# Patient Record
Sex: MALE | Race: BLACK OR AFRICAN AMERICAN | NOT HISPANIC OR LATINO | URBAN - METROPOLITAN AREA
[De-identification: names, ages, dates, MRNs, and addresses within clinical notes are randomized per-mention and may not be internally consistent; named-entity substitution may affect disease eponyms.]

---

## 2020-11-06 ENCOUNTER — EMERGENCY (EMERGENCY)
Facility: HOSPITAL | Age: 55
LOS: 1 days | Discharge: HOME | End: 2020-11-06
Admitting: EMERGENCY MEDICINE
Payer: MEDICAID

## 2020-11-06 VITALS
SYSTOLIC BLOOD PRESSURE: 123 MMHG | OXYGEN SATURATION: 96 % | HEIGHT: 74 IN | RESPIRATION RATE: 16 BRPM | TEMPERATURE: 98 F | DIASTOLIC BLOOD PRESSURE: 83 MMHG | HEART RATE: 118 BPM | WEIGHT: 205.03 LBS

## 2020-11-06 VITALS
RESPIRATION RATE: 16 BRPM | HEART RATE: 85 BPM | DIASTOLIC BLOOD PRESSURE: 73 MMHG | SYSTOLIC BLOOD PRESSURE: 116 MMHG | OXYGEN SATURATION: 96 %

## 2020-11-06 DIAGNOSIS — R07.89 OTHER CHEST PAIN: ICD-10-CM

## 2020-11-06 LAB
ALBUMIN SERPL ELPH-MCNC: 3.8 G/DL — SIGNIFICANT CHANGE UP (ref 3.4–5)
ALP SERPL-CCNC: 53 U/L — SIGNIFICANT CHANGE UP (ref 40–120)
ALT FLD-CCNC: 34 U/L — SIGNIFICANT CHANGE UP (ref 12–42)
ANION GAP SERPL CALC-SCNC: 10 MMOL/L — SIGNIFICANT CHANGE UP (ref 9–16)
AST SERPL-CCNC: 48 U/L — HIGH (ref 15–37)
BILIRUB SERPL-MCNC: 0.8 MG/DL — SIGNIFICANT CHANGE UP (ref 0.2–1.2)
BUN SERPL-MCNC: 17 MG/DL — SIGNIFICANT CHANGE UP (ref 7–23)
CALCIUM SERPL-MCNC: 9 MG/DL — SIGNIFICANT CHANGE UP (ref 8.5–10.5)
CHLORIDE SERPL-SCNC: 105 MMOL/L — SIGNIFICANT CHANGE UP (ref 96–108)
CO2 SERPL-SCNC: 26 MMOL/L — SIGNIFICANT CHANGE UP (ref 22–31)
CREAT SERPL-MCNC: 1.27 MG/DL — SIGNIFICANT CHANGE UP (ref 0.5–1.3)
D DIMER BLD IA.RAPID-MCNC: 430 NG/ML DDU — HIGH
GLUCOSE SERPL-MCNC: 91 MG/DL — SIGNIFICANT CHANGE UP (ref 70–99)
HCT VFR BLD CALC: 37.4 % — LOW (ref 39–50)
HGB BLD-MCNC: 12.5 G/DL — LOW (ref 13–17)
MCHC RBC-ENTMCNC: 31.6 PG — SIGNIFICANT CHANGE UP (ref 27–34)
MCHC RBC-ENTMCNC: 33.4 GM/DL — SIGNIFICANT CHANGE UP (ref 32–36)
MCV RBC AUTO: 94.7 FL — SIGNIFICANT CHANGE UP (ref 80–100)
NRBC # BLD: 0 /100 WBCS — SIGNIFICANT CHANGE UP (ref 0–0)
PLATELET # BLD AUTO: 189 K/UL — SIGNIFICANT CHANGE UP (ref 150–400)
POTASSIUM SERPL-MCNC: 4.1 MMOL/L — SIGNIFICANT CHANGE UP (ref 3.5–5.3)
POTASSIUM SERPL-SCNC: 4.1 MMOL/L — SIGNIFICANT CHANGE UP (ref 3.5–5.3)
PROT SERPL-MCNC: 7.9 G/DL — SIGNIFICANT CHANGE UP (ref 6.4–8.2)
RBC # BLD: 3.95 M/UL — LOW (ref 4.2–5.8)
RBC # FLD: 13.6 % — SIGNIFICANT CHANGE UP (ref 10.3–14.5)
SODIUM SERPL-SCNC: 141 MMOL/L — SIGNIFICANT CHANGE UP (ref 132–145)
TROPONIN I SERPL-MCNC: 0.03 NG/ML — SIGNIFICANT CHANGE UP (ref 0.02–0.06)
TROPONIN I SERPL-MCNC: 0.04 NG/ML — SIGNIFICANT CHANGE UP (ref 0.02–0.06)
TSH SERPL-MCNC: 2.26 UIU/ML — SIGNIFICANT CHANGE UP (ref 0.36–3.74)
WBC # BLD: 8.53 K/UL — SIGNIFICANT CHANGE UP (ref 3.8–10.5)
WBC # FLD AUTO: 8.53 K/UL — SIGNIFICANT CHANGE UP (ref 3.8–10.5)

## 2020-11-06 PROCEDURE — 93010 ELECTROCARDIOGRAM REPORT: CPT

## 2020-11-06 PROCEDURE — 71045 X-RAY EXAM CHEST 1 VIEW: CPT | Mod: 26

## 2020-11-06 PROCEDURE — 99285 EMERGENCY DEPT VISIT HI MDM: CPT | Mod: 25

## 2020-11-06 RX ORDER — ASPIRIN/CALCIUM CARB/MAGNESIUM 324 MG
325 TABLET ORAL ONCE
Refills: 0 | Status: COMPLETED | OUTPATIENT
Start: 2020-11-06 | End: 2020-11-06

## 2020-11-06 RX ADMIN — Medication 325 MILLIGRAM(S): at 04:19

## 2020-11-06 NOTE — ED ADULT NURSE NOTE - NSIMPLEMENTINTERV_GEN_ALL_ED
Implemented All Universal Safety Interventions:  [place] to call system. Call bell, personal items and telephone within reach. Instruct patient to call for assistance. Room bathroom lighting operational. Non-slip footwear when patient is off stretcher. Physically safe environment: no spills, clutter or unnecessary equipment. Stretcher in lowest position, wheels locked, appropriate side rails in place.

## 2020-11-06 NOTE — ED PROVIDER NOTE - OBJECTIVE STATEMENT
56 yo male with pmhx HTN presents c/o left sided chest pain x 1 hour, states he walked here from [address] currently falling asleep, poorly cooperative with history, patient unable to provide details of chest discomfort. no vomiting/diaphoresis. denies smoking or drug use history including no cocaine use. patient states he was told he had pericarditis 5 years ago.

## 2020-11-06 NOTE — ED PROVIDER NOTE - DIAGNOSTIC INTERPRETATION
Interpreted by ED JESUS Huggins  chest x-ray 1 views  Lungs clear, heart shadow normal, bony structures normal, no free air under diaphragm, no PTX

## 2020-11-06 NOTE — ED PROVIDER NOTE - PHYSICAL EXAMINATION
CONSTITUTIONAL: Well-appearing; well-nourished; in no apparent distress.   	HEAD: Normocephalic; atraumatic.   	EYES:  conjunctiva and sclera clear  	ENT: normal nose; no rhinorrhea; normal pharynx with no erythema or lesions.   	NECK: Supple; non-tender;   	CARDIOVASCULAR: Normal S1, S2; no murmurs, rubs, or gallops. Regular rate and rhythm.   	RESPIRATORY: Breathing easily; breath sounds clear and equal bilaterally; no wheezes, rhonchi, or rales.  	GI: Soft; non-distended; non-tender  	EXT: No cyanosis or edema; N/V intact  	SKIN: Normal for age and race; warm; dry; good turgor; no apparent lesions or rash.   	NEURO: A & O x 3; face symmetric; grossly unremarkable.   PSYCHOLOGICAL: The patient’s mood and manner are appropriate.

## 2020-11-06 NOTE — ED PROVIDER NOTE - CARE PROVIDER_API CALL
Getachew Guardado  CARDIOVASCULAR DISEASE  [address] [facility] [facility], 3rd [place] [address]  Phone: [phone]  Fax: [phone]  Follow Up Time:

## 2020-11-06 NOTE — ED ADULT NURSE REASSESSMENT NOTE - NS ED NURSE REASSESS COMMENT FT1
Patient sleeping comfortably in stretcher, blood collected and sent to lab and heplock placed to right ac 20G patent and intact free or redness swelling or abnormalities. Attempted to perform Covid 19 swab multiple times. Patient was unable to tolerate it and became slightly irate. Purpose and importance of covid testing explained to patient. Provider informed and aware.

## 2020-11-06 NOTE — ED PROVIDER NOTE - PATIENT PORTAL LINK FT
You can access the FollowMyHealth Patient Portal offered by [facility] by registering at the following website: [email or web address]. By joining [employer]’s FollowMyHealth portal, you will also be able to view your health information using other applications (apps) compatible with our system.

## 2020-11-06 NOTE — ED PROVIDER NOTE - CLINICAL SUMMARY MEDICAL DECISION MAKING FREE TEXT BOX
chest pain, poor historian, patient sleeping throughout ED stay, well appearing, NAD, EKG NSR, trop x 2 neg, chest pain free now, advised close follow up with cards, return precautions discussed.

## 2021-03-31 ENCOUNTER — EMERGENCY (EMERGENCY)
Facility: HOSPITAL | Age: 56
LOS: 1 days | Discharge: HOME | End: 2021-03-31
Attending: EMERGENCY MEDICINE | Admitting: EMERGENCY MEDICINE
Payer: MEDICAID

## 2021-03-31 VITALS
OXYGEN SATURATION: 96 % | DIASTOLIC BLOOD PRESSURE: 75 MMHG | HEART RATE: 82 BPM | SYSTOLIC BLOOD PRESSURE: 104 MMHG | RESPIRATION RATE: 18 BRPM | TEMPERATURE: 98 F | HEIGHT: 74 IN | WEIGHT: 250 LBS

## 2021-03-31 DIAGNOSIS — R07.89 OTHER CHEST PAIN: ICD-10-CM

## 2021-03-31 DIAGNOSIS — Z86.79 PERSONAL HISTORY OF OTHER DISEASES OF THE CIRCULATORY SYSTEM: ICD-10-CM

## 2021-03-31 DIAGNOSIS — Z91.14 PATIENT'S OTHER NONCOMPLIANCE WITH MEDICATION REGIMEN: ICD-10-CM

## 2021-03-31 DIAGNOSIS — I10 ESSENTIAL (PRIMARY) HYPERTENSION: ICD-10-CM

## 2021-03-31 LAB
ALBUMIN SERPL ELPH-MCNC: 3.8 G/DL — SIGNIFICANT CHANGE UP (ref 3.4–5)
ALP SERPL-CCNC: 79 U/L — SIGNIFICANT CHANGE UP (ref 40–120)
ALT FLD-CCNC: 37 U/L — SIGNIFICANT CHANGE UP (ref 12–42)
ANION GAP SERPL CALC-SCNC: 9 MMOL/L — SIGNIFICANT CHANGE UP (ref 9–16)
AST SERPL-CCNC: 28 U/L — SIGNIFICANT CHANGE UP (ref 15–37)
BASOPHILS # BLD AUTO: 0.03 K/UL — SIGNIFICANT CHANGE UP (ref 0–0.2)
BASOPHILS NFR BLD AUTO: 0.6 % — SIGNIFICANT CHANGE UP (ref 0–2)
BILIRUB SERPL-MCNC: 0.4 MG/DL — SIGNIFICANT CHANGE UP (ref 0.2–1.2)
BUN SERPL-MCNC: 13 MG/DL — SIGNIFICANT CHANGE UP (ref 7–23)
CALCIUM SERPL-MCNC: 9.4 MG/DL — SIGNIFICANT CHANGE UP (ref 8.5–10.5)
CHLORIDE SERPL-SCNC: 106 MMOL/L — SIGNIFICANT CHANGE UP (ref 96–108)
CO2 SERPL-SCNC: 28 MMOL/L — SIGNIFICANT CHANGE UP (ref 22–31)
CREAT SERPL-MCNC: 1.23 MG/DL — SIGNIFICANT CHANGE UP (ref 0.5–1.3)
EOSINOPHIL # BLD AUTO: 0.2 K/UL — SIGNIFICANT CHANGE UP (ref 0–0.5)
EOSINOPHIL NFR BLD AUTO: 4.1 % — SIGNIFICANT CHANGE UP (ref 0–6)
GLUCOSE SERPL-MCNC: 76 MG/DL — SIGNIFICANT CHANGE UP (ref 70–99)
HCT VFR BLD CALC: 37.2 % — LOW (ref 39–50)
HGB BLD-MCNC: 12.7 G/DL — LOW (ref 13–17)
IMM GRANULOCYTES NFR BLD AUTO: 0.6 % — SIGNIFICANT CHANGE UP (ref 0–1.5)
LYMPHOCYTES # BLD AUTO: 2.2 K/UL — SIGNIFICANT CHANGE UP (ref 1–3.3)
LYMPHOCYTES # BLD AUTO: 44.8 % — HIGH (ref 13–44)
MCHC RBC-ENTMCNC: 32.2 PG — SIGNIFICANT CHANGE UP (ref 27–34)
MCHC RBC-ENTMCNC: 34.1 GM/DL — SIGNIFICANT CHANGE UP (ref 32–36)
MCV RBC AUTO: 94.4 FL — SIGNIFICANT CHANGE UP (ref 80–100)
MONOCYTES # BLD AUTO: 0.51 K/UL — SIGNIFICANT CHANGE UP (ref 0–0.9)
MONOCYTES NFR BLD AUTO: 10.4 % — SIGNIFICANT CHANGE UP (ref 2–14)
NEUTROPHILS # BLD AUTO: 1.94 K/UL — SIGNIFICANT CHANGE UP (ref 1.8–7.4)
NEUTROPHILS NFR BLD AUTO: 39.5 % — LOW (ref 43–77)
NRBC # BLD: 0 /100 WBCS — SIGNIFICANT CHANGE UP (ref 0–0)
PLATELET # BLD AUTO: 239 K/UL — SIGNIFICANT CHANGE UP (ref 150–400)
POTASSIUM SERPL-MCNC: 3.9 MMOL/L — SIGNIFICANT CHANGE UP (ref 3.5–5.3)
POTASSIUM SERPL-SCNC: 3.9 MMOL/L — SIGNIFICANT CHANGE UP (ref 3.5–5.3)
PROT SERPL-MCNC: 7.6 G/DL — SIGNIFICANT CHANGE UP (ref 6.4–8.2)
RBC # BLD: 3.94 M/UL — LOW (ref 4.2–5.8)
RBC # FLD: 14.1 % — SIGNIFICANT CHANGE UP (ref 10.3–14.5)
SODIUM SERPL-SCNC: 143 MMOL/L — SIGNIFICANT CHANGE UP (ref 132–145)
TROPONIN I SERPL-MCNC: 0.02 NG/ML — SIGNIFICANT CHANGE UP (ref 0.02–0.06)
WBC # BLD: 4.91 K/UL — SIGNIFICANT CHANGE UP (ref 3.8–10.5)
WBC # FLD AUTO: 4.91 K/UL — SIGNIFICANT CHANGE UP (ref 3.8–10.5)

## 2021-03-31 PROCEDURE — 93010 ELECTROCARDIOGRAM REPORT: CPT

## 2021-03-31 PROCEDURE — 99284 EMERGENCY DEPT VISIT MOD MDM: CPT | Mod: 25

## 2021-03-31 PROCEDURE — 71046 X-RAY EXAM CHEST 2 VIEWS: CPT | Mod: 26

## 2021-03-31 RX ORDER — KETOROLAC TROMETHAMINE 30 MG/ML
15 SYRINGE (ML) INJECTION ONCE
Refills: 0 | Status: DISCONTINUED | OUTPATIENT
Start: 2021-03-31 | End: 2021-03-31

## 2021-03-31 RX ADMIN — Medication 15 MILLIGRAM(S): at 06:48

## 2021-03-31 NOTE — ED PROVIDER NOTE - OBJECTIVE STATEMENT
56 yo M, hx HTN, pericarditis, noncompliant with hctz, or medical care, presents with intermittent L sided chest pain, sharp, nonradiating. denies associated sob, syncope, palpitations, or abd pain, N/V/D. denies recent trauma. denies etoh, drugs or smoking. notes did not take anything for the pain. denies recent uri fever, chills or cough.

## 2021-03-31 NOTE — ED PROVIDER NOTE - CLINICAL SUMMARY MEDICAL DECISION MAKING FREE TEXT BOX
patient with hx of htn, pericarditis. ekg in ed not c/w pericarditis, will do 1 trop, and basic labs. do not suspect pe. or dissection. well appearing with stable vs.

## 2021-03-31 NOTE — ED ADULT TRIAGE NOTE - CHIEF COMPLAINT QUOTE
walk in pt with complaints of sharp chest pain radiating up left neck and left arm. Hx pericarditis and htn. Has not taken htn meds 'in a while.'

## 2021-03-31 NOTE — ED ADULT NURSE NOTE - OBJECTIVE STATEMENT
Pt states "I started having this pain yesterday and its not going away". Pt denies associated complaints at this time.

## 2021-03-31 NOTE — ED PROVIDER NOTE - CARE PROVIDERS DIRECT ADDRESSES
[email or web address]

## 2021-03-31 NOTE — ED PROVIDER NOTE - PATIENT PORTAL LINK FT
You can access the FollowMyHealth Patient Portal offered by [facility] by registering at the following website: [email or web address]. By joining [employer]’s FollowMyHealth portal, you will also be able to view your health information using other applications (apps) compatible with our system.

## 2021-05-27 ENCOUNTER — EMERGENCY (EMERGENCY)
Facility: HOSPITAL | Age: 56
LOS: 1 days | Discharge: HOME | End: 2021-05-27
Attending: EMERGENCY MEDICINE | Admitting: EMERGENCY MEDICINE
Payer: MEDICAID

## 2021-05-27 VITALS
HEART RATE: 95 BPM | OXYGEN SATURATION: 96 % | RESPIRATION RATE: 16 BRPM | SYSTOLIC BLOOD PRESSURE: 128 MMHG | DIASTOLIC BLOOD PRESSURE: 76 MMHG | TEMPERATURE: 98 F

## 2021-05-27 VITALS
DIASTOLIC BLOOD PRESSURE: 69 MMHG | RESPIRATION RATE: 16 BRPM | SYSTOLIC BLOOD PRESSURE: 111 MMHG | TEMPERATURE: 98 F | OXYGEN SATURATION: 94 % | WEIGHT: 250 LBS | HEART RATE: 109 BPM | HEIGHT: 74 IN

## 2021-05-27 DIAGNOSIS — F10.129 ALCOHOL ABUSE WITH INTOXICATION, UNSPECIFIED: ICD-10-CM

## 2021-05-27 DIAGNOSIS — I10 ESSENTIAL (PRIMARY) HYPERTENSION: ICD-10-CM

## 2021-05-27 DIAGNOSIS — M54.2 CERVICALGIA: ICD-10-CM

## 2021-05-27 DIAGNOSIS — R07.9 CHEST PAIN, UNSPECIFIED: ICD-10-CM

## 2021-05-27 DIAGNOSIS — R06.02 SHORTNESS OF BREATH: ICD-10-CM

## 2021-05-27 LAB
ALBUMIN SERPL ELPH-MCNC: 4 G/DL — SIGNIFICANT CHANGE UP (ref 3.4–5)
ALP SERPL-CCNC: 49 U/L — SIGNIFICANT CHANGE UP (ref 40–120)
ALT FLD-CCNC: 35 U/L — SIGNIFICANT CHANGE UP (ref 12–42)
ANION GAP SERPL CALC-SCNC: 14 MMOL/L — SIGNIFICANT CHANGE UP (ref 9–16)
AST SERPL-CCNC: 36 U/L — SIGNIFICANT CHANGE UP (ref 15–37)
BASOPHILS # BLD AUTO: 0.04 K/UL — SIGNIFICANT CHANGE UP (ref 0–0.2)
BASOPHILS NFR BLD AUTO: 0.6 % — SIGNIFICANT CHANGE UP (ref 0–2)
BILIRUB SERPL-MCNC: 0.8 MG/DL — SIGNIFICANT CHANGE UP (ref 0.2–1.2)
BUN SERPL-MCNC: 15 MG/DL — SIGNIFICANT CHANGE UP (ref 7–23)
CALCIUM SERPL-MCNC: 9 MG/DL — SIGNIFICANT CHANGE UP (ref 8.5–10.5)
CHLORIDE SERPL-SCNC: 104 MMOL/L — SIGNIFICANT CHANGE UP (ref 96–108)
CO2 SERPL-SCNC: 22 MMOL/L — SIGNIFICANT CHANGE UP (ref 22–31)
CREAT SERPL-MCNC: 1.27 MG/DL — SIGNIFICANT CHANGE UP (ref 0.5–1.3)
D DIMER BLD IA.RAPID-MCNC: 446 NG/ML DDU — HIGH
EOSINOPHIL # BLD AUTO: 0.04 K/UL — SIGNIFICANT CHANGE UP (ref 0–0.5)
EOSINOPHIL NFR BLD AUTO: 0.6 % — SIGNIFICANT CHANGE UP (ref 0–6)
ETHANOL SERPL-MCNC: 110 MG/DL — HIGH
GLUCOSE SERPL-MCNC: 121 MG/DL — HIGH (ref 70–99)
HCT VFR BLD CALC: 36.8 % — LOW (ref 39–50)
HGB BLD-MCNC: 12.6 G/DL — LOW (ref 13–17)
IMM GRANULOCYTES NFR BLD AUTO: 0.3 % — SIGNIFICANT CHANGE UP (ref 0–1.5)
LIDOCAIN IGE QN: 32 U/L — LOW (ref 73–393)
LYMPHOCYTES # BLD AUTO: 2.21 K/UL — SIGNIFICANT CHANGE UP (ref 1–3.3)
LYMPHOCYTES # BLD AUTO: 33.3 % — SIGNIFICANT CHANGE UP (ref 13–44)
MAGNESIUM SERPL-MCNC: 2 MG/DL — SIGNIFICANT CHANGE UP (ref 1.6–2.6)
MCHC RBC-ENTMCNC: 32 PG — SIGNIFICANT CHANGE UP (ref 27–34)
MCHC RBC-ENTMCNC: 34.2 GM/DL — SIGNIFICANT CHANGE UP (ref 32–36)
MCV RBC AUTO: 93.4 FL — SIGNIFICANT CHANGE UP (ref 80–100)
MONOCYTES # BLD AUTO: 0.51 K/UL — SIGNIFICANT CHANGE UP (ref 0–0.9)
MONOCYTES NFR BLD AUTO: 7.7 % — SIGNIFICANT CHANGE UP (ref 2–14)
NEUTROPHILS # BLD AUTO: 3.82 K/UL — SIGNIFICANT CHANGE UP (ref 1.8–7.4)
NEUTROPHILS NFR BLD AUTO: 57.5 % — SIGNIFICANT CHANGE UP (ref 43–77)
NRBC # BLD: 0 /100 WBCS — SIGNIFICANT CHANGE UP (ref 0–0)
PLATELET # BLD AUTO: 246 K/UL — SIGNIFICANT CHANGE UP (ref 150–400)
POTASSIUM SERPL-MCNC: 3.6 MMOL/L — SIGNIFICANT CHANGE UP (ref 3.5–5.3)
POTASSIUM SERPL-SCNC: 3.6 MMOL/L — SIGNIFICANT CHANGE UP (ref 3.5–5.3)
PROT SERPL-MCNC: 7.8 G/DL — SIGNIFICANT CHANGE UP (ref 6.4–8.2)
RBC # BLD: 3.94 M/UL — LOW (ref 4.2–5.8)
RBC # FLD: 14.1 % — SIGNIFICANT CHANGE UP (ref 10.3–14.5)
SODIUM SERPL-SCNC: 140 MMOL/L — SIGNIFICANT CHANGE UP (ref 132–145)
TROPONIN I SERPL-MCNC: 0.03 NG/ML — SIGNIFICANT CHANGE UP (ref 0.02–0.06)
TROPONIN I SERPL-MCNC: <0.017 NG/ML — LOW (ref 0.02–0.06)
WBC # BLD: 6.64 K/UL — SIGNIFICANT CHANGE UP (ref 3.8–10.5)
WBC # FLD AUTO: 6.64 K/UL — SIGNIFICANT CHANGE UP (ref 3.8–10.5)

## 2021-05-27 PROCEDURE — 71275 CT ANGIOGRAPHY CHEST: CPT | Mod: 26

## 2021-05-27 PROCEDURE — 99285 EMERGENCY DEPT VISIT HI MDM: CPT

## 2021-05-27 PROCEDURE — 93010 ELECTROCARDIOGRAM REPORT: CPT

## 2021-05-27 RX ORDER — ASPIRIN/CALCIUM CARB/MAGNESIUM 324 MG
325 TABLET ORAL ONCE
Refills: 0 | Status: COMPLETED | OUTPATIENT
Start: 2021-05-27 | End: 2021-05-27

## 2021-05-27 RX ORDER — DIPHENOXYLATE HCL/ATROPINE 2.5-.025MG
1 TABLET ORAL ONCE
Refills: 0 | Status: DISCONTINUED | OUTPATIENT
Start: 2021-05-27 | End: 2021-05-27

## 2021-05-27 RX ORDER — METOCLOPRAMIDE HCL 10 MG
10 TABLET ORAL ONCE
Refills: 0 | Status: DISCONTINUED | OUTPATIENT
Start: 2021-05-27 | End: 2021-05-27

## 2021-05-27 RX ORDER — MORPHINE SULFATE 50 MG/1
6 CAPSULE, EXTENDED RELEASE ORAL ONCE
Refills: 0 | Status: DISCONTINUED | OUTPATIENT
Start: 2021-05-27 | End: 2021-05-27

## 2021-05-27 RX ORDER — FAMOTIDINE 10 MG/ML
20 INJECTION INTRAVENOUS ONCE
Refills: 0 | Status: COMPLETED | OUTPATIENT
Start: 2021-05-27 | End: 2021-05-27

## 2021-05-27 RX ADMIN — Medication 30 MILLILITER(S): at 10:40

## 2021-05-27 RX ADMIN — Medication 325 MILLIGRAM(S): at 10:40

## 2021-05-27 RX ADMIN — FAMOTIDINE 20 MILLIGRAM(S): 10 INJECTION INTRAVENOUS at 10:40

## 2021-05-27 NOTE — ED PROVIDER NOTE - PATIENT PORTAL LINK FT
You can access the FollowMyHealth Patient Portal offered by [facility] by registering at the following website: [email or web address]. By joining [employer]’s FollowMyHealth portal, you will also be able to view your health information using other applications (apps) compatible with our system.

## 2021-05-27 NOTE — SBIRT NOTE ADULT - NSSBIRTALCACTIVEREFTXDET_GEN_A_CORE
Patient requested to go to detox treatment and is requesting placement at [facility] in [place]. This worker called the facility in which the have placement available for him for detox and he is well known to the facility. Patient in agreement with placement today and will be picked up and taken there

## 2021-05-27 NOTE — ED ADULT NURSE NOTE - NSIMPLEMENTINTERV_GEN_ALL_ED
Implemented All Universal Safety Interventions:  [place] to call system. Call bell, personal items and telephone within reach. Instruct patient to call for assistance. Room bathroom lighting operational. Non-slip footwear when patient is off stretcher. Physically safe environment: no spills, clutter or unnecessary equipment. Stretcher in lowest position, wheels locked, appropriate side rails in place.

## 2021-05-27 NOTE — ED PROVIDER NOTE - OBJECTIVE STATEMENT
56 y/o M with PMHx of HTN presents to the ED presents to the ED for L sided CP with radiation to the neck that started about 20 minutes PTA. Pt also reports having some SOB. He admits to drinking alcohol last night. When asked how many drinks, Pt states he had "a whole lot of shit." He denies fevers, chills, Abd pain, and N/V.

## 2021-05-27 NOTE — ED PROVIDER NOTE - CLINICAL SUMMARY MEDICAL DECISION MAKING FREE TEXT BOX
54 y/o M with Hx of HTN presenting with L sided CP that started about 20 minutes prior to arrival. Exam is remarkable for mild epigastric discomfort. Pt otherwise appears well and in no apparent distress. Plan for routine labs, CXR, EKG, and cardiac monitoring. Will reassess clinically after results have been obtained.

## 2021-05-27 NOTE — ED PROVIDER NOTE - CARE PROVIDER_API CALL
Getachew Guardado)  Cardiovascular Disease  [address] [facility] [place] [address]  Phone: [phone]  Fax: [phone]  Follow Up Time:

## 2021-05-27 NOTE — ED PROVIDER NOTE - PROGRESS NOTE DETAILS
Lab results show elevated D-Dimer. Will order CT angio chest to r/o PE. Pt w normal trop x 2, CTA neg for PE, intoxicated, now clinically sober. Asking for detox placement help. SM called [facility] and they know pt, he can be picked up in 45 min. Will DC. Pt alert, tolerated po in ED.

## 2021-09-17 ENCOUNTER — EMERGENCY (EMERGENCY)
Facility: HOSPITAL | Age: 56
LOS: 1 days | Discharge: HOME | End: 2021-09-17
Attending: EMERGENCY MEDICINE | Admitting: EMERGENCY MEDICINE
Payer: MEDICAID

## 2021-09-17 VITALS
DIASTOLIC BLOOD PRESSURE: 71 MMHG | RESPIRATION RATE: 16 BRPM | SYSTOLIC BLOOD PRESSURE: 110 MMHG | HEART RATE: 65 BPM | OXYGEN SATURATION: 97 % | TEMPERATURE: 98 F

## 2021-09-17 VITALS
TEMPERATURE: 98 F | DIASTOLIC BLOOD PRESSURE: 74 MMHG | OXYGEN SATURATION: 98 % | HEIGHT: 74 IN | HEART RATE: 84 BPM | SYSTOLIC BLOOD PRESSURE: 131 MMHG | RESPIRATION RATE: 18 BRPM

## 2021-09-17 DIAGNOSIS — I10 ESSENTIAL (PRIMARY) HYPERTENSION: ICD-10-CM

## 2021-09-17 DIAGNOSIS — M79.18 MYALGIA, OTHER SITE: ICD-10-CM

## 2021-09-17 DIAGNOSIS — U07.1 COVID-19: ICD-10-CM

## 2021-09-17 PROCEDURE — 71045 X-RAY EXAM CHEST 1 VIEW: CPT | Mod: 26

## 2021-09-17 PROCEDURE — 99284 EMERGENCY DEPT VISIT MOD MDM: CPT | Mod: 25

## 2021-09-17 RX ORDER — KETOROLAC TROMETHAMINE 30 MG/ML
30 SYRINGE (ML) INJECTION ONCE
Refills: 0 | Status: DISCONTINUED | OUTPATIENT
Start: 2021-09-17 | End: 2021-09-17

## 2021-09-17 RX ORDER — ACETAMINOPHEN 500 MG
1000 TABLET ORAL ONCE
Refills: 0 | Status: COMPLETED | OUTPATIENT
Start: 2021-09-17 | End: 2021-09-17

## 2021-09-17 RX ADMIN — Medication 30 MILLIGRAM(S): at 10:46

## 2021-09-17 RX ADMIN — Medication 1000 MILLIGRAM(S): at 10:45

## 2021-09-17 NOTE — ED PROVIDER NOTE - PATIENT PORTAL LINK FT
You can access the FollowMyHealth Patient Portal offered by [facility] by registering at the following website: [email or web address]. By joining [employer]’s FollowMyHealth portal, you will also be able to view your health information using other applications (apps) compatible with our system.

## 2021-09-17 NOTE — ED PROVIDER NOTE - CLINICAL SUMMARY MEDICAL DECISION MAKING FREE TEXT BOX
COVID positive pt presents with generalized body ache and chest tightness. Physical exam was unremarkable. Will obtain chest X-ray and provide Tylenol and Toradol injection.

## 2021-09-17 NOTE — ED PROVIDER NOTE - OBJECTIVE STATEMENT
57 y/o M with PMHx of HTN presents to ED for generalized body ache and chest tightness with deep breathe, onset 6 days ago. Pt notes symptoms is worsening and would like a note to remain in the isolation hotel. Pt was initially negative with rapid COVID test, but tested positive at the shelter program. Pt is given ibuprofen with minimal relief. Pt is not in respiratory distress. Pt has a hx of drinking, but is trying to stay clean for his new job. Pt was vaccinated with his first dose of Moderna in March but did not receive his second dose.

## 2021-09-17 NOTE — ED ADULT TRIAGE NOTE - CHIEF COMPLAINT QUOTE
pt states he is covid positive dx 9/9/21 and has "whole body pain" requesting "something stronger than motrin" o2 sat 98 room air, afebrile  staying in [facility] shelter hotel but has been outside in stores, carrying grocery bags

## 2021-09-17 NOTE — ED PROVIDER NOTE - NSFOLLOWUPINSTRUCTIONS_ED_ALL_ED_FT
Pt is still recovering from Covid 19 and needs stable isolation housing    Give patient 600 mg of Ibuprofen every 6 to 8 hours for pain/fever    See Covid instructions below

## 2021-09-17 NOTE — ED ADULT NURSE NOTE - OBJECTIVE STATEMENT
Bed: 22  Expected date:   Expected time:   Means of arrival:   Comments:  ems   recent covid dx, c/o bodyaches, no s/s of distress, awaiting provider eval

## 2021-09-17 NOTE — ED PROVIDER NOTE - CONSTITUTIONAL, MLM
Well appearing, awake, alert, oriented to person, place, time/situation and in no respiratory distress. normal...

## 2021-10-31 ENCOUNTER — EMERGENCY (EMERGENCY)
Facility: HOSPITAL | Age: 56
LOS: 1 days | Discharge: HOME | End: 2021-10-31
Admitting: EMERGENCY MEDICINE
Payer: MEDICAID

## 2021-10-31 VITALS
WEIGHT: 255.07 LBS | DIASTOLIC BLOOD PRESSURE: 54 MMHG | OXYGEN SATURATION: 97 % | RESPIRATION RATE: 16 BRPM | HEART RATE: 122 BPM | SYSTOLIC BLOOD PRESSURE: 155 MMHG | TEMPERATURE: 98 F | HEIGHT: 74 IN

## 2021-10-31 VITALS
DIASTOLIC BLOOD PRESSURE: 56 MMHG | HEART RATE: 87 BPM | SYSTOLIC BLOOD PRESSURE: 107 MMHG | RESPIRATION RATE: 18 BRPM | OXYGEN SATURATION: 96 % | TEMPERATURE: 98 F

## 2021-10-31 DIAGNOSIS — M54.2 CERVICALGIA: ICD-10-CM

## 2021-10-31 DIAGNOSIS — R00.0 TACHYCARDIA, UNSPECIFIED: ICD-10-CM

## 2021-10-31 DIAGNOSIS — R07.89 OTHER CHEST PAIN: ICD-10-CM

## 2021-10-31 DIAGNOSIS — R11.0 NAUSEA: ICD-10-CM

## 2021-10-31 DIAGNOSIS — I10 ESSENTIAL (PRIMARY) HYPERTENSION: ICD-10-CM

## 2021-10-31 LAB
ALBUMIN SERPL ELPH-MCNC: 3.9 G/DL — SIGNIFICANT CHANGE UP (ref 3.4–5)
ALP SERPL-CCNC: 55 U/L — SIGNIFICANT CHANGE UP (ref 40–120)
ALT FLD-CCNC: 65 U/L — HIGH (ref 12–42)
AMPHET UR-MCNC: NEGATIVE — SIGNIFICANT CHANGE UP
ANION GAP SERPL CALC-SCNC: 13 MMOL/L — SIGNIFICANT CHANGE UP (ref 9–16)
APTT BLD: 28.4 SEC — SIGNIFICANT CHANGE UP (ref 27.5–35.5)
AST SERPL-CCNC: 56 U/L — HIGH (ref 15–37)
BARBITURATES UR SCN-MCNC: NEGATIVE — SIGNIFICANT CHANGE UP
BASOPHILS # BLD AUTO: 0.04 K/UL — SIGNIFICANT CHANGE UP (ref 0–0.2)
BASOPHILS NFR BLD AUTO: 0.6 % — SIGNIFICANT CHANGE UP (ref 0–2)
BENZODIAZ UR-MCNC: NEGATIVE — SIGNIFICANT CHANGE UP
BILIRUB SERPL-MCNC: 1 MG/DL — SIGNIFICANT CHANGE UP (ref 0.2–1.2)
BUN SERPL-MCNC: 17 MG/DL — SIGNIFICANT CHANGE UP (ref 7–23)
CALCIUM SERPL-MCNC: 8.8 MG/DL — SIGNIFICANT CHANGE UP (ref 8.5–10.5)
CHLORIDE SERPL-SCNC: 102 MMOL/L — SIGNIFICANT CHANGE UP (ref 96–108)
CO2 SERPL-SCNC: 25 MMOL/L — SIGNIFICANT CHANGE UP (ref 22–31)
COCAINE METAB.OTHER UR-MCNC: POSITIVE
CREAT SERPL-MCNC: 1.18 MG/DL — SIGNIFICANT CHANGE UP (ref 0.5–1.3)
EOSINOPHIL # BLD AUTO: 0.02 K/UL — SIGNIFICANT CHANGE UP (ref 0–0.5)
EOSINOPHIL NFR BLD AUTO: 0.3 % — SIGNIFICANT CHANGE UP (ref 0–6)
GLUCOSE SERPL-MCNC: 73 MG/DL — SIGNIFICANT CHANGE UP (ref 70–99)
HCT VFR BLD CALC: 36.6 % — LOW (ref 39–50)
HGB BLD-MCNC: 12.5 G/DL — LOW (ref 13–17)
IMM GRANULOCYTES NFR BLD AUTO: 0.4 % — SIGNIFICANT CHANGE UP (ref 0–1.5)
INR BLD: 1.21 — HIGH (ref 0.88–1.16)
LIDOCAIN IGE QN: 33 U/L — LOW (ref 73–393)
LYMPHOCYTES # BLD AUTO: 2.01 K/UL — SIGNIFICANT CHANGE UP (ref 1–3.3)
LYMPHOCYTES # BLD AUTO: 29.3 % — SIGNIFICANT CHANGE UP (ref 13–44)
MAGNESIUM SERPL-MCNC: 1.9 MG/DL — SIGNIFICANT CHANGE UP (ref 1.6–2.6)
MCHC RBC-ENTMCNC: 31.6 PG — SIGNIFICANT CHANGE UP (ref 27–34)
MCHC RBC-ENTMCNC: 34.2 GM/DL — SIGNIFICANT CHANGE UP (ref 32–36)
MCV RBC AUTO: 92.7 FL — SIGNIFICANT CHANGE UP (ref 80–100)
METHADONE UR-MCNC: NEGATIVE — SIGNIFICANT CHANGE UP
MONOCYTES # BLD AUTO: 0.72 K/UL — SIGNIFICANT CHANGE UP (ref 0–0.9)
MONOCYTES NFR BLD AUTO: 10.5 % — SIGNIFICANT CHANGE UP (ref 2–14)
NEUTROPHILS # BLD AUTO: 4.04 K/UL — SIGNIFICANT CHANGE UP (ref 1.8–7.4)
NEUTROPHILS NFR BLD AUTO: 58.9 % — SIGNIFICANT CHANGE UP (ref 43–77)
NRBC # BLD: 0 /100 WBCS — SIGNIFICANT CHANGE UP (ref 0–0)
NT-PROBNP SERPL-SCNC: 81 PG/ML — SIGNIFICANT CHANGE UP
OPIATES UR-MCNC: NEGATIVE — SIGNIFICANT CHANGE UP
PCP SPEC-MCNC: SIGNIFICANT CHANGE UP
PCP UR-MCNC: NEGATIVE — SIGNIFICANT CHANGE UP
PLATELET # BLD AUTO: 260 K/UL — SIGNIFICANT CHANGE UP (ref 150–400)
POTASSIUM SERPL-MCNC: 4.3 MMOL/L — SIGNIFICANT CHANGE UP (ref 3.5–5.3)
POTASSIUM SERPL-SCNC: 4.3 MMOL/L — SIGNIFICANT CHANGE UP (ref 3.5–5.3)
PROT SERPL-MCNC: 7.9 G/DL — SIGNIFICANT CHANGE UP (ref 6.4–8.2)
PROTHROM AB SERPL-ACNC: 14.1 SEC — HIGH (ref 10.6–13.6)
RBC # BLD: 3.95 M/UL — LOW (ref 4.2–5.8)
RBC # FLD: 14.2 % — SIGNIFICANT CHANGE UP (ref 10.3–14.5)
SODIUM SERPL-SCNC: 140 MMOL/L — SIGNIFICANT CHANGE UP (ref 132–145)
THC UR QL: NEGATIVE — SIGNIFICANT CHANGE UP
TROPONIN I, HIGH SENSITIVITY RESULT: 13.2 NG/L — SIGNIFICANT CHANGE UP
TROPONIN I, HIGH SENSITIVITY RESULT: 13.4 NG/L — SIGNIFICANT CHANGE UP
WBC # BLD: 6.86 K/UL — SIGNIFICANT CHANGE UP (ref 3.8–10.5)
WBC # FLD AUTO: 6.86 K/UL — SIGNIFICANT CHANGE UP (ref 3.8–10.5)

## 2021-10-31 PROCEDURE — 71046 X-RAY EXAM CHEST 2 VIEWS: CPT | Mod: 26

## 2021-10-31 PROCEDURE — 71275 CT ANGIOGRAPHY CHEST: CPT | Mod: 26

## 2021-10-31 PROCEDURE — 99285 EMERGENCY DEPT VISIT HI MDM: CPT

## 2021-10-31 PROCEDURE — 93010 ELECTROCARDIOGRAM REPORT: CPT

## 2021-10-31 RX ORDER — SODIUM CHLORIDE 9 MG/ML
1000 INJECTION INTRAMUSCULAR; INTRAVENOUS; SUBCUTANEOUS ONCE
Refills: 0 | Status: COMPLETED | OUTPATIENT
Start: 2021-10-31 | End: 2021-10-31

## 2021-10-31 RX ORDER — LIDOCAINE 4 G/100G
5 CREAM TOPICAL ONCE
Refills: 0 | Status: COMPLETED | OUTPATIENT
Start: 2021-10-31 | End: 2021-10-31

## 2021-10-31 RX ORDER — ASPIRIN/CALCIUM CARB/MAGNESIUM 324 MG
324 TABLET ORAL ONCE
Refills: 0 | Status: COMPLETED | OUTPATIENT
Start: 2021-10-31 | End: 2021-10-31

## 2021-10-31 RX ORDER — NITROGLYCERIN 6.5 MG
0.4 CAPSULE, EXTENDED RELEASE ORAL ONCE
Refills: 0 | Status: COMPLETED | OUTPATIENT
Start: 2021-10-31 | End: 2021-10-31

## 2021-10-31 RX ORDER — FAMOTIDINE 10 MG/ML
20 INJECTION INTRAVENOUS ONCE
Refills: 0 | Status: COMPLETED | OUTPATIENT
Start: 2021-10-31 | End: 2021-10-31

## 2021-10-31 RX ADMIN — Medication 30 MILLILITER(S): at 12:37

## 2021-10-31 RX ADMIN — SODIUM CHLORIDE 1000 MILLILITER(S): 9 INJECTION INTRAMUSCULAR; INTRAVENOUS; SUBCUTANEOUS at 12:36

## 2021-10-31 RX ADMIN — LIDOCAINE 5 MILLILITER(S): 4 CREAM TOPICAL at 12:38

## 2021-10-31 RX ADMIN — Medication 324 MILLIGRAM(S): at 12:37

## 2021-10-31 RX ADMIN — FAMOTIDINE 20 MILLIGRAM(S): 10 INJECTION INTRAVENOUS at 12:36

## 2021-10-31 RX ADMIN — Medication 0.4 MILLIGRAM(S): at 15:28

## 2021-10-31 NOTE — ED PROVIDER NOTE - CHIEF COMPLAINT
Endorse anxiety relating to health issues  Service to Behavioral Health  TSH   
PLAN  - Doppler U/S of right leg  - Referral to Ortho  
Patient has a history of prediabetes  PLAN  -HbA1c  -CBC  -CMP    
The patient is a 56y Male complaining of chest pain.

## 2021-10-31 NOTE — ED PROVIDER NOTE - OBJECTIVE STATEMENT
57 yo M w/ PMHx of pericarditis in 2015, HTN noncompliant with medications, recent COVID infection last month, s/p 1 dose of Moderna COVID vaccine, presents to the ED c/o L sided chest pain radiating to L neck with associated nausea, upset stomach, described as burning x 30 mins prior to arrival. Pt states chest pain is constant. Pt denies tobacco, alcohol or drug use. Pt denies fever, chills, cough, SOB, vomiting, back pain, dysuria, hematuria, lower leg swelling or recent travel.

## 2021-10-31 NOTE — ED PROVIDER NOTE - PATIENT PORTAL LINK FT
You can access the FollowMyHealth Patient Portal offered by [facility] by registering at the following website: [email or web address]. By joining [employer]’s FollowMyHealth portal, you will also be able to view your health information using other applications (apps) compatible with our system.

## 2021-10-31 NOTE — ED PROVIDER NOTE - CONSTITUTIONAL, MLM
poor hygiene, awake, alert, oriented to person, place, time/situation and in no apparent distress. normal...

## 2021-10-31 NOTE — ED PROVIDER NOTE - CHPI ED SYMPTOMS NEG
dysuria, hematuria, lower leg swelling/no back pain/no cough/no fever/no shortness of breath/no vomiting/no chills

## 2021-10-31 NOTE — ED ADULT NURSE NOTE - OBJECTIVE STATEMENT
Pt c/o sharp left-sided chest pain radiating up to neck and into left arm starting about 45 minutes ago. Does not volunteer additional information but says yes to nausea, abdominal pain, SOB. States was not feeling well this morning on waking but unclear in what way. When asked about puncture deonna and bruise to AC area states was admitted to [facility] last week, also for chest pain, unsure what results of admission were. Denies cough/fevers. Supposed to be taking HCTZ for HTN but has been out for a month.

## 2021-10-31 NOTE — ED ADULT NURSE NOTE - NSIMPLEMENTINTERV_GEN_ALL_ED
Implemented All Universal Safety Interventions:  [place] to call system. Call bell, personal items and telephone within reach. Instruct patient to call for assistance. Room bathroom lighting operational. Non-slip footwear when patient is off stretcher. Physically safe environment: no spills, clutter or unnecessary equipment. Stretcher in lowest position, wheels locked, appropriate side rails in place.

## 2021-10-31 NOTE — ED PROVIDER NOTE - NSFOLLOWUPINSTRUCTIONS_ED_ALL_ED_FT
Chest Pain    Chest pain can be caused by many different conditions which may or may not be dangerous. Causes include heartburn, lung infections, heart attack, blood clot in lungs, skin infections, strain or damage to muscle, cartilage, or bones, etc. In addition to a history and physical examination, an electrocardiogram (ECG) or other lab tests may have been performed to determine the cause of your chest pain. Follow up with your primary care provider or with a cardiologist as instructed.     SEEK IMMEDIATE MEDICAL CARE IF YOU HAVE ANY OF THE FOLLOWING SYMPTOMS: worsening chest pain, coughing up blood, unexplained back/neck/jaw pain, severe abdominal pain, dizziness or lightheadedness, fainting, shortness of breath, sweaty or clammy skin, vomiting, or racing heart beat. These symptoms may represent a serious problem that is an emergency. Do not wait to see if the symptoms will go away. Get medical help right away. Call 911 and do not drive yourself to the hospital.      HYDRATE WELL  FOLLOW UP WITH CARDIOLOGY.  CALL FOR A FOLLOW UP APPOINTMENT.

## 2021-12-03 ENCOUNTER — EMERGENCY (EMERGENCY)
Facility: HOSPITAL | Age: 56
LOS: 1 days | Discharge: HOME | End: 2021-12-03
Attending: EMERGENCY MEDICINE | Admitting: EMERGENCY MEDICINE
Payer: MEDICAID

## 2021-12-03 VITALS
SYSTOLIC BLOOD PRESSURE: 103 MMHG | HEART RATE: 62 BPM | TEMPERATURE: 98 F | OXYGEN SATURATION: 98 % | DIASTOLIC BLOOD PRESSURE: 65 MMHG | RESPIRATION RATE: 18 BRPM

## 2021-12-03 VITALS
HEIGHT: 74 IN | SYSTOLIC BLOOD PRESSURE: 108 MMHG | TEMPERATURE: 98 F | OXYGEN SATURATION: 97 % | HEART RATE: 75 BPM | WEIGHT: 255.96 LBS | DIASTOLIC BLOOD PRESSURE: 70 MMHG | RESPIRATION RATE: 16 BRPM

## 2021-12-03 DIAGNOSIS — R07.89 OTHER CHEST PAIN: ICD-10-CM

## 2021-12-03 DIAGNOSIS — M17.11 UNILATERAL PRIMARY OSTEOARTHRITIS, RIGHT KNEE: ICD-10-CM

## 2021-12-03 DIAGNOSIS — I10 ESSENTIAL (PRIMARY) HYPERTENSION: ICD-10-CM

## 2021-12-03 PROBLEM — I31.9 DISEASE OF PERICARDIUM, UNSPECIFIED: Chronic | Status: ACTIVE | Noted: 2021-10-31

## 2021-12-03 LAB
ALBUMIN SERPL ELPH-MCNC: 3.9 G/DL — SIGNIFICANT CHANGE UP (ref 3.4–5)
ALP SERPL-CCNC: 72 U/L — SIGNIFICANT CHANGE UP (ref 40–120)
ALT FLD-CCNC: 25 U/L — SIGNIFICANT CHANGE UP (ref 12–42)
ANION GAP SERPL CALC-SCNC: 6 MMOL/L — LOW (ref 9–16)
AST SERPL-CCNC: 25 U/L — SIGNIFICANT CHANGE UP (ref 15–37)
BASOPHILS # BLD AUTO: 0.02 K/UL — SIGNIFICANT CHANGE UP (ref 0–0.2)
BASOPHILS NFR BLD AUTO: 0.4 % — SIGNIFICANT CHANGE UP (ref 0–2)
BILIRUB SERPL-MCNC: 0.5 MG/DL — SIGNIFICANT CHANGE UP (ref 0.2–1.2)
BUN SERPL-MCNC: 18 MG/DL — SIGNIFICANT CHANGE UP (ref 7–23)
CALCIUM SERPL-MCNC: 9.1 MG/DL — SIGNIFICANT CHANGE UP (ref 8.5–10.5)
CHLORIDE SERPL-SCNC: 103 MMOL/L — SIGNIFICANT CHANGE UP (ref 96–108)
CO2 SERPL-SCNC: 29 MMOL/L — SIGNIFICANT CHANGE UP (ref 22–31)
CREAT SERPL-MCNC: 1.18 MG/DL — SIGNIFICANT CHANGE UP (ref 0.5–1.3)
EOSINOPHIL # BLD AUTO: 0.19 K/UL — SIGNIFICANT CHANGE UP (ref 0–0.5)
EOSINOPHIL NFR BLD AUTO: 3.7 % — SIGNIFICANT CHANGE UP (ref 0–6)
GLUCOSE SERPL-MCNC: 102 MG/DL — HIGH (ref 70–99)
HCT VFR BLD CALC: 38.3 % — LOW (ref 39–50)
HGB BLD-MCNC: 13 G/DL — SIGNIFICANT CHANGE UP (ref 13–17)
IMM GRANULOCYTES NFR BLD AUTO: 0.2 % — SIGNIFICANT CHANGE UP (ref 0–1.5)
LYMPHOCYTES # BLD AUTO: 2.21 K/UL — SIGNIFICANT CHANGE UP (ref 1–3.3)
LYMPHOCYTES # BLD AUTO: 42.7 % — SIGNIFICANT CHANGE UP (ref 13–44)
MCHC RBC-ENTMCNC: 31.3 PG — SIGNIFICANT CHANGE UP (ref 27–34)
MCHC RBC-ENTMCNC: 33.9 GM/DL — SIGNIFICANT CHANGE UP (ref 32–36)
MCV RBC AUTO: 92.3 FL — SIGNIFICANT CHANGE UP (ref 80–100)
MONOCYTES # BLD AUTO: 0.53 K/UL — SIGNIFICANT CHANGE UP (ref 0–0.9)
MONOCYTES NFR BLD AUTO: 10.2 % — SIGNIFICANT CHANGE UP (ref 2–14)
NEUTROPHILS # BLD AUTO: 2.22 K/UL — SIGNIFICANT CHANGE UP (ref 1.8–7.4)
NEUTROPHILS NFR BLD AUTO: 42.8 % — LOW (ref 43–77)
NRBC # BLD: 0 /100 WBCS — SIGNIFICANT CHANGE UP (ref 0–0)
PLATELET # BLD AUTO: 237 K/UL — SIGNIFICANT CHANGE UP (ref 150–400)
POTASSIUM SERPL-MCNC: 3.8 MMOL/L — SIGNIFICANT CHANGE UP (ref 3.5–5.3)
POTASSIUM SERPL-SCNC: 3.8 MMOL/L — SIGNIFICANT CHANGE UP (ref 3.5–5.3)
PROT SERPL-MCNC: 8 G/DL — SIGNIFICANT CHANGE UP (ref 6.4–8.2)
RBC # BLD: 4.15 M/UL — LOW (ref 4.2–5.8)
RBC # FLD: 13.3 % — SIGNIFICANT CHANGE UP (ref 10.3–14.5)
SODIUM SERPL-SCNC: 138 MMOL/L — SIGNIFICANT CHANGE UP (ref 132–145)
TROPONIN I, HIGH SENSITIVITY RESULT: 6.8 NG/L — SIGNIFICANT CHANGE UP
WBC # BLD: 5.18 K/UL — SIGNIFICANT CHANGE UP (ref 3.8–10.5)
WBC # FLD AUTO: 5.18 K/UL — SIGNIFICANT CHANGE UP (ref 3.8–10.5)

## 2021-12-03 PROCEDURE — 99285 EMERGENCY DEPT VISIT HI MDM: CPT | Mod: 25

## 2021-12-03 PROCEDURE — 71045 X-RAY EXAM CHEST 1 VIEW: CPT | Mod: 26

## 2021-12-03 PROCEDURE — 73562 X-RAY EXAM OF KNEE 3: CPT | Mod: 26,RT

## 2021-12-03 PROCEDURE — 93010 ELECTROCARDIOGRAM REPORT: CPT

## 2021-12-03 RX ORDER — KETOROLAC TROMETHAMINE 30 MG/ML
30 SYRINGE (ML) INJECTION ONCE
Refills: 0 | Status: DISCONTINUED | OUTPATIENT
Start: 2021-12-03 | End: 2021-12-03

## 2021-12-03 RX ORDER — FAMOTIDINE 10 MG/ML
1 INJECTION INTRAVENOUS
Qty: 28 | Refills: 0
Start: 2021-12-03 | End: 2021-12-16

## 2021-12-03 RX ORDER — FAMOTIDINE 10 MG/ML
20 INJECTION INTRAVENOUS ONCE
Refills: 0 | Status: COMPLETED | OUTPATIENT
Start: 2021-12-03 | End: 2021-12-03

## 2021-12-03 RX ADMIN — Medication 30 MILLIGRAM(S): at 10:45

## 2021-12-03 RX ADMIN — FAMOTIDINE 20 MILLIGRAM(S): 10 INJECTION INTRAVENOUS at 10:29

## 2021-12-03 RX ADMIN — Medication 30 MILLILITER(S): at 10:30

## 2021-12-03 RX ADMIN — Medication 30 MILLIGRAM(S): at 10:29

## 2021-12-03 NOTE — ED ADULT NURSE NOTE - OBJECTIVE STATEMENT
Pt c/o right knee pain x1 month and chest pain stating "I always have chest pain because my job is stressful". Denies SOB or any other complaints at this time. Full ROM to all extremities.

## 2021-12-03 NOTE — ED PROVIDER NOTE - CARE PROVIDER_API CALL
Getachew Guardado)  Cardiovascular Disease  [address] [facility], [address]rd [place] [address]  Phone: [phone]  Fax: [phone]  Follow Up Time:     Franky Merritt)  Orthopaedic Surgery; Sports Medicine  [address], [address]nd [place] [address]  Phone: [phone]  Fax: [phone]  Follow Up Time:

## 2021-12-03 NOTE — ED PROVIDER NOTE - CLINICAL SUMMARY MEDICAL DECISION MAKING FREE TEXT BOX
Negative workup for ACS, pain likely more related to GERD/gastritis, will d/c home on famotidine, f/u with both PMD and cardio. Given ACE wrap for knee and PO meds for pain control, f/u with ortho.

## 2021-12-03 NOTE — ED PROVIDER NOTE - PATIENT PORTAL LINK FT
You can access the FollowMyHealth Patient Portal offered by [facility] by registering at the following website: [email or web address]. By joining [employer]’s FollowMyHealth portal, you will also be able to view your health information using other applications (apps) compatible with our system.

## 2021-12-03 NOTE — ED ADULT NURSE NOTE - ALCOHOL PRE SCREEN (AUDIT - C)
Patient not in room  Continue current meds  Will f/u peripherally  Call us as needed Statement Selected

## 2021-12-03 NOTE — ED PROVIDER NOTE - NSFOLLOWUPINSTRUCTIONS_ED_ALL_ED_FT
Chest Pain    Chest pain can be caused by many different conditions which may or may not be dangerous. Causes include heartburn, lung infections, heart attack, blood clot in lungs, skin infections, strain or damage to muscle, cartilage, or bones, etc. In addition to a history and physical examination, an electrocardiogram (ECG) or other lab tests may have been performed to determine the cause of your chest pain. Follow up with your primary care provider or with a cardiologist as instructed.     SEEK IMMEDIATE MEDICAL CARE IF YOU HAVE ANY OF THE FOLLOWING SYMPTOMS: worsening chest pain, coughing up blood, unexplained back/neck/jaw pain, severe abdominal pain, dizziness or lightheadedness, fainting, shortness of breath, sweaty or clammy skin, vomiting, or racing heart beat. These symptoms may represent a serious problem that is an emergency. Do not wait to see if the symptoms will go away. Get medical help right away. Call 911 and do not drive yourself to the hospital.        Knee Pain    WHAT YOU NEED TO KNOW:    Knee pain may start suddenly, or it may be a long-term problem. You may have pain on the side, front, or back of your knee. You may have knee stiffness and swelling. You may hear popping sounds or feel like your knee is giving way or locking up as you walk. You may feel pain when you sit, stand, walk, or climb up and down stairs. Knee pain can be caused by conditions such as obesity, inflammation, or strains or tears in ligaments or tendons.     DISCHARGE INSTRUCTIONS:    Return to the emergency department if:   •Your pain is worse, even after treatment.       •You cannot bend or straighten your leg completely.       •The swelling around your knee does not go down even with treatment.      •Your knee is painful and hot to the touch.       Contact your healthcare provider if:   •You have questions or concerns about your condition or care.           Medicines: You may need any of the following:   •NSAIDs help decrease swelling and pain or fever. This medicine is available with or without a doctor's order. NSAIDs can cause stomach bleeding or kidney problems in certain people. If you take blood thinner medicine, always ask your healthcare provider if NSAIDs are safe for you. Always read the medicine label and follow directions.      •Acetaminophen decreases pain and fever. It is available without a doctor's order. Ask how much to take and how often to take it. Follow directions. Read the labels of all other medicines you are using to see if they also contain acetaminophen, or ask your doctor or pharmacist. Acetaminophen can cause liver damage if not taken correctly. Do not use more than 4 grams (4,000 milligrams) total of acetaminophen in one day.       •Prescription pain medicine may be given. Ask your healthcare provider how to take this medicine safely. Some prescription pain medicines contain acetaminophen. Do not take other medicines that contain acetaminophen without talking to your healthcare provider. Too much acetaminophen may cause liver damage. Prescription pain medicine may cause constipation. Ask your healthcare provider how to prevent or treat constipation.       •Take your medicine as directed. Contact your healthcare provider if you think your medicine is not helping or if you have side effects. Tell him or her if you are allergic to any medicine. Keep a list of the medicines, vitamins, and herbs you take. Include the amounts, and when and why you take them. Bring the list or the pill bottles to follow-up visits. Carry your medicine list with you in case of an emergency.      What you can do to manage your symptoms:   •Rest your knee so it can heal. Limit activities that increase your pain. Do low-impact exercises, such as walking or swimming.       •Apply ice to help reduce swelling and pain. Use an ice pack, or put crushed ice in a plastic bag. Cover it with a towel before you apply it to your knee. Apply ice for 15 to 20 minutes every hour, or as directed.      •Apply compression to help reduce swelling. Use a brace or bandage only as directed.      •Elevate your knee to help decrease pain and swelling. Elevate your knee while you are sitting or lying down. Prop your leg on pillows to keep your knee above the level of your heart.      •Prevent your knee from moving as directed. Your healthcare provider may put on a cast or splint. You may need to wear a leg brace to stabilize your knee. A leg brace can be adjusted to increase your range of motion as your knee heals.  Hinged Knee Braces            What you can do to prevent knee pain:   •Maintain a healthy weight. Extra weight increases your risk for knee pain. Ask your healthcare provider how much you should weigh. He or she can help you create a safe weight loss plan if you need to lose weight.      •Exercise or train properly. Use the correct equipment for sports. Wear shoes that provide good support. Check your posture often as you exercise, play sports, or train for an event. This can help prevent stress and strain on your knees. Rest between sessions so you do not overwork your knees.      Follow up with your healthcare provider within 24 hours or as directed: You may need follow-up treatments, such as steroid injections to decrease pain. Write down your questions so you remember to ask them during your visits.

## 2021-12-03 NOTE — ED ADULT NURSE REASSESSMENT NOTE - NS ED NURSE REASSESS COMMENT FT1
Pt placed on continuous cardiac monitoring. Pt requesting pain medication for knee pain, Dr. Valdes made aware. No acute signs of distress at this time.

## 2021-12-03 NOTE — ED PROVIDER NOTE - OBJECTIVE STATEMENT
57 yo M w/ PMHx of pericarditis in 2015, HTN noncompliant with medications, p/w non-exertional burning CP on/off for years, noted to be worse when stressed out. No SOB/palpitations. Sx worse at night. States "maybe it's heartburn." Took 200mg IBU yesterday w/out improvement. Also endorsing R knee pain/swelling that waxes/wanes with exertion and has been present for years. Pt is starting a new job in 3 days and wants to make sure that he's healthy enough for work. No falls/trauma. No fever/chills/rash.
0

## 2022-01-01 NOTE — ED ADULT TRIAGE NOTE - PAIN: [place]ENCE, MLM
complains of pain/discomfort I have reviewed and confirmed nurses' notes for patient's medications, allergies, medical history, and surgical history.

## 2022-01-07 ENCOUNTER — EMERGENCY (EMERGENCY)
Facility: HOSPITAL | Age: 57
LOS: 1 days | Discharge: HOME | End: 2022-01-07
Attending: EMERGENCY MEDICINE | Admitting: EMERGENCY MEDICINE
Payer: MEDICAID

## 2022-01-07 VITALS
HEIGHT: 74 IN | HEART RATE: 100 BPM | DIASTOLIC BLOOD PRESSURE: 70 MMHG | WEIGHT: 246.04 LBS | RESPIRATION RATE: 16 BRPM | OXYGEN SATURATION: 96 % | SYSTOLIC BLOOD PRESSURE: 106 MMHG | TEMPERATURE: 99 F

## 2022-01-07 DIAGNOSIS — I10 ESSENTIAL (PRIMARY) HYPERTENSION: ICD-10-CM

## 2022-01-07 DIAGNOSIS — R07.89 OTHER CHEST PAIN: ICD-10-CM

## 2022-01-07 DIAGNOSIS — F14.99 COCAINE USE, UNSPECIFIED WITH UNSPECIFIED COCAINE-INDUCED DISORDER: ICD-10-CM

## 2022-01-07 DIAGNOSIS — R07.9 CHEST PAIN, UNSPECIFIED: ICD-10-CM

## 2022-01-07 DIAGNOSIS — Z20.822 CONTACT WITH AND (SUSPECTED) EXPOSURE TO COVID-19: ICD-10-CM

## 2022-01-07 LAB
ALBUMIN SERPL ELPH-MCNC: 3.7 G/DL — SIGNIFICANT CHANGE UP (ref 3.4–5)
ALP SERPL-CCNC: 66 U/L — SIGNIFICANT CHANGE UP (ref 40–120)
ALT FLD-CCNC: 51 U/L — HIGH (ref 12–42)
ANION GAP SERPL CALC-SCNC: 8 MMOL/L — LOW (ref 9–16)
AST SERPL-CCNC: 37 U/L — SIGNIFICANT CHANGE UP (ref 15–37)
BASOPHILS # BLD AUTO: 0.03 K/UL — SIGNIFICANT CHANGE UP (ref 0–0.2)
BASOPHILS NFR BLD AUTO: 0.4 % — SIGNIFICANT CHANGE UP (ref 0–2)
BILIRUB SERPL-MCNC: 0.6 MG/DL — SIGNIFICANT CHANGE UP (ref 0.2–1.2)
BUN SERPL-MCNC: 17 MG/DL — SIGNIFICANT CHANGE UP (ref 7–23)
CALCIUM SERPL-MCNC: 9.1 MG/DL — SIGNIFICANT CHANGE UP (ref 8.5–10.5)
CHLORIDE SERPL-SCNC: 104 MMOL/L — SIGNIFICANT CHANGE UP (ref 96–108)
CO2 SERPL-SCNC: 24 MMOL/L — SIGNIFICANT CHANGE UP (ref 22–31)
CREAT SERPL-MCNC: 1.25 MG/DL — SIGNIFICANT CHANGE UP (ref 0.5–1.3)
EOSINOPHIL # BLD AUTO: 0.09 K/UL — SIGNIFICANT CHANGE UP (ref 0–0.5)
EOSINOPHIL NFR BLD AUTO: 1.2 % — SIGNIFICANT CHANGE UP (ref 0–6)
GLUCOSE SERPL-MCNC: 112 MG/DL — HIGH (ref 70–99)
HCT VFR BLD CALC: 37.2 % — LOW (ref 39–50)
HGB BLD-MCNC: 12.7 G/DL — LOW (ref 13–17)
IMM GRANULOCYTES NFR BLD AUTO: 0.4 % — SIGNIFICANT CHANGE UP (ref 0–1.5)
LYMPHOCYTES # BLD AUTO: 1.57 K/UL — SIGNIFICANT CHANGE UP (ref 1–3.3)
LYMPHOCYTES # BLD AUTO: 20.1 % — SIGNIFICANT CHANGE UP (ref 13–44)
MCHC RBC-ENTMCNC: 31.7 PG — SIGNIFICANT CHANGE UP (ref 27–34)
MCHC RBC-ENTMCNC: 34.1 GM/DL — SIGNIFICANT CHANGE UP (ref 32–36)
MCV RBC AUTO: 92.8 FL — SIGNIFICANT CHANGE UP (ref 80–100)
MONOCYTES # BLD AUTO: 0.7 K/UL — SIGNIFICANT CHANGE UP (ref 0–0.9)
MONOCYTES NFR BLD AUTO: 9 % — SIGNIFICANT CHANGE UP (ref 2–14)
NEUTROPHILS # BLD AUTO: 5.38 K/UL — SIGNIFICANT CHANGE UP (ref 1.8–7.4)
NEUTROPHILS NFR BLD AUTO: 68.9 % — SIGNIFICANT CHANGE UP (ref 43–77)
NRBC # BLD: 0 /100 WBCS — SIGNIFICANT CHANGE UP (ref 0–0)
PLATELET # BLD AUTO: 360 K/UL — SIGNIFICANT CHANGE UP (ref 150–400)
POTASSIUM SERPL-MCNC: 4.3 MMOL/L — SIGNIFICANT CHANGE UP (ref 3.5–5.3)
POTASSIUM SERPL-SCNC: 4.3 MMOL/L — SIGNIFICANT CHANGE UP (ref 3.5–5.3)
PROT SERPL-MCNC: 7.9 G/DL — SIGNIFICANT CHANGE UP (ref 6.4–8.2)
RBC # BLD: 4.01 M/UL — LOW (ref 4.2–5.8)
RBC # FLD: 13.6 % — SIGNIFICANT CHANGE UP (ref 10.3–14.5)
SARS-COV-2 RNA SPEC QL NAA+PROBE: SIGNIFICANT CHANGE UP
SODIUM SERPL-SCNC: 136 MMOL/L — SIGNIFICANT CHANGE UP (ref 132–145)
TROPONIN I, HIGH SENSITIVITY RESULT: 7.1 NG/L — SIGNIFICANT CHANGE UP
WBC # BLD: 7.8 K/UL — SIGNIFICANT CHANGE UP (ref 3.8–10.5)
WBC # FLD AUTO: 7.8 K/UL — SIGNIFICANT CHANGE UP (ref 3.8–10.5)

## 2022-01-07 PROCEDURE — 71045 X-RAY EXAM CHEST 1 VIEW: CPT | Mod: 26

## 2022-01-07 PROCEDURE — 99285 EMERGENCY DEPT VISIT HI MDM: CPT | Mod: 25

## 2022-01-07 PROCEDURE — 93010 ELECTROCARDIOGRAM REPORT: CPT

## 2022-01-07 RX ORDER — ASPIRIN/CALCIUM CARB/MAGNESIUM 324 MG
162 TABLET ORAL ONCE
Refills: 0 | Status: COMPLETED | OUTPATIENT
Start: 2022-01-07 | End: 2022-01-07

## 2022-01-07 RX ADMIN — Medication 162 MILLIGRAM(S): at 10:51

## 2022-01-07 NOTE — ED PROVIDER NOTE - NORMAL, MLM
Detail Level: Zone If patient would like to start Isotretinoin he is in town until August.  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

## 2022-01-07 NOTE — ED PROVIDER NOTE - OBJECTIVE STATEMENT
57 yo M w/ PMHx of pericarditis in 2015, HTN noncompliant with medications, p/w non-exertional CP     previous [facility] records reviewed 57 yo M w/ PMHx of pericarditis in 2015, HTN noncompliant with medications, p/w non-exertional CP   states he used cocaine last night     The patient denies the following symptoms:  headache, dizziness/lightheadedness, neck pain/neck stiffness, numbness/tingling, photophobia, change in vision/hearing/gait/mental status/speech,difficulty swallowing, focal weakness, rash, fever, chills, neck/jaw/arm or back pain, palpitations, shortness of breath, diaphoresis, swelling to arm and/or legs, N/V/D, abdominal pain, abdominal distention, back pain,  previous [facility] records reviewed

## 2022-01-07 NOTE — ED PROVIDER NOTE - PATIENT PORTAL LINK FT
You can access the FollowMyHealth Patient Portal offered by [facility] by registering at the following website: [email or web address]. By joining [employer]’s FollowMyHealth portal, you will also be able to view your health information using other applications (apps) compatible with our system.

## 2022-01-07 NOTE — ED PROVIDER NOTE - NSFOLLOWUPINSTRUCTIONS_ED_ALL_ED_FT
don't use cocaine    limit alcohol intake    take all prior medications as directed     follow up with your doctor next week    return to ER for worsening pain shortness of breath or any other concern

## 2022-01-07 NOTE — ED ADULT NURSE NOTE - OBJECTIVE STATEMENT
57 y/o male who is here with chest pain and cough- pt states he was dx with the flu " weeks ago " admits to alcohol and marijuana use-

## 2022-01-07 NOTE — ED PROVIDER NOTE - CLINICAL SUMMARY MEDICAL DECISION MAKING FREE TEXT BOX
The patient's symptoms progressively improved throughout the ED stay.  The patient tolerated PO fluids.  ED evaluation and management discussed with the patient and family (if available) in detail.  Close PMD and/or specialist follow up encouraged.  Strict ED return instructions discussed in detail for any worsening or new symptoms. The patient was given the opportunity to ask any questions about their discharge diagnosis and discharge instructions. The patient verbalized understanding of these instructions and need to return to the ED for any worsening of illness or for any concern. The patient received a printed version of the discharge instructions. The patient understands the Emergency Department diagnosis is a preliminary diagnosis often based on limited information and that the patient must adhere to the follow-up plan as discussed.  At the time of discharge from the Emergency Department, the patient is alert with fluent appropriate speech and ambulatory without difficulty.  A medical screening examination was performed and no emergency medical condition was identified.    @1230 pm pt is alert walking around ER and asking to home

## 2022-01-07 NOTE — ED ADULT NURSE NOTE - SUICIDE SCREENING QUESTION 2
No

## 2022-01-07 NOTE — ED ADULT NURSE NOTE - CHIEF COMPLAINT QUOTE
productive cough, lightheaded, sneezing, chest discomfort x 3 days, admits to "heavy" etoh use, crack/cocaine, marijuana use pm

## 2022-02-16 ENCOUNTER — EMERGENCY (EMERGENCY)
Facility: HOSPITAL | Age: 57
LOS: 1 days | Discharge: HOME | End: 2022-02-16
Admitting: EMERGENCY MEDICINE
Payer: MEDICAID

## 2022-02-16 VITALS
SYSTOLIC BLOOD PRESSURE: 115 MMHG | WEIGHT: 220.02 LBS | HEART RATE: 124 BPM | HEIGHT: 74 IN | DIASTOLIC BLOOD PRESSURE: 81 MMHG | TEMPERATURE: 98 F | OXYGEN SATURATION: 99 % | RESPIRATION RATE: 20 BRPM

## 2022-02-16 DIAGNOSIS — R07.2 PRECORDIAL PAIN: ICD-10-CM

## 2022-02-16 DIAGNOSIS — Z86.79 PERSONAL HISTORY OF OTHER DISEASES OF THE CIRCULATORY SYSTEM: ICD-10-CM

## 2022-02-16 DIAGNOSIS — R00.0 TACHYCARDIA, UNSPECIFIED: ICD-10-CM

## 2022-02-16 DIAGNOSIS — Z20.822 CONTACT WITH AND (SUSPECTED) EXPOSURE TO COVID-19: ICD-10-CM

## 2022-02-16 DIAGNOSIS — I10 ESSENTIAL (PRIMARY) HYPERTENSION: ICD-10-CM

## 2022-02-16 LAB
ALBUMIN SERPL ELPH-MCNC: 4.2 G/DL — SIGNIFICANT CHANGE UP (ref 3.4–5)
ALP SERPL-CCNC: 73 U/L — SIGNIFICANT CHANGE UP (ref 40–120)
ALT FLD-CCNC: 41 U/L — SIGNIFICANT CHANGE UP (ref 12–42)
ANION GAP SERPL CALC-SCNC: 13 MMOL/L — SIGNIFICANT CHANGE UP (ref 9–16)
APTT BLD: 29.7 SEC — SIGNIFICANT CHANGE UP (ref 27.5–35.5)
AST SERPL-CCNC: 38 U/L — HIGH (ref 15–37)
BASOPHILS # BLD AUTO: 0.03 K/UL — SIGNIFICANT CHANGE UP (ref 0–0.2)
BASOPHILS NFR BLD AUTO: 0.3 % — SIGNIFICANT CHANGE UP (ref 0–2)
BILIRUB SERPL-MCNC: 0.6 MG/DL — SIGNIFICANT CHANGE UP (ref 0.2–1.2)
BUN SERPL-MCNC: 14 MG/DL — SIGNIFICANT CHANGE UP (ref 7–23)
CALCIUM SERPL-MCNC: 9.5 MG/DL — SIGNIFICANT CHANGE UP (ref 8.5–10.5)
CHLORIDE SERPL-SCNC: 100 MMOL/L — SIGNIFICANT CHANGE UP (ref 96–108)
CO2 SERPL-SCNC: 26 MMOL/L — SIGNIFICANT CHANGE UP (ref 22–31)
CREAT SERPL-MCNC: 1.14 MG/DL — SIGNIFICANT CHANGE UP (ref 0.5–1.3)
D DIMER BLD IA.RAPID-MCNC: 588 NG/ML DDU — HIGH
EOSINOPHIL # BLD AUTO: 0.06 K/UL — SIGNIFICANT CHANGE UP (ref 0–0.5)
EOSINOPHIL NFR BLD AUTO: 0.6 % — SIGNIFICANT CHANGE UP (ref 0–6)
GLUCOSE SERPL-MCNC: 69 MG/DL — LOW (ref 70–99)
HCT VFR BLD CALC: 37.2 % — LOW (ref 39–50)
HGB BLD-MCNC: 12.8 G/DL — LOW (ref 13–17)
IMM GRANULOCYTES NFR BLD AUTO: 0.4 % — SIGNIFICANT CHANGE UP (ref 0–1.5)
INR BLD: 1.25 — HIGH (ref 0.88–1.16)
LYMPHOCYTES # BLD AUTO: 3.37 K/UL — HIGH (ref 1–3.3)
LYMPHOCYTES # BLD AUTO: 33.3 % — SIGNIFICANT CHANGE UP (ref 13–44)
MCHC RBC-ENTMCNC: 31.4 PG — SIGNIFICANT CHANGE UP (ref 27–34)
MCHC RBC-ENTMCNC: 34.4 GM/DL — SIGNIFICANT CHANGE UP (ref 32–36)
MCV RBC AUTO: 91.2 FL — SIGNIFICANT CHANGE UP (ref 80–100)
MONOCYTES # BLD AUTO: 0.94 K/UL — HIGH (ref 0–0.9)
MONOCYTES NFR BLD AUTO: 9.3 % — SIGNIFICANT CHANGE UP (ref 2–14)
NEUTROPHILS # BLD AUTO: 5.68 K/UL — SIGNIFICANT CHANGE UP (ref 1.8–7.4)
NEUTROPHILS NFR BLD AUTO: 56.1 % — SIGNIFICANT CHANGE UP (ref 43–77)
NRBC # BLD: 0 /100 WBCS — SIGNIFICANT CHANGE UP (ref 0–0)
PLATELET # BLD AUTO: 343 K/UL — SIGNIFICANT CHANGE UP (ref 150–400)
POTASSIUM SERPL-MCNC: 3.9 MMOL/L — SIGNIFICANT CHANGE UP (ref 3.5–5.3)
POTASSIUM SERPL-SCNC: 3.9 MMOL/L — SIGNIFICANT CHANGE UP (ref 3.5–5.3)
PROT SERPL-MCNC: 8.6 G/DL — HIGH (ref 6.4–8.2)
PROTHROM AB SERPL-ACNC: 14.8 SEC — HIGH (ref 10.6–13.6)
RBC # BLD: 4.08 M/UL — LOW (ref 4.2–5.8)
RBC # FLD: 14.2 % — SIGNIFICANT CHANGE UP (ref 10.3–14.5)
SARS-COV-2 RNA SPEC QL NAA+PROBE: SIGNIFICANT CHANGE UP
SODIUM SERPL-SCNC: 139 MMOL/L — SIGNIFICANT CHANGE UP (ref 132–145)
TROPONIN I, HIGH SENSITIVITY RESULT: 16.5 NG/L — SIGNIFICANT CHANGE UP
WBC # BLD: 10.12 K/UL — SIGNIFICANT CHANGE UP (ref 3.8–10.5)
WBC # FLD AUTO: 10.12 K/UL — SIGNIFICANT CHANGE UP (ref 3.8–10.5)

## 2022-02-16 PROCEDURE — 71275 CT ANGIOGRAPHY CHEST: CPT | Mod: 26

## 2022-02-16 PROCEDURE — 71046 X-RAY EXAM CHEST 2 VIEWS: CPT | Mod: 26

## 2022-02-16 PROCEDURE — 93010 ELECTROCARDIOGRAM REPORT: CPT

## 2022-02-16 PROCEDURE — 99285 EMERGENCY DEPT VISIT HI MDM: CPT

## 2022-02-16 NOTE — ED ADULT TRIAGE NOTE - PATIENT ON (OXYGEN DELIVERY METHOD)
P1 term baby has not breast fed yet. She is 4 hrs old. Put baby STS and tried latching but she is too sleepy. Discussed STS, cueing, if sleepy, attempt to latch baby every 3 hrs, baby's output and encouraged to call for assist. She has a breast pump.   room air

## 2022-02-16 NOTE — ED ADULT TRIAGE NOTE - CHIEF COMPLAINT QUOTE
Pt c/o chest pain radiating to LUE x15 minutes and diarhrea x1 day, endorses hx of pericarditis in 2015.

## 2022-02-16 NOTE — ED ADULT NURSE NOTE - OBJECTIVE STATEMENT
Patient states he developed sudden onset of chest pain that radiated up to his neck and left upper arm today. Patient states has never felt pain like this. States only cardiac history is pericarditis prior.

## 2022-02-17 VITALS
SYSTOLIC BLOOD PRESSURE: 120 MMHG | RESPIRATION RATE: 18 BRPM | OXYGEN SATURATION: 99 % | HEART RATE: 91 BPM | DIASTOLIC BLOOD PRESSURE: 74 MMHG | TEMPERATURE: 98 F

## 2022-02-17 LAB — GLUCOSE BLDC GLUCOMTR-MCNC: 124 MG/DL — HIGH (ref 70–99)

## 2022-02-17 RX ORDER — IBUPROFEN 200 MG
600 TABLET ORAL ONCE
Refills: 0 | Status: COMPLETED | OUTPATIENT
Start: 2022-02-17 | End: 2022-02-17

## 2022-02-17 RX ADMIN — Medication 600 MILLIGRAM(S): at 04:04

## 2022-02-17 NOTE — ED PROVIDER NOTE - PATIENT PORTAL LINK FT
You can access the FollowMyHealth Patient Portal offered by [facility] by registering at the following website: [email or web address]. By joining [employer]’s FollowMyHealth portal, you will also be able to view your health information using other applications (apps) compatible with our system.

## 2022-02-17 NOTE — ED PROVIDER NOTE - CLINICAL SUMMARY MEDICAL DECISION MAKING FREE TEXT BOX
57 yo m pmhx sig for pericarditis in 2015, HTN noncompliant with medications pw gradual onset mid sternal chest pain pleuritic non radiating with no provoking or modifying factors. The CP is non exertional non positional.

## 2022-02-17 NOTE — ED PROVIDER NOTE - PHYSICAL EXAMINATION
Physical Exam    Vital Signs: I have reviewed the initial vital signs.  Constitutional: well-nourished, appears stated age, no acute distress  Eyes: PERRLA, and symmetrical lids.  ENT: Neck supple with no adenopathy, moist MM.  Cardiovascular: +tachycardia, regular rhythm, well-perfused extremities, radial pulse +2 and equal b/l  Respiratory: unlabored respiratory effort, clear to auscultation bilaterally  Gastrointestinal: soft, non-tender abdomen, no guarding  Musculoskeletal: supple neck, no lower extremity edema  Integumentary: warm, dry, no rash  Neurologic: extremities’ motor and sensory functions grossly intact  Psychiatric: A&Ox3, appropriate mood, appropriate affect

## 2022-02-17 NOTE — ED PROVIDER NOTE - OBJECTIVE STATEMENT
55 yo m pmhx sig for pericarditis in 2015, HTN noncompliant with medications pw gradual onset mid sternal chest pain pleuritic non radiating with no provoking or modifying factors. The CP is non exertional non positional.    I have reviewed available current nursing and previous documentation of past medical, surgical, family, and/or social history.

## 2022-02-17 NOTE — ED PROVIDER NOTE - CARE PROVIDER_API CALL
Dianne Candelario)  Cardiovascular Disease; Internal Medicine  [address], [address]  [place], [place] [address]  Phone: [phone]  Fax: [phone]  Follow Up Time: 1-3 Days

## 2022-05-07 ENCOUNTER — EMERGENCY (EMERGENCY)
Facility: HOSPITAL | Age: 57
LOS: 1 days | Discharge: HOME | End: 2022-05-07
Admitting: EMERGENCY MEDICINE
Payer: MEDICAID

## 2022-05-07 VITALS
HEIGHT: 74 IN | SYSTOLIC BLOOD PRESSURE: 124 MMHG | RESPIRATION RATE: 18 BRPM | TEMPERATURE: 98 F | OXYGEN SATURATION: 96 % | DIASTOLIC BLOOD PRESSURE: 83 MMHG | HEART RATE: 102 BPM | WEIGHT: 255.07 LBS

## 2022-05-07 VITALS
DIASTOLIC BLOOD PRESSURE: 75 MMHG | OXYGEN SATURATION: 96 % | HEART RATE: 91 BPM | SYSTOLIC BLOOD PRESSURE: 122 MMHG | TEMPERATURE: 98 F | RESPIRATION RATE: 18 BRPM

## 2022-05-07 DIAGNOSIS — F43.20 ADJUSTMENT DISORDER, UNSPECIFIED: ICD-10-CM

## 2022-05-07 DIAGNOSIS — F14.20 COCAINE DEPENDENCE, UNCOMPLICATED: ICD-10-CM

## 2022-05-07 DIAGNOSIS — F32.9 MAJOR DEPRESSIVE DISORDER, SINGLE EPISODE, UNSPECIFIED: ICD-10-CM

## 2022-05-07 LAB
ALBUMIN SERPL ELPH-MCNC: 3.9 G/DL — SIGNIFICANT CHANGE UP (ref 3.4–5)
ALP SERPL-CCNC: 64 U/L — SIGNIFICANT CHANGE UP (ref 40–120)
ALT FLD-CCNC: 76 U/L — HIGH (ref 12–42)
ANION GAP SERPL CALC-SCNC: 10 MMOL/L — SIGNIFICANT CHANGE UP (ref 9–16)
APTT BLD: 28.6 SEC — SIGNIFICANT CHANGE UP (ref 27.5–35.5)
AST SERPL-CCNC: 61 U/L — HIGH (ref 15–37)
BASOPHILS # BLD AUTO: 0.04 K/UL — SIGNIFICANT CHANGE UP (ref 0–0.2)
BASOPHILS NFR BLD AUTO: 0.6 % — SIGNIFICANT CHANGE UP (ref 0–2)
BILIRUB SERPL-MCNC: 0.8 MG/DL — SIGNIFICANT CHANGE UP (ref 0.2–1.2)
BUN SERPL-MCNC: 14 MG/DL — SIGNIFICANT CHANGE UP (ref 7–23)
CALCIUM SERPL-MCNC: 8.7 MG/DL — SIGNIFICANT CHANGE UP (ref 8.5–10.5)
CHLORIDE SERPL-SCNC: 106 MMOL/L — SIGNIFICANT CHANGE UP (ref 96–108)
CO2 SERPL-SCNC: 25 MMOL/L — SIGNIFICANT CHANGE UP (ref 22–31)
CREAT SERPL-MCNC: 1.38 MG/DL — HIGH (ref 0.5–1.3)
EGFR: 60 ML/MIN/1.73M2 — SIGNIFICANT CHANGE UP
EOSINOPHIL # BLD AUTO: 0.05 K/UL — SIGNIFICANT CHANGE UP (ref 0–0.5)
EOSINOPHIL NFR BLD AUTO: 0.7 % — SIGNIFICANT CHANGE UP (ref 0–6)
ETHANOL SERPL-MCNC: 45 MG/DL — HIGH
GLUCOSE SERPL-MCNC: 76 MG/DL — SIGNIFICANT CHANGE UP (ref 70–99)
HCT VFR BLD CALC: 37 % — LOW (ref 39–50)
HGB BLD-MCNC: 12.2 G/DL — LOW (ref 13–17)
IMM GRANULOCYTES NFR BLD AUTO: 0.1 % — SIGNIFICANT CHANGE UP (ref 0–1.5)
INR BLD: 1.28 — HIGH (ref 0.88–1.16)
LACTATE SERPL-SCNC: 1.9 MMOL/L — SIGNIFICANT CHANGE UP (ref 0.4–2)
LIDOCAIN IGE QN: 31 U/L — LOW (ref 73–393)
LYMPHOCYTES # BLD AUTO: 2.27 K/UL — SIGNIFICANT CHANGE UP (ref 1–3.3)
LYMPHOCYTES # BLD AUTO: 32.2 % — SIGNIFICANT CHANGE UP (ref 13–44)
MAGNESIUM SERPL-MCNC: 1.8 MG/DL — SIGNIFICANT CHANGE UP (ref 1.6–2.6)
MCHC RBC-ENTMCNC: 30.7 PG — SIGNIFICANT CHANGE UP (ref 27–34)
MCHC RBC-ENTMCNC: 33 GM/DL — SIGNIFICANT CHANGE UP (ref 32–36)
MCV RBC AUTO: 93 FL — SIGNIFICANT CHANGE UP (ref 80–100)
MONOCYTES # BLD AUTO: 0.8 K/UL — SIGNIFICANT CHANGE UP (ref 0–0.9)
MONOCYTES NFR BLD AUTO: 11.4 % — SIGNIFICANT CHANGE UP (ref 2–14)
NEUTROPHILS # BLD AUTO: 3.87 K/UL — SIGNIFICANT CHANGE UP (ref 1.8–7.4)
NEUTROPHILS NFR BLD AUTO: 55 % — SIGNIFICANT CHANGE UP (ref 43–77)
NRBC # BLD: 0 /100 WBCS — SIGNIFICANT CHANGE UP (ref 0–0)
PLATELET # BLD AUTO: 245 K/UL — SIGNIFICANT CHANGE UP (ref 150–400)
POTASSIUM SERPL-MCNC: 4 MMOL/L — SIGNIFICANT CHANGE UP (ref 3.5–5.3)
POTASSIUM SERPL-SCNC: 4 MMOL/L — SIGNIFICANT CHANGE UP (ref 3.5–5.3)
PROT SERPL-MCNC: 7.5 G/DL — SIGNIFICANT CHANGE UP (ref 6.4–8.2)
PROTHROM AB SERPL-ACNC: 14.9 SEC — HIGH (ref 10.5–13.4)
RBC # BLD: 3.98 M/UL — LOW (ref 4.2–5.8)
RBC # FLD: 14.5 % — SIGNIFICANT CHANGE UP (ref 10.3–14.5)
SARS-COV-2 RNA SPEC QL NAA+PROBE: SIGNIFICANT CHANGE UP
SODIUM SERPL-SCNC: 141 MMOL/L — SIGNIFICANT CHANGE UP (ref 132–145)
TROPONIN I, HIGH SENSITIVITY RESULT: 10.7 NG/L — SIGNIFICANT CHANGE UP
TROPONIN I, HIGH SENSITIVITY RESULT: 12.4 NG/L — SIGNIFICANT CHANGE UP
WBC # BLD: 7.04 K/UL — SIGNIFICANT CHANGE UP (ref 3.8–10.5)
WBC # FLD AUTO: 7.04 K/UL — SIGNIFICANT CHANGE UP (ref 3.8–10.5)

## 2022-05-07 PROCEDURE — 71045 X-RAY EXAM CHEST 1 VIEW: CPT | Mod: 26

## 2022-05-07 PROCEDURE — 93010 ELECTROCARDIOGRAM REPORT: CPT

## 2022-05-07 PROCEDURE — 99285 EMERGENCY DEPT VISIT HI MDM: CPT

## 2022-05-07 PROCEDURE — 90792 PSYCH DIAG EVAL W/MED SRVCS: CPT | Mod: 95

## 2022-05-07 RX ORDER — ACETAMINOPHEN 500 MG
650 TABLET ORAL ONCE
Refills: 0 | Status: COMPLETED | OUTPATIENT
Start: 2022-05-07 | End: 2022-05-07

## 2022-05-07 RX ORDER — SODIUM CHLORIDE 9 MG/ML
1000 INJECTION INTRAMUSCULAR; INTRAVENOUS; SUBCUTANEOUS ONCE
Refills: 0 | Status: COMPLETED | OUTPATIENT
Start: 2022-05-07 | End: 2022-05-07

## 2022-05-07 RX ADMIN — Medication 650 MILLIGRAM(S): at 14:27

## 2022-05-07 RX ADMIN — SODIUM CHLORIDE 1000 MILLILITER(S): 9 INJECTION INTRAMUSCULAR; INTRAVENOUS; SUBCUTANEOUS at 13:26

## 2022-05-07 NOTE — ED PROVIDER NOTE - PATIENT PORTAL LINK FT
You can access the FollowMyHealth Patient Portal offered by [facility] by registering at the following website: [email or web address]. By joining [employer]’s FollowMyHealth portal, you will also be able to view your health information using other applications (apps) compatible with our system.

## 2022-05-07 NOTE — ED PROVIDER NOTE - CLINICAL SUMMARY MEDICAL DECISION MAKING FREE TEXT BOX
55 y/o M here for chest pain, undomiciled, asking for food. Moody through evaluation pt stating he is suicidal. Pt evaluated by psych and cleared. Pt discharged with return precautions

## 2022-05-07 NOTE — ED PROVIDER NOTE - NSICDXPASTMEDICALHX_GEN_ALL_CORE_FT
PAST MEDICAL HISTORY:  Hypertension     Pericarditis      PAST MEDICAL HISTORY:  Hypertension     Pericarditis

## 2022-05-07 NOTE — ED BEHAVIORAL HEALTH ASSESSMENT NOTE - SUMMARY
57y/o male with longstanding history of cocaine use disorder, alcohol use disorder, other psychoactive substance use disorder, MDD, multiple ED visitations for chest pain, cocaine dependence and intoxication, alcohol dependence and intoxication, BIBSELF to the ED initially with chest pain then prior to disposition reported suicidal ideation, thus psychiatry consult requested.    Patient most likely under the influence of substances, did not provide urine for toxicology, is minimally cooperative and provides very minimal concrete information, evasive, with conditional suicidal ideation though he has no history of suicide attempts, is help-seeking, visits multiple hospitals for similar issues repeatedly, and is chronically non-adherent to care.    Does not warrant psychiatric hospitalization.

## 2022-05-07 NOTE — ED BEHAVIORAL HEALTH ASSESSMENT NOTE - RISK ASSESSMENT
Low Acute Suicide Risk Acute RF - active substance abuse, non-adherence, underlying addiction and mental health disorder    Chronic RF - residential/financial/relationship insecurity, little to no insight into mental health and addiction    Protective - help-seeking, resourceful, personality structure

## 2022-05-07 NOTE — ED PROVIDER NOTE - PROGRESS NOTE DETAILS
Consulted telepsych. Telepshych will see patient. Pt endorsed suicidality later on in visit, placed on 1:1 will consult tele psych. Patient cleared by telepsych. Patient Appears well NAD stable.

## 2022-05-07 NOTE — ED BEHAVIORAL HEALTH ASSESSMENT NOTE - DIFFERENTIAL
Cocaine Use Disorder, by history  Alcohol Use Disorder, by history  MDD, by history  Other substance use disorders as per Psykes    High likelihood of malingering for secondary gain of shelter

## 2022-05-07 NOTE — ED BEHAVIORAL HEALTH ASSESSMENT NOTE - SAFETY PLAN ADDT'L DETAILS
Education provided regarding environmental safety / lethal means restriction/Provision of National Suicide Prevention Lifeline 1-687-204-TALK (0945)

## 2022-05-07 NOTE — ED BEHAVIORAL HEALTH ASSESSMENT NOTE - NSACTIVEVENT_PSY_ALL_CORE
Chronic pain or other acute medical condition/Substance intoxication or withdrawal/Pending incarceration or homelessness

## 2022-05-07 NOTE — ED BEHAVIORAL HEALTH ASSESSMENT NOTE - DESCRIPTION
ED triage    Chest Pain work-up    Placed on 1:1    Psychiatry consult undomiciled, unemployed, not enrolled in school, non-caregiver Pericarditis?

## 2022-05-07 NOTE — ED PROVIDER NOTE - OBJECTIVE STATEMENT
55 y/o M, undomiciled, Hx of pericarditis and HTN in the past coming in today complaining of chest pain. Pt is a poor historian. No fever, no chills.

## 2022-05-07 NOTE — ED BEHAVIORAL HEALTH ASSESSMENT NOTE - OTHER PAST PSYCHIATRIC HISTORY (INCLUDE DETAILS REGARDING ONSET, COURSE OF ILLNESS, INPATIENT/OUTPATIENT TREATMENT)
Alcohol related disorders | Cocaine related disorders | Major Depressive Disorder | Other psychoactive substance related disorders |  Substance-Induced Depressive Disorder | Adjustment Disorder | Cannabis related disorders | Unspecifed/Other Anxiety Disorder |  Unspecifed/Other Bipolar | Unspecifed/Other Depressive Disorder | Substance-Induced Sleep Disorder | Unspecifed/Other Psychotic  Disorders

## 2022-05-07 NOTE — ED ADULT NURSE REASSESSMENT NOTE - NS ED NURSE REASSESS COMMENT FT1
pt now c/o "feeling suicidal" states that he has been thinking of "jumping in front of a train" for months, provider at bedside, no s/s of distress, awaiting provider eval

## 2022-05-07 NOTE — ED BEHAVIORAL HEALTH ASSESSMENT NOTE - HPI (INCLUDE ILLNESS QUALITY, SEVERITY, DURATION, TIMING, CONTEXT, MODIFYING FACTORS, ASSOCIATED SIGNS AND SYMPTOMS)
57y/o male with longstanding history of cocaine use disorder, alcohol use disorder, other psychoactive substance use disorder, MDD, multiple ED visitations for chest pain, cocaine dependence and intoxication, alcohol dependence and intoxication, BIBSELF to the ED initially with chest pain then prior to disposition reported suicidal ideation, thus psychiatry consult requested.    On evaluation, patient sleeping, under blanket, appears comfortable, but complains of having chest pain and requests pain medication. He states that he is thinking of jumping in front of the train and "need to put me in a psych mejia" to "get back on medication", but states he does not know his diagnosis, that he is NEVER told his diagnosis, and he is unsure of medications (states zoloft), though most recent picked up medication was trazodone and hydroxyzine, and does not elaborate further on his mental health.    He cannot elaborate on his suicidal ideation with the exception of it "being for months". Of note patient has been in this emergency room february and there was no mention of any suicidal ideation. Patient does not have any history of suicide attempts.     Patient does not appear psychotic. Patient does not appear manic. patient appears to be most likely under the influence of substances.

## 2022-05-10 DIAGNOSIS — R07.89 OTHER CHEST PAIN: ICD-10-CM

## 2022-05-10 DIAGNOSIS — Z59.00 HOMELESSNESS UNSPECIFIED: ICD-10-CM

## 2022-05-10 DIAGNOSIS — I10 ESSENTIAL (PRIMARY) HYPERTENSION: ICD-10-CM

## 2022-05-10 DIAGNOSIS — Z20.822 CONTACT WITH AND (SUSPECTED) EXPOSURE TO COVID-19: ICD-10-CM

## 2022-05-10 SDOH — ECONOMIC STABILITY - HOUSING INSECURITY: HOMELESSNESS UNSPECIFIED: Z59.00

## 2022-06-29 ENCOUNTER — HOSPITAL ENCOUNTER (INPATIENT)
Dept: HOSPITAL 74 - YASAS | Age: 57
LOS: 1 days | Discharge: LEFT BEFORE DISCHARGE | DRG: 770 | End: 2022-06-30
Attending: SURGERY | Admitting: ALLERGY & IMMUNOLOGY
Payer: COMMERCIAL

## 2022-06-29 VITALS — BODY MASS INDEX: 33.1 KG/M2

## 2022-06-29 DIAGNOSIS — F10.230: Primary | ICD-10-CM

## 2022-06-29 DIAGNOSIS — I10: ICD-10-CM

## 2022-06-29 DIAGNOSIS — F19.24: ICD-10-CM

## 2022-06-29 DIAGNOSIS — F19.282: ICD-10-CM

## 2022-06-29 DIAGNOSIS — M54.59: ICD-10-CM

## 2022-06-29 DIAGNOSIS — G89.29: ICD-10-CM

## 2022-06-29 DIAGNOSIS — Z56.0: ICD-10-CM

## 2022-06-29 DIAGNOSIS — K21.9: ICD-10-CM

## 2022-06-29 DIAGNOSIS — Z87.891: ICD-10-CM

## 2022-06-29 DIAGNOSIS — F12.20: ICD-10-CM

## 2022-06-29 DIAGNOSIS — Z59.00: ICD-10-CM

## 2022-06-29 DIAGNOSIS — F31.9: ICD-10-CM

## 2022-06-29 PROCEDURE — U0005 INFEC AGEN DETEC AMPLI PROBE: HCPCS

## 2022-06-29 PROCEDURE — HZ2ZZZZ DETOXIFICATION SERVICES FOR SUBSTANCE ABUSE TREATMENT: ICD-10-PCS | Performed by: SURGERY

## 2022-06-29 PROCEDURE — U0003 INFECTIOUS AGENT DETECTION BY NUCLEIC ACID (DNA OR RNA); SEVERE ACUTE RESPIRATORY SYNDROME CORONAVIRUS 2 (SARS-COV-2) (CORONAVIRUS DISEASE [COVID-19]), AMPLIFIED PROBE TECHNIQUE, MAKING USE OF HIGH THROUGHPUT TECHNOLOGIES AS DESCRIBED BY CMS-2020-01-R: HCPCS

## 2022-06-29 RX ADMIN — Medication SCH: at 23:55

## 2022-06-29 RX ADMIN — HYDROXYZINE PAMOATE SCH: 25 CAPSULE ORAL at 18:40

## 2022-06-29 RX ADMIN — HYDROXYZINE PAMOATE SCH: 25 CAPSULE ORAL at 23:55

## 2022-06-29 SDOH — ECONOMIC STABILITY - HOUSING INSECURITY: HOMELESSNESS UNSPECIFIED: Z59.00

## 2022-06-29 SDOH — ECONOMIC STABILITY - INCOME SECURITY: UNEMPLOYMENT, UNSPECIFIED: Z56.0

## 2022-06-30 VITALS — HEART RATE: 71 BPM | DIASTOLIC BLOOD PRESSURE: 79 MMHG | SYSTOLIC BLOOD PRESSURE: 131 MMHG

## 2022-06-30 VITALS — TEMPERATURE: 97.8 F

## 2022-06-30 RX ADMIN — HYDROXYZINE PAMOATE SCH: 25 CAPSULE ORAL at 18:19

## 2022-06-30 RX ADMIN — HYDROXYZINE PAMOATE SCH: 25 CAPSULE ORAL at 21:29

## 2022-06-30 RX ADMIN — HYDROXYZINE PAMOATE SCH: 25 CAPSULE ORAL at 15:19

## 2022-06-30 RX ADMIN — Medication SCH MG: at 21:29

## 2022-06-30 RX ADMIN — HYDROXYZINE PAMOATE SCH: 25 CAPSULE ORAL at 10:40

## 2022-06-30 RX ADMIN — Medication SCH MG: at 21:28

## 2022-06-30 RX ADMIN — HYDROXYZINE PAMOATE SCH MG: 25 CAPSULE ORAL at 05:16

## 2022-06-30 NOTE — ED PROVIDER NOTE - CARE PROVIDER_API CALL
done
Kimmy Macdonald)  Internal Medicine  [address], [place] [address]  Phone: [phone]  Fax: [phone]  Follow Up Time:     Getachew Guardado)  Cardiovascular Disease  [address] [facility] [place] [address]  Phone: [phone]  Fax: [phone]  Follow Up Time:

## 2022-07-17 ENCOUNTER — EMERGENCY (EMERGENCY)
Facility: HOSPITAL | Age: 57
LOS: 1 days | Discharge: HOME | End: 2022-07-17
Attending: EMERGENCY MEDICINE | Admitting: EMERGENCY MEDICINE

## 2022-07-17 VITALS
RESPIRATION RATE: 20 BRPM | HEART RATE: 118 BPM | OXYGEN SATURATION: 96 % | TEMPERATURE: 98 F | DIASTOLIC BLOOD PRESSURE: 83 MMHG | SYSTOLIC BLOOD PRESSURE: 132 MMHG | HEIGHT: 74 IN | WEIGHT: 259.93 LBS

## 2022-07-17 VITALS — HEART RATE: 112 BPM

## 2022-07-17 LAB
ALBUMIN SERPL ELPH-MCNC: 4 G/DL — SIGNIFICANT CHANGE UP (ref 3.4–5)
ALP SERPL-CCNC: 59 U/L — SIGNIFICANT CHANGE UP (ref 40–120)
ALT FLD-CCNC: 36 U/L — SIGNIFICANT CHANGE UP (ref 12–42)
AMPHET UR-MCNC: NEGATIVE — SIGNIFICANT CHANGE UP
ANION GAP SERPL CALC-SCNC: 10 MMOL/L — SIGNIFICANT CHANGE UP (ref 9–16)
AST SERPL-CCNC: 28 U/L — SIGNIFICANT CHANGE UP (ref 15–37)
BARBITURATES UR SCN-MCNC: NEGATIVE — SIGNIFICANT CHANGE UP
BASOPHILS # BLD AUTO: 0.05 K/UL — SIGNIFICANT CHANGE UP (ref 0–0.2)
BASOPHILS NFR BLD AUTO: 0.7 % — SIGNIFICANT CHANGE UP (ref 0–2)
BENZODIAZ UR-MCNC: NEGATIVE — SIGNIFICANT CHANGE UP
BILIRUB SERPL-MCNC: 0.9 MG/DL — SIGNIFICANT CHANGE UP (ref 0.2–1.2)
BUN SERPL-MCNC: 15 MG/DL — SIGNIFICANT CHANGE UP (ref 7–23)
CALCIUM SERPL-MCNC: 9 MG/DL — SIGNIFICANT CHANGE UP (ref 8.5–10.5)
CHLORIDE SERPL-SCNC: 102 MMOL/L — SIGNIFICANT CHANGE UP (ref 96–108)
CO2 SERPL-SCNC: 24 MMOL/L — SIGNIFICANT CHANGE UP (ref 22–31)
COCAINE METAB.OTHER UR-MCNC: POSITIVE
CREAT SERPL-MCNC: 1.33 MG/DL — HIGH (ref 0.5–1.3)
EGFR: 63 ML/MIN/1.73M2 — SIGNIFICANT CHANGE UP
EOSINOPHIL # BLD AUTO: 0.05 K/UL — SIGNIFICANT CHANGE UP (ref 0–0.5)
EOSINOPHIL NFR BLD AUTO: 0.7 % — SIGNIFICANT CHANGE UP (ref 0–6)
ETHANOL SERPL-MCNC: 59 MG/DL — HIGH
GLUCOSE SERPL-MCNC: 78 MG/DL — SIGNIFICANT CHANGE UP (ref 70–99)
HCT VFR BLD CALC: 36.8 % — LOW (ref 39–50)
HGB BLD-MCNC: 12.5 G/DL — LOW (ref 13–17)
IMM GRANULOCYTES NFR BLD AUTO: 0.3 % — SIGNIFICANT CHANGE UP (ref 0–1.5)
LIDOCAIN IGE QN: 51 U/L — LOW (ref 73–393)
LYMPHOCYTES # BLD AUTO: 2.76 K/UL — SIGNIFICANT CHANGE UP (ref 1–3.3)
LYMPHOCYTES # BLD AUTO: 38.8 % — SIGNIFICANT CHANGE UP (ref 13–44)
MAGNESIUM SERPL-MCNC: 1.8 MG/DL — SIGNIFICANT CHANGE UP (ref 1.6–2.6)
MCHC RBC-ENTMCNC: 31.4 PG — SIGNIFICANT CHANGE UP (ref 27–34)
MCHC RBC-ENTMCNC: 34 GM/DL — SIGNIFICANT CHANGE UP (ref 32–36)
MCV RBC AUTO: 92.5 FL — SIGNIFICANT CHANGE UP (ref 80–100)
METHADONE UR-MCNC: NEGATIVE — SIGNIFICANT CHANGE UP
MONOCYTES # BLD AUTO: 0.88 K/UL — SIGNIFICANT CHANGE UP (ref 0–0.9)
MONOCYTES NFR BLD AUTO: 12.4 % — SIGNIFICANT CHANGE UP (ref 2–14)
NEUTROPHILS # BLD AUTO: 3.36 K/UL — SIGNIFICANT CHANGE UP (ref 1.8–7.4)
NEUTROPHILS NFR BLD AUTO: 47.1 % — SIGNIFICANT CHANGE UP (ref 43–77)
NRBC # BLD: 0 /100 WBCS — SIGNIFICANT CHANGE UP (ref 0–0)
NT-PROBNP SERPL-SCNC: 262 PG/ML — SIGNIFICANT CHANGE UP
OPIATES UR-MCNC: NEGATIVE — SIGNIFICANT CHANGE UP
PCP SPEC-MCNC: SIGNIFICANT CHANGE UP
PCP UR-MCNC: NEGATIVE — SIGNIFICANT CHANGE UP
PLATELET # BLD AUTO: 320 K/UL — SIGNIFICANT CHANGE UP (ref 150–400)
POTASSIUM SERPL-MCNC: 3.6 MMOL/L — SIGNIFICANT CHANGE UP (ref 3.5–5.3)
POTASSIUM SERPL-SCNC: 3.6 MMOL/L — SIGNIFICANT CHANGE UP (ref 3.5–5.3)
PROT SERPL-MCNC: 8.3 G/DL — HIGH (ref 6.4–8.2)
RBC # BLD: 3.98 M/UL — LOW (ref 4.2–5.8)
RBC # FLD: 14.6 % — HIGH (ref 10.3–14.5)
SODIUM SERPL-SCNC: 136 MMOL/L — SIGNIFICANT CHANGE UP (ref 132–145)
THC UR QL: NEGATIVE — SIGNIFICANT CHANGE UP
TROPONIN I, HIGH SENSITIVITY RESULT: 22.2 NG/L — SIGNIFICANT CHANGE UP
WBC # BLD: 7.12 K/UL — SIGNIFICANT CHANGE UP (ref 3.8–10.5)
WBC # FLD AUTO: 7.12 K/UL — SIGNIFICANT CHANGE UP (ref 3.8–10.5)

## 2022-07-17 PROCEDURE — 71046 X-RAY EXAM CHEST 2 VIEWS: CPT | Mod: 26

## 2022-07-17 PROCEDURE — 93010 ELECTROCARDIOGRAM REPORT: CPT

## 2022-07-17 PROCEDURE — 99285 EMERGENCY DEPT VISIT HI MDM: CPT | Mod: 25

## 2022-07-17 RX ORDER — ASPIRIN/CALCIUM CARB/MAGNESIUM 324 MG
325 TABLET ORAL ONCE
Refills: 0 | Status: COMPLETED | OUTPATIENT
Start: 2022-07-17 | End: 2022-07-17

## 2022-07-17 RX ORDER — SODIUM CHLORIDE 9 MG/ML
1000 INJECTION INTRAMUSCULAR; INTRAVENOUS; SUBCUTANEOUS ONCE
Refills: 0 | Status: COMPLETED | OUTPATIENT
Start: 2022-07-17 | End: 2022-07-17

## 2022-07-17 RX ADMIN — Medication 325 MILLIGRAM(S): at 14:26

## 2022-07-17 RX ADMIN — SODIUM CHLORIDE 1000 MILLILITER(S): 9 INJECTION INTRAMUSCULAR; INTRAVENOUS; SUBCUTANEOUS at 14:27

## 2022-07-17 RX ADMIN — Medication 30 MILLILITER(S): at 14:26

## 2022-07-17 NOTE — ED PROVIDER NOTE - CLINICAL SUMMARY MEDICAL DECISION MAKING FREE TEXT BOX
Pt presents for L upper chest wall pain, pt is well appearing on evaluation, has hx of similar presentations to ED and also requesting detox info for alcohol abuse. Will get basic labs and CXR.

## 2022-07-17 NOTE — ED PROVIDER NOTE - PATIENT PORTAL LINK FT
You can access the FollowMyHealth Patient Portal offered by [facility] by registering at the following website: [email or web address]. By joining [employer]’s FollowMyHealth portal, you will also be able to view your health information using other applications (apps) compatible with our system.

## 2022-07-17 NOTE — ED PROVIDER NOTE - OBJECTIVE STATEMENT
58 yo male pt, hx of HTN (uncompliant w meds), prior episode of pericarditis, hx of alcohol abuse. presents for L sided chest pain, that started this am. Pt states he has been binging on alcohol for 2 days. Denies any drug use. Old chart review shows prior visits for similar an between 2021-22 has had several visits w normal ACS work ups and 3 PE studies that show no PE. pt denies cough, n/v/d, HA, neck pain, fever, chills, etc.

## 2022-07-17 NOTE — ED PROVIDER NOTE - PROGRESS NOTE DETAILS
normal trop, unchanged CXR. similar presentation before. spoke about alcohol and cocaine cessation. Pt has been resting while in ED, no distress.

## 2022-07-18 DIAGNOSIS — R07.89 OTHER CHEST PAIN: ICD-10-CM

## 2022-07-18 DIAGNOSIS — F10.10 ALCOHOL ABUSE, UNCOMPLICATED: ICD-10-CM

## 2022-07-18 DIAGNOSIS — I10 ESSENTIAL (PRIMARY) HYPERTENSION: ICD-10-CM

## 2022-07-18 DIAGNOSIS — R00.0 TACHYCARDIA, UNSPECIFIED: ICD-10-CM

## 2022-07-30 NOTE — ED ADULT NURSE NOTE - CAS EDN DISCHARGE INTERVENTIONS
IV discontinued, cath removed intact
Vaccinations/[facility]  Screening Program/  Immunization Record/Breastfeeding Log/Bottle Feeding Log/Breastfeeding Mother’s Support Group Information/Guide to Postpartum Care/[facility] Hearing Screen Program/Back To Sleep Handout/Shaken Baby Prevention Handout/Breastfeeding Guide and Packet/Birth Certificate Instructions/Discharge Medication Information for Patients and Families Pocket Guide

## 2022-08-13 ENCOUNTER — EMERGENCY (EMERGENCY)
Facility: HOSPITAL | Age: 57
LOS: 1 days | Discharge: HOME | End: 2022-08-13
Attending: EMERGENCY MEDICINE | Admitting: EMERGENCY MEDICINE

## 2022-08-13 VITALS
TEMPERATURE: 98 F | HEIGHT: 74 IN | RESPIRATION RATE: 19 BRPM | DIASTOLIC BLOOD PRESSURE: 78 MMHG | OXYGEN SATURATION: 97 % | SYSTOLIC BLOOD PRESSURE: 126 MMHG | WEIGHT: 231.49 LBS | HEART RATE: 114 BPM

## 2022-08-13 VITALS — HEART RATE: 95 BPM | SYSTOLIC BLOOD PRESSURE: 129 MMHG | DIASTOLIC BLOOD PRESSURE: 65 MMHG

## 2022-08-13 DIAGNOSIS — R00.0 TACHYCARDIA, UNSPECIFIED: ICD-10-CM

## 2022-08-13 DIAGNOSIS — T46.5X6A UNDERDOSING OF OTHER ANTIHYPERTENSIVE DRUGS, INITIAL ENCOUNTER: ICD-10-CM

## 2022-08-13 DIAGNOSIS — R07.89 OTHER CHEST PAIN: ICD-10-CM

## 2022-08-13 DIAGNOSIS — Z20.822 CONTACT WITH AND (SUSPECTED) EXPOSURE TO COVID-19: ICD-10-CM

## 2022-08-13 DIAGNOSIS — F10.10 ALCOHOL ABUSE, UNCOMPLICATED: ICD-10-CM

## 2022-08-13 DIAGNOSIS — I10 ESSENTIAL (PRIMARY) HYPERTENSION: ICD-10-CM
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
## 2022-08-13 DIAGNOSIS — Z91.14 PATIENT'S OTHER NONCOMPLIANCE WITH MEDICATION REGIMEN: ICD-10-CM

## 2022-08-13 LAB
ALBUMIN SERPL ELPH-MCNC: 4 G/DL — SIGNIFICANT CHANGE UP (ref 3.4–5)
ALP SERPL-CCNC: 58 U/L — SIGNIFICANT CHANGE UP (ref 40–120)
ALT FLD-CCNC: 56 U/L — HIGH (ref 12–42)
ANION GAP SERPL CALC-SCNC: 12 MMOL/L — SIGNIFICANT CHANGE UP (ref 9–16)
APPEARANCE UR: CLEAR — SIGNIFICANT CHANGE UP
APTT BLD: 30.4 SEC — SIGNIFICANT CHANGE UP (ref 27.5–35.5)
AST SERPL-CCNC: 109 U/L — HIGH (ref 15–37)
BASOPHILS # BLD AUTO: 0.03 K/UL — SIGNIFICANT CHANGE UP (ref 0–0.2)
BASOPHILS NFR BLD AUTO: 0.4 % — SIGNIFICANT CHANGE UP (ref 0–2)
BILIRUB SERPL-MCNC: 0.9 MG/DL — SIGNIFICANT CHANGE UP (ref 0.2–1.2)
BILIRUB UR-MCNC: NEGATIVE — SIGNIFICANT CHANGE UP
BUN SERPL-MCNC: 15 MG/DL — SIGNIFICANT CHANGE UP (ref 7–23)
CALCIUM SERPL-MCNC: 9.1 MG/DL — SIGNIFICANT CHANGE UP (ref 8.5–10.5)
CHLORIDE SERPL-SCNC: 105 MMOL/L — SIGNIFICANT CHANGE UP (ref 96–108)
CO2 SERPL-SCNC: 22 MMOL/L — SIGNIFICANT CHANGE UP (ref 22–31)
COLOR SPEC: YELLOW — SIGNIFICANT CHANGE UP
CREAT SERPL-MCNC: 1.32 MG/DL — HIGH (ref 0.5–1.3)
DIFF PNL FLD: NEGATIVE — SIGNIFICANT CHANGE UP
EGFR: 63 ML/MIN/1.73M2 — SIGNIFICANT CHANGE UP
EOSINOPHIL # BLD AUTO: 0.09 K/UL — SIGNIFICANT CHANGE UP (ref 0–0.5)
EOSINOPHIL NFR BLD AUTO: 1.2 % — SIGNIFICANT CHANGE UP (ref 0–6)
ETHANOL SERPL-MCNC: 73 MG/DL — HIGH
GLUCOSE SERPL-MCNC: 75 MG/DL — SIGNIFICANT CHANGE UP (ref 70–99)
GLUCOSE UR QL: NEGATIVE — SIGNIFICANT CHANGE UP
HCT VFR BLD CALC: 36.9 % — LOW (ref 39–50)
HGB BLD-MCNC: 12.4 G/DL — LOW (ref 13–17)
IMM GRANULOCYTES NFR BLD AUTO: 0.3 % — SIGNIFICANT CHANGE UP (ref 0–1.5)
INR BLD: 1.2 — HIGH (ref 0.88–1.16)
KETONES UR-MCNC: NEGATIVE — SIGNIFICANT CHANGE UP
LEUKOCYTE ESTERASE UR-ACNC: NEGATIVE — SIGNIFICANT CHANGE UP
LYMPHOCYTES # BLD AUTO: 2.76 K/UL — SIGNIFICANT CHANGE UP (ref 1–3.3)
LYMPHOCYTES # BLD AUTO: 36.1 % — SIGNIFICANT CHANGE UP (ref 13–44)
MAGNESIUM SERPL-MCNC: 2 MG/DL — SIGNIFICANT CHANGE UP (ref 1.6–2.6)
MCHC RBC-ENTMCNC: 31.2 PG — SIGNIFICANT CHANGE UP (ref 27–34)
MCHC RBC-ENTMCNC: 33.6 GM/DL — SIGNIFICANT CHANGE UP (ref 32–36)
MCV RBC AUTO: 92.9 FL — SIGNIFICANT CHANGE UP (ref 80–100)
MONOCYTES # BLD AUTO: 0.88 K/UL — SIGNIFICANT CHANGE UP (ref 0–0.9)
MONOCYTES NFR BLD AUTO: 11.5 % — SIGNIFICANT CHANGE UP (ref 2–14)
NEUTROPHILS # BLD AUTO: 3.86 K/UL — SIGNIFICANT CHANGE UP (ref 1.8–7.4)
NEUTROPHILS NFR BLD AUTO: 50.5 % — SIGNIFICANT CHANGE UP (ref 43–77)
NITRITE UR-MCNC: NEGATIVE — SIGNIFICANT CHANGE UP
NRBC # BLD: 0 /100 WBCS — SIGNIFICANT CHANGE UP (ref 0–0)
NT-PROBNP SERPL-SCNC: 297 PG/ML — SIGNIFICANT CHANGE UP
PCP SPEC-MCNC: SIGNIFICANT CHANGE UP
PH UR: 5.5 — SIGNIFICANT CHANGE UP (ref 5–8)
PLATELET # BLD AUTO: 251 K/UL — SIGNIFICANT CHANGE UP (ref 150–400)
POTASSIUM SERPL-MCNC: 3.8 MMOL/L — SIGNIFICANT CHANGE UP (ref 3.5–5.3)
POTASSIUM SERPL-SCNC: 3.8 MMOL/L — SIGNIFICANT CHANGE UP (ref 3.5–5.3)
PROT SERPL-MCNC: 7.9 G/DL — SIGNIFICANT CHANGE UP (ref 6.4–8.2)
PROT UR-MCNC: NEGATIVE MG/DL — SIGNIFICANT CHANGE UP
PROTHROM AB SERPL-ACNC: 14 SEC — HIGH (ref 10.5–13.4)
RBC # BLD: 3.97 M/UL — LOW (ref 4.2–5.8)
RBC # FLD: 14.3 % — SIGNIFICANT CHANGE UP (ref 10.3–14.5)
SARS-COV-2 RNA SPEC QL NAA+PROBE: SIGNIFICANT CHANGE UP
SODIUM SERPL-SCNC: 139 MMOL/L — SIGNIFICANT CHANGE UP (ref 132–145)
SP GR SPEC: 1.01 — SIGNIFICANT CHANGE UP (ref 1–1.03)
TROPONIN I, HIGH SENSITIVITY RESULT: 27.6 NG/L — SIGNIFICANT CHANGE UP
UROBILINOGEN FLD QL: 0.2 E.U./DL — SIGNIFICANT CHANGE UP
WBC # BLD: 7.64 K/UL — SIGNIFICANT CHANGE UP (ref 3.8–10.5)
WBC # FLD AUTO: 7.64 K/UL — SIGNIFICANT CHANGE UP (ref 3.8–10.5)

## 2022-08-13 PROCEDURE — 71045 X-RAY EXAM CHEST 1 VIEW: CPT | Mod: 26

## 2022-08-13 PROCEDURE — 99285 EMERGENCY DEPT VISIT HI MDM: CPT | Mod: 25

## 2022-08-13 PROCEDURE — 93010 ELECTROCARDIOGRAM REPORT: CPT

## 2022-08-13 RX ORDER — FAMOTIDINE 10 MG/ML
20 INJECTION INTRAVENOUS ONCE
Refills: 0 | Status: COMPLETED | OUTPATIENT
Start: 2022-08-13 | End: 2022-08-13

## 2022-08-13 RX ORDER — SODIUM CHLORIDE 9 MG/ML
1000 INJECTION INTRAMUSCULAR; INTRAVENOUS; SUBCUTANEOUS ONCE
Refills: 0 | Status: COMPLETED | OUTPATIENT
Start: 2022-08-13 | End: 2022-08-13

## 2022-08-13 RX ORDER — DIAZEPAM 5 MG
5 TABLET ORAL ONCE
Refills: 0 | Status: DISCONTINUED | OUTPATIENT
Start: 2022-08-13 | End: 2022-08-13

## 2022-08-13 RX ORDER — ASPIRIN/CALCIUM CARB/MAGNESIUM 324 MG
324 TABLET ORAL ONCE
Refills: 0 | Status: COMPLETED | OUTPATIENT
Start: 2022-08-13 | End: 2022-08-13

## 2022-08-13 RX ADMIN — Medication 324 MILLIGRAM(S): at 16:30

## 2022-08-13 RX ADMIN — SODIUM CHLORIDE 1000 MILLILITER(S): 9 INJECTION INTRAMUSCULAR; INTRAVENOUS; SUBCUTANEOUS at 16:34

## 2022-08-13 RX ADMIN — Medication 5 MILLIGRAM(S): at 16:33

## 2022-08-13 RX ADMIN — Medication 30 MILLILITER(S): at 18:12

## 2022-08-13 RX ADMIN — FAMOTIDINE 20 MILLIGRAM(S): 10 INJECTION INTRAVENOUS at 18:12

## 2022-08-13 NOTE — ED PROVIDER NOTE - ATTENDING APP SHARED VISIT CONTRIBUTION OF CARE
I have seen the pt, reviewed all pertinent clinical data, and I agree with the documentation/care/plan executed by Dr. Marquis. CP improved, NSR on EKG w/negative workup for ACS, PTX, PNA. Tachycardia likely related to early withdrawal sx, improved with valium. d/c home to f/u with cardiology as outpt. given return precautions.

## 2022-08-13 NOTE — ED PROVIDER NOTE - CLINICAL SUMMARY MEDICAL DECISION MAKING FREE TEXT BOX
57M PMH HTN (noncompliant w meds), prior episode of pericarditis, hx of alcohol abuse. presents for L sided sharp chest pain. VSS in ED. ECG nondiagnostic. Lungs ctab, neurovascularly intact, abd NT  Symptoms do not seem like typical angina. Will eval for ACS with cardiac labs, CXR. Also possible component of GERD given recent etoh binge and intermittent burning sensation

## 2022-08-13 NOTE — ED PROVIDER NOTE - PROGRESS NOTE DETAILS
[employer] BANGURA (PGY-3)  explained results of w/u to pt. CXR clear and labs nonactionable. informed pt to cut down on drinking and avoid cocaine. pt states he was in detox last month and requesting detox again. No S/W present currently during weekend; told pt to come back to ED M-F b/w 9-5 for SW assistance. will d/c to home with return precautions

## 2022-08-13 NOTE — ED PROVIDER NOTE - NS ED ATTENDING STATEMENT MOD
This was a shared visit with the EMMIE. I reviewed and verified the documentation and independently performed the documented:

## 2022-08-13 NOTE — ED PROVIDER NOTE - PHYSICAL EXAMINATION
Physical Exam:  Gen: awake alert   HEENT: normal conjunctiva, oral mucosa moist  Lung: CTAB, no respiratory distress, no wheezes/rhonchi/rales B/L, speaking in full sentences  CV: RRR  Abd: soft, NT, ND, no guarding, no rigidity, no rebound tenderness  MSK: no visible deformities, ROM normal in UE/LE  Neuro: No focal sensory or motor deficits, 2+ radial pulses b/l  Skin: Warm, well perfused, no rash, no leg swelling  ~Zackary Marquis MD (PGY-3)

## 2022-08-13 NOTE — ED PROVIDER NOTE - OBJECTIVE STATEMENT
57M PMH HTN (noncompliant w meds), prior episode of pericarditis, hx of alcohol abuse. presents for L sided sharp chest pain radiating to L temple and L arm when he was walking this afternoon. States he drank a lot yest but only drinks on weekends usually. States he has never had cardiac w/u in the past although chart review indicates he has. Snorts cocaine but last use was 1 month ago. Also endorsing occasional chest burning sensation

## 2022-08-13 NOTE — ED PROVIDER NOTE - PATIENT PORTAL LINK FT
You can access the FollowMyHealth Patient Portal offered by [facility] by registering at the following website: [email or web address]. By joining [employer]’s FollowMyHealth portal, you will also be able to view your health information using other applications (apps) compatible with our system.

## 2022-08-13 NOTE — ED PROVIDER NOTE - NS ED ROS FT
CONST: no fevers, no chills  ENT: no sore throat  CV: +chest pain, no leg swelling  RESP: no shortness of breath, no cough  ABD: no abdominal pain, no nausea, no vomiting, no diarrhea  : no dysuria, no flank pain, no hematuria  MSK: no back pain, no extremity pain  NEURO: no headache or additional neurologic complaints  SKIN:  no rash

## 2022-08-13 NOTE — ED PROVIDER NOTE - NSFOLLOWUPINSTRUCTIONS_ED_ALL_ED_FT
You were seen today in the emergency room for chest pain. Although the testing done today indicates that your pain is not from an acute emergency, your pain could still represent a problem with your heart. You need to follow up with your doctor and/or a cardiologist in the next 48-72 hours.     If you develop any new or worsening symptoms you need to return immediately to the emergency department. If you experience any of the following please come right back to the emergency room: chest pain that becomes much worse with walking up stairs or exercising, uncontrollable nausea and vomiting, severe chest pain that will not go away, passing out, new persistent numbness and/or weakness. I You were seen today in the emergency room for chest pain. Although the testing done today indicates that your pain is not from an acute emergency, your pain could still represent a problem with your heart. You need to follow up with your doctor and/or a cardiologist in the next 48-72 hours    If you develop any new or worsening symptoms you need to return immediately to the emergency department. If you experience any of the following please come right back to the emergency room: chest pain that becomes much worse with walking up stairs or exercising, uncontrollable nausea and vomiting, severe chest pain that will not go away, passing out, new persistent numbness and/or weakness

## 2022-10-17 ENCOUNTER — HOSPITAL ENCOUNTER (INPATIENT)
Dept: HOSPITAL 74 - YASAS | Age: 57
LOS: 8 days | Discharge: HOME | DRG: 772 | End: 2022-10-25
Attending: PSYCHIATRY & NEUROLOGY | Admitting: ALLERGY & IMMUNOLOGY
Payer: COMMERCIAL

## 2022-10-17 VITALS — BODY MASS INDEX: 34.4 KG/M2

## 2022-10-17 DIAGNOSIS — I10: ICD-10-CM

## 2022-10-17 DIAGNOSIS — F19.24: ICD-10-CM

## 2022-10-17 DIAGNOSIS — F14.20: Primary | ICD-10-CM

## 2022-10-17 DIAGNOSIS — F10.20: ICD-10-CM

## 2022-10-17 DIAGNOSIS — F19.282: ICD-10-CM

## 2022-10-17 DIAGNOSIS — G89.29: ICD-10-CM

## 2022-10-17 DIAGNOSIS — F31.9: ICD-10-CM

## 2022-10-17 DIAGNOSIS — K21.9: ICD-10-CM

## 2022-10-17 PROCEDURE — U0005 INFEC AGEN DETEC AMPLI PROBE: HCPCS

## 2022-10-17 PROCEDURE — U0003 INFECTIOUS AGENT DETECTION BY NUCLEIC ACID (DNA OR RNA); SEVERE ACUTE RESPIRATORY SYNDROME CORONAVIRUS 2 (SARS-COV-2) (CORONAVIRUS DISEASE [COVID-19]), AMPLIFIED PROBE TECHNIQUE, MAKING USE OF HIGH THROUGHPUT TECHNOLOGIES AS DESCRIBED BY CMS-2020-01-R: HCPCS

## 2022-10-18 LAB
ALBUMIN SERPL-MCNC: 3.6 G/DL (ref 3.4–5)
ALP SERPL-CCNC: 75 U/L (ref 45–117)
ALT SERPL-CCNC: 60 U/L (ref 13–61)
ANION GAP SERPL CALC-SCNC: 9 MMOL/L (ref 8–16)
AST SERPL-CCNC: 53 U/L (ref 15–37)
BILIRUB SERPL-MCNC: 0.7 MG/DL (ref 0.2–1)
BUN SERPL-MCNC: 21.1 MG/DL (ref 7–18)
CALCIUM SERPL-MCNC: 9 MG/DL (ref 8.5–10.1)
CHLORIDE SERPL-SCNC: 109 MMOL/L (ref 98–107)
CO2 SERPL-SCNC: 25 MMOL/L (ref 21–32)
CREAT SERPL-MCNC: 1.3 MG/DL (ref 0.55–1.3)
DEPRECATED RDW RBC AUTO: 14.3 % (ref 11.9–15.9)
GLUCOSE SERPL-MCNC: 82 MG/DL (ref 74–106)
HCT VFR BLD CALC: 35.5 % (ref 35.4–49)
HGB BLD-MCNC: 12.2 GM/DL (ref 11.7–16.9)
MCH RBC QN AUTO: 32.5 PG (ref 25.7–33.7)
MCHC RBC AUTO-ENTMCNC: 34.4 G/DL (ref 32–35.9)
MCV RBC: 94.4 FL (ref 80–96)
PLATELET # BLD AUTO: 240 10^3/UL (ref 134–434)
PMV BLD: 9 FL (ref 7.5–11.1)
PROT SERPL-MCNC: 7.1 G/DL (ref 6.4–8.2)
RBC # BLD AUTO: 3.76 M/MM3 (ref 4–5.6)
SODIUM SERPL-SCNC: 143 MMOL/L (ref 136–145)
WBC # BLD AUTO: 4.5 K/MM3 (ref 4–10)

## 2022-10-18 PROCEDURE — HZ42ZZZ GROUP COUNSELING FOR SUBSTANCE ABUSE TREATMENT, COGNITIVE-BEHAVIORAL: ICD-10-PCS | Performed by: PSYCHIATRY & NEUROLOGY

## 2022-10-18 RX ADMIN — DIPHENHYDRAMINE HCL PRN MG: 25 CAPSULE ORAL at 21:18

## 2022-10-18 RX ADMIN — Medication SCH MG: at 21:18

## 2022-10-18 RX ADMIN — Medication SCH TAB: at 10:14

## 2022-10-18 RX ADMIN — HYDROCHLOROTHIAZIDE SCH MG: 12.5 CAPSULE ORAL at 10:14

## 2022-10-19 RX ADMIN — IBUPROFEN PRN MG: 400 TABLET, FILM COATED ORAL at 20:20

## 2022-10-19 RX ADMIN — HYDROCHLOROTHIAZIDE SCH: 12.5 CAPSULE ORAL at 10:04

## 2022-10-19 RX ADMIN — Medication SCH TAB: at 10:04

## 2022-10-19 RX ADMIN — Medication SCH: at 22:15

## 2022-10-20 VITALS — RESPIRATION RATE: 18 BRPM

## 2022-10-20 RX ADMIN — HYDROCHLOROTHIAZIDE SCH: 12.5 CAPSULE ORAL at 10:33

## 2022-10-20 RX ADMIN — DIPHENHYDRAMINE HCL PRN MG: 25 CAPSULE ORAL at 21:09

## 2022-10-20 RX ADMIN — Medication SCH MG: at 21:10

## 2022-10-20 RX ADMIN — Medication SCH TAB: at 10:34

## 2022-10-21 RX ADMIN — HYDROCHLOROTHIAZIDE SCH: 12.5 CAPSULE ORAL at 10:20

## 2022-10-21 RX ADMIN — Medication SCH TAB: at 10:19

## 2022-10-21 RX ADMIN — Medication SCH MG: at 21:14

## 2022-10-21 RX ADMIN — IBUPROFEN PRN MG: 400 TABLET, FILM COATED ORAL at 21:13

## 2022-10-22 RX ADMIN — HYDROCHLOROTHIAZIDE SCH MG: 12.5 CAPSULE ORAL at 09:52

## 2022-10-22 RX ADMIN — DIPHENHYDRAMINE HCL PRN MG: 25 CAPSULE ORAL at 21:05

## 2022-10-22 RX ADMIN — Medication SCH TAB: at 09:51

## 2022-10-22 RX ADMIN — Medication SCH MG: at 21:06

## 2022-10-23 LAB
APPEARANCE UR: CLEAR
BILIRUB UR STRIP.AUTO-MCNC: NEGATIVE MG/DL
COLOR UR: YELLOW
KETONES UR QL STRIP: NEGATIVE
LEUKOCYTE ESTERASE UR QL STRIP.AUTO: NEGATIVE
NITRITE UR QL STRIP: NEGATIVE
PH UR: 5.5 [PH] (ref 5–8)
PROT UR QL STRIP: NEGATIVE
PROT UR QL STRIP: NEGATIVE
SP GR UR: 1.02 (ref 1.01–1.03)
UROBILINOGEN UR STRIP-MCNC: 0.2 MG/DL (ref 0.2–1)

## 2022-10-23 RX ADMIN — Medication SCH MG: at 21:12

## 2022-10-23 RX ADMIN — HYDROCHLOROTHIAZIDE SCH: 12.5 CAPSULE ORAL at 09:45

## 2022-10-23 RX ADMIN — IBUPROFEN PRN MG: 400 TABLET, FILM COATED ORAL at 21:10

## 2022-10-23 RX ADMIN — Medication SCH TAB: at 09:45

## 2022-10-24 RX ADMIN — Medication SCH MG: at 21:12

## 2022-10-24 RX ADMIN — DIPHENHYDRAMINE HCL PRN MG: 25 CAPSULE ORAL at 21:12

## 2022-10-24 RX ADMIN — IBUPROFEN PRN MG: 400 TABLET, FILM COATED ORAL at 19:31

## 2022-10-24 RX ADMIN — HYDROCHLOROTHIAZIDE SCH: 12.5 CAPSULE ORAL at 09:53

## 2022-10-24 RX ADMIN — Medication SCH TAB: at 09:53

## 2022-10-25 VITALS — DIASTOLIC BLOOD PRESSURE: 62 MMHG | SYSTOLIC BLOOD PRESSURE: 112 MMHG | HEART RATE: 82 BPM

## 2022-10-25 VITALS — TEMPERATURE: 98 F

## 2022-10-25 RX ADMIN — Medication SCH TAB: at 09:46

## 2022-10-25 RX ADMIN — HYDROCHLOROTHIAZIDE SCH: 12.5 CAPSULE ORAL at 09:46

## 2022-12-30 ENCOUNTER — EMERGENCY (EMERGENCY)
Facility: HOSPITAL | Age: 57
LOS: 1 days | Discharge: LEFT BEFORE DISCHARGE | End: 2022-12-30
Attending: EMERGENCY MEDICINE | Admitting: EMERGENCY MEDICINE
Payer: MEDICAID

## 2022-12-30 VITALS
HEART RATE: 70 BPM | RESPIRATION RATE: 18 BRPM | OXYGEN SATURATION: 96 % | DIASTOLIC BLOOD PRESSURE: 82 MMHG | SYSTOLIC BLOOD PRESSURE: 113 MMHG | TEMPERATURE: 98 F

## 2022-12-30 VITALS
DIASTOLIC BLOOD PRESSURE: 77 MMHG | WEIGHT: 268.08 LBS | TEMPERATURE: 98 F | HEIGHT: 74 IN | OXYGEN SATURATION: 98 % | HEART RATE: 80 BPM | SYSTOLIC BLOOD PRESSURE: 117 MMHG | RESPIRATION RATE: 18 BRPM

## 2022-12-30 LAB
ALBUMIN SERPL ELPH-MCNC: 3.7 G/DL — SIGNIFICANT CHANGE UP (ref 3.4–5)
ALP SERPL-CCNC: 66 U/L — SIGNIFICANT CHANGE UP (ref 40–120)
ALT FLD-CCNC: 18 U/L — SIGNIFICANT CHANGE UP (ref 12–42)
ANION GAP SERPL CALC-SCNC: 8 MMOL/L — LOW (ref 9–16)
AST SERPL-CCNC: 22 U/L — SIGNIFICANT CHANGE UP (ref 15–37)
BASOPHILS # BLD AUTO: 0.04 K/UL — SIGNIFICANT CHANGE UP (ref 0–0.2)
BASOPHILS NFR BLD AUTO: 0.9 % — SIGNIFICANT CHANGE UP (ref 0–2)
BILIRUB SERPL-MCNC: 0.4 MG/DL — SIGNIFICANT CHANGE UP (ref 0.2–1.2)
BUN SERPL-MCNC: 16 MG/DL — SIGNIFICANT CHANGE UP (ref 7–23)
CALCIUM SERPL-MCNC: 9.4 MG/DL — SIGNIFICANT CHANGE UP (ref 8.5–10.5)
CHLORIDE SERPL-SCNC: 104 MMOL/L — SIGNIFICANT CHANGE UP (ref 96–108)
CO2 SERPL-SCNC: 26 MMOL/L — SIGNIFICANT CHANGE UP (ref 22–31)
CREAT SERPL-MCNC: 1.16 MG/DL — SIGNIFICANT CHANGE UP (ref 0.5–1.3)
EGFR: 74 ML/MIN/1.73M2 — SIGNIFICANT CHANGE UP
EOSINOPHIL # BLD AUTO: 0.14 K/UL — SIGNIFICANT CHANGE UP (ref 0–0.5)
EOSINOPHIL NFR BLD AUTO: 3.1 % — SIGNIFICANT CHANGE UP (ref 0–6)
GLUCOSE SERPL-MCNC: 99 MG/DL — SIGNIFICANT CHANGE UP (ref 70–99)
HCT VFR BLD CALC: 38.3 % — LOW (ref 39–50)
HGB BLD-MCNC: 12.6 G/DL — LOW (ref 13–17)
IMM GRANULOCYTES NFR BLD AUTO: 0.2 % — SIGNIFICANT CHANGE UP (ref 0–0.9)
LYMPHOCYTES # BLD AUTO: 1.67 K/UL — SIGNIFICANT CHANGE UP (ref 1–3.3)
LYMPHOCYTES # BLD AUTO: 37 % — SIGNIFICANT CHANGE UP (ref 13–44)
MCHC RBC-ENTMCNC: 30.9 PG — SIGNIFICANT CHANGE UP (ref 27–34)
MCHC RBC-ENTMCNC: 32.9 GM/DL — SIGNIFICANT CHANGE UP (ref 32–36)
MCV RBC AUTO: 93.9 FL — SIGNIFICANT CHANGE UP (ref 80–100)
MONOCYTES # BLD AUTO: 0.56 K/UL — SIGNIFICANT CHANGE UP (ref 0–0.9)
MONOCYTES NFR BLD AUTO: 12.4 % — SIGNIFICANT CHANGE UP (ref 2–14)
NEUTROPHILS # BLD AUTO: 2.09 K/UL — SIGNIFICANT CHANGE UP (ref 1.8–7.4)
NEUTROPHILS NFR BLD AUTO: 46.4 % — SIGNIFICANT CHANGE UP (ref 43–77)
NRBC # BLD: 0 /100 WBCS — SIGNIFICANT CHANGE UP (ref 0–0)
PLATELET # BLD AUTO: 212 K/UL — SIGNIFICANT CHANGE UP (ref 150–400)
POTASSIUM SERPL-MCNC: 4.3 MMOL/L — SIGNIFICANT CHANGE UP (ref 3.5–5.3)
POTASSIUM SERPL-SCNC: 4.3 MMOL/L — SIGNIFICANT CHANGE UP (ref 3.5–5.3)
PROT SERPL-MCNC: 7.6 G/DL — SIGNIFICANT CHANGE UP (ref 6.4–8.2)
RBC # BLD: 4.08 M/UL — LOW (ref 4.2–5.8)
RBC # FLD: 14 % — SIGNIFICANT CHANGE UP (ref 10.3–14.5)
SARS-COV-2 RNA SPEC QL NAA+PROBE: SIGNIFICANT CHANGE UP
SODIUM SERPL-SCNC: 138 MMOL/L — SIGNIFICANT CHANGE UP (ref 132–145)
TROPONIN I, HIGH SENSITIVITY RESULT: 7.7 NG/L — SIGNIFICANT CHANGE UP
WBC # BLD: 4.51 K/UL — SIGNIFICANT CHANGE UP (ref 3.8–10.5)
WBC # FLD AUTO: 4.51 K/UL — SIGNIFICANT CHANGE UP (ref 3.8–10.5)

## 2022-12-30 PROCEDURE — 99285 EMERGENCY DEPT VISIT HI MDM: CPT

## 2022-12-30 PROCEDURE — 71045 X-RAY EXAM CHEST 1 VIEW: CPT | Mod: 26

## 2022-12-30 PROCEDURE — 93010 ELECTROCARDIOGRAM REPORT: CPT

## 2022-12-30 RX ORDER — ASPIRIN/CALCIUM CARB/MAGNESIUM 324 MG
162 TABLET ORAL ONCE
Refills: 0 | Status: COMPLETED | OUTPATIENT
Start: 2022-12-30 | End: 2022-12-30

## 2022-12-30 RX ADMIN — Medication 162 MILLIGRAM(S): at 09:38

## 2022-12-30 NOTE — ED ADULT NURSE NOTE - NSIMPLEMENTINTERV_GEN_ALL_ED
Implemented All Universal Safety Interventions:  [place] to call system. Call bell, personal items and telephone within reach. Instruct patient to call for assistance. Room bathroom lighting operational. Non-slip footwear when patient is off stretcher. Physically safe environment: no spills, clutter or unnecessary equipment. Stretcher in lowest position, wheels locked, appropriate side rails in place.

## 2022-12-30 NOTE — ED PROVIDER NOTE - CLINICAL SUMMARY MEDICAL DECISION MAKING FREE TEXT BOX
no chest pain throughout ed stay walking around ER and/or watching tv  advised second trop. to be drawn at 12. @11 pt advised nursing and myself that "I want to leave AMA".     The patient wishes to leave against medical advice.  I have discussed the risks, benefits and alternatives (including the possibility of worsening of disease, pain, permanent disability, and/or death) from a heart attack  with the patient   The patient voices understanding of these risks, benefits, and alternatives and still wishes to sign out against medical advice.  The patient is awake, alert, oriented  x 3 and has demonstrated capacity to refuse/direct care.  I have advised the patient that they can and should return immediately should they develop any worse/different/additional symptoms, or if they change their mind and want to continue their care.

## 2022-12-30 NOTE — ED PROVIDER NOTE - OBJECTIVE STATEMENT
Triage VS: HR: 80, BP: 117/77, Resp.: 18, Temp.: 97.9  F, O2: 98%  Triage Note: Pt walk in complaining of exertional CP for the last couple days. Pt states "it runs to the L side of my face and L arm." Endorses mild SOB Triage VS: HR: 80, BP: 117/77, Resp.: 18, Temp.: 97.9  F, O2: 98%    Triage Note: Pt walk in complaining of exertional CP for the last couple days. Pt states "it runs to the L side of my face and L arm." Endorses mild SOB  -  56 yo male with the complaint of "many months of chest pain" states the pain sometime in left side of neck and sometime is associated with sob. pain can occur with or without exertion. pt states long history (more than one year) of similar chest pain.  previous [facility] records reviewed - multiple prior evaluations for chest pain. pt states that he had a normal cardiac cath at [facility] in october 2022.  he was told that  the cath was "good" and that he "did not need a stent" the cath was performed as patient had similar chest pains as he does today in the ER.    history of pericarditis 2015    no motor sensory complaints  no headache no n/v/d no fatigue mo Triage VS: HR: 80, BP: 117/77, Resp.: 18, Temp.: 97.9  F, O2: 98%    Triage Note: Pt walk in complaining of exertional CP for the last couple days. Pt states "it runs to the L side of my face and L arm." Endorses mild SOB  -  pt arrived ambulatory from [address] [place] [address] - walked and took the train   58 yo male with the complaint of "many months of chest pain" states the pain sometime in left side of neck and sometime is associated with sob. pain can occur with or without exertion. pt states long history (more than one year) of similar chest pain.  previous [facility] records reviewed - multiple prior evaluations for chest pain. pt states that he had a normal cardiac cath at [facility] in october 2022.  he was told that  the cath was "good" and that he "did not need a stent" the cath was performed as patient had similar chest pains as he does today in the ER.    history of pericarditis 2015    no motor sensory complaints  no headache no n/v/d no fatigue mo

## 2022-12-30 NOTE — ED PROVIDER NOTE - CONSTITUTIONAL, MLM
normal... Well appearing, awake, alert, oriented to person, place, time/situation and in no apparent distress. watching TV

## 2022-12-30 NOTE — ED PROVIDER NOTE - PATIENT PORTAL LINK FT
You can access the FollowMyHealth Patient Portal offered by [facility] by registering at the following website: [email or web address]. By joining [employer]’s FollowMyHealth portal, you will also be able to view your health information using other applications (apps) compatible with our system.

## 2022-12-30 NOTE — ED ADULT TRIAGE NOTE - CHIEF COMPLAINT QUOTE
Pt walk in complaining of exertional CP for the last couple days. Pt states "it runs to the L side of my face and L arm." Endorses mild SOB.

## 2022-12-30 NOTE — ED ADULT TRIAGE NOTE - TEMPERATURE IN FAHRENHEIT (DEGREES F)
DENIAL SOLIFENACIN 5MG TAB 10/28/20. Pt must try and fail preferred alternatives : MYRBETRIQ ER TAB, and TOVIAZ ER TAB .      See attached for details 97.9

## 2023-01-01 NOTE — ED ADULT TRIAGE NOTE - RESPIRATORY RATE (BREATHS/MIN)
Nursing notes reviewed as summarized above:      Time seen:900      History  Parents had stomach flu type symptoms last week which resolved  This patient had symptoms of sore throat achiness fever loose stool  Went to urgent care had strep test which was negative but placed on amoxicillin.  Culture is now back and negative  Because he had had fever for around 6 days he was brought for further evaluation  There is no peeling skin no runny nose at this time no sore throat no cough no abdominal pain no back pain no rash no complaints at this time      Past medical history  Patient Active Problem List   Diagnosis   • Kawasaki disease (CMS/HCC)   • Heart murmur        Periodic fever syndrome (CMS/HCC)                               NO PAST SURGERIES                                             Family history:      Family History   Problem Relation Age of Onset   • Hypertension Maternal Grandmother    • Hyperlipidemia Maternal Grandmother    • Diabetes Maternal Grandmother    • Hypertension Maternal Grandfather    • Hyperlipidemia Maternal Grandfather    • Diabetes Maternal Grandfather    • Heart disease Neg Hx      Review of patient's family status indicates:    Maternal Grandmother                                     Maternal Grandfather                                     Neg Hx                                                         Social History  here with parent  Social History     Tobacco Use   • Smoking status: Never   • Smokeless tobacco: Never       Review of systems    See HPI    Comprehensive ROS otherwise negative      Physical exam    ED Triage Vitals [01/01/23 0858]   /72   Heart Rate 99   Resp 18   Temp 97.7 °F (36.5 °C)   SpO2 100 %        Vitals:    01/01/23 0858   BP: 116/72   Pulse: 99   Resp: 18   Temp: 97.7 °F (36.5 °C)   TempSrc: Oral   SpO2: 100%      Vitals:    01/01/23 0858   BP: 116/72   Pulse: 99   Resp: 18   Temp: 97.7 °F (36.5 °C)   TempSrc: Oral   SpO2: 100%        General: Nontoxic no  distress vital signs reviewed   Eyes: No icterus pupils equal  ENT: Moist mucous membranes TM clear bilaterally no tonsillitis  Neck: No neck stiffness moving it freely  no meningeal signs  Cardiovascular: Equal symmetric pulses no murmur  Respiratory: Good effort clear bilaterally  no abdominal tenderness no HSM no rebound or guarding no hernia  Skin: No new rash  Psych: Appropriate mood  Lymph: No adenopathy      No results found for this visit on 01/01/23.    Imaging Results    None       Course  Normal exam at this point in time  Mother concerned of leukemia.  I discussed in the context of her and  having \"stomach flu\" symptoms with him with same type symptoms and now better with no symptoms that I do not think this is likely.  I did validate her concerns and recommended follow-up with the pediatrician if he develops additional fever  This time there is no lymphadenopathy and again child does not have any signs or symptoms at this time  There is no signs of strep throat there is no signs of neck abscess lymphadenopathy no abdominal tenderness no rash no peeling skin no signs of Kawasaki's no signs of pneumonia         ED Diagnosis     Diagnosis Comment Associated Orders       Final diagnosis    Fever, unspecified fever cause -- --      History of fever    Follow-up Information     Follow up With Specialties Details Why Contact Info    Nikunj Hinson DO Pediatrics In 2 days  [facility] [address] [facility] [address] [address]  [phone]            Current Discharge Medication List          Disposition:  Discharge [1] 1/1/2023  9:14 AM  Home     Nikunj Burden MD  01/01/23 0914       Nikunj Burden MD  01/01/23 0915     16

## 2023-01-02 DIAGNOSIS — R07.89 OTHER CHEST PAIN: ICD-10-CM

## 2023-01-02 DIAGNOSIS — Z20.822 CONTACT WITH AND (SUSPECTED) EXPOSURE TO COVID-19: ICD-10-CM

## 2023-01-02 DIAGNOSIS — Z53.29 PROCEDURE AND TREATMENT NOT CARRIED OUT BECAUSE OF PATIENT'S DECISION FOR OTHER REASONS: ICD-10-CM

## 2023-01-02 DIAGNOSIS — M54.2 CERVICALGIA: ICD-10-CM

## 2023-01-04 ENCOUNTER — HOSPITAL ENCOUNTER (INPATIENT)
Dept: HOSPITAL 74 - YASAS | Age: 58
LOS: 3 days | Discharge: HOME | End: 2023-01-07
Attending: SURGERY | Admitting: ALLERGY & IMMUNOLOGY
Payer: COMMERCIAL

## 2023-01-04 VITALS — BODY MASS INDEX: 34.4 KG/M2

## 2023-01-04 DIAGNOSIS — F32.A: ICD-10-CM

## 2023-01-04 DIAGNOSIS — M54.50: ICD-10-CM

## 2023-01-04 DIAGNOSIS — I10: ICD-10-CM

## 2023-01-04 DIAGNOSIS — K21.9: ICD-10-CM

## 2023-01-04 DIAGNOSIS — F10.230: Primary | ICD-10-CM

## 2023-01-04 DIAGNOSIS — G89.29: ICD-10-CM

## 2023-01-04 DIAGNOSIS — F14.20: ICD-10-CM

## 2023-01-04 PROCEDURE — HZ2ZZZZ DETOXIFICATION SERVICES FOR SUBSTANCE ABUSE TREATMENT: ICD-10-PCS | Performed by: SURGERY

## 2023-01-04 PROCEDURE — U0005 INFEC AGEN DETEC AMPLI PROBE: HCPCS

## 2023-01-04 PROCEDURE — U0003 INFECTIOUS AGENT DETECTION BY NUCLEIC ACID (DNA OR RNA); SEVERE ACUTE RESPIRATORY SYNDROME CORONAVIRUS 2 (SARS-COV-2) (CORONAVIRUS DISEASE [COVID-19]), AMPLIFIED PROBE TECHNIQUE, MAKING USE OF HIGH THROUGHPUT TECHNOLOGIES AS DESCRIBED BY CMS-2020-01-R: HCPCS

## 2023-01-04 RX ADMIN — Medication SCH: at 22:42

## 2023-01-05 RX ADMIN — DICYCLOMINE HYDROCHLORIDE PRN MG: 10 CAPSULE ORAL at 17:25

## 2023-01-05 RX ADMIN — PANTOPRAZOLE SODIUM SCH MG: 20 TABLET, DELAYED RELEASE ORAL at 23:01

## 2023-01-05 RX ADMIN — DICYCLOMINE HYDROCHLORIDE PRN MG: 10 CAPSULE ORAL at 06:25

## 2023-01-05 RX ADMIN — PANTOPRAZOLE SODIUM SCH MG: 20 TABLET, DELAYED RELEASE ORAL at 17:25

## 2023-01-05 RX ADMIN — Medication SCH MG: at 23:01

## 2023-01-05 RX ADMIN — ALUMINUM HYDROXIDE, MAGNESIUM HYDROXIDE, AND SIMETHICONE PRN ML: 200; 200; 20 SUSPENSION ORAL at 06:40

## 2023-01-05 RX ADMIN — Medication SCH: at 10:50

## 2023-01-05 RX ADMIN — BISMUTH SUBSALICYLATE PRN MG: 525 LIQUID ORAL at 08:45

## 2023-01-06 VITALS — TEMPERATURE: 98.5 F | SYSTOLIC BLOOD PRESSURE: 122 MMHG | HEART RATE: 105 BPM | DIASTOLIC BLOOD PRESSURE: 87 MMHG

## 2023-01-06 VITALS — RESPIRATION RATE: 18 BRPM

## 2023-01-06 RX ADMIN — BISMUTH SUBSALICYLATE PRN MG: 525 LIQUID ORAL at 17:22

## 2023-01-06 RX ADMIN — PANTOPRAZOLE SODIUM SCH MG: 20 TABLET, DELAYED RELEASE ORAL at 10:49

## 2023-01-06 RX ADMIN — Medication SCH TAB: at 10:49

## 2023-01-06 RX ADMIN — Medication SCH MG: at 22:03

## 2023-01-06 RX ADMIN — ALUMINUM HYDROXIDE, MAGNESIUM HYDROXIDE, AND SIMETHICONE PRN ML: 200; 200; 20 SUSPENSION ORAL at 06:54

## 2023-01-06 RX ADMIN — PANTOPRAZOLE SODIUM SCH MG: 20 TABLET, DELAYED RELEASE ORAL at 22:03

## 2023-01-13 ENCOUNTER — EMERGENCY (EMERGENCY)
Facility: HOSPITAL | Age: 58
LOS: 1 days | Discharge: LEFT BEFORE DISCHARGE | End: 2023-01-13
Attending: EMERGENCY MEDICINE | Admitting: EMERGENCY MEDICINE
Payer: MEDICAID

## 2023-01-13 VITALS
TEMPERATURE: 98 F | DIASTOLIC BLOOD PRESSURE: 83 MMHG | SYSTOLIC BLOOD PRESSURE: 126 MMHG | RESPIRATION RATE: 18 BRPM | OXYGEN SATURATION: 97 % | HEIGHT: 74 IN | HEART RATE: 119 BPM

## 2023-01-13 PROCEDURE — L9991: CPT

## 2023-01-17 DIAGNOSIS — R07.9 CHEST PAIN, UNSPECIFIED: ICD-10-CM

## 2023-01-17 DIAGNOSIS — Z53.21 PROCEDURE AND TREATMENT NOT CARRIED OUT DUE TO PATIENT LEAVING PRIOR TO BEING SEEN BY HEALTH CARE PROVIDER: ICD-10-CM

## 2023-02-03 ENCOUNTER — HOSPITAL ENCOUNTER (INPATIENT)
Dept: HOSPITAL 74 - YASAS | Age: 58
LOS: 7 days | Discharge: LEFT BEFORE DISCHARGE | DRG: 770 | End: 2023-02-10
Attending: PSYCHIATRY & NEUROLOGY | Admitting: ALLERGY & IMMUNOLOGY
Payer: COMMERCIAL

## 2023-02-03 VITALS — BODY MASS INDEX: 34.1 KG/M2

## 2023-02-03 DIAGNOSIS — M54.50: ICD-10-CM

## 2023-02-03 DIAGNOSIS — F10.20: Primary | ICD-10-CM

## 2023-02-03 DIAGNOSIS — F14.20: ICD-10-CM

## 2023-02-03 DIAGNOSIS — R07.9: ICD-10-CM

## 2023-02-03 DIAGNOSIS — I10: ICD-10-CM

## 2023-02-03 DIAGNOSIS — G89.29: ICD-10-CM

## 2023-02-03 DIAGNOSIS — F31.9: ICD-10-CM

## 2023-02-03 DIAGNOSIS — K21.9: ICD-10-CM

## 2023-02-03 PROCEDURE — HZ42ZZZ GROUP COUNSELING FOR SUBSTANCE ABUSE TREATMENT, COGNITIVE-BEHAVIORAL: ICD-10-PCS | Performed by: PSYCHIATRY & NEUROLOGY

## 2023-02-03 PROCEDURE — U0003 INFECTIOUS AGENT DETECTION BY NUCLEIC ACID (DNA OR RNA); SEVERE ACUTE RESPIRATORY SYNDROME CORONAVIRUS 2 (SARS-COV-2) (CORONAVIRUS DISEASE [COVID-19]), AMPLIFIED PROBE TECHNIQUE, MAKING USE OF HIGH THROUGHPUT TECHNOLOGIES AS DESCRIBED BY CMS-2020-01-R: HCPCS

## 2023-02-03 PROCEDURE — U0005 INFEC AGEN DETEC AMPLI PROBE: HCPCS

## 2023-02-03 RX ADMIN — Medication SCH: at 22:04

## 2023-02-03 RX ADMIN — ASPIRIN SCH: 81 TABLET, COATED ORAL at 15:50

## 2023-02-03 RX ADMIN — HYDROCHLOROTHIAZIDE SCH MG: 12.5 CAPSULE ORAL at 15:50

## 2023-02-04 RX ADMIN — Medication SCH MG: at 21:24

## 2023-02-04 RX ADMIN — IBUPROFEN PRN MG: 400 TABLET, FILM COATED ORAL at 00:18

## 2023-02-04 RX ADMIN — ASPIRIN SCH MG: 81 TABLET, COATED ORAL at 10:29

## 2023-02-04 RX ADMIN — HYDROCHLOROTHIAZIDE SCH MG: 12.5 CAPSULE ORAL at 10:29

## 2023-02-04 RX ADMIN — Medication SCH TAB: at 10:29

## 2023-02-04 RX ADMIN — IBUPROFEN PRN MG: 400 TABLET, FILM COATED ORAL at 06:42

## 2023-02-04 RX ADMIN — IBUPROFEN PRN MG: 400 TABLET, FILM COATED ORAL at 19:45

## 2023-02-04 RX ADMIN — Medication SCH: at 21:24

## 2023-02-05 RX ADMIN — Medication SCH: at 21:06

## 2023-02-05 RX ADMIN — IBUPROFEN PRN MG: 400 TABLET, FILM COATED ORAL at 21:05

## 2023-02-05 RX ADMIN — ALUMINUM HYDROXIDE, MAGNESIUM HYDROXIDE, AND SIMETHICONE PRN ML: 200; 200; 20 SUSPENSION ORAL at 15:37

## 2023-02-05 RX ADMIN — IBUPROFEN PRN MG: 400 TABLET, FILM COATED ORAL at 06:41

## 2023-02-05 RX ADMIN — HYDROCHLOROTHIAZIDE SCH MG: 12.5 CAPSULE ORAL at 09:16

## 2023-02-05 RX ADMIN — Medication SCH TAB: at 09:16

## 2023-02-05 RX ADMIN — ASPIRIN SCH MG: 81 TABLET, COATED ORAL at 09:16

## 2023-02-05 RX ADMIN — Medication SCH MG: at 21:05

## 2023-02-06 LAB
APPEARANCE UR: CLEAR
BILIRUB UR STRIP.AUTO-MCNC: NEGATIVE MG/DL
COLOR UR: YELLOW
KETONES UR QL STRIP: NEGATIVE
LEUKOCYTE ESTERASE UR QL STRIP.AUTO: NEGATIVE
NITRITE UR QL STRIP: NEGATIVE
PH UR: 7 [PH] (ref 5–8)
PROT UR QL STRIP: NEGATIVE
PROT UR QL STRIP: NEGATIVE
SP GR UR: 1.02 (ref 1.01–1.03)
UROBILINOGEN UR STRIP-MCNC: 0.2 MG/DL (ref 0.2–1)

## 2023-02-06 RX ADMIN — Medication SCH MG: at 21:35

## 2023-02-06 RX ADMIN — ALUMINUM HYDROXIDE, MAGNESIUM HYDROXIDE, AND SIMETHICONE PRN ML: 200; 200; 20 SUSPENSION ORAL at 01:44

## 2023-02-06 RX ADMIN — Medication SCH TAB: at 09:27

## 2023-02-06 RX ADMIN — Medication SCH: at 21:36

## 2023-02-06 RX ADMIN — ASPIRIN SCH MG: 81 TABLET, COATED ORAL at 09:26

## 2023-02-06 RX ADMIN — HYDROCHLOROTHIAZIDE SCH MG: 12.5 CAPSULE ORAL at 09:26

## 2023-02-07 LAB
ALBUMIN SERPL-MCNC: 3.4 G/DL (ref 3.4–5)
ALP SERPL-CCNC: 65 U/L (ref 45–117)
ALT SERPL-CCNC: 66 U/L (ref 13–61)
ANION GAP SERPL CALC-SCNC: 6 MMOL/L (ref 8–16)
AST SERPL-CCNC: 40 U/L (ref 15–37)
BASOPHILS # BLD: 1 % (ref 0–2)
BILIRUB SERPL-MCNC: 0.5 MG/DL (ref 0.2–1)
BUN SERPL-MCNC: 15 MG/DL (ref 7–18)
CALCIUM SERPL-MCNC: 9 MG/DL (ref 8.5–10.1)
CHLORIDE SERPL-SCNC: 106 MMOL/L (ref 98–107)
CO2 SERPL-SCNC: 26 MMOL/L (ref 21–32)
CREAT SERPL-MCNC: 1.2 MG/DL (ref 0.55–1.3)
DEPRECATED RDW RBC AUTO: 15.2 % (ref 11.9–15.9)
EOSINOPHIL # BLD: 3.9 % (ref 0–4.5)
GLUCOSE SERPL-MCNC: 111 MG/DL (ref 74–106)
HCT VFR BLD CALC: 36.5 % (ref 35.4–49)
HGB BLD-MCNC: 12.3 GM/DL (ref 11.7–16.9)
LYMPHOCYTES # BLD: 37.4 % (ref 8–40)
MCH RBC QN AUTO: 31.9 PG (ref 25.7–33.7)
MCHC RBC AUTO-ENTMCNC: 33.8 G/DL (ref 32–35.9)
MCV RBC: 94.5 FL (ref 80–96)
MONOCYTES # BLD AUTO: 8.1 % (ref 3.8–10.2)
NEUTROPHILS # BLD: 49.6 % (ref 42.8–82.8)
PLATELET # BLD AUTO: 229 10^3/UL (ref 134–434)
PMV BLD: 8.6 FL (ref 7.5–11.1)
PROT SERPL-MCNC: 6.9 G/DL (ref 6.4–8.2)
RBC # BLD AUTO: 3.86 M/MM3 (ref 4–5.6)
SODIUM SERPL-SCNC: 139 MMOL/L (ref 136–145)
TREPONEMA PALLIDUM AB [UNITS/VOLUME] IN SERUM OR PLASMA BY IMMUNOASSAY: (no result)
WBC # BLD AUTO: 5.3 K/MM3 (ref 4–10)

## 2023-02-07 RX ADMIN — IBUPROFEN PRN MG: 400 TABLET, FILM COATED ORAL at 06:55

## 2023-02-07 RX ADMIN — Medication SCH: at 21:37

## 2023-02-07 RX ADMIN — HYDROCHLOROTHIAZIDE SCH MG: 12.5 CAPSULE ORAL at 10:00

## 2023-02-07 RX ADMIN — GUAIFENESIN PRN ML: 100 SOLUTION ORAL at 16:24

## 2023-02-07 RX ADMIN — ASPIRIN SCH MG: 81 TABLET, COATED ORAL at 10:00

## 2023-02-07 RX ADMIN — Medication SCH TAB: at 10:00

## 2023-02-07 RX ADMIN — Medication SCH MG: at 21:36

## 2023-02-08 RX ADMIN — IBUPROFEN PRN MG: 400 TABLET, FILM COATED ORAL at 21:18

## 2023-02-08 RX ADMIN — ASPIRIN SCH MG: 81 TABLET, COATED ORAL at 09:58

## 2023-02-08 RX ADMIN — Medication SCH: at 21:17

## 2023-02-08 RX ADMIN — GUAIFENESIN PRN ML: 100 SOLUTION ORAL at 06:58

## 2023-02-08 RX ADMIN — Medication SCH TAB: at 09:59

## 2023-02-08 RX ADMIN — HYDROCHLOROTHIAZIDE SCH: 12.5 CAPSULE ORAL at 09:59

## 2023-02-08 RX ADMIN — Medication SCH MG: at 21:17

## 2023-02-09 VITALS — RESPIRATION RATE: 18 BRPM | TEMPERATURE: 97.3 F

## 2023-02-09 RX ADMIN — Medication SCH: at 21:27

## 2023-02-09 RX ADMIN — Medication SCH TAB: at 09:32

## 2023-02-09 RX ADMIN — ASPIRIN SCH MG: 81 TABLET, COATED ORAL at 09:31

## 2023-02-09 RX ADMIN — Medication SCH MG: at 21:27

## 2023-02-09 RX ADMIN — HYDROCHLOROTHIAZIDE SCH MG: 12.5 CAPSULE ORAL at 09:32

## 2023-02-10 VITALS — HEART RATE: 80 BPM | SYSTOLIC BLOOD PRESSURE: 107 MMHG | DIASTOLIC BLOOD PRESSURE: 72 MMHG

## 2023-02-10 RX ADMIN — Medication SCH TAB: at 09:53

## 2023-02-10 RX ADMIN — ASPIRIN SCH MG: 81 TABLET, COATED ORAL at 09:53

## 2023-02-10 RX ADMIN — HYDROCHLOROTHIAZIDE SCH: 12.5 CAPSULE ORAL at 09:54

## 2023-02-23 NOTE — ED ADULT TRIAGE NOTE - TEMPERATURE IN FAHRENHEIT (DEGREES F)
[Normal] : Patient is awake and alert and in no acute distress [Oriented x3] : oriented to person, place, and time [Conjunctiva] : normal conjunctiva [Eyelids] : normal eyelids [Pupils] : pupils were equal and round [Ears] : normal ears [Nose] : normal nose [Rash] : no rash [FreeTextEntry1] : Pleasant and cooperative with exam, appropriate for age.\par Ambulates without evidence of antalgia and limp, good coordination and balance.\par \par Right Knee: \par Full active and passive range of motion of the knee, possible mild decrease extension compared to the left  good muscle strength 5 5. \par Neurologically intact. DTRs intact. \par There is + clicking of the knee with ROM\par There is no quadriceps atrophy noted. \par There is no edema, effusion, erythema or ecchymosis noted. \par There are no signs of Genu Varum or Valgum. \par There is no pain over the tibial tubercle, patellar tendon or distal pole of the patella.  \par + Moderate ligamentous laxity noted at the patellofemoral joint.  \par + Positive J sign noted.  \par There is no discomfort with palpation over the medial/lateral joint space. \par There is no discomfort elicited with palpation over the medial/lateral aspect of the patella. \par There is no discomfort with palpation over the MCL/LCL ligaments. \par Negative patella apprehension sign. Negative patella grind test. Negative Marc's test. \par There is a good endpoint on Lachman's exam. Negative anterior/posterior drawer sign. \par The knee joint is stable with varus/valgus stress.  \par There is no active hip pain. \par 2+ pulses palpated, with capillary refill pulse one in all toes.\par  98

## 2023-03-17 ENCOUNTER — EMERGENCY (EMERGENCY)
Facility: HOSPITAL | Age: 58
LOS: 1 days | Discharge: LEFT BEFORE DISCHARGE | End: 2023-03-17
Attending: EMERGENCY MEDICINE | Admitting: EMERGENCY MEDICINE
Payer: MEDICAID

## 2023-03-17 VITALS
HEART RATE: 106 BPM | TEMPERATURE: 97 F | RESPIRATION RATE: 18 BRPM | OXYGEN SATURATION: 95 % | DIASTOLIC BLOOD PRESSURE: 78 MMHG | SYSTOLIC BLOOD PRESSURE: 115 MMHG

## 2023-03-17 PROCEDURE — 99284 EMERGENCY DEPT VISIT MOD MDM: CPT

## 2023-03-17 RX ORDER — ASPIRIN/CALCIUM CARB/MAGNESIUM 324 MG
324 TABLET ORAL ONCE
Refills: 0 | Status: DISCONTINUED | OUTPATIENT
Start: 2023-03-17 | End: 2023-03-17

## 2023-03-17 NOTE — ED PROVIDER NOTE - OBJECTIVE STATEMENT
56 yo M w/ history of HTN coming in w/ left sided chest pain. States it started 15 minutes PTA and radiates to the left side of the neck. Noted intermittent SOB with symptoms. Currently states still has chest patient. Patient denies seeing cardiologist in the past. However, on evaluation of records, patient has had multiple visits to [facility] ED for chest pain and reportedly had a normal cath in October. Patiently denied EKG in triage. 58 yo M w/ history of HTN coming in w/ left sided - focal (indicates site with one finger) non progressive chest pain. States chest pain started 15 minutes PTA while patient was seated and radiates to the left side of the neck. Noted intermittent SOB with symptoms. no dizziness/lightgheadedness/sweating or n/v.  Currently states still has chest patient. Patient denies seeing cardiologist in the past. However, on evaluation of records, patient has had multiple visits to [facility] ED for chest pain and reportedly had a normal cath in October 2022. Patiently refused  EKG in triage. triage: pt biba for chest pain.  -  58 yo M w/ history of HTN coming in w/ left sided - focal (indicates site with one finger) non progressive chest pain. States chest pain started 15 minutes PTA while patient was seated and radiates to the left side of the neck. pt noted intermittent SOB with cp symptoms. no dizziness/lightgheadedness/sweating or n/v.  Currently states still has chest pain. Patient denies seeing cardiologist in the past. However, on evaluation of [facility] records, patient has had multiple [facility] ED [facility] and in the medical records is it documented that the patient had a normal cardiac cath in October 2022. Patiently refused  EKG in triage.

## 2023-03-17 NOTE — ED ADULT TRIAGE NOTE - ARRIVAL FROM
"              After Visit Summary   5/22/2018    Patrick Gutierrez    MRN: [id number]           Patient Information     Date Of Birth          1994        Visit Information        Provider Department      5/22/2018 1:00 PM Katt Meredith DO [facility]        Today's Diagnoses     Attention deficit hyperactivity disorder (ADHD), predominantly inattentive type    -  1    Screening examination for venereal disease        Exercise-induced asthma        Screening for thyroid disorder          Care Instructions    Follow up as planned in 6 months  Script done for 4 months    Katt Meredith D.O.            Follow-ups after your visit        Who to contact     If you have questions or need follow up information about today's clinic visit or your schedule please contact [facility] directly at [phone].  Normal or non-critical lab and imaging results will be communicated to you by MyChart, letter or phone within 4 business days after the clinic has received the results. If you do not hear from us within 7 days, please contact the clinic through MyChart or phone. If you have a critical or abnormal lab result, we will notify you by phone as soon as possible.  Submit refill requests through [employer] or call your pharmacy and they will forward the refill request to us. Please allow 3 business days for your refill to be completed.          Additional Information About Your Visit        MyChart Information     [employer] lets you send messages to your doctor, view your test results, renew your prescriptions, schedule appointments and more. To sign up, go to [email or web address] . Click on \"Log in\" on the left side of the screen, which will take you to the Welcome page. Then click on \"Sign up Now\" on the right side of the page.     You will be asked to enter the access code listed below, as well as some personal information. Please follow the directions to create your username " "and password.     Your access code is: [email or web address]  Expires: 2018  1:30 PM     Your access code will  in 90 days. If you need help or a new code, please call your [place] clinic or [phone].        Care EveryWhere ID     This is your Care EveryWhere ID. This could be used by other organizations to access your [facility] medical records  [id number]        Your Vitals Were     Pulse Temperature Height Pulse Oximetry BMI (Body Mass Index)       72 98.3  F (36.8  C) 5' 11\" (1.803 m) 98% 23.74 kg/m2        Blood Pressure from Last 3 Encounters:   18 118/70   18 122/72   10/02/17 128/82    Weight from Last 3 Encounters:   18 170 lb 3.2 oz (77.2 kg)   18 161 lb (73 kg)   10/02/17 167 lb 2 oz (75.8 kg)              We Performed the Following     CHLAMYDIA TRACHOMATIS PCR     Drug  Screen Comprehensive , Urine with Reported Meds (Med[facility]ox) (Pain Care Package)     HIV Antigen Antibody Combo     NEISSERIA GONORRHOEA PCR     Treponema Abs w Reflex to RPR and Titer     TSH with free T4 reflex          Today's Medication Changes          These changes are accurate as of 18  1:30 PM.  If you have any questions, ask your nurse or doctor.               These medicines have changed or have updated prescriptions.        Dose/Directions    albuterol 108 (90 Base) MCG/ACT Inhaler   Commonly known as:  VENTOLIN HFA   This may have changed:  See the new instructions.   Used for:  Exercise-induced asthma   Changed by:  Katt Meredith DO        INHALE 2 PUFFS INTO THE LUNGS EVERY 6 HOURS AS NEEDED FOR SHORTNESS OF BREATH/DYSPNEA OR WHEEZING   Quantity:  18 g   Refills:  1       * methylphenidate 20 MG tablet   Commonly known as:  RITALIN   This may have changed:  Another medication with the same name was added. Make sure you understand how and when to take each.   Used for:  Attention deficit hyperactivity disorder (ADHD), predominantly inattentive type   Changed by:  Katt Meredith " DO Pam        Use one tab daily   Quantity:  30 tablet   Refills:  0       * methylphenidate 20 MG tablet   Commonly known as:  RITALIN   This may have changed:  Another medication with the same name was added. Make sure you understand how and when to take each.   Used for:  Attention deficit hyperactivity disorder (ADHD), predominantly inattentive type   Changed by:  Katt Meredith DO        One tab daily   Quantity:  30 tablet   Refills:  0       * methylphenidate 20 MG tablet   Commonly known as:  RITALIN   This may have changed:  Another medication with the same name was added. Make sure you understand how and when to take each.   Used for:  Attention deficit hyperactivity disorder (ADHD), predominantly inattentive type   Changed by:  Katt Meredith DO        Use one tab daily   Quantity:  30 tablet   Refills:  0       * methylphenidate 50 MG CR capsule   Commonly known as:  METADATE CD   This may have changed:  Another medication with the same name was added. Make sure you understand how and when to take each.   Used for:  Attention deficit hyperactivity disorder (ADHD), predominantly inattentive type   Changed by:  Katt Meredith DO        Dose:  50 mg   Start taking on:  6/15/2018   Take 1 capsule (50 mg) by mouth every morning   Quantity:  30 capsule   Refills:  0       * methylphenidate 20 MG tablet   Commonly known as:  RITALIN   This may have changed:  Another medication with the same name was added. Make sure you understand how and when to take each.   Used for:  Attention deficit hyperactivity disorder (ADHD), predominantly inattentive type   Changed by:  Katt Meredith DO        Start taking on:  6/15/2018   One tab daily   Quantity:  30 tablet   Refills:  0       * methylphenidate 50 MG CR capsule   Commonly known as:  METADATE CD   This may have changed:  You were already taking a medication with the same name, and this prescription was added. Make sure  you understand how and when to take each.   Used for:  Attention deficit hyperactivity disorder (ADHD), predominantly inattentive type   Changed by:  Katt Meredith, DO        Dose:  50 mg   Start taking on:  7/13/2018   Take 1 capsule (50 mg) by mouth daily   Quantity:  30 capsule   Refills:  0       * methylphenidate 20 MG tablet   Commonly known as:  RITALIN   This may have changed:  You were already taking a medication with the same name, and this prescription was added. Make sure you understand how and when to take each.   Used for:  Attention deficit hyperactivity disorder (ADHD), predominantly inattentive type   Changed by:  Katt Meredith, DO        Dose:  20 mg   Start taking on:  7/13/2018   Take 1 tablet (20 mg) by mouth daily   Quantity:  30 tablet   Refills:  0       * methylphenidate 50 MG CR capsule   Commonly known as:  METADATE CD   This may have changed:  You were already taking a medication with the same name, and this prescription was added. Make sure you understand how and when to take each.   Used for:  Attention deficit hyperactivity disorder (ADHD), predominantly inattentive type   Changed by:  Katt Meredith, DO        Dose:  50 mg   Start taking on:  8/13/2018   Take 1 capsule (50 mg) by mouth daily   Quantity:  30 capsule   Refills:  0       * methylphenidate 20 MG tablet   Commonly known as:  RITALIN   This may have changed:  You were already taking a medication with the same name, and this prescription was added. Make sure you understand how and when to take each.   Used for:  Attention deficit hyperactivity disorder (ADHD), predominantly inattentive type   Changed by:  Katt Meredith, DO        Dose:  20 mg   Start taking on:  8/13/2018   Take 1 tablet (20 mg) by mouth daily   Quantity:  30 tablet   Refills:  0       * methylphenidate 50 MG CR capsule   Commonly known as:  METADATE CD   This may have changed:  You were already taking a medication with  the same name, and this prescription was added. Make sure you understand how and when to take each.   Used for:  Attention deficit hyperactivity disorder (ADHD), predominantly inattentive type   Changed by:  Katt Meredith DO        Dose:  50 mg   Start taking on:  9/14/2018   Take 1 capsule (50 mg) by mouth daily   Quantity:  30 capsule   Refills:  0       * methylphenidate 20 MG tablet   Commonly known as:  RITALIN   This may have changed:  You were already taking a medication with the same name, and this prescription was added. Make sure you understand how and when to take each.   Used for:  Attention deficit hyperactivity disorder (ADHD), predominantly inattentive type   Changed by:  Katt Meredith DO        Dose:  20 mg   Start taking on:  9/14/2018   Take 1 tablet (20 mg) by mouth daily   Quantity:  30 tablet   Refills:  0       * Notice:  This list has 11 medication(s) that are the same as other medications prescribed for you. Read the directions carefully, and ask your doctor or other care provider to review them with you.         Where to get your medicines      These medications were sent to [facility] [address].  [address] [place] [id number]     Phone:  [phone]     albuterol 108 (90 Base) MCG/ACT Inhaler         Some of these will need a paper prescription and others can be bought over the counter.  Ask your nurse if you have questions.     Bring a paper prescription for each of these medications     methylphenidate 20 MG tablet    methylphenidate 20 MG tablet    methylphenidate 20 MG tablet    methylphenidate 20 MG tablet    methylphenidate 50 MG CR capsule    methylphenidate 50 MG CR capsule    methylphenidate 50 MG CR capsule    methylphenidate 50 MG CR capsule                Primary Care Provider Office Phone # Fax #    Katt Meredith [phone]       [address] [place] [address]  [place] [address]        Equal Access to Services     [facility] FarmROSENDO : Hadii nilton dorman zackaryalexandra Garrettali, waaxda luqadaha, qaybta kaalmakb foster, rosa maria mcgill. So [facility] [phone].    ATENCIÓN: Si habla español, tiene a lopez disposición servicios gratuitos de asistencia lingüística. Maddyame al [phone].    We comply with applicable federal civil rights laws and [place] laws. We do not discriminate on the basis of race, color, national origin, age, disability, sex, sexual orientation, or gender identity.            Thank you!     Thank you for choosing [facility]  for your care. Our goal is always to provide you with excellent care. Hearing back from our patients is one way we can continue to improve our services. Please take a few minutes to complete the written survey that you may receive in the mail after your visit with us. Thank you!             Your Updated Medication List - Protect others around you: Learn how to safely use, store and throw away your medicines at www.disposemymeds.org.          This list is accurate as of 5/22/18  1:30 PM.  Always use your most recent med list.                   Brand Name Dispense Instructions for use Diagnosis    albuterol 108 (90 Base) MCG/ACT Inhaler    VENTOLIN HFA    18 g    INHALE 2 PUFFS INTO THE LUNGS EVERY 6 HOURS AS NEEDED FOR SHORTNESS OF BREATH/DYSPNEA OR WHEEZING    Exercise-induced asthma       * methylphenidate 20 MG tablet    RITALIN    30 tablet    Use one tab daily    Attention deficit hyperactivity disorder (ADHD), predominantly inattentive type       * methylphenidate 20 MG tablet    RITALIN    30 tablet    One tab daily    Attention deficit hyperactivity disorder (ADHD), predominantly inattentive type       * methylphenidate 20 MG tablet    RITALIN    30 tablet    Use one tab daily    Attention deficit hyperactivity disorder (ADHD), predominantly inattentive type       * methylphenidate 50 MG CR capsule   Start  taking on:  6/15/2018    METADATE CD    30 capsule    Take 1 capsule (50 mg) by mouth every morning    Attention deficit hyperactivity disorder (ADHD), predominantly inattentive type       * methylphenidate 20 MG tablet   Start taking on:  6/15/2018    RITALIN    30 tablet    One tab daily    Attention deficit hyperactivity disorder (ADHD), predominantly inattentive type       * methylphenidate 50 MG CR capsule   Start taking on:  7/13/2018    METADATE CD    30 capsule    Take 1 capsule (50 mg) by mouth daily    Attention deficit hyperactivity disorder (ADHD), predominantly inattentive type       * methylphenidate 20 MG tablet   Start taking on:  7/13/2018    RITALIN    30 tablet    Take 1 tablet (20 mg) by mouth daily    Attention deficit hyperactivity disorder (ADHD), predominantly inattentive type       * methylphenidate 50 MG CR capsule   Start taking on:  8/13/2018    METADATE CD    30 capsule    Take 1 capsule (50 mg) by mouth daily    Attention deficit hyperactivity disorder (ADHD), predominantly inattentive type       * methylphenidate 20 MG tablet   Start taking on:  8/13/2018    RITALIN    30 tablet    Take 1 tablet (20 mg) by mouth daily    Attention deficit hyperactivity disorder (ADHD), predominantly inattentive type       * methylphenidate 50 MG CR capsule   Start taking on:  9/14/2018    METADATE CD    30 capsule    Take 1 capsule (50 mg) by mouth daily    Attention deficit hyperactivity disorder (ADHD), predominantly inattentive type       * methylphenidate 20 MG tablet   Start taking on:  9/14/2018    RITALIN    30 tablet    Take 1 tablet (20 mg) by mouth daily    Attention deficit hyperactivity disorder (ADHD), predominantly inattentive type       * Notice:  This list has 11 medication(s) that are the same as other medications prescribed for you. Read the directions carefully, and ask your doctor or other care provider to review them with you.       Home

## 2023-03-17 NOTE — ED PROVIDER NOTE - NS ED ROS FT
General: denies fever, chills  HENT: denies nasal congestion, rhinorrhea  Eyes: denies visual changes, blurred vision  CV: +chest pain + SOB  Resp: denies difficulty breathing, cough  Abdominal: denies nausea, vomiting, diarrhea, abdominal pain  : denies urinary pain or discharge  MSK: denies muscle aches, leg swelling  Neuro: denies headaches, numbness, tingling  Skin: denies rashes, bruises

## 2023-03-17 NOTE — ED ADULT NURSE REASSESSMENT NOTE - NS ED NURSE REASSESS COMMENT FT1
pt refusing ekg. pt states that he cannot walk, witnessed walking to and from restroom with steady gait, independently.

## 2023-03-17 NOTE — ED PROVIDER NOTE - NSFOLLOWUPINSTRUCTIONS_ED_ALL_ED_FT
Discharge instructions:    - Please follow up with your Cardiologist within the next 24-72 hours.     - Take any prescribed medications as instructed:     SEEK IMMEDIATE MEDICAL CARE IF YOU HAVE ANY OF THE FOLLOWING SYMPTOMS: worsening chest pain, coughing up blood, unexplained back/neck/jaw pain, severe abdominal pain, dizziness or lightheadedness, fainting, shortness of breath, sweaty or clammy skin, vomiting, or racing heart beat. These symptoms may represent a serious problem that is an emergency. Do not wait to see if the symptoms will go away. Get medical help right away. Call 911 and do not drive yourself to the hospital.

## 2023-03-17 NOTE — ED PROVIDER NOTE - NS ED ATTENDING STATEMENT MOD
I have seen and examined this patient and fully participated in the care of this patient as the teaching attending.  The service was shared with the EMMIE.  I reviewed and verified the documentation and independently performed the documented:

## 2023-03-17 NOTE — ED PROVIDER NOTE - PHYSICAL EXAMINATION
GENERAL: well appearing in no acute distress, non-toxic appearing  HEAD: normocephalic, atraumatic  HENT: airway intact  EYES: normal conjunctiva  CARDIAC: regular rate and rhythm, normal S1S2, no appreciable murmurs, 2+ pulses in UE/LE b/l  PULM: normal breath sounds, clear to ascultation bilaterally, no rales, rhonchi, wheezing  GI: abdomen nondistended, soft, nontender, no guarding, rebound tenderness  NEURO: no focal motor or sensory deficits  MSK: no peripheral edema  SKIN: well-perfused, extremities warm, no visible rashes GENERAL: well appearing in no apparent distress, appeared comfortable lying in the bed   HEAD: normocephalic, atraumatic  HENT: airway intact  EYES: normal conjunctiva  CARDIAC: regular rate and rhythm, normal S1S2, no appreciable murmurs, 2+ pulses in UE/LE b/l  PULM: normal breath sounds, clear to ascultation bilaterally, no rales, rhonchi, wheezing  GI: abdomen nondistended, soft, nontender, no guarding, rebound tenderness  NEURO: no focal motor or sensory deficits speech and gait normal   MSK: no peripheral edema  SKIN: well-perfused, extremities warm, no visible rashes

## 2023-03-17 NOTE — ED PROVIDER NOTE - PATIENT PORTAL LINK FT
You can access the FollowMyHealth Patient Portal offered by [facility] by registering at the following website: [email or web address]. By joining [facility]’s FollowMyHealth portal, you will also be able to view your health information using other applications (apps) compatible with our system.

## 2023-03-17 NOTE — ED ADULT NURSE NOTE - OBJECTIVE STATEMENT
Pt aox3 with steady gait (seen walking back and forth to the bathroom). Pt not endorsing any pain to rn on arrival. breathing is even and unlabored. denies cough, sob or dizziness.

## 2023-03-17 NOTE — ED PROVIDER NOTE - ATTENDING CONTRIBUTION TO CARE
I have personally seen and examined this patient. I have fully participated in the care of this patient. I have reviewed all pertinent clinical information, including history, physical exam and plan.    The patient wishes to leave against medical advice.  I have discussed the risks, benefits and alternatives (including the possibility of worsening of disease, pain, permanent disability, and/or death) with the patient .  The patient voices understanding of these risks, benefits, and alternatives and still wishes to sign out against medical advice.  The patient is awake, alert, oriented  x 3 and has demonstrated capacity to refuse/direct care.  I have advised the patient that they can and should return immediately should they develop any worse/different/additional symptoms, or if they change their mind and want to continue their care. the patient verbally consents to leaving ER AMA and has no further questions and left ER prior to signing AMA papers.

## 2023-03-20 DIAGNOSIS — R07.89 OTHER CHEST PAIN: ICD-10-CM

## 2023-03-20 DIAGNOSIS — I10 ESSENTIAL (PRIMARY) HYPERTENSION: ICD-10-CM

## 2023-03-20 DIAGNOSIS — Z86.79 PERSONAL HISTORY OF OTHER DISEASES OF THE CIRCULATORY SYSTEM: ICD-10-CM

## 2023-03-20 DIAGNOSIS — Z53.29 PROCEDURE AND TREATMENT NOT CARRIED OUT BECAUSE OF PATIENT'S DECISION FOR OTHER REASONS: ICD-10-CM

## 2023-04-06 ENCOUNTER — EMERGENCY (EMERGENCY)
Facility: HOSPITAL | Age: 58
LOS: 1 days | Discharge: HOME | End: 2023-04-06
Attending: EMERGENCY MEDICINE | Admitting: EMERGENCY MEDICINE
Payer: COMMERCIAL

## 2023-04-06 ENCOUNTER — HOSPITAL ENCOUNTER (INPATIENT)
Dept: HOSPITAL 74 - YASAS | Age: 58
LOS: 3 days | Discharge: HOME | End: 2023-04-09
Attending: SURGERY | Admitting: ALLERGY & IMMUNOLOGY
Payer: COMMERCIAL

## 2023-04-06 VITALS
OXYGEN SATURATION: 97 % | HEART RATE: 78 BPM | TEMPERATURE: 98 F | RESPIRATION RATE: 16 BRPM | HEIGHT: 72 IN | WEIGHT: 261.91 LBS

## 2023-04-06 VITALS
SYSTOLIC BLOOD PRESSURE: 132 MMHG | HEART RATE: 72 BPM | OXYGEN SATURATION: 98 % | RESPIRATION RATE: 16 BRPM | DIASTOLIC BLOOD PRESSURE: 79 MMHG

## 2023-04-06 VITALS — BODY MASS INDEX: 33.9 KG/M2

## 2023-04-06 DIAGNOSIS — Z56.0 UNEMPLOYMENT, UNSPECIFIED: ICD-10-CM

## 2023-04-06 DIAGNOSIS — F39 UNSPECIFIED MOOD [AFFECTIVE] DISORDER: ICD-10-CM

## 2023-04-06 DIAGNOSIS — R07.89 OTHER CHEST PAIN: ICD-10-CM

## 2023-04-06 DIAGNOSIS — F10.20 ALCOHOL DEPENDENCE, UNCOMPLICATED: ICD-10-CM

## 2023-04-06 DIAGNOSIS — I31.9 DISEASE OF PERICARDIUM, UNSPECIFIED: ICD-10-CM

## 2023-04-06 DIAGNOSIS — E66.3 OVERWEIGHT: ICD-10-CM

## 2023-04-06 DIAGNOSIS — R44.0 AUDITORY HALLUCINATIONS: ICD-10-CM

## 2023-04-06 DIAGNOSIS — F10.230: Primary | ICD-10-CM
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
## 2023-04-06 DIAGNOSIS — I10 ESSENTIAL (PRIMARY) HYPERTENSION: ICD-10-CM

## 2023-04-06 DIAGNOSIS — M54.50: ICD-10-CM

## 2023-04-06 DIAGNOSIS — F14.20: ICD-10-CM

## 2023-04-06 DIAGNOSIS — F32.A: ICD-10-CM

## 2023-04-06 DIAGNOSIS — R45.851 SUICIDAL IDEATIONS: ICD-10-CM

## 2023-04-06 DIAGNOSIS — Z59.01 SHELTERED HOMELESSNESS: ICD-10-CM

## 2023-04-06 DIAGNOSIS — K21.9: ICD-10-CM

## 2023-04-06 DIAGNOSIS — M79.10 MYALGIA, UNSPECIFIED SITE: ICD-10-CM

## 2023-04-06 DIAGNOSIS — Z91.51 PERSONAL HISTORY OF SUICIDAL BEHAVIOR: ICD-10-CM

## 2023-04-06 DIAGNOSIS — F14.90 COCAINE USE, UNSPECIFIED, UNCOMPLICATED: ICD-10-CM

## 2023-04-06 DIAGNOSIS — G89.29: ICD-10-CM

## 2023-04-06 DIAGNOSIS — R45.84 ANHEDONIA: ICD-10-CM

## 2023-04-06 DIAGNOSIS — I10: ICD-10-CM

## 2023-04-06 DIAGNOSIS — F31.9 BIPOLAR DISORDER, UNSPECIFIED: ICD-10-CM

## 2023-04-06 LAB
ALBUMIN SERPL ELPH-MCNC: 3.7 G/DL — SIGNIFICANT CHANGE UP (ref 3.3–5)
ALP SERPL-CCNC: 77 U/L — SIGNIFICANT CHANGE UP (ref 40–120)
ALT FLD-CCNC: 42 U/L — SIGNIFICANT CHANGE UP (ref 10–45)
ANION GAP SERPL CALC-SCNC: 12 MMOL/L — SIGNIFICANT CHANGE UP (ref 5–17)
AST SERPL-CCNC: 44 U/L — HIGH (ref 10–40)
BASOPHILS # BLD AUTO: 0.04 K/UL — SIGNIFICANT CHANGE UP (ref 0–0.2)
BASOPHILS NFR BLD AUTO: 0.7 % — SIGNIFICANT CHANGE UP (ref 0–2)
BILIRUB SERPL-MCNC: 0.6 MG/DL — SIGNIFICANT CHANGE UP (ref 0.2–1.2)
BUN SERPL-MCNC: 17 MG/DL — SIGNIFICANT CHANGE UP (ref 7–23)
CALCIUM SERPL-MCNC: 8.7 MG/DL — SIGNIFICANT CHANGE UP (ref 8.4–10.5)
CHLORIDE SERPL-SCNC: 104 MMOL/L — SIGNIFICANT CHANGE UP (ref 96–108)
CO2 SERPL-SCNC: 24 MMOL/L — SIGNIFICANT CHANGE UP (ref 22–31)
CREAT SERPL-MCNC: 1.19 MG/DL — SIGNIFICANT CHANGE UP (ref 0.5–1.3)
EGFR: 72 ML/MIN/1.73M2 — SIGNIFICANT CHANGE UP
EOSINOPHIL # BLD AUTO: 0.12 K/UL — SIGNIFICANT CHANGE UP (ref 0–0.5)
EOSINOPHIL NFR BLD AUTO: 2.1 % — SIGNIFICANT CHANGE UP (ref 0–6)
ETHANOL SERPL-MCNC: <10 MG/DL — SIGNIFICANT CHANGE UP (ref 0–10)
GLUCOSE SERPL-MCNC: 108 MG/DL — HIGH (ref 70–99)
HCT VFR BLD CALC: 36.5 % — LOW (ref 39–50)
HGB BLD-MCNC: 12.1 G/DL — LOW (ref 13–17)
IMM GRANULOCYTES NFR BLD AUTO: 0.2 % — SIGNIFICANT CHANGE UP (ref 0–0.9)
LYMPHOCYTES # BLD AUTO: 2.59 K/UL — SIGNIFICANT CHANGE UP (ref 1–3.3)
LYMPHOCYTES # BLD AUTO: 45.4 % — HIGH (ref 13–44)
MCHC RBC-ENTMCNC: 31.4 PG — SIGNIFICANT CHANGE UP (ref 27–34)
MCHC RBC-ENTMCNC: 33.2 GM/DL — SIGNIFICANT CHANGE UP (ref 32–36)
MCV RBC AUTO: 94.8 FL — SIGNIFICANT CHANGE UP (ref 80–100)
MONOCYTES # BLD AUTO: 0.6 K/UL — SIGNIFICANT CHANGE UP (ref 0–0.9)
MONOCYTES NFR BLD AUTO: 10.5 % — SIGNIFICANT CHANGE UP (ref 2–14)
NEUTROPHILS # BLD AUTO: 2.35 K/UL — SIGNIFICANT CHANGE UP (ref 1.8–7.4)
NEUTROPHILS NFR BLD AUTO: 41.1 % — LOW (ref 43–77)
NRBC # BLD: 0 /100 WBCS — SIGNIFICANT CHANGE UP (ref 0–0)
PLATELET # BLD AUTO: 297 K/UL — SIGNIFICANT CHANGE UP (ref 150–400)
POTASSIUM SERPL-MCNC: 4.4 MMOL/L — SIGNIFICANT CHANGE UP (ref 3.5–5.3)
POTASSIUM SERPL-SCNC: 4.4 MMOL/L — SIGNIFICANT CHANGE UP (ref 3.5–5.3)
PROT SERPL-MCNC: 6.6 G/DL — SIGNIFICANT CHANGE UP (ref 6–8.3)
RBC # BLD: 3.85 M/UL — LOW (ref 4.2–5.8)
RBC # FLD: 14.1 % — SIGNIFICANT CHANGE UP (ref 10.3–14.5)
SALICYLATES SERPL-MCNC: 0.6 MG/DL — LOW (ref 2.8–20)
SODIUM SERPL-SCNC: 140 MMOL/L — SIGNIFICANT CHANGE UP (ref 135–145)
TROPONIN T SERPL-MCNC: 0.01 NG/ML — SIGNIFICANT CHANGE UP (ref 0–0.01)
WBC # BLD: 5.71 K/UL — SIGNIFICANT CHANGE UP (ref 3.8–10.5)
WBC # FLD AUTO: 5.71 K/UL — SIGNIFICANT CHANGE UP (ref 3.8–10.5)

## 2023-04-06 PROCEDURE — 99285 EMERGENCY DEPT VISIT HI MDM: CPT | Mod: 25

## 2023-04-06 PROCEDURE — U0003 INFECTIOUS AGENT DETECTION BY NUCLEIC ACID (DNA OR RNA); SEVERE ACUTE RESPIRATORY SYNDROME CORONAVIRUS 2 (SARS-COV-2) (CORONAVIRUS DISEASE [COVID-19]), AMPLIFIED PROBE TECHNIQUE, MAKING USE OF HIGH THROUGHPUT TECHNOLOGIES AS DESCRIBED BY CMS-2020-01-R: HCPCS

## 2023-04-06 PROCEDURE — 99285 EMERGENCY DEPT VISIT HI MDM: CPT

## 2023-04-06 PROCEDURE — U0005 INFEC AGEN DETEC AMPLI PROBE: HCPCS

## 2023-04-06 PROCEDURE — 80053 COMPREHEN METABOLIC PANEL: CPT

## 2023-04-06 PROCEDURE — 71045 X-RAY EXAM CHEST 1 VIEW: CPT | Mod: 26

## 2023-04-06 PROCEDURE — 84484 ASSAY OF TROPONIN QUANT: CPT

## 2023-04-06 PROCEDURE — 85025 COMPLETE CBC W/AUTO DIFF WBC: CPT

## 2023-04-06 PROCEDURE — 80299 QUANTITATIVE ASSAY DRUG: CPT

## 2023-04-06 PROCEDURE — 71045 X-RAY EXAM CHEST 1 VIEW: CPT

## 2023-04-06 PROCEDURE — HZ2ZZZZ DETOXIFICATION SERVICES FOR SUBSTANCE ABUSE TREATMENT: ICD-10-PCS | Performed by: SURGERY

## 2023-04-06 PROCEDURE — 90792 PSYCH DIAG EVAL W/MED SRVCS: CPT

## 2023-04-06 PROCEDURE — 93005 ELECTROCARDIOGRAM TRACING: CPT

## 2023-04-06 PROCEDURE — 80307 DRUG TEST PRSMV CHEM ANLYZR: CPT

## 2023-04-06 PROCEDURE — 36415 COLL VENOUS BLD VENIPUNCTURE: CPT

## 2023-04-06 RX ADMIN — Medication SCH: at 22:52

## 2023-04-06 SDOH — ECONOMIC STABILITY - INCOME SECURITY: UNEMPLOYMENT, UNSPECIFIED: Z56.0

## 2023-04-06 SDOH — ECONOMIC STABILITY - HOUSING INSECURITY: SHELTERED HOMELESSNESS: Z59.01

## 2023-04-06 NOTE — ED PROVIDER NOTE - OBJECTIVE STATEMENT
58 y/o M with a PMHx of HTN, pericarditis, and bipolar disorder. Pt presents to the ED requesting to speak with social work. Pt states he was seen at outside hospital for suicidality 2-3 months ago and was started on zoloft. Pt states Zoloft started causing him diarrhea and he stopped taking medication. Pt states he is a daily drinker and drinks 3pints of vodka daily, last drink was some time yesterday and is looking for detox. Pt admits to living in hotels and doesn't have a permanent residence, and states he is tired and wants a place to rest. While in ED pt is c/o chest pain. Pt reports to feeling suicidal and reports that he wants to shoot himself, states he has a prior attempt when trying to jump in front of a train. Pt reports to having hallucinations telling to kill himself but has no visual hallucination, denies nausea, vomiting and sweating. 56 y/o M with a PMHx of HTN, pericarditis, and bipolar disorder. Pt presents to the ED requesting to speak with social work for detox. Pt states he was seen at outside hospital for suicidality 2-3 months ago and was started on zoloft. Pt states Zoloft started causing him diarrhea and he stopped taking medication. Pt states he is a daily drinker and drinks 3pints of vodka daily, last drink was some time yesterday and is looking for detox from alcohol. Pt admits to living in hotels and doesn't have a permanent residence, and states he is tired and wants a place to rest. While in ED pt is c/o left sided body pain including to chest which he has when he goes through withdrawal, however denies any tremors, n/v, sweating.... Pt reports feeling suicidal and reports that he wants to shoot himself, states he has a prior attempt of trying to jump in front of a train. Pt reports to having auditory hallucinations telling to kill himself but has no visual hallucination.

## 2023-04-06 NOTE — ED PROVIDER NOTE - NSFOLLOWUPINSTRUCTIONS_ED_ALL_ED_FT
Please follow up at the detox program for further treatment.  Follow up with your primary care physician/ psychiatrist for re-evaluation and further work up as needed.  Return to er for any new or worsening symptoms.        English    Alcohol Abuse and Dependence Information, Adult  Alcohol is a widely available drug. People drink alcohol in different amounts. People who drink alcohol very often and in large amounts often have problems during and after drinking. They may develop what is called an alcohol use disorder. There are two main types of alcohol use disorders:  Alcohol abuse. This is when you use alcohol too much or too often. You may use alcohol to make yourself feel happy or to reduce stress. You may have a hard time setting a limit on the amount you drink.  Alcohol dependence. This is when you use alcohol consistently for a period of time, and your body changes as a result. This can make it hard to stop drinking because you may start to feel sick or feel different when you do not use alcohol. These symptoms are known as withdrawal.  How can alcohol abuse and dependence affect me?  Alcohol abuse and dependence can have a negative effect on your life. Drinking too much can lead to addiction. You may feel like you need alcohol to function normally. You may drink alcohol before work in the morning, during the day, or as soon as you get home from work in the evening. These actions can result in:  Poor work performance.  Job loss.  Financial problems.  Car crashes or criminal charges from driving after drinking alcohol.  Problems in your relationships with friends and family.  Losing the trust and respect of coworkers, friends, and family.  Drinking heavily over a long period of time can permanently damage your body and brain, and can cause lifelong health issues, such as:  Damage to your liver or pancreas.  Heart problems, high blood pressure, or stroke.  Certain cancers.  Decreased ability to fight infections.  Brain or nerve damage.  Depression.  Early (premature) death.  If you are careless or you crave alcohol, it is easy to drink more than your body can handle (overdose). Alcohol overdose is a serious situation that requires hospitalization. It may lead to permanent injuries or death.    What can increase my risk?  Having a family history of alcohol abuse.  Having depression or other mental health conditions.  Beginning to drink at an early age.  Binge drinking often.  Experiencing trauma, stress, and an unstable home life during childhood.  Spending time with people who drink often.  What actions can I take to prevent or manage alcohol abuse and dependence?  Do not drink alcohol if:  Your health care provider tells you not to drink.  You are pregnant, may be pregnant, or are planning to become pregnant.  If you drink alcohol:  Limit how much you use to:  0–1 drink a day for women.  0–2 drinks a day for men.  Be aware of how much alcohol is in your drink. In the [place] one drink equals one 12 oz bottle of beer (355 mL), one 5 oz glass of wine (148 mL), or one 1½ oz glass of hard liquor (44 mL).  Stop drinking if you have been drinking too much. This can be very hard to do if you are used to abusing alcohol. If you begin to have withdrawal symptoms, talk with your health care provider or a person that you trust. These symptoms may include anxiety, shaky hands, headache, nausea, sweating, or not being able to sleep.  Choose to drink nonalcoholic beverages in social gatherings and places where there may be alcohol.  Activity    Spend more time on activities that you enjoy that do not involve alcohol, like hobbies or exercise.  Find healthy ways to cope with stress, such as exercise, meditation, or spending time with people you care about.  General information    Talk to your family, coworkers, and friends about supporting you in your efforts to stop drinking. If they drink, ask them not to drink around you. Spend more time with people who do not drink alcohol.  If you think that you have an alcohol dependency problem:  Tell friends or family about your concerns.  Talk with your health care provider or another health professional about where to get help.  Work with a therapist and a chemical dependency counselor.  Consider joining a support group for people who struggle with alcohol abuse and dependence.  Where to find support    Your health care provider.  SMART Recovery: www.smartrecovery.org  Therapy and support groups    Local treatment centers or chemical dependency counselors.  Local AA groups in your community: www.aa.org  Where to find more information  Centers for Disease Control and Prevention: www.cdc.gov  National [place] on Alcohol Abuse and Alcoholism: www.niaaa.nih.gov  Alcoholics Anonymous (AA): www.aa.org  Contact a health care provider if:  You drank more or for longer than you intended on more than one occasion.  You tried to stop drinking or to cut back on how much you drink, but you were not able to.  You often drink to the point of vomiting or passing out.  You want to drink so badly that you cannot think about anything else.  You have problems in your life due to drinking, but you continue to drink.  You keep drinking even though you feel anxious, depressed, or have experienced memory loss.  You have stopped doing the things you used to enjoy in order to drink.  You have to drink more than you used to in order to get the effect you want.  You experience anxiety, sweating, nausea, shakiness, and trouble sleeping when you try to stop drinking.  Get help right away if:  You have thoughts about hurting yourself or others.  You have serious withdrawal symptoms, including:  Confusion.  Racing heart.  High blood pressure.  Fever.  If you ever feel like you may hurt yourself or others, or have thoughts about taking your own life, get help right away. You can go to your nearest emergency department or call:  Your local emergency services (911 in the [place]  A suicide crisis helpline, such as the National Suicide Prevention Lifeline at 1-888.125.4582 or 053 in the [place] This is open 24 hours a day.  Summary  Alcohol abuse and dependence can have a negative effect on your life. Drinking too much or too often can lead to addiction.  If you drink alcohol, limit how much you use.  If you are having trouble keeping your drinking under control, find ways to change your behavior. Hobbies, calming activities, exercise, or support groups can help.  If you feel you need help with changing your drinking habits, talk with your health care provider, a good friend, or a therapist, or go to an AA group.  This information is not intended to replace advice given to you by your health care provider. Make sure you discuss any questions you have with your health care provider.    Document Revised: 11/11/2022 Document Reviewed: 02/25/2020  Elsevier Patient Education © 2023 Elsevier Inc.

## 2023-04-06 NOTE — ED PROVIDER NOTE - PHYSICAL EXAMINATION
VITAL SIGNS: I have reviewed nursing notes and confirm.  CONSTITUTIONAL: Well appearing, in no acute distress.   SKIN:  warm and dry, no acute rash.   HEAD:  normocephalic, atraumatic.  EYES: EOM intact; conjunctiva and sclera clear.  ENT: No nasal discharge; airway clear.   NECK: Supple; non tender.  CARD: S1, S2 normal; no murmurs, gallops, or rubs. Regular rate and rhythm.   RESP:  Clear to auscultation b/l, no wheezes, rales or rhonchi.  ABD: Normal bowel sounds; soft; non-distended; non-tender; no guarding/ rebound.  EXT: Normal ROM. No clubbing, cyanosis or edema. 2+ pulses to b/l ue/le.  NEURO: Alert, oriented, grossly unremarkable  PSYCH: Cooperative, mood and affect appropriate. VITAL SIGNS: I have reviewed nursing notes and confirm.  CONSTITUTIONAL: Tired appearing, in no acute distress.   SKIN:  warm and dry, no acute rash.   HEAD:  normocephalic, atraumatic.  EYES: EOM intact; conjunctiva and sclera clear.  ENT: No nasal discharge; airway clear.   NECK: Supple; non tender.  CARD: S1, S2 normal; no murmurs, gallops, or rubs. Regular rate and rhythm.   RESP:  Clear to auscultation b/l, no wheezes, rales or rhonchi.  ABD: Normal bowel sounds; soft; non-distended; non-tender; no guarding/ rebound.  EXT: Normal ROM. No clubbing, cyanosis or edema. 2+ pulses to b/l ue/le.  NEURO: Alert, oriented, grossly unremarkable  PSYCH: Cooperative, mood and affect appropriate. VITAL SIGNS: I have reviewed nursing notes and confirm.  CONSTITUTIONAL: WISAM m in no acute distress, sleeping comfortably, no diaphoresis.   SKIN:  warm and dry, no acute rash.   HEAD:  normocephalic, atraumatic.  EYES: EOM intact; conjunctiva and sclera clear.  ENT: No nasal discharge; airway clear.   NECK: Supple; non tender.  CARD: S1, S2 normal; no murmurs, gallops, or rubs. Regular rate and rhythm.   RESP:  Clear to auscultation b/l, no wheezes, rales or rhonchi.  ABD: Normal bowel sounds; soft; non-distended; non-tender; no guarding/ rebound.  EXT: Normal ROM. No clubbing, cyanosis or edema. 2+ pulses to b/l ue/le.  NEURO: somnolent but easily arousable to verbal stimuli. No focal deficits.   PSYCH: Easily exasperated.

## 2023-04-06 NOTE — ED BEHAVIORAL HEALTH ASSESSMENT NOTE - OTHER
reports to stay in hotels, on the street Pt describes a voice telling him to seek help in rehab "or else something will happen", appears more consistent with own thoughts than an auditory hallucination. Does not appear to be responding to internal stimuli partial insight into alcohol use disorder, limited insight into likely role in mood symptoms impaired based on recurrent substance use and nonadherence with outpt mental health treatment, though currently seeking help lying back in stretcher in ED, gait not assessed

## 2023-04-06 NOTE — ED BEHAVIORAL HEALTH ASSESSMENT NOTE - DIFFERENTIAL
Alcohol Use Disorder, cocaine use disorder by history  Unspecified mood disorder, suspicion for substance-induced mood disorder, r/o mdd, r/o bipolar spectrum  r/o malingering

## 2023-04-06 NOTE — ED PROVIDER NOTE - PATIENT PORTAL LINK FT
You can access the FollowMyHealth Patient Portal offered by [facility] by registering at the following website: [email or web address]. By joining [employer]’s FollowMyHealth portal, you will also be able to view your health information using other applications (apps) compatible with our system.

## 2023-04-06 NOTE — ED BEHAVIORAL HEALTH ASSESSMENT NOTE - NSBHMSETHTCONTENT_PSY_A_CORE
focused on physical pain, desire for rehab, overall future oriented content. Current passive SI, no HI

## 2023-04-06 NOTE — ED BEHAVIORAL HEALTH ASSESSMENT NOTE - NSBHSACONSEQUENCE_PSY_A_CORE FT
Pt reports many past detox and rehab admissions, including [facility], [facility], others, last 1-2 months ago

## 2023-04-06 NOTE — ED BEHAVIORAL HEALTH ASSESSMENT NOTE - RISK ASSESSMENT
Assessed at low acute suicide risk. Assessed at low acute suicide risk, low violence risk. Currently with passive SI, no active SI/intent/plan, no HI or violent thoughts or behaviors. While distressed by physical pain and recurrent alcohol use, notably treatment-seeking, forward thinking, with pattern of seeking help when in distress. additional protective factors include sense of commitment to 14yo daughter  Static risk factors include age, male gender, h/o psychiatric dx/ h/o substance use  Modifiable risk factors include recent substance use, lack of engagement in psychiatric/substance use treatment, unstable housing

## 2023-04-06 NOTE — ED BEHAVIORAL HEALTH ASSESSMENT NOTE - HPI (INCLUDE ILLNESS QUALITY, SEVERITY, DURATION, TIMING, CONTEXT, MODIFYING FACTORS, ASSOCIATED SIGNS AND SYMPTOMS)
58yo man with a self-reported history of bipolar disorder, alcohol use disorder, prior documented cocaine use disorder, HTN, pericarditis, who presented to the ED this morning BIBS with c/o chest pain x30 minutes, seeking social work assistance and detox from alcohol. Pt subsequently endorsed SI and AH to ED provider, with reported thoughts of shooting himself and history of jumping in front of a train. Reported CAH to kill self. No acute cause of chest pain identified. No reported attempts to harm self. No agitation. Psychiatry consulted for evaluation.     On interview, pt found asleep but arousable, calm, fully oriented, immediately reporting distress related to chest pain for "days" that has been evaluated at many emergency rooms including [facility], without identified cause. Pt states that after leaving the last ED (did not state name), he drank alcohol and walked over to [facility] hoping to access detox services. States that he would like to be picked up by [facility] detox and transported directly to the program, as he has been in the past. States that if he is not able to access detox, he "doesn't know what he'll do", mentions traveling to [place] to seek psychiatric admission at [facility] where he has been admitted once before. Does not volunteer any specific mood symptoms other than low mood; does not describe anhedonia, hopelessness, helplessness, guilt, changes in sleep or appetite. When asked directly, states that he has experiencing intermittent thoughts of ending his life over the last several days due to his pain, with fleeting thoughts of jumping in front of a train, no intent, no formed plan or preparatory acts, no recent attempts. Cites his 14yo daughter as his primary deterrent for suicide; does not wish for MD to contact her/daughter's mother because he doesn't want them to know he is in the hospital again.    Pt reports daily alcohol use of 3+ pints of vodka for many years, with periods of sobriety while in detox/rehab, last ~one month ago. Pt reports other past substance use but declines to discuss (chart indicates past cocaine); denies any recent use. Pt reports past diagnosis of bipolar disorder 2-3 months ago leading to admission to [facility] for depressive symptoms and passive SI; reports treatment with Zoloft that he discontinued after discharge. Pt denies any other past psychiatric admissions and denies consistent outpatient psychiatric care. Pt reports history of auditory hallucinations that he describes as a voice reinforcing his own thoughts, currently telling him to seek help in detox/rehab "or else something will happen", denies more specific commands or other types of perceptual disturbances. No described history suggestive of miquel, no reported paranoia or delusions, no HI.    Prior chart reviewed - pt with psychiatric evaluation at [facility] ED in 5/2022 under similar circumstances, initially with c/o chest pain and later endorsing SI. Per MD documentation, pt with hx of diagnoses including "cocaine use disorder, alcohol use disorder, other psychoactive substance use disorder, MDD, multiple ED visitations for chest pain, cocaine dependence and intoxication, alcohol dependence and intoxication." Assessed with substance induced mood sx, concern for conditional SI and malingering for shelter, cleared for dc.

## 2023-04-06 NOTE — ED BEHAVIORAL HEALTH ASSESSMENT NOTE - REFERRAL / APPOINTMENT DETAILS
refer to alcohol detox - pt requesting [place]. Can also provide referral information for the [facility] for dual diagnosis outpatient treatment (see above)

## 2023-04-06 NOTE — ED PROVIDER NOTE - CLINICAL SUMMARY MEDICAL DECISION MAKING FREE TEXT BOX
Impression: 58 y/o M with a PMHx of HTN, pericarditis, and bipolar disorder who presents to the ED with multiple complaints. Pt is requesting to speak with social work for detox, requesting a place to stay to rest, and c/o of SI. Will consult psych and dispo pending workup. Impression: 58 y/o M with a PMHx of HTN, pericarditis, and bipolar disorder who presents to the ED with multiple complaints including wanting to speak with social work for detox, requesting a place to stay to rest, chest pain, and SI. AVSS. Pt is comfortable appearing. No signs of etoh w/drawal with neg CIWA score. Impression: 56 y/o M with a PMHx of HTN, pericarditis, and bipolar disorder who presents to the ED with multiple complaints including wanting to speak with social work for detox, requesting a place to stay to rest, chest pain, and SI/ auditory hallucinations. AVSS. Pt is comfortable appearing. No signs of etoh w/drawal with zero CIWA score. Labs unremarkable including trop. No signs of pericarditis or stemi on ekg. CXR neg for i/e. Pt seen by psych- no indications for psychiatric inpt tx at this time. Prior charts reviewed and pt noted to have multiple ED visits for chest pain. Do not suspect acs given non-ischemic ekg and neg trop. Pt seen by social work and pt accepted for detox program at [facility]. Transportation will arrive at  today to take pt to detox center. ED evaluation and management discussed with the patient in detail.  Close PMD/ psych follow up encouraged.  Strict ED return instructions discussed in detail and patient given the opportunity to ask any questions about their discharge diagnosis and instructions. Patient verbalized understanding.

## 2023-04-06 NOTE — ED BEHAVIORAL HEALTH ASSESSMENT NOTE - NSBHSAALC_PSY_A_CORE FT
Pt reports daily ~3 pints of vodka for "a long time", last drink reported shortly before ED presentation

## 2023-04-06 NOTE — ED BEHAVIORAL HEALTH ASSESSMENT NOTE - NS ED BHA REVIEW OF ED CHART AVAILABLE INVESTIGATIONS REVIEWED
Yes On discharge, pt will f/u with current outpt psych treatment @ [facility] in [place]: psychiatrist Barby Murray, therapist Juan Antonio Gill [phone]

## 2023-04-06 NOTE — ED BEHAVIORAL HEALTH ASSESSMENT NOTE - DESCRIPTION
HTN, pericarditis single, unemployed, stays at a hotel and in the street, has a 14yo daughter in custody of her mother, speaks on the phone regularly. Pt BIBS early this morning with triage CC "chest pain for 30 mins . pt requesting for Detox. last drink was last night". Calm and cooperative in ED, maintained on 1:1 observation for later endorsed SI. Observed with inconsistencies in reported history.  Labs with mild elevation in AST, BAL <10, utox pending

## 2023-04-06 NOTE — ED BEHAVIORAL HEALTH ASSESSMENT NOTE - DETAILS
in custody of mother reports diarrhea with Zoloft reports chest pain reports conditional, fleeting thoughts of suicide by jumping in front of a train if unable to find cause of chest pain and unable to access detox services. Denies ever any intent, denies any preparatory acts, denies any clear past suicide attempts. lack of privacy in ED environment reports foot pain from walking around to different hospitals self not assessed at elevated suicide risk. Return precautions and emergency options reviewed with pt

## 2023-04-06 NOTE — ED ADULT NURSE NOTE - OBJECTIVE STATEMENT
Patient w/ PMHx pericarditis (2015), depression, bipolar, HTN, c/o chest pain and sob, started 30min before ED arrival. Patient stated want to speak to  about detox program for alcohol-dependence. Patient stated he is suppose to be on zoloft but haven't been taking because it gives him diarrhea , and patient wants to speak with psych about it. Patient stated last drink a few hours ago, patient answered "I took a bunch of shit" when asked how much he drank this morning and how much he drinks on daily basis; patient unable to quantified. Patient reported sweating a lot but skin is dry to touch. Patient answered "not today" when asked if he uses any recreational drug use, patient denies any history of drug use when asked again. Patient continue to eat cheetos in bed despite educated not to eat or drink before seeing ED provider. Patient had frequent ED visits for chest pain in the past but never see a cardiologist for f/u despite being told to. ekg completed at triage. NAD. Denies fever/chill. Ambulate w/ steady gait. Patient w/ PMHx pericarditis (2015), depression, bipolar, HTN, c/o chest pain and sob, started 30min before ED arrival. Patient stated want to speak to  about detox program for alcohol-dependence. Patient stated he is suppose to be on zoloft but haven't been taking because it gives him diarrhea , and patient wants to speak with psych about it. Patient stated last drink a few hours ago, patient answered "I took a bunch of shit" when asked how much he drank this morning and how much he drinks on daily basis; patient unable to quantified. Patient reported sweating a lot but skin is dry to touch. Patient answered "not today" when asked if he uses any recreational drug use, patient denies any history of drug use when asked again. Patient continue to eat cheetos in bed despite educated not to eat or drink before seeing ED provider. Patient had frequent ED visits for chest pain in the past but never see a cardiologist for f/u despite being told to. ekg completed at triage. NAD. Denies n/v, fever/chill. No tremor, focal weakness noted. Ambulate w/ steady gait.

## 2023-04-06 NOTE — ED BEHAVIORAL HEALTH ASSESSMENT NOTE - SUMMARY
RECOMMENDATIONS  -pt is psychiatrically stable for discharge  -substance counseling provided  -recommend referral to alcohol detox - pt seeking to attend [facility]  -f/u pending utox  -encourage participation in dual diagnosis treatment as outpatient - can provide additional referral options (below)  -pt declined to provide any collateral contacts  -d/w ED attending Dr. Sherman 58yo man, unstably domiciled, unemployed, with a history of alcohol use disorder, cocaine use disorder, self-reported bipolar disorder v MDD v substance induced mood disorder, HTN, pericarditis, one past reported psychiatric admission and unclear if past suicide attempt, not engaged in outpt psychiatric care, many past detox/rehab visits, who presents with vague mood symptoms and fleeting and conditional suicidality in the setting of recent alcohol and ?other substance use and distress related to chest pain. No s/s suggestive of a major depressive episode, miquel, or psychosis; appears that primary dx are alcohol use disorder and likely other substance use disorders. Not currently acutely intoxicated or withdrawing from alcohol, though component of substance-induced mood sx is likely. Role of secondary gain for shelter also suspected given unhappiness with living situation, many recent ED visits reported to other institutions, and focus on obtaining detox/rehab admission. Pt is clearly forward-thinking, treatment seeking (motivated for detox), not assessed at high acute risk of harm to self or others. Recommend referral to detox; no current indication for inpatient psychiatric care.       RECOMMENDATIONS  -pt is psychiatrically stable for discharge  -substance counseling provided  -recommend referral to alcohol detox - pt seeking to attend [facility]  -f/u pending utox  -encourage participation in dual diagnosis treatment as outpatient - can provide additional referral options (below)  -CIWA monitoring if remains in ED  -pt declined to provide any collateral contacts  -d/w ED attending Dr. Sherman    outpatient dual diagnosis treatment:   •	[email or web address]  o	[email or web address]  o	Comprehensive addiction treatment services  o	Walk Ins Mon-Fri 9am-2pm.   o	Two locations  ?	[address], [address] [facility] [address] [phone]  ?	[address] [place] [address] [phone]

## 2023-04-06 NOTE — ED BEHAVIORAL HEALTH ASSESSMENT NOTE - OTHER PAST PSYCHIATRIC HISTORY (INCLUDE DETAILS REGARDING ONSET, COURSE OF ILLNESS, INPATIENT/OUTPATIENT TREATMENT)
Pt reports a past diagnosis of depression v. bipolar disorder, previously prescribed Zoloft by [facility] 2-3 months ago  (or longer, pt uncertain), self-discontinued because developed diarrhea. Pt reports a history of SI when under stress or physical pain, vague about whether any past suicide attempts - states that he jumped in front of a train in the past but unable to provide any details and at another point in interview, denied past suicide attempts.   Denies ever being in consistent outpatient psychiatric care. Does mention past treatment with an [facility] on [address] in [place], states "they could never find me" and denies consistent adherence with care.

## 2023-04-07 RX ADMIN — Medication SCH MG: at 21:24

## 2023-04-07 RX ADMIN — Medication SCH TAB: at 10:44

## 2023-04-07 RX ADMIN — HYDROCHLOROTHIAZIDE SCH MG: 12.5 CAPSULE ORAL at 10:44

## 2023-04-08 RX ADMIN — Medication SCH: at 23:07

## 2023-04-08 RX ADMIN — HYDROCHLOROTHIAZIDE SCH MG: 12.5 CAPSULE ORAL at 10:35

## 2023-04-08 RX ADMIN — Medication SCH TAB: at 10:35

## 2023-04-09 VITALS
SYSTOLIC BLOOD PRESSURE: 130 MMHG | HEART RATE: 91 BPM | DIASTOLIC BLOOD PRESSURE: 78 MMHG | RESPIRATION RATE: 18 BRPM | TEMPERATURE: 97.5 F

## 2023-04-09 RX ADMIN — Medication SCH TAB: at 09:21

## 2023-04-09 RX ADMIN — HYDROCHLOROTHIAZIDE SCH: 12.5 CAPSULE ORAL at 09:23

## 2023-04-18 LAB — ACETAZOLAMIDE LEVEL: <0.5 CD:431152031 — LOW

## 2023-04-27 ENCOUNTER — HOSPITAL ENCOUNTER (INPATIENT)
Dept: HOSPITAL 74 - YASAS | Age: 58
LOS: 4 days | Discharge: HOME | End: 2023-05-01
Attending: SURGERY | Admitting: ALLERGY & IMMUNOLOGY
Payer: COMMERCIAL

## 2023-04-27 VITALS — BODY MASS INDEX: 33.3 KG/M2

## 2023-04-27 DIAGNOSIS — F10.230: Primary | ICD-10-CM

## 2023-04-27 DIAGNOSIS — E78.5: ICD-10-CM

## 2023-04-27 DIAGNOSIS — G89.29: ICD-10-CM

## 2023-04-27 DIAGNOSIS — K76.0: ICD-10-CM

## 2023-04-27 DIAGNOSIS — F14.20: ICD-10-CM

## 2023-04-27 DIAGNOSIS — I10: ICD-10-CM

## 2023-04-27 DIAGNOSIS — M54.50: ICD-10-CM

## 2023-04-27 DIAGNOSIS — K21.9: ICD-10-CM

## 2023-04-27 LAB
ALBUMIN SERPL-MCNC: 3.5 G/DL (ref 3.4–5)
ALP SERPL-CCNC: 82 U/L (ref 45–117)
ALT SERPL-CCNC: 48 U/L (ref 13–61)
ANION GAP SERPL CALC-SCNC: 7 MMOL/L (ref 8–16)
AST SERPL-CCNC: 36 U/L (ref 15–37)
BILIRUB SERPL-MCNC: 0.8 MG/DL (ref 0.2–1)
BUN SERPL-MCNC: 17 MG/DL (ref 7–18)
CALCIUM SERPL-MCNC: 8.7 MG/DL (ref 8.5–10.1)
CHLORIDE SERPL-SCNC: 107 MMOL/L (ref 98–107)
CO2 SERPL-SCNC: 23 MMOL/L (ref 21–32)
CREAT SERPL-MCNC: 1.2 MG/DL (ref 0.55–1.3)
DEPRECATED RDW RBC AUTO: 14.4 % (ref 11.9–15.9)
GLUCOSE SERPL-MCNC: 118 MG/DL (ref 74–106)
HCT VFR BLD CALC: 34.9 % (ref 35.4–49)
HGB BLD-MCNC: 11.9 GM/DL (ref 11.7–16.9)
MCH RBC QN AUTO: 31.5 PG (ref 25.7–33.7)
MCHC RBC AUTO-ENTMCNC: 34.1 G/DL (ref 32–35.9)
MCV RBC: 92.4 FL (ref 80–96)
PLATELET # BLD AUTO: 264 10^3/UL (ref 134–434)
PMV BLD: 8.6 FL (ref 7.5–11.1)
PROT SERPL-MCNC: 7 G/DL (ref 6.4–8.2)
RBC # BLD AUTO: 3.78 M/MM3 (ref 4–5.6)
SODIUM SERPL-SCNC: 137 MMOL/L (ref 136–145)
WBC # BLD AUTO: 4.6 K/MM3 (ref 4–10)

## 2023-04-27 PROCEDURE — HZ2ZZZZ DETOXIFICATION SERVICES FOR SUBSTANCE ABUSE TREATMENT: ICD-10-PCS | Performed by: SURGERY

## 2023-04-27 PROCEDURE — U0003 INFECTIOUS AGENT DETECTION BY NUCLEIC ACID (DNA OR RNA); SEVERE ACUTE RESPIRATORY SYNDROME CORONAVIRUS 2 (SARS-COV-2) (CORONAVIRUS DISEASE [COVID-19]), AMPLIFIED PROBE TECHNIQUE, MAKING USE OF HIGH THROUGHPUT TECHNOLOGIES AS DESCRIBED BY CMS-2020-01-R: HCPCS

## 2023-04-27 PROCEDURE — U0005 INFEC AGEN DETEC AMPLI PROBE: HCPCS

## 2023-04-27 RX ADMIN — Medication SCH TAB: at 12:55

## 2023-04-27 RX ADMIN — Medication SCH: at 23:08

## 2023-04-27 RX ADMIN — HYDROCHLOROTHIAZIDE SCH MG: 12.5 CAPSULE ORAL at 12:54

## 2023-04-27 RX ADMIN — Medication SCH: at 23:20

## 2023-04-28 VITALS — RESPIRATION RATE: 18 BRPM

## 2023-04-28 RX ADMIN — Medication SCH: at 22:52

## 2023-04-28 RX ADMIN — Medication SCH TAB: at 10:39

## 2023-04-28 RX ADMIN — Medication SCH MG: at 22:49

## 2023-04-28 RX ADMIN — HYDROCHLOROTHIAZIDE SCH: 12.5 CAPSULE ORAL at 10:42

## 2023-04-28 RX ADMIN — IBUPROFEN PRN MG: 600 TABLET, FILM COATED ORAL at 10:40

## 2023-04-29 RX ADMIN — Medication SCH TAB: at 10:06

## 2023-04-29 RX ADMIN — IBUPROFEN PRN MG: 600 TABLET, FILM COATED ORAL at 11:54

## 2023-04-29 RX ADMIN — HYDROCHLOROTHIAZIDE SCH MG: 12.5 CAPSULE ORAL at 10:06

## 2023-04-29 RX ADMIN — Medication SCH MG: at 22:46

## 2023-04-30 RX ADMIN — HYDROCHLOROTHIAZIDE SCH MG: 12.5 CAPSULE ORAL at 10:38

## 2023-04-30 RX ADMIN — Medication SCH: at 23:27

## 2023-04-30 RX ADMIN — Medication SCH TAB: at 10:38

## 2023-05-01 VITALS — SYSTOLIC BLOOD PRESSURE: 103 MMHG | TEMPERATURE: 96.6 F | HEART RATE: 86 BPM | DIASTOLIC BLOOD PRESSURE: 56 MMHG

## 2023-05-16 ENCOUNTER — EMERGENCY (EMERGENCY)
Facility: HOSPITAL | Age: 58
LOS: 1 days | Discharge: HOME | End: 2023-05-16
Attending: STUDENT IN AN ORGANIZED HEALTH CARE EDUCATION/TRAINING PROGRAM | Admitting: EMERGENCY MEDICINE
Payer: COMMERCIAL

## 2023-05-16 VITALS
SYSTOLIC BLOOD PRESSURE: 123 MMHG | WEIGHT: 261.91 LBS | HEART RATE: 84 BPM | DIASTOLIC BLOOD PRESSURE: 75 MMHG | OXYGEN SATURATION: 98 % | HEIGHT: 72 IN | RESPIRATION RATE: 18 BRPM | TEMPERATURE: 98 F

## 2023-05-16 VITALS
TEMPERATURE: 98 F | SYSTOLIC BLOOD PRESSURE: 110 MMHG | RESPIRATION RATE: 18 BRPM | DIASTOLIC BLOOD PRESSURE: 80 MMHG | HEART RATE: 70 BPM | OXYGEN SATURATION: 98 %

## 2023-05-16 DIAGNOSIS — F31.9 BIPOLAR DISORDER, UNSPECIFIED: ICD-10-CM

## 2023-05-16 DIAGNOSIS — R79.89 OTHER SPECIFIED ABNORMAL FINDINGS OF BLOOD CHEMISTRY: ICD-10-CM

## 2023-05-16 DIAGNOSIS — I10 ESSENTIAL (PRIMARY) HYPERTENSION: ICD-10-CM

## 2023-05-16 DIAGNOSIS — R74.8 ABNORMAL LEVELS OF OTHER SERUM ENZYMES: ICD-10-CM

## 2023-05-16 DIAGNOSIS — Z86.79 PERSONAL HISTORY OF OTHER DISEASES OF THE CIRCULATORY SYSTEM: ICD-10-CM

## 2023-05-16 DIAGNOSIS — R07.89 OTHER CHEST PAIN: ICD-10-CM

## 2023-05-16 DIAGNOSIS — R00.1 BRADYCARDIA, UNSPECIFIED: ICD-10-CM

## 2023-05-16 LAB
ANION GAP SERPL CALC-SCNC: 9 MMOL/L — SIGNIFICANT CHANGE UP (ref 5–17)
BASOPHILS # BLD AUTO: 0.04 K/UL — SIGNIFICANT CHANGE UP (ref 0–0.2)
BASOPHILS NFR BLD AUTO: 0.8 % — SIGNIFICANT CHANGE UP (ref 0–2)
BUN SERPL-MCNC: 22 MG/DL — SIGNIFICANT CHANGE UP (ref 7–23)
CALCIUM SERPL-MCNC: 8.9 MG/DL — SIGNIFICANT CHANGE UP (ref 8.4–10.5)
CHLORIDE SERPL-SCNC: 107 MMOL/L — SIGNIFICANT CHANGE UP (ref 96–108)
CK MB CFR SERPL CALC: 8.3 NG/ML — HIGH (ref 0–6.7)
CK SERPL-CCNC: 465 U/L — HIGH (ref 30–200)
CO2 SERPL-SCNC: 25 MMOL/L — SIGNIFICANT CHANGE UP (ref 22–31)
CREAT SERPL-MCNC: 1.31 MG/DL — HIGH (ref 0.5–1.3)
EGFR: 64 ML/MIN/1.73M2 — SIGNIFICANT CHANGE UP
EOSINOPHIL # BLD AUTO: 0.13 K/UL — SIGNIFICANT CHANGE UP (ref 0–0.5)
EOSINOPHIL NFR BLD AUTO: 2.6 % — SIGNIFICANT CHANGE UP (ref 0–6)
GLUCOSE SERPL-MCNC: 102 MG/DL — HIGH (ref 70–99)
HCT VFR BLD CALC: 35.2 % — LOW (ref 39–50)
HGB BLD-MCNC: 11.6 G/DL — LOW (ref 13–17)
IMM GRANULOCYTES NFR BLD AUTO: 0.2 % — SIGNIFICANT CHANGE UP (ref 0–0.9)
LIDOCAIN IGE QN: 26 U/L — SIGNIFICANT CHANGE UP (ref 7–60)
LYMPHOCYTES # BLD AUTO: 1.99 K/UL — SIGNIFICANT CHANGE UP (ref 1–3.3)
LYMPHOCYTES # BLD AUTO: 39.6 % — SIGNIFICANT CHANGE UP (ref 13–44)
MAGNESIUM SERPL-MCNC: 1.9 MG/DL — SIGNIFICANT CHANGE UP (ref 1.6–2.6)
MCHC RBC-ENTMCNC: 30.8 PG — SIGNIFICANT CHANGE UP (ref 27–34)
MCHC RBC-ENTMCNC: 33 GM/DL — SIGNIFICANT CHANGE UP (ref 32–36)
MCV RBC AUTO: 93.4 FL — SIGNIFICANT CHANGE UP (ref 80–100)
MONOCYTES # BLD AUTO: 0.47 K/UL — SIGNIFICANT CHANGE UP (ref 0–0.9)
MONOCYTES NFR BLD AUTO: 9.3 % — SIGNIFICANT CHANGE UP (ref 2–14)
NEUTROPHILS # BLD AUTO: 2.39 K/UL — SIGNIFICANT CHANGE UP (ref 1.8–7.4)
NEUTROPHILS NFR BLD AUTO: 47.5 % — SIGNIFICANT CHANGE UP (ref 43–77)
NRBC # BLD: 0 /100 WBCS — SIGNIFICANT CHANGE UP (ref 0–0)
NT-PROBNP SERPL-SCNC: 41 PG/ML — SIGNIFICANT CHANGE UP (ref 0–300)
PLATELET # BLD AUTO: 269 K/UL — SIGNIFICANT CHANGE UP (ref 150–400)
POTASSIUM SERPL-MCNC: 4.2 MMOL/L — SIGNIFICANT CHANGE UP (ref 3.5–5.3)
POTASSIUM SERPL-SCNC: 4.2 MMOL/L — SIGNIFICANT CHANGE UP (ref 3.5–5.3)
RBC # BLD: 3.77 M/UL — LOW (ref 4.2–5.8)
RBC # FLD: 14.3 % — SIGNIFICANT CHANGE UP (ref 10.3–14.5)
SODIUM SERPL-SCNC: 141 MMOL/L — SIGNIFICANT CHANGE UP (ref 135–145)
TROPONIN T SERPL-MCNC: 0.01 NG/ML — SIGNIFICANT CHANGE UP (ref 0–0.01)
TROPONIN T SERPL-MCNC: <0.01 NG/ML — SIGNIFICANT CHANGE UP (ref 0–0.01)
TROPONIN T SERPL-MCNC: <0.01 NG/ML — SIGNIFICANT CHANGE UP (ref 0–0.01)
WBC # BLD: 5.03 K/UL — SIGNIFICANT CHANGE UP (ref 3.8–10.5)
WBC # FLD AUTO: 5.03 K/UL — SIGNIFICANT CHANGE UP (ref 3.8–10.5)

## 2023-05-16 PROCEDURE — 99285 EMERGENCY DEPT VISIT HI MDM: CPT

## 2023-05-16 PROCEDURE — 36415 COLL VENOUS BLD VENIPUNCTURE: CPT

## 2023-05-16 PROCEDURE — 82553 CREATINE MB FRACTION: CPT

## 2023-05-16 PROCEDURE — 83735 ASSAY OF MAGNESIUM: CPT

## 2023-05-16 PROCEDURE — 84484 ASSAY OF TROPONIN QUANT: CPT

## 2023-05-16 PROCEDURE — 71045 X-RAY EXAM CHEST 1 VIEW: CPT | Mod: 26

## 2023-05-16 PROCEDURE — 83690 ASSAY OF LIPASE: CPT

## 2023-05-16 PROCEDURE — 93005 ELECTROCARDIOGRAM TRACING: CPT

## 2023-05-16 PROCEDURE — 85025 COMPLETE CBC W/AUTO DIFF WBC: CPT

## 2023-05-16 PROCEDURE — 82550 ASSAY OF CK (CPK): CPT

## 2023-05-16 PROCEDURE — 71045 X-RAY EXAM CHEST 1 VIEW: CPT

## 2023-05-16 PROCEDURE — 80048 BASIC METABOLIC PNL TOTAL CA: CPT

## 2023-05-16 PROCEDURE — 99285 EMERGENCY DEPT VISIT HI MDM: CPT | Mod: 25

## 2023-05-16 PROCEDURE — 83880 ASSAY OF NATRIURETIC PEPTIDE: CPT

## 2023-05-16 RX ORDER — ACETAMINOPHEN 500 MG
975 TABLET ORAL ONCE
Refills: 0 | Status: COMPLETED | OUTPATIENT
Start: 2023-05-16 | End: 2023-05-16

## 2023-05-16 RX ADMIN — Medication 975 MILLIGRAM(S): at 05:37

## 2023-05-16 NOTE — ED PROVIDER NOTE - PROGRESS NOTE DETAILS
jessica -   ecg nonischemic. cxr clear.   labs w trop neg. creatinine 1.31 (previously 1.19), CKMB slightly elevated at 8.3.   remains asymptomatic while in ED.  signed out to day team pending rpt ecg / trop @ 830a if unchanged ok for dc w outpt cards f/u. maria l: pt signed out with rpt troponin and ecg pending. ecg unchanged, 2nd troponin neg. return precautions given.

## 2023-05-16 NOTE — ED PROVIDER NOTE - NSFOLLOWUPINSTRUCTIONS_ED_ALL_ED_FT
CHEST PAIN -  Your work-up was reviewed and your results are on the pages to follow. No other emergency testing is indicated in the ER today to rule out other life threatening causes of your symptoms, however you will require further evaluation after leaving the Emergency Department today.     Drink plenty of fluids, get plenty of rest.   Avoid strenuous activity until you are seen in follow up.   Take tylenol / motrin as needed for pain.     FOLLOW UP - You need to follow up with a Primary Care Doctor / cardiology within 1-2 weeks for continued evaluation.   Call your Primary Care Doctor this week to schedule a follow up appointment.   See information below regarding a Cardiologist - call to make an appointment.    RETURN PRECAUTIONS - Return to the Emergency Department for persistent, worsening or new symptoms including severe chest pain, shortness of breath, difficulty breathing, nausea/vomiting, excessive sweating, or any other serious concerns.    ___      PRIMARY CARE -  1) [facility]  Phone: [phone]  [address] [place] [address]    2) [facility]  Phone: [phone]  [address] [place] [address]

## 2023-05-16 NOTE — ED ADULT NURSE NOTE - OBJECTIVE STATEMENT
Pt is 57 y.o male pt walked in complaining of L sided chest pain radiating to the neck 30 minutes prior to arrival. Pt A&Ox4. Pt is conversive and has a steady gait. Pt has hx of pericarditis, states " kiley been stressed out lately and been walking a lot". Denies sob, abd pain, fever, chills, headache, dizziness, tingling, numbness,  or bowel incontinence. EKG done. IV inserted and labs drawn. Assessment ongoing. Will cont to monitor.

## 2023-05-16 NOTE — ED ADULT NURSE NOTE - CAS TRG GEN SKIN CONDITION
----- Message from Jen Olmstead, Jerry Gonzalez sent at 3/3/2021 11:40 AM EST -----  1 hour GTT:  171  3 hour GTT:  76, 178, 162, 162    Please place referrals for diabetic education and nutrition  Please send testing supplies Rx to [facility] in [place]  She needs to test fasting QAM, 2 hours PP Warm

## 2023-05-16 NOTE — ED PROVIDER NOTE - CLINICAL SUMMARY MEDICAL DECISION MAKING FREE TEXT BOX
history of bipolar disorder, alcohol use disorder, prior documented cocaine use disorder, HTN, pericarditis, undomiciled per last psych note. pt w chest pain x 30mins pta. now resolved.   pt well appearing, stable vitals, exam unremarkable   low suspicion acs however w onset of pain just pta will assess w two ecg / trop  do not suspect PE. low risk by wells.   no recent infectious symptoms to suggest pericarditis.   plan - labs, ecg, cxr  reassess    of note - on chart review similar presentation in 4/2023 w ecg ok / trop negative. at that time also complained of si - cleared by psych. today pt denies si / hi / hallucinations.

## 2023-05-16 NOTE — ED PROVIDER NOTE - CARE PROVIDER_API CALL
Dianne Candelario)  Cardiovascular Disease; Internal Medicine  [address], [address]  [place], [place] [address]  Phone: [phone]  Fax: [phone]  Follow Up Time:

## 2023-05-16 NOTE — ED ADULT NURSE NOTE - NSFALLUNIVINTERV_ED_ALL_ED
Bed/Stretcher in lowest position, wheels locked, appropriate side rails in place/Call bell, personal items and telephone in reach/Instruct patient to call for assistance before getting out of bed/chair/stretcher/Non-slip footwear applied when patient is off stretcher/[place] to call system/Physically safe environment - no spills, clutter or unnecessary equipment/Purposeful proactive rounding/Room/bathroom lighting operational, light cord in reach

## 2023-05-16 NOTE — ED ADULT TRIAGE NOTE - CHIEF COMPLAINT QUOTE
Patient /co of left sided chest pain, radiates to left neck, states started this evening, had been walking a lot today, SOB, nausea/vomitting, no diaphoresis..  PMHx  Pericarditis, Pneumonia, HTN.  EKG in progress.

## 2023-05-16 NOTE — ED PROVIDER NOTE - PATIENT PORTAL LINK FT
You can access the FollowMyHealth Patient Portal offered by [facility] by registering at the following website: [email or web address]. By joining [facility]’s FollowMyHealth portal, you will also be able to view your health information using other applications (apps) compatible with our system.

## 2023-06-12 ENCOUNTER — HOSPITAL ENCOUNTER (INPATIENT)
Dept: HOSPITAL 74 - YASAS | Age: 58
LOS: 4 days | Discharge: HOME | End: 2023-06-16
Attending: ALLERGY & IMMUNOLOGY | Admitting: SURGERY
Payer: COMMERCIAL

## 2023-06-12 VITALS — BODY MASS INDEX: 33.6 KG/M2

## 2023-06-12 DIAGNOSIS — F14.20: ICD-10-CM

## 2023-06-12 DIAGNOSIS — I10: ICD-10-CM

## 2023-06-12 DIAGNOSIS — F10.230: Primary | ICD-10-CM

## 2023-06-12 PROCEDURE — HZ2ZZZZ DETOXIFICATION SERVICES FOR SUBSTANCE ABUSE TREATMENT: ICD-10-PCS | Performed by: SURGERY

## 2023-06-12 RX ADMIN — Medication SCH: at 22:49

## 2023-06-12 RX ADMIN — Medication SCH MG: at 22:49

## 2023-06-13 LAB
ALBUMIN SERPL-MCNC: 3.7 G/DL (ref 3.4–5)
ALP SERPL-CCNC: 63 U/L (ref 45–117)
ALT SERPL-CCNC: 64 U/L (ref 13–61)
ANION GAP SERPL CALC-SCNC: 9 MMOL/L (ref 8–16)
AST SERPL-CCNC: 49 U/L (ref 15–37)
BILIRUB SERPL-MCNC: 0.4 MG/DL (ref 0.2–1)
BUN SERPL-MCNC: 18 MG/DL (ref 7–18)
CALCIUM SERPL-MCNC: 9.3 MG/DL (ref 8.5–10.1)
CHLORIDE SERPL-SCNC: 108 MMOL/L (ref 98–107)
CO2 SERPL-SCNC: 28 MMOL/L (ref 21–32)
CREAT SERPL-MCNC: 1.1 MG/DL (ref 0.55–1.3)
DEPRECATED RDW RBC AUTO: 14.4 % (ref 11.9–15.9)
GLUCOSE SERPL-MCNC: 107 MG/DL (ref 74–106)
HCT VFR BLD CALC: 36.8 % (ref 35.4–49)
HGB BLD-MCNC: 12.4 GM/DL (ref 11.7–16.9)
MCH RBC QN AUTO: 30.8 PG (ref 25.7–33.7)
MCHC RBC AUTO-ENTMCNC: 33.6 G/DL (ref 32–35.9)
MCV RBC: 91.6 FL (ref 80–96)
PLATELET # BLD AUTO: 219 10^3/UL (ref 134–434)
PMV BLD: 9 FL (ref 7.5–11.1)
POTASSIUM SERPLBLD-SCNC: 4.2 MMOL/L (ref 3.5–5.1)
PROT SERPL-MCNC: 7.4 G/DL (ref 6.4–8.2)
RBC # BLD AUTO: 4.02 M/MM3 (ref 4–5.6)
SODIUM SERPL-SCNC: 145 MMOL/L (ref 136–145)
WBC # BLD AUTO: 5 K/MM3 (ref 4–10)

## 2023-06-13 RX ADMIN — Medication SCH: at 23:04

## 2023-06-13 RX ADMIN — HYDROCHLOROTHIAZIDE SCH: 12.5 CAPSULE ORAL at 10:26

## 2023-06-13 RX ADMIN — Medication SCH TAB: at 10:26

## 2023-06-14 RX ADMIN — Medication SCH: at 23:26

## 2023-06-14 RX ADMIN — Medication SCH TAB: at 10:43

## 2023-06-14 RX ADMIN — HYDROCHLOROTHIAZIDE SCH: 12.5 CAPSULE ORAL at 10:41

## 2023-06-15 RX ADMIN — HYDROCHLOROTHIAZIDE SCH: 12.5 CAPSULE ORAL at 09:31

## 2023-06-15 RX ADMIN — Medication SCH: at 23:05

## 2023-06-15 RX ADMIN — Medication SCH TAB: at 09:31

## 2023-06-15 RX ADMIN — Medication SCH: at 23:04

## 2023-06-16 VITALS
RESPIRATION RATE: 16 BRPM | DIASTOLIC BLOOD PRESSURE: 81 MMHG | TEMPERATURE: 97.3 F | SYSTOLIC BLOOD PRESSURE: 114 MMHG | HEART RATE: 60 BPM

## 2023-06-30 ENCOUNTER — EMERGENCY (EMERGENCY)
Facility: HOSPITAL | Age: 58
LOS: 1 days | Discharge: HOME | End: 2023-06-30
Admitting: EMERGENCY MEDICINE
Payer: MEDICAID

## 2023-06-30 VITALS
DIASTOLIC BLOOD PRESSURE: 71 MMHG | SYSTOLIC BLOOD PRESSURE: 162 MMHG | OXYGEN SATURATION: 99 % | RESPIRATION RATE: 16 BRPM | HEART RATE: 79 BPM

## 2023-06-30 VITALS
RESPIRATION RATE: 18 BRPM | TEMPERATURE: 97 F | OXYGEN SATURATION: 94 % | WEIGHT: 261.91 LBS | SYSTOLIC BLOOD PRESSURE: 106 MMHG | HEART RATE: 97 BPM | DIASTOLIC BLOOD PRESSURE: 74 MMHG | HEIGHT: 74 IN

## 2023-06-30 LAB
ALBUMIN SERPL ELPH-MCNC: 3.9 G/DL — SIGNIFICANT CHANGE UP (ref 3.4–5)
ALP SERPL-CCNC: 61 U/L — SIGNIFICANT CHANGE UP (ref 40–120)
ALT FLD-CCNC: 41 U/L — SIGNIFICANT CHANGE UP (ref 12–42)
ANION GAP SERPL CALC-SCNC: 9 MMOL/L — SIGNIFICANT CHANGE UP (ref 9–16)
AST SERPL-CCNC: 24 U/L — SIGNIFICANT CHANGE UP (ref 15–37)
BASOPHILS # BLD AUTO: 0.03 K/UL — SIGNIFICANT CHANGE UP (ref 0–0.2)
BASOPHILS NFR BLD AUTO: 0.5 % — SIGNIFICANT CHANGE UP (ref 0–2)
BILIRUB SERPL-MCNC: 0.4 MG/DL — SIGNIFICANT CHANGE UP (ref 0.2–1.2)
BUN SERPL-MCNC: 18 MG/DL — SIGNIFICANT CHANGE UP (ref 7–23)
CALCIUM SERPL-MCNC: 9.6 MG/DL — SIGNIFICANT CHANGE UP (ref 8.5–10.5)
CHLORIDE SERPL-SCNC: 104 MMOL/L — SIGNIFICANT CHANGE UP (ref 96–108)
CO2 SERPL-SCNC: 26 MMOL/L — SIGNIFICANT CHANGE UP (ref 22–31)
CREAT SERPL-MCNC: 1.19 MG/DL — SIGNIFICANT CHANGE UP (ref 0.5–1.3)
EGFR: 71 ML/MIN/1.73M2 — SIGNIFICANT CHANGE UP
EOSINOPHIL # BLD AUTO: 0.09 K/UL — SIGNIFICANT CHANGE UP (ref 0–0.5)
EOSINOPHIL NFR BLD AUTO: 1.6 % — SIGNIFICANT CHANGE UP (ref 0–6)
GLUCOSE SERPL-MCNC: 94 MG/DL — SIGNIFICANT CHANGE UP (ref 70–99)
HCT VFR BLD CALC: 38.4 % — LOW (ref 39–50)
HGB BLD-MCNC: 12.7 G/DL — LOW (ref 13–17)
IMM GRANULOCYTES NFR BLD AUTO: 0.5 % — SIGNIFICANT CHANGE UP (ref 0–0.9)
LYMPHOCYTES # BLD AUTO: 2.02 K/UL — SIGNIFICANT CHANGE UP (ref 1–3.3)
LYMPHOCYTES # BLD AUTO: 36.8 % — SIGNIFICANT CHANGE UP (ref 13–44)
MAGNESIUM SERPL-MCNC: 1.7 MG/DL — SIGNIFICANT CHANGE UP (ref 1.6–2.6)
MCHC RBC-ENTMCNC: 30.6 PG — SIGNIFICANT CHANGE UP (ref 27–34)
MCHC RBC-ENTMCNC: 33.1 GM/DL — SIGNIFICANT CHANGE UP (ref 32–36)
MCV RBC AUTO: 92.5 FL — SIGNIFICANT CHANGE UP (ref 80–100)
MONOCYTES # BLD AUTO: 0.57 K/UL — SIGNIFICANT CHANGE UP (ref 0–0.9)
MONOCYTES NFR BLD AUTO: 10.4 % — SIGNIFICANT CHANGE UP (ref 2–14)
NEUTROPHILS # BLD AUTO: 2.75 K/UL — SIGNIFICANT CHANGE UP (ref 1.8–7.4)
NEUTROPHILS NFR BLD AUTO: 50.2 % — SIGNIFICANT CHANGE UP (ref 43–77)
NRBC # BLD: 0 /100 WBCS — SIGNIFICANT CHANGE UP (ref 0–0)
NT-PROBNP SERPL-SCNC: 48 PG/ML — SIGNIFICANT CHANGE UP
PLATELET # BLD AUTO: 266 K/UL — SIGNIFICANT CHANGE UP (ref 150–400)
POTASSIUM SERPL-MCNC: 4.1 MMOL/L — SIGNIFICANT CHANGE UP (ref 3.5–5.3)
POTASSIUM SERPL-SCNC: 4.1 MMOL/L — SIGNIFICANT CHANGE UP (ref 3.5–5.3)
PROT SERPL-MCNC: 7.9 G/DL — SIGNIFICANT CHANGE UP (ref 6.4–8.2)
RBC # BLD: 4.15 M/UL — LOW (ref 4.2–5.8)
RBC # FLD: 14 % — SIGNIFICANT CHANGE UP (ref 10.3–14.5)
SODIUM SERPL-SCNC: 139 MMOL/L — SIGNIFICANT CHANGE UP (ref 132–145)
TROPONIN I, HIGH SENSITIVITY RESULT: 7.8 NG/L — SIGNIFICANT CHANGE UP
WBC # BLD: 5.49 K/UL — SIGNIFICANT CHANGE UP (ref 3.8–10.5)
WBC # FLD AUTO: 5.49 K/UL — SIGNIFICANT CHANGE UP (ref 3.8–10.5)

## 2023-06-30 PROCEDURE — 71045 X-RAY EXAM CHEST 1 VIEW: CPT | Mod: 26

## 2023-06-30 PROCEDURE — 99285 EMERGENCY DEPT VISIT HI MDM: CPT

## 2023-06-30 NOTE — ED PROVIDER NOTE - NS ED SCRIBE STATEMENT
CHIEF COMPLAINT       Chief Complaint   Patient presents with    Eye Problem       PT reports at 1400 today that PT lost vision in his right eye and was transferred to see ophthalmologist.           HPI  52 y.o. male emergency department pt with loss of vision to right eye. seen at [facility] earlier [facility] and sent to this facility for further ophthalmology consultation. over the period of a couple hours he noticed increasing blurred vision and then ultimately realized that he could not see half of his visual field on the affected eye.  Denies headache or any other symptoms. No trauma. No left eye Sx's. constant    [place]'s work-up including CT of the head as well as hematologic work-up all was benign.      REVIEW OF SYSTEMS  See HPI for further details. All other systems are negative.      PAST MEDICAL HISTORY   HTN  SOCIAL HISTORY  Social History           Tobacco Use    Smoking status: Not on file    Smokeless tobacco: Not on file   Substance and Sexual Activity    Alcohol use: Not on file    Drug use: Not on file    Sexual activity: Not on file         SURGICAL HISTORY  patient denies any surgical history     CURRENT MEDICATIONS  Home Medications    **Home medications have not yet been reviewed for this encounter**            ALLERGIES  NKDA        POHx: as per HPI.     FAMILY HISTORY - no blindness      EXAM:  General: no apparent distress, oriented x 3     Visual acuities: near, with glasses    Exam: VA rt CF Temporally / lt 20/30  IOP 13 OU     Pupils: right: 3+  APD  left: 4 -> 2 mm, brisk, regular, no APD  Motilities: right: left: WNL     Alignment: ortho both  Confrontation Visual Fields: unable rt; WNL left  Color Vision: not tested     Intraocular pressures: by tonopen    Right:16  Left: 18     SLIT LAMP EXAMINATION:    Lids / lashes / adnexa: RIGHT: WNL; LEFT: WNL;  sl dermatochalasis  Conjunctiva / sclera:   RIGHT: ; LEFT: white / quiet  Cornea: RIGHT: WNL (-) ; LEFT: WNL   Anterior Chamber: RIGHT:  deep quiet; LEFT: deep and quiet  Iris: RIGHT: ;  LEFT: normal  Lens: RIGHT: :LEFT: 2-3+ milky NSC / 1+ CC      DILATED FUNDUS EXAMINATION: after 1% tropicamide and 2.5% phenylephrine   Vit: rt: ; lt: wnl  Rt CRAO  Maculae: both: central dot hemorrhages / edema / hard exudates     Periphery: no breaks or tears noted OD;  OS:    Vessels: rt: ; lt: WNL        ASSESSMENT AND PLAN:  1. Rt central retinal artery occlusion - needs immediate CRP, ESR, platelets, blood sugar, A1C, CBC, PT / PTT, lipid profile, DAMIAN, RF, FTA-ABS, serum protein electrophoresis, carotid u/s, cardiacTTE, MRI orbits / brain     2. Nuclear sclerotic cataracts both eyes - not yet visually significant. Monitor.      3. Dermatochalasis BUL - NVS. monitor   EMMIE

## 2023-06-30 NOTE — ED ADULT NURSE NOTE - OBJECTIVE STATEMENT
here with chest pain while [place] subway this morning; said chest pain was crushing and caused shortness of breath

## 2023-06-30 NOTE — ED PROVIDER NOTE - OBJECTIVE STATEMENT
59 y/o M who got out of rehab today and has been clean for 20 days and presents to the ED after he felt some sharp chest pain that is different than normal. Denies chest pain on arrival. Patient appears to be undomiciled. Pt denies fevers/chills, headache, SOB, N/V.

## 2023-06-30 NOTE — ED PROVIDER NOTE - PATIENT PORTAL LINK FT
You can access the FollowMyHealth Patient Portal offered by [facility] by registering at the following website: [email or web address]. By joining [facility]’s FollowMyHealth portal, you will also be able to view your health information using other applications (apps) compatible with our system.

## 2023-06-30 NOTE — ED PROVIDER NOTE - PHYSICAL EXAMINATION
VITAL SIGNS: I have reviewed nursing notes and confirm.   CONST: Well-developed; No apparent distress.  ENT: No nasal discharge; airway clear.  HEAD: Normocephalic; atraumatic.  EYES: Sclera clear. Pupils round and symmetrical bilaterally.  CARD: S1, S2 normal; no murmurs, gallops, or rubs. Regular rate and rhythm.  RESP: No wheezes, rales or rhonchi. Breathing comfortably; speaking in full sentences.   ABD: Soft; non-distended; non-tender;   MSK: No acute deformities noted to extremities. No tenderness to cervical/thoracic/lumbar spine to palpation.  NEURO: Alert, oriented. Speech is fluent and appropriate. Moving all extremities appropriately. No gross motor or sensory abnormalities.   SKIN: Skin is normal temperature; no diaphoresis; no pallor.   PSYCH: Cooperative. Appropriate mood, language, and behavior.

## 2023-06-30 NOTE — ED ADULT NURSE NOTE - NURSING NEURO ORIENTATION
Alert-The patient is alert, awake and responds to voice. The patient is oriented to time, place, and person. The triage nurse is able to obtain subjective information.
oriented to person, place and time

## 2023-06-30 NOTE — ED PROVIDER NOTE - CLINICAL SUMMARY MEDICAL DECISION MAKING FREE TEXT BOX
Patient presents for sharp chest pain that began today. Exam within normal limits. Will get 2 sets of cardiac enzymes, EKG, and reassess.

## 2023-06-30 NOTE — ED ADULT TRIAGE NOTE - CHIEF COMPLAINT QUOTE
Pt walks in c/o chest pain radiating to L arm since this morning. PMH of pericarditis. Pt completed 20 stay at rehab for alcohol this morning.

## 2023-07-01 ENCOUNTER — EMERGENCY (EMERGENCY)
Facility: HOSPITAL | Age: 58
LOS: 1 days | Discharge: HOME | End: 2023-07-01
Attending: STUDENT IN AN ORGANIZED HEALTH CARE EDUCATION/TRAINING PROGRAM | Admitting: STUDENT IN AN ORGANIZED HEALTH CARE EDUCATION/TRAINING PROGRAM
Payer: COMMERCIAL

## 2023-07-01 VITALS
RESPIRATION RATE: 16 BRPM | DIASTOLIC BLOOD PRESSURE: 77 MMHG | WEIGHT: 257.94 LBS | OXYGEN SATURATION: 96 % | HEIGHT: 74 IN | HEART RATE: 117 BPM | TEMPERATURE: 98 F | SYSTOLIC BLOOD PRESSURE: 121 MMHG

## 2023-07-01 DIAGNOSIS — R45.851 SUICIDAL IDEATIONS: ICD-10-CM

## 2023-07-01 DIAGNOSIS — R00.0 TACHYCARDIA, UNSPECIFIED: ICD-10-CM

## 2023-07-01 DIAGNOSIS — Z59.01 SHELTERED HOMELESSNESS: ICD-10-CM

## 2023-07-01 DIAGNOSIS — I10 ESSENTIAL (PRIMARY) HYPERTENSION: ICD-10-CM

## 2023-07-01 DIAGNOSIS — F31.9 BIPOLAR DISORDER, UNSPECIFIED: ICD-10-CM

## 2023-07-01 DIAGNOSIS — Z86.79 PERSONAL HISTORY OF OTHER DISEASES OF THE CIRCULATORY SYSTEM: ICD-10-CM

## 2023-07-01 DIAGNOSIS — R07.89 OTHER CHEST PAIN: ICD-10-CM

## 2023-07-01 DIAGNOSIS — Z20.822 CONTACT WITH AND (SUSPECTED) EXPOSURE TO COVID-19: ICD-10-CM

## 2023-07-01 DIAGNOSIS — Z91.148 PATIENT'S OTHER NONCOMPLIANCE WITH MEDICATION REGIMEN FOR OTHER REASON: ICD-10-CM

## 2023-07-01 DIAGNOSIS — Z76.5 MALINGERER [CONSCIOUS SIMULATION]: ICD-10-CM

## 2023-07-01 LAB
ALBUMIN SERPL ELPH-MCNC: 4.6 G/DL — SIGNIFICANT CHANGE UP (ref 3.3–5)
ALP SERPL-CCNC: 61 U/L — SIGNIFICANT CHANGE UP (ref 40–120)
ALT FLD-CCNC: 41 U/L — SIGNIFICANT CHANGE UP (ref 10–45)
ANION GAP SERPL CALC-SCNC: 18 MMOL/L — HIGH (ref 5–17)
APAP SERPL-MCNC: <5 UG/ML — LOW (ref 10–30)
APPEARANCE UR: CLEAR — SIGNIFICANT CHANGE UP
AST SERPL-CCNC: 67 U/L — HIGH (ref 10–40)
BASOPHILS # BLD AUTO: 0.05 K/UL — SIGNIFICANT CHANGE UP (ref 0–0.2)
BASOPHILS NFR BLD AUTO: 0.6 % — SIGNIFICANT CHANGE UP (ref 0–2)
BILIRUB SERPL-MCNC: 0.8 MG/DL — SIGNIFICANT CHANGE UP (ref 0.2–1.2)
BILIRUB UR-MCNC: NEGATIVE — SIGNIFICANT CHANGE UP
BUN SERPL-MCNC: 21 MG/DL — SIGNIFICANT CHANGE UP (ref 7–23)
CALCIUM SERPL-MCNC: 9.3 MG/DL — SIGNIFICANT CHANGE UP (ref 8.4–10.5)
CHLORIDE SERPL-SCNC: 104 MMOL/L — SIGNIFICANT CHANGE UP (ref 96–108)
CO2 SERPL-SCNC: 17 MMOL/L — LOW (ref 22–31)
COLOR SPEC: YELLOW — SIGNIFICANT CHANGE UP
CREAT SERPL-MCNC: 1.31 MG/DL — HIGH (ref 0.5–1.3)
DIFF PNL FLD: NEGATIVE — SIGNIFICANT CHANGE UP
EGFR: 63 ML/MIN/1.73M2 — SIGNIFICANT CHANGE UP
EOSINOPHIL # BLD AUTO: 0.03 K/UL — SIGNIFICANT CHANGE UP (ref 0–0.5)
EOSINOPHIL NFR BLD AUTO: 0.4 % — SIGNIFICANT CHANGE UP (ref 0–6)
ETHANOL SERPL-MCNC: 27 MG/DL — HIGH (ref 0–10)
GLUCOSE SERPL-MCNC: 69 MG/DL — LOW (ref 70–99)
GLUCOSE UR QL: NEGATIVE — SIGNIFICANT CHANGE UP
HCT VFR BLD CALC: 40 % — SIGNIFICANT CHANGE UP (ref 39–50)
HGB BLD-MCNC: 13 G/DL — SIGNIFICANT CHANGE UP (ref 13–17)
IMM GRANULOCYTES NFR BLD AUTO: 0.4 % — SIGNIFICANT CHANGE UP (ref 0–0.9)
KETONES UR-MCNC: ABNORMAL MG/DL
LEUKOCYTE ESTERASE UR-ACNC: NEGATIVE — SIGNIFICANT CHANGE UP
LYMPHOCYTES # BLD AUTO: 2.2 K/UL — SIGNIFICANT CHANGE UP (ref 1–3.3)
LYMPHOCYTES # BLD AUTO: 28 % — SIGNIFICANT CHANGE UP (ref 13–44)
MCHC RBC-ENTMCNC: 30.7 PG — SIGNIFICANT CHANGE UP (ref 27–34)
MCHC RBC-ENTMCNC: 32.5 GM/DL — SIGNIFICANT CHANGE UP (ref 32–36)
MCV RBC AUTO: 94.3 FL — SIGNIFICANT CHANGE UP (ref 80–100)
MONOCYTES # BLD AUTO: 0.7 K/UL — SIGNIFICANT CHANGE UP (ref 0–0.9)
MONOCYTES NFR BLD AUTO: 8.9 % — SIGNIFICANT CHANGE UP (ref 2–14)
NEUTROPHILS # BLD AUTO: 4.85 K/UL — SIGNIFICANT CHANGE UP (ref 1.8–7.4)
NEUTROPHILS NFR BLD AUTO: 61.7 % — SIGNIFICANT CHANGE UP (ref 43–77)
NITRITE UR-MCNC: NEGATIVE — SIGNIFICANT CHANGE UP
NRBC # BLD: 0 /100 WBCS — SIGNIFICANT CHANGE UP (ref 0–0)
PCP SPEC-MCNC: SIGNIFICANT CHANGE UP
PH UR: 5.5 — SIGNIFICANT CHANGE UP (ref 5–8)
PLATELET # BLD AUTO: 253 K/UL — SIGNIFICANT CHANGE UP (ref 150–400)
POTASSIUM SERPL-MCNC: 4.3 MMOL/L — SIGNIFICANT CHANGE UP (ref 3.5–5.3)
POTASSIUM SERPL-SCNC: 4.3 MMOL/L — SIGNIFICANT CHANGE UP (ref 3.5–5.3)
PROT SERPL-MCNC: 8 G/DL — SIGNIFICANT CHANGE UP (ref 6–8.3)
PROT UR-MCNC: NEGATIVE MG/DL — SIGNIFICANT CHANGE UP
RBC # BLD: 4.24 M/UL — SIGNIFICANT CHANGE UP (ref 4.2–5.8)
RBC # FLD: 14.4 % — SIGNIFICANT CHANGE UP (ref 10.3–14.5)
SALICYLATES SERPL-MCNC: <0.3 MG/DL — LOW (ref 2.8–20)
SARS-COV-2 RNA SPEC QL NAA+PROBE: SIGNIFICANT CHANGE UP
SODIUM SERPL-SCNC: 139 MMOL/L — SIGNIFICANT CHANGE UP (ref 135–145)
SP GR SPEC: >=1.03 — SIGNIFICANT CHANGE UP (ref 1–1.03)
TROPONIN T, HIGH SENSITIVITY RESULT: 12 NG/L — SIGNIFICANT CHANGE UP (ref 0–51)
TROPONIN T, HIGH SENSITIVITY RESULT: 12 NG/L — SIGNIFICANT CHANGE UP (ref 0–51)
TSH SERPL-MCNC: 1.25 UIU/ML — SIGNIFICANT CHANGE UP (ref 0.27–4.2)
UROBILINOGEN FLD QL: 0.2 E.U./DL — SIGNIFICANT CHANGE UP
WBC # BLD: 7.86 K/UL — SIGNIFICANT CHANGE UP (ref 3.8–10.5)
WBC # FLD AUTO: 7.86 K/UL — SIGNIFICANT CHANGE UP (ref 3.8–10.5)

## 2023-07-01 PROCEDURE — 99285 EMERGENCY DEPT VISIT HI MDM: CPT

## 2023-07-01 PROCEDURE — 93005 ELECTROCARDIOGRAM TRACING: CPT

## 2023-07-01 PROCEDURE — 84484 ASSAY OF TROPONIN QUANT: CPT

## 2023-07-01 PROCEDURE — 81003 URINALYSIS AUTO W/O SCOPE: CPT

## 2023-07-01 PROCEDURE — 99283 EMERGENCY DEPT VISIT LOW MDM: CPT | Mod: 25

## 2023-07-01 PROCEDURE — 84443 ASSAY THYROID STIM HORMONE: CPT

## 2023-07-01 PROCEDURE — 80307 DRUG TEST PRSMV CHEM ANLYZR: CPT

## 2023-07-01 PROCEDURE — 87635 SARS-COV-2 COVID-19 AMP PRB: CPT

## 2023-07-01 PROCEDURE — 85025 COMPLETE CBC W/AUTO DIFF WBC: CPT

## 2023-07-01 PROCEDURE — 36415 COLL VENOUS BLD VENIPUNCTURE: CPT

## 2023-07-01 PROCEDURE — 80053 COMPREHEN METABOLIC PANEL: CPT

## 2023-07-01 SDOH — ECONOMIC STABILITY - HOUSING INSECURITY: SHELTERED HOMELESSNESS: Z59.01

## 2023-07-01 NOTE — ED ADULT NURSE NOTE - OBJECTIVE STATEMENT
Patient is a 58yoM presenting to the ED for SI. Patient is awake and alert, spont breathing on RA. Patient presents to the ED c/o intermittent left sided chest pain that is non radiating. Also endorsing SI, no actual plan at this time, denies HI, denies AH/VH. Also reports that he has been off his meds for over 2 months. Patient is a 58yoM presenting to the ED for SI. Patient is awake and alert, spont breathing on RA. Patient presents to the ED c/o intermittent left sided chest pain that is non radiating. Also endorsing extremely vague SI, no actual plan at this time, denies HI, denies AH/VH. Patient not able to elaborate further when questioned on SI. Also reports that he has been off his meds for over 2 months.

## 2023-07-01 NOTE — ED ADULT TRIAGE NOTE - CHIEF COMPLAINT QUOTE
Pt, with hx of bipolar, HTN, pericarditis and alcohol/cocaine use, presents to ER c/o SI (without plan) and left side chest pain radiating to left temple with N/V, palpitations, and dizziness for ~30 minutes while walking. Pt denies any HI or AH/VH at this time. Pt reports past attempts at harming self.

## 2023-07-01 NOTE — ED ADULT NURSE NOTE - NSFALLUNIVINTERV_ED_ALL_ED
Bed/Stretcher in lowest position, wheels locked, appropriate side rails in place/Call bell, personal items and telephone in reach/Instruct patient to call for assistance before getting out of bed/chair/stretcher/Non-slip footwear applied when patient is off stretcher/[place] to call system/Physically safe environment - no spills, clutter or unnecessary equipment/Purposeful proactive rounding/Room/bathroom lighting operational, light cord in reach

## 2023-07-02 VITALS
HEART RATE: 98 BPM | RESPIRATION RATE: 17 BRPM | TEMPERATURE: 97 F | OXYGEN SATURATION: 95 % | SYSTOLIC BLOOD PRESSURE: 100 MMHG | DIASTOLIC BLOOD PRESSURE: 60 MMHG

## 2023-07-02 NOTE — ED PROVIDER NOTE - OBJECTIVE STATEMENT
58 yr old male, history of bipolar disorder, alcohol use disorder, prior documented cocaine use disorder, HTN (noncompliant w meds), pericarditis, undomiciled, presents to the Emergency Department w multiple complaints. pt primarily complaining of chest pain. central chest started approx 30mins pta. resolved since arrival to ed. nonexertional, nonpleuritic. nonradiating.   no sob, palpitations, lightheadedness, diaphoresis, n/v, leg swelling, near-syncope / syncope. no fever, abd pain. no headache, vision changes, extremity weakness / numbness / tingling. no recent infectious symptoms. no prolonged immobilization / recent travel, hx dvt/pe.     on chart review similar presentation in 4/2023 w ecg ok / trop negative. at that time also complained of si - cleared by psych. today pt denies si / hi / hallucinations. 58 yr old male, history of bipolar disorder, alcohol use disorder, prior documented cocaine use disorder, HTN (noncompliant w meds), pericarditis, undomiciled, presents to the Emergency Department w multiple complaints. pt primarily complaining of chest pain. central chest started approx 30mins pta. resolved since arrival to ed. nonexertional, nonpleuritic. nonradiating.   no sob, palpitations, lightheadedness, diaphoresis, n/v, leg swelling, near-syncope / syncope. no fever, abd pain. no headache, vision changes, extremity weakness / numbness / tingling. no recent infectious symptoms. no prolonged immobilization / recent travel, hx dvt/pe.   complained of SI in triage however on my evaluation -   on chart review similar presentation in 4/2023 w ecg ok / trop negative. at that time also complained of si - cleared by psych. today pt denies si / hi / hallucinations. 58 yr old male, history of bipolar disorder, alcohol use disorder, prior documented cocaine use disorder, HTN (noncompliant w meds), pericarditis, undomiciled, presents to the Emergency Department w multiple complaints. pt primarily complaining of chest pain. central chest started approx 30mins pta. resolved since arrival to ed. nonexertional, nonpleuritic. nonradiating.   no sob, palpitations, lightheadedness, diaphoresis, n/v, leg swelling, near-syncope / syncope. no fever, abd pain. no headache, vision changes, extremity weakness / numbness / tingling. no recent infectious symptoms. no prolonged immobilization / recent travel, hx dvt/pe.   complained of SI in triage however on my evaluation pt only complains of chest pain. denies si / hi / hallucinations.     on chart review similar presentation in 4/2023 and 6/2023 w ecg ok / trop negative. 4/2023 also complained of si - cleared by psych.

## 2023-07-02 NOTE — ED PROVIDER NOTE - PATIENT PORTAL LINK FT
You can access the FollowMyHealth Patient Portal offered by [facility] by registering at the following website: [email or web address]. By joining [facility]’s FollowMyHealth portal, you will also be able to view your health information using other applications (apps) compatible with our system.

## 2023-07-02 NOTE — ED PROVIDER NOTE - CLINICAL SUMMARY MEDICAL DECISION MAKING FREE TEXT BOX
history of bipolar disorder, alcohol use disorder, prior documented cocaine use disorder, HTN, pericarditis, undomiciled per last psych note. pt w chest pain x 30mins pta. now resolved.   pt well appearing, stable vitals, exam unremarkable   low suspicion acs however w onset of pain just pta will assess w two ecg / trop  do not suspect PE. low risk by wells.   no recent infectious symptoms to suggest pericarditis.   plan - labs, ecg, cxr  reassess history of bipolar disorder, alcohol use disorder, prior documented cocaine use disorder, HTN, pericarditis, undomiciled here w chest pain x 30mins pta. now resolved. mentioned SI to triage but on my evaluation only complains of chest pain. denies si / hi / hallucinations.   pt well appearing, stable vitals, exam unremarkable   low suspicion acs however w onset of pain just pta will assess w two ecg / trop  do not suspect PE. low risk by wells.   no recent infectious symptoms to suggest pericarditis.   plan - labs, ecg, cxr  reassess    re complaint of si in triage. pt denied to me, resting comfortably, normal mood and affect does not seem to be risk to self or others.   on review of previous psych notes "Assessed with substance induced mood sx, concern for conditional SI and malingering for shelter, cleared for dc." // "though component of substance-induced mood sx is likely. Role of secondary gain for shelter also suspected given unhappiness with living situation,"  pt clinically sober and denies SI now but can reevaluate after cards evaluation.

## 2023-07-02 NOTE — ED PROVIDER NOTE - PROGRESS NOTE DETAILS
ecg nonischemic  trop 12 -> 12 w delta 0  creatinine 1.31 (similar prior)  pt resting comfortably. pt clinically sober and denies si, would like to sleep. pt not acutely psychotic and does not seem at risk to self or others. pt stable for dc.     All results reviewed with the patient verbally. Discharge plan and return precautions d/w pt who verbalized understanding and agrees with plan. All questions answered. Vitals WNL. Ready for d/c. ecg nonischemic. trop 12 -> 12 w delta 0  creatinine 1.31 (similar prior)  pt resting comfortably. again had initially denied SI on my evaluation however after discussing negative cardiac workup and plan for dc pt now reports si, vague statements says "I just do" when asked further questions. pt clinically sober do not feel this is substance related si. as mentioned previously in psych notes feel there may be secondary gain related to housing situation.   pt not acutely psychotic and does not seem at risk to self or others. do not feel need for emergent psych evaluation. pt stable for dc.     All results reviewed with the patient verbally. Discharge plan and return precautions d/w pt who verbalized understanding and agrees with plan. All questions answered. Vitals WNL. Ready for d/c.

## 2023-07-02 NOTE — ED PROVIDER NOTE - ATTENDING APP SHARED VISIT CONTRIBUTION OF CARE
history of bipolar disorder, alcohol use disorder, prior documented cocaine use disorder, HTN, pericarditis, undomiciled here w chest pain x 30mins pta. now resolved. mentioned SI to triage but on my evaluation only complains of chest pain. denies si / hi / hallucinations.   pt well appearing, stable vitals, exam unremarkable   low suspicion acs however w onset of pain just pta will assess w two ecg / trop  do not suspect PE. low risk by wells.   no recent infectious symptoms to suggest pericarditis.   plan - labs, ecg, cxr  reassess    re complaint of si in triage. pt denied to me, resting comfortably, normal mood and affect does not seem to be risk to self or others.   on review of previous psych notes "Assessed with substance induced mood sx, concern for conditional SI and malingering for shelter, cleared for dc." // "though component of substance-induced mood sx is likely. Role of secondary gain for shelter also suspected given unhappiness with living situation,"  pt clinically sober and denies SI now but can reevaluate after cards evaluation.

## 2023-07-02 NOTE — ED PROVIDER NOTE - NSFOLLOWUPINSTRUCTIONS_ED_ALL_ED_FT
Drink plenty of fluids, get plenty of rest.   Avoid strenuous activity until you are seen in follow up.   Take tylenol / motrin as needed for pain.     FOLLOW UP - You need to follow up with a Primary Care Doctor within 1-2 weeks for continued evaluation.     RETURN PRECAUTIONS - Return to the Emergency Department for persistent, worsening or new symptoms including severe chest pain, shortness of breath, difficulty breathing, nausea/vomiting, excessive sweating, or any other serious concerns.     ____      Continue all medications as previously prescribed.     Follow up with your Psychiatry team within 1 week for continued evaluation and management.     If you do not have insurance or to see a psychiatrist on the same day, you could go to a [facility] walk-in clinic, for example, to the one at [facility]:  [facility] Walk-in[facility] [address], at [address] Walk straight down the main [address]inas and take your first left.  Enter at room 1A2. 8:00am for Walk-in-Clinic [phone]    If you start to feel worse, especially if you are afraid you might hurt yourself or someone else, please call 911, return here, or go to your nearest emergency room.     [facility] ([phone]) is a free, confidential support, crisis intervention, and referral service for [facility] residents. You can call 24 hours per day/7 days per week. The hotline's staff of trained mental health professionals help callers find mental health and substance abuse services.

## 2023-07-04 DIAGNOSIS — R07.89 OTHER CHEST PAIN: ICD-10-CM

## 2023-07-04 DIAGNOSIS — F31.9 BIPOLAR DISORDER, UNSPECIFIED: ICD-10-CM

## 2023-07-04 DIAGNOSIS — Z59.00 HOMELESSNESS UNSPECIFIED: ICD-10-CM

## 2023-07-04 DIAGNOSIS — I10 ESSENTIAL (PRIMARY) HYPERTENSION: ICD-10-CM

## 2023-07-04 SDOH — ECONOMIC STABILITY - HOUSING INSECURITY: HOMELESSNESS UNSPECIFIED: Z59.00

## 2023-09-01 ENCOUNTER — HOSPITAL ENCOUNTER (INPATIENT)
Dept: HOSPITAL 74 - YASAS | Age: 58
LOS: 1 days | Discharge: LEFT BEFORE DISCHARGE | DRG: 770 | End: 2023-09-02
Attending: SURGERY | Admitting: SURGERY
Payer: COMMERCIAL

## 2023-09-01 VITALS — BODY MASS INDEX: 34.7 KG/M2

## 2023-09-01 DIAGNOSIS — F14.10: ICD-10-CM

## 2023-09-01 DIAGNOSIS — F10.230: Primary | ICD-10-CM

## 2023-09-01 PROCEDURE — HZ2ZZZZ DETOXIFICATION SERVICES FOR SUBSTANCE ABUSE TREATMENT: ICD-10-PCS | Performed by: SURGERY

## 2023-09-01 RX ADMIN — METHOCARBAMOL PRN MG: 500 TABLET ORAL at 22:48

## 2023-09-01 RX ADMIN — IBUPROFEN PRN MG: 600 TABLET, FILM COATED ORAL at 22:49

## 2023-09-01 RX ADMIN — IBUPROFEN PRN MG: 600 TABLET, FILM COATED ORAL at 17:01

## 2023-09-02 VITALS
RESPIRATION RATE: 20 BRPM | SYSTOLIC BLOOD PRESSURE: 114 MMHG | TEMPERATURE: 98.2 F | DIASTOLIC BLOOD PRESSURE: 64 MMHG | HEART RATE: 65 BPM

## 2023-09-02 RX ADMIN — IBUPROFEN PRN MG: 600 TABLET, FILM COATED ORAL at 06:02

## 2023-09-02 RX ADMIN — IBUPROFEN PRN MG: 600 TABLET, FILM COATED ORAL at 13:28

## 2023-09-02 RX ADMIN — METHOCARBAMOL PRN MG: 500 TABLET ORAL at 13:28

## 2023-09-28 NOTE — ED PROVIDER NOTE - OBJECTIVE STATEMENT
57 yr old male, history of bipolar disorder, alcohol use disorder, prior documented cocaine use disorder, HTN, pericarditis, undomiciled per last psych note, presents to the Emergency Department w chest pain. pt reports chest pain that started 30 mins pta. started while walking for "a long time" overnight. lasted a few mins and resolved on its own. no current pain.   no sob, palpitations, lightheadedness, diaphoresis, n/v, leg swelling, near-syncope / syncope. no fever, abd pain. no headache, vision changes, extremity weakness / numbness / tingling.   no recent infectious symptoms. no prolonged immobilization / recent travel, hx dvt/pe.     supposed to be on medications for htn but does not take them  on chart review similar presentation in 4/2023 w ecg ok / trop negative. at that time also complained of si - cleared by psych. today pt denies si / hi / hallucinations. Detail Level: Detailed Application Tool (Optional): Liquid Nitrogen Sprayer Render Note In Bullet Format When Appropriate: No Show Applicator Variable?: Yes Post-Care Instructions: I reviewed with the patient in detail post-care instructions. Patient is to wear sunprotection, and avoid picking at any of the treated lesions. Pt may apply Vaseline to crusted or scabbing areas. Duration Of Freeze Thaw-Cycle (Seconds): 0 Number Of Freeze-Thaw Cycles: 1 freeze-thaw cycle Consent: The patient's consent was obtained including but not limited to risks of crusting, scabbing, blistering, scarring, darker or lighter pigmentary change, recurrence, incomplete removal and infection. No

## 2023-10-03 ENCOUNTER — HOSPITAL ENCOUNTER (INPATIENT)
Dept: HOSPITAL 74 - YASAS | Age: 58
LOS: 4 days | Discharge: HOME | End: 2023-10-07
Attending: SURGERY | Admitting: ALLERGY & IMMUNOLOGY
Payer: COMMERCIAL

## 2023-10-03 VITALS — BODY MASS INDEX: 35.9 KG/M2

## 2023-10-03 DIAGNOSIS — F14.20: ICD-10-CM

## 2023-10-03 DIAGNOSIS — F41.9: ICD-10-CM

## 2023-10-03 DIAGNOSIS — F13.20: ICD-10-CM

## 2023-10-03 DIAGNOSIS — E78.5: ICD-10-CM

## 2023-10-03 DIAGNOSIS — F25.0: ICD-10-CM

## 2023-10-03 DIAGNOSIS — I10: ICD-10-CM

## 2023-10-03 DIAGNOSIS — F32.A: ICD-10-CM

## 2023-10-03 DIAGNOSIS — K21.9: ICD-10-CM

## 2023-10-03 DIAGNOSIS — F10.230: Primary | ICD-10-CM

## 2023-10-03 PROCEDURE — HZ2ZZZZ DETOXIFICATION SERVICES FOR SUBSTANCE ABUSE TREATMENT: ICD-10-PCS | Performed by: SURGERY

## 2023-10-04 RX ADMIN — Medication SCH MG: at 23:03

## 2023-10-04 RX ADMIN — Medication SCH TAB: at 09:23

## 2023-10-04 RX ADMIN — IBUPROFEN PRN MG: 600 TABLET, FILM COATED ORAL at 00:43

## 2023-10-05 RX ADMIN — ALUMINUM HYDROXIDE, MAGNESIUM HYDROXIDE, AND SIMETHICONE PRN ML: 200; 200; 20 SUSPENSION ORAL at 06:01

## 2023-10-05 RX ADMIN — Medication SCH: at 22:48

## 2023-10-05 RX ADMIN — ALUMINUM HYDROXIDE, MAGNESIUM HYDROXIDE, AND SIMETHICONE PRN ML: 200; 200; 20 SUSPENSION ORAL at 13:30

## 2023-10-05 RX ADMIN — Medication SCH TAB: at 09:13

## 2023-10-06 RX ADMIN — Medication SCH: at 10:37

## 2023-10-06 RX ADMIN — IBUPROFEN PRN MG: 600 TABLET, FILM COATED ORAL at 03:28

## 2023-10-06 RX ADMIN — Medication SCH: at 23:37

## 2023-10-06 RX ADMIN — ALUMINUM HYDROXIDE, MAGNESIUM HYDROXIDE, AND SIMETHICONE PRN ML: 200; 200; 20 SUSPENSION ORAL at 19:10

## 2023-10-07 VITALS
TEMPERATURE: 97.8 F | SYSTOLIC BLOOD PRESSURE: 136 MMHG | HEART RATE: 82 BPM | DIASTOLIC BLOOD PRESSURE: 86 MMHG | RESPIRATION RATE: 16 BRPM

## 2023-10-07 RX ADMIN — Medication SCH TAB: at 09:32

## 2023-11-13 ENCOUNTER — HOSPITAL ENCOUNTER (INPATIENT)
Dept: HOSPITAL 74 - YASAS | Age: 58
LOS: 3 days | Discharge: HOME | End: 2023-11-16
Attending: SURGERY | Admitting: ALLERGY & IMMUNOLOGY
Payer: COMMERCIAL

## 2023-11-13 VITALS — BODY MASS INDEX: 36.4 KG/M2

## 2023-11-13 DIAGNOSIS — K21.9: ICD-10-CM

## 2023-11-13 DIAGNOSIS — M54.50: ICD-10-CM

## 2023-11-13 DIAGNOSIS — F12.10: ICD-10-CM

## 2023-11-13 DIAGNOSIS — Z87.891: ICD-10-CM

## 2023-11-13 DIAGNOSIS — I10: ICD-10-CM

## 2023-11-13 DIAGNOSIS — F10.230: Primary | ICD-10-CM

## 2023-11-13 DIAGNOSIS — F41.9: ICD-10-CM

## 2023-11-13 DIAGNOSIS — F32.A: ICD-10-CM

## 2023-11-13 DIAGNOSIS — G89.29: ICD-10-CM

## 2023-11-13 DIAGNOSIS — F14.20: ICD-10-CM

## 2023-11-13 DIAGNOSIS — R79.89: ICD-10-CM

## 2023-11-13 PROCEDURE — HZ2ZZZZ DETOXIFICATION SERVICES FOR SUBSTANCE ABUSE TREATMENT: ICD-10-PCS | Performed by: SURGERY

## 2023-11-13 RX ADMIN — Medication SCH: at 22:41

## 2023-11-13 RX ADMIN — Medication SCH MG: at 22:26

## 2023-11-13 RX ADMIN — ALUMINUM HYDROXIDE, MAGNESIUM HYDROXIDE, AND SIMETHICONE PRN ML: 200; 200; 20 SUSPENSION ORAL at 22:27

## 2023-11-13 RX ADMIN — Medication SCH TAB: at 13:00

## 2023-11-14 LAB
ALBUMIN SERPL-MCNC: 3.6 G/DL (ref 3.4–5)
ALP SERPL-CCNC: 68 U/L (ref 45–117)
ALT SERPL-CCNC: 44 U/L (ref 13–61)
ANION GAP SERPL CALC-SCNC: 6 MMOL/L (ref 4–13)
AST SERPL-CCNC: 23 U/L (ref 15–37)
BILIRUB SERPL-MCNC: 0.4 MG/DL (ref 0.2–1)
BUN SERPL-MCNC: 16.8 MG/DL (ref 7–18)
CALCIUM SERPL-MCNC: 8.9 MG/DL (ref 8.5–10.1)
CHLORIDE SERPL-SCNC: 106 MMOL/L (ref 98–107)
CO2 SERPL-SCNC: 28 MMOL/L (ref 21–32)
CREAT SERPL-MCNC: 1.2 MG/DL (ref 0.55–1.3)
DEPRECATED RDW RBC AUTO: 14.7 % (ref 11.9–15.9)
GLUCOSE SERPL-MCNC: 108 MG/DL (ref 74–106)
HCT VFR BLD CALC: 36.3 % (ref 35.4–49)
HGB BLD-MCNC: 12.3 GM/DL (ref 11.7–16.9)
MCH RBC QN AUTO: 30.6 PG (ref 25.7–33.7)
MCHC RBC AUTO-ENTMCNC: 33.9 G/DL (ref 32–35.9)
MCV RBC: 90.3 FL (ref 80–96)
PLATELET # BLD AUTO: 238 10^3/UL (ref 134–434)
PMV BLD: 8.8 FL (ref 7.5–11.1)
POTASSIUM SERPLBLD-SCNC: 4.1 MMOL/L (ref 3.5–5.1)
PROT SERPL-MCNC: 7.1 G/DL (ref 6.4–8.2)
RBC # BLD AUTO: 4.02 M/MM3 (ref 4–5.6)
SODIUM SERPL-SCNC: 139 MMOL/L (ref 136–145)
WBC # BLD AUTO: 4.6 K/MM3 (ref 4–10)

## 2023-11-14 RX ADMIN — Medication SCH TAB: at 10:26

## 2023-11-14 RX ADMIN — Medication SCH MG: at 22:16

## 2023-11-14 RX ADMIN — HYDROCHLOROTHIAZIDE SCH: 12.5 CAPSULE ORAL at 10:26

## 2023-11-14 RX ADMIN — Medication SCH: at 22:16

## 2023-11-15 VITALS — RESPIRATION RATE: 18 BRPM

## 2023-11-15 RX ADMIN — Medication SCH: at 23:38

## 2023-11-15 RX ADMIN — ALUMINUM HYDROXIDE, MAGNESIUM HYDROXIDE, AND SIMETHICONE PRN ML: 200; 200; 20 SUSPENSION ORAL at 20:38

## 2023-11-15 RX ADMIN — ALUMINUM HYDROXIDE, MAGNESIUM HYDROXIDE, AND SIMETHICONE PRN ML: 200; 200; 20 SUSPENSION ORAL at 12:44

## 2023-11-15 RX ADMIN — HYDROCHLOROTHIAZIDE SCH: 12.5 CAPSULE ORAL at 10:57

## 2023-11-15 RX ADMIN — Medication SCH TAB: at 10:57

## 2023-11-16 VITALS — TEMPERATURE: 97.6 F | DIASTOLIC BLOOD PRESSURE: 55 MMHG | HEART RATE: 60 BPM | SYSTOLIC BLOOD PRESSURE: 98 MMHG

## 2023-11-16 RX ADMIN — Medication SCH: at 10:29

## 2023-11-16 RX ADMIN — HYDROCHLOROTHIAZIDE SCH: 12.5 CAPSULE ORAL at 10:29

## 2023-11-26 ENCOUNTER — EMERGENCY (EMERGENCY)
Facility: HOSPITAL | Age: 58
LOS: 1 days | Discharge: ANOTHER HOSPITAL | End: 2023-11-26
Attending: STUDENT IN AN ORGANIZED HEALTH CARE EDUCATION/TRAINING PROGRAM | Admitting: EMERGENCY MEDICINE
Payer: MEDICAID

## 2023-11-26 ENCOUNTER — EMERGENCY (EMERGENCY)
Facility: HOSPITAL | Age: 58
LOS: 1 days | Discharge: HOME | End: 2023-11-26
Attending: EMERGENCY MEDICINE | Admitting: EMERGENCY MEDICINE
Payer: MEDICAID

## 2023-11-26 VITALS
SYSTOLIC BLOOD PRESSURE: 147 MMHG | RESPIRATION RATE: 18 BRPM | HEIGHT: 74 IN | OXYGEN SATURATION: 97 % | TEMPERATURE: 98 F | DIASTOLIC BLOOD PRESSURE: 82 MMHG | HEART RATE: 63 BPM

## 2023-11-26 VITALS
DIASTOLIC BLOOD PRESSURE: 86 MMHG | SYSTOLIC BLOOD PRESSURE: 115 MMHG | RESPIRATION RATE: 18 BRPM | WEIGHT: 272.05 LBS | TEMPERATURE: 98 F | HEIGHT: 74 IN | OXYGEN SATURATION: 95 % | HEART RATE: 95 BPM

## 2023-11-26 VITALS
OXYGEN SATURATION: 95 % | TEMPERATURE: 98 F | SYSTOLIC BLOOD PRESSURE: 118 MMHG | HEART RATE: 82 BPM | RESPIRATION RATE: 16 BRPM | DIASTOLIC BLOOD PRESSURE: 87 MMHG

## 2023-11-26 DIAGNOSIS — F19.10 OTHER PSYCHOACTIVE SUBSTANCE ABUSE, UNCOMPLICATED: ICD-10-CM

## 2023-11-26 DIAGNOSIS — F32.A DEPRESSION, UNSPECIFIED: ICD-10-CM

## 2023-11-26 DIAGNOSIS — F41.9 ANXIETY DISORDER, UNSPECIFIED: ICD-10-CM

## 2023-11-26 LAB
ALBUMIN SERPL ELPH-MCNC: 3.5 G/DL — SIGNIFICANT CHANGE UP (ref 3.4–5)
ALBUMIN SERPL ELPH-MCNC: 3.5 G/DL — SIGNIFICANT CHANGE UP (ref 3.4–5)
ALBUMIN SERPL ELPH-MCNC: 3.8 G/DL — SIGNIFICANT CHANGE UP (ref 3.3–5)
ALBUMIN SERPL ELPH-MCNC: 3.8 G/DL — SIGNIFICANT CHANGE UP (ref 3.3–5)
ALP SERPL-CCNC: 61 U/L — SIGNIFICANT CHANGE UP (ref 40–120)
ALP SERPL-CCNC: 61 U/L — SIGNIFICANT CHANGE UP (ref 40–120)
ALP SERPL-CCNC: 69 U/L — SIGNIFICANT CHANGE UP (ref 40–120)
ALP SERPL-CCNC: 69 U/L — SIGNIFICANT CHANGE UP (ref 40–120)
ALT FLD-CCNC: 48 U/L — HIGH (ref 12–42)
ALT FLD-CCNC: 48 U/L — HIGH (ref 12–42)
ALT FLD-CCNC: SIGNIFICANT CHANGE UP U/L (ref 4–41)
ALT FLD-CCNC: SIGNIFICANT CHANGE UP U/L (ref 4–41)
ANION GAP SERPL CALC-SCNC: 10 MMOL/L — SIGNIFICANT CHANGE UP (ref 9–16)
ANION GAP SERPL CALC-SCNC: 10 MMOL/L — SIGNIFICANT CHANGE UP (ref 9–16)
ANION GAP SERPL CALC-SCNC: 9 MMOL/L — SIGNIFICANT CHANGE UP (ref 7–14)
ANION GAP SERPL CALC-SCNC: 9 MMOL/L — SIGNIFICANT CHANGE UP (ref 7–14)
APAP SERPL-MCNC: <10 UG/ML — LOW (ref 15–25)
APAP SERPL-MCNC: <10 UG/ML — LOW (ref 15–25)
APAP SERPL-MCNC: <2 UG/ML — LOW (ref 10–30)
APAP SERPL-MCNC: <2 UG/ML — LOW (ref 10–30)
APTT BLD: 30.2 SEC — SIGNIFICANT CHANGE UP (ref 24.5–35.6)
APTT BLD: 30.2 SEC — SIGNIFICANT CHANGE UP (ref 24.5–35.6)
AST SERPL-CCNC: 57 U/L — HIGH (ref 15–37)
AST SERPL-CCNC: 57 U/L — HIGH (ref 15–37)
AST SERPL-CCNC: SIGNIFICANT CHANGE UP U/L (ref 4–40)
AST SERPL-CCNC: SIGNIFICANT CHANGE UP U/L (ref 4–40)
BASOPHILS # BLD AUTO: 0.03 K/UL — SIGNIFICANT CHANGE UP (ref 0–0.2)
BASOPHILS NFR BLD AUTO: 0.5 % — SIGNIFICANT CHANGE UP (ref 0–2)
BASOPHILS NFR BLD AUTO: 0.5 % — SIGNIFICANT CHANGE UP (ref 0–2)
BASOPHILS NFR BLD AUTO: 0.7 % — SIGNIFICANT CHANGE UP (ref 0–2)
BASOPHILS NFR BLD AUTO: 0.7 % — SIGNIFICANT CHANGE UP (ref 0–2)
BILIRUB SERPL-MCNC: 0.6 MG/DL — SIGNIFICANT CHANGE UP (ref 0.2–1.2)
BILIRUB SERPL-MCNC: 0.6 MG/DL — SIGNIFICANT CHANGE UP (ref 0.2–1.2)
BILIRUB SERPL-MCNC: 0.7 MG/DL — SIGNIFICANT CHANGE UP (ref 0.2–1.2)
BILIRUB SERPL-MCNC: 0.7 MG/DL — SIGNIFICANT CHANGE UP (ref 0.2–1.2)
BUN SERPL-MCNC: 24 MG/DL — HIGH (ref 7–23)
CALCIUM SERPL-MCNC: 8.8 MG/DL — SIGNIFICANT CHANGE UP (ref 8.4–10.5)
CALCIUM SERPL-MCNC: 8.8 MG/DL — SIGNIFICANT CHANGE UP (ref 8.4–10.5)
CALCIUM SERPL-MCNC: 8.8 MG/DL — SIGNIFICANT CHANGE UP (ref 8.5–10.5)
CALCIUM SERPL-MCNC: 8.8 MG/DL — SIGNIFICANT CHANGE UP (ref 8.5–10.5)
CHLORIDE SERPL-SCNC: 104 MMOL/L — SIGNIFICANT CHANGE UP (ref 96–108)
CHLORIDE SERPL-SCNC: 104 MMOL/L — SIGNIFICANT CHANGE UP (ref 96–108)
CHLORIDE SERPL-SCNC: 107 MMOL/L — SIGNIFICANT CHANGE UP (ref 98–107)
CHLORIDE SERPL-SCNC: 107 MMOL/L — SIGNIFICANT CHANGE UP (ref 98–107)
CO2 SERPL-SCNC: 22 MMOL/L — SIGNIFICANT CHANGE UP (ref 22–31)
CO2 SERPL-SCNC: 22 MMOL/L — SIGNIFICANT CHANGE UP (ref 22–31)
CO2 SERPL-SCNC: 27 MMOL/L — SIGNIFICANT CHANGE UP (ref 22–31)
CO2 SERPL-SCNC: 27 MMOL/L — SIGNIFICANT CHANGE UP (ref 22–31)
CREAT SERPL-MCNC: 1.22 MG/DL — SIGNIFICANT CHANGE UP (ref 0.5–1.3)
CREAT SERPL-MCNC: 1.22 MG/DL — SIGNIFICANT CHANGE UP (ref 0.5–1.3)
CREAT SERPL-MCNC: 1.33 MG/DL — HIGH (ref 0.5–1.3)
CREAT SERPL-MCNC: 1.33 MG/DL — HIGH (ref 0.5–1.3)
EGFR: 62 ML/MIN/1.73M2 — SIGNIFICANT CHANGE UP
EGFR: 62 ML/MIN/1.73M2 — SIGNIFICANT CHANGE UP
EGFR: 69 ML/MIN/1.73M2 — SIGNIFICANT CHANGE UP
EGFR: 69 ML/MIN/1.73M2 — SIGNIFICANT CHANGE UP
EOSINOPHIL # BLD AUTO: 0.07 K/UL — SIGNIFICANT CHANGE UP (ref 0–0.5)
EOSINOPHIL # BLD AUTO: 0.07 K/UL — SIGNIFICANT CHANGE UP (ref 0–0.5)
EOSINOPHIL # BLD AUTO: 0.13 K/UL — SIGNIFICANT CHANGE UP (ref 0–0.5)
EOSINOPHIL # BLD AUTO: 0.13 K/UL — SIGNIFICANT CHANGE UP (ref 0–0.5)
EOSINOPHIL NFR BLD AUTO: 1.6 % — SIGNIFICANT CHANGE UP (ref 0–6)
EOSINOPHIL NFR BLD AUTO: 1.6 % — SIGNIFICANT CHANGE UP (ref 0–6)
EOSINOPHIL NFR BLD AUTO: 2 % — SIGNIFICANT CHANGE UP (ref 0–6)
EOSINOPHIL NFR BLD AUTO: 2 % — SIGNIFICANT CHANGE UP (ref 0–6)
ETHANOL SERPL-MCNC: <10 MG/DL — SIGNIFICANT CHANGE UP
ETHANOL SERPL-MCNC: <10 MG/DL — SIGNIFICANT CHANGE UP
ETHANOL SERPL-MCNC: <3 MG/DL — SIGNIFICANT CHANGE UP
ETHANOL SERPL-MCNC: <3 MG/DL — SIGNIFICANT CHANGE UP
FLUAV AG NPH QL: SIGNIFICANT CHANGE UP
FLUBV AG NPH QL: SIGNIFICANT CHANGE UP
GLUCOSE SERPL-MCNC: 105 MG/DL — HIGH (ref 70–99)
GLUCOSE SERPL-MCNC: 105 MG/DL — HIGH (ref 70–99)
GLUCOSE SERPL-MCNC: 93 MG/DL — SIGNIFICANT CHANGE UP (ref 70–99)
GLUCOSE SERPL-MCNC: 93 MG/DL — SIGNIFICANT CHANGE UP (ref 70–99)
HCT VFR BLD CALC: 34 % — LOW (ref 39–50)
HCT VFR BLD CALC: 34 % — LOW (ref 39–50)
HCT VFR BLD CALC: 35.5 % — LOW (ref 39–50)
HCT VFR BLD CALC: 35.5 % — LOW (ref 39–50)
HGB BLD-MCNC: 11.5 G/DL — LOW (ref 13–17)
HGB BLD-MCNC: 11.5 G/DL — LOW (ref 13–17)
HGB BLD-MCNC: 11.8 G/DL — LOW (ref 13–17)
HGB BLD-MCNC: 11.8 G/DL — LOW (ref 13–17)
HIV 1 & 2 AB SERPL IA.RAPID: SIGNIFICANT CHANGE UP
HIV 1 & 2 AB SERPL IA.RAPID: SIGNIFICANT CHANGE UP
IANC: 2.22 K/UL — SIGNIFICANT CHANGE UP (ref 1.8–7.4)
IANC: 2.22 K/UL — SIGNIFICANT CHANGE UP (ref 1.8–7.4)
IMM GRANULOCYTES NFR BLD AUTO: 0.2 % — SIGNIFICANT CHANGE UP (ref 0–0.9)
INR BLD: 0.99 RATIO — SIGNIFICANT CHANGE UP (ref 0.85–1.18)
INR BLD: 0.99 RATIO — SIGNIFICANT CHANGE UP (ref 0.85–1.18)
LYMPHOCYTES # BLD AUTO: 1.53 K/UL — SIGNIFICANT CHANGE UP (ref 1–3.3)
LYMPHOCYTES # BLD AUTO: 1.53 K/UL — SIGNIFICANT CHANGE UP (ref 1–3.3)
LYMPHOCYTES # BLD AUTO: 2.57 K/UL — SIGNIFICANT CHANGE UP (ref 1–3.3)
LYMPHOCYTES # BLD AUTO: 2.57 K/UL — SIGNIFICANT CHANGE UP (ref 1–3.3)
LYMPHOCYTES # BLD AUTO: 35.7 % — SIGNIFICANT CHANGE UP (ref 13–44)
LYMPHOCYTES # BLD AUTO: 35.7 % — SIGNIFICANT CHANGE UP (ref 13–44)
LYMPHOCYTES # BLD AUTO: 39.7 % — SIGNIFICANT CHANGE UP (ref 13–44)
LYMPHOCYTES # BLD AUTO: 39.7 % — SIGNIFICANT CHANGE UP (ref 13–44)
MCHC RBC-ENTMCNC: 30.5 PG — SIGNIFICANT CHANGE UP (ref 27–34)
MCHC RBC-ENTMCNC: 30.5 PG — SIGNIFICANT CHANGE UP (ref 27–34)
MCHC RBC-ENTMCNC: 30.6 PG — SIGNIFICANT CHANGE UP (ref 27–34)
MCHC RBC-ENTMCNC: 30.6 PG — SIGNIFICANT CHANGE UP (ref 27–34)
MCHC RBC-ENTMCNC: 33.2 GM/DL — SIGNIFICANT CHANGE UP (ref 32–36)
MCHC RBC-ENTMCNC: 33.2 GM/DL — SIGNIFICANT CHANGE UP (ref 32–36)
MCHC RBC-ENTMCNC: 33.8 GM/DL — SIGNIFICANT CHANGE UP (ref 32–36)
MCHC RBC-ENTMCNC: 33.8 GM/DL — SIGNIFICANT CHANGE UP (ref 32–36)
MCV RBC AUTO: 90.4 FL — SIGNIFICANT CHANGE UP (ref 80–100)
MCV RBC AUTO: 90.4 FL — SIGNIFICANT CHANGE UP (ref 80–100)
MCV RBC AUTO: 91.7 FL — SIGNIFICANT CHANGE UP (ref 80–100)
MCV RBC AUTO: 91.7 FL — SIGNIFICANT CHANGE UP (ref 80–100)
MONOCYTES # BLD AUTO: 0.43 K/UL — SIGNIFICANT CHANGE UP (ref 0–0.9)
MONOCYTES # BLD AUTO: 0.43 K/UL — SIGNIFICANT CHANGE UP (ref 0–0.9)
MONOCYTES # BLD AUTO: 0.6 K/UL — SIGNIFICANT CHANGE UP (ref 0–0.9)
MONOCYTES # BLD AUTO: 0.6 K/UL — SIGNIFICANT CHANGE UP (ref 0–0.9)
MONOCYTES NFR BLD AUTO: 10 % — SIGNIFICANT CHANGE UP (ref 2–14)
MONOCYTES NFR BLD AUTO: 10 % — SIGNIFICANT CHANGE UP (ref 2–14)
MONOCYTES NFR BLD AUTO: 9.3 % — SIGNIFICANT CHANGE UP (ref 2–14)
MONOCYTES NFR BLD AUTO: 9.3 % — SIGNIFICANT CHANGE UP (ref 2–14)
NEUTROPHILS # BLD AUTO: 2.22 K/UL — SIGNIFICANT CHANGE UP (ref 1.8–7.4)
NEUTROPHILS # BLD AUTO: 2.22 K/UL — SIGNIFICANT CHANGE UP (ref 1.8–7.4)
NEUTROPHILS # BLD AUTO: 3.13 K/UL — SIGNIFICANT CHANGE UP (ref 1.8–7.4)
NEUTROPHILS # BLD AUTO: 3.13 K/UL — SIGNIFICANT CHANGE UP (ref 1.8–7.4)
NEUTROPHILS NFR BLD AUTO: 48.3 % — SIGNIFICANT CHANGE UP (ref 43–77)
NEUTROPHILS NFR BLD AUTO: 48.3 % — SIGNIFICANT CHANGE UP (ref 43–77)
NEUTROPHILS NFR BLD AUTO: 51.8 % — SIGNIFICANT CHANGE UP (ref 43–77)
NEUTROPHILS NFR BLD AUTO: 51.8 % — SIGNIFICANT CHANGE UP (ref 43–77)
NRBC # BLD: 0 /100 WBCS — SIGNIFICANT CHANGE UP (ref 0–0)
NRBC # FLD: 0 K/UL — SIGNIFICANT CHANGE UP (ref 0–0)
NRBC # FLD: 0 K/UL — SIGNIFICANT CHANGE UP (ref 0–0)
NT-PROBNP SERPL-SCNC: 42 PG/ML — SIGNIFICANT CHANGE UP
NT-PROBNP SERPL-SCNC: 42 PG/ML — SIGNIFICANT CHANGE UP
NT-PROBNP SERPL-SCNC: 53 PG/ML — SIGNIFICANT CHANGE UP
NT-PROBNP SERPL-SCNC: 53 PG/ML — SIGNIFICANT CHANGE UP
PLATELET # BLD AUTO: 272 K/UL — SIGNIFICANT CHANGE UP (ref 150–400)
PLATELET # BLD AUTO: 272 K/UL — SIGNIFICANT CHANGE UP (ref 150–400)
PLATELET # BLD AUTO: 280 K/UL — SIGNIFICANT CHANGE UP (ref 150–400)
PLATELET # BLD AUTO: 280 K/UL — SIGNIFICANT CHANGE UP (ref 150–400)
POTASSIUM SERPL-MCNC: 4.1 MMOL/L — SIGNIFICANT CHANGE UP (ref 3.5–5.3)
POTASSIUM SERPL-MCNC: 4.1 MMOL/L — SIGNIFICANT CHANGE UP (ref 3.5–5.3)
POTASSIUM SERPL-MCNC: SIGNIFICANT CHANGE UP MMOL/L (ref 3.5–5.3)
POTASSIUM SERPL-MCNC: SIGNIFICANT CHANGE UP MMOL/L (ref 3.5–5.3)
POTASSIUM SERPL-SCNC: 4.1 MMOL/L — SIGNIFICANT CHANGE UP (ref 3.5–5.3)
POTASSIUM SERPL-SCNC: 4.1 MMOL/L — SIGNIFICANT CHANGE UP (ref 3.5–5.3)
POTASSIUM SERPL-SCNC: SIGNIFICANT CHANGE UP MMOL/L (ref 3.5–5.3)
POTASSIUM SERPL-SCNC: SIGNIFICANT CHANGE UP MMOL/L (ref 3.5–5.3)
PROT SERPL-MCNC: 7.6 G/DL — SIGNIFICANT CHANGE UP (ref 6.4–8.2)
PROT SERPL-MCNC: 7.6 G/DL — SIGNIFICANT CHANGE UP (ref 6.4–8.2)
PROT SERPL-MCNC: SIGNIFICANT CHANGE UP G/DL (ref 6–8.3)
PROT SERPL-MCNC: SIGNIFICANT CHANGE UP G/DL (ref 6–8.3)
PROTHROM AB SERPL-ACNC: 11.2 SEC — SIGNIFICANT CHANGE UP (ref 9.5–13)
PROTHROM AB SERPL-ACNC: 11.2 SEC — SIGNIFICANT CHANGE UP (ref 9.5–13)
RBC # BLD: 3.76 M/UL — LOW (ref 4.2–5.8)
RBC # BLD: 3.76 M/UL — LOW (ref 4.2–5.8)
RBC # BLD: 3.87 M/UL — LOW (ref 4.2–5.8)
RBC # BLD: 3.87 M/UL — LOW (ref 4.2–5.8)
RBC # FLD: 14.6 % — HIGH (ref 10.3–14.5)
RBC # FLD: 14.6 % — HIGH (ref 10.3–14.5)
RBC # FLD: 14.8 % — HIGH (ref 10.3–14.5)
RBC # FLD: 14.8 % — HIGH (ref 10.3–14.5)
RSV RNA NPH QL NAA+NON-PROBE: SIGNIFICANT CHANGE UP
SALICYLATES SERPL-MCNC: 0.4 MG/DL — LOW (ref 2.8–20)
SALICYLATES SERPL-MCNC: 0.4 MG/DL — LOW (ref 2.8–20)
SALICYLATES SERPL-MCNC: <0.3 MG/DL — LOW (ref 15–30)
SALICYLATES SERPL-MCNC: <0.3 MG/DL — LOW (ref 15–30)
SARS-COV-2 RNA SPEC QL NAA+PROBE: SIGNIFICANT CHANGE UP
SODIUM SERPL-SCNC: 138 MMOL/L — SIGNIFICANT CHANGE UP (ref 135–145)
SODIUM SERPL-SCNC: 138 MMOL/L — SIGNIFICANT CHANGE UP (ref 135–145)
SODIUM SERPL-SCNC: 141 MMOL/L — SIGNIFICANT CHANGE UP (ref 132–145)
SODIUM SERPL-SCNC: 141 MMOL/L — SIGNIFICANT CHANGE UP (ref 132–145)
TOXICOLOGY SCREEN, DRUGS OF ABUSE, SERUM RESULT: SIGNIFICANT CHANGE UP
TOXICOLOGY SCREEN, DRUGS OF ABUSE, SERUM RESULT: SIGNIFICANT CHANGE UP
TROPONIN I, HIGH SENSITIVITY RESULT: 10.3 NG/L — SIGNIFICANT CHANGE UP
TROPONIN I, HIGH SENSITIVITY RESULT: 10.3 NG/L — SIGNIFICANT CHANGE UP
TROPONIN I, HIGH SENSITIVITY RESULT: 9.9 NG/L — SIGNIFICANT CHANGE UP
TROPONIN I, HIGH SENSITIVITY RESULT: 9.9 NG/L — SIGNIFICANT CHANGE UP
TROPONIN T, HIGH SENSITIVITY RESULT: 7 NG/L — SIGNIFICANT CHANGE UP
TROPONIN T, HIGH SENSITIVITY RESULT: 7 NG/L — SIGNIFICANT CHANGE UP
TSH SERPL-MCNC: 3.9 UIU/ML — HIGH (ref 0.36–3.74)
TSH SERPL-MCNC: 3.9 UIU/ML — HIGH (ref 0.36–3.74)
WBC # BLD: 4.29 K/UL — SIGNIFICANT CHANGE UP (ref 3.8–10.5)
WBC # BLD: 4.29 K/UL — SIGNIFICANT CHANGE UP (ref 3.8–10.5)
WBC # BLD: 6.47 K/UL — SIGNIFICANT CHANGE UP (ref 3.8–10.5)
WBC # BLD: 6.47 K/UL — SIGNIFICANT CHANGE UP (ref 3.8–10.5)
WBC # FLD AUTO: 4.29 K/UL — SIGNIFICANT CHANGE UP (ref 3.8–10.5)
WBC # FLD AUTO: 4.29 K/UL — SIGNIFICANT CHANGE UP (ref 3.8–10.5)
WBC # FLD AUTO: 6.47 K/UL — SIGNIFICANT CHANGE UP (ref 3.8–10.5)
WBC # FLD AUTO: 6.47 K/UL — SIGNIFICANT CHANGE UP (ref 3.8–10.5)

## 2023-11-26 PROCEDURE — 99285 EMERGENCY DEPT VISIT HI MDM: CPT

## 2023-11-26 PROCEDURE — 71046 X-RAY EXAM CHEST 2 VIEWS: CPT | Mod: 26

## 2023-11-26 PROCEDURE — 93010 ELECTROCARDIOGRAM REPORT: CPT

## 2023-11-26 RX ORDER — OLANZAPINE 15 MG/1
5 TABLET, FILM COATED ORAL AT BEDTIME
Refills: 0 | Status: DISCONTINUED | OUTPATIENT
Start: 2023-11-26 | End: 2023-11-29

## 2023-11-26 RX ORDER — KETOROLAC TROMETHAMINE 30 MG/ML
15 SYRINGE (ML) INJECTION ONCE
Refills: 0 | Status: DISCONTINUED | OUTPATIENT
Start: 2023-11-26 | End: 2023-11-26

## 2023-11-26 RX ORDER — SODIUM CHLORIDE 9 MG/ML
1000 INJECTION INTRAMUSCULAR; INTRAVENOUS; SUBCUTANEOUS ONCE
Refills: 0 | Status: COMPLETED | OUTPATIENT
Start: 2023-11-26 | End: 2023-11-26

## 2023-11-26 RX ORDER — ACETAMINOPHEN 500 MG
975 TABLET ORAL ONCE
Refills: 0 | Status: COMPLETED | OUTPATIENT
Start: 2023-11-26 | End: 2023-11-26

## 2023-11-26 RX ORDER — IBUPROFEN 200 MG
600 TABLET ORAL ONCE
Refills: 0 | Status: COMPLETED | OUTPATIENT
Start: 2023-11-26 | End: 2023-11-26

## 2023-11-26 RX ADMIN — Medication 600 MILLIGRAM(S): at 20:20

## 2023-11-26 RX ADMIN — Medication 15 MILLIGRAM(S): at 02:00

## 2023-11-26 RX ADMIN — Medication 975 MILLIGRAM(S): at 02:00

## 2023-11-26 RX ADMIN — SODIUM CHLORIDE 1000 MILLILITER(S): 9 INJECTION INTRAMUSCULAR; INTRAVENOUS; SUBCUTANEOUS at 02:01

## 2023-11-26 NOTE — ED PROVIDER NOTE - PROGRESS NOTE DETAILS
KATRIN Ramos - Medical evaluation performed. There is no clinical evidence of any acute medical problem requiring immediate intervention. Tolerating medication well . Awaiting inpatient psychiatric bed. The patient is a 58y Male who has a past medical and surgery history of HTN Bipolar Disorder (non compliant with meds) substance abuse (alcohol/cocaine last use 2 days ago) pericarditis boarding for a bed in psychiatry for SI after initially PTED with left-sided chest pain as described. SI has been present for the past year stating a plan of he will jumping in front of a train Patient recently evaluated at [facility] earlier this morning for similar chest pain.  Workup was unremarkable and patient was discharged EKGS here have been normal .     Vital Signs Last 24 Hrs  T(F): 98.7 HR: 55 BP: 121/79 BP(mean): 77  RR: 17 SpO2: 98% (27 Nov 2023 07:47)   PE: as described;     DATA:  EKG: pending at time of evaluation Diagnosis Line Sinus tachycardia 7/1/23    LAB:                         11.5   4.29  )-----------( 272      ( 26 Nov 2023 10:03 )             34.0     PT: 11.2 sec;   INR: 0.99 ratio   PTT:30.2 [id number]    138  |  107  |  24<H>  ----------------------------<  105<H>  TNP   |  22  |  1.22    Ca    8.8      26 Nov 2023 10:03    TPro  TNP  /  Alb  3.8  /  TBili  0.6  /  DBili  x   /  AST  TNP  /  ALT  TNP  /  AlkPhos  61  11-26     Troponin T, High Sensitivity Result: 7 ng/L (11-26-23 @ 10:03)  Urinalysis Basic - ( 26 Nov 2023 10:03 )  Color: x / Appearance: x / SG: x / pH: x  Gluc: 105 mg/dL / Ketone: x  / Bili: x / Urobili: x   Blood: x / Protein: x / Nitrite: x   Leuk Esterase: x / RBC: x / WBC x   Sq Epi: x / Non Sq Epi: x / Bacteria: x       IMPRESSION/RISK:  Dx=  Bipolar non compliant with meds with plan seen by psychiatry feels needs admission  Consideration include: [facility] chart reviewed no indication that chest pain = ACS or pericarditis on that visit or this one can treat symptomatically with tylenol/dermal lido patch etc; if relation to meals consider GERD/pepcid  Plan  as above  continue to monitor   await psych reccs  signed to JESUS Qureshi JESUS Whitney: As above progress note, received sign out. Resting comfortable, no need for emergent/ acute medical intervention at this time. Pending admission or transfer to inpatient psych facility. Will continue to monitor. JESUS Whitney: patient accepted for transfer to [place] Dr. ALEJANDRO Purvis

## 2023-11-26 NOTE — ED PROVIDER NOTE - CARE PLAN
Principal Discharge DX:	Chest pain  Secondary Diagnosis:	Suicidal ideation   1 Principal Discharge DX:	Suicidal ideation  Secondary Diagnosis:	Bipolar disorder  Secondary Diagnosis:	Atypical chest pain

## 2023-11-26 NOTE — ED BEHAVIORAL HEALTH ASSESSMENT NOTE - PSYCHIATRIC ISSUES AND PLAN (INCLUDE STANDING AND PRN MEDICATION)
start zyprexa 5mg HS. titrate. PRNs: seroquel 50mg PO q6Hrs for agitation; PRNs: zyprexa 5mg IM q6Hrs for severe agitation. defer if qtC > 500m/s

## 2023-11-26 NOTE — ED BEHAVIORAL HEALTH ASSESSMENT NOTE - ADDITIONAL DETAILS ALL
one self reported aborted SA: via jumping in front of a 7 train (last summer 2023). no other reported hx of SA

## 2023-11-26 NOTE — ED BEHAVIORAL HEALTH ASSESSMENT NOTE - LEGAL HISTORY
none reported. no pending legal issues as per [facility]/ [employer] site

## 2023-11-26 NOTE — ED BEHAVIORAL HEALTH ASSESSMENT NOTE - RISK ASSESSMENT
Assessed at moderate acute suicide risk + elevated chronic suicide risk.  Currently with passive SI + plan but no intentions; past reported hx of self aborted SA; currently no active SI/intent/plan, currently no HI/ intent/ plan.     has chronic hx of somatic pain + recurrent alcohol use, notably treatment-seeking, forward goal oriented  thinking, with pattern of seeking help when in distress. additional protective factors include sense of commitment to 15 yr old daughter, no reported access to guns; currently not floridly psychotic or manic    Static risk factors include age, male gender, hx of psychiatric illness, with multiple substance use    Modifiable risk factors include recent substance use, lack of engagement in psychiatric/substance use treatment, unstable housing, social support

## 2023-11-26 NOTE — ED PROVIDER NOTE - PHYSICAL EXAMINATION
Patient A&Ox3, well-appearing, and in no acute distress. Head NCAT. EOM intact and PERRL. Oropharynx with MMM. Heart rate regular, with no murmurs/gallops/clicks/rubs. Breath sounds clear to auscultation bilaterally. Abdomen NTND, soft, with normal bowel sounds. Skin warm and dry.

## 2023-11-26 NOTE — ED PROVIDER NOTE - NSFOLLOWUPINSTRUCTIONS_ED_ALL_ED_FT
Chest Pain    Chest pain can be caused by many different conditions which may or may not be dangerous. Causes include heartburn, lung infections, heart attack, blood clot in lungs, skin infections, strain or damage to muscle, cartilage, or bones, etc. Lab tests or other studies including an electrocardiogram (EKG) may have been performed to find the cause of your pain. Make sure to follow up with a cardiologist or as instructed by your health care professional.    SEEK IMMEDIATE MEDICAL CARE IF YOU HAVE THE FOLLOWING SYMPTOMS: worsening chest pain, coughing up blood, unexplained back/neck/jaw pain, severe abdominal pain, dizziness or lightheadedness, shortness of breath, sweaty or clammy skin, vomiting, or racing heart beat. These symptoms may represent a serious problem that is an emergency. Do not wait to see if the symptoms will go away. Get medical help right away. Call your local emergency services (911 in the [place] Do not drive yourself to the hospital.    24-Hour Drop-In Centers For Adults:  [facility] (18+) - [address] (Subway: [address] to [address])    [facility] (25+) - [address] [facility] [address] (Subway: #6 to [facility])    [facility] (18+) - [address] [facility] (Subway: A to [facility])    Drop-In Centers for Adults:  [facility] (18+) - [address] (Near [facility] - Subway: 1/2/3/A/C/E to [address] /[place])    Please follow up with your Primary Care Physician. If you do not have a doctor, you can call our referral line ([phone]) to find a doctor that matches your insurance.     You can also follow up with clinics listed below if you do not have a doctor:  [facility]  [address] [place] [address]  Appointment Center:  [phone]    [address] [address]  Appointment Center: [phone] ([phone])

## 2023-11-26 NOTE — ED ADULT NURSE REASSESSMENT NOTE - NS ED NURSE REASSESS COMMENT FT1
Received patient from Main ED. Patient alert and oriented times three. Patient calm and cooperative. Lunch offered and accepted. Patient waiting for bed assignment.   EVERT Crowell

## 2023-11-26 NOTE — ED BEHAVIORAL HEALTH ASSESSMENT NOTE - DIFFERENTIAL
Unspecified mood disorder, suspicion for substance-induced mood disorder  MDD vs bipolar depression  axis II pathology  malingering

## 2023-11-26 NOTE — ED BEHAVIORAL HEALTH NOTE - BEHAVIORAL HEALTH NOTE
[facility]  Reference #: [id number]    Practitioner Count: 1  Pharmacy Count: 1  Current Opioid Prescriptions: 0  Current Benzodiazepine Prescriptions: 0  Current Stimulant Prescriptions: 0      Patient Demographic Information (PDI)       PDI	First Name	Last Name	Birth Date	Gender	Street Address	[facility]	Zip Code  LINDSEY Dickerson	1965	Male	[address]	[facility]	[address]    Prescription Information      PDI Filter:    PDI	Current Rx	Drug Type	Rx Written	Rx Dispensed	Drug	Quantity	Days Supply	Prescriber Name	Prescriber JUNIOR #	Payment Method	Dispenser  A	N	B	10/18/2023	10/18/2023	lorazepam 2 mg tablet	12	3	Robyn Gaines MD	[id number]	Medicaid	[id number]  A	N	B	04/24/2023	04/24/2023	lorazepam 2 mg tablet	12	2	Ayo Jacome)	[id number]	Medicaid	[id number]      per [facility] [address]/ [employer] site: NO PENDING LEGAL CASES

## 2023-11-26 NOTE — ED ADULT TRIAGE NOTE - CHIEF COMPLAINT QUOTE
Pt c/o suicidal thoughts "for a while" with a plan. Pt not willing to share plan in triage. Pt also c/o left sided chest pain radiating to head. PHx HTN, Bipolar not compliant with any meds

## 2023-11-26 NOTE — ED BEHAVIORAL HEALTH ASSESSMENT NOTE - SUMMARY
58/M with reported hx of bipolar disorder/ depression/ anxiety, chronically nonadherent to meds/ psych appts;  hx of multiple psych admissions + psych ED/ CPEP attendances; hx of polysubstance use.  this morning, self presented to the ED complaining of left sided chest pain (with radiation towards the head) + SOB.. psych was consulted for evaluation of SI (with plan to jump in front of the E train)    at this time, endorses feeling sad + anxious in the context of multiple psychosocial stressors. has also been harboring intermittent passive SI + plan (to jump in front of a train).  unable to partake towards safety planning. it seems that current presentation is same as with past ED presentation - SI is conditional;  said depressive episode do not meet severe MDD criteria. neither does endorsed anxiety satisfying any specific anxiety disorder criteria.  differentials to entertain for this case include: Unspecified mood disorder, suspicion for substance-induced mood disorder; axis II pathology and malingering.      currently, does not appear to be acutely manic nor psychotic. not actively suicidal or homicidal. not delirious. no manifestation of any impending withdrawal.  he is help seeking and wishes to pursue 9.13 admission.  ED service will help facilitate such request. however, if he rescinds and is able to safety plan, will consider discharging him.      recommendations:  1. start zyprexa 5mg HS. titrate.   2. PRNs: seroquel 50mg PO q6Hrs for agitation; PRNs: zyprexa 5mg IM q6Hrs for severe agitation. defer if qtC > 500m/s  3. other medical issues to be recommended by ED treatment team. start folic acid PO, thiamine PO + MVI 1 tab daily  4. symptom triggered CIWA  5. temporarily boarding here at the  ED as no beds available  - discussed with  ED staff 58/M with reported hx of bipolar disorder/ depression/ anxiety, chronically nonadherent to meds/ psych appts;  hx of multiple psych admissions + psych ED/ CPEP attendances; hx of polysubstance use.  this morning, self presented to the ED complaining of left sided chest pain (with radiation towards the head) + SOB.. psych was consulted for evaluation of SI (with plan to jump in front of the E train)    at this time, endorses feeling sad + anxious in the context of multiple psychosocial stressors. has also been harboring intermittent passive SI + plan (to jump in front of a train).  unable to partake towards safety planning. it seems that current presentation is same as with past ED presentation - SI is conditional;  said depressive episode do not meet severe MDD criteria. neither does endorsed anxiety satisfying any specific anxiety disorder criteria.  differentials to entertain for this case include: Unspecified mood disorder, suspicion for substance-induced mood disorder; axis II pathology and malingering.      currently, does not appear to be acutely manic nor psychotic. not actively suicidal or homicidal. not delirious. no manifestation of any impending withdrawal.  he is help seeking and wishes to pursue 9.13 admission.  ED service will help facilitate such request. however, if he rescinds and is able to safety plan, will consider discharging him.      recommendations:  1. start zyprexa 5mg HS. titrate.   2. PRNs: seroquel 50mg PO q6Hrs for agitation; PRNs: zyprexa 5mg IM q6Hrs for severe agitation. defer if qtC > 500m/s  3. other medical issues to be recommended by ED treatment team. start folic acid PO, thiamine PO + MVI 1 tab daily  4. symptom triggered CIWA

## 2023-11-26 NOTE — ED BEHAVIORAL HEALTH ASSESSMENT NOTE - NSBHSAALC_PSY_A_CORE FT
past hx of reporting daily alcohol use ~3 pints of vodka for "a long time". adamantly denied any DTs; does have withdrawal; does have black outs

## 2023-11-26 NOTE — ED PROVIDER NOTE - ATTENDING APP SHARED VISIT CONTRIBUTION OF CARE
58-year-old male, past medical history of hypertension, bipolar disorder, and substance use disorder, presents to ED complaining of left-sided chest pain x 1 day.  Patient also complaining of suicidal ideation.  Patient states he has had these thoughts for about a year now.  Patient states feels like he will jump in front of the E train.  Patient noncompliant with medication.  Patient admits to using alcohol/cocaine approximately 2 days ago.  Patient recently evaluated at [facility] earlier this morning for similar chest pain.  Workup was unremarkable and patient was discharged.  Patient states since then pain is worsened.  Patient notes has had cardiac cath in the past.  However patient is slightly poor historian.  Denies any recent cardiac workup.  Denies fever/chills, abdominal pain, nausea/vomiting/diarrhea, urinary symptoms, lightheadedness/dizziness, weakness/numbness or any other symptoms at this time.    Vital signs stable.  EKG is normal sinus without ischemic changes.  Physical exam significant for disheveled male in no acute distress.  Head is normocephalic and atraumatic.  Extraocular movements intact.  Pupils equal round reactive to light. No conjunctival pallor.  Heart is regular rate and rhythm without murmur.  Lungs clear to auscultation bilaterally.  Abdomen is soft and nontender/nondistended.  Pulses are 2+ throughout.  No lower extremity edema.  Skin is warm and well-perfused without rashes.  Neuro exam is nonfocal.    Patient presenting with left-sided chest pain and suicidal ideation.  History concerning for ACS.  Plan to perform cardiac workup including basic labs, troponin, and chest x-ray.  Patient likely will also require psychiatric evaluation.  Will send will do psych clearance and consult psychiatry for further evaluation.  Dispo pending results and psychiatric recommendations.

## 2023-11-26 NOTE — ED ADULT TRIAGE NOTE - SOURCE OF INFORMATION
From: Naty Ross  To: Ana Bustillo  Sent: 8/3/2021 10:00 AM CDT  Subject: Non-Urgent Medical Question    I am having issues with my left hip, it is aching whether I am standing, sitting or laying down and it is tender to the touch. I am unable to lay on my left side. I have not fallen or had any recent injury to the area. I called [facility] in [place] and told them I was having issues and the soonest they can get me in is August 30th. Is this something I should wait that long on? Do I have an alternative?     Thank you   Patient

## 2023-11-26 NOTE — ED ADULT NURSE NOTE - OBJECTIVE STATEMENT
pt A&ox4, coming to ED for suicidal thoughts and left sided chest pain. pt states he's had suicidal thought and chest pain for over 1 year. pt states he is prescribed Depakote but doesn't take it. pt has plan to "jump in front of U train". pt denies ETOH use or illicit drug use today but states he used cocaine and ETOH Thursday. NSR on telemetry. breathing is spontaneous and unlabored. sating 99% on RA.  bilateral pedal and radial pulses palpable and strong. right ac 20g IV placed Labs drawn and sent as per ordered. Bed in lowest position, call bell within reach, all other safety and comfort measures provided. awaiting labs results and further orders.

## 2023-11-26 NOTE — ED BEHAVIORAL HEALTH ASSESSMENT NOTE - NS ED BHA HOMICIDALITY PRESENT AGGRESSION OTHERS LIFETIME
Physician notified/Patient and/or family announced that they are leaving. They were advised to stay, advised to return if worse. Yes

## 2023-11-26 NOTE — ED PROVIDER NOTE - OBJECTIVE STATEMENT
58M history of bipolar, substance use disorder  presents with 1hr of sharp, left sided moderate chest pain with radiation to the L shoulder/neck associated with body aches, chills, and dry cough; denies associated shortness of breath, nausea/vomiting, diaphoresis. Went on a jimenez of alcohol and cocaine last night, but denies any substances over the past 24 hours. Has not slept in about 48 hours.

## 2023-11-26 NOTE — ED PROVIDER NOTE - PATIENT PORTAL LINK FT
You can access the FollowMyHealth Patient Portal offered by [facility] by registering at the following website: [email or web address]. By joining [facility]’s FollowMyHealth portal, you will also be able to view your health information using other applications (apps) compatible with our system.

## 2023-11-26 NOTE — ED BEHAVIORAL HEALTH NOTE - BEHAVIORAL HEALTH NOTE
As per request of provider, writer met with pt to obtain collateral information. Pt alert and oriented and able to provide collateral. Pt was a poor historian at times during conversation.     Patient is a 57 y/o male currently undomiciled, hx of bipolar disorder and substance use disorder, who presented to ED as a walk-in due to chest pain and suicidal ideations. Pt explained they have been living on the streets or intermittently renting rooms when available. Pt currently unemployed.     Reason for ed visit per provider note: past medical history of hypertension, bipolar disorder, and substance use disorder, presents to ED complaining of left-sided chest pain x 1 day.  Patient also complaining of suicidal ideation.  Patient states he has had these thoughts for about a year now.  Patient states feels like he will jump in front of the E train.  Patient noncompliant with medication.  Patient admits to using alcohol/cocaine approximately 2 days ago.  Patient recently evaluated at [facility] earlier this morning for similar chest pain.  Workup was unremarkable and patient was discharged.  Patient states since then pain is worsened.  Patient notes has had cardiac cath in the past.     Symptoms/hx: Pt states they often have suicidal ideation when they are depressed and have active plans leading to psychiatric admissions.     Baseline: pt explained when they were in Respite housing in the [place] they were doing very well and able to send money to their 15 y/o daughter in [place].     Drugs alcohol: pt denied any drug use. Pt stated recent alcohol use 2 days ago and states they often relapse and drink.     Treatment: Pt was recently inpatient at [facility] over the summer but felt their best when they were in respite housing.     Medication: currently prescribed Depakote and abilify, however non-compliant stating the medication is not right for him.     Medical problem: pt states they have high blood pressure.     Dispo: in-patient psychiatric, pt requesting [facility]. As per request of provider, writer met with pt to obtain collateral information. Pt alert and oriented and able to provide collateral. Pt was a poor historian at times during conversation.     Patient is a 57 y/o male currently undomiciled, hx of bipolar disorder and substance use disorder, who presented to ED as a walk-in due to chest pain and suicidal ideations. Pt explained they have been living on the streets or intermittently renting rooms when available. Pt currently unemployed.     Reason for ed visit per provider note: past medical history of hypertension, bipolar disorder, and substance use disorder, presents to ED complaining of left-sided chest pain x 1 day.  Patient also complaining of suicidal ideation.  Patient states he has had these thoughts for about a year now.  Patient states feels like he will jump in front of the E train.  Patient noncompliant with medication.  Patient admits to using alcohol/cocaine approximately 2 days ago.  Patient recently evaluated at [facility] earlier this morning for similar chest pain.  Workup was unremarkable and patient was discharged.  Patient states since then pain is worsened.  Patient notes has had cardiac cath in the past.     Symptoms/hx: Pt states they often have suicidal ideation when they are depressed and have active plans leading to psychiatric admissions.     Baseline: pt explained when they were in Respite housing in the [place] they were doing very well and able to send money to their 15 y/o daughter in [place].     Drugs alcohol: pt denied any drug use. Pt stated recent alcohol use 2 days ago and states they often relapse and drink.     Treatment: Pt was recently inpatient at [facility] over the summer but felt their best when they were in respite housing.     Medication: currently prescribed Depakote and abilify, however non-compliant stating the medication is not right for him.     Medical problem: pt states they have high blood pressure.     Dispo: in-patient psychiatric, pt requesting [facility]. Per Kerry [phone] no beds available today.
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
## 2023-11-26 NOTE — ED BEHAVIORAL HEALTH ASSESSMENT NOTE - NSACTIVEVENT_PSY_ALL_CORE
Triggering events leading to humiliation, shame, and/or despair (e.g., Loss of relationship, financial or health status) (real or anticipated)/Current or pending social isolation/Chronic pain or other acute medical condition/Substance intoxication or withdrawal

## 2023-11-26 NOTE — ED BEHAVIORAL HEALTH NOTE - BEHAVIORAL HEALTH NOTE
DIMA contacted [facility], spoke with Anthony Davidson he stated that there are still no adult beds available.    DIMA informed provider  of this information.

## 2023-11-26 NOTE — ED ADULT NURSE NOTE - NSFALLUNIVINTERV_ED_ALL_ED
Bed/Stretcher in lowest position, wheels locked, appropriate side rails in place/Call bell, personal items and telephone in reach/Instruct patient to call for assistance before getting out of bed/chair/stretcher/Non-slip footwear applied when patient is off stretcher/[place] to call system/Physically safe environment - no spills, clutter or unnecessary equipment/Purposeful proactive rounding/Room/bathroom lighting operational, light cord in reach

## 2023-11-26 NOTE — ED BEHAVIORAL HEALTH ASSESSMENT NOTE - DESCRIPTION
currently, reports feeling tired and experiencing "flu like symptoms": cough, SOB, previously with chest pain - but currently none. no reported occurrence of agitation/ violent behavior. no active SI or HI. not manic nor psychotic.  is not manifesting any signs/ symptoms suggestive of impending withdrawal. is not delirious.      Vital Signs Last 24 Hrs  T(C): 36.5 (26 Nov 2023 12:01), Max: 36.9 (26 Nov 2023 08:52)  T(F): 97.7 (26 Nov 2023 12:01), Max: 98.4 (26 Nov 2023 08:52)  HR: 70 (26 Nov 2023 12:01) (63 - 95)  BP: 105/63 (26 Nov 2023 12:01) (105/63 - 147/82)  BP(mean): 77 (26 Nov 2023 12:01) (77 - 77)  RR: 16 (26 Nov 2023 12:01) (16 - 18)  SpO2: 97% (26 Nov 2023 12:01) (95% - 97%)    Parameters below as of 26 Nov 2023 12:01  Patient On (Oxygen Delivery Method): room air single, unemployed, stays at a hotel, [place] and in the streets. has a 15 yr old daughter (living in [place], [place] - currently in custody of bio mother, speaks on the phone regularly). no reported access to guns HTN, pericarditis, CHF, CKD, GERD. per psyckes, past meds to include: HCTZ 25mg, lipitor 40mg, ASA 81mg, entresto 24-26mg, folic acid 1mg, thiamine 100mg HTN, pericarditis, self reports hx of CHF (claims EF around ? 45%), CKD, GERD. per psyckes, past meds to include: HCTZ 25mg, lipitor 40mg, ASA 81mg, entresto 24-26mg, folic acid 1mg, thiamine 100mg

## 2023-11-26 NOTE — ED BEHAVIORAL HEALTH ASSESSMENT NOTE - HPI (INCLUDE ILLNESS QUALITY, SEVERITY, DURATION, TIMING, CONTEXT, MODIFYING FACTORS, ASSOCIATED SIGNS AND SYMPTOMS)
58 yr old male, single, undomiciled and unemployed.  self reports hx of bipolar disorder, depression and anxiety; as per Psyckes: with pan-diagnoses namely: Major Depressive Disorder, Substance-Induced Depressive Disorder, Unspecified/Other Bipolar, Depressive Disorder,  Antisocial Personality Disorder,  schizoaffective Disorder, Bipolar I, Bipolar II, Unspecified/Other Anxiety Disorder, Adjustment Disorder, Substance-Induced Sleep Disorder, Schizophrenia, Substance-Induced Psychotic Disorder, Unspecified/Other Psychotic Disorders, Attention Deficit Hyperactivity Disorder, Borderline Personality Disorder,   Unspecified/Other Psychotic Disorders, Attention Deficit Hyperactivity Disorder, Borderline Personality Disorder, Insomnia Disorder, Other Mental Disorders  and Substance-Induced Anxiety Disorder.  Pt is chronically nonadherent to meds/ psych appts.  has hx of multiple psych admissions. self reports hx of self aborted SA (jumping in front of a 7 train - last summer). with polysubstance use: Alcohol (denied any DTs - per psyckes, with multiple detoxes/ rehabs - last attendance at [facility], [place], 10/9 - 10/15/2023 for Alcohol dependence, uncomplicated)/ Cocaine/ Other psychoactive substance related disorders/ Cannabis/ Tobacco/ Opioid/  Other stimulant related disorders.  previous ED evaluation conducted last 2023 initially presenting as chest pain x 30 minutes, sought social work assistance and detox from alcohol. Psych was consulted to evaluate for SI (plan of shooting self) + cAH ("kill yourself"). that encounter, led to Pt being discharged with referral to alcohol detox - requested referral to [facility].  Pt self reports hx of self aborted SA (jumping in front of a 7 train last summer).  pertinent medical issues include: HTN, CKD, GERD, pericarditis; self reports hx of CHF (with ? EF 45%).  this morning, self presented to the ED complaining of left sided chest pain (with radiation towards the head) + SOB.. psych was consulted for evaluation of SI (with plan to jump in front of the E train)    seen bedside.  currently, calm and cooperative.  He does not remember how he got to [facility].  said he was trying to get to [facility].  reported feeling depressed because he is unable to talk to his daughter (now age 15 - last time saw her, when she was 3 weeks old - daughter living with bio mother in [place], [place]).  along with the depression, he also admits harboring chronic SI. he stated  "I want to kill myself."  he has a plan to jump in front of the E train.  currently, denied any intentions.  no other plans raised nor any researches conducted re: potential lethal means to commit suicide.  currently denied any HI but expresses anger toward his child's mother. fantasizes "hanging her (child's mother) from a tree so she can suffer." Pt denies any ongoing pending legal issues.    self reports prior episode of SA: described as about to jump in front of a 7 train but that he did not jump because "an emre pulled me back".. described depression as feeling sad + associated symptoms of poor sleep; low energy level; feelings of hopelessness.  there is intermittent AH (hearing  mother's voice telling me "what are you waiting for?") + ? VH (seeing bugs crawl - ? formication in the context of cocaine use + seeing face of his  mother); denied any other perceptual disturbances. no thought insertion/ broadcasting/ withdrawal.      along with the depression, also endorses feeling anxious but denied experiencing any specific severe anxiety disorder symptoms.  He endorses past periods of sustained elevated mood, impulsivity/ risk taking behavior, racing thoughts but denies other associated symptoms of miquel. describes endorses frequent fighting to "defend myself".. denied any legal repercussions resultant of these frequent self reported physical altercations. no recent manic episode reported.  predominantly, endorses symptoms of depression + intermittent/ passive SI.      per Prior chart reviewed - there was past psych evaluation conducted at [facility] ED back in 2022 under similar circumstances.  Pt initially presented with complaint of chest pain and later on, endorsing SI. Pt was assessed with substance induced mood symptoms, concern for conditional SI and malingering.  that encounter also yielded Pt being psych cleared for discharge

## 2023-11-26 NOTE — ED BEHAVIORAL HEALTH ASSESSMENT NOTE - OTHER
boarding partial insight into alcohol use disorder, limited insight into likely role in mood symptoms homelessness, financial constraints, unemployment, limited social support, not being able to see 15 yr old daughter previously described hearing voice of  mother + seeing her face + seeing bugs (see HPI for details).. currently, no perceptual disturbances.. Does not appear to be responding to internal stimuli distracted impaired based on recurrent substance use and nonadherence with out-pt mental health treatment, though currently seeking help CVM, I stop lying back in stretcher in ED, gait not assessed [facility]

## 2023-11-26 NOTE — ED PROVIDER NOTE - CLINICAL SUMMARY MEDICAL DECISION MAKING FREE TEXT BOX
58M history of bipolar, substance use disorder  presents with 1hr of sharp, left sided moderate chest pain with radiation to the L shoulder/neck associated with body aches, chills, and dry cough; denies associated shortness of breath, nausea/vomiting, diaphoresis. Went on a jimenez of alcohol and cocaine last night, but denies any substances over the past 24 hours. Has not slept in about 48 hours.    Patient A&Ox3, well-appearing, and in no acute distress. Head NCAT. EOM intact and PERRL. Oropharynx with MMM. Heart rate regular, with no murmurs/gallops/clicks/rubs. Breath sounds clear to auscultation bilaterally. Abdomen NTND, soft, with normal bowel sounds. Skin warm and dry.    MDM: Patient with substance use disorder presents with acute onset of 1hr sharp chest pains. Lifestyle places patient at risk for ACS, but other flu-like symptoms are more consistent with viral illness. Will assess etiology of pain with labs, ecg. Based on chart review, patient has presented other times to the ED for similar complaints with negative workup and concern for possible shelter seeking behavior.

## 2023-11-27 VITALS
SYSTOLIC BLOOD PRESSURE: 121 MMHG | DIASTOLIC BLOOD PRESSURE: 79 MMHG | TEMPERATURE: 99 F | OXYGEN SATURATION: 98 % | RESPIRATION RATE: 17 BRPM | HEART RATE: 55 BPM

## 2023-11-27 DIAGNOSIS — F31.9 BIPOLAR DISORDER, UNSPECIFIED: ICD-10-CM

## 2023-11-27 DIAGNOSIS — R07.89 OTHER CHEST PAIN: ICD-10-CM

## 2023-11-27 DIAGNOSIS — Z20.822 CONTACT WITH AND (SUSPECTED) EXPOSURE TO COVID-19: ICD-10-CM

## 2023-11-27 DIAGNOSIS — M79.10 MYALGIA, UNSPECIFIED SITE: ICD-10-CM

## 2023-11-27 DIAGNOSIS — F14.90 COCAINE USE, UNSPECIFIED, UNCOMPLICATED: ICD-10-CM

## 2023-11-27 DIAGNOSIS — R05.8 OTHER SPECIFIED COUGH: ICD-10-CM

## 2023-11-27 DIAGNOSIS — F10.90 ALCOHOL USE, UNSPECIFIED, UNCOMPLICATED: ICD-10-CM

## 2023-11-27 NOTE — ED ADULT NURSE REASSESSMENT NOTE - NS ED NURSE REASSESS COMMENT FT1
Received pt from previous shift. Currently resting in BH room, even unlabored breathing,  not in any physical distress. Pt remains boarding for psychiatric admission. Will continue to monitor.

## 2023-11-27 NOTE — ED BEHAVIORAL HEALTH PROGRESS NOTE - OTHER
distracted homelessness, financial constraints, unemployment, limited social support, not being able to see 15 yr old daughter boarding impaired based on recurrent substance use and nonadherence with out-pt mental health treatment, though currently seeking help previously described hearing voice of  mother + seeing her face + seeing bugs (see HPI for details).. currently, no perceptual disturbances.. Does not appear to be responding to internal stimuli partial insight into alcohol use disorder, limited insight into likely role in mood symptoms lying back in stretcher in ED, gait not assessed

## 2023-11-27 NOTE — ED BEHAVIORAL HEALTH PROGRESS NOTE - SUMMARY
58/M with reported hx of bipolar disorder/ depression/ anxiety, chronically nonadherent to meds/ psych appts;  hx of multiple psych admissions + psych ED/ CPEP attendances; hx of polysubstance use.  this morning, self presented to the ED complaining of left sided chest pain (with radiation towards the head) + SOB.. psych was consulted for evaluation of SI (with plan to jump in front of the E train)    at this time, endorses feeling sad + anxious in the context of multiple psychosocial stressors. has also been harboring intermittent passive SI + plan (to jump in front of a train).  unable to partake towards safety planning. it seems that current presentation is same as with past ED presentation - SI is conditional;  said depressive episode do not meet severe MDD criteria. neither does endorsed anxiety satisfying any specific anxiety disorder criteria.  differentials to entertain for this case include: Unspecified mood disorder, suspicion for substance-induced mood disorder; axis II pathology and malingering.      currently, does not appear to be acutely manic nor psychotic. not actively suicidal or homicidal. not delirious. no manifestation of any impending withdrawal.  he is help seeking and wishes to pursue 9.13 admission.  ED service will help facilitate such request. however, if he rescinds and is able to safety plan, will consider discharging him.      recommendations:  1. start zyprexa 5mg HS. titrate.   2. PRNs: seroquel 50mg PO q6Hrs for agitation; PRNs: zyprexa 5mg IM q6Hrs for severe agitation. defer if qtC > 500m/s  3. other medical issues to be recommended by ED treatment team. start folic acid PO, thiamine PO + MVI 1 tab daily  4. symptom triggered CIWA  5. temporarily boarding here at the  ED as no beds available  - discussed with  ED staff

## 2023-11-27 NOTE — ED BEHAVIORAL HEALTH PROGRESS NOTE - CASE SUMMARY/FORMULATION (CLEARLY DOCUMENT RATIONALE FOR DISPOSITION CHANGE)
58/M with reported hx of bipolar disorder/ depression/ anxiety, chronically nonadherent to meds/ psych appts;  hx of multiple psych admissions + psych ED/ CPEP attendances; hx of polysubstance use.  this morning, self presented to the ED complaining of left sided chest pain (with radiation towards the head) + SOB.. psych was consulted for evaluation of SI (with plan to jump in front of the E train)    at this time, endorses feeling sad + anxious in the context of multiple psychosocial stressors. has also been harboring intermittent passive SI + plan (to jump in front of a train).  unable to partake towards safety planning. it seems that current presentation is same as with past ED presentation - SI is conditional;  said depressive episode do not meet severe MDD criteria. neither does endorsed anxiety satisfying any specific anxiety disorder criteria.  differentials to entertain for this case include: Unspecified mood disorder, suspicion for substance-induced mood disorder; axis II pathology and malingering.      currently, does not appear to be acutely manic nor psychotic. not actively suicidal or homicidal. not delirious. no manifestation of any impending withdrawal.  he is help seeking and wishes to pursue 9.13 admission.

## 2023-11-27 NOTE — ED ADULT NURSE REASSESSMENT NOTE - NS ED NURSE REASSESS COMMENT FT1
Pt currently asleep. Shows no signs of acute distress. Respirations even and unlabored. Safety maintained. Will continue to monitor.

## 2023-11-27 NOTE — ED ADULT NURSE REASSESSMENT NOTE - NS ED NURSE REASSESS COMMENT FT1
Pt is resting in bed, NAD, even unlabored respirations observed. nutrition noted. accepted vitals assessment. VS as noted. Safety measures maintained. Care ongoing.

## 2023-12-18 ENCOUNTER — HOSPITAL ENCOUNTER (INPATIENT)
Dept: HOSPITAL 74 - YASAS | Age: 58
LOS: 8 days | Discharge: HOME | End: 2023-12-26
Attending: SURGERY | Admitting: ALLERGY & IMMUNOLOGY
Payer: COMMERCIAL

## 2023-12-18 VITALS — BODY MASS INDEX: 34.7 KG/M2

## 2023-12-18 DIAGNOSIS — Z86.59: ICD-10-CM

## 2023-12-18 DIAGNOSIS — U07.1: ICD-10-CM

## 2023-12-18 DIAGNOSIS — F10.230: Primary | ICD-10-CM

## 2023-12-18 DIAGNOSIS — I10: ICD-10-CM

## 2023-12-18 DIAGNOSIS — K21.9: ICD-10-CM

## 2023-12-18 DIAGNOSIS — M54.50: ICD-10-CM

## 2023-12-18 DIAGNOSIS — F14.20: ICD-10-CM

## 2023-12-18 DIAGNOSIS — E78.5: ICD-10-CM

## 2023-12-18 DIAGNOSIS — G89.29: ICD-10-CM

## 2023-12-18 RX ADMIN — METHOCARBAMOL PRN MG: 500 TABLET ORAL at 22:01

## 2023-12-18 RX ADMIN — IBUPROFEN PRN MG: 600 TABLET, FILM COATED ORAL at 22:01

## 2023-12-18 RX ADMIN — Medication SCH MG: at 22:01

## 2023-12-18 RX ADMIN — ALUMINUM HYDROXIDE, MAGNESIUM HYDROXIDE, AND SIMETHICONE PRN ML: 200; 200; 20 SUSPENSION ORAL at 20:24

## 2023-12-19 LAB
ALBUMIN SERPL-MCNC: 3.5 G/DL (ref 3.4–5)
ALP SERPL-CCNC: 77 U/L (ref 45–117)
ALT SERPL-CCNC: 51 U/L (ref 13–61)
ANION GAP SERPL CALC-SCNC: 5 MMOL/L (ref 4–13)
AST SERPL-CCNC: 51 U/L (ref 15–37)
BILIRUB SERPL-MCNC: 0.4 MG/DL (ref 0.2–1)
BUN SERPL-MCNC: 19.9 MG/DL (ref 7–18)
CALCIUM SERPL-MCNC: 9.3 MG/DL (ref 8.5–10.1)
CHLORIDE SERPL-SCNC: 107 MMOL/L (ref 98–107)
CO2 SERPL-SCNC: 26 MMOL/L (ref 21–32)
CREAT SERPL-MCNC: 1.1 MG/DL (ref 0.55–1.3)
DEPRECATED RDW RBC AUTO: 14.9 % (ref 11.9–15.9)
GLUCOSE SERPL-MCNC: 86 MG/DL (ref 74–106)
HCT VFR BLD CALC: 37.3 % (ref 35.4–49)
HGB BLD-MCNC: 12.3 GM/DL (ref 11.7–16.9)
MCH RBC QN AUTO: 29.9 PG (ref 25.7–33.7)
MCHC RBC AUTO-ENTMCNC: 33 G/DL (ref 32–35.9)
MCV RBC: 90.4 FL (ref 80–96)
PLATELET # BLD AUTO: 257 10^3/UL (ref 134–434)
PMV BLD: 8.6 FL (ref 7.5–11.1)
POTASSIUM SERPLBLD-SCNC: 4.2 MMOL/L (ref 3.5–5.1)
PROT SERPL-MCNC: 7.2 G/DL (ref 6.4–8.2)
RBC # BLD AUTO: 4.13 M/MM3 (ref 4–5.6)
SODIUM SERPL-SCNC: 138 MMOL/L (ref 136–145)
WBC # BLD AUTO: 4.2 K/MM3 (ref 4–10)

## 2023-12-19 RX ADMIN — GUAIFENESIN PRN MG: 600 TABLET, EXTENDED RELEASE ORAL at 12:46

## 2023-12-19 RX ADMIN — DICYCLOMINE HYDROCHLORIDE PRN MG: 10 CAPSULE ORAL at 09:21

## 2023-12-19 RX ADMIN — HYDROCHLOROTHIAZIDE SCH MG: 12.5 CAPSULE ORAL at 10:40

## 2023-12-19 RX ADMIN — Medication SCH TAB: at 09:22

## 2023-12-20 PROCEDURE — HZ2ZZZZ DETOXIFICATION SERVICES FOR SUBSTANCE ABUSE TREATMENT: ICD-10-PCS | Performed by: SURGERY

## 2023-12-23 VITALS — RESPIRATION RATE: 18 BRPM

## 2023-12-25 RX ADMIN — FAMOTIDINE SCH MG: 20 TABLET ORAL at 16:39

## 2023-12-25 RX ADMIN — DICYCLOMINE HYDROCHLORIDE ONE MG: 10 CAPSULE ORAL at 10:48

## 2023-12-26 VITALS — SYSTOLIC BLOOD PRESSURE: 108 MMHG | TEMPERATURE: 98 F | DIASTOLIC BLOOD PRESSURE: 72 MMHG | HEART RATE: 85 BPM

## 2023-12-26 RX ADMIN — AMLODIPINE BESYLATE SCH MG: 2.5 TABLET ORAL at 09:58

## 2023-12-28 ENCOUNTER — EMERGENCY (EMERGENCY)
Facility: HOSPITAL | Age: 58
LOS: 1 days | Discharge: PSYCHIATRIC OR SUBSTANCE USE FACILITY | End: 2023-12-28
Attending: STUDENT IN AN ORGANIZED HEALTH CARE EDUCATION/TRAINING PROGRAM | Admitting: STUDENT IN AN ORGANIZED HEALTH CARE EDUCATION/TRAINING PROGRAM
Payer: MEDICAID

## 2023-12-28 VITALS
SYSTOLIC BLOOD PRESSURE: 133 MMHG | RESPIRATION RATE: 18 BRPM | TEMPERATURE: 98 F | HEART RATE: 81 BPM | OXYGEN SATURATION: 100 % | DIASTOLIC BLOOD PRESSURE: 86 MMHG | HEIGHT: 74 IN

## 2023-12-28 LAB
ALBUMIN SERPL ELPH-MCNC: 4 G/DL — SIGNIFICANT CHANGE UP (ref 3.3–5)
ALBUMIN SERPL ELPH-MCNC: 4 G/DL — SIGNIFICANT CHANGE UP (ref 3.3–5)
ALBUMIN SERPL ELPH-MCNC: 4.1 G/DL — SIGNIFICANT CHANGE UP (ref 3.3–5)
ALBUMIN SERPL ELPH-MCNC: 4.1 G/DL — SIGNIFICANT CHANGE UP (ref 3.3–5)
ALP SERPL-CCNC: 60 U/L — SIGNIFICANT CHANGE UP (ref 40–120)
ALP SERPL-CCNC: 60 U/L — SIGNIFICANT CHANGE UP (ref 40–120)
ALP SERPL-CCNC: 61 U/L — SIGNIFICANT CHANGE UP (ref 40–120)
ALP SERPL-CCNC: 61 U/L — SIGNIFICANT CHANGE UP (ref 40–120)
ALT FLD-CCNC: 36 U/L — SIGNIFICANT CHANGE UP (ref 4–41)
ALT FLD-CCNC: 36 U/L — SIGNIFICANT CHANGE UP (ref 4–41)
ALT FLD-CCNC: 37 U/L — SIGNIFICANT CHANGE UP (ref 4–41)
ALT FLD-CCNC: 37 U/L — SIGNIFICANT CHANGE UP (ref 4–41)
AMPHET UR-MCNC: NEGATIVE — SIGNIFICANT CHANGE UP
AMPHET UR-MCNC: NEGATIVE — SIGNIFICANT CHANGE UP
ANION GAP SERPL CALC-SCNC: 10 MMOL/L — SIGNIFICANT CHANGE UP (ref 7–14)
APAP SERPL-MCNC: <10 UG/ML — LOW (ref 15–25)
APAP SERPL-MCNC: <10 UG/ML — LOW (ref 15–25)
APPEARANCE UR: ABNORMAL
APPEARANCE UR: ABNORMAL
AST SERPL-CCNC: 36 U/L — SIGNIFICANT CHANGE UP (ref 4–40)
AST SERPL-CCNC: 36 U/L — SIGNIFICANT CHANGE UP (ref 4–40)
AST SERPL-CCNC: 39 U/L — SIGNIFICANT CHANGE UP (ref 4–40)
AST SERPL-CCNC: 39 U/L — SIGNIFICANT CHANGE UP (ref 4–40)
BACTERIA # UR AUTO: NEGATIVE /HPF — SIGNIFICANT CHANGE UP
BACTERIA # UR AUTO: NEGATIVE /HPF — SIGNIFICANT CHANGE UP
BARBITURATES UR SCN-MCNC: NEGATIVE — SIGNIFICANT CHANGE UP
BARBITURATES UR SCN-MCNC: NEGATIVE — SIGNIFICANT CHANGE UP
BASE EXCESS BLDV CALC-SCNC: 3.1 MMOL/L — HIGH (ref -2–3)
BASE EXCESS BLDV CALC-SCNC: 3.1 MMOL/L — HIGH (ref -2–3)
BASOPHILS # BLD AUTO: 0.03 K/UL — SIGNIFICANT CHANGE UP (ref 0–0.2)
BASOPHILS # BLD AUTO: 0.03 K/UL — SIGNIFICANT CHANGE UP (ref 0–0.2)
BASOPHILS NFR BLD AUTO: 0.4 % — SIGNIFICANT CHANGE UP (ref 0–2)
BASOPHILS NFR BLD AUTO: 0.4 % — SIGNIFICANT CHANGE UP (ref 0–2)
BENZODIAZ UR-MCNC: NEGATIVE — SIGNIFICANT CHANGE UP
BENZODIAZ UR-MCNC: NEGATIVE — SIGNIFICANT CHANGE UP
BILIRUB DIRECT SERPL-MCNC: 0.2 MG/DL — SIGNIFICANT CHANGE UP (ref 0–0.3)
BILIRUB DIRECT SERPL-MCNC: 0.2 MG/DL — SIGNIFICANT CHANGE UP (ref 0–0.3)
BILIRUB INDIRECT FLD-MCNC: 0.8 MG/DL — SIGNIFICANT CHANGE UP (ref 0–1)
BILIRUB INDIRECT FLD-MCNC: 0.8 MG/DL — SIGNIFICANT CHANGE UP (ref 0–1)
BILIRUB SERPL-MCNC: 1 MG/DL — SIGNIFICANT CHANGE UP (ref 0.2–1.2)
BILIRUB UR-MCNC: NEGATIVE — SIGNIFICANT CHANGE UP
BILIRUB UR-MCNC: NEGATIVE — SIGNIFICANT CHANGE UP
BLOOD GAS VENOUS COMPREHENSIVE RESULT: SIGNIFICANT CHANGE UP
BLOOD GAS VENOUS COMPREHENSIVE RESULT: SIGNIFICANT CHANGE UP
BUN SERPL-MCNC: 16 MG/DL — SIGNIFICANT CHANGE UP (ref 7–23)
CALCIUM SERPL-MCNC: 8.6 MG/DL — SIGNIFICANT CHANGE UP (ref 8.4–10.5)
CALCIUM SERPL-MCNC: 8.6 MG/DL — SIGNIFICANT CHANGE UP (ref 8.4–10.5)
CALCIUM SERPL-MCNC: 8.9 MG/DL — SIGNIFICANT CHANGE UP (ref 8.4–10.5)
CALCIUM SERPL-MCNC: 8.9 MG/DL — SIGNIFICANT CHANGE UP (ref 8.4–10.5)
CAST: 0 /LPF — SIGNIFICANT CHANGE UP (ref 0–4)
CAST: 0 /LPF — SIGNIFICANT CHANGE UP (ref 0–4)
CHLORIDE BLDV-SCNC: 103 MMOL/L — SIGNIFICANT CHANGE UP (ref 96–108)
CHLORIDE BLDV-SCNC: 103 MMOL/L — SIGNIFICANT CHANGE UP (ref 96–108)
CHLORIDE SERPL-SCNC: 102 MMOL/L — SIGNIFICANT CHANGE UP (ref 98–107)
CO2 BLDV-SCNC: 31.5 MMOL/L — HIGH (ref 22–26)
CO2 BLDV-SCNC: 31.5 MMOL/L — HIGH (ref 22–26)
CO2 SERPL-SCNC: 26 MMOL/L — SIGNIFICANT CHANGE UP (ref 22–31)
COCAINE METAB.OTHER UR-MCNC: POSITIVE
COCAINE METAB.OTHER UR-MCNC: POSITIVE
COLOR SPEC: SIGNIFICANT CHANGE UP
COLOR SPEC: SIGNIFICANT CHANGE UP
CREAT SERPL-MCNC: 1.13 MG/DL — SIGNIFICANT CHANGE UP (ref 0.5–1.3)
CREAT SERPL-MCNC: 1.13 MG/DL — SIGNIFICANT CHANGE UP (ref 0.5–1.3)
CREAT SERPL-MCNC: 1.17 MG/DL — SIGNIFICANT CHANGE UP (ref 0.5–1.3)
CREAT SERPL-MCNC: 1.17 MG/DL — SIGNIFICANT CHANGE UP (ref 0.5–1.3)
CREATININE URINE RESULT, DAU: 387 MG/DL — SIGNIFICANT CHANGE UP
CREATININE URINE RESULT, DAU: 387 MG/DL — SIGNIFICANT CHANGE UP
DIFF PNL FLD: NEGATIVE — SIGNIFICANT CHANGE UP
DIFF PNL FLD: NEGATIVE — SIGNIFICANT CHANGE UP
EGFR: 73 ML/MIN/1.73M2 — SIGNIFICANT CHANGE UP
EGFR: 73 ML/MIN/1.73M2 — SIGNIFICANT CHANGE UP
EGFR: 76 ML/MIN/1.73M2 — SIGNIFICANT CHANGE UP
EGFR: 76 ML/MIN/1.73M2 — SIGNIFICANT CHANGE UP
EOSINOPHIL # BLD AUTO: 0.13 K/UL — SIGNIFICANT CHANGE UP (ref 0–0.5)
EOSINOPHIL # BLD AUTO: 0.13 K/UL — SIGNIFICANT CHANGE UP (ref 0–0.5)
EOSINOPHIL NFR BLD AUTO: 1.8 % — SIGNIFICANT CHANGE UP (ref 0–6)
EOSINOPHIL NFR BLD AUTO: 1.8 % — SIGNIFICANT CHANGE UP (ref 0–6)
ETHANOL SERPL-MCNC: <10 MG/DL — SIGNIFICANT CHANGE UP
ETHANOL SERPL-MCNC: <10 MG/DL — SIGNIFICANT CHANGE UP
GAS PNL BLDV: 134 MMOL/L — LOW (ref 136–145)
GAS PNL BLDV: 134 MMOL/L — LOW (ref 136–145)
GLUCOSE BLDV-MCNC: 82 MG/DL — SIGNIFICANT CHANGE UP (ref 70–99)
GLUCOSE BLDV-MCNC: 82 MG/DL — SIGNIFICANT CHANGE UP (ref 70–99)
GLUCOSE SERPL-MCNC: 81 MG/DL — SIGNIFICANT CHANGE UP (ref 70–99)
GLUCOSE SERPL-MCNC: 81 MG/DL — SIGNIFICANT CHANGE UP (ref 70–99)
GLUCOSE SERPL-MCNC: 90 MG/DL — SIGNIFICANT CHANGE UP (ref 70–99)
GLUCOSE SERPL-MCNC: 90 MG/DL — SIGNIFICANT CHANGE UP (ref 70–99)
GLUCOSE UR QL: NEGATIVE MG/DL — SIGNIFICANT CHANGE UP
GLUCOSE UR QL: NEGATIVE MG/DL — SIGNIFICANT CHANGE UP
HCO3 BLDV-SCNC: 30 MMOL/L — HIGH (ref 22–29)
HCO3 BLDV-SCNC: 30 MMOL/L — HIGH (ref 22–29)
HCT VFR BLD CALC: 35.2 % — LOW (ref 39–50)
HCT VFR BLD CALC: 35.2 % — LOW (ref 39–50)
HCT VFR BLDA CALC: 38 % — LOW (ref 39–51)
HCT VFR BLDA CALC: 38 % — LOW (ref 39–51)
HGB BLD CALC-MCNC: 12.5 G/DL — LOW (ref 12.6–17.4)
HGB BLD CALC-MCNC: 12.5 G/DL — LOW (ref 12.6–17.4)
HGB BLD-MCNC: 11.8 G/DL — LOW (ref 13–17)
HGB BLD-MCNC: 11.8 G/DL — LOW (ref 13–17)
IANC: 3.67 K/UL — SIGNIFICANT CHANGE UP (ref 1.8–7.4)
IANC: 3.67 K/UL — SIGNIFICANT CHANGE UP (ref 1.8–7.4)
IMM GRANULOCYTES NFR BLD AUTO: 0.3 % — SIGNIFICANT CHANGE UP (ref 0–0.9)
IMM GRANULOCYTES NFR BLD AUTO: 0.3 % — SIGNIFICANT CHANGE UP (ref 0–0.9)
KETONES UR-MCNC: ABNORMAL MG/DL
KETONES UR-MCNC: ABNORMAL MG/DL
LACTATE BLDV-MCNC: 1.5 MMOL/L — SIGNIFICANT CHANGE UP (ref 0.5–2)
LACTATE BLDV-MCNC: 1.5 MMOL/L — SIGNIFICANT CHANGE UP (ref 0.5–2)
LEUKOCYTE ESTERASE UR-ACNC: NEGATIVE — SIGNIFICANT CHANGE UP
LEUKOCYTE ESTERASE UR-ACNC: NEGATIVE — SIGNIFICANT CHANGE UP
LYMPHOCYTES # BLD AUTO: 2.15 K/UL — SIGNIFICANT CHANGE UP (ref 1–3.3)
LYMPHOCYTES # BLD AUTO: 2.15 K/UL — SIGNIFICANT CHANGE UP (ref 1–3.3)
LYMPHOCYTES # BLD AUTO: 29.7 % — SIGNIFICANT CHANGE UP (ref 13–44)
LYMPHOCYTES # BLD AUTO: 29.7 % — SIGNIFICANT CHANGE UP (ref 13–44)
MAGNESIUM SERPL-MCNC: 2.1 MG/DL — SIGNIFICANT CHANGE UP (ref 1.6–2.6)
MAGNESIUM SERPL-MCNC: 2.1 MG/DL — SIGNIFICANT CHANGE UP (ref 1.6–2.6)
MAGNESIUM SERPL-MCNC: 2.2 MG/DL — SIGNIFICANT CHANGE UP (ref 1.6–2.6)
MAGNESIUM SERPL-MCNC: 2.2 MG/DL — SIGNIFICANT CHANGE UP (ref 1.6–2.6)
MCHC RBC-ENTMCNC: 29.9 PG — SIGNIFICANT CHANGE UP (ref 27–34)
MCHC RBC-ENTMCNC: 29.9 PG — SIGNIFICANT CHANGE UP (ref 27–34)
MCHC RBC-ENTMCNC: 33.5 GM/DL — SIGNIFICANT CHANGE UP (ref 32–36)
MCHC RBC-ENTMCNC: 33.5 GM/DL — SIGNIFICANT CHANGE UP (ref 32–36)
MCV RBC AUTO: 89.3 FL — SIGNIFICANT CHANGE UP (ref 80–100)
MCV RBC AUTO: 89.3 FL — SIGNIFICANT CHANGE UP (ref 80–100)
METHADONE UR-MCNC: NEGATIVE — SIGNIFICANT CHANGE UP
METHADONE UR-MCNC: NEGATIVE — SIGNIFICANT CHANGE UP
MONOCYTES # BLD AUTO: 1.23 K/UL — HIGH (ref 0–0.9)
MONOCYTES # BLD AUTO: 1.23 K/UL — HIGH (ref 0–0.9)
MONOCYTES NFR BLD AUTO: 17 % — HIGH (ref 2–14)
MONOCYTES NFR BLD AUTO: 17 % — HIGH (ref 2–14)
NEUTROPHILS # BLD AUTO: 3.67 K/UL — SIGNIFICANT CHANGE UP (ref 1.8–7.4)
NEUTROPHILS # BLD AUTO: 3.67 K/UL — SIGNIFICANT CHANGE UP (ref 1.8–7.4)
NEUTROPHILS NFR BLD AUTO: 50.8 % — SIGNIFICANT CHANGE UP (ref 43–77)
NEUTROPHILS NFR BLD AUTO: 50.8 % — SIGNIFICANT CHANGE UP (ref 43–77)
NITRITE UR-MCNC: NEGATIVE — SIGNIFICANT CHANGE UP
NITRITE UR-MCNC: NEGATIVE — SIGNIFICANT CHANGE UP
NRBC # BLD: 0 /100 WBCS — SIGNIFICANT CHANGE UP (ref 0–0)
NRBC # BLD: 0 /100 WBCS — SIGNIFICANT CHANGE UP (ref 0–0)
NRBC # FLD: 0 K/UL — SIGNIFICANT CHANGE UP (ref 0–0)
NRBC # FLD: 0 K/UL — SIGNIFICANT CHANGE UP (ref 0–0)
NT-PROBNP SERPL-SCNC: 55 PG/ML — SIGNIFICANT CHANGE UP
NT-PROBNP SERPL-SCNC: 55 PG/ML — SIGNIFICANT CHANGE UP
OPIATES UR-MCNC: NEGATIVE — SIGNIFICANT CHANGE UP
OPIATES UR-MCNC: NEGATIVE — SIGNIFICANT CHANGE UP
OXYCODONE UR-MCNC: NEGATIVE — SIGNIFICANT CHANGE UP
OXYCODONE UR-MCNC: NEGATIVE — SIGNIFICANT CHANGE UP
PCO2 BLDV: 54 MMHG — SIGNIFICANT CHANGE UP (ref 42–55)
PCO2 BLDV: 54 MMHG — SIGNIFICANT CHANGE UP (ref 42–55)
PCP SPEC-MCNC: SIGNIFICANT CHANGE UP
PCP SPEC-MCNC: SIGNIFICANT CHANGE UP
PCP UR-MCNC: NEGATIVE — SIGNIFICANT CHANGE UP
PCP UR-MCNC: NEGATIVE — SIGNIFICANT CHANGE UP
PH BLDV: 7.35 — SIGNIFICANT CHANGE UP (ref 7.32–7.43)
PH BLDV: 7.35 — SIGNIFICANT CHANGE UP (ref 7.32–7.43)
PH UR: 6 — SIGNIFICANT CHANGE UP (ref 5–8)
PH UR: 6 — SIGNIFICANT CHANGE UP (ref 5–8)
PHOSPHATE SERPL-MCNC: 2.7 MG/DL — SIGNIFICANT CHANGE UP (ref 2.5–4.5)
PHOSPHATE SERPL-MCNC: 2.7 MG/DL — SIGNIFICANT CHANGE UP (ref 2.5–4.5)
PLATELET # BLD AUTO: 275 K/UL — SIGNIFICANT CHANGE UP (ref 150–400)
PLATELET # BLD AUTO: 275 K/UL — SIGNIFICANT CHANGE UP (ref 150–400)
PO2 BLDV: 24 MMHG — LOW (ref 25–45)
PO2 BLDV: 24 MMHG — LOW (ref 25–45)
POTASSIUM BLDV-SCNC: 5.7 MMOL/L — HIGH (ref 3.5–5.1)
POTASSIUM BLDV-SCNC: 5.7 MMOL/L — HIGH (ref 3.5–5.1)
POTASSIUM SERPL-MCNC: 4.1 MMOL/L — SIGNIFICANT CHANGE UP (ref 3.5–5.3)
POTASSIUM SERPL-MCNC: 4.1 MMOL/L — SIGNIFICANT CHANGE UP (ref 3.5–5.3)
POTASSIUM SERPL-MCNC: 5.1 MMOL/L — SIGNIFICANT CHANGE UP (ref 3.5–5.3)
POTASSIUM SERPL-MCNC: 5.1 MMOL/L — SIGNIFICANT CHANGE UP (ref 3.5–5.3)
POTASSIUM SERPL-SCNC: 4.1 MMOL/L — SIGNIFICANT CHANGE UP (ref 3.5–5.3)
POTASSIUM SERPL-SCNC: 4.1 MMOL/L — SIGNIFICANT CHANGE UP (ref 3.5–5.3)
POTASSIUM SERPL-SCNC: 5.1 MMOL/L — SIGNIFICANT CHANGE UP (ref 3.5–5.3)
POTASSIUM SERPL-SCNC: 5.1 MMOL/L — SIGNIFICANT CHANGE UP (ref 3.5–5.3)
PROT SERPL-MCNC: 7.4 G/DL — SIGNIFICANT CHANGE UP (ref 6–8.3)
PROT SERPL-MCNC: 7.4 G/DL — SIGNIFICANT CHANGE UP (ref 6–8.3)
PROT SERPL-MCNC: 7.6 G/DL — SIGNIFICANT CHANGE UP (ref 6–8.3)
PROT SERPL-MCNC: 7.6 G/DL — SIGNIFICANT CHANGE UP (ref 6–8.3)
PROT UR-MCNC: 30 MG/DL
PROT UR-MCNC: 30 MG/DL
RBC # BLD: 3.94 M/UL — LOW (ref 4.2–5.8)
RBC # BLD: 3.94 M/UL — LOW (ref 4.2–5.8)
RBC # FLD: 14.6 % — HIGH (ref 10.3–14.5)
RBC # FLD: 14.6 % — HIGH (ref 10.3–14.5)
RBC CASTS # UR COMP ASSIST: 1 /HPF — SIGNIFICANT CHANGE UP (ref 0–4)
RBC CASTS # UR COMP ASSIST: 1 /HPF — SIGNIFICANT CHANGE UP (ref 0–4)
REVIEW: SIGNIFICANT CHANGE UP
REVIEW: SIGNIFICANT CHANGE UP
SAO2 % BLDV: 26.4 % — LOW (ref 67–88)
SAO2 % BLDV: 26.4 % — LOW (ref 67–88)
SARS-COV-2 RNA SPEC QL NAA+PROBE: SIGNIFICANT CHANGE UP
SARS-COV-2 RNA SPEC QL NAA+PROBE: SIGNIFICANT CHANGE UP
SODIUM SERPL-SCNC: 138 MMOL/L — SIGNIFICANT CHANGE UP (ref 135–145)
SP GR SPEC: 1.03 — HIGH (ref 1–1.03)
SP GR SPEC: 1.03 — HIGH (ref 1–1.03)
SQUAMOUS # UR AUTO: 1 /HPF — SIGNIFICANT CHANGE UP (ref 0–5)
SQUAMOUS # UR AUTO: 1 /HPF — SIGNIFICANT CHANGE UP (ref 0–5)
THC UR QL: NEGATIVE — SIGNIFICANT CHANGE UP
THC UR QL: NEGATIVE — SIGNIFICANT CHANGE UP
TROPONIN T, HIGH SENSITIVITY RESULT: 9 NG/L — SIGNIFICANT CHANGE UP
TROPONIN T, HIGH SENSITIVITY RESULT: 9 NG/L — SIGNIFICANT CHANGE UP
UROBILINOGEN FLD QL: 1 MG/DL — SIGNIFICANT CHANGE UP (ref 0.2–1)
UROBILINOGEN FLD QL: 1 MG/DL — SIGNIFICANT CHANGE UP (ref 0.2–1)
WBC # BLD: 7.23 K/UL — SIGNIFICANT CHANGE UP (ref 3.8–10.5)
WBC # BLD: 7.23 K/UL — SIGNIFICANT CHANGE UP (ref 3.8–10.5)
WBC # FLD AUTO: 7.23 K/UL — SIGNIFICANT CHANGE UP (ref 3.8–10.5)
WBC # FLD AUTO: 7.23 K/UL — SIGNIFICANT CHANGE UP (ref 3.8–10.5)
WBC UR QL: 2 /HPF — SIGNIFICANT CHANGE UP (ref 0–5)
WBC UR QL: 2 /HPF — SIGNIFICANT CHANGE UP (ref 0–5)

## 2023-12-28 PROCEDURE — 93010 ELECTROCARDIOGRAM REPORT: CPT

## 2023-12-28 PROCEDURE — 99285 EMERGENCY DEPT VISIT HI MDM: CPT

## 2023-12-28 PROCEDURE — 71045 X-RAY EXAM CHEST 1 VIEW: CPT | Mod: 26

## 2023-12-28 RX ORDER — KETOROLAC TROMETHAMINE 30 MG/ML
30 SYRINGE (ML) INJECTION ONCE
Refills: 0 | Status: DISCONTINUED | OUTPATIENT
Start: 2023-12-28 | End: 2023-12-28

## 2023-12-28 RX ADMIN — Medication 30 MILLIGRAM(S): at 08:06

## 2023-12-28 RX ADMIN — Medication 2 MILLIGRAM(S): at 07:36

## 2023-12-28 NOTE — ED BEHAVIORAL HEALTH ASSESSMENT NOTE - NSBHROSSYSTEMS_PSY_ALL_CORE
,Remote Patient Monitoring Note      Date/Time:  10/21/2022 1:05 PM  LPN contacted patient by telephone regarding red alert and yellow alert received for blood pressure reading (179/65 and FALSE ALERT for wt). Verified patients name and  as identifiers. Background: RPM for HTN Clinical Interventions: Reviewed and followed up on alerts and treatments-LPN reviewed yellow alert for BP and FALSE red alert for wt. Pt stated that she is not having any symptoms and feels that she may be placing the cuff too tightly. Writer asked pt to recheck BP but pt stated that she was not near her BP monitor and reassured writer that she is not having any symptoms. Denied HA, dizziness, AMS. 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 Wt today is 142.6 and starting wt is 143. Writer will route to [facility] to advise of the status. Plan/Follow Up: Will continue to review, monitor and address alerts with follow up based on severity of symptoms and risk factors. Chanda Larson LPN, [facility] PH: [phone] ---- Current Patient Metrics ---- Activity: - mins Blood Pressure: 179/65, 64bpm Pulseox: 95%, 62bpm Weight: 142.6lbs Note Created at: 10/21/2022 01:08 PM ET ---- Time-Spent: 7 minutes 0 seconds  Chanda Larson LPNAltru Health Systems  PH: [phone]    UPDATE- 2:25 pm- Writer contacted pt to advise of PCP's instructions to take extra Lisinopril with elevated BP and pt verbalized understanding.   Chanda Larson LPN, [facility] 30: [phone]
Constitutional Symptoms.../Cardiovascular...

## 2023-12-28 NOTE — ED BEHAVIORAL HEALTH ASSESSMENT NOTE - HPI (INCLUDE ILLNESS QUALITY, SEVERITY, DURATION, TIMING, CONTEXT, MODIFYING FACTORS, ASSOCIATED SIGNS AND SYMPTOMS)
58 yr old male, single, undomiciled and unemployed.  self reports hx of bipolar disorder, depression and anxiety; as per Psyckes: with pan-diagnoses namely: Major Depressive Disorder, Substance-Induced Depressive Disorder, Unspecified/Other Bipolar, Depressive Disorder,  Antisocial Personality Disorder,  schizoaffective Disorder, Bipolar I, Bipolar II, Unspecified/Other Anxiety Disorder, Adjustment Disorder, Substance-Induced Sleep Disorder, Schizophrenia, Substance-Induced Psychotic Disorder, Unspecified/Other Psychotic Disorders, Attention Deficit Hyperactivity Disorder, Borderline Personality Disorder, Unspecified/Other Psychotic Disorders, Attention Deficit Hyperactivity Disorder, Borderline Personality Disorder, Insomnia Disorder, Other Mental Disorders  and Substance-Induced Anxiety Disorder.  Pt is chronically nonadherent to meds/ psych appts.  has hx of multiple psych admissions- Last to [facility] -. self reports hx of self aborted SA (jumping in front of a 7 train - last summer). with polysubstance use: Alcohol (denied any seizures, stated in past he has had VH during detox - per psyckes, with multiple detoxes/ rehabs - last attendance at [facility]- reports he was discharged a few days agofor Alcohol dependence, uncomplicated)/ Cocaine/ Other psychoactive substance related disorders/ Cannabis/ Tobacco/ Opioid/  Other stimulant related disorders.  Pt self reports hx of self aborted SA (jumping in front of a 7 train last summer). Ppertinent medical issues include: HTN, CKD, GERD, pericarditis; self reports hx of CHF (with ? EF 45%).  this morning, self presented to the ED complaining of chest pain and suicidal ideation.     Patient reports he came to the ED because he feels physically and mentally ill. He stated 7 days ago he left [facility] and could not attend rehab because he had covid. He reports since leaving the detox he relapsed on alcohol and has been drinking "a few pints of liquor daily." He stated he has been having physical and emotional issues. He stated he has been having chest pain and feeling numbness on his left side. He reports he also has moments of dizziness and "my whole body aches." He stated he feels depressed and has chronic suicidal thoughts. He has a plan to get his friends gun or jump in front of a train. He stated "I keep doing the same thing." He reports his longest period of sobriety was from 0621-2010 when he lives down [facility]. He has an [employer] worker Kim but stated they are located in [place] with his psychiatrist and he finds that area triggering so he is not compliant with treatment.  He was unable to safety plan outside the hospital environment.     Patient reports over the last week he has not been sleeping, not eating, feeling depressed, hopeless and has low energy. Patient described depression as feeling sad + associated symptoms of poor sleep; low energy level; feelings of hopelessness.  He endorsed  intermittent AH (hearing  mother's voice ) but only when he took Depakote and Abilify. He endorsed+ ? VH (seeing bugs crawl - ? formication in the context of past detox ? He denied current AH/VH. He  denied any other perceptual disturbances. no thought insertion/ broadcasting/ withdrawal.        Patient questioned regarding HI and he stated he has HI to harm his daughters mother but refused to discuss these thoughts further.     per Prior chart reviewed -23- "admits harboring chronic SI. he stated  "I want to kill myself."  he has a plan to jump in front of the E train.  currently, denied any intentions.  no other plans raised nor any researches conducted re: potential lethal means to commit suicide.  currently denied any HI but expresses anger toward his child's mother. fantasizes "hanging her (child's mother) from a tree so she can suffer." Pt denies any ongoing pending legal issues."      Per chart review there was past psych evaluation conducted at [facility] ED back in 2022 under similar circumstances.  Pt initially presented with complaint of chest pain and later on, endorsing SI. Pt was assessed with substance induced mood symptoms, concern for conditional SI and malingering.  that encounter also yielded Pt being psych cleared for discharge    Attempted to call [facility] ([facility]) [facility] (Admission Date: 22-AUG-22) • Main  Contact: Kim Juan Carlos, [phone]- D 58 yr old male, single, undomiciled and unemployed.  self reports hx of bipolar disorder, depression and anxiety; as per Psyckes: with pan-diagnoses namely: Major Depressive Disorder, Substance-Induced Depressive Disorder, Unspecified/Other Bipolar, Depressive Disorder,  Antisocial Personality Disorder,  schizoaffective Disorder, Bipolar I, Bipolar II, Unspecified/Other Anxiety Disorder, Adjustment Disorder, Substance-Induced Sleep Disorder, Schizophrenia, Substance-Induced Psychotic Disorder, Unspecified/Other Psychotic Disorders, Attention Deficit Hyperactivity Disorder, Borderline Personality Disorder, Unspecified/Other Psychotic Disorders, Attention Deficit Hyperactivity Disorder, Borderline Personality Disorder, Insomnia Disorder, Other Mental Disorders  and Substance-Induced Anxiety Disorder.  Pt is chronically nonadherent to meds/ psych appts.  has hx of multiple psych admissions- Last to [facility] -. self reports hx of self aborted SA (jumping in front of a 7 train - last summer). with polysubstance use: Alcohol (denied any seizures, stated in past he has had VH during detox - per psyckes, with multiple detoxes/ rehabs - last attendance at [facility]- reports he was discharged a few days agofor Alcohol dependence, uncomplicated)/ Cocaine/ Other psychoactive substance related disorders/ Cannabis/ Tobacco/ Opioid/  Other stimulant related disorders.  Pt self reports hx of self aborted SA (jumping in front of a 7 train last summer). Ppertinent medical issues include: HTN, CKD, GERD, pericarditis; self reports hx of CHF (with ? EF 45%).  this morning, self presented to the ED complaining of chest pain and suicidal ideation.     Patient reports he came to the ED because he feels physically and mentally ill. He stated 7 days ago he left [facility] and could not attend rehab because he had covid. He reports since leaving the detox he relapsed on alcohol and has been drinking "a few pints of liquor daily." He stated he has been having physical and emotional issues. He stated he has been having chest pain and feeling numbness on his left side. He reports he also has moments of dizziness and "my whole body aches." He stated he feels depressed and has chronic suicidal thoughts. He has a plan to get his friends gun or jump in front of a train. He stated "I keep doing the same thing." He reports his longest period of sobriety was from 1011-2996 when he lives down [facility]. He has an [employer] worker Kim but stated they are located in [place] with his psychiatrist and he finds that area triggering so he is not compliant with treatment.  He was unable to safety plan outside the hospital environment.     Patient reports over the last week he has not been sleeping, not eating, feeling depressed, hopeless and has low energy. Patient described depression as feeling sad + associated symptoms of poor sleep; low energy level; feelings of hopelessness.  He endorsed  intermittent AH (hearing  mother's voice ) but only when he took Depakote and Abilify. He endorsed+ ? VH (seeing bugs crawl - ? formication in the context of past detox ? He denied current AH/VH. He  denied any other perceptual disturbances. no thought insertion/ broadcasting/ withdrawal.        Patient questioned regarding HI and he stated he has HI to harm his daughters mother but refused to discuss these thoughts further.     per Prior chart reviewed -23- "admits harboring chronic SI. he stated  "I want to kill myself."  he has a plan to jump in front of the E train.  currently, denied any intentions.  no other plans raised nor any researches conducted re: potential lethal means to commit suicide.  currently denied any HI but expresses anger toward his child's mother. fantasizes "hanging her (child's mother) from a tree so she can suffer." Pt denies any ongoing pending legal issues."      Per chart review there was past psych evaluation conducted at [facility] ED back in 2022 under similar circumstances.  Pt initially presented with complaint of chest pain and later on, endorsing SI. Pt was assessed with substance induced mood symptoms, concern for conditional SI and malingering.  that encounter also yielded Pt being psych cleared for discharge    Attempted to call [facility] ([facility]) [facility] (Admission Date: 22-AUG-22) • Main  Contact: Kim Juan Carlos, [phone]- Q 58 yr old male, single, undomiciled and unemployed.  self reports hx of bipolar disorder, depression and anxiety; as per Psyckes: with pan-diagnoses namely: Major Depressive Disorder, Substance-Induced Depressive Disorder, Unspecified/Other Bipolar, Depressive Disorder,  Antisocial Personality Disorder,  schizoaffective Disorder, Bipolar I, Bipolar II, Unspecified/Other Anxiety Disorder, Adjustment Disorder, Substance-Induced Sleep Disorder, Schizophrenia, Substance-Induced Psychotic Disorder, Unspecified/Other Psychotic Disorders, Attention Deficit Hyperactivity Disorder, Borderline Personality Disorder, Unspecified/Other Psychotic Disorders, Attention Deficit Hyperactivity Disorder, Borderline Personality Disorder, Insomnia Disorder, Other Mental Disorders  and Substance-Induced Anxiety Disorder.  Pt is chronically nonadherent to meds/ psych appts.  has hx of multiple psych admissions- Last to [facility] -. self reports hx of self aborted SA (jumping in front of a 7 train - last summer). with polysubstance use: Alcohol (denied any seizures, stated in past he has had VH during detox - per psyckes, with multiple detoxes/ rehabs - last attendance at [facility]- reports he was discharged a few days ago for Alcohol dependence, uncomplicated)/ Cocaine/ Other psychoactive substance related disorders/ Cannabis/ Tobacco/ Opioid/  Other stimulant related disorders.  Pt self reports hx of self aborted SA (jumping in front of a 7 train last summer). Pertinent medical issues include: HTN, CKD, GERD, pericarditis; self reports hx of CHF (with ? EF 45%).  This morning, self presented to the ED complaining of chest pain and suicidal ideation.     Patient reports he came to the ED because he feels physically and mentally ill. He stated 7 days ago he left [facility] and could not attend rehab because he had covid. He reports since leaving the detox he relapsed on alcohol and has been drinking "a few pints of liquor daily." He stated he has been having physical and emotional issues. He stated he has been having chest pain and feeling numbness on his left side. He reports he also has moments of dizziness and "my whole body aches." He stated he feels depressed and has chronic suicidal thoughts. He has a plan to get his friends gun or jump in front of a train. He stated "I keep doing the same thing." He reports his longest period of sobriety was from 4114-4310 when he lives down [facility]. He has an [employer] worker Kim but stated they are located in [place] with his psychiatrist and he finds that area triggering so he is not compliant with treatment.  He was unable to safety plan outside the hospital environment.     Patient reports over the last week he has not been sleeping, not eating, feeling depressed, hopeless and has low energy. Patient described depression as feeling sad + associated symptoms of poor sleep; low energy level; feelings of hopelessness.  He endorsed  intermittent AH (hearing  mother's voice ) but only when he took Depakote and Abilify. He endorsed+ ? VH (seeing bugs crawl - ? formication in the context of past detox ? He denied current AH/VH. He  denied any other perceptual disturbances. no thought insertion/ broadcasting/ withdrawal.        Patient questioned regarding HI and he stated he has HI to harm his daughters mother but refused to discuss these thoughts further.     per Prior chart reviewed -23- "admits harboring chronic SI. he stated  "I want to kill myself."  he has a plan to jump in front of the E train.  currently, denied any intentions.  no other plans raised nor any researches conducted re: potential lethal means to commit suicide.  currently denied any HI but expresses anger toward his child's mother. fantasizes "hanging her (child's mother) from a tree so she can suffer." Pt denies any ongoing pending legal issues."      Per chart review there was past psych evaluation conducted at [facility] ED back in 2022 under similar circumstances.  Pt initially presented with complaint of chest pain and later on, endorsing SI. Pt was assessed with substance induced mood symptoms, concern for conditional SI and malingering.  that encounter also yielded Pt being psych cleared for discharge    Attempted to call [facility] ([facility]) [facility] (Admission Date: 22-AUG-22) • Main  Contact: Kim Rangel, [phone]- [facility] 58 yr old male, single, undomiciled and unemployed.  self reports hx of bipolar disorder, depression and anxiety; as per Psyckes: with pan-diagnoses namely: Major Depressive Disorder, Substance-Induced Depressive Disorder, Unspecified/Other Bipolar, Depressive Disorder,  Antisocial Personality Disorder,  schizoaffective Disorder, Bipolar I, Bipolar II, Unspecified/Other Anxiety Disorder, Adjustment Disorder, Substance-Induced Sleep Disorder, Schizophrenia, Substance-Induced Psychotic Disorder, Unspecified/Other Psychotic Disorders, Attention Deficit Hyperactivity Disorder, Borderline Personality Disorder, Unspecified/Other Psychotic Disorders, Attention Deficit Hyperactivity Disorder, Borderline Personality Disorder, Insomnia Disorder, Other Mental Disorders  and Substance-Induced Anxiety Disorder.  Pt is chronically nonadherent to meds/ psych appts.  has hx of multiple psych admissions- Last to [facility] -. self reports hx of self aborted SA (jumping in front of a 7 train - last summer). with polysubstance use: Alcohol (denied any seizures, stated in past he has had VH during detox - per psyckes, with multiple detoxes/ rehabs - last attendance at [facility]- reports he was discharged a few days ago for Alcohol dependence, uncomplicated)/ Cocaine/ Other psychoactive substance related disorders/ Cannabis/ Tobacco/ Opioid/  Other stimulant related disorders.  Pt self reports hx of self aborted SA (jumping in front of a 7 train last summer). Pertinent medical issues include: HTN, CKD, GERD, pericarditis; self reports hx of CHF (with ? EF 45%).  This morning, self presented to the ED complaining of chest pain and suicidal ideation.     Patient reports he came to the ED because he feels physically and mentally ill. He stated 7 days ago he left [facility] and could not attend rehab because he had covid. He reports since leaving the detox he relapsed on alcohol and has been drinking "a few pints of liquor daily." He stated he has been having physical and emotional issues. He stated he has been having chest pain and feeling numbness on his left side. He reports he also has moments of dizziness and "my whole body aches." He stated he feels depressed and has chronic suicidal thoughts. He has a plan to get his friends gun or jump in front of a train. He stated "I keep doing the same thing." He reports his longest period of sobriety was from 7611-2085 when he lives down [facility]. He has an [employer] worker Kim but stated they are located in [place] with his psychiatrist and he finds that area triggering so he is not compliant with treatment.  He was unable to safety plan outside the hospital environment.     Patient reports over the last week he has not been sleeping, not eating, feeling depressed, hopeless and has low energy. Patient described depression as feeling sad + associated symptoms of poor sleep; low energy level; feelings of hopelessness.  He endorsed  intermittent AH (hearing  mother's voice ) but only when he took Depakote and Abilify. He endorsed+ ? VH (seeing bugs crawl - ? formication in the context of past detox ? He denied current AH/VH. He  denied any other perceptual disturbances. no thought insertion/ broadcasting/ withdrawal.        Patient questioned regarding HI and he stated he has HI to harm his daughters mother but refused to discuss these thoughts further.     per Prior chart reviewed -23- "admits harboring chronic SI. he stated  "I want to kill myself."  he has a plan to jump in front of the E train.  currently, denied any intentions.  no other plans raised nor any researches conducted re: potential lethal means to commit suicide.  currently denied any HI but expresses anger toward his child's mother. fantasizes "hanging her (child's mother) from a tree so she can suffer." Pt denies any ongoing pending legal issues."      Per chart review there was past psych evaluation conducted at [facility] ED back in 2022 under similar circumstances.  Pt initially presented with complaint of chest pain and later on, endorsing SI. Pt was assessed with substance induced mood symptoms, concern for conditional SI and malingering.  that encounter also yielded Pt being psych cleared for discharge    Attempted to call [facility] ([facility]) [facility] (Admission Date: 22-AUG-22) • Main  Contact: Kim Rangel, [phone]- [place]

## 2023-12-28 NOTE — ED ADULT NURSE NOTE - OBJECTIVE STATEMENT
patient brought to room 3A. Patient ambulatory at baseline. patient alert and oriented times four. Patient endorses chest pain and SI. Patients EKG in the chart. Patient vitals unremarkable. normal sinus on the monitor. Patient being difficult at triage and back here in the area with his belongings being taken away put across old cat scan room. Belongings taken and put away. MD stephenson at the bedside completing eval. awaiting further orders.     pt c/o left sided cp that started 20 minutes ago [place] sob.  also states has felt suicidal for a year. has made attempts in the past. states he tried to shoot himself. denies having weapons or firearms at this time.  denies drugs or etoh. also wants to be tested for covid.  past medical history- bipolar, pericarditis, htn

## 2023-12-28 NOTE — ED BEHAVIORAL HEALTH ASSESSMENT NOTE - NS ED BHA HOMICIDALITY PRESENT AGGRESSION PROPERTY PAST MONTH
HPI     Diabetic Eye Exam      Additional comments: photos              Comments     Screening photos          Last edited by Lenka Bailey MA on 9/17/2019  3:40 PM. (History)            Assessment /Plan     For exam results, see Encounter Report.    Diabetes mellitus without complication  -     Diabetic Eye Screening Photo      62 y.o. y/o here for screening for Diabetic Renopathy with non-dilated fundus photos per Balbir Avery MD                      None known

## 2023-12-28 NOTE — ED ADULT NURSE REASSESSMENT NOTE - NS ED NURSE REASSESS COMMENT FT1
RN: Pt brought to  by staff to get changed into hospital clothing. Pt endorsing worsening chest pain and is becoming agitated. MD Munguia made aware and came to  to evaluate pt. Pt was able to be verbally deescalated by staff. Pt agreed to take PO medication. Pt then medicated as ordered. Pt safely changed and belongings secured. Pt returned to spot  for further evaluation

## 2023-12-28 NOTE — ED ADULT NURSE REASSESSMENT NOTE - NS ED NURSE REASSESS COMMENT FT1
Received report from EVERT Sims, pt A&OX3, ambulatory at baseline here for chest pain and suicidal ideations "for a while". Pt has a plan in place. Pt says drinks alcohol on weekends and denies use of drugs. No hallucinations at present. Normal sinus rhythm on the cardiac monitor. Respirations even and unlabored, chest rise equal b/l. On 1:1 for safety. Cooperative at this time. Safety being maintained through out.

## 2023-12-28 NOTE — ED BEHAVIORAL HEALTH ASSESSMENT NOTE - DETAILS
reports chest pain, dizziness- Informed Dr. Mccarty- patient medically cleared reports diarrhea with Zoloft self reports getting into fights - none recent; HI regarding child's mother but refuses to discuss further in custody of mother (currently based in [place], [place]) in custody of mother (currently based in [place], [place]) Denies any intent, denies any preparatory acts. self reports recent aborted SA via jumping in front of a 7 train. fatigued, numbness- Dr Carnes aware- patient medically cleared per transfer protocol discussed with  ED team Admitted to [place] discharged beginning of December 2023 Admitted to [facility] discharged beginning of December 2023 central intake reported they cannot provide information, since patient is discharged refused to discuss fatigued, numbness- Dr Mccarty aware- patient medically cleared

## 2023-12-28 NOTE — ED ADULT NURSE REASSESSMENT NOTE - NS ED NURSE REASSESS COMMENT FT1
Patient report taken from Main ER RN. Patient is calm and cooperative. Patient waiting for bed assignment.  EVERT Crowell

## 2023-12-28 NOTE — ED BEHAVIORAL HEALTH ASSESSMENT NOTE - NSBHATTESTTYPEVISIT_PSY_A_CORE
Attending evaluating patient with EMMIE (70137/93862 code) Attending evaluating patient with EMMIE (49264/19544 code)

## 2023-12-28 NOTE — ED BEHAVIORAL HEALTH ASSESSMENT NOTE - NSBHSAALC_PSY_A_CORE FT
reports daily alcohol use ~3-4 pints of liquor daily- last drank this AM per patient , denies seizures, ? history of DTs?

## 2023-12-28 NOTE — ED BEHAVIORAL HEALTH NOTE - BEHAVIORAL HEALTH NOTE
writer received a call from [facility].  patient requires CIWA and UTOX. writer received a call from [facility].  patient requires CIWA and UTOX.

## 2023-12-28 NOTE — ED BEHAVIORAL HEALTH ASSESSMENT NOTE - RISK ASSESSMENT
Assessed at moderate acute suicide risk + elevated chronic suicide risk.  Currently with active SI + plan ? HI; past reported hx of self aborted SA; c    has chronic hx of somatic pain + recurrent alcohol use, notably treatment-seeking, forward goal oriented  thinking, with pattern of seeking help when in distress. additional protective factors include sense of commitment to 15 yr old daughter, currently not floridly psychotic or manic    Static risk factors include age, male gender, hx of psychiatric illness, with multiple substance use    Modifiable risk factors include recent substance use, lack of engagement in psychiatric/substance use treatment, unstable housing, social support

## 2023-12-28 NOTE — ED PROVIDER NOTE - ATTENDING CONTRIBUTION TO CARE
I (Ezra) agree with above, I performed a history and physical. Counseled [facility] medical staff, physician assistant, and/or medical student on medical decision making as documented. Medical decisions and treatment interventions were made in real time during the patient encounter. Additionally and/or with the following exceptions: Patient is a 58-year-old male past medical history of bipolar disorder and depression with multiple psychiatric admissions presenting to the emergency department with chest pain.  Radiated to neck.  Also had shortness of breath as well.  Was nonexertional.  Patient was well-appearing, although uncomfortable appearing.  No respiratory distress.  Neurologically intact ambulatory without ataxia.  Vital signs reviewed and were within normal limits.  On psychiatric screening exam patient stated he was feeling depressed and he would kill himself with a gun.  Said he has access to a gun in his apartment.  Patient signed out to oncoming attending pending labs and was medically cleared psychiatric consultation

## 2023-12-28 NOTE — ED BEHAVIORAL HEALTH ASSESSMENT NOTE - NSBHATTESTAPPAMEND_PSY_A_CORE
I have personally seen and examined this patient. I fully participated in the care of this patient. I have made amendments to the documentation where appropriate and otherwise agree with the history, physical exam, and plan as documented by the EMMIE

## 2023-12-28 NOTE — ED PROVIDER NOTE - CLINICAL SUMMARY MEDICAL DECISION MAKING FREE TEXT BOX
58-year-old male patient past medical history of bipolar disorder, depression, anxiety who is nonadherent to his medications, pericarditis, polysubstance abuse who presents to the emergency department for 7 days of left-sided chest pain that radiates towards his neck and temple.  Of note, endorses shortness of breath and SI as well.  Patient was seen in the ED about 1 month ago with similar complaints and was later transferred and admitted to [facility].  Patient endorses he wants to shoot himself, however, denies having any guns.  At this time, does not appear manic nor psychotic.  Will draw labs, EKG and chest x-ray for medical clearance and will consult psychiatry for reevaluation and assessment after recent psych hospitalization. 58-year-old male patient past medical history of bipolar disorder, depression, anxiety who is nonadherent to his medications, pericarditis, polysubstance abuse who presents to the emergency department for 7 days of left-sided chest pain that radiates towards his neck and temple.  Of note, endorses shortness of breath and SI as well.  Patient was seen in the ED about 1 month ago with similar complaints and was later transferred and admitted to [facility].  Patient endorses he wants to shoot himself, however, denies having any guns.  At this time, does not appear manic nor psychotic.  Will draw labs, EKG and chest x-ray for medical clearance and will consult psychiatry for reevaluation and assessment after recent psych hospitalization.

## 2023-12-28 NOTE — ED BEHAVIORAL HEALTH NOTE - BEHAVIORAL HEALTH NOTE
Writer was informed there were no beds at [facility], no beds at [facility], patient being reviewed by [facility]. Writer was informed there were no beds at [facility], no beds at [facility], patient being reviewed by [facility].

## 2023-12-28 NOTE — ED ADULT NURSE NOTE - NS_ED_NURSE_RECEIVING_FACILITY _ED_ALL_ED
[facility]3] [facility]

## 2023-12-28 NOTE — ED BEHAVIORAL HEALTH NOTE - BEHAVIORAL HEALTH NOTE
COVID Exposure Screen- Patient    1. *Have you had a COVID-19 test in the last 90 days? ( x) Yes ( ) No ( ) Unknown- Reason: _____    IF YES PROCEED TO QUESTION #2. IF NO OR UNKNOWN, PLEASE SKIP TO QUESTION #3.    Date of test(s) and result(s): reports + result 7 days ago; currently covid PCR negative     2. *In the past 10 days, have you been around anyone with a positive COVID-19 test?* (x ) Yes ( ) No ( ) Unknown- Reason: ____    see above

## 2023-12-28 NOTE — ED BEHAVIORAL HEALTH ASSESSMENT NOTE - VIOLENCE RISK FACTORS:
Violent ideation/threat/speech/Substance abuse/Impulsivity/Lack of insight into violence risk/need for treatment/Noncompliance with treatment/Community stressors that increase the risk of destabilization

## 2023-12-28 NOTE — ED PROVIDER NOTE - PHYSICAL EXAMINATION
GENERAL: Awake, alert, NAD  HEENT: NC/AT, moist mucous membranes, EOMI  LUNGS: CTAB, no wheezes or crackles   CARDIAC: RRR, no m/r/g  ABDOMEN: Soft, non tender, non distended, no rebound, no guarding  BACK: No midline spinal tenderness  EXT: No edema, no calf tenderness, no deformities.  NEURO: A&Ox3. Moving all extremities.  SKIN: Warm and dry. No rash.  PSYCH: endorsing SI with plan to shoot himself. denies HI or hallucinations

## 2023-12-28 NOTE — ED BEHAVIORAL HEALTH NOTE - BEHAVIORAL HEALTH NOTE
[facility] PRECERT:   Auth# [id number]  [employer]: [id number]  5     [employer] will call  with amount of days authorized. [facility] PRECERT:   Auth# [id number]  [employer]: [email or web address]  9     [employer] will call  with amount of days authorized.

## 2023-12-28 NOTE — ED PROVIDER NOTE - ED STEMI HIDDEN
Action 2021   Action Taken CE FOR [facility] UPDATED.  IMAGING REQUESTED   TRACKIN  CK     Action 10/05/2021 JTV 7:43am   Action Taken \[facility]\"" received CD with images from [facility]. Records were scanned and CD with images was sent to  for processing.        
hide

## 2023-12-28 NOTE — ED BEHAVIORAL HEALTH ASSESSMENT NOTE - SUMMARY
58 yr old male, single, undomiciled and unemployed.  self reports hx of bipolar disorder, depression and anxiety; as per Psyckes: with pan-diagnoses namely: Major Depressive Disorder, Substance-Induced Depressive Disorder, Unspecified/Other Bipolar, Depressive Disorder,  Antisocial Personality Disorder,  schizoaffective Disorder, Bipolar I, Bipolar II, Unspecified/Other Anxiety Disorder, Adjustment Disorder, Substance-Induced Sleep Disorder, Schizophrenia, Substance-Induced Psychotic Disorder, Unspecified/Other Psychotic Disorders, Attention Deficit Hyperactivity Disorder, Borderline Personality Disorder, Unspecified/Other Psychotic Disorders, Attention Deficit Hyperactivity Disorder, Borderline Personality Disorder, Insomnia Disorder, Other Mental Disorders  and Substance-Induced Anxiety Disorder.  Pt is chronically nonadherent to meds/ psych appts.  has hx of multiple psych admissions- Last to [facility] 11/23-12/23. self reports hx of self aborted SA (jumping in front of a 7 train - last summer). with polysubstance use: Alcohol (denied any seizures, stated in past he has had VH during detox - per psyckes, with multiple detoxes/ rehabs - last attendance at [facility]- reports he was discharged a few days agofor Alcohol dependence, uncomplicated)/ Cocaine/ Other psychoactive substance related disorders/ Cannabis/ Tobacco/ Opioid/  Other stimulant related disorders.  Pt self reports hx of self aborted SA (jumping in front of a 7 train last summer). Ppertinent medical issues include: HTN, CKD, GERD, pericarditis; self reports hx of CHF (with ? EF 45%).  this morning, self presented to the ED complaining of chest pain and suicidal ideation.     At this time, endorses  depression with chronic suicidal ideation with thoughts to use his friends gun or jump in front of a train, he endorses recent relapse on alcohol. He was  unable to partake towards safety planning. it seems that current presentation is same as with past ED presentation - differentials to entertain for this case include: Unspecified mood disorder, suspicion for substance-induced mood disorder; axis II pathology and malingering.      Currently, does not appear to be acutely manic nor psychotic. He is help seeking and wishes to pursue 9.13 admission.  ED service will help facilitate such request. however, if he rescinds and is able to safety plan, will consider discharging him.  Per EM Dr. Mccarty patient stated medically cleared.     recommendations:  1.  start Zoloft 50mg titrate.   2. PRNs: seroquel 50mg PO q6Hrs for agitation; PRNs: zyprexa 5mg IM q6Hrs for severe agitation. defer if qtC > 500m/s  3. other medical issues to be recommended by ED treatment team. start folic acid PO, thiamine PO + MVI 1 tab daily  4. symptom triggered CIWA 58 yr old male, single, undomiciled and unemployed.  self reports hx of bipolar disorder, depression and anxiety; as per Psyckes: with pan-diagnoses namely: Major Depressive Disorder, Substance-Induced Depressive Disorder, Unspecified/Other Bipolar, Depressive Disorder,  Antisocial Personality Disorder,  schizoaffective Disorder, Bipolar I, Bipolar II, Unspecified/Other Anxiety Disorder, Adjustment Disorder, Substance-Induced Sleep Disorder, Schizophrenia, Substance-Induced Psychotic Disorder, Unspecified/Other Psychotic Disorders, Attention Deficit Hyperactivity Disorder, Borderline Personality Disorder, Unspecified/Other Psychotic Disorders, Attention Deficit Hyperactivity Disorder, Borderline Personality Disorder, Insomnia Disorder, Other Mental Disorders  and Substance-Induced Anxiety Disorder.  Pt is chronically nonadherent to meds/ psych appts.  has hx of multiple psych admissions- Last to [facility] 11/23-12/23. self reports hx of self aborted SA (jumping in front of a 7 train - last summer). with polysubstance use: Alcohol (denied any seizures, stated in past he has had VH during detox - per psyckes, with multiple detoxes/ rehabs - last attendance at [facility]- reports he was discharged a few days agofor Alcohol dependence, uncomplicated)/ Cocaine/ Other psychoactive substance related disorders/ Cannabis/ Tobacco/ Opioid/  Other stimulant related disorders.  Pt self reports hx of self aborted SA (jumping in front of a 7 train last summer). Ppertinent medical issues include: HTN, CKD, GERD, pericarditis; self reports hx of CHF (with ? EF 45%).  this morning, self presented to the ED complaining of chest pain and suicidal ideation.     At this time, endorses  depression with chronic suicidal ideation with thoughts to use his friends gun or jump in front of a train, he endorses recent relapse on alcohol. He was  unable to partake towards safety planning. it seems that current presentation is same as with past ED presentation - differentials to entertain for this case include: Unspecified mood disorder, suspicion for substance-induced mood disorder; axis II pathology and malingering.      Currently, does not appear to be acutely manic nor psychotic. He is help seeking and wishes to pursue 9.13 admission.  ED service will help facilitate such request. however, if he rescinds and is able to safety plan, will consider discharging him.  Per EM Dr. Mccarty patient stated medically cleared.     recommendations:  1.  start Zoloft 50mg titrate.   2. PRNs: seroquel 50mg PO q6Hrs for agitation; PRNs: zyprexa 5mg IM q6Hrs for severe agitation. defer if qtC > 500m/s  3. other medical issues to be recommended by ED treatment team. start folic acid PO, thiamine PO + MVI 1 tab daily  4. symptom triggered CIWA 58 yr old male, single, undomiciled and unemployed.  self reports hx of bipolar disorder, depression and anxiety; as per Psyckes: with pan-diagnoses namely: Major Depressive Disorder, Substance-Induced Depressive Disorder, Unspecified/Other Bipolar, Depressive Disorder,  Antisocial Personality Disorder,  schizoaffective Disorder, Bipolar I, Bipolar II, Unspecified/Other Anxiety Disorder, Adjustment Disorder, Substance-Induced Sleep Disorder, Schizophrenia, Substance-Induced Psychotic Disorder, Unspecified/Other Psychotic Disorders, Attention Deficit Hyperactivity Disorder, Borderline Personality Disorder, Unspecified/Other Psychotic Disorders, Attention Deficit Hyperactivity Disorder, Borderline Personality Disorder, Insomnia Disorder, Other Mental Disorders  and Substance-Induced Anxiety Disorder.  Pt is chronically nonadherent to meds/ psych appts.  has hx of multiple psych admissions- Last to [facility] 11/23-12/23. self reports hx of self aborted SA (jumping in front of a 7 train - last summer). with polysubstance use: Alcohol (denied any seizures, stated in past he has had VH during detox - per psyckes, with multiple detoxes/ rehabs - last attendance at [facility]- reports he was discharged a few days agofor Alcohol dependence, uncomplicated)/ Cocaine/ Other psychoactive substance related disorders/ Cannabis/ Tobacco/ Opioid/  Other stimulant related disorders.  Pt self reports hx of self aborted SA (jumping in front of a 7 train last summer). Ppertinent medical issues include: HTN, CKD, GERD, pericarditis; self reports hx of CHF (with ? EF 45%).  this morning, self presented to the ED complaining of chest pain and suicidal ideation.     At this time, endorses depression with chronic suicidal ideation with thoughts to use his friends gun or jump in front of a train, he endorses recent relapse on alcohol. He was  unable to partake towards safety planning. it seems that current presentation is same as with past ED presentation - differentials to entertain for this case include: Unspecified mood disorder, suspicion for substance-induced mood disorder; axis II pathology and malingering.      Currently, does not appear to be acutely manic nor psychotic. He is help seeking and wishes to pursue 9.13 admission.  ED service will help facilitate such request. however, if he rescinds and is able to safety plan, will consider discharging him.  Per EM Dr. Mccarty patient stated medically cleared.     recommendations:  1.  start Zoloft 50mg titrate.   2. PRNs: seroquel 50mg PO q6Hrs for agitation; PRNs: zyprexa 5mg IM q6Hrs for severe agitation. defer if qtC > 500m/s  3. other medical issues to be recommended by ED treatment team. start folic acid PO, thiamine PO + MVI 1 tab daily  4. symptom triggered CIWA 58 yr old male, single, undomiciled and unemployed.  self reports hx of bipolar disorder, depression and anxiety; as per Psyckes: with pan-diagnoses namely: Major Depressive Disorder, Substance-Induced Depressive Disorder, Unspecified/Other Bipolar, Depressive Disorder,  Antisocial Personality Disorder,  schizoaffective Disorder, Bipolar I, Bipolar II, Unspecified/Other Anxiety Disorder, Adjustment Disorder, Substance-Induced Sleep Disorder, Schizophrenia, Substance-Induced Psychotic Disorder, Unspecified/Other Psychotic Disorders, Attention Deficit Hyperactivity Disorder, Borderline Personality Disorder, Unspecified/Other Psychotic Disorders, Attention Deficit Hyperactivity Disorder, Borderline Personality Disorder, Insomnia Disorder, Other Mental Disorders  and Substance-Induced Anxiety Disorder.  Pt is chronically nonadherent to meds/ psych appts.  has hx of multiple psych admissions- Last to [facility] 11/23-12/23. self reports hx of self aborted SA (jumping in front of a 7 train - last summer). with polysubstance use: Alcohol (denied any seizures, stated in past he has had VH during detox - per psyckes, with multiple detoxes/ rehabs - last attendance at [facility]- reports he was discharged a few days agofor Alcohol dependence, uncomplicated)/ Cocaine/ Other psychoactive substance related disorders/ Cannabis/ Tobacco/ Opioid/  Other stimulant related disorders.  Pt self reports hx of self aborted SA (jumping in front of a 7 train last summer). Ppertinent medical issues include: HTN, CKD, GERD, pericarditis; self reports hx of CHF (with ? EF 45%).  this morning, self presented to the ED complaining of chest pain and suicidal ideation.     At this time, endorses depression with chronic suicidal ideation with thoughts to use his friends gun or jump in front of a train, he endorses recent relapse on alcohol. He was  unable to partake towards safety planning. it seems that current presentation is same as with past ED presentation - differentials to entertain for this case include: Unspecified mood disorder, suspicion for substance-induced mood disorder; axis II pathology and malingering.      Currently, does not appear to be acutely manic nor psychotic. He is help seeking and wishes to pursue 9.13 admission.  ED service will help facilitate such request. however, if he rescinds and is able to safety plan, will consider discharging him.  Per EM Dr. Mccarty patient stated medically cleared.     recommendations:  1.  start Zoloft 50mg titrate.   2. PRNs: seroquel 50mg PO q6Hrs for agitation; PRNs: zyprexa 5mg IM q6Hrs for severe agitation. defer if qtC > 500m/s  3. other medical issues to be recommended by ED treatment team. start folic acid PO, thiamine PO + MVI 1 tab daily  4. symptom triggered CIWA

## 2023-12-28 NOTE — ED BEHAVIORAL HEALTH ASSESSMENT NOTE - NSBHATTESTCOMMENTATTENDFT_PSY_A_CORE
Patient is a 58 yr old male, single, undomiciled and unemployed, self reports hx of bipolar disorder, depression and anxiety; as per Psyckes: with pan-diagnoses namely: Major Depressive Disorder, Substance-Induced Depressive Disorder, Unspecified/Other Bipolar, Depressive Disorder,  Antisocial Personality Disorder,  schizoaffective Disorder, Bipolar I, Bipolar II, Unspecified/Other Anxiety Disorder, Adjustment Disorder, Substance-Induced Sleep Disorder, Schizophrenia, Substance-Induced Psychotic Disorder, Unspecified/Other Psychotic Disorders, Attention Deficit Hyperactivity Disorder, Borderline Personality Disorder, Unspecified/Other Psychotic Disorders, Attention Deficit Hyperactivity Disorder, Borderline Personality Disorder, Insomnia Disorder, Other Mental Disorders  and Substance-Induced Anxiety Disorder.  Pt is chronically nonadherent to meds/ psych appts.  has hx of multiple psych admissions- Last to [facility] 11/23-12/23. self reports hx of self aborted SA (jumping in front of a 7 train - last summer). with polysubstance use: Alcohol (denied any seizures, stated in past he has had VH during detox - per psyckes, with multiple detoxes/ rehabs - last attendance at [facility]- reports he was discharged a few days agofor Alcohol dependence, uncomplicated)/ Cocaine/ Other psychoactive substance related disorders/ Cannabis/ Tobacco/ Opioid/  Other stimulant related disorders.  Pt self reports hx of self aborted SA (jumping in front of a 7 train last summer). Ppertinent medical issues include: HTN, CKD, GERD, pericarditis; self reports hx of CHF (with ? EF 45%).  this morning, self presented to the ED complaining of chest pain and suicidal ideation.     Patient continues to endorse depressed mood, and persistent suicidal ideations with plans, and access to means with history of past suicide attempts.  Patient is an acute danger to self and others at this time.  Patient lacks insight and judgment into illness and remains an acute safety risk and warrants inpatient psychiatric hospitalization for safety and stabilization. He is unable to contract for safety or engage in meaningful safety planning. Patient is a 58 yr old male, single, undomiciled and unemployed, self reports hx of bipolar disorder, depression and anxiety; as per Psyckes: with pan-diagnoses namely: Major Depressive Disorder, Substance-Induced Depressive Disorder, Unspecified/Other Bipolar, Depressive Disorder,  Antisocial Personality Disorder,  schizoaffective Disorder, Bipolar I, Bipolar II, Unspecified/Other Anxiety Disorder, Adjustment Disorder, Substance-Induced Sleep Disorder, Schizophrenia, Substance-Induced Psychotic Disorder, Unspecified/Other Psychotic Disorders, Attention Deficit Hyperactivity Disorder, Borderline Personality Disorder, Unspecified/Other Psychotic Disorders, Attention Deficit Hyperactivity Disorder, Borderline Personality Disorder, Insomnia Disorder, Other Mental Disorders  and Substance-Induced Anxiety Disorder.  Pt is chronically nonadherent to meds/ psych appts.  has hx of multiple psych admissions- Last to [facility] 11/23-12/23. self reports hx of self aborted SA (jumping in front of a 7 train - last summer). with polysubstance use: Alcohol (denied any seizures, stated in past he has had VH during detox - per psyckes, with multiple detoxes/ rehabs - last attendance at [facility]- reports he was discharged a few days agofor Alcohol dependence, uncomplicated)/ Cocaine/ Other psychoactive substance related disorders/ Cannabis/ Tobacco/ Opioid/  Other stimulant related disorders.  Pt self reports hx of self aborted SA (jumping in front of a 7 train last summer). Ppertinent medical issues include: HTN, CKD, GERD, pericarditis; self reports hx of CHF (with ? EF 45%).  this morning, self presented to the ED complaining of chest pain and suicidal ideation.     Patient continues to endorse depressed mood, and persistent suicidal ideations with plans, and access to means with history of past suicide attempts.  Patient is an acute danger to self and others at this time.  Patient lacks insight and judgment into illness and remains an acute safety risk and warrants inpatient psychiatric hospitalization for safety and stabilization. He is unable to contract for safety or engage in meaningful safety planning.

## 2023-12-28 NOTE — ED ADULT TRIAGE NOTE - CHIEF COMPLAINT QUOTE
chest pain and SI    pt c/o left sided cp that started 20 [place] ago [place] sob.  also states has felt suicidal for a year. has made attempts in the past. states he tried to shoot himself. denies having weapons or firearms at this time.  denies drugs or etoh. also wants to be tested for covid.  past medical history- bipolar, pericarditis, htn

## 2023-12-28 NOTE — ED BEHAVIORAL HEALTH ASSESSMENT NOTE - NSBHMSEINSIGHT_PSY_A_CORE
Please contact patient  I was thinking more about her, and I think it may be worth trying a course of prednisone for her chest pain  This could help reduce any inflammation that may be causing pain  Please let me know if she is willing to try this and I will send a prescription 
Spoke to pt  She appreciates the thought you put int this  She does want to wait to discuss with the covid clinic  She will call if she wants to start the prednisone 
Other

## 2023-12-28 NOTE — ED PROVIDER NOTE - PROGRESS NOTE DETAILS
Odilon, PGY3: Patient cleared medically. No signs of ACS or any other pathology at this time. Will await psych eval and disposition. Shefali: Patient assessed for complaints, medically cleared for behavioral health. Dr. Greg Powers DO (ED ATTENDING):  patient medically cleared at this time for admission to psych. Patient had +COVID-19 swab 1week ago and per policy required 2 negative swabs today. Both covid swabs now negative.  Patient accepted by [facility] for transfer Dr. Greg Powers DO (ED ATTENDING):  patient medically cleared at this time for admission to psych. Patient had +COVID-19 swab 1week ago and per policy required 2 negative swabs today. Both covid swabs now negative.  Patient accepted by [facility] for transfer

## 2023-12-28 NOTE — ED BEHAVIORAL HEALTH ASSESSMENT NOTE - NS ED BHA AXIS I PRIMARY CODE FT
Patient Instructions by Rosita Ashford CMA at 3/29/2021  3:20 PM     Author: Rosiat Ashford CMA Service: -- Author Type: Certified Medical Assistant    Filed: 3/29/2021  3:18 PM Encounter Date: 3/29/2021 Status: Signed    : Rosita Ashford CMA (Certified Medical Assistant)         3/29/2021  Wt Readings from Last 1 Encounters:   03/29/21 29 lb (13.2 kg) (55 %, Z= 0.12)*     * Growth percentiles are based on CDC (Girls, 2-20 Years) data.       Acetaminophen Dosing Instructions  (May take every 4-6 hours)      WEIGHT   AGE Infant/Children's  160mg/5ml Children's   Chewable Tabs  80 mg each Víctor Strength  Chewable Tabs  160 mg     Milliliter (ml) Soft Chew Tabs Chewable Tabs   6-11 lbs 0-3 months 1.25 ml     12-17 lbs 4-11 months 2.5 ml     18-23 lbs 12-23 months 3.75 ml     24-35 lbs 2-3 years 5 ml 2 tabs    36-47 lbs 4-5 years 7.5 ml 3 tabs    48-59 lbs 6-8 years 10 ml 4 tabs 2 tabs   60-71 lbs 9-10 years 12.5 ml 5 tabs 2.5 tabs   72-95 lbs 11 years 15 ml 6 tabs 3 tabs   96 lbs and over 12 years   4 tabs     Ibuprofen Dosing Instructions- Liquid  (May take every 6-8 hours)      WEIGHT   AGE Concentrated Drops   50 mg/1.25 ml Infant/Children's   100 mg/5ml     Dropperful Milliliter (ml)   12-17 lbs 6- 11 months 1 (1.25 ml)    18-23 lbs 12-23 months 1 1/2 (1.875 ml)    24-35 lbs 2-3 years  5 ml   36-47 lbs 4-5 years  7.5 ml   48-59 lbs 6-8 years  10 ml   60-71 lbs 9-10 years  12.5 ml   72-95 lbs 11 years  15 ml       Ibuprofen Dosing Instructions- Tablets/Caplets  (May take every 6-8 hours)    WEIGHT AGE Children's   Chewable Tabs   50 mg Víctor Strength   Chewable Tabs   100 mg Víctor Strength   Caplets    100 mg     Tablet Tablet Caplet   24-35 lbs 2-3 years 2 tabs     36-47 lbs 4-5 years 3 tabs     48-59 lbs 6-8 years 4 tabs 2 tabs 2 caps   60-71 lbs 9-10 years 5 tabs 2.5 tabs 2.5 caps   72-95 lbs 11 years 6 tabs 3 tabs 3 caps          Patient Education      BRIGHT FUTURES HANDOUT- PARENT  2 YEAR  VISIT  Here are some suggestions from [employer] experts that may be of value to your family.     HOW YOUR FAMILY IS DOING  Take time for yourself and your partner.  Stay in touch with friends.  Make time for family activities. Spend time with each child.  Teach your child not to hit, bite, or hurt other people. Be a role model.  If you feel unsafe in your home or have been hurt by someone, let us know. Hotlines and community resources can also provide confidential help.  Dont smoke or use e-cigarettes. Keep your home and car smoke-free. Tobacco-free spaces keep children healthy.  Dont use alcohol or drugs.  Accept help from family and friends.  If you are worried about your living or food situation, reach out for help. Community agencies and programs such as WIC and SNAP can provide information and assistance.    YOUR BLESSING BEHAVIOR  Praise your child when he does what you ask him to do.  Listen to and respect your child. Expect others to as well.  Help your child talk about his feelings.  Watch how he responds to new people or situations.  Read, talk, sing, and explore together. These activities are the best ways to help toddlers learn.  Limit TV, tablet, or smartphone use to no more than 1 hour of high-quality programs each day.  It is better for toddlers to play than to watch TV.  Encourage your child to play for up to 60 minutes a day.  Avoid TV during meals. Talk together instead.    TALKING AND YOUR CHILD  Use clear, simple language with your child. Dont use baby talk.  Talk slowly and remember that it may take a while for your child to respond. Your child should be able to follow simple instructions.  Read to your child every day. Your child may love hearing the same story over and over.  Talk about and describe pictures in books.  Talk about the things you see and hear when you are together.  Ask your child to point to things as you read.  Stop a story to let your child make an animal sound or finish a  part of the story.    TOILET TRAINING  Begin toilet training when your child is ready. Signs of being ready for toilet training include  Staying dry for 2 hours  Knowing if she is wet or dry  Can pull pants down and up  Wanting to learn  Can tell you if she is going to have a bowel movement  Plan for toilet breaks often. Children use the toilet as many as 10 times each day.  Teach your child to wash her hands after using the toilet.  Clean potty-chairs after every use.  Take the child to choose underwear when she feels ready to do so.    SAFETY  Make sure your blessing car safety seat is rear facing until he reaches the highest weight or height allowed by the car safety seats . Once your child reaches these limits, it is time to switch the seat to the forward- facing position.  Make sure the car safety seat is installed correctly in the back seat. The harness straps should be snug against your blessing chest.  Children watch what you do. Everyone should wear a lap and shoulder seat belt in the car.  Never leave your child alone in your home or yard, especially near cars or machinery, without a responsible adult in charge.  When backing out of the garage or driving in the driveway, have another adult hold your child a safe distance away so he is not in the path of your car.  Have your child wear a helmet that fits properly when riding bikes and trikes.  If it is necessary to keep a gun in your home, store it unloaded and locked with the ammunition locked separately.    WHAT TO EXPECT AT YOUR BLESSING 2  YEAR VISIT  We will talk about  Creating family routines  Supporting your talking child  Getting along with other children  Getting ready for   Keeping your child safe at home, outside, and in the car      Helpful Resources: National Domestic Violence Hotline: 565.291.8992  Poison Help Line:  651.616.6183  Information About Car Safety Seats: www.safercar.gov/parents  Toll-free Auto Safety Hotline:  562-930-9831  Consistent with Bright Futures: Guidelines for Health Supervision of Infants, Children, and Adolescents, 4th Edition  For more information, go to https://brightfutures.aap.org.                     F32.A

## 2023-12-28 NOTE — ED BEHAVIORAL HEALTH NOTE - BEHAVIORAL HEALTH NOTE
worker spoke to Nakia at [facility] who states that patient has to be re-tested after 12am For covid due to pt testing positive 7 days ago. worker spoke to Nakia at [facility] who states that patient has to be re-tested after 12am For covid due to pt testing positive 7 days ago.

## 2023-12-28 NOTE — ED PROVIDER NOTE - DISPOSITION TYPE
The patient is Stable - Low risk of patient condition declining or worsening    Shift Goals  Clinical Goals: pain control, VSS, lochia remain WNL, bedrest    Progress made toward(s) clinical / shift goals:  Patient VSS and WDL. Lochia remain WNL, fundus firm and palpable. Patient currently on bed rest s/p  surgery. No s/sx of distress observed.    Patient is not progressing towards the following goals:       TRANSFER

## 2023-12-28 NOTE — ED BEHAVIORAL HEALTH ASSESSMENT NOTE - DESCRIPTION
HTN, pericarditis, self reports hx of CHF (claims EF around ? 45%), CKD, GERD. per psyckes, past meds to include: HCTZ 25mg, lipitor 40mg, ASA 81mg, entresto 24-26mg, folic acid 1mg, thiamine 100mg currently, reports feeling tired and experiencing chest pain and numbness. EM Dr. Carnes reports patient is medically cleared.    ICU Vital Signs Last 24 Hrs  T(C): 36.2 (28 Dec 2023 08:37), Max: 36.6 (28 Dec 2023 05:37)  T(F): 97.2 (28 Dec 2023 08:37), Max: 97.8 (28 Dec 2023 05:37)  HR: 61 (28 Dec 2023 08:37) (61 - 81)  BP: 143/65 (28 Dec 2023 08:37) (133/86 - 143/65)  BP(mean): --  ABP: --  ABP(mean): --  RR: 17 (28 Dec 2023 08:37) (17 - 18)  SpO2: 100% (28 Dec 2023 08:37) (100% - 100%)    O2 Parameters below as of 28 Dec 2023 08:37  Patient On (Oxygen Delivery Method): room air single, unemployed, stays at a hotel, [employer] park and in the streets. has a 15 yr old daughter (living in [place], [place] - currently in custody of bio mother, speaks on the phone regularly). no reported access to guns single, unemployed, stays at a hotel, [employer] park and in the streets. has a 15 yr old daughter (living in [place], [place] - currently in custody of bio mother, speaks on the phone regularly). no reported access to guns currently, reports feeling tired and experiencing chest pain and numbness. EM Dr. Mccarty reports patient is medically cleared.    ICU Vital Signs Last 24 Hrs  T(C): 36.2 (28 Dec 2023 08:37), Max: 36.6 (28 Dec 2023 05:37)  T(F): 97.2 (28 Dec 2023 08:37), Max: 97.8 (28 Dec 2023 05:37)  HR: 61 (28 Dec 2023 08:37) (61 - 81)  BP: 143/65 (28 Dec 2023 08:37) (133/86 - 143/65)  BP(mean): --  ABP: --  ABP(mean): --  RR: 17 (28 Dec 2023 08:37) (17 - 18)  SpO2: 100% (28 Dec 2023 08:37) (100% - 100%)    O2 Parameters below as of 28 Dec 2023 08:37  Patient On (Oxygen Delivery Method): room air

## 2023-12-28 NOTE — ED BEHAVIORAL HEALTH ASSESSMENT NOTE - OTHER
CVM, I stop partial insight into alcohol use disorder, limited insight into likely role in mood symptoms homelessness, financial constraints, unemployment, limited social support, not being able to see 15 yr old daughter boarding awaiting bed impaired based on recurrent substance use and nonadherence with out-pt mental health treatment, though currently seeking help

## 2023-12-28 NOTE — ED BEHAVIORAL HEALTH ASSESSMENT NOTE - OTHER PAST PSYCHIATRIC HISTORY (INCLUDE DETAILS REGARDING ONSET, COURSE OF ILLNESS, INPATIENT/OUTPATIENT TREATMENT)
self reports hx of depression + anxiety  multiple past diagnoses as per Psyckes  high mental health utilizer including numerous Psych ED/ CPEP visits for SI, alcohol intoxication  prior psych admissions  chronically nonadherent with meds   claimed previously prescribed Zoloft at [facility]   - self-discontinued because developed diarrhea  stated past hx of allegedly self aborting jumping in front of a 7 train over the summer (2023)  denied past suicide attempts.   Denies ever being in consistent outpatient psychiatric care.   currently with a IMT ([facility] ([facility]) [place] [facility], 8/22/2022 -  Current) self reports hx of depression + anxiety  multiple past diagnoses as per Psyckes  high mental health utilizer including numerous Psych ED/ CPEP visits for SI, alcohol intoxication  prior psych admissions  chronically nonadherent with meds   claimed previously prescribed Zoloft at [facility]   - self-discontinued because developed diarrhea  stated past hx of allegedly self aborting jumping in front of a 7 train over the summer (2023)  denied past suicide attempts.   Denies ever being in consistent outpatient psychiatric care.   currently with a IMT ([facility] ([facility]) [place] [facility], 8/22/2022 -  Current)

## 2023-12-28 NOTE — ED BEHAVIORAL HEALTH NOTE - BEHAVIORAL HEALTH NOTE
worker called [facility] and spoke to Ernestina who states they have no adult beds. worker called [facility] and spoke to Ernestina who states they have no adult beds.

## 2023-12-29 VITALS
SYSTOLIC BLOOD PRESSURE: 115 MMHG | HEART RATE: 75 BPM | OXYGEN SATURATION: 100 % | DIASTOLIC BLOOD PRESSURE: 82 MMHG | TEMPERATURE: 98 F | RESPIRATION RATE: 18 BRPM

## 2023-12-29 LAB
SARS-COV-2 RNA SPEC QL NAA+PROBE: SIGNIFICANT CHANGE UP
SARS-COV-2 RNA SPEC QL NAA+PROBE: SIGNIFICANT CHANGE UP

## 2023-12-29 NOTE — ED ADULT NURSE REASSESSMENT NOTE - NS ED NURSE REASSESS COMMENT FT1
Received report from night RN, pt calm denies si/hi/avh presently, pt made aware of admission to [facility] pt transported via EMS. Received report from night RN, pt calm denies si/hi/avh presently, pt made aware of admission to [facility] pt transported via EMS.

## 2023-12-29 NOTE — ED BEHAVIORAL HEALTH NOTE - BEHAVIORAL HEALTH NOTE
Pt second COVID test resulted and is negative. Writer contacted [facility] admissions at [phone]. Writer spoke with Dakota who reported doctor to complete final review. No nurse however available with request for d/c at 10AM once accepted. Pt second COVID test resulted and is negative. Writer contacted [facility] admissions at [phone]. Writer spoke with Dakota who reported doctor to complete final review. No nurse however available with request for d/c at 10AM once accepted. Pt second COVID test resulted and is negative. Writer contacted [facility] admissions at [phone]. Writer spoke with Dakota who reported doctor to complete final review. No nurse however available with request for d/c at 10AM once accepted.    4 AM: Pt accepted for transfer by Dr. Xi Marsh. Precert completed by earlier  staff. Pt second COVID test resulted and is negative. Writer contacted [facility] admissions at [phone]. Writer spoke with Dakota who reported doctor to complete final review. No nurse however available with request for d/c at 10AM once accepted.    4 AM: Pt accepted for transfer by Dr. Xi Marsh. Precert completed by earlier  staff. Pt second COVID test resulted and is negative. Writer contacted [facility] admissions at [phone]. Writer spoke with Dakota who reported doctor to complete final review. No nurse however available with request for d/c at 10AM once accepted.    4 AM: Pt accepted for transfer by Dr. Xi Marsh. Precert completed by earlier  staff. [facility] faxed. Pt second COVID test resulted and is negative. Writer contacted [facility] admissions at [phone]. Writer spoke with Dakota who reported doctor to complete final review. No nurse however available with request for d/c at 10AM once accepted.    4 AM: Pt accepted for transfer by Dr. Xi Marsh. Precert completed by earlier  staff. [facility] faxed.

## 2023-12-29 NOTE — ED ADULT NURSE REASSESSMENT NOTE - NS ED NURSE REASSESS COMMENT FT1
Pt received at 12am shift change. Repeat Covid swab performed and set to lab for processing. Pt awaiting poss xfer to [facility]. Pt received at 12am shift change. Repeat Covid swab performed and set to lab for processing. Pt awaiting poss xfer to [facility].

## 2024-01-18 ENCOUNTER — INPATIENT (INPATIENT)
Facility: HOSPITAL | Age: 59
LOS: 10 days | Discharge: HOME | End: 2024-01-29
Attending: STUDENT IN AN ORGANIZED HEALTH CARE EDUCATION/TRAINING PROGRAM | Admitting: PSYCHIATRY & NEUROLOGY
Payer: MEDICAID

## 2024-01-18 VITALS
HEART RATE: 112 BPM | DIASTOLIC BLOOD PRESSURE: 89 MMHG | RESPIRATION RATE: 18 BRPM | SYSTOLIC BLOOD PRESSURE: 133 MMHG | TEMPERATURE: 98 F | OXYGEN SATURATION: 100 % | HEIGHT: 74 IN

## 2024-01-18 DIAGNOSIS — F32.9 MAJOR DEPRESSIVE DISORDER, SINGLE EPISODE, UNSPECIFIED: ICD-10-CM

## 2024-01-18 LAB
ALBUMIN SERPL ELPH-MCNC: 4.4 G/DL — SIGNIFICANT CHANGE UP (ref 3.3–5)
ALP SERPL-CCNC: 59 U/L — SIGNIFICANT CHANGE UP (ref 40–120)
ALT FLD-CCNC: 34 U/L — SIGNIFICANT CHANGE UP (ref 4–41)
AMPHET UR-MCNC: NEGATIVE — SIGNIFICANT CHANGE UP
ANION GAP SERPL CALC-SCNC: 19 MMOL/L — HIGH (ref 7–14)
APAP SERPL-MCNC: <10 UG/ML — LOW (ref 15–25)
APPEARANCE UR: CLEAR — SIGNIFICANT CHANGE UP
AST SERPL-CCNC: 42 U/L — HIGH (ref 4–40)
BARBITURATES UR SCN-MCNC: NEGATIVE — SIGNIFICANT CHANGE UP
BASOPHILS # BLD AUTO: 0.03 K/UL — SIGNIFICANT CHANGE UP (ref 0–0.2)
BASOPHILS NFR BLD AUTO: 0.4 % — SIGNIFICANT CHANGE UP (ref 0–2)
BENZODIAZ UR-MCNC: NEGATIVE — SIGNIFICANT CHANGE UP
BILIRUB SERPL-MCNC: 0.6 MG/DL — SIGNIFICANT CHANGE UP (ref 0.2–1.2)
BILIRUB UR-MCNC: NEGATIVE — SIGNIFICANT CHANGE UP
BUN SERPL-MCNC: 14 MG/DL — SIGNIFICANT CHANGE UP (ref 7–23)
CALCIUM SERPL-MCNC: 9.6 MG/DL — SIGNIFICANT CHANGE UP (ref 8.4–10.5)
CHLORIDE SERPL-SCNC: 99 MMOL/L — SIGNIFICANT CHANGE UP (ref 98–107)
CO2 SERPL-SCNC: 19 MMOL/L — LOW (ref 22–31)
COCAINE METAB.OTHER UR-MCNC: POSITIVE
COLOR SPEC: YELLOW — SIGNIFICANT CHANGE UP
CREAT SERPL-MCNC: 1.16 MG/DL — SIGNIFICANT CHANGE UP (ref 0.5–1.3)
CREATININE URINE RESULT, DAU: 98 MG/DL — SIGNIFICANT CHANGE UP
DIFF PNL FLD: NEGATIVE — SIGNIFICANT CHANGE UP
EGFR: 73 ML/MIN/1.73M2 — SIGNIFICANT CHANGE UP
EOSINOPHIL # BLD AUTO: 0.04 K/UL — SIGNIFICANT CHANGE UP (ref 0–0.5)
EOSINOPHIL NFR BLD AUTO: 0.5 % — SIGNIFICANT CHANGE UP (ref 0–6)
ETHANOL SERPL-MCNC: 26 MG/DL — HIGH
GLUCOSE SERPL-MCNC: 65 MG/DL — LOW (ref 70–99)
GLUCOSE UR QL: NEGATIVE MG/DL — SIGNIFICANT CHANGE UP
HCT VFR BLD CALC: 36.2 % — LOW (ref 39–50)
HGB BLD-MCNC: 12.2 G/DL — LOW (ref 13–17)
IANC: 4.42 K/UL — SIGNIFICANT CHANGE UP (ref 1.8–7.4)
IMM GRANULOCYTES NFR BLD AUTO: 0.8 % — SIGNIFICANT CHANGE UP (ref 0–0.9)
KETONES UR-MCNC: NEGATIVE MG/DL — SIGNIFICANT CHANGE UP
LEUKOCYTE ESTERASE UR-ACNC: NEGATIVE — SIGNIFICANT CHANGE UP
LYMPHOCYTES # BLD AUTO: 2.42 K/UL — SIGNIFICANT CHANGE UP (ref 1–3.3)
LYMPHOCYTES # BLD AUTO: 31.4 % — SIGNIFICANT CHANGE UP (ref 13–44)
MCHC RBC-ENTMCNC: 30 PG — SIGNIFICANT CHANGE UP (ref 27–34)
MCHC RBC-ENTMCNC: 33.7 GM/DL — SIGNIFICANT CHANGE UP (ref 32–36)
MCV RBC AUTO: 88.9 FL — SIGNIFICANT CHANGE UP (ref 80–100)
METHADONE UR-MCNC: NEGATIVE — SIGNIFICANT CHANGE UP
MONOCYTES # BLD AUTO: 0.74 K/UL — SIGNIFICANT CHANGE UP (ref 0–0.9)
MONOCYTES NFR BLD AUTO: 9.6 % — SIGNIFICANT CHANGE UP (ref 2–14)
NEUTROPHILS # BLD AUTO: 4.42 K/UL — SIGNIFICANT CHANGE UP (ref 1.8–7.4)
NEUTROPHILS NFR BLD AUTO: 57.3 % — SIGNIFICANT CHANGE UP (ref 43–77)
NITRITE UR-MCNC: NEGATIVE — SIGNIFICANT CHANGE UP
NRBC # BLD: 0 /100 WBCS — SIGNIFICANT CHANGE UP (ref 0–0)
NRBC # FLD: 0 K/UL — SIGNIFICANT CHANGE UP (ref 0–0)
OPIATES UR-MCNC: NEGATIVE — SIGNIFICANT CHANGE UP
OXYCODONE UR-MCNC: NEGATIVE — SIGNIFICANT CHANGE UP
PCP SPEC-MCNC: SIGNIFICANT CHANGE UP
PCP UR-MCNC: NEGATIVE — SIGNIFICANT CHANGE UP
PH UR: 5.5 — SIGNIFICANT CHANGE UP (ref 5–8)
PLATELET # BLD AUTO: 318 K/UL — SIGNIFICANT CHANGE UP (ref 150–400)
POTASSIUM SERPL-MCNC: 4 MMOL/L — SIGNIFICANT CHANGE UP (ref 3.5–5.3)
POTASSIUM SERPL-SCNC: 4 MMOL/L — SIGNIFICANT CHANGE UP (ref 3.5–5.3)
PROT SERPL-MCNC: 8.3 G/DL — SIGNIFICANT CHANGE UP (ref 6–8.3)
PROT UR-MCNC: NEGATIVE MG/DL — SIGNIFICANT CHANGE UP
RBC # BLD: 4.07 M/UL — LOW (ref 4.2–5.8)
RBC # FLD: 14.6 % — HIGH (ref 10.3–14.5)
SALICYLATES SERPL-MCNC: <0.3 MG/DL — LOW (ref 15–30)
SARS-COV-2 RNA SPEC QL NAA+PROBE: SIGNIFICANT CHANGE UP
SODIUM SERPL-SCNC: 137 MMOL/L — SIGNIFICANT CHANGE UP (ref 135–145)
SP GR SPEC: 1.01 — SIGNIFICANT CHANGE UP (ref 1–1.03)
THC UR QL: NEGATIVE — SIGNIFICANT CHANGE UP
TOXICOLOGY SCREEN, DRUGS OF ABUSE, SERUM RESULT: SIGNIFICANT CHANGE UP
TROPONIN T, HIGH SENSITIVITY RESULT: 10 NG/L — SIGNIFICANT CHANGE UP
TSH SERPL-MCNC: 1.8 UIU/ML — SIGNIFICANT CHANGE UP (ref 0.27–4.2)
UROBILINOGEN FLD QL: 0.2 MG/DL — SIGNIFICANT CHANGE UP (ref 0.2–1)
WBC # BLD: 7.71 K/UL — SIGNIFICANT CHANGE UP (ref 3.8–10.5)
WBC # FLD AUTO: 7.71 K/UL — SIGNIFICANT CHANGE UP (ref 3.8–10.5)

## 2024-01-18 PROCEDURE — 99285 EMERGENCY DEPT VISIT HI MDM: CPT

## 2024-01-18 RX ORDER — ASPIRIN/CALCIUM CARB/MAGNESIUM 324 MG
81 TABLET ORAL DAILY
Refills: 0 | Status: DISCONTINUED | OUTPATIENT
Start: 2024-01-18 | End: 2024-01-29

## 2024-01-18 RX ORDER — ARIPIPRAZOLE 15 MG/1
10 TABLET ORAL AT BEDTIME
Refills: 0 | Status: DISCONTINUED | OUTPATIENT
Start: 2024-01-18 | End: 2024-01-19

## 2024-01-18 RX ORDER — IBUPROFEN 200 MG
400 TABLET ORAL ONCE
Refills: 0 | Status: COMPLETED | OUTPATIENT
Start: 2024-01-18 | End: 2024-01-18

## 2024-01-18 RX ORDER — FOLIC ACID 0.8 MG
1 TABLET ORAL DAILY
Refills: 0 | Status: DISCONTINUED | OUTPATIENT
Start: 2024-01-18 | End: 2024-01-29

## 2024-01-18 RX ORDER — ATORVASTATIN CALCIUM 80 MG/1
40 TABLET, FILM COATED ORAL AT BEDTIME
Refills: 0 | Status: DISCONTINUED | OUTPATIENT
Start: 2024-01-18 | End: 2024-01-29

## 2024-01-18 RX ORDER — HYDROXYZINE HCL 10 MG
50 TABLET ORAL EVERY 6 HOURS
Refills: 0 | Status: DISCONTINUED | OUTPATIENT
Start: 2024-01-18 | End: 2024-01-29

## 2024-01-18 RX ORDER — OLANZAPINE 15 MG/1
5 TABLET, FILM COATED ORAL ONCE
Refills: 0 | Status: DISCONTINUED | OUTPATIENT
Start: 2024-01-18 | End: 2024-01-29

## 2024-01-18 RX ORDER — HYDROXYZINE HCL 10 MG
50 TABLET ORAL EVERY 6 HOURS
Refills: 0 | Status: DISCONTINUED | OUTPATIENT
Start: 2024-01-18 | End: 2024-01-18

## 2024-01-18 RX ORDER — INFLUENZA VIRUS VACCINE 15; 15; 15; 15 UG/.5ML; UG/.5ML; UG/.5ML; UG/.5ML
0.5 SUSPENSION INTRAMUSCULAR ONCE
Refills: 0 | Status: DISCONTINUED | OUTPATIENT
Start: 2024-01-18 | End: 2024-01-29

## 2024-01-18 RX ORDER — OLANZAPINE 15 MG/1
5 TABLET, FILM COATED ORAL ONCE
Refills: 0 | Status: COMPLETED | OUTPATIENT
Start: 2024-01-18 | End: 2024-01-18

## 2024-01-18 RX ORDER — TRAZODONE HCL 50 MG
50 TABLET ORAL AT BEDTIME
Refills: 0 | Status: DISCONTINUED | OUTPATIENT
Start: 2024-01-18 | End: 2024-01-23

## 2024-01-18 RX ORDER — OLANZAPINE 15 MG/1
5 TABLET, FILM COATED ORAL EVERY 6 HOURS
Refills: 0 | Status: DISCONTINUED | OUTPATIENT
Start: 2024-01-18 | End: 2024-01-29

## 2024-01-18 RX ADMIN — ATORVASTATIN CALCIUM 40 MILLIGRAM(S): 80 TABLET, FILM COATED ORAL at 21:40

## 2024-01-18 RX ADMIN — Medication 400 MILLIGRAM(S): at 15:15

## 2024-01-18 RX ADMIN — OLANZAPINE 5 MILLIGRAM(S): 15 TABLET, FILM COATED ORAL at 17:01

## 2024-01-18 RX ADMIN — Medication 2 MILLIGRAM(S): at 21:39

## 2024-01-18 NOTE — ED BEHAVIORAL HEALTH ASSESSMENT NOTE - DETAILS
in custody of mother (currently based in [place], [place]) ANOOP - reports diarrhea with Zoloft Denies any intent, denies any preparatory acts. self reports recent aborted SA via jumping in front of a 7 train. Reports wanting to shoot himself but told other provider that he wanted to "slit his throat" discussed with  ED team headache refused to discuss SOH - Nurse Manager Ely WAS UNABLE to provide information except med list self reports getting into fights - none recent; HI regarding child's mother but refuses to discuss further WAS UNABLE to provide information except med list, d/c 01/05/24 ANOOP - Dr. Byrd

## 2024-01-18 NOTE — ED ADULT TRIAGE NOTE - PRO INTERPRETER NEED 2
English Additional Notes: Lot #: [id number]\\nEx: 01/26\\nSAMPLE BOTOX NO CHARGE Detail Level: Simple Render Risk Assessment In Note?: no

## 2024-01-18 NOTE — ED BEHAVIORAL HEALTH ASSESSMENT NOTE - OTHER PAST PSYCHIATRIC HISTORY (INCLUDE DETAILS REGARDING ONSET, COURSE OF ILLNESS, INPATIENT/OUTPATIENT TREATMENT)
self reports hx of depression + anxiety  multiple past diagnoses as per Psyckes  high mental health utilizer including numerous Psych ED/ CPEP visits for SI, alcohol intoxication  prior psych admissions  chronically nonadherent with meds   claimed previously prescribed Zoloft at [facility]   - self-discontinued because developed diarrhea  stated past hx of allegedly self aborting jumping in front of a 7 train over the summer (2023)  denied past suicide attempts.   Denies ever being in consistent outpatient psychiatric care.   currently with a IMT ([facility] ([facility]) [place] [facility], 8/22/2022 -  Current) -LEFT VOICEMAIL

## 2024-01-18 NOTE — ED ADULT NURSE NOTE - OBJECTIVE STATEMENT
Patient to [facility] with suicidal and homicidal ideations. Patient states he has not been able to see his daughter since December and he feels that the baby's mother is keeping her away from him and turning hr against him. He has been hearing voices that tell him to either cut his throat, shoot himself or jump in front of a train. Also to kill the baby's mom. Patient evaluated by medical provider and waiting for psych evaluation. Patient changed, calm and cooperated. Offered and accepted lunch.   EVERT Crowell

## 2024-01-18 NOTE — ED BEHAVIORAL HEALTH ASSESSMENT NOTE - HPI (INCLUDE ILLNESS QUALITY, SEVERITY, DURATION, TIMING, CONTEXT, MODIFYING FACTORS, ASSOCIATED SIGNS AND SYMPTOMS)
58 yr old male, single, undomiciled (lives in hotels) and unemployed.  self reports hx of bipolar disorder, depression and anxiety; as per Psyckes: with pan-diagnoses namely: Major Depressive Disorder, Substance-Induced Depressive Disorder, Unspecified/Other Bipolar, Depressive Disorder,  Antisocial Personality Disorder,  schizoaffective Disorder, Bipolar I, Bipolar II, Unspecified/Other Anxiety Disorder, Adjustment Disorder, Substance-Induced Sleep Disorder, Schizophrenia, Substance-Induced Psychotic Disorder, Unspecified/Other Psychotic Disorders, Attention Deficit Hyperactivity Disorder, Borderline Personality Disorder, Unspecified/Other Psychotic Disorders, Attention Deficit Hyperactivity Disorder, Borderline Personality Disorder, Insomnia Disorder, Other Mental Disorders  and Substance-Induced Anxiety Disorder.  Pt is chronically nonadherent to meds/ psych appts.  has hx of multiple psych admissions- Last to [facility] 12/28-01/2024 (date unknown). self reports hx of self aborted SA (jumping in front of a 7 train - last summer). with polysubstance use: Alcohol (denied any seizures, stated in past he has had VH during detox - per psyckes, with multiple detoxes/ rehabs - last attendance at [facility]-  for Alcohol dependence, uncomplicated)/ Cocaine/ Other psychoactive substance related disorders/ Cannabis/ Tobacco/ Opioid/  Other stimulant related disorders.  Pt self reports hx of self aborted SA (jumping in front of a 7 train last summer). Pertinent medical issues include: HTN, CKD, GERD, pericarditis; self reports hx of CHF (with ? EF 45%).  This morning, self presented to the ED complaining of suicidal ideation.     Patient reports that he's feeling suicidal with a plan to shoot himself. He states he has a gun stored with a friend in [place] and he admits to plans on shooting himself but refuses to provide further information. He also admits to hearing voices telling him to "kill himself" and having a "visions of his dead mother".  He admits that he was drinking alcohol yesterday and relapsed in the last few days where he's been drinking pints of Bryon and beers. Patient feels hopeless, with low energy, not sleeping well and not eating well. He just keeps stating "I need to die", "I'm tired of the pain". He is unable and unwilling to engage in any safety planning.     Pt reported that he was having homicidal ideation towards his child's mother but states that he has no address and no way of actually getting to her.   Pt not reporting any paranoid delusions, no evidence of miquel at this time and denies hearing voices currently.   Attempted to call [facility] ([facility]) [facility] (Admission Date: 22-AUG-22) • Main  Contact: Kim Rangel, [phone]- lv

## 2024-01-18 NOTE — ED PROVIDER NOTE - OBJECTIVE STATEMENT
59 yo M PMH Bipolar p/w increased agitation after not speaking to his 15 yo daughter Charo since December 30th. Reports he feels like his daughter's mother is hiding his daughter from him. He admits to having AH that tell him to either jump in front of a train, shoot himself or cut his throat as well as to harm his daughter's mother. Daughter lives in [place], [place]. Admits to taking Abilify but stopping it a couple weeks ago. Has an ACT team and a , Kim. Denies fever, headache, dizziness, VH, chills, chest pain, shortness of breath, abdominal pain, sick contact or recent travel. Denies alcohol use or other drugs.

## 2024-01-18 NOTE — ED BEHAVIORAL HEALTH ASSESSMENT NOTE - DESCRIPTION
HTN, pericarditis, self reports hx of CHF (claims EF around ? 45%), CKD, GERD. per psyckes, past meds to include: HCTZ 25mg, lipitor 40mg, ASA 81mg, entresto 24-26mg, folic acid 1mg, thiamine 100mg single, unemployed, stays at a hotel, [employer] [place] and in the streets. has a 15 yr old daughter (living in [place], [place] - currently in custody of bio mother, speaks on the phone regularly). no reported access to guns currently, reports having a headache. Given Motrin  .   Vital Signs Last 24 Hrs  T(C): 36.6 (18 Jan 2024 13:43), Max: 36.6 (18 Jan 2024 13:43)  T(F): 97.9 (18 Jan 2024 13:43), Max: 97.9 (18 Jan 2024 13:43)  HR: 112 (18 Jan 2024 13:43) (112 - 112)  BP: 133/89 (18 Jan 2024 13:43) (133/89 - 133/89)  BP(mean): --  RR: 18 (18 Jan 2024 13:43) (18 - 18)  SpO2: 100% (18 Jan 2024 13:43) (100% - 100%)    Parameters below as of 18 Jan 2024 13:43  Patient On (Oxygen Delivery Method): room air

## 2024-01-18 NOTE — ED ADULT NURSE NOTE - COVID-19 ORDERING FACILITY
CLINICAL NUTRITION SERVICES - REASSESSMENT NOTE     Nutrition Prescription    RECOMMENDATIONS FOR MDs/PROVIDERS TO ORDER:  Daily fluid adjustments per MD    Malnutrition Status:    Moderate malnutrition in the context of chronic illness    Recommendations already ordered by Registered Dietitian (RD):  -Continue Osmolite 1.5 Virgilio @ goal of 50ml/hr (1200ml/day) + 1 pkt Prosource TID will provide: 1920 kcals (29 kcal/kg), 108 g PRO (1.6 g/kg), 914 ml free H20, 244 g CHO, and 0 g fiber daily   -Continue daily MVI  -Continue thiamine daily     Future/Additional Recommendations:  -Continue to monitor TF tolerance, weight trends, stools, labs      EVALUATION OF THE PROGRESS TOWARD GOALS   Diet: NPO  Nutrition Support: TF currently running at 35 ml/hr, will increase to goal rate of 50 ml/hr at 1600.     7/10: Osmolite 1.5 @ 15 ml/hr, no advancement per OG tube     7/11-current: Osmolite 1.5 Virgilio @ goal of 50ml/hr (1200ml/day) + 1 pkt Prosource TID will provide: 1920 kcals (29 kcal/kg), 108 g PRO (1.6 g/kg), 914 ml free H20, 244 g CHO, and 0 g fiber daily     Intake: Pt started on trickle feeds on 7/10 and began advancing to goal at midnight on 7/12. Prior to LVAD surgery, pt was eating % at all meals on a 2g Na diet.      NEW FINDINGS   Pt underwent LVAD placement on 7/8 with chest left open. Chest closed and NJT placed by radiology on 7/11.     GI: LBM 7/11    Labs: Reviewed - hyperphosphatemia, hyponatremia     Medications: Reviewed     Weights: No weight loss over the past week.   Date/Time Weight   07/12/22 0430 76.1 kg (167 lb 12.3 oz)   07/11/22 0200 77.8 kg (171 lb 8.3 oz)   07/10/22 0400 77.8 kg (171 lb 8.3 oz)   07/08/22 0400 63 kg (138 lb 14.2 oz)   07/07/22 0000 63 kg (138 lb 14.2 oz)   07/06/22 0600 60.5 kg (133 lb 6.1 oz)   07/05/22 0400 65.3 kg (143 lb 15.4 oz)     MALNUTRITION  % Intake: Decreased intake does not meet criteria  % Weight Loss: None noted  Subcutaneous Fat Loss: Global mild  Muscle Loss:  Temporal:  Moderate, Thoracic region (clavicle, acromium bone, deltoid, trapezius, pectoral):  Mild, Upper leg (quadricep, hamstring):  Mild and Posterior calf:  Moderate  Fluid Accumulation/Edema: None noted  Malnutrition Diagnosis: Moderate malnutrition in the context of chronic illness    Previous Goals   Patient to consume % of nutritionally adequate meal trays TID, or the equivalent with supplements/snacks.  Evaluation: Unable to evaluate    Previous Nutrition Diagnosis  Predicted inadequate nutrient intake related to intubation as evidenced by NPO status and possible need for EN post-transplant/LVAD.  Evaluation: No longer applicable, nutrition diagnosis changed below    CURRENT NUTRITION DIAGNOSIS  Inadequate oral intake related to intubation as evidenced by NPO status and need for EN to meet 100% nutrition needs.       INTERVENTIONS  Implementation  Enteral Nutrition - continue as ordered   Multi-trace element supplement therapy  Multivitamin/mineral supplement therapy    Goals  Total avg nutritional intake to meet a minimum of 25 kcal/kg and 1.5 g PRO/kg daily (per dosing wt 67 kg).    Monitoring/Evaluation  Progress toward goals will be monitored and evaluated per protocol.    Kelsey Prado, MS, RD, LD  4E (CVICU) RD pager: [phone]  Ascom: [id number]  Weekend/Holiday RD pager: [phone]   [facility]  - SO NW2

## 2024-01-18 NOTE — ED PROVIDER NOTE - PROGRESS NOTE DETAILS
Patient complain of left sided CP radiating to the neck. Reports feels like increased pressure.  Trop ordered. EKG performed.  EKG shows sinus tach. No T wave elevation.   Pending trop result prior to admit.

## 2024-01-18 NOTE — ED ADULT TRIAGE NOTE - NS ED NURSE BANDS TYPE
Dr Marilu Potts made aware that pt is a  , third delivery was C/Section for hand presentation.    Dr Edmundo Reyes notified of pts status Name band;

## 2024-01-18 NOTE — ED BEHAVIORAL HEALTH ASSESSMENT NOTE - RISK ASSESSMENT
Adequate: hears normal conversation without difficulty
Assessed at moderate acute suicide risk + elevated chronic suicide risk.  Currently with active SI + plan ? HI; past reported hx of self aborted SA; c    has chronic hx of somatic pain + recurrent alcohol use, notably treatment-seeking, forward goal oriented  thinking, with pattern of seeking help when in distress. additional protective factors include sense of commitment to 15 yr old daughter, currently not floridly psychotic or manic    Static risk factors include age, male gender, hx of psychiatric illness, with multiple substance use    Modifiable risk factors include recent substance use, lack of engagement in psychiatric/substance use treatment, unstable housing, social support

## 2024-01-18 NOTE — ED BEHAVIORAL HEALTH NOTE - BEHAVIORAL HEALTH NOTE
Writer called [facility]  intake to get discharge medication list.  Was informed the information cannot be released.  Writer was transferred to Nurse Manager Ely.  Ely provided following list of medication:   Abilify 10mg HS  ASA 81mg QD  Ativan PRN  Trazadone PRN  Entresto 24/26mg QD  Folic Acid   Lipitor 40mg QHS  Vistaril PRN.

## 2024-01-18 NOTE — ED BEHAVIORAL HEALTH ASSESSMENT NOTE - MEDICAL ISSUES AND PLAN (INCLUDE STANDING AND PRN MEDICATION)
see above had discontinued HTN meds at [facility] (unknown reasons), recommend hospitalist to f/u

## 2024-01-18 NOTE — BH PATIENT PROFILE - HOW PATIENT ADDRESSED, PROFILE
Patient called office stating his right foot wound is draining more, has an odor and his hospice nurse was concerned. Denies fevers. He states he ran out of atb on Saturday and is wondering if he needs another before his appt with Dr. Priya Rosario on 7/10. I Explained to patient Dr. Priya Rosario is out of the office until 7/10 and in the meantime if he is worried about possible infection he should go to Urgent Care/ER to be evaluated. Patient verbalized understanding.
Dre

## 2024-01-18 NOTE — ED ADULT TRIAGE NOTE - CHIEF COMPLAINT QUOTE
Pt reporting to the ED for S/I. PMH of bipolar disorder, not taking medication. Pt has plan to "slit my throat". Denies H/I. No drug or ETOH use. + auditory hallucinations.

## 2024-01-18 NOTE — ED BEHAVIORAL HEALTH ASSESSMENT NOTE - SUMMARY
58 yr old male, single, undomiciled (lives in hotels) and unemployed.  self reports hx of bipolar disorder, depression and anxiety; as per Psyckes: with pan-diagnoses namely: Major Depressive Disorder, Substance-Induced Depressive Disorder, Unspecified/Other Bipolar, Depressive Disorder,  Antisocial Personality Disorder,  schizoaffective Disorder, Bipolar I, Bipolar II, Unspecified/Other Anxiety Disorder, Adjustment Disorder, Substance-Induced Sleep Disorder, Schizophrenia, Substance-Induced Psychotic Disorder, Unspecified/Other Psychotic Disorders, Attention Deficit Hyperactivity Disorder, Borderline Personality Disorder, Unspecified/Other Psychotic Disorders, Attention Deficit Hyperactivity Disorder, Borderline Personality Disorder, Insomnia Disorder, Other Mental Disorders  and Substance-Induced Anxiety Disorder.  Pt is chronically nonadherent to meds/ psych appts.  has hx of multiple psych admissions- Last to [facility] 12/28-01/2024 (date unknown). self reports hx of self aborted SA (jumping in front of a 7 train - last summer). with polysubstance use: Alcohol (denied any seizures, stated in past he has had VH during detox - per psyckes, with multiple detoxes/ rehabs - last attendance at [facility]-  for Alcohol dependence, uncomplicated)/ Cocaine/ Other psychoactive substance related disorders/ Cannabis/ Tobacco/ Opioid/  Other stimulant related disorders.  Pt self reports hx of self aborted SA (jumping in front of a 7 train last summer). Pertinent medical issues include: HTN, CKD, GERD, pericarditis; self reports hx of CHF (with ? EF 45%).  This morning, self presented to the ED complaining of suicidal ideation.     At this time, endorses depression with suicidal ideation with thoughts to use his friends gun or slit his throat, he also endorses recent relapse on alcohol. He was unable and unwilling to partake towards safety planning. it seems that current presentation is same as with past ED presentation - differentials to entertain for this case include: Unspecified mood disorder, suspicion for substance-induced mood disorder; axis II pathology and malingering.      Currently, does not appear to be acutely manic nor psychotic. He is help seeking and wishes to pursue 9.13 admission.  ED service will help facilitate such request. however, if he rescinds and is able to safety plan, will consider discharging him. 58 yr old male, single, undomiciled (lives in hotels) and unemployed.  self reports hx of bipolar disorder, depression and anxiety; as per Psyckes: with pan-diagnoses namely: Major Depressive Disorder, Substance-Induced Depressive Disorder, Unspecified/Other Bipolar, Depressive Disorder,  Antisocial Personality Disorder,  schizoaffective Disorder, Bipolar I, Bipolar II, Unspecified/Other Anxiety Disorder, Adjustment Disorder, Substance-Induced Sleep Disorder, Schizophrenia, Substance-Induced Psychotic Disorder, Unspecified/Other Psychotic Disorders, Attention Deficit Hyperactivity Disorder, Borderline Personality Disorder, Unspecified/Other Psychotic Disorders, Attention Deficit Hyperactivity Disorder, Borderline Personality Disorder, Insomnia Disorder, Other Mental Disorders  and Substance-Induced Anxiety Disorder.  Pt is chronically nonadherent to meds/ psych appts.  has hx of multiple psych admissions- Last to [facility] 12/28-01/2024 (date unknown). self reports hx of self aborted SA (jumping in front of a 7 train - last summer). with polysubstance use: Alcohol (denied any seizures, stated in past he has had VH during detox - per psyckes, with multiple detoxes/ rehabs - last attendance at [facility]-  for Alcohol dependence, uncomplicated)/ Cocaine/ Other psychoactive substance related disorders/ Cannabis/ Tobacco/ Opioid/  Other stimulant related disorders.  Pt self reports hx of self aborted SA (jumping in front of a 7 train last summer). Pertinent medical issues include: HTN, CKD, GERD, pericarditis; self reports hx of CHF (with ? EF 45%).  This morning, self presented to the ED complaining of suicidal ideation.     At this time, endorses depression with suicidal ideation with thoughts to use his friends gun or slit his throat, he also endorses recent relapse on alcohol. He was unable and unwilling to partake towards safety planning. it seems that current presentation is same as with past ED presentation - differentials to entertain for this case include: Unspecified mood disorder, suspicion for substance-induced mood disorder; axis II pathology and malingering.      Currently, does not appear to be acutely manic nor psychotic. He is help seeking and wishes to pursue 9.13 admission.  ED service will help facilitate such request. however, if he rescinds and is able to safety plan, will consider discharging him.    Meds:   Abilify 10mg HS  ASA 81mg QD  Ativan PRN  Trazodone 50mg po qhs   Folic Acid   Lipitor 40mg QHS  Vistaril PRN.

## 2024-01-18 NOTE — BH PATIENT PROFILE - NSELOPEMENTRISK_PSY_ALL_CORE
Pt requests to reschedule due to OR delay, OR states will still be 1.5 hours before taking back. Dr. Cayetano Saavedra made aware, states can be rescheduled to Wednesday, 7/27/22 and continue taking Lovenox, do not restart xarelto. Pt and family made aware, verbalize understanding. Francie Enamorado RN in [address] [place] [address] made aware, Left message for office making aware, and made PAT aware. Pt ambulated out with family, meal tickets given. No

## 2024-01-18 NOTE — ED BEHAVIORAL HEALTH ASSESSMENT NOTE - OTHER
homelessness, financial constraints, unemployment, limited social support, not being able to see 15 yr old daughter impaired based on recurrent substance use and nonadherence with out-pt mental health treatment, though currently seeking help partial insight into alcohol use disorder, limited insight into likely role in mood symptoms CVM, I stop

## 2024-01-18 NOTE — ED BEHAVIORAL HEALTH ASSESSMENT NOTE - CURRENT MEDICATION
stops taking his medication as he is discharged  Discharge meds: Abilify 10mg HS  ASA 81mg QD  Ativan PRN  Trazadone PRN  Entresto 24/26mg QD  Folic Acid   Lipitor 40mg QHS  Vistaril PRN. stops taking his medication as he is discharged  Discharge meds:   Abilify 10mg HS  ASA 81mg QD  Ativan PRN  Trazodone 50mg po qhs   Folic Acid   Lipitor 40mg QHS  Vistaril PRN.

## 2024-01-18 NOTE — BH PATIENT PROFILE - HOME MEDICATIONS
naproxen 375 mg oral tablet , 1 tab(s) orally 2 times a day, As Needed -for moderate pain   famotidine 20 mg oral tablet , 1 tab(s) orally 2 times a day

## 2024-01-18 NOTE — ED PROVIDER NOTE - CLINICAL SUMMARY MEDICAL DECISION MAKING FREE TEXT BOX
57 yo M PMH Bipolar p/w increased agitation after not speaking to his 15 yo daughter Charo since December 30th. Reports he feels like his daughter's mother is hiding his daughter from him. He admits to having AH that tell him to either jump in front of a train, shoot himself or cut his throat as well as to harm his daughter's mother. Daughter lives in [place], [place]. Admits to taking Abilify but stopping it a couple weeks ago.   Labs, EKG, COVID  Psych consult  Dispo as per consult

## 2024-01-18 NOTE — ED BEHAVIORAL HEALTH ASSESSMENT NOTE - PSYCHIATRIC ISSUES AND PLAN (INCLUDE STANDING AND PRN MEDICATION)
c/w above meds. titrate. PRNs: seroquel 50mg PO q6Hrs for agitation; PRNs: zyprexa 5mg IM q6Hrs for severe agitation. defer if qtC > 500m/s See above  PRNs: zyprexa 5mg IM q6Hrs for severe agitation. defer if qtC > 500m/s See above  PRNs: zyprexa 5mg po/IM q6Hrs for severe agitation. defer if qtC > 500m/s

## 2024-01-19 PROCEDURE — 99222 1ST HOSP IP/OBS MODERATE 55: CPT | Mod: GC

## 2024-01-19 RX ORDER — THIAMINE MONONITRATE (VIT B1) 100 MG
300 TABLET ORAL DAILY
Refills: 0 | Status: DISCONTINUED | OUTPATIENT
Start: 2024-01-19 | End: 2024-01-25

## 2024-01-19 RX ADMIN — ATORVASTATIN CALCIUM 40 MILLIGRAM(S): 80 TABLET, FILM COATED ORAL at 21:53

## 2024-01-19 RX ADMIN — Medication 1 MILLIGRAM(S): at 08:05

## 2024-01-19 RX ADMIN — Medication 81 MILLIGRAM(S): at 08:05

## 2024-01-19 NOTE — BH INPATIENT PSYCHIATRY ASSESSMENT NOTE - DETAILS
refused to discuss Denies any intent, denies any preparatory acts. self reports recent aborted SA via jumping in front of a 7 train. Reports wanting to shoot himself but told other provider that he wanted to "slit his throat" chronic passive HI towards wife who lives in another state, no current passive HI on unit

## 2024-01-19 NOTE — BH SOCIAL WORK INITIAL PSYCHOSOCIAL EVALUATION - OTHER PAST PSYCHIATRIC HISTORY (INCLUDE DETAILS REGARDING ONSET, COURSE OF ILLNESS, INPATIENT/OUTPATIENT TREATMENT)
Patient is a 58 yr old male, single, undomiciled (lives in hotels) and unemployed. He self reports hx of depression + anxiety, but has multiple past diagnoses as per Psyckes. Patient is a high mental health utilizer including numerous Psych ED/ CPEP visits for SI, alcohol intoxication, currently linked to [facility] IMT team. Per EMR patient is chronically nonadherent with meds, and was most recently, as per patient statement prescribed zoloft but self-discontinued due to side effects. Patient reports past hx of allegedly self aborting SA where he planned to jump in front of a 7 train over the summer (2023). Despite this aborted attempt, patient does not report other past suicide attempts, and currently upon interview with writer on unit does not report suicidal ideation. Per EMR, patient in ED reports suicidal ideation with plan and intent to shoot self with his gun that is being held by a friend. Patient reports struggling with substances as his main concern and issue, asking for assistance in [place] from the city to reduce substance use. PMHx of HTN, CKD, GERD, pericarditis; self reports hx of CHF.

## 2024-01-19 NOTE — BH INPATIENT PSYCHIATRY ASSESSMENT NOTE - RISK ASSESSMENT
risk factors: middle aged man, hx of prior aborted SA, hx of mood and psychotic disorder, AUD and cocaine use, hopelessness, triggering events (daughter blocking calls, daughter was pt's previous means to cope), homelessness, intoxication, social isolation, poor f/u with care and med adherence  protective factors: hopeful for continued positive therapeutic relationship with daughter  historical context: Patient presents with helplessness and SI w/o clear intent or plan I/s/o alcohol and cocaine intoxication and withdrawal. Recently discharged from [facility] for similar presentation. Currently endorses psychological and social stressor as daughter has blocked pt on all means of communication. Patient has also previously expressed passive HI towards ex-wife who lives in another state.  Level of risk: moderate acute and chronic risk given previous SA hx, extensive psychiatric history and ongoing AUD and cocaine use. low acute and low chronic risk of HIIP given lack of access to means and passive HI towards ex-wife who lives in another state

## 2024-01-19 NOTE — BH INPATIENT PSYCHIATRY ASSESSMENT NOTE - VIOLENCE RISK FACTORS:
Antisocial behavior/cognition (past or present)/Substance abuse/Affective dysregulation/Impulsivity/Noncompliance with treatment/Community stressors that increase the risk of destabilization/Irritability

## 2024-01-19 NOTE — BH SOCIAL WORK INITIAL PSYCHOSOCIAL EVALUATION - TRANSPORTATION
Pt discharged accompanied by [facility] with belongings. IV catheter removed. No S/S of distress.    no

## 2024-01-19 NOTE — BH INPATIENT PSYCHIATRY ASSESSMENT NOTE - MSE UNSTRUCTURED FT
The patient appears stated age, fair/poor hygeine and appears disheveled.  The patient was calm, cooperative with the interview and maintained poor eye contact.  No psychomotor agitation or retardation noted. The patient's speech was fluent with some latency.  The patient's mood is "depressed" . Constricted to depressed affect with full range. The patient's thoughts are goal directed.  Denies any current delusions or hallucinations though endorses chronic VH of his "dead mother" and chronic AH that is vague, not commanding. Endorses passive SI though denies intention and plan. Denies HIIP.  Insight is poor.  Judgment is poor. Impulse control has been fair on the unit. The patient appears stated age, fair/poor hygeine and appears disheveled.  The patient was calm, cooperative with the interview and maintained poor eye contact.  No psychomotor agitation or retardation noted. The patient's speech was fluent with some latency.  The patient's mood is "depressed" . Constricted to depressed affect with full range. The patient's thoughts are goal directed.  Denies any current delusions. Endorses chronic VH of his "dead mother" and chronic AH that is vague, not commanding. Endorses passive SI though denies intention and plan. Denies HIIP.  Insight is poor.  Judgment is poor. Impulse control has been fair on the unit.

## 2024-01-19 NOTE — PSYCHIATRIC REHAB INITIAL EVALUATION - PATIENT'S GENDER IDENTITY
Patient: Betsy Sutton    Procedure: Procedure(s):  PARATHYROIDECTOMY       Anesthesia Type:  General    Note:  Disposition: Outpatient   Postop Pain Control: Uneventful            Sign Out: Well controlled pain   PONV: No   Neuro/Psych: Uneventful            Sign Out: Acceptable/Baseline neuro status   Airway/Respiratory: Uneventful            Sign Out: Acceptable/Baseline resp. status   CV/Hemodynamics: Uneventful            Sign Out: Acceptable CV status; No obvious hypovolemia; No obvious fluid overload   Other NRE: NONE   DID A NON-ROUTINE EVENT OCCUR? No           Last vitals:  Vitals Value Taken Time   /76 07/12/22 1500   Temp 35.9  C (96.7  F) 07/12/22 1425   Pulse 89 07/12/22 1512   Resp 22 07/12/22 1512   SpO2 91 % 07/12/22 1512   Vitals shown include unvalidated device data.    Electronically Signed By: Chandler Bonilla MD, MD  July 12, 2022  3:14 PM  
Male

## 2024-01-19 NOTE — PSYCHIATRIC REHAB INITIAL EVALUATION - NSBHPRRECOMMEND_PSY_ALL_CORE
Patient is a 58 year old, single -American male, [facility] [facility] [place], with a HX of inpatient hospitalizations, recently discharged from [facility], with one prior suicide attempt, a hx of polysubstance abuse that includes alcohol, cannabis, cocaine, opiods and stimulants, with chronic noncompliance with treatment and medication. Patient endorsed worsening SI with plan to shoot himself with a gun in [place] at a friend's house. Patient recently relapsed and drank beer and Yessi over the past several days. Patient endorsed poor sleep, and hopelessness. Patient verbalized HI in the ED that involves the mother of his fifteen years old daughter who is currently in [place].   Patient will work on coping skills as well as improving compliance with medication. Psych rehab staff will continue to provide ongoing support and encouragement.

## 2024-01-19 NOTE — BH SOCIAL WORK INITIAL PSYCHOSOCIAL EVALUATION - NSBHSAALC_PSY_A_CORE FT
Reports daily alcohol use ~3-4 pints of liquor daily- Henfavianey and begins drinking with beers. Denies seizures

## 2024-01-19 NOTE — BH SOCIAL WORK INITIAL PSYCHOSOCIAL EVALUATION - NSBHCHILDLIVEWITH_PSY_ALL_CORE
Daughter living in [place], [place] - currently in custody of biological mother. Patient speaks on the phone regularly/No

## 2024-01-19 NOTE — BH SOCIAL WORK INITIAL PSYCHOSOCIAL EVALUATION - NSPTSTATEDGOAL_PSY_ALL_CORE
Patient would like assistance with housing and substance abuse treatment. He would like to break the cycle that he is currently stuck in and get better.

## 2024-01-19 NOTE — BH INPATIENT PSYCHIATRY ASSESSMENT NOTE - NSBHASSESSSUMMFT_PSY_ALL_CORE
57 yo male, single, undomiciled (goes between shelters and hotels), unemployed, denies hx of PPHx though per Psyckes has several past diagnoses including but not limited to MDD, TIA, Bipolar I/II, schizophrenia, substance-induced depression/psychosis/anxiety/sleep disorder, borderline and antisocial personality disorder, chronically nonadherent to meds and psych appts, hx of multiple psych admissions (most recently 11/23-12/23 at [facility] for self aborted SA), polysubstance use (Alcohol, cocaine) w/o hx of seizures, multiple detoxes/rehabs (last at [facility] Dec 2023), PMHx including HTN, CKD, GERD, pericarditis, self reported CHF (EF 45%?) who is transferred to [facility] from [facility] ED complaining of depression and SI I/s/o recent alcohol and cocaine use.    On assessment, pt presents with depressed mood, helplessness and ongoing acute on chronic passive SI w/o intent nor plan. Previously admitted for aborted SA by jumping in front of train vs using friends' gun. Ongoing alcohol and cocaine use likely exacerbating presentation. Patient recently discharged from [facility] for similar presentation. Does not present with manic symptoms. Chronic AH/VH likely secondary to trauma and cluster B personality. Given recent discharge from [facility] and multiple ED visits in past two months, it is suspected that pt presents with intentions for secondary gain. Patient is open to rehab and long term housing.     Will d/c Abilify at this time as there is no suspicion of miquel or primary psychotic symptoms. Will plan for dispo to rehab.     Plan:  1. Legals: admit on 9.13 status  2. Safety: routine observation, denies SI/HI/I/P on the unit. PRNs: Zyprexa 5 mg PO/IM for moderate/severe agitation. Atarax 50 mg PO q6 for anxiety.  3. Psychiatry:  d/c abilify   c/t trazodone 50 mg qHS for insomnia.  4. Medical: c/w WINSOMEWA protocol.  c/t lipitor 40 mg qHS. thiamine 300 mg and folic acid 1 mg given hx of alcohol use.  5. Dispo: pending clinical improvement likely to rehab.  Patient continues to require inpatient hospitalization for stabilization and safety.    Carlitos Kearns, R-1

## 2024-01-19 NOTE — BH INPATIENT PSYCHIATRY ASSESSMENT NOTE - HPI (INCLUDE ILLNESS QUALITY, SEVERITY, DURATION, TIMING, CONTEXT, MODIFYING FACTORS, ASSOCIATED SIGNS AND SYMPTOMS)
59 yo male, single, undomiciled (goes between shelters and hotels), unemployed, denies hx of PPHx though per Psyckes has several past diagnoses including but not limited to MDD, TIA, Bipolar I/II, schizophrenia, substance-induced depression/psychosis/anxiety/sleep disorder, borderline and antisocial personality disorder, chronically nonadherent to meds and psych appts, hx of multiple psych admissions (most recently 11/23-12/23 at [facility] for self aborted SA), polysubstance use (Alcohol, cocaine) w/o hx of seizures, multiple detoxes/rehabs (last at [facility] Dec 2023), PMHx including HTN, CKD, GERD, pericarditis, self reported CHF (EF 45%?) who is transferred to [facility] from [facility] complaining of depression and SI I/s/o recent alcohol and cocaine use.    Patient originally endorsed SI with thoughts to use his friend's gun or slit his throat. Originally also presented with HI towards his child's mother but states that he has no address and no way of actually gettng to her. On assessment today, He appears mildly irritable as the "abilify they gave me makes me groggy". Denies SI with intent and plan.  Is unable to give a clear answer to "Have you wished you better off dead"? as "he doesn't like answering that question". Denies HI. When asked to share recent events, pt states that he recently relapse on alcohol several days ago. States he drinks several pints of Henessy but denies that he does this to the point of blacking out. Of note, he was most recently discharged from [facility] for 12/28/23 to early January 2024 for similar concerns. Patient states that he relapsed several days ago because he "doesn't give a fuck". Says that he is "stressed" due to the fact that his 15 year old daughter who lives in [place]  has not picked up her phone for the past month. He expresses concern for her and her physical safety and this is constantly on his mind. Says that the lack of communication from his daughter has occurred in the past before. Patient had not made contact with his ex-wife for a long time.     Pt states that he last drank alcohol 2 days ago. Currently denies tremors and other withdrawal symptoms (CIWA currently 0, last at 10 on 1/18 evening). Patient currently denies VH but endorses chronic VH "visions of his dead mother" because he is a "mama's boy". States that visions come on randomly but occurs more often the few days after he is withdrawing from alcohol. The visions do not bother him, occasionally they do not make sense (he references an occasion when he saw "his mom in a cat suit"). Patient denies current AH, but chronic and vague hx of AH that is not commanding. Pt adamantly rejects that he has had a diagnosis of schizophrenia and bipolar disorder in the past noting that a " who was unqualified gave him these diagnoses". Pt only thinks that the only psychiatric condition he lives with is "stress".     During interview, patient makes treatment aware that he wants long term housing in order to remove himself from triggers and possibilities of him drinking again. Continues to be open to rehab as mentioned on previous notes.

## 2024-01-19 NOTE — BH INPATIENT PSYCHIATRY ASSESSMENT NOTE - NSBHMETABOLIC_PSY_ALL_CORE_FT
BMI: BMI (kg/m2): 34.9 (01-18-24 @ 21:15)  HbA1c:   Glucose:   BP: 107/65 (01-18-24 @ 21:15) (107/65 - 133/89)Vital Signs Last 24 Hrs  T(C): 36.4 (01-19-24 @ 08:04), Max: 36.6 (01-18-24 @ 13:43)  T(F): 97.6 (01-19-24 @ 08:04), Max: 97.9 (01-18-24 @ 13:43)  HR: 108 (01-18-24 @ 21:15) (108 - 112)  BP: 107/65 (01-18-24 @ 21:15) (107/65 - 133/89)  BP(mean): --  RR: 18 (01-18-24 @ 21:15) (18 - 18)  SpO2: 100% (01-18-24 @ 13:43) (100% - 100%)    Orthostatic VS  01-19-24 @ 08:04  Lying BP: --/-- HR: --  Sitting BP: 92/62 HR: 90  Standing BP: 111/63 HR: 100  Site: --  Mode: --  Orthostatic VS  01-19-24 @ 03:35  Lying BP: --/-- HR: --  Sitting BP: 105/64 HR: 78  Standing BP: --/-- HR: --  Site: upper left arm  Mode: electronic    Lipid Panel:  BMI: BMI (kg/m2): 34.9 (01-18-24 @ 21:15)  HbA1c:   Glucose:   BP: 107/65 (01-18-24 @ 21:15) (107/65 - 133/89)Vital Signs Last 24 Hrs  T(C): 36.4 (01-19-24 @ 08:04), Max: 36.6 (01-19-24 @ 03:35)  T(F): 97.6 (01-19-24 @ 08:04), Max: 97.8 (01-19-24 @ 03:35)  HR: 108 (01-18-24 @ 21:15) (108 - 108)  BP: 107/65 (01-18-24 @ 21:15) (107/65 - 107/65)  BP(mean): --  RR: 18 (01-18-24 @ 21:15) (18 - 18)  SpO2: --    Orthostatic VS  01-19-24 @ 08:04  Lying BP: --/-- HR: --  Sitting BP: 92/62 HR: 90  Standing BP: 111/63 HR: 100  Site: --  Mode: --  Orthostatic VS  01-19-24 @ 03:35  Lying BP: --/-- HR: --  Sitting BP: 105/64 HR: 78  Standing BP: --/-- HR: --  Site: upper left arm  Mode: electronic    Lipid Panel:

## 2024-01-19 NOTE — BH INPATIENT PSYCHIATRY ASSESSMENT NOTE - OTHER PAST PSYCHIATRIC HISTORY (INCLUDE DETAILS REGARDING ONSET, COURSE OF ILLNESS, INPATIENT/OUTPATIENT TREATMENT)
self reports hx of depression + anxiety  multiple past diagnoses as per Psyckes  high mental health utilizer including numerous Psych ED/ CPEP visits for SI, alcohol intoxication  prior psych admissions  chronically nonadherent with meds   claimed previously prescribed Zoloft at [facility]   - self-discontinued because developed diarrhea  stated past hx of allegedly self aborting jumping in front of a 7 train over the summer (2023)  denied past suicide attempts.   Denies ever being in consistent outpatient psychiatric care.   currently with a IMT ([facility] ([facility] [place] [facility], 8/22/2022 -  Current) -LEFT VOICEMAIL

## 2024-01-19 NOTE — BH INPATIENT PSYCHIATRY ASSESSMENT NOTE - DESCRIPTION
single, unemployed, stays at a hotel, [employer] park and in the streets. has a 15 yr old daughter (living in [place], [place] - currently in custody of bio mother, speaks on the phone regularly). no reported access to guns

## 2024-01-19 NOTE — BH INPATIENT PSYCHIATRY ASSESSMENT NOTE - NSBHATTESTCOMMENTATTENDFT_PSY_A_CORE
Pt presenting with report of low mood, SI, AH/VH in the context of cocaine and EtOH use. While on the unit pt denies SIIP and HIIP, able to contract for safety. Does not present with primary acute psychosis and no evidence of acute mood d/o. No evidence of EtOH withdrawal and CIWA scores currently 0. He is focused on help with housing and transfer to rehab. Noted to have many recent presentations to the ED with reports of chest pain and SI. Some concern for secondary gain. Pt declines Abilify and no indication for such at this time, will discontinue. C/w symptom-triggered CIWA protocol. Trazodone for sleep. Defer other standing meds given no acute pathology at this time. Will make referrals to rehab.

## 2024-01-19 NOTE — BH INPATIENT PSYCHIATRY ASSESSMENT NOTE - CURRENT MEDICATION
MEDICATIONS  (STANDING):  ARIPiprazole 10 milliGRAM(s) Oral at bedtime  aspirin  chewable 81 milliGRAM(s) Oral daily  atorvastatin 40 milliGRAM(s) Oral at bedtime  folic acid 1 milliGRAM(s) Oral daily  influenza   Vaccine 0.5 milliLiter(s) IntraMuscular once  thiamine 300 milliGRAM(s) Oral daily  traZODone 50 milliGRAM(s) Oral at bedtime    MEDICATIONS  (PRN):  hydrOXYzine hydrochloride 50 milliGRAM(s) Oral every 6 hours PRN anxiety  LORazepam     Tablet 2 milliGRAM(s) Oral every 2 hours PRN CIWA score increase by 2 points and current CIWA score GREATER THAN 9  OLANZapine 5 milliGRAM(s) Oral every 6 hours PRN agitation  OLANZapine Injectable 5 milliGRAM(s) IntraMuscular Once PRN severe agitation   MEDICATIONS  (STANDING):  aspirin  chewable 81 milliGRAM(s) Oral daily  atorvastatin 40 milliGRAM(s) Oral at bedtime  folic acid 1 milliGRAM(s) Oral daily  influenza   Vaccine 0.5 milliLiter(s) IntraMuscular once  multivitamin 1 Tablet(s) Oral daily  thiamine 300 milliGRAM(s) Oral daily  traZODone 50 milliGRAM(s) Oral at bedtime    MEDICATIONS  (PRN):  hydrOXYzine hydrochloride 50 milliGRAM(s) Oral every 6 hours PRN anxiety  LORazepam     Tablet 2 milliGRAM(s) Oral every 2 hours PRN CIWA score increase by 2 points and current CIWA score GREATER THAN 9  OLANZapine 5 milliGRAM(s) Oral every 6 hours PRN agitation  OLANZapine Injectable 5 milliGRAM(s) IntraMuscular Once PRN severe agitation

## 2024-01-19 NOTE — BH INPATIENT PSYCHIATRY ASSESSMENT NOTE - NSBHCRANIAL_PSY_ALL_CORE
Recognizes 2 fingers or can read (II)/Smiles, shows teeth, opens mouth, sticks out tongue (V, VII, XI)/Normal speech (IX, X, XII)/Extraocular Eye Movement Intact  (III, IV, VI)/Shoulder shrug (XI)/Hearing intact (VIII) Normal speech (IX, X, XII)/Extraocular Eye Movement Intact  (III, IV, VI)/Hearing intact (VIII)

## 2024-01-19 NOTE — BH INPATIENT PSYCHIATRY ASSESSMENT NOTE - NSBHCHARTREVIEWVS_PSY_A_CORE FT
Vital Signs Last 24 Hrs  T(C): 36.4 (01-19-24 @ 08:04), Max: 36.6 (01-18-24 @ 13:43)  T(F): 97.6 (01-19-24 @ 08:04), Max: 97.9 (01-18-24 @ 13:43)  HR: 108 (01-18-24 @ 21:15) (108 - 112)  BP: 107/65 (01-18-24 @ 21:15) (107/65 - 133/89)  BP(mean): --  RR: 18 (01-18-24 @ 21:15) (18 - 18)  SpO2: 100% (01-18-24 @ 13:43) (100% - 100%)    Orthostatic VS  01-19-24 @ 08:04  Lying BP: --/-- HR: --  Sitting BP: 92/62 HR: 90  Standing BP: 111/63 HR: 100  Site: --  Mode: --  Orthostatic VS  01-19-24 @ 03:35  Lying BP: --/-- HR: --  Sitting BP: 105/64 HR: 78  Standing BP: --/-- HR: --  Site: upper left arm  Mode: electronic   Vital Signs Last 24 Hrs  T(C): 36.4 (01-19-24 @ 08:04), Max: 36.6 (01-19-24 @ 03:35)  T(F): 97.6 (01-19-24 @ 08:04), Max: 97.8 (01-19-24 @ 03:35)  HR: 108 (01-18-24 @ 21:15) (108 - 108)  BP: 107/65 (01-18-24 @ 21:15) (107/65 - 107/65)  BP(mean): --  RR: 18 (01-18-24 @ 21:15) (18 - 18)  SpO2: --    Orthostatic VS  01-19-24 @ 08:04  Lying BP: --/-- HR: --  Sitting BP: 92/62 HR: 90  Standing BP: 111/63 HR: 100  Site: --  Mode: --  Orthostatic VS  01-19-24 @ 03:35  Lying BP: --/-- HR: --  Sitting BP: 105/64 HR: 78  Standing BP: --/-- HR: --  Site: upper left arm  Mode: electronic

## 2024-01-20 PROCEDURE — 99232 SBSQ HOSP IP/OBS MODERATE 35: CPT

## 2024-01-20 RX ORDER — DIPHENHYDRAMINE HCL 50 MG
50 CAPSULE ORAL ONCE
Refills: 0 | Status: COMPLETED | OUTPATIENT
Start: 2024-01-20 | End: 2024-01-20

## 2024-01-20 RX ORDER — ACETAMINOPHEN 500 MG
650 TABLET ORAL EVERY 6 HOURS
Refills: 0 | Status: DISCONTINUED | OUTPATIENT
Start: 2024-01-20 | End: 2024-01-29

## 2024-01-20 RX ADMIN — ATORVASTATIN CALCIUM 40 MILLIGRAM(S): 80 TABLET, FILM COATED ORAL at 20:15

## 2024-01-20 RX ADMIN — Medication 50 MILLIGRAM(S): at 20:24

## 2024-01-20 RX ADMIN — Medication 81 MILLIGRAM(S): at 08:15

## 2024-01-20 RX ADMIN — Medication 650 MILLIGRAM(S): at 09:15

## 2024-01-20 RX ADMIN — Medication 1 MILLIGRAM(S): at 08:15

## 2024-01-20 RX ADMIN — Medication 300 MILLIGRAM(S): at 08:15

## 2024-01-20 RX ADMIN — Medication 650 MILLIGRAM(S): at 20:55

## 2024-01-20 RX ADMIN — Medication 1 TABLET(S): at 08:15

## 2024-01-20 RX ADMIN — Medication 650 MILLIGRAM(S): at 08:16

## 2024-01-20 RX ADMIN — Medication 650 MILLIGRAM(S): at 19:55

## 2024-01-20 NOTE — BH INPATIENT PSYCHIATRY PROGRESS NOTE - NSBHASSESSSUMMFT_PSY_ALL_CORE
57 yo male, single, undomiciled (goes between shelters and hotels), unemployed, denies hx of PPHx though per Psyckes has several past diagnoses including but not limited to MDD, TIA, Bipolar I/II, schizophrenia, substance-induced depression/psychosis/anxiety/sleep disorder, borderline and antisocial personality disorder, chronically nonadherent to meds and psych appts, hx of multiple psych admissions (most recently 11/23-12/23 at [facility] for self aborted SA), polysubstance use (Alcohol, cocaine) w/o hx of seizures, multiple detoxes/rehabs (last at [facility] Dec 2023), PMHx including HTN, CKD, GERD, pericarditis, self reported CHF (EF 45%?) who is transferred to [facility] from [facility] ED complaining of depression and SI I/s/o recent alcohol and cocaine use.    On assessment, pt presents with depressed mood, helplessness and ongoing acute on chronic passive SI w/o intent nor plan. Previously admitted for aborted SA by jumping in front of train vs using friends' gun. Ongoing alcohol and cocaine use likely exacerbating presentation. Patient recently discharged from [facility] for similar presentation. Does not present with manic symptoms. Chronic AH/VH likely secondary to trauma and cluster B personality. Given recent discharge from [facility] and multiple ED visits in past two months, it is suspected that pt presents with intentions for secondary gain. Patient is open to rehab and long term housing.     Will d/c Abilify at this time as there is no suspicion of miquel or primary psychotic symptoms. Will plan for dispo to rehab.     Plan:  1. Legals: admit on 9.13 status  2. Safety: routine observation, denies SI/HI/I/P on the unit. PRNs: Zyprexa 5 mg PO/IM for moderate/severe agitation. Atarax 50 mg PO q6 for anxiety.  3. Psychiatry:  d/c abilify   c/t trazodone 50 mg qHS for insomnia.  4. Medical: c/w WINSOMEWA protocol.  c/t lipitor 40 mg qHS. thiamine 300 mg and folic acid 1 mg given hx of alcohol use.  5. Dispo: pending clinical improvement likely to rehab.  Patient continues to require inpatient hospitalization for stabilization and safety.    Carlitos Kearns, R-1

## 2024-01-20 NOTE — BH INPATIENT PSYCHIATRY PROGRESS NOTE - NSBHFUPINTERVALHXFT_PSY_A_CORE
Pt compliant with medication and tolerating it well.  Chart reviewed and case discussed with nursing.  No events reported overnight.  Pt denies SI/HI/I/P or AH/VH or paranoia.  Pt reports eating and sleeping fairly.  Pt denies feeling depressed.  Pt denies withdrawal sx from ETOH and CIWA is 0.  Pt reports he wants to be set up with housing.

## 2024-01-20 NOTE — BH INPATIENT PSYCHIATRY PROGRESS NOTE - NSBHCHARTREVIEWVS_PSY_A_CORE FT
Vital Signs Last 24 Hrs  T(C): 37.2 (01-20-24 @ 08:19), Max: 37.2 (01-20-24 @ 08:19)  T(F): 98.9 (01-20-24 @ 08:19), Max: 98.9 (01-20-24 @ 08:19)  HR: --  BP: --  BP(mean): --  RR: --  SpO2: --    Orthostatic VS  01-20-24 @ 08:19  Lying BP: --/-- HR: --  Sitting BP: 124/68 HR: 72  Standing BP: 125/70 HR: 74  Site: --  Mode: --  Orthostatic VS  01-19-24 @ 08:04  Lying BP: --/-- HR: --  Sitting BP: 92/62 HR: 90  Standing BP: 111/63 HR: 100  Site: --  Mode: --  Orthostatic VS  01-19-24 @ 03:35  Lying BP: --/-- HR: --  Sitting BP: 105/64 HR: 78  Standing BP: --/-- HR: --  Site: upper left arm  Mode: electronic

## 2024-01-20 NOTE — BH INPATIENT PSYCHIATRY PROGRESS NOTE - CURRENT MEDICATION
MEDICATIONS  (STANDING):  aspirin  chewable 81 milliGRAM(s) Oral daily  atorvastatin 40 milliGRAM(s) Oral at bedtime  folic acid 1 milliGRAM(s) Oral daily  influenza   Vaccine 0.5 milliLiter(s) IntraMuscular once  multivitamin 1 Tablet(s) Oral daily  thiamine 300 milliGRAM(s) Oral daily  traZODone 50 milliGRAM(s) Oral at bedtime    MEDICATIONS  (PRN):  acetaminophen     Tablet .. 650 milliGRAM(s) Oral every 6 hours PRN Mild Pain (1 - 3), Moderate Pain (4 - 6)  hydrOXYzine hydrochloride 50 milliGRAM(s) Oral every 6 hours PRN anxiety  LORazepam     Tablet 2 milliGRAM(s) Oral every 2 hours PRN CIWA score increase by 2 points and current CIWA score GREATER THAN 9  OLANZapine 5 milliGRAM(s) Oral every 6 hours PRN agitation  OLANZapine Injectable 5 milliGRAM(s) IntraMuscular Once PRN severe agitation

## 2024-01-20 NOTE — BH INPATIENT PSYCHIATRY PROGRESS NOTE - NSBHMETABOLIC_PSY_ALL_CORE_FT
BMI: BMI (kg/m2): 34.9 (01-18-24 @ 21:15)  HbA1c:   Glucose:   BP: 107/65 (01-18-24 @ 21:15) (107/65 - 133/89)Vital Signs Last 24 Hrs  T(C): 37.2 (01-20-24 @ 08:19), Max: 37.2 (01-20-24 @ 08:19)  T(F): 98.9 (01-20-24 @ 08:19), Max: 98.9 (01-20-24 @ 08:19)  HR: --  BP: --  BP(mean): --  RR: --  SpO2: --    Orthostatic VS  01-20-24 @ 08:19  Lying BP: --/-- HR: --  Sitting BP: 124/68 HR: 72  Standing BP: 125/70 HR: 74  Site: --  Mode: --  Orthostatic VS  01-19-24 @ 08:04  Lying BP: --/-- HR: --  Sitting BP: 92/62 HR: 90  Standing BP: 111/63 HR: 100  Site: --  Mode: --  Orthostatic VS  01-19-24 @ 03:35  Lying BP: --/-- HR: --  Sitting BP: 105/64 HR: 78  Standing BP: --/-- HR: --  Site: upper left arm  Mode: electronic    Lipid Panel:

## 2024-01-21 PROCEDURE — 99232 SBSQ HOSP IP/OBS MODERATE 35: CPT

## 2024-01-21 RX ADMIN — Medication 1 TABLET(S): at 08:10

## 2024-01-21 RX ADMIN — ATORVASTATIN CALCIUM 40 MILLIGRAM(S): 80 TABLET, FILM COATED ORAL at 20:10

## 2024-01-21 RX ADMIN — Medication 100 MILLIGRAM(S): at 08:10

## 2024-01-21 RX ADMIN — Medication 50 MILLIGRAM(S): at 20:10

## 2024-01-21 RX ADMIN — Medication 300 MILLIGRAM(S): at 08:11

## 2024-01-21 RX ADMIN — Medication 81 MILLIGRAM(S): at 08:10

## 2024-01-21 RX ADMIN — Medication 1 MILLIGRAM(S): at 08:10

## 2024-01-21 NOTE — BH INPATIENT PSYCHIATRY PROGRESS NOTE - NSBHMETABOLIC_PSY_ALL_CORE_FT
BMI: BMI (kg/m2): 34.9 (01-18-24 @ 21:15)  HbA1c:   Glucose:   BP: 107/65 (01-18-24 @ 21:15) (107/65 - 107/65)Vital Signs Last 24 Hrs  T(C): --  T(F): --  HR: --  BP: --  BP(mean): --  RR: --  SpO2: --    Orthostatic VS  01-21-24 @ 06:49  Lying BP: --/-- HR: --  Sitting BP: 122/75 HR: 81  Standing BP: --/-- HR: --  Site: --  Mode: --  Orthostatic VS  01-20-24 @ 19:06  Lying BP: --/-- HR: --  Sitting BP: 112/67 HR: 78  Standing BP: 116/72 HR: 84  Site: --  Mode: --  Orthostatic VS  01-20-24 @ 08:19  Lying BP: --/-- HR: --  Sitting BP: 124/68 HR: 72  Standing BP: 125/70 HR: 74  Site: --  Mode: --    Lipid Panel:

## 2024-01-21 NOTE — BH INPATIENT PSYCHIATRY PROGRESS NOTE - NSBHFUPINTERVALHXFT_PSY_A_CORE
Pt compliant with medication and tolerating it well.  Chart reviewed, no events reported overnight.  Pt denies SI/HI/I/P or AH/VH or paranoia.  Pt reports eating and sleeping fairly.  Pt denies feeling depressed.  Pt denies withdrawal sx from ETOH.  Pt asks if he will be discharged by the first of the month.  Pt reports he came here because he needed time to detox from ETOH.  Pt reports he feels better and has been exercising and praying.

## 2024-01-21 NOTE — BH INPATIENT PSYCHIATRY PROGRESS NOTE - CURRENT MEDICATION
MEDICATIONS  (STANDING):  aspirin  chewable 81 milliGRAM(s) Oral daily  atorvastatin 40 milliGRAM(s) Oral at bedtime  folic acid 1 milliGRAM(s) Oral daily  influenza   Vaccine 0.5 milliLiter(s) IntraMuscular once  multivitamin 1 Tablet(s) Oral daily  thiamine 300 milliGRAM(s) Oral daily  traZODone 50 milliGRAM(s) Oral at bedtime    MEDICATIONS  (PRN):  acetaminophen     Tablet .. 650 milliGRAM(s) Oral every 6 hours PRN Mild Pain (1 - 3), Moderate Pain (4 - 6)  benzonatate 100 milliGRAM(s) Oral three times a day PRN cough  hydrOXYzine hydrochloride 50 milliGRAM(s) Oral every 6 hours PRN anxiety  LORazepam     Tablet 2 milliGRAM(s) Oral every 2 hours PRN CIWA score increase by 2 points and current CIWA score GREATER THAN 9  OLANZapine 5 milliGRAM(s) Oral every 6 hours PRN agitation  OLANZapine Injectable 5 milliGRAM(s) IntraMuscular Once PRN severe agitation

## 2024-01-21 NOTE — BH INPATIENT PSYCHIATRY PROGRESS NOTE - PRN MEDS
MEDICATIONS  (PRN):  acetaminophen     Tablet .. 650 milliGRAM(s) Oral every 6 hours PRN Mild Pain (1 - 3), Moderate Pain (4 - 6)  benzonatate 100 milliGRAM(s) Oral three times a day PRN cough  hydrOXYzine hydrochloride 50 milliGRAM(s) Oral every 6 hours PRN anxiety  LORazepam     Tablet 2 milliGRAM(s) Oral every 2 hours PRN CIWA score increase by 2 points and current CIWA score GREATER THAN 9  OLANZapine 5 milliGRAM(s) Oral every 6 hours PRN agitation  OLANZapine Injectable 5 milliGRAM(s) IntraMuscular Once PRN severe agitation

## 2024-01-21 NOTE — BH INPATIENT PSYCHIATRY PROGRESS NOTE - NSBHASSESSSUMMFT_PSY_ALL_CORE
59 yo male, single, undomiciled (goes between shelters and hotels), unemployed, denies hx of PPHx though per Psyckes has several past diagnoses including but not limited to MDD, TIA, Bipolar I/II, schizophrenia, substance-induced depression/psychosis/anxiety/sleep disorder, borderline and antisocial personality disorder, chronically nonadherent to meds and psych appts, hx of multiple psych admissions (most recently 11/23-12/23 at [facility] for self aborted SA), polysubstance use (Alcohol, cocaine) w/o hx of seizures, multiple detoxes/rehabs (last at [facility] Dec 2023), PMHx including HTN, CKD, GERD, pericarditis, self reported CHF (EF 45%?) who is transferred to [facility] from [facility] ED complaining of depression and SI I/s/o recent alcohol and cocaine use.    On assessment, pt presents with depressed mood, helplessness and ongoing acute on chronic passive SI w/o intent nor plan. Previously admitted for aborted SA by jumping in front of train vs using friends' gun. Ongoing alcohol and cocaine use likely exacerbating presentation. Patient recently discharged from [facility] for similar presentation. Does not present with manic symptoms. Chronic AH/VH likely secondary to trauma and cluster B personality. Given recent discharge from [facility] and multiple ED visits in past two months, it is suspected that pt presents with intentions for secondary gain. Patient is open to rehab and long term housing.     Will d/c Abilify at this time as there is no suspicion of miquel or primary psychotic symptoms. Will plan for dispo to rehab.     Plan:  1. Legals: admit on 9.13 status  2. Safety: routine observation, denies SI/HI/I/P on the unit. PRNs: Zyprexa 5 mg PO/IM for moderate/severe agitation. Atarax 50 mg PO q6 for anxiety.  3. Psychiatry:  d/c abilify   c/t trazodone 50 mg qHS for insomnia.  4. Medical: c/w WINSOMEWA protocol.  c/t lipitor 40 mg qHS. thiamine 300 mg and folic acid 1 mg given hx of alcohol use.  5. Dispo: pending clinical improvement likely to rehab.  Patient continues to require inpatient hospitalization for stabilization and safety.    Carlitos Kearns, R-1

## 2024-01-21 NOTE — BH INPATIENT PSYCHIATRY PROGRESS NOTE - NSBHCHARTREVIEWVS_PSY_A_CORE FT
Vital Signs Last 24 Hrs  T(C): --  T(F): --  HR: --  BP: --  BP(mean): --  RR: --  SpO2: --    Orthostatic VS  01-21-24 @ 06:49  Lying BP: --/-- HR: --  Sitting BP: 122/75 HR: 81  Standing BP: --/-- HR: --  Site: --  Mode: --  Orthostatic VS  01-20-24 @ 19:06  Lying BP: --/-- HR: --  Sitting BP: 112/67 HR: 78  Standing BP: 116/72 HR: 84  Site: --  Mode: --  Orthostatic VS  01-20-24 @ 08:19  Lying BP: --/-- HR: --  Sitting BP: 124/68 HR: 72  Standing BP: 125/70 HR: 74  Site: --  Mode: --

## 2024-01-22 DIAGNOSIS — F10.20 ALCOHOL DEPENDENCE, UNCOMPLICATED: ICD-10-CM

## 2024-01-22 DIAGNOSIS — F14.10 COCAINE ABUSE, UNCOMPLICATED: ICD-10-CM

## 2024-01-22 PROCEDURE — 99232 SBSQ HOSP IP/OBS MODERATE 35: CPT | Mod: GC

## 2024-01-22 PROCEDURE — 90853 GROUP PSYCHOTHERAPY: CPT

## 2024-01-22 RX ORDER — IBUPROFEN 200 MG
400 TABLET ORAL EVERY 6 HOURS
Refills: 0 | Status: DISCONTINUED | OUTPATIENT
Start: 2024-01-22 | End: 2024-01-29

## 2024-01-22 RX ADMIN — Medication 300 MILLIGRAM(S): at 08:25

## 2024-01-22 RX ADMIN — Medication 400 MILLIGRAM(S): at 08:02

## 2024-01-22 RX ADMIN — Medication 1 MILLIGRAM(S): at 08:28

## 2024-01-22 RX ADMIN — Medication 81 MILLIGRAM(S): at 08:26

## 2024-01-22 RX ADMIN — ATORVASTATIN CALCIUM 40 MILLIGRAM(S): 80 TABLET, FILM COATED ORAL at 21:01

## 2024-01-22 RX ADMIN — Medication 400 MILLIGRAM(S): at 09:02

## 2024-01-22 RX ADMIN — Medication 1 TABLET(S): at 08:26

## 2024-01-22 NOTE — DIETITIAN INITIAL EVALUATION ADULT - ENTER FROM (CAL/KG)
Impression: Viral conjunctivitis: B30.9.  Plan: ERX Pred forte 3 times a day
discontinue all other meds 

recheck in 3 weeks
20

## 2024-01-22 NOTE — BH INPATIENT PSYCHIATRY PROGRESS NOTE - NSICDXBHTERTIARYDX_PSY_ALL_CORE
R/O Borderline personality disorder   F60.3  R/O Major depression, chronic   F32.9  R/O Malingering   Z76.5   R/O Malingering   Z76.5

## 2024-01-22 NOTE — BH INPATIENT PSYCHIATRY PROGRESS NOTE - PRN MEDS
MEDICATIONS  (PRN):  acetaminophen     Tablet .. 650 milliGRAM(s) Oral every 6 hours PRN Mild Pain (1 - 3), Moderate Pain (4 - 6)  benzonatate 100 milliGRAM(s) Oral three times a day PRN cough  hydrOXYzine hydrochloride 50 milliGRAM(s) Oral every 6 hours PRN anxiety  ibuprofen  Tablet. 400 milliGRAM(s) Oral every 6 hours PRN Mild Pain (1 - 3), Moderate Pain (4 - 6)  LORazepam     Tablet 2 milliGRAM(s) Oral every 2 hours PRN CIWA score increase by 2 points and current CIWA score GREATER THAN 9  OLANZapine 5 milliGRAM(s) Oral every 6 hours PRN agitation  OLANZapine Injectable 5 milliGRAM(s) IntraMuscular Once PRN severe agitation   MEDICATIONS  (PRN):  acetaminophen     Tablet .. 650 milliGRAM(s) Oral every 6 hours PRN Mild Pain (1 - 3), Moderate Pain (4 - 6)  benzonatate 100 milliGRAM(s) Oral three times a day PRN cough  hydrOXYzine hydrochloride 50 milliGRAM(s) Oral every 6 hours PRN anxiety  ibuprofen  Tablet. 400 milliGRAM(s) Oral every 6 hours PRN Mild Pain (1 - 3), Moderate Pain (4 - 6)  OLANZapine 5 milliGRAM(s) Oral every 6 hours PRN agitation  OLANZapine Injectable 5 milliGRAM(s) IntraMuscular Once PRN severe agitation

## 2024-01-22 NOTE — BH INPATIENT PSYCHIATRY PROGRESS NOTE - NSBHCHARTREVIEWVS_PSY_A_CORE FT
Vital Signs Last 24 Hrs  T(C): 36.7 (01-22-24 @ 08:32), Max: 36.7 (01-22-24 @ 08:32)  T(F): 98 (01-22-24 @ 08:32), Max: 98 (01-22-24 @ 08:32)  HR: --  BP: --  BP(mean): --  RR: --  SpO2: --    Orthostatic VS  01-22-24 @ 08:32  Lying BP: --/-- HR: --  Sitting BP: 137/90 HR: 83  Standing BP: 120/91 HR: 100  Site: --  Mode: --  Orthostatic VS  01-21-24 @ 06:49  Lying BP: --/-- HR: --  Sitting BP: 122/75 HR: 81  Standing BP: --/-- HR: --  Site: --  Mode: --  Orthostatic VS  01-20-24 @ 19:06  Lying BP: --/-- HR: --  Sitting BP: 112/67 HR: 78  Standing BP: 116/72 HR: 84  Site: --  Mode: --   Vital Signs Last 24 Hrs  T(C): 36.7 (01-22-24 @ 08:32), Max: 36.7 (01-22-24 @ 08:32)  T(F): 98 (01-22-24 @ 08:32), Max: 98 (01-22-24 @ 08:32)  HR: --  BP: --  BP(mean): --  RR: --  SpO2: --    Orthostatic VS  01-22-24 @ 08:32  Lying BP: --/-- HR: --  Sitting BP: 137/90 HR: 83  Standing BP: 120/91 HR: 100  Site: --  Mode: --  Orthostatic VS  01-21-24 @ 06:49  Lying BP: --/-- HR: --  Sitting BP: 122/75 HR: 81  Standing BP: --/-- HR: --  Site: --  Mode: --

## 2024-01-22 NOTE — BH INPATIENT PSYCHIATRY PROGRESS NOTE - MSE UNSTRUCTURED FT
The patient appears stated age, fair/poor hygeine and appears disheveled.  The patient was calm, cooperative with the interview and maintained poor eye contact.  No psychomotor agitation or retardation noted. The patient's speech was fluent with some latency.  The patient's mood is "good" . Neutral affect with full range. The patient's thoughts are goal directed.  Denies any current delusions. Denies AH/VH. Denies SIIP and HIIP.  Insight is fair.  Judgment is poor. Impulse control has been fair on the unit. The patient appears stated age, has fair hygiene and grooming. The patient was calm, cooperative with the interview and maintained appropriate eye contact.  No psychomotor agitation or retardation noted. The patient's speech was fluent with normal rate and volume.  The patient's mood is "good."  Neutral affect with full range, bright at times. The patient's thoughts are goal directed.  Denies any current delusions. Denies AH/VH. Denies SIIP and HIIP.  Insight is fair.  Judgment is poor. Impulse control has been fair on the unit.

## 2024-01-22 NOTE — BH INPATIENT PSYCHIATRY PROGRESS NOTE - NSBHFUPINTERVALHXFT_PSY_A_CORE
Pt compliant with medication and tolerating it well.  Chart reviewed, no events reported overnight.  Pt denies SI/HI/I/P or AH/VH or paranoia.  Pt reports eating and sleeping fairly.  Pt denies feeling depressed.  Pt denies withdrawal sx from ETOH.  Pt asks if he will be discharged by the first of the month.  Pt reports he came here because he needed time to detox from ETOH.  Pt reports he feels better and has been exercising and praying. Chart reviewed and case discussed with interdisciplinary team. Has not required PRNs over the weekend. Pt seen this morning with entire team. States that he has not been sleeping well because it is too cold in his room. Denies SIIP. Denies AH/VH or paranoia. Pt denies feeling depressed, states his mood is "good". Denies withdrawal sx from ETOH. Pt states that he "wants to leave by the first of next month" because he has "$300 worth of food stamps". Pt endorses ongoing dissapointment and worry because his daughter who lives out of state continues to not  her phone.  Chart reviewed and case discussed with interdisciplinary team. Has not required PRNs over the weekend. Pt seen this morning with entire team. States that he has not been sleeping well because it is too cold in his room. Denies SIIP. Denies AH/VH or paranoia. Pt denies feeling depressed, states his mood is "good". Denies withdrawal sx from ETOH. Pt states that he "wants to leave by the first of next month" because he has "$300 worth of food stamps". Pt endorses ongoing disappointment and worry because his daughter who lives out of state continues to not  her phone.

## 2024-01-22 NOTE — BH INPATIENT PSYCHIATRY PROGRESS NOTE - GENERAL APPEARANCE
not actively internally stimulated/No deformities present

## 2024-01-22 NOTE — DIETITIAN INITIAL EVALUATION ADULT - PERTINENT MEDS FT
MEDICATIONS  (STANDING):  aspirin  chewable 81 milliGRAM(s) Oral daily  atorvastatin 40 milliGRAM(s) Oral at bedtime  folic acid 1 milliGRAM(s) Oral daily  influenza   Vaccine 0.5 milliLiter(s) IntraMuscular once  multivitamin 1 Tablet(s) Oral daily  thiamine 300 milliGRAM(s) Oral daily  traZODone 50 milliGRAM(s) Oral at bedtime    MEDICATIONS  (PRN):  acetaminophen     Tablet .. 650 milliGRAM(s) Oral every 6 hours PRN Mild Pain (1 - 3), Moderate Pain (4 - 6)  benzonatate 100 milliGRAM(s) Oral three times a day PRN cough  hydrOXYzine hydrochloride 50 milliGRAM(s) Oral every 6 hours PRN anxiety  ibuprofen  Tablet. 400 milliGRAM(s) Oral every 6 hours PRN Mild Pain (1 - 3), Moderate Pain (4 - 6)  OLANZapine 5 milliGRAM(s) Oral every 6 hours PRN agitation  OLANZapine Injectable 5 milliGRAM(s) IntraMuscular Once PRN severe agitation

## 2024-01-22 NOTE — DIETITIAN INITIAL EVALUATION ADULT - OTHER INFO
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  Pt transferred to [facility] from [facility] ED c/o Depression and SI with recent alcohol and cocaine use.   Met Pt in his room. Reports fair appetite/po intake at present. Reports some nausea. NKFA.   UBW: 280 lbs. Current weight: 277 lbs. Noted some weight loss. (3 lbs)   Food preferences explored and implemented. Encouraged po intake. Pt verbalized understanding.

## 2024-01-22 NOTE — BH INPATIENT PSYCHIATRY PROGRESS NOTE - NSBHASSESSSUMMFT_PSY_ALL_CORE
57 yo male, single, undomiciled (goes between shelters and hotels), unemployed, denies hx of PPHx though per Psyckes has several past diagnoses including but not limited to MDD, TIA, Bipolar I/II, schizophrenia, substance-induced depression/psychosis/anxiety/sleep disorder, borderline and antisocial personality disorder, chronically nonadherent to meds and psych appts, hx of multiple psych admissions (most recently 11/23-12/23 at [facility] for self aborted SA), polysubstance use (Alcohol, cocaine) w/o hx of seizures, multiple detoxes/rehabs (last at [facility] Dec 2023), PMHx including HTN, CKD, GERD, pericarditis, self reported CHF (EF 45%?) who is transferred to [facility] from [facility] ED complaining of depression and SI I/s/o recent alcohol and cocaine use.    On assessment, pt presents with depressed mood, helplessness and ongoing acute on chronic passive SI w/o intent nor plan. Previously admitted for aborted SA by jumping in front of train vs using friends' gun. Ongoing alcohol and cocaine use likely exacerbating presentation. Patient recently discharged from [facility] for similar presentation. Does not present with manic symptoms. Chronic AH/VH likely secondary to trauma and cluster B personality. Given recent discharge from [facility] and multiple ED visits in past two months, it is suspected that pt presents with intentions for secondary gain. Patient is open to rehab and long term housing.     Will d/c Abilify at this time as there is no suspicion of miquel or primary psychotic symptoms. Will plan for dispo to rehab.     Plan:  1. Legals: admit on 9.13 status  2. Safety: routine observation, denies SI/HI/I/P on the unit. PRNs: Zyprexa 5 mg PO/IM for moderate/severe agitation. Atarax 50 mg PO q6 for anxiety.  3. Psychiatry:  d/c abilify   c/t trazodone 50 mg qHS for insomnia.  4. Medical: c/w WINSOMEWA protocol.  c/t lipitor 40 mg qHS. thiamine 300 mg and folic acid 1 mg given hx of alcohol use.  5. Dispo: pending clinical improvement likely to rehab.  Patient continues to require inpatient hospitalization for stabilization and safety.    Carlitos Kearns, R-1 59 yo male, single, undomiciled (goes between shelters and hotels), unemployed, denies hx of PPHx though per Psyckes has several past diagnoses including but not limited to MDD, TIA, Bipolar I/II, schizophrenia, substance-induced depression/psychosis/anxiety/sleep disorder, borderline and antisocial personality disorder, chronically nonadherent to meds and psych appts, hx of multiple psych admissions (most recently 11/23-12/23 at [facility] for self aborted SA), polysubstance use (Alcohol, cocaine) w/o hx of seizures, multiple detoxes/rehabs (last at [facility] Dec 2023), PMHx including HTN, CKD, GERD, pericarditis, self reported CHF (EF 45%?) who is transferred to [facility] from [facility] ED complaining of depression and SI I/s/o recent alcohol and cocaine use.    On assessment, pt presents with depressed mood, helplessness and ongoing acute on chronic passive SI w/o intent nor plan. Previously admitted for aborted SA by jumping in front of train vs using friends' gun. Ongoing alcohol and cocaine use likely exacerbating presentation. Patient recently discharged from [facility] for similar presentation. Does not present with manic symptoms. Chronic AH/VH likely secondary to trauma and cluster B personality. Given recent discharge from [facility] and multiple ED visits in past two months, it is suspected that pt presents with intentions for secondary gain. Patient is open to rehab and long term housing.     Reassessment reveals further evidence for malingering. Pt currently denies mood symptoms and SI, most recent mood episode likely 2/2 to substance use and secondary gain. No indications to start new medication at this time. Will plan for discharge to rehab.     Plan:  1. Legals: admit on 9.13 status  2. Safety: routine observation, denies SI/HI/I/P on the unit. PRNs: Zyprexa 5 mg PO/IM for moderate/severe agitation. Atarax 50 mg PO q6 for anxiety.  3. Psychiatry:  d/c abilify   c/t trazodone 50 mg qHS for insomnia.  4. Medical: c/w CIWA protocol.  c/t lipitor 40 mg qHS. thiamine 300 mg and folic acid 1 mg given hx of alcohol use.  5. Dispo: pending clinical improvement likely to rehab.  Patient continues to require inpatient hospitalization for stabilization and safety.    Carlitos Kearns, R-1 59 yo male, single, undomiciled (goes between shelters and hotels), unemployed, denies hx of PPHx though per Psyckes has several past diagnoses including but not limited to MDD, TIA, Bipolar I/II, schizophrenia, substance-induced depression/psychosis/anxiety/sleep disorder, borderline and antisocial personality disorder, chronically nonadherent to meds and psych appts, hx of multiple psych admissions (most recently 11/23-12/23 at [facility] for self aborted SA), polysubstance use (Alcohol, cocaine) w/o hx of seizures, multiple detoxes/rehabs (last at [facility] Dec 2023), PMHx including HTN, CKD, GERD, pericarditis, self reported CHF (EF 45%?) who is transferred to [facility] from [facility] ED complaining of depression and SI I/s/o recent alcohol and cocaine use.    On assessment, pt presents with depressed mood, helplessness and ongoing acute on chronic passive SI w/o intent nor plan. Previously admitted for aborted SA by jumping in front of train vs using friends' gun. Ongoing alcohol and cocaine use likely exacerbating presentation. Patient recently discharged from [facility] for similar presentation. Does not present with manic symptoms. Chronic AH/VH likely secondary to trauma and cluster B personality. Given recent discharge from [facility] and multiple ED visits in past two months, it is suspected that pt presents with intentions for secondary gain. Patient is open to rehab and long term housing.     Reassessment reveals further evidence for malingering. Pt currently denies mood symptoms and SI, most recent mood episode likely 2/2 to substance use and secondary gain. No indications to start new medication at this time. Will plan for discharge to rehab.     Plan:  1. Legals: admit on 9.13 status  2. Safety: routine observation, denies SI/HI/I/P on the unit. PRNs: Zyprexa 5 mg PO/IM for moderate/severe agitation. Atarax 50 mg PO q6 for anxiety.  3. Psychiatry:  d/c abilify   c/t trazodone 50 mg qHS for insomnia.  4. Medical: [facility] protocol.  c/t lipitor 40 mg qHS. thiamine 300 mg and folic acid 1 mg given hx of alcohol use.  5. Dispo: pending clinical improvement likely to rehab.  Patient continues to require inpatient hospitalization for stabilization and safety.    Carlitos Kearns, R-1 Pt is a 57yo single man who is undomiciled (living at shelters, hotels, ERs/hospitals, and rehabs) and unemployed, with PMHx HTN and self-reported CHF, with PPHx of documented diagnoses ranging across mood, psychotic, and personality disorders, hx of many prior hospitalizations (last at [place] in Dec 2023) and ED visits, chronic nonadherence to meds and outpt treatment, currently has IMT team, hx of several prior self-aborted suicide attempts, and significant hx of polysubstance use/abuse (recently EtOH and cocaine), hx of many prior detox/rehabs (last at [facility] Dec 2023), admitted due to report of SI (with several vague plans), HI towards mother of his child, and report of AH (at times command in nature to harm self) in the context of EtOH and cocaine use, recent discharge from [facility], and inability to reach his 16yo daughter (who lives in [place]). On admission pt without evidence of acute psychosis or mood pathology. Requests referral to rehab and assistance with housing. Suspect primary substance use and some element of secondary gain.      Pt reports rapid improvement in mood over the weekend with resolution of SIIP, HIIP, and AH. Continues to desire referral to rehab. No evidence of EtOH withdrawal and CIWA scores have been 0.     Plan:  1. Legals: admit on 9.13 status  2. Safety: routine observation, denies SI/HI/I/P on the unit. PRNs: Zyprexa 5 mg PO/IM for moderate/severe agitation. Atarax 50 mg PO q6 for anxiety.  3. Psychiatry  - Have discontinued Abilify 10mg daily as per pt preference and given no indication for such at this time  - C/w Trazodone 50mg qHS for insomnia  - No indication for other standing psychotropic at this time  4. Medical:   - Discontinue CIWA protocol. Will c/w thiamine and folic acid for now given Etoh abuse  - C/w Lipitor 40 mg qHS  - Hx of HTN - BP stable at present without medication, will monitor   5. Dispo: SW to refer to rehab, will get PPD    Carlitos Kearns, R-1

## 2024-01-22 NOTE — BH INPATIENT PSYCHIATRY PROGRESS NOTE - NSBHMETABOLIC_PSY_ALL_CORE_FT
BMI: BMI (kg/m2): 34.9 (01-18-24 @ 21:15)  HbA1c:   Glucose:   BP: --Vital Signs Last 24 Hrs  T(C): 36.7 (01-22-24 @ 08:32), Max: 36.7 (01-22-24 @ 08:32)  T(F): 98 (01-22-24 @ 08:32), Max: 98 (01-22-24 @ 08:32)  HR: --  BP: --  BP(mean): --  RR: --  SpO2: --    Orthostatic VS  01-22-24 @ 08:32  Lying BP: --/-- HR: --  Sitting BP: 137/90 HR: 83  Standing BP: 120/91 HR: 100  Site: --  Mode: --  Orthostatic VS  01-21-24 @ 06:49  Lying BP: --/-- HR: --  Sitting BP: 122/75 HR: 81  Standing BP: --/-- HR: --  Site: --  Mode: --  Orthostatic VS  01-20-24 @ 19:06  Lying BP: --/-- HR: --  Sitting BP: 112/67 HR: 78  Standing BP: 116/72 HR: 84  Site: --  Mode: --    Lipid Panel:  BMI: BMI (kg/m2): 34.9 (01-18-24 @ 21:15)  HbA1c:   Glucose:   BP: --Vital Signs Last 24 Hrs  T(C): 36.7 (01-22-24 @ 08:32), Max: 36.7 (01-22-24 @ 08:32)  T(F): 98 (01-22-24 @ 08:32), Max: 98 (01-22-24 @ 08:32)  HR: --  BP: --  BP(mean): --  RR: --  SpO2: --    Orthostatic VS  01-22-24 @ 08:32  Lying BP: --/-- HR: --  Sitting BP: 137/90 HR: 83  Standing BP: 120/91 HR: 100  Site: --  Mode: --  Orthostatic VS  01-21-24 @ 06:49  Lying BP: --/-- HR: --  Sitting BP: 122/75 HR: 81  Standing BP: --/-- HR: --  Site: --  Mode: --    Lipid Panel:

## 2024-01-22 NOTE — BH INPATIENT PSYCHIATRY PROGRESS NOTE - CURRENT MEDICATION
MEDICATIONS  (STANDING):  aspirin  chewable 81 milliGRAM(s) Oral daily  atorvastatin 40 milliGRAM(s) Oral at bedtime  folic acid 1 milliGRAM(s) Oral daily  influenza   Vaccine 0.5 milliLiter(s) IntraMuscular once  multivitamin 1 Tablet(s) Oral daily  thiamine 300 milliGRAM(s) Oral daily  traZODone 50 milliGRAM(s) Oral at bedtime    MEDICATIONS  (PRN):  acetaminophen     Tablet .. 650 milliGRAM(s) Oral every 6 hours PRN Mild Pain (1 - 3), Moderate Pain (4 - 6)  benzonatate 100 milliGRAM(s) Oral three times a day PRN cough  hydrOXYzine hydrochloride 50 milliGRAM(s) Oral every 6 hours PRN anxiety  ibuprofen  Tablet. 400 milliGRAM(s) Oral every 6 hours PRN Mild Pain (1 - 3), Moderate Pain (4 - 6)  LORazepam     Tablet 2 milliGRAM(s) Oral every 2 hours PRN CIWA score increase by 2 points and current CIWA score GREATER THAN 9  OLANZapine 5 milliGRAM(s) Oral every 6 hours PRN agitation  OLANZapine Injectable 5 milliGRAM(s) IntraMuscular Once PRN severe agitation   MEDICATIONS  (STANDING):  aspirin  chewable 81 milliGRAM(s) Oral daily  atorvastatin 40 milliGRAM(s) Oral at bedtime  folic acid 1 milliGRAM(s) Oral daily  influenza   Vaccine 0.5 milliLiter(s) IntraMuscular once  multivitamin 1 Tablet(s) Oral daily  thiamine 300 milliGRAM(s) Oral daily  traZODone 50 milliGRAM(s) Oral at bedtime    MEDICATIONS  (PRN):  acetaminophen     Tablet .. 650 milliGRAM(s) Oral every 6 hours PRN Mild Pain (1 - 3), Moderate Pain (4 - 6)  benzonatate 100 milliGRAM(s) Oral three times a day PRN cough  hydrOXYzine hydrochloride 50 milliGRAM(s) Oral every 6 hours PRN anxiety  ibuprofen  Tablet. 400 milliGRAM(s) Oral every 6 hours PRN Mild Pain (1 - 3), Moderate Pain (4 - 6)  OLANZapine 5 milliGRAM(s) Oral every 6 hours PRN agitation  OLANZapine Injectable 5 milliGRAM(s) IntraMuscular Once PRN severe agitation

## 2024-01-22 NOTE — BH INPATIENT PSYCHIATRY PROGRESS NOTE - NSBHMSESPEECH_PSY_A_CORE
no disorganization in speech/Normal volume, rate, productivity, spontaneity and articulation

## 2024-01-22 NOTE — BH INPATIENT PSYCHIATRY PROGRESS NOTE - NSBHMSELANG_PSY_A_CORE
No abnormalities noted
Xray Chest 1 View-PORTABLE IMMEDIATE
No abnormalities noted
No abnormalities noted

## 2024-01-23 PROCEDURE — 90853 GROUP PSYCHOTHERAPY: CPT

## 2024-01-23 RX ORDER — DIPHENHYDRAMINE HCL 50 MG
25 CAPSULE ORAL AT BEDTIME
Refills: 0 | Status: DISCONTINUED | OUTPATIENT
Start: 2024-01-23 | End: 2024-01-29

## 2024-01-23 RX ORDER — TUBERCULIN PURIFIED PROTEIN DERIVATIVE 5 [IU]/.1ML
5 INJECTION, SOLUTION INTRADERMAL ONCE
Refills: 0 | Status: COMPLETED | OUTPATIENT
Start: 2024-01-23 | End: 2024-01-23

## 2024-01-23 RX ADMIN — Medication 1 TABLET(S): at 08:09

## 2024-01-23 RX ADMIN — Medication 81 MILLIGRAM(S): at 08:08

## 2024-01-23 RX ADMIN — TUBERCULIN PURIFIED PROTEIN DERIVATIVE 5 UNIT(S): 5 INJECTION, SOLUTION INTRADERMAL at 13:00

## 2024-01-23 RX ADMIN — Medication 1 MILLIGRAM(S): at 08:08

## 2024-01-23 RX ADMIN — Medication 100 MILLIGRAM(S): at 13:02

## 2024-01-23 RX ADMIN — Medication 300 MILLIGRAM(S): at 08:08

## 2024-01-23 RX ADMIN — Medication 400 MILLIGRAM(S): at 20:43

## 2024-01-23 NOTE — BH INPATIENT PSYCHIATRY PROGRESS NOTE - NSBHFUPINTERVALHXFT_PSY_A_CORE
Chart reviewed and case discussed with interdisciplinary team. Has not required PRNs over the weekend. Pt seen this morning with entire team. States that he has not been sleeping well because it is too cold in his room. Denies SIIP. Denies AH/VH or paranoia. Pt denies feeling depressed, states his mood is "good". Denies withdrawal sx from ETOH. Pt states that he "wants to leave by the first of next month" because he has "$300 worth of food stamps". Pt endorses ongoing disappointment and worry because his daughter who lives out of state continues to not  her phone.  Chart reviewed and case discussed with interdisciplinary team. Has not required PRNs this hospital course. Pt seen this morning at bedside. Adamantly denies SI. States his mood is "good". Denies withdrawal sx. States that he is feeling good because he was able to obtain daughter's new phone number and looks forward to calling her in the afternoon. Continues to be open to rehab. States that he is sleeping well but says that trazodone is "too strong and benadryl works well for me".

## 2024-01-23 NOTE — BH INPATIENT PSYCHIATRY PROGRESS NOTE - NSBHCHARTREVIEWVS_PSY_A_CORE FT
Vital Signs Last 24 Hrs  T(C): 36.6 (01-23-24 @ 08:16), Max: 36.6 (01-23-24 @ 08:16)  T(F): 97.8 (01-23-24 @ 08:16), Max: 97.8 (01-23-24 @ 08:16)  HR: --  BP: --  BP(mean): --  RR: --  SpO2: --    Orthostatic VS  01-23-24 @ 08:16  Lying BP: --/-- HR: --  Sitting BP: 127/72 HR: 81  Standing BP: 128/84 HR: 92  Site: --  Mode: --  Orthostatic VS  01-22-24 @ 08:32  Lying BP: --/-- HR: --  Sitting BP: 137/90 HR: 83  Standing BP: 120/91 HR: 100  Site: --  Mode: --   Vital Signs Last 24 Hrs  T(C): 36.6 (01-24-24 @ 06:31), Max: 36.6 (01-24-24 @ 06:31)  T(F): 97.8 (01-24-24 @ 06:31), Max: 97.8 (01-24-24 @ 06:31)  HR: --  BP: --  BP(mean): --  RR: --  SpO2: --    Orthostatic VS  01-24-24 @ 06:31  Lying BP: --/-- HR: --  Sitting BP: 105/77 HR: 100  Standing BP: 111/72 HR: 89  Site: --  Mode: --  Orthostatic VS  01-23-24 @ 08:16  Lying BP: --/-- HR: --  Sitting BP: 127/72 HR: 81  Standing BP: 128/84 HR: 92  Site: --  Mode: --

## 2024-01-23 NOTE — BH INPATIENT PSYCHIATRY PROGRESS NOTE - CURRENT MEDICATION
MEDICATIONS  (STANDING):  aspirin  chewable 81 milliGRAM(s) Oral daily  atorvastatin 40 milliGRAM(s) Oral at bedtime  folic acid 1 milliGRAM(s) Oral daily  influenza   Vaccine 0.5 milliLiter(s) IntraMuscular once  multivitamin 1 Tablet(s) Oral daily  thiamine 300 milliGRAM(s) Oral daily  traZODone 50 milliGRAM(s) Oral at bedtime    MEDICATIONS  (PRN):  acetaminophen     Tablet .. 650 milliGRAM(s) Oral every 6 hours PRN Mild Pain (1 - 3), Moderate Pain (4 - 6)  benzonatate 100 milliGRAM(s) Oral three times a day PRN cough  hydrOXYzine hydrochloride 50 milliGRAM(s) Oral every 6 hours PRN anxiety  ibuprofen  Tablet. 400 milliGRAM(s) Oral every 6 hours PRN Mild Pain (1 - 3), Moderate Pain (4 - 6)  OLANZapine 5 milliGRAM(s) Oral every 6 hours PRN agitation  OLANZapine Injectable 5 milliGRAM(s) IntraMuscular Once PRN severe agitation   MEDICATIONS  (STANDING):  aspirin  chewable 81 milliGRAM(s) Oral daily  atorvastatin 40 milliGRAM(s) Oral at bedtime  folic acid 1 milliGRAM(s) Oral daily  influenza   Vaccine 0.5 milliLiter(s) IntraMuscular once  multivitamin 1 Tablet(s) Oral daily  thiamine 300 milliGRAM(s) Oral daily    MEDICATIONS  (PRN):  acetaminophen     Tablet .. 650 milliGRAM(s) Oral every 6 hours PRN Mild Pain (1 - 3), Moderate Pain (4 - 6)  benzonatate 100 milliGRAM(s) Oral three times a day PRN cough  diphenhydrAMINE 25 milliGRAM(s) Oral at bedtime PRN insomnia  hydrOXYzine hydrochloride 50 milliGRAM(s) Oral every 6 hours PRN anxiety  ibuprofen  Tablet. 400 milliGRAM(s) Oral every 6 hours PRN Mild Pain (1 - 3), Moderate Pain (4 - 6)  OLANZapine 5 milliGRAM(s) Oral every 6 hours PRN agitation  OLANZapine Injectable 5 milliGRAM(s) IntraMuscular Once PRN severe agitation   MEDICATIONS  (STANDING):  aspirin  chewable 81 milliGRAM(s) Oral daily  atorvastatin 40 milliGRAM(s) Oral at bedtime  folic acid 1 milliGRAM(s) Oral daily  influenza   Vaccine 0.5 milliLiter(s) IntraMuscular once  multivitamin 1 Tablet(s) Oral daily  thiamine 300 milliGRAM(s) Oral daily    MEDICATIONS  (PRN):  acetaminophen     Tablet .. 650 milliGRAM(s) Oral every 6 hours PRN Mild Pain (1 - 3), Moderate Pain (4 - 6)  aluminum hydroxide/magnesium hydroxide/simethicone Suspension 30 milliLiter(s) Oral every 4 hours PRN Dyspepsia  benzonatate 100 milliGRAM(s) Oral three times a day PRN cough  diphenhydrAMINE 25 milliGRAM(s) Oral at bedtime PRN insomnia  hydrOXYzine hydrochloride 50 milliGRAM(s) Oral every 6 hours PRN anxiety  ibuprofen  Tablet. 400 milliGRAM(s) Oral every 6 hours PRN Mild Pain (1 - 3), Moderate Pain (4 - 6)  OLANZapine 5 milliGRAM(s) Oral every 6 hours PRN agitation  OLANZapine Injectable 5 milliGRAM(s) IntraMuscular Once PRN severe agitation

## 2024-01-23 NOTE — BH INPATIENT PSYCHIATRY PROGRESS NOTE - PRN MEDS
MEDICATIONS  (PRN):  acetaminophen     Tablet .. 650 milliGRAM(s) Oral every 6 hours PRN Mild Pain (1 - 3), Moderate Pain (4 - 6)  benzonatate 100 milliGRAM(s) Oral three times a day PRN cough  hydrOXYzine hydrochloride 50 milliGRAM(s) Oral every 6 hours PRN anxiety  ibuprofen  Tablet. 400 milliGRAM(s) Oral every 6 hours PRN Mild Pain (1 - 3), Moderate Pain (4 - 6)  OLANZapine 5 milliGRAM(s) Oral every 6 hours PRN agitation  OLANZapine Injectable 5 milliGRAM(s) IntraMuscular Once PRN severe agitation   MEDICATIONS  (PRN):  acetaminophen     Tablet .. 650 milliGRAM(s) Oral every 6 hours PRN Mild Pain (1 - 3), Moderate Pain (4 - 6)  benzonatate 100 milliGRAM(s) Oral three times a day PRN cough  diphenhydrAMINE 25 milliGRAM(s) Oral at bedtime PRN insomnia  hydrOXYzine hydrochloride 50 milliGRAM(s) Oral every 6 hours PRN anxiety  ibuprofen  Tablet. 400 milliGRAM(s) Oral every 6 hours PRN Mild Pain (1 - 3), Moderate Pain (4 - 6)  OLANZapine 5 milliGRAM(s) Oral every 6 hours PRN agitation  OLANZapine Injectable 5 milliGRAM(s) IntraMuscular Once PRN severe agitation   MEDICATIONS  (PRN):  acetaminophen     Tablet .. 650 milliGRAM(s) Oral every 6 hours PRN Mild Pain (1 - 3), Moderate Pain (4 - 6)  aluminum hydroxide/magnesium hydroxide/simethicone Suspension 30 milliLiter(s) Oral every 4 hours PRN Dyspepsia  benzonatate 100 milliGRAM(s) Oral three times a day PRN cough  diphenhydrAMINE 25 milliGRAM(s) Oral at bedtime PRN insomnia  hydrOXYzine hydrochloride 50 milliGRAM(s) Oral every 6 hours PRN anxiety  ibuprofen  Tablet. 400 milliGRAM(s) Oral every 6 hours PRN Mild Pain (1 - 3), Moderate Pain (4 - 6)  OLANZapine 5 milliGRAM(s) Oral every 6 hours PRN agitation  OLANZapine Injectable 5 milliGRAM(s) IntraMuscular Once PRN severe agitation

## 2024-01-23 NOTE — BH INPATIENT PSYCHIATRY PROGRESS NOTE - MSE UNSTRUCTURED FT
The patient appears stated age, has fair hygiene and grooming. The patient was calm, cooperative with the interview and maintained appropriate eye contact.  No psychomotor agitation or retardation noted. The patient's speech was fluent with normal rate and volume.  The patient's mood is "good."  Neutral affect with full range, bright at times. The patient's thoughts are goal directed.  Denies any current delusions. Denies AH/VH. Denies SIIP and HIIP.  Insight is fair.  Judgment is poor. Impulse control has been fair on the unit.

## 2024-01-23 NOTE — BH INPATIENT PSYCHIATRY PROGRESS NOTE - NSBHASSESSSUMMFT_PSY_ALL_CORE
Pt is a 59yo single man who is undomiciled (living at shelters, hotels, ERs/hospitals, and rehabs) and unemployed, with PMHx HTN and self-reported CHF, with PPHx of documented diagnoses ranging across mood, psychotic, and personality disorders, hx of many prior hospitalizations (last at [facility] in Dec 2023) and ED visits, chronic nonadherence to meds and outpt treatment, currently has IMT team, hx of several prior self-aborted suicide attempts, and significant hx of polysubstance use/abuse (recently EtOH and cocaine), hx of many prior detox/rehabs (last at [facility] Dec 2023), admitted due to report of SI (with several vague plans), HI towards mother of his child, and report of AH (at times command in nature to harm self) in the context of EtOH and cocaine use, recent discharge from [facility], and inability to reach his 16yo daughter (who lives in [place]). On admission pt without evidence of acute psychosis or mood pathology. Requests referral to rehab and assistance with housing. Suspect primary substance use and some element of secondary gain.      Pt reports rapid improvement in mood over the weekend with resolution of SIIP, HIIP, and AH. Continues to desire referral to rehab. No evidence of EtOH withdrawal and CIWA scores have been 0.     Plan:  1. Legals: admit on 9.13 status  2. Safety: routine observation, denies SI/HI/I/P on the unit. PRNs: Zyprexa 5 mg PO/IM for moderate/severe agitation. Atarax 50 mg PO q6 for anxiety.  3. Psychiatry  - Have discontinued Abilify 10mg daily as per pt preference and given no indication for such at this time  - C/w Trazodone 50mg qHS for insomnia  - No indication for other standing psychotropic at this time  4. Medical:   - Discontinue CIWA protocol. Will c/w thiamine and folic acid for now given Etoh abuse  - C/w Lipitor 40 mg qHS  - Hx of HTN - BP stable at present without medication, will monitor   5. Dispo: SW to refer to rehab, will get PPD    Carlitos Kearns, R-1 Pt is a 57yo single man who is undomiciled (living at shelters, hotels, ERs/hospitals, and rehabs) and unemployed, with PMHx HTN and self-reported CHF, with PPHx of documented diagnoses ranging across mood, psychotic, and personality disorders, hx of many prior hospitalizations (last at [facility] in Dec 2023) and ED visits, chronic nonadherence to meds and outpt treatment, currently has IMT team, hx of several prior self-aborted suicide attempts, and significant hx of polysubstance use/abuse (recently EtOH and cocaine), hx of many prior detox/rehabs (last at [facility] Dec 2023), admitted due to report of SI (with several vague plans), HI towards mother of his child, and report of AH (at times command in nature to harm self) in the context of EtOH and cocaine use, recent discharge from [facility], and inability to reach his 14yo daughter (who lives in [place]). On admission pt without evidence of acute psychosis or mood pathology. Requests referral to rehab and assistance with housing. Suspect primary substance use and some element of secondary gain.      PPt continues to deny SIIP, HIIP, and AH. Will continue to plan for rehab.     Plan:  1. Legals: admit on 9.13 status  2. Safety: routine observation, denies SI/HI/I/P on the unit. PRNs: Zyprexa 5 mg PO/IM for moderate/severe agitation. Atarax 50 mg PO q6 for anxiety.  3. Psychiatry  - Have discontinued Abilify 10mg daily as per pt preference and given no indication for such at this time  - d/c trazodone due to pt's self report of oversedation; will give benadryl 25 mg qHS PRN for insomnia  - No indication for other standing psychotropic at this time  4. Medical:   - Discontinue CIWA protocol. Will c/w thiamine and folic acid for now given Etoh abuse  - C/w Lipitor 40 mg qHS  - ppd ordered for rehab clearance   - Hx of HTN - BP stable at present without medication, will monitor   5. Dispo: SW to refer to rehab, will get PPD    Carlitos Kearns, R-1

## 2024-01-23 NOTE — BH INPATIENT PSYCHIATRY PROGRESS NOTE - NSBHMETABOLIC_PSY_ALL_CORE_FT
BMI: BMI (kg/m2): 34.9 (01-18-24 @ 21:15)  HbA1c:   Glucose:   BP: --Vital Signs Last 24 Hrs  T(C): 36.6 (01-23-24 @ 08:16), Max: 36.6 (01-23-24 @ 08:16)  T(F): 97.8 (01-23-24 @ 08:16), Max: 97.8 (01-23-24 @ 08:16)  HR: --  BP: --  BP(mean): --  RR: --  SpO2: --    Orthostatic VS  01-23-24 @ 08:16  Lying BP: --/-- HR: --  Sitting BP: 127/72 HR: 81  Standing BP: 128/84 HR: 92  Site: --  Mode: --  Orthostatic VS  01-22-24 @ 08:32  Lying BP: --/-- HR: --  Sitting BP: 137/90 HR: 83  Standing BP: 120/91 HR: 100  Site: --  Mode: --    Lipid Panel:  BMI: BMI (kg/m2): 34.9 (01-18-24 @ 21:15)  HbA1c:   Glucose:   BP: --Vital Signs Last 24 Hrs  T(C): 36.6 (01-24-24 @ 06:31), Max: 36.6 (01-24-24 @ 06:31)  T(F): 97.8 (01-24-24 @ 06:31), Max: 97.8 (01-24-24 @ 06:31)  HR: --  BP: --  BP(mean): --  RR: --  SpO2: --    Orthostatic VS  01-24-24 @ 06:31  Lying BP: --/-- HR: --  Sitting BP: 105/77 HR: 100  Standing BP: 111/72 HR: 89  Site: --  Mode: --  Orthostatic VS  01-23-24 @ 08:16  Lying BP: --/-- HR: --  Sitting BP: 127/72 HR: 81  Standing BP: 128/84 HR: 92  Site: --  Mode: --    Lipid Panel:

## 2024-01-24 RX ADMIN — Medication 300 MILLIGRAM(S): at 08:17

## 2024-01-24 RX ADMIN — Medication 30 MILLILITER(S): at 17:45

## 2024-01-24 RX ADMIN — ATORVASTATIN CALCIUM 40 MILLIGRAM(S): 80 TABLET, FILM COATED ORAL at 20:37

## 2024-01-24 RX ADMIN — Medication 1 TABLET(S): at 08:18

## 2024-01-24 RX ADMIN — Medication 25 MILLIGRAM(S): at 20:41

## 2024-01-24 RX ADMIN — Medication 1 MILLIGRAM(S): at 08:17

## 2024-01-24 RX ADMIN — Medication 81 MILLIGRAM(S): at 08:18

## 2024-01-24 NOTE — BH INPATIENT PSYCHIATRY PROGRESS NOTE - NSBHFUPINTERVALHXFT_PSY_A_CORE
Chart reviewed and case discussed with interdisciplinary team. Has not required PRNs this hospital course. Pt seen this morning at bedside. Adamantly denies SI. States his mood is "good". Denies withdrawal sx. States that he is feeling good because he was able to obtain daughter's new phone number and looks forward to calling her in the afternoon. Continues to be open to rehab. States that he is sleeping well but says that trazodone is "too strong and benadryl works well for me". Chart reviewed and case discussed with interdisciplinary team. Has not required PRNs this hospital course. Pt seen this afternoon in milieu. Appears in good spirit. Denies SI. States that he has heartburn due to "spicy potatoes from breakfast". Continues to be open to rehab. Chart reviewed and case discussed with interdisciplinary team. Has not required PRNs this hospital course. Sleep was poor overnight. Pt seen this afternoon in milieu. Appears in good spirit. Reports improved mood and anxiety. Denies SIIP and HIIP. Denies AH and no evidence of psychosis. States that he has heartburn due to "spicy potatoes from breakfast". Continues to be open to rehab.

## 2024-01-24 NOTE — BH DISCHARGE NOTE NURSING/SOCIAL WORK/PSYCH REHAB - NSCDUDCCRISIS_PSY_A_CORE
[facility]  1 [phone]) [facility] ([phone])  Text "WELL" to 56911  Website: www.[employer]/.[facility] 1 (697) 621-RHTT (2007) Website: www.safehorizon.org/.National Suicide Prevention Lifeline 9 (972) 016-1211/.  [facility]  1 (788) LIF[facility] (826-1189)/.  [facility]  [address] [place] [address] [phone]   Hours:  Monday through Friday from 9 AM to 3 PM/988 Suicide and Crisis Lifeline

## 2024-01-24 NOTE — BH SAFETY PLAN - LOCAL URGENT CARE ADDRESS
----- Message from Mitra Hoyos sent at 3/9/2017  8:16 AM CST -----  Patient is requesting a medication refill.     RX name: dextroamphetamine-amphetamine (ADDERALL XR) 10 MG 24 hr capsule  Strength:   Quantity:   Directions: Take 1 capsule (10 mg total) by mouth every morning. - Oral    Pharmacy name: [facility]           See discharge Paperwork

## 2024-01-24 NOTE — BH INPATIENT PSYCHIATRY PROGRESS NOTE - NSBHMETABOLIC_PSY_ALL_CORE_FT
BMI: BMI (kg/m2): 34.9 (01-18-24 @ 21:15)  HbA1c:   Glucose:   BP: --Vital Signs Last 24 Hrs  T(C): 36.6 (01-24-24 @ 06:31), Max: 36.6 (01-24-24 @ 06:31)  T(F): 97.8 (01-24-24 @ 06:31), Max: 97.8 (01-24-24 @ 06:31)  HR: --  BP: --  BP(mean): --  RR: --  SpO2: --    Orthostatic VS  01-24-24 @ 06:31  Lying BP: --/-- HR: --  Sitting BP: 105/77 HR: 100  Standing BP: 111/72 HR: 89  Site: --  Mode: --  Orthostatic VS  01-23-24 @ 08:16  Lying BP: --/-- HR: --  Sitting BP: 127/72 HR: 81  Standing BP: 128/84 HR: 92  Site: --  Mode: --    Lipid Panel:  BMI: BMI (kg/m2): 34.9 (01-18-24 @ 21:15)  HbA1c:   Glucose:   BP: --Vital Signs Last 24 Hrs  T(C): 36.6 (01-25-24 @ 07:35), Max: 36.6 (01-25-24 @ 07:35)  T(F): 97.9 (01-25-24 @ 07:35), Max: 97.9 (01-25-24 @ 07:35)  HR: --  BP: --  BP(mean): --  RR: --  SpO2: --    Orthostatic VS  01-25-24 @ 07:35  Lying BP: --/-- HR: --  Sitting BP: 119/81 HR: 60  Standing BP: 117/82 HR: 65  Site: --  Mode: --  Orthostatic VS  01-24-24 @ 06:31  Lying BP: --/-- HR: --  Sitting BP: 105/77 HR: 100  Standing BP: 111/72 HR: 89  Site: --  Mode: --    Lipid Panel:

## 2024-01-24 NOTE — ED ADULT NURSE NOTE - CAS EDP DISCH TYPE
Review of hospital course, labs, vitals, medical records.   Bedside exam and interview   Discussed plan of care with primary team ACP and housestaff   Documenting the encounter Review of hospital course, labs, vitals, medical records.   Bedside exam and interview   Discussed plan of care with primary team ACP and housestaff   Documenting the encounter Review of hospital course, labs, vitals, medical records.   Bedside exam and interview   Discussed plan of care with primary team ACP and housestaff   Documenting the encounter evaluation and management of perforated duodenal diverticulum, review of labs and imaging, discussion with patient and family, documentation. evaluation and management of perforated duodenal diverticulum, review of labs and imaging, discussion with patient and family, documentation. Review of hospital course, labs, vitals, medical records.   Bedside exam and interview   Discussed plan of care with primary team ACP and housestaff   Documenting the encounter evaluation and management of perforated duodenal diverticulum, review of labs and imaging, discussion with patient and family, documentation. evaluation and management of perforated duodenal diverticulum, review of labs and imaging, discussion with patient and family, documentation. evaluation and management of perforated duodenal diverticulum, review of labs and imaging, discussion with patient and family, documentation. evaluation and management of perforated duodenal diverticulum, review of labs and imaging, discussion with patient and family, documentation. evaluation and management of perforated duodenal diverticulum, review of labs and imaging, discussion with patient and family, documentation. evaluation and management of perforated duodenal diverticulum, review of labs and imaging, discussion with patient and family, documentation. evaluation and management of perforated duodenal diverticulum, review of labs and imaging, discussion with patient and family, documentation. As above med rec performed  labs reviewed  2 sets of ICU rounds  patient counseled med rec performed  labs reviewed  2 sets of rounds during the day, reviewed extensively with the house staff  counseled patient As above evaluation and management of perforated duodenal diverticulum, review of labs and imaging, discussion with patient and family, documentation. evaluation and management of perforated duodenal diverticulum, review of labs and imaging, discussion with patient and family, documentation. valuation and management of perforated duodenal diverticulum, review of labs and imaging, discussion with patient and family, documentation. evaluation and management of perforated duodenal diverticulum, review of labs and imaging, discussion with patient and family, documentation. evaluation and management of perforated duodenal diverticulum, review of labs and imaging, discussion with patient and family, documentation. evaluation and management of perforated duodenal diverticulum, review of labs and imaging, discussion with patient and family, documentation. evaluation and management of perforated duodenal diverticulum, review of labs and imaging, discussion with patient and family, documentation. Home

## 2024-01-24 NOTE — BH DISCHARGE NOTE NURSING/SOCIAL WORK/PSYCH REHAB - DISCHARGE INSTRUCTIONS AFTERCARE APPOINTMENTS
In order to check the location, date, or time of your aftercare appointment, please refer to your Discharge Instructions Document given to you upon leaving the hospital.  If you have lost the instructions please call [phone]

## 2024-01-24 NOTE — BH INPATIENT PSYCHIATRY PROGRESS NOTE - CURRENT MEDICATION
MEDICATIONS  (STANDING):  aspirin  chewable 81 milliGRAM(s) Oral daily  atorvastatin 40 milliGRAM(s) Oral at bedtime  folic acid 1 milliGRAM(s) Oral daily  influenza   Vaccine 0.5 milliLiter(s) IntraMuscular once  multivitamin 1 Tablet(s) Oral daily  thiamine 300 milliGRAM(s) Oral daily    MEDICATIONS  (PRN):  acetaminophen     Tablet .. 650 milliGRAM(s) Oral every 6 hours PRN Mild Pain (1 - 3), Moderate Pain (4 - 6)  benzonatate 100 milliGRAM(s) Oral three times a day PRN cough  diphenhydrAMINE 25 milliGRAM(s) Oral at bedtime PRN insomnia  hydrOXYzine hydrochloride 50 milliGRAM(s) Oral every 6 hours PRN anxiety  ibuprofen  Tablet. 400 milliGRAM(s) Oral every 6 hours PRN Mild Pain (1 - 3), Moderate Pain (4 - 6)  OLANZapine 5 milliGRAM(s) Oral every 6 hours PRN agitation  OLANZapine Injectable 5 milliGRAM(s) IntraMuscular Once PRN severe agitation   MEDICATIONS  (STANDING):  aspirin  chewable 81 milliGRAM(s) Oral daily  atorvastatin 40 milliGRAM(s) Oral at bedtime  folic acid 1 milliGRAM(s) Oral daily  influenza   Vaccine 0.5 milliLiter(s) IntraMuscular once  multivitamin 1 Tablet(s) Oral daily  thiamine 300 milliGRAM(s) Oral daily    MEDICATIONS  (PRN):  acetaminophen     Tablet .. 650 milliGRAM(s) Oral every 6 hours PRN Mild Pain (1 - 3), Moderate Pain (4 - 6)  aluminum hydroxide/magnesium hydroxide/simethicone Suspension 30 milliLiter(s) Oral every 4 hours PRN Dyspepsia  benzonatate 100 milliGRAM(s) Oral three times a day PRN cough  diphenhydrAMINE 25 milliGRAM(s) Oral at bedtime PRN insomnia  hydrOXYzine hydrochloride 50 milliGRAM(s) Oral every 6 hours PRN anxiety  ibuprofen  Tablet. 400 milliGRAM(s) Oral every 6 hours PRN Mild Pain (1 - 3), Moderate Pain (4 - 6)  OLANZapine 5 milliGRAM(s) Oral every 6 hours PRN agitation  OLANZapine Injectable 5 milliGRAM(s) IntraMuscular Once PRN severe agitation

## 2024-01-24 NOTE — BH DISCHARGE NOTE NURSING/SOCIAL WORK/PSYCH REHAB - NSDCPRGOAL_PSY_ALL_CORE
Writer met with patient to safety plan for discharge and discuss the patient’s progress throughout the inpatient stay. Patient was receptive to safety planning and readily identified coping skills, triggers, social support, and reasons for living. Upon admission, pt. was guarded and irritable. Throughout his inpatient stay, pt. was compliant with treatment and medication. pt. engaged appropriately with treatment team and was seen engaging with peers as well. pt. acknowledged that he needed support and stated that he is ready to make better choices moving forward. At discharge, pt presents as bright as well as motivated for continued treatment. pt. reports being motivated for sobriety for his health and the relationship with his daughter. Pt. met established psych rehab goal as evidenced by his compliance with his medications.

## 2024-01-24 NOTE — BH INPATIENT PSYCHIATRY PROGRESS NOTE - NSBHCHARTREVIEWVS_PSY_A_CORE FT
Vital Signs Last 24 Hrs  T(C): 36.6 (01-24-24 @ 06:31), Max: 36.6 (01-24-24 @ 06:31)  T(F): 97.8 (01-24-24 @ 06:31), Max: 97.8 (01-24-24 @ 06:31)  HR: --  BP: --  BP(mean): --  RR: --  SpO2: --    Orthostatic VS  01-24-24 @ 06:31  Lying BP: --/-- HR: --  Sitting BP: 105/77 HR: 100  Standing BP: 111/72 HR: 89  Site: --  Mode: --  Orthostatic VS  01-23-24 @ 08:16  Lying BP: --/-- HR: --  Sitting BP: 127/72 HR: 81  Standing BP: 128/84 HR: 92  Site: --  Mode: --   Vital Signs Last 24 Hrs  T(C): 36.6 (01-25-24 @ 07:35), Max: 36.6 (01-25-24 @ 07:35)  T(F): 97.9 (01-25-24 @ 07:35), Max: 97.9 (01-25-24 @ 07:35)  HR: --  BP: --  BP(mean): --  RR: --  SpO2: --    Orthostatic VS  01-25-24 @ 07:35  Lying BP: --/-- HR: --  Sitting BP: 119/81 HR: 60  Standing BP: 117/82 HR: 65  Site: --  Mode: --  Orthostatic VS  01-24-24 @ 06:31  Lying BP: --/-- HR: --  Sitting BP: 105/77 HR: 100  Standing BP: 111/72 HR: 89  Site: --  Mode: --

## 2024-01-24 NOTE — BH INPATIENT PSYCHIATRY PROGRESS NOTE - PRN MEDS
MEDICATIONS  (PRN):  acetaminophen     Tablet .. 650 milliGRAM(s) Oral every 6 hours PRN Mild Pain (1 - 3), Moderate Pain (4 - 6)  benzonatate 100 milliGRAM(s) Oral three times a day PRN cough  diphenhydrAMINE 25 milliGRAM(s) Oral at bedtime PRN insomnia  hydrOXYzine hydrochloride 50 milliGRAM(s) Oral every 6 hours PRN anxiety  ibuprofen  Tablet. 400 milliGRAM(s) Oral every 6 hours PRN Mild Pain (1 - 3), Moderate Pain (4 - 6)  OLANZapine 5 milliGRAM(s) Oral every 6 hours PRN agitation  OLANZapine Injectable 5 milliGRAM(s) IntraMuscular Once PRN severe agitation   MEDICATIONS  (PRN):  acetaminophen     Tablet .. 650 milliGRAM(s) Oral every 6 hours PRN Mild Pain (1 - 3), Moderate Pain (4 - 6)  aluminum hydroxide/magnesium hydroxide/simethicone Suspension 30 milliLiter(s) Oral every 4 hours PRN Dyspepsia  benzonatate 100 milliGRAM(s) Oral three times a day PRN cough  diphenhydrAMINE 25 milliGRAM(s) Oral at bedtime PRN insomnia  hydrOXYzine hydrochloride 50 milliGRAM(s) Oral every 6 hours PRN anxiety  ibuprofen  Tablet. 400 milliGRAM(s) Oral every 6 hours PRN Mild Pain (1 - 3), Moderate Pain (4 - 6)  OLANZapine 5 milliGRAM(s) Oral every 6 hours PRN agitation  OLANZapine Injectable 5 milliGRAM(s) IntraMuscular Once PRN severe agitation

## 2024-01-24 NOTE — BH INPATIENT PSYCHIATRY PROGRESS NOTE - NSBHASSESSSUMMFT_PSY_ALL_CORE
Pt is a 59yo single man who is undomiciled (living at shelters, hotels, ERs/hospitals, and rehabs) and unemployed, with PMHx HTN and self-reported CHF, with PPHx of documented diagnoses ranging across mood, psychotic, and personality disorders, hx of many prior hospitalizations (last at [place] in Dec 2023) and ED visits, chronic nonadherence to meds and outpt treatment, currently has IMT team, hx of several prior self-aborted suicide attempts, and significant hx of polysubstance use/abuse (recently EtOH and cocaine), hx of many prior detox/rehabs (last at [facility] Dec 2023), admitted due to report of SI (with several vague plans), HI towards mother of his child, and report of AH (at times command in nature to harm self) in the context of EtOH and cocaine use, recent discharge from [facility], and inability to reach his 16yo daughter (who lives in [place]). On admission pt without evidence of acute psychosis or mood pathology. Requests referral to rehab and assistance with housing. Suspect primary substance use and some element of secondary gain.      PPt continues to deny SIIP, HIIP, and AH. Will continue to plan for rehab.     Plan:  1. Legals: admit on 9.13 status  2. Safety: routine observation, denies SI/HI/I/P on the unit. PRNs: Zyprexa 5 mg PO/IM for moderate/severe agitation. Atarax 50 mg PO q6 for anxiety.  3. Psychiatry  - Have discontinued Abilify 10mg daily as per pt preference and given no indication for such at this time  - d/c trazodone due to pt's self report of oversedation; will give benadryl 25 mg qHS PRN for insomnia  - No indication for other standing psychotropic at this time  4. Medical:   - Discontinue CIWA protocol. Will c/w thiamine and folic acid for now given Etoh abuse  - C/w Lipitor 40 mg qHS  - ppd ordered for rehab clearance   - Hx of HTN - BP stable at present without medication, will monitor   5. Dispo: SW to refer to rehab, will get PPD    Carlitos Kearns, R-1 Pt is a 57yo single man who is undomiciled (living at shelters, hotels, ERs/hospitals, and rehabs) and unemployed, with PMHx HTN and self-reported CHF, with PPHx of documented diagnoses ranging across mood, psychotic, and personality disorders, hx of many prior hospitalizations (last at [place] in Dec 2023) and ED visits, chronic nonadherence to meds and outpt treatment, currently has IMT team, hx of several prior self-aborted suicide attempts, and significant hx of polysubstance use/abuse (recently EtOH and cocaine), hx of many prior detox/rehabs (last at [facility] Dec 2023), admitted due to report of SI (with several vague plans), HI towards mother of his child, and report of AH (at times command in nature to harm self) in the context of EtOH and cocaine use, recent discharge from [facility], and inability to reach his 14yo daughter (who lives in [place]). On admission pt without evidence of acute psychosis or mood pathology. Requests referral to rehab and assistance with housing. Suspect primary substance use and some element of secondary gain.      PPt continues to deny SIIP, HIIP, and AH. Will continue to plan for rehab.     Plan:  1. Legals: admit on 9.13 status  2. Safety: routine observation, denies SI/HI/I/P on the unit. PRNs: Zyprexa 5 mg PO/IM for moderate/severe agitation. Atarax 50 mg PO q6 for anxiety.  3. Psychiatry  - Have discontinued Abilify 10mg daily as per pt preference and given no indication for such at this time  - d/c trazodone due to pt's self report of oversedation; will give benadryl 25 mg qHS PRN for insomnia  - No indication for other standing psychotropic at this time  4. Medical:   - Discontinue CIWA protocol. Will c/w thiamine and folic acid for now given Etoh abuse  - C/w Lipitor 40 mg qHS  - ppd ordered for rehab clearance   - Hx of HTN - BP stable at present without medication, will monitor   - maalox PRN for dyspepsia  5. Dispo: SW to refer to rehab, will get PPD    Carlitos Kearns, R-1 Pt is a 59yo single man who is undomiciled (living at shelters, hotels, ERs/hospitals, and rehabs) and unemployed, with PMHx HTN and self-reported CHF, with PPHx of documented diagnoses ranging across mood, psychotic, and personality disorders, hx of many prior hospitalizations (last at [place] in Dec 2023) and ED visits, chronic nonadherence to meds and outpt treatment, currently has IMT team, hx of several prior self-aborted suicide attempts, and significant hx of polysubstance use/abuse (recently EtOH and cocaine), hx of many prior detox/rehabs (last at [facility] Dec 2023), admitted due to report of SI (with several vague plans), HI towards mother of his child, and report of AH (at times command in nature to harm self) in the context of EtOH and cocaine use, recent discharge from [facility], and inability to reach his 16yo daughter (who lives in [place]). On admission pt without evidence of acute psychosis or mood pathology. Requests referral to rehab and assistance with housing. Suspect primary substance use and some element of secondary gain.      Pt continues to deny SIIP, HIIP, and AH. Will continue to plan for rehab.     Plan:  1. Legals: admit on 9.13 status  2. Safety: routine observation, denies SI/HI/I/P on the unit. PRNs: Zyprexa 5 mg PO/IM for moderate/severe agitation. Atarax 50 mg PO q6 for anxiety.  3. Psychiatry  - Have discontinued Abilify 10mg daily as per pt preference and given no indication for such at this time  - No indication for other standing psychotropic at this time  4. Medical:   - Discontinue CIWA protocol. Will c/w thiamine and folic acid for now given Etoh abuse  - C/w Lipitor 40 mg qHS  - ppd ordered for rehab clearance   - Hx of HTN - BP stable at present without medication, will monitor   - maalox PRN for dyspepsia  5. Dispo: SW to refer to rehab, will get PPD    Carlitos Kearns, R-1

## 2024-01-24 NOTE — BH DISCHARGE NOTE NURSING/SOCIAL WORK/PSYCH REHAB - PATIENT PORTAL LINK FT
You can access the FollowMyHealth Patient Portal offered by [facility] by registering at the following website: [email or web address]. By joining [facility]’s FollowMyHealth portal, you will also be able to view your health information using other applications (apps) compatible with our system.

## 2024-01-25 PROCEDURE — 99231 SBSQ HOSP IP/OBS SF/LOW 25: CPT | Mod: GC

## 2024-01-25 PROCEDURE — 90853 GROUP PSYCHOTHERAPY: CPT

## 2024-01-25 RX ADMIN — Medication 81 MILLIGRAM(S): at 08:18

## 2024-01-25 RX ADMIN — Medication 400 MILLIGRAM(S): at 21:45

## 2024-01-25 RX ADMIN — Medication 1 TABLET(S): at 08:18

## 2024-01-25 RX ADMIN — Medication 1 MILLIGRAM(S): at 08:18

## 2024-01-25 RX ADMIN — Medication 400 MILLIGRAM(S): at 20:43

## 2024-01-25 RX ADMIN — Medication 300 MILLIGRAM(S): at 08:18

## 2024-01-25 NOTE — BH INPATIENT PSYCHIATRY DISCHARGE NOTE - NSBHDCHANDOFFFT_PSY_ALL_CORE
Yes Called [facility] at [phone] however no provider available for hand off. Left Called [facility] at [phone] however no provider available for hand off. Left unit call back number [phone]. SW to fax discharge summary

## 2024-01-25 NOTE — BH INPATIENT PSYCHIATRY DISCHARGE NOTE - ATTENDING DISCHARGE PHYSICAL EXAMINATION:
Pt is a 59yo man with appropriate grooming and hygiene. He is calm and cooperative. No psychomotor abnormalities noted. Speech is normal in rate and volume. Stated mood is "good." Affect is euthymic, at times with irritable edge, full. TP is linear. TC without delusions, denies SIIP and HIIP, +hopeful. No perceptual disturbances noted. Insight is fair. Judgement is chronically poor though at present fair. Impulse control is intact.

## 2024-01-25 NOTE — BH INPATIENT PSYCHIATRY DISCHARGE NOTE - HPI (INCLUDE ILLNESS QUALITY, SEVERITY, DURATION, TIMING, CONTEXT, MODIFYING FACTORS, ASSOCIATED SIGNS AND SYMPTOMS)
57 yo male, single, undomiciled (goes between shelters and hotels), unemployed, denies hx of PPHx though per Psyckes has several past diagnoses including but not limited to MDD, TIA, Bipolar I/II, schizophrenia, substance-induced depression/psychosis/anxiety/sleep disorder, borderline and antisocial personality disorder, chronically nonadherent to meds and psych appts, hx of multiple psych admissions (most recently 11/23-12/23 at [facility] for self aborted SA), polysubstance use (Alcohol, cocaine) w/o hx of seizures, multiple detoxes/rehabs (last at [facility] Dec 2023), PMHx including HTN, CKD, GERD, pericarditis, self reported CHF (EF 45%?) who is transferred to [facility] from [facility] complaining of depression and SI I/s/o recent alcohol and cocaine use.    Patient originally endorsed SI with thoughts to use his friend's gun or slit his throat. Originally also presented with HI towards his child's mother but states that he has no address and no way of actually gettng to her. On assessment today, He appears mildly irritable as the "abilify they gave me makes me groggy". Denies SI with intent and plan.  Is unable to give a clear answer to "Have you wished you better off dead"? as "he doesn't like answering that question". Denies HI. When asked to share recent events, pt states that he recently relapse on alcohol several days ago. States he drinks several pints of Henessy but denies that he does this to the point of blacking out. Of note, he was most recently discharged from [facility] for 12/28/23 to early January 2024 for similar concerns. Patient states that he relapsed several days ago because he "doesn't give a fuck". Says that he is "stressed" due to the fact that his 15 year old daughter who lives in [place]  has not picked up her phone for the past month. He expresses concern for her and her physical safety and this is constantly on his mind. Says that the lack of communication from his daughter has occurred in the past before. Patient had not made contact with his ex-wife for a long time.     Pt states that he last drank alcohol 2 days ago. Currently denies tremors and other withdrawal symptoms (CIWA currently 0, last at 10 on 1/18 evening). Patient currently denies VH but endorses chronic VH "visions of his dead mother" because he is a "mama's boy". States that visions come on randomly but occurs more often the few days after he is withdrawing from alcohol. The visions do not bother him, occasionally they do not make sense (he references an occasion when he saw "his mom in a cat suit"). Patient denies current AH, but chronic and vague hx of AH that is not commanding. Pt adamantly rejects that he has had a diagnosis of schizophrenia and bipolar disorder in the past noting that a " who was unqualified gave him these diagnoses". Pt only thinks that the only psychiatric condition he lives with is "stress".     During interview, patient makes treatment aware that he wants long term housing in order to remove himself from triggers and possibilities of him drinking again. Continues to be open to rehab as mentioned on previous notes.

## 2024-01-25 NOTE — BH INPATIENT PSYCHIATRY PROGRESS NOTE - MSE UNSTRUCTURED FT
The patient appears stated age, has fair hygiene and grooming. The patient was calm, cooperative with the interview and maintained appropriate eye contact.  No psychomotor agitation or retardation noted. The patient's speech was fluent with normal rate and volume.  The patient's mood is "good."  Neutral affect with full range, bright at times. The patient's thoughts are goal directed.  Denies any current delusions. Denies AH/VH. Denies SIIP and HIIP.  Insight is fair.  Judgment is poor. Impulse control has been fair on the unit. The patient appears stated age, has fair hygiene and grooming. The patient was calm, cooperative with the interview and maintained appropriate eye contact.  No psychomotor agitation or retardation noted. The patient's speech was fluent with normal rate and volume.  The patient's mood is "good."  Neutral affect with full range, bright at times. The patient's thoughts are goal directed.  Denies any current delusions. Denies SIIP and HIIP. Denies AH/VH.  Insight is fair.  Judgment is poor. Impulse control has been fair on the unit.

## 2024-01-25 NOTE — BH INPATIENT PSYCHIATRY DISCHARGE NOTE - OTHER PAST PSYCHIATRIC HISTORY (INCLUDE DETAILS REGARDING ONSET, COURSE OF ILLNESS, INPATIENT/OUTPATIENT TREATMENT)
Patient is a 58 yr old male, single, undomiciled (lives in hotels) and unemployed. He self reports hx of depression + anxiety, but has multiple past diagnoses as per Psyckes. Patient is a high mental health utilizer including numerous Psych ED/ CPEP visits for SI, alcohol intoxication, currently linked to [facility] IMT team. Per EMR patient is chronically nonadherent with meds, and was most recently, as per patient statement prescribed zoloft but self-discontinued due to side effects. Patient reports past hx of allegedly self aborting SA where he planned to jump in front of a 7 train over the summer (2023). Despite this aborted attempt, patient does not report other past suicide attempts, and currently upon interview with writer on unit does not report suicidal ideation. Per EMR, patient in ED reports suicidal ideation with plan and intent to shoot self with his gun that is being held by a friend. Patient reports struggling with substances as his main concern and issue, asking for assistance in housing away from the city to reduce substance use. PMHx of HTN, CKD, GERD, pericarditis; self reports hx of CHF. hx of many prior hospitalizations and ED visits for SI, HI, and AH in the context of substance use; hx of possible self-aborted suicide attempts

## 2024-01-25 NOTE — BH INPATIENT PSYCHIATRY DISCHARGE NOTE - DESCRIPTION
single, unemployed, stays at a hotel, [employer] [place] and in the streets. has a 15 yr old daughter (living in [place], [place] - currently in custody of bio mother, speaks on the phone regularly). no reported access to guns

## 2024-01-25 NOTE — BH INPATIENT PSYCHIATRY DISCHARGE NOTE - NSPRESENTSXS_PSY_ALL_CORE
Depressed mood/Anhedonia/Command hallucinations to hurt self/Refusal or inability to complete safety plan Refusal or inability to complete safety plan

## 2024-01-25 NOTE — BH INPATIENT PSYCHIATRY DISCHARGE NOTE - NSDCMRMEDTOKEN_GEN_ALL_CORE_FT
famotidine 20 mg oral tablet: 1 tab(s) orally 2 times a day   naproxen 375 mg oral tablet: 1 tab(s) orally 2 times a day, As Needed -for moderate pain    Aspirin Low Dose 81 mg oral tablet, chewable: 1 tab(s) orally once a day  folic acid 1 mg oral tablet: 1 tab(s) orally once a day  Lipitor 40 mg oral tablet: 1 tab(s) orally once a day (at bedtime)  Multiple Vitamins oral tablet: 1 tab(s) orally once a day

## 2024-01-25 NOTE — BH INPATIENT PSYCHIATRY PROGRESS NOTE - NSBHASSESSSUMMFT_PSY_ALL_CORE
Pt is a 59yo single man who is undomiciled (living at shelters, hotels, ERs/hospitals, and rehabs) and unemployed, with PMHx HTN and self-reported CHF, with PPHx of documented diagnoses ranging across mood, psychotic, and personality disorders, hx of many prior hospitalizations (last at [facility] in Dec 2023) and ED visits, chronic nonadherence to meds and outpt treatment, currently has IMT team, hx of several prior self-aborted suicide attempts, and significant hx of polysubstance use/abuse (recently EtOH and cocaine), hx of many prior detox/rehabs (last at [facility] Dec 2023), admitted due to report of SI (with several vague plans), HI towards mother of his child, and report of AH (at times command in nature to harm self) in the context of EtOH and cocaine use, recent discharge from [facility], and inability to reach his 16yo daughter (who lives in [place]). On admission pt without evidence of acute psychosis or mood pathology. Requests referral to rehab and assistance with housing. Suspect primary substance use and some element of secondary gain.      Pt continues to deny SIIP, HIIP, and AH. Will continue to plan for rehab, alternatively to shelter. Patient agreeable to both options.    Plan:  1. Legals: admit on 9.13 status  2. Safety: routine observation, denies SI/HI/I/P on the unit. PRNs: Zyprexa 5 mg PO/IM for moderate/severe agitation. Atarax 50 mg PO q6 for anxiety.  3. Psychiatry  - Have discontinued Abilify 10mg daily as per pt preference and given no indication for such at this time  - No indication for other standing psychotropic at this time  4. Medical:   - Discontinue CIWA protocol. Will c/w thiamine and folic acid for now given Etoh abuse  - C/w Lipitor 40 mg qHS  - ppd ordered for rehab clearance   - Hx of HTN - BP stable at present without medication, will monitor   - maalox PRN for dyspepsia  5. Dispo: SW to refer to rehab, PPD pending    Carlitos Kearns, R-1 Pt is a 59yo single man who is undomiciled (living at shelters, hotels, ERs/hospitals, and rehabs) and unemployed, with PMHx HTN and self-reported CHF, with PPHx of documented diagnoses ranging across mood, psychotic, and personality disorders, hx of many prior hospitalizations (last at [facility] in Dec 2023) and ED visits, chronic nonadherence to meds and outpt treatment, currently has IMT team, hx of several prior self-aborted suicide attempts, and significant hx of polysubstance use/abuse (recently EtOH and cocaine), hx of many prior detox/rehabs (last at [facility] Dec 2023), admitted due to report of SI (with several vague plans), HI towards mother of his child, and report of AH (at times command in nature to harm self) in the context of EtOH and cocaine use, recent discharge from [facility], and inability to reach his 16yo daughter (who lives in [place]). On admission pt without evidence of acute psychosis or mood pathology. Requests referral to rehab and assistance with housing. Suspect primary substance use and some element of secondary gain.      Pt continues to deny SIIP, HIIP, and AH. Will continue to plan for rehab, alternatively to shelter. Patient agreeable to both options.    Plan:  1. Legals: admit on 9.13 status  2. Safety: routine observation, denies SI/HI/I/P on the unit. PRNs: Zyprexa 5 mg PO/IM for moderate/severe agitation. Atarax 50 mg PO q6 for anxiety.  3. Psychiatry  - Have discontinued Abilify 10mg daily as per pt preference and given no indication for such at this time  - No indication for other standing psychotropic at this time  4. Medical:   - s/p CIWA protocol, no evidence of withdrawal   - HLD: C/w Lipitor 40 mg qHS  - Hx of HTN - BP stable at present without medication, will monitor   - maalox PRN for dyspepsia  5. Dispo: rehab vs shelter    Carlitos Kearns, R-1

## 2024-01-25 NOTE — BH INPATIENT PSYCHIATRY DISCHARGE NOTE - NSBHMETABOLIC_PSY_ALL_CORE_FT
BMI: BMI (kg/m2): 34.9 (01-18-24 @ 21:15)  HbA1c:   Glucose:   BP: --Vital Signs Last 24 Hrs  T(C): 36.6 (01-25-24 @ 07:35), Max: 36.6 (01-25-24 @ 07:35)  T(F): 97.9 (01-25-24 @ 07:35), Max: 97.9 (01-25-24 @ 07:35)  HR: --  BP: --  BP(mean): --  RR: --  SpO2: --    Orthostatic VS  01-25-24 @ 07:35  Lying BP: --/-- HR: --  Sitting BP: 119/81 HR: 60  Standing BP: 117/82 HR: 65  Site: --  Mode: --  Orthostatic VS  01-24-24 @ 06:31  Lying BP: --/-- HR: --  Sitting BP: 105/77 HR: 100  Standing BP: 111/72 HR: 89  Site: --  Mode: --    Lipid Panel:

## 2024-01-25 NOTE — BH INPATIENT PSYCHIATRY DISCHARGE NOTE - NSCURPASTPSYDX_PSY_ALL_CORE
Mood disorder/Alcohol/Substance Use disorders/Cluster B Personality disorder/traits Alcohol/Substance Use disorders/Cluster B Personality disorder/traits

## 2024-01-25 NOTE — BH INPATIENT PSYCHIATRY DISCHARGE NOTE - NSDCCPCAREPLAN_GEN_ALL_CORE_FT
PRINCIPAL DISCHARGE DIAGNOSIS  Diagnosis: Substance induced mood disorder  Assessment and Plan of Treatment: transfer to rehab for further management

## 2024-01-25 NOTE — BH INPATIENT PSYCHIATRY PROGRESS NOTE - PRN MEDS
MEDICATIONS  (PRN):  acetaminophen     Tablet .. 650 milliGRAM(s) Oral every 6 hours PRN Mild Pain (1 - 3), Moderate Pain (4 - 6)  aluminum hydroxide/magnesium hydroxide/simethicone Suspension 30 milliLiter(s) Oral every 4 hours PRN Dyspepsia  benzonatate 100 milliGRAM(s) Oral three times a day PRN cough  diphenhydrAMINE 25 milliGRAM(s) Oral at bedtime PRN insomnia  hydrOXYzine hydrochloride 50 milliGRAM(s) Oral every 6 hours PRN anxiety  ibuprofen  Tablet. 400 milliGRAM(s) Oral every 6 hours PRN Mild Pain (1 - 3), Moderate Pain (4 - 6)  OLANZapine 5 milliGRAM(s) Oral every 6 hours PRN agitation  OLANZapine Injectable 5 milliGRAM(s) IntraMuscular Once PRN severe agitation

## 2024-01-25 NOTE — BH INPATIENT PSYCHIATRY DISCHARGE NOTE - MSE UNSTRUCTURED FT
The patient appears stated age, has fair hygiene and grooming. The patient was calm, cooperative with the interview and maintained appropriate eye contact.  No psychomotor agitation or retardation noted. The patient's speech was fluent with normal rate and volume.  The patient's mood is "good."  Neutral affect with full range, bright at times. The patient's thoughts are goal directed.  Denies any current delusions. Denies SIIP and HIIP. Denies AH/VH.  Insight is fair.  Judgment is poor. Impulse control has been fair on the unit.

## 2024-01-25 NOTE — BH INPATIENT PSYCHIATRY DISCHARGE NOTE - NSBHFUPINTERVALHXFT_PSY_A_CORE
Chart reviewed and case discussed with interdisciplinary team. Has not required PRNs this hospital course. Sleep and appetite intact. Continues to report improved mood and denies SIIP, HIIP, and AH. Appears down this morning stating that he has not spoken to his daughter over the weekend because her mother took her phone away. Expressed desire to be discharged early today because he wants to check himself into a rehab center in [place]. States that he needs COVID results that are  from his charts. Says that he "does not want rehab to know that he was at [facility]". Says that he will likely go to [facility] if unable to be admitted to rehab. Safety plan reviewed with patient this morning. Looks forward to purchasing a [employer] phone in order to reach out to his other supports.

## 2024-01-25 NOTE — BH INPATIENT PSYCHIATRY DISCHARGE NOTE - HOSPITAL COURSE
Patient presented with helplessness, SI w/o clear intent or plan, passive HI w/o clear intent or plan towards ex-wife, and CAH all occuring in setting of alcohol and cocaine intoxication and withdrawal. HPI significant for recent social stressor as daughter had recently changed her phone number.   Of note, PSYCKES was reviewed, revealing an extensive and convoluted, psychiatric history including psychotic spectrum disorders, mood disorders, substance use and cluster B personality disorder. However, presentation during this hospitalization was determined to be only due to a substance-induced mood disorder (specifically, moderate alcohol and cocaine use disorder) and cluster B pathology. Therefore, Abilify was discontinued given lack of evidence of primary psychotic or manic disorder. CIWA protocol was started for alcoholic withdrawal (initially score of 10, with repeat scores of 0 thereafter). Patient started on thiamine and folate for chronic alcohol use. No new standing psychiatric medications started this admission as presentation determined to be fully secondary to substance use. Trazadone and Benadryl PRN were given for insomnia. Mood improved over course of hospitalization without significant intervention. During hospitalization, patient consistently denied SI, HI and CAH. No episodes of agitation requiring PRNs. Of note, it was suspected that there was some element of secondary gain. Patient remained actively engaged in treatment and participated in individual, group, and milieu therapy. In terms of disposition, patient was agreeable to rehab, however patient was not accepted to facilities that team applied to due to previous behavioral concerns. Patient, however, was open to discharge to shelter.     Medically, during this hospitalization there were no issues. Patient was continued on home medications, lipitor 40 mg and aspirin 81 mg.     DAY OF DISCHARGE    - Suicidal and aggression risk assessments  On day of discharge, patient's MSE remained without evidence for SIIP, HIIP and CAH. Alcohol and cocaine withdrawal was uncomplicated this admission. Mood and affect improved over hospitalization and euthymic on day of discharge. The patient is at elevated chronic risk of self-harm due to hx of aborted SA; hx of psych hospitalizations; age and gender; ongoing homelessness; ongoing substance use with lack of motivation to change; and cluster B pathology. Protective factors include treatment adherence during hospitalization; hopefulness and having a reason to live; and lack of access to a gun. On day of discharge, patient plans to continue to seek substance use treatment through rehab and is able to safety plan with team on 1. improving and diversifying his support system (will buy a state-phone and reach out to other friends/family other than his daughter) and 2. identifying warning signs that could lead to relapse. Patient is agreeable to follow up with IMT for further management of medical and psychiatric conditions. Given the above, patient does not appear to be an imminent threat to self or others and is appropriate for discharge.      Patient will be discharged with the following DSM5 Diagnoses:   substance-induced mood disorder    Patient will be discharged on the following medications:   aspirin 81 mg, 30 days   Lipitor 40 mg, 30 days     No verbal handoff was given for patient this admission. Patient instructed to follow up with IMT post-discharge. Patient presented with helplessness, SI w/o clear intent or plan, passive HI w/o clear intent or plan towards ex-wife, and CAH all occuring in setting of alcohol and cocaine intoxication and withdrawal. HPI significant for recent social stressor as daughter had recently changed her phone number.   Of note, PSYCKES was reviewed, revealing an extensive and convoluted, psychiatric history including psychotic spectrum disorders, mood disorders, substance use and cluster B personality disorder. However, presentation during this hospitalization was determined to be only due to a substance-induced mood disorder (specifically, moderate alcohol and cocaine use disorder) and cluster B pathology. Therefore, Abilify was discontinued given lack of evidence of primary psychotic or manic disorder. CIWA protocol was started for alcoholic withdrawal (initially score of 10, with repeat scores of 0 thereafter). Patient started on thiamine and folate for chronic alcohol use. No new standing psychiatric medications started this admission as presentation determined to be fully secondary to substance use. Trazadone and Benadryl PRN were given for insomnia. Mood improved over course of hospitalization without significant intervention. During hospitalization, patient consistently denied SI, HI and CAH. No episodes of agitation requiring PRNs. Of note, it was suspected that there was some element of secondary gain. Patient remained actively engaged in treatment and participated in individual, group, and milieu therapy. In terms of disposition, patient was agreeable to rehab, however patient was not accepted to facilities that team applied to due to previous behavioral concerns. Patient, however, was open to discharge to shelter.     Medically, during this hospitalization there were no issues. Patient was continued on home medications, lipitor 40 mg and aspirin 81 mg.     DAY OF DISCHARGE    - Suicidal and aggression risk assessments  On day of discharge, patient's MSE remained without evidence for SIIP, HIIP and CAH. Alcohol and cocaine withdrawal was uncomplicated this admission. Mood and affect improved over hospitalization and euthymic on day of discharge. The patient is at elevated chronic risk of self-harm due to hx of aborted SA; hx of psych hospitalizations; age and gender; ongoing homelessness; ongoing substance use with lack of motivation to change; and cluster B pathology. Protective factors include treatment adherence during hospitalization; hopefulness and having a reason to live; and lack of access to a gun. On day of discharge, patient plans to continue to seek substance use treatment through rehab and is able to safety plan with team on 1. improving and diversifying his support system (will buy a state-phone and reach out to other friends/family other than his daughter) and 2. identifying warning signs that could lead to relapse. Patient is agreeable to follow up with IMT for further management of medical and psychiatric conditions. Given the above, patient does not appear to be an imminent threat to self or others and is appropriate for discharge.      Patient will be discharged with the following DSM5 Diagnoses:   substance-induced mood disorder    Patient will be discharged on the following medications:   aspirin 81 mg, 30 days   Lipitor 40 mg, 30 days     Patient to be discharged to shelter with plans to self refer to rehab program in the [place]. No verbal handoff was given for patient this admission. Patient instructed to follow up with IMT post-discharge. Pt is a 59yo single man who is undomiciled (living at shelters, hotels, ERs/hospitals, and rehabs) and unemployed, with PMHx HTN and self-reported CHF, with PPHx of many documented diagnoses ranging across mood, psychotic, and personality disorders, hx of many prior hospitalizations (last at [facility] in Dec 2023) and ED visits, chronic nonadherence to meds and outpt treatment, currently has IMT team, hx of several prior self-aborted suicide attempts, and significant hx of polysubstance use/abuse (recently EtOH and cocaine), hx of many prior detox/rehabs (last at [facility] Dec 2023), admitted due to report of SI (with several vague plans), HI towards mother of his child, and report of AH in the context of EtOH and cocaine use, recent discharge from [facility], and inability to reach his 14yo daughter (who lives in [place]).     Very soon after admission pt reported resolution of SI, HI, and AH and denied all mood and psychotic symptoms. He was mainly focused on assistance with referral to rehab vs assistance with housing.     Given no clear acute psychiatric pathology (and per pt preference), Abilify, which had been started in the ED was discontinued and pt was not started on any other psychotropic medication. Pt was initially maintained on CIWA protocol with prn Ativan available, though he did not exhibit any symptoms of EtOH withdrawal.     Over hospitalization mood remained stable (though irritable at times related to frustration with select peers), he did not exhibit any symptoms of psychosis, and he consistently denied SIIP and HIIP. He was in good behavioral control and never became aggressive or violent. He attended some groups and was social with select peers. He tended to ADLs appropriately.     Several referrals were made to inpatient rehab, however pt was not accepted and he ultimately stated wish to discharge to a shelter (and planned to arrange for his own rehab/substance use treatment).      - Suicidal and aggression risk assessments  On day of discharge, patient's MSE remained without evidence for SIIP, HIIP and CAH. Alcohol and cocaine withdrawal was uncomplicated this admission. Mood and affect improved over hospitalization and euthymic on day of discharge. The patient is at elevated chronic risk of self-harm due to hx of aborted SA; hx of psych hospitalizations; age and gender; ongoing homelessness; ongoing substance use with lack of motivation to change; and cluster B pathology. Protective factors include treatment adherence during hospitalization; hopefulness and having a reason to live; and lack of access to a gun. On day of discharge, patient plans to continue to seek substance use treatment through rehab and is able to safety plan with team on 1. improving and diversifying his support system (will buy a state-phone and reach out to other friends/family other than his daughter) and 2. identifying warning signs that could lead to relapse. Patient is agreeable to follow up with IMT for further management of medical and psychiatric conditions. Given the above, patient does not appear to be an imminent threat to self or others and is appropriate for discharge.      Patient will be discharged with the following DSM5 Diagnoses:   substance-induced mood disorder    Patient will be discharged on the following medications:   aspirin 81 mg, 30 days   Lipitor 40 mg, 30 days     Patient to be discharged to shelter with plans to self refer to rehab program in the [place]. No verbal handoff was given for patient this admission. Patient instructed to follow up with IMT post-discharge. Pt is a 57yo single man who is undomiciled (living at shelters, hotels, ERs/hospitals, and rehabs) and unemployed, with PMHx HTN and self-reported CHF, with PPHx of many documented diagnoses ranging across mood, psychotic, and personality disorders, hx of many prior hospitalizations (last at [facility] in Dec 2023) and ED visits, chronic nonadherence to meds and outpt treatment, currently has IMT team, hx of several prior self-aborted suicide attempts, and significant hx of polysubstance use/abuse (recently EtOH and cocaine), hx of many prior detox/rehabs (last at [facility] Dec 2023), admitted due to report of SI (with several vague plans), HI towards mother of his child, and report of AH in the context of EtOH and cocaine use, recent discharge from [facility], and inability to reach his 16yo daughter (who lives in [place]).     Very soon after admission pt reported resolution of SI, HI, and AH and denied all mood and psychotic symptoms. He was mainly focused on assistance with referral to rehab vs assistance with housing.     Given no clear acute psychiatric pathology (and per pt preference), Abilify, which had been started in the ED was discontinued and pt was not started on any other psychotropic medication. Pt was initially maintained on CIWA protocol with prn Ativan available, though he did not exhibit any symptoms of EtOH withdrawal.     Over hospitalization mood remained stable (though irritable at times related to frustration with select peers), he did not exhibit any symptoms of psychosis, and he consistently denied SIIP and HIIP. He was in good behavioral control and never became aggressive or violent. He attended some groups and was social with select peers. He tended to ADLs appropriately.     Several referrals were made to inpatient rehab, however pt was not accepted and he ultimately stated wish to discharge to a shelter (and planned to arrange for his own rehab/substance use treatment).    Pt was discharged with a 30-day supply of home meds Lipitor and Aspirin only.     - Suicidal and aggression risk assessments  On day of discharge, patient's MSE remained without evidence for SIIP, HIIP and CAH. Alcohol and cocaine withdrawal was uncomplicated this admission. Mood and affect improved over hospitalization and euthymic on day of discharge. The patient is at elevated chronic risk of self-harm due to hx of aborted SA; hx of psych hospitalizations; age and gender; ongoing homelessness; ongoing substance use with lack of motivation to change; and cluster B pathology. Protective factors include treatment adherence during hospitalization; hopefulness and having a reason to live; pt identifies his daughter as highly protective; pt is treatment-seeking and often self-presents to the ED for voluntary hospitalization; and lack of access to a gun. On day of discharge, patient plans to continue to seek substance use treatment through rehab and is able to safety plan with team on 1. improving and diversifying his support system (will buy a [employer] and reach out to other friends/family other than his daughter) and 2. identifying warning signs that could lead to relapse. Patient is agreeable to follow up with IMT for further management of medical and psychiatric conditions. Given the above, patient does not appear to be an imminent threat to self or others and is appropriate for discharge.     Pt is a 57yo single man who is undomiciled (living at shelters, hotels, ERs/hospitals, and rehabs) and unemployed, with PMHx HTN and self-reported CHF, with PPHx of many documented diagnoses ranging across mood, psychotic, and personality disorders, hx of many prior hospitalizations (last at [facility] in Dec 2023) and ED visits, chronic nonadherence to meds and outpt treatment, currently has IMT team, hx of several prior self-aborted suicide attempts, and significant hx of polysubstance use/abuse (recently EtOH and cocaine), hx of many prior detox/rehabs (last at [facility] Dec 2023), admitted due to report of SI (with several vague plans), HI towards mother of his child, and report of AH in the context of EtOH and cocaine use, recent discharge from [facility], and inability to reach his 16yo daughter (who lives in [place]).     Very soon after admission pt reported resolution of SI, HI, and AH and denied all mood and psychotic symptoms. He was mainly focused on referral to rehab vs assistance with housing.     Given no clear acute psychiatric pathology (and per pt preference), Abilify, which had been started in the ED was discontinued and pt was not started on any other psychotropic medication. Pt was initially maintained on CIWA protocol with prn Ativan available, though he did not exhibit any symptoms of EtOH withdrawal.     Over hospitalization mood remained stable (though irritable at times related to frustration with select peers), he did not exhibit any symptoms of psychosis, and he consistently denied SIIP and HIIP. He was in good behavioral control and never became aggressive or violent. He attended some groups and was social with select peers. He tended to ADLs appropriately.     Several referrals were made to inpatient rehab, however pt was not accepted and he ultimately stated wish to discharge to a shelter (and planned to arrange for his own rehab/substance use treatment).    Pt was discharged with a 30-day supply of home meds Lipitor and Aspirin only.     - Suicidal and aggression risk assessments  On day of discharge, patient's MSE remained without evidence for SIIP, HIIP and CAH. Alcohol and cocaine withdrawal was uncomplicated this admission. Mood and affect improved over hospitalization and euthymic on day of discharge. The patient is at elevated chronic risk of self-harm due to hx of aborted SA; hx of psych hospitalizations; age and gender; ongoing homelessness; ongoing substance use with lack of motivation to change; and cluster B pathology. Protective factors include treatment adherence during hospitalization; hopefulness and having a reason to live; pt identifies his daughter as highly protective; pt is treatment-seeking and often self-presents to the ED for voluntary hospitalization; and lack of access to a gun. On day of discharge, patient plans to continue to seek substance use treatment through rehab and is able to safety plan with team on 1. improving and diversifying his support system (will buy a [employer] and reach out to other friends/family other than his daughter) and 2. identifying warning signs that could lead to relapse. Patient is agreeable to follow up with IMT for further management of medical and psychiatric conditions. Given the above, patient does not appear to be an imminent threat to self or others and is appropriate for discharge.

## 2024-01-25 NOTE — BH INPATIENT PSYCHIATRY DISCHARGE NOTE - NSBHFUPINTERVALCCFT_PSY_A_CORE
"I want to be discharged early"   Discharge Progress Note Date and Time: 01-29-24 @ 22:10  "I want to be discharged early"

## 2024-01-25 NOTE — BH INPATIENT PSYCHIATRY DISCHARGE NOTE - NSBHDCMEDICALFT_PSY_A_CORE
Continued home med Lipitor for HLD  Pt has hx of HTN though blood pressure well-controlled without medication

## 2024-01-25 NOTE — BH INPATIENT PSYCHIATRY PROGRESS NOTE - NSBHFUPINTERVALHXFT_PSY_A_CORE
Chart reviewed and case discussed with interdisciplinary team. Has not required PRNs this hospital course. Sleep was poor overnight stating that he was getting irritable because another patient was pacing outside of his room. Pt seen this afternoon in milieu. Denies SIIP. Denies AH. Appears to be discharged focused, open to being discharged to shelter if rehab does not work out.  Chart reviewed and case discussed with interdisciplinary team. Has not required PRNs this hospital course. Sleep was poor overnight stating that he was getting irritable because another patient was pacing outside of his room. Continues to report improved mood and denies SIIP, HIIP, and AH. Now very discharge-focused. Hopeful for acceptance to rehab.

## 2024-01-25 NOTE — BH INPATIENT PSYCHIATRY DISCHARGE NOTE - REASON FOR ADMISSION
self report of suicidal thoughts, homicidal thoughts, and auditory hallucinations with related substance (cocaine and alcohol) use

## 2024-01-25 NOTE — BH INPATIENT PSYCHIATRY PROGRESS NOTE - CURRENT MEDICATION
MEDICATIONS  (STANDING):  aspirin  chewable 81 milliGRAM(s) Oral daily  atorvastatin 40 milliGRAM(s) Oral at bedtime  folic acid 1 milliGRAM(s) Oral daily  influenza   Vaccine 0.5 milliLiter(s) IntraMuscular once  multivitamin 1 Tablet(s) Oral daily  thiamine 300 milliGRAM(s) Oral daily    MEDICATIONS  (PRN):  acetaminophen     Tablet .. 650 milliGRAM(s) Oral every 6 hours PRN Mild Pain (1 - 3), Moderate Pain (4 - 6)  aluminum hydroxide/magnesium hydroxide/simethicone Suspension 30 milliLiter(s) Oral every 4 hours PRN Dyspepsia  benzonatate 100 milliGRAM(s) Oral three times a day PRN cough  diphenhydrAMINE 25 milliGRAM(s) Oral at bedtime PRN insomnia  hydrOXYzine hydrochloride 50 milliGRAM(s) Oral every 6 hours PRN anxiety  ibuprofen  Tablet. 400 milliGRAM(s) Oral every 6 hours PRN Mild Pain (1 - 3), Moderate Pain (4 - 6)  OLANZapine 5 milliGRAM(s) Oral every 6 hours PRN agitation  OLANZapine Injectable 5 milliGRAM(s) IntraMuscular Once PRN severe agitation

## 2024-01-25 NOTE — BH INPATIENT PSYCHIATRY DISCHARGE NOTE - NSBHASSESSSUMMFT_PSY_ALL_CORE
Pt is a 57yo single man who is undomiciled (living at shelters, hotels, ERs/hospitals, and rehabs) and unemployed, with PMHx HTN and self-reported CHF, with PPHx of documented diagnoses ranging across mood, psychotic, and personality disorders, hx of many prior hospitalizations (last at [facility] in Dec 2023) and ED visits, chronic nonadherence to meds and outpt treatment, currently has IMT team, hx of several prior self-aborted suicide attempts, and significant hx of polysubstance use/abuse (recently EtOH and cocaine), hx of many prior detox/rehabs (last at [facility] Dec 2023), admitted due to report of SI (with several vague plans), HI towards mother of his child, and report of AH (at times command in nature to harm self) in the context of EtOH and cocaine use, recent discharge from [facility], and inability to reach his 14yo daughter (who lives in [place]). On admission pt without evidence of acute psychosis or mood pathology. Requests referral to rehab and assistance with housing. Suspect primary substance use and some element of secondary gain.      Will discharge to shelter today. No safety concerns on day of discharge; pt has consistently denied SI and AH this admission. Safety plan reviewed with pt today. Patient d/rocky with 30 day supply of aspirin and lipitor. To follow up with IMT as outpatient.

## 2024-01-25 NOTE — BH INPATIENT PSYCHIATRY PROGRESS NOTE - NSBHCHARTREVIEWVS_PSY_A_CORE FT
Vital Signs Last 24 Hrs  T(C): 36.6 (01-25-24 @ 07:35), Max: 36.6 (01-25-24 @ 07:35)  T(F): 97.9 (01-25-24 @ 07:35), Max: 97.9 (01-25-24 @ 07:35)  HR: --  BP: --  BP(mean): --  RR: --  SpO2: --    Orthostatic VS  01-25-24 @ 07:35  Lying BP: --/-- HR: --  Sitting BP: 119/81 HR: 60  Standing BP: 117/82 HR: 65  Site: --  Mode: --  Orthostatic VS  01-24-24 @ 06:31  Lying BP: --/-- HR: --  Sitting BP: 105/77 HR: 100  Standing BP: 111/72 HR: 89  Site: --  Mode: --

## 2024-01-26 PROCEDURE — 99231 SBSQ HOSP IP/OBS SF/LOW 25: CPT | Mod: GC

## 2024-01-26 RX ORDER — ASPIRIN/CALCIUM CARB/MAGNESIUM 324 MG
1 TABLET ORAL
Qty: 30 | Refills: 0
Start: 2024-01-26 | End: 2024-02-24

## 2024-01-26 RX ORDER — ATORVASTATIN CALCIUM 80 MG/1
1 TABLET, FILM COATED ORAL
Qty: 30 | Refills: 0
Start: 2024-01-26 | End: 2024-02-24

## 2024-01-26 RX ORDER — FOLIC ACID 0.8 MG
1 TABLET ORAL
Qty: 30 | Refills: 0
Start: 2024-01-26 | End: 2024-02-24

## 2024-01-26 RX ADMIN — Medication 81 MILLIGRAM(S): at 08:15

## 2024-01-26 RX ADMIN — Medication 1 MILLIGRAM(S): at 08:15

## 2024-01-26 RX ADMIN — Medication 400 MILLIGRAM(S): at 18:12

## 2024-01-26 RX ADMIN — Medication 1 TABLET(S): at 08:15

## 2024-01-26 RX ADMIN — ATORVASTATIN CALCIUM 40 MILLIGRAM(S): 80 TABLET, FILM COATED ORAL at 20:08

## 2024-01-26 NOTE — BH INPATIENT PSYCHIATRY PROGRESS NOTE - CURRENT MEDICATION
MEDICATIONS  (STANDING):  aspirin  chewable 81 milliGRAM(s) Oral daily  atorvastatin 40 milliGRAM(s) Oral at bedtime  folic acid 1 milliGRAM(s) Oral daily  influenza   Vaccine 0.5 milliLiter(s) IntraMuscular once  multivitamin 1 Tablet(s) Oral daily    MEDICATIONS  (PRN):  acetaminophen     Tablet .. 650 milliGRAM(s) Oral every 6 hours PRN Mild Pain (1 - 3), Moderate Pain (4 - 6)  aluminum hydroxide/magnesium hydroxide/simethicone Suspension 30 milliLiter(s) Oral every 4 hours PRN Dyspepsia  benzonatate 100 milliGRAM(s) Oral three times a day PRN cough  diphenhydrAMINE 25 milliGRAM(s) Oral at bedtime PRN insomnia  hydrOXYzine hydrochloride 50 milliGRAM(s) Oral every 6 hours PRN anxiety  ibuprofen  Tablet. 400 milliGRAM(s) Oral every 6 hours PRN Mild Pain (1 - 3), Moderate Pain (4 - 6)  OLANZapine 5 milliGRAM(s) Oral every 6 hours PRN agitation  OLANZapine Injectable 5 milliGRAM(s) IntraMuscular Once PRN severe agitation

## 2024-01-26 NOTE — BH INPATIENT PSYCHIATRY PROGRESS NOTE - NSBHCHARTREVIEWVS_PSY_A_CORE FT
Vital Signs Last 24 Hrs  T(C): 36.5 (01-26-24 @ 08:36), Max: 36.5 (01-26-24 @ 08:36)  T(F): 97.7 (01-26-24 @ 08:36), Max: 97.7 (01-26-24 @ 08:36)  HR: --  BP: --  BP(mean): --  RR: --  SpO2: --    Orthostatic VS  01-26-24 @ 08:36  Lying BP: --/-- HR: --  Sitting BP: 108/64 HR: 74  Standing BP: 118/69 HR: 84  Site: --  Mode: --  Orthostatic VS  01-25-24 @ 07:35  Lying BP: --/-- HR: --  Sitting BP: 119/81 HR: 60  Standing BP: 117/82 HR: 65  Site: --  Mode: --

## 2024-01-26 NOTE — BH INPATIENT PSYCHIATRY PROGRESS NOTE - NSICDXBHSECONDARYDX_PSY_ALL_CORE
Moderate alcohol use disorder   F10.20  Cocaine abuse   F14.10  

## 2024-01-26 NOTE — BH INPATIENT PSYCHIATRY PROGRESS NOTE - NSBHASSESSSUMMFT_PSY_ALL_CORE
Pt is a 57yo single man who is undomiciled (living at shelters, hotels, ERs/hospitals, and rehabs) and unemployed, with PMHx HTN and self-reported CHF, with PPHx of documented diagnoses ranging across mood, psychotic, and personality disorders, hx of many prior hospitalizations (last at [facility] in Dec 2023) and ED visits, chronic nonadherence to meds and outpt treatment, currently has IMT team, hx of several prior self-aborted suicide attempts, and significant hx of polysubstance use/abuse (recently EtOH and cocaine), hx of many prior detox/rehabs (last at [facility] Dec 2023), admitted due to report of SI (with several vague plans), HI towards mother of his child, and report of AH (at times command in nature to harm self) in the context of EtOH and cocaine use, recent discharge from [facility], and inability to reach his 14yo daughter (who lives in [place]). On admission pt without evidence of acute psychosis or mood pathology. Requests referral to rehab and assistance with housing. Suspect primary substance use and some element of secondary gain.      Pt continues to deny SIIP, HIIP, and AH. Will plan to d/c to [facility] on Monday, pt agreeable to plan.     Plan:  1. Legals: admit on 9.13 status  2. Safety: routine observation, denies SI/HI/I/P on the unit. PRNs: Zyprexa 5 mg PO/IM for moderate/severe agitation. Atarax 50 mg PO q6 for anxiety.  3. Psychiatry  - Have discontinued Abilify 10mg daily as per pt preference and given no indication for such at this time  - No indication for other standing psychotropic at this time  4. Medical:   - s/p CIWA protocol, no evidence of withdrawal   - HLD: C/w Lipitor 40 mg qHS  - Hx of HTN - BP stable at present without medication, will monitor   - maalox PRN for dyspepsia  5. Dispo: long term    Carlitos Kearns, R-1

## 2024-01-26 NOTE — BH TREATMENT PLAN - NSTXPLANTHERAPYSESSIONSFT_PSY_ALL_CORE
01-24-24  Type of therapy: Psychoeducation, Relapse prevention  --  --  --  --  --    01-26-24  Type of therapy: Coping skills  Type of session: Individual  Level of patient participation: Engaged  Duration of participation: 15 minutes  Therapy conducted by: Psych rehab  Therapy Summary: Writer met with pt. to assess the progress of [facility] rehab goal over the past 7 days. Pt. was scheduled to be discharged earlier in the week, but there were changes within his discharge plans and now will be discharged on Monday. Pt. continues to present the same, he is calm, and stating that he is ready for discharge. Pt. met established psych rehab goal and states that he will work on remaining sober and compliant with followup on the outside. Pt. states that he is looking forward to seeing his daughter. Pt. denies SI/HI/TH/AH/VH.

## 2024-01-26 NOTE — BH INPATIENT PSYCHIATRY PROGRESS NOTE - NSICDXBHPRIMARYDX_PSY_ALL_CORE
Substance induced mood disorder   F19.80  
Substance induced mood disorder   F19.90  
Substance induced mood disorder   F19.37  
Substance induced mood disorder   F19.55  
Substance induced mood disorder   F19.44  
Substance induced mood disorder   F19.40  
Substance induced mood disorder   F19.33

## 2024-01-26 NOTE — BH INPATIENT PSYCHIATRY PROGRESS NOTE - NSTXDCOPLKGOAL_PSY_ALL_CORE
Will agree to consider an appropriate level of outpatient care
Other...
Will agree to consider an appropriate level of outpatient care
Will agree to consider an appropriate level of outpatient care

## 2024-01-26 NOTE — BH TREATMENT PLAN - NSTXSUICIDINTERMD_PSY_ALL_CORE
Consistently denied during admission. SI likely 2/2 cocaine and alcohol withdrawal and secondary gain

## 2024-01-26 NOTE — BH INPATIENT PSYCHIATRY PROGRESS NOTE - NSTXDCHOUSGOAL_PSY_ALL_CORE
Will meet with care coordinator and accept services
Other...
Will meet with care coordinator and accept services

## 2024-01-26 NOTE — BH INPATIENT PSYCHIATRY PROGRESS NOTE - NSTXPROBALCDRG_PSY_ALL_CORE
ALCOHOL OR DRUG WITHDRAWAL
no

## 2024-01-26 NOTE — BH INPATIENT PSYCHIATRY PROGRESS NOTE - NSBHFUPINTERVALHXFT_PSY_A_CORE
Chart reviewed and case discussed with interdisciplinary team. Has not required PRNs this hospital course. Sleep and appetite intact. Continues to report improved mood and denies SIIP, HIIP, and AH. Appears cheerful that he can speak to his daughter over the phone again. When asked about other coping mechanisms, patient mentions calling another friend/relative by the name of Mariam.     Patient expresses concern about confusion to SW and team about rehab placement. Team communicated with patient that rehab options are now not possible as patient was not accepted at either options. Communicated to patient that this psychiatric hospitalization and notes provided to rehab centers was not the cause for being declined. Patient open to going to shelter on Monday.  Chart reviewed and case discussed with interdisciplinary team. Has not required PRNs this hospital course. Sleep and appetite intact. Continues to report improved mood and denies SIIP, HIIP, and AH. Appears cheerful that he can speak to his daughter over the phone again. When asked about other coping mechanisms, patient mentions calling another friend/relative by the name of Mariam.     Patient expresses concern about confusion to SW and team about rehab placement. Team communicated with patient that rehab options are now not possible as patient was not accepted at either options. Patient open to going to shelter on Monday.

## 2024-01-26 NOTE — BH INPATIENT PSYCHIATRY PROGRESS NOTE - NSBHMETABOLIC_PSY_ALL_CORE_FT
BMI: BMI (kg/m2): 34.9 (01-18-24 @ 21:15)  HbA1c:   Glucose:   BP: --Vital Signs Last 24 Hrs  T(C): 36.5 (01-26-24 @ 08:36), Max: 36.5 (01-26-24 @ 08:36)  T(F): 97.7 (01-26-24 @ 08:36), Max: 97.7 (01-26-24 @ 08:36)  HR: --  BP: --  BP(mean): --  RR: --  SpO2: --    Orthostatic VS  01-26-24 @ 08:36  Lying BP: --/-- HR: --  Sitting BP: 108/64 HR: 74  Standing BP: 118/69 HR: 84  Site: --  Mode: --  Orthostatic VS  01-25-24 @ 07:35  Lying BP: --/-- HR: --  Sitting BP: 119/81 HR: 60  Standing BP: 117/82 HR: 65  Site: --  Mode: --    Lipid Panel:

## 2024-01-26 NOTE — BH INPATIENT PSYCHIATRY PROGRESS NOTE - NSBHATTESTTYPEVISIT_PSY_A_CORE
EMMIE without on-site Attending supervision
Attending with Resident/Fellow/Student
EMMIE without on-site Attending supervision
Attending with Resident/Fellow/Student

## 2024-01-26 NOTE — BH INPATIENT PSYCHIATRY PROGRESS NOTE - NSTXALCDRGGOAL_PSY_ALL_CORE
Will not display signs of withdrawal

## 2024-01-26 NOTE — BH INPATIENT PSYCHIATRY PROGRESS NOTE - NSBHATTESTBILLING_PSY_A_CORE
66806-Hmaxtnqdkv OBS or IP - moderate complexity OR 35-49 mins
76231-Yrtvqzqrlt OBS or IP - moderate complexity OR 35-49 mins
47454-Uzdlskgako OBS or IP - low complexity OR 25-34 mins
28105-Krlbgjpbbi OBS or IP - low complexity OR 25-34 mins
93074-Gighkkdkpt OBS or IP - low complexity OR 25-34 mins
84195-Ulhvjmtvcl OBS or IP - low complexity OR 25-34 mins
76468-Wvsnddbpge OBS or IP - moderate complexity OR 35-49 mins

## 2024-01-26 NOTE — BH INPATIENT PSYCHIATRY PROGRESS NOTE - NSBHATTESTCOMMENTATTENDFT_PSY_A_CORE
Pt remains with stable mood and denies SIIP, HIIP, and AH. Has been speaking with his daughter, which he identifies as his coping skill. He is discharge-focused. Still no indication for psychotropic medication. Discharge to rehab vs shelter. 
Pt remains with stable mood and no evidence of psychosis. Denies SIIP and HIIP. Has been denied from several rehabs. Pt now desires discharge to shelter, SW making referral. No indication for standing psychotropics. 
Pt remains with stable mood and no evidence of psychosis, now denying SIIP, HIIP, and AH. Remains with wish for rehab. No indication for standing psychotropic meds at this time. SW making referrals to rehab. 
Pt remains with stable mood and no evidence of psychosis. Denies SIIP and HIIP. No indication for standing psychotropics at this time. Awaiting placement in rehab. 
Pt without any acute psychosis or mood pathology. Reports rapid improvement in mood over the weekend with resolution of SIIP, HIIP, and AH. No evidence of EtOH withdrawal. Requests discharge by the first of the month to collect food stamps. Continues to request referral to rehab. Holding off on standing psychotropics other than Trazodone for sleep. D/c CIWA. SW to refer to rehab.

## 2024-01-27 RX ADMIN — Medication 81 MILLIGRAM(S): at 08:06

## 2024-01-27 RX ADMIN — Medication 1 MILLIGRAM(S): at 08:06

## 2024-01-27 RX ADMIN — Medication 1 TABLET(S): at 08:07

## 2024-01-28 RX ADMIN — Medication 81 MILLIGRAM(S): at 08:19

## 2024-01-28 RX ADMIN — Medication 1 MILLIGRAM(S): at 08:18

## 2024-01-28 RX ADMIN — ATORVASTATIN CALCIUM 40 MILLIGRAM(S): 80 TABLET, FILM COATED ORAL at 20:03

## 2024-01-28 RX ADMIN — Medication 1 TABLET(S): at 08:18

## 2024-01-29 VITALS — TEMPERATURE: 98 F

## 2024-01-29 PROCEDURE — 90853 GROUP PSYCHOTHERAPY: CPT

## 2024-01-29 RX ADMIN — Medication 1 TABLET(S): at 08:08

## 2024-01-29 RX ADMIN — Medication 400 MILLIGRAM(S): at 09:32

## 2024-01-29 RX ADMIN — Medication 1 MILLIGRAM(S): at 08:08

## 2024-01-29 RX ADMIN — Medication 81 MILLIGRAM(S): at 08:08

## 2024-01-29 NOTE — BH PSYCHOLOGY - GROUP THERAPY NOTE - TOKEN PULL-DIAGNOSIS
Primary Diagnosis:  Substance induced mood disorder [F19.94]      Substance use disorder [F19.90]        Problem Dx:   Cocaine abuse [F14.10]      Moderate alcohol use disorder [F10.20]      
Primary Diagnosis:  Substance induced mood disorder [F19.94]      Substance use disorder [F19.90]        Problem Dx:

## 2024-01-29 NOTE — BH PSYCHOLOGY - GROUP THERAPY NOTE - NSBHPSYCHOLRESPONSE_PSY_A_CORE
Symptoms reduced/Accepted support
Accepted support
Symptoms reduced/Coping skills acquired/Accepted support
Symptoms reduced/Accepted support

## 2024-01-29 NOTE — BH PSYCHOLOGY - GROUP THERAPY NOTE - NSPSYCHOLGRPBILLING_PSY_A_CORE
[id number] - Group Psychotherapy
83142 - Group Psychotherapy
[id number] - Group Psychotherapy
[id number] - Group Psychotherapy

## 2024-01-29 NOTE — BH PSYCHOLOGY - GROUP THERAPY NOTE - NSBHPSYCHOLPARTICIPCOMMENT_PSY_A_CORE FT
Patient arrived to the group session and accepted a worksheet when offered by the . Patient was observed to have spent parts of the session walking in and out of the day room. Patient declined to participate when the  attempted to engage in the discussion.
Patient arrived on time for the session; patient was observed to be attentive and interested in the group discussion.  Although the patient did not contribute spontaneously or volunteer to read during the group session, patient appeared able to follow along with the group discussion. Patient was able to engage with the  and interact with other group members in an appropriate manner.

## 2024-01-29 NOTE — BH PSYCHOLOGY - GROUP THERAPY NOTE - NSPSYCHOLGRPGENPROB_PSY_A_CORE
academic/vocational/social dysfunction/anxiety/depression
full range of motion in all extremities

## 2024-02-02 ENCOUNTER — EMERGENCY (EMERGENCY)
Facility: HOSPITAL | Age: 59
LOS: 1 days | Discharge: LEFT BEFORE DISCHARGE | End: 2024-02-02
Attending: EMERGENCY MEDICINE | Admitting: EMERGENCY MEDICINE
Payer: MEDICAID

## 2024-02-02 VITALS
DIASTOLIC BLOOD PRESSURE: 73 MMHG | SYSTOLIC BLOOD PRESSURE: 127 MMHG | RESPIRATION RATE: 19 BRPM | OXYGEN SATURATION: 95 % | HEART RATE: 116 BPM | HEIGHT: 74 IN | WEIGHT: 285.06 LBS | TEMPERATURE: 99 F

## 2024-02-02 VITALS
DIASTOLIC BLOOD PRESSURE: 66 MMHG | RESPIRATION RATE: 18 BRPM | HEART RATE: 103 BPM | TEMPERATURE: 99 F | OXYGEN SATURATION: 96 % | SYSTOLIC BLOOD PRESSURE: 107 MMHG

## 2024-02-02 DIAGNOSIS — E78.5 HYPERLIPIDEMIA, UNSPECIFIED: ICD-10-CM

## 2024-02-02 DIAGNOSIS — F14.10 COCAINE ABUSE, UNCOMPLICATED: ICD-10-CM

## 2024-02-02 DIAGNOSIS — R07.89 OTHER CHEST PAIN: ICD-10-CM

## 2024-02-02 DIAGNOSIS — J06.9 ACUTE UPPER RESPIRATORY INFECTION, UNSPECIFIED: ICD-10-CM

## 2024-02-02 DIAGNOSIS — Z79.82 LONG TERM (CURRENT) USE OF ASPIRIN: ICD-10-CM

## 2024-02-02 DIAGNOSIS — I10 ESSENTIAL (PRIMARY) HYPERTENSION: ICD-10-CM

## 2024-02-02 DIAGNOSIS — R00.0 TACHYCARDIA, UNSPECIFIED: ICD-10-CM

## 2024-02-02 DIAGNOSIS — F10.10 ALCOHOL ABUSE, UNCOMPLICATED: ICD-10-CM

## 2024-02-02 LAB
ALBUMIN SERPL ELPH-MCNC: 4 G/DL — SIGNIFICANT CHANGE UP (ref 3.4–5)
ALP SERPL-CCNC: 53 U/L — SIGNIFICANT CHANGE UP (ref 40–120)
ALT FLD-CCNC: 43 U/L — HIGH (ref 12–42)
ANION GAP SERPL CALC-SCNC: 13 MMOL/L — SIGNIFICANT CHANGE UP (ref 9–16)
APTT BLD: 27 SEC — SIGNIFICANT CHANGE UP (ref 24.5–35.6)
AST SERPL-CCNC: 67 U/L — HIGH (ref 15–37)
BASOPHILS # BLD AUTO: 0.03 K/UL — SIGNIFICANT CHANGE UP (ref 0–0.2)
BASOPHILS NFR BLD AUTO: 0.3 % — SIGNIFICANT CHANGE UP (ref 0–2)
BILIRUB SERPL-MCNC: 1.2 MG/DL — SIGNIFICANT CHANGE UP (ref 0.2–1.2)
BUN SERPL-MCNC: 19 MG/DL — SIGNIFICANT CHANGE UP (ref 7–23)
CALCIUM SERPL-MCNC: 9.2 MG/DL — SIGNIFICANT CHANGE UP (ref 8.5–10.5)
CHLORIDE SERPL-SCNC: 100 MMOL/L — SIGNIFICANT CHANGE UP (ref 96–108)
CO2 SERPL-SCNC: 23 MMOL/L — SIGNIFICANT CHANGE UP (ref 22–31)
CREAT SERPL-MCNC: 1.43 MG/DL — HIGH (ref 0.5–1.3)
D DIMER BLD IA.RAPID-MCNC: 794 NG/ML DDU — HIGH
EGFR: 57 ML/MIN/1.73M2 — LOW
EOSINOPHIL # BLD AUTO: 0.07 K/UL — SIGNIFICANT CHANGE UP (ref 0–0.5)
EOSINOPHIL NFR BLD AUTO: 0.8 % — SIGNIFICANT CHANGE UP (ref 0–6)
FLUAV AG NPH QL: SIGNIFICANT CHANGE UP
FLUBV AG NPH QL: SIGNIFICANT CHANGE UP
GLUCOSE SERPL-MCNC: 80 MG/DL — SIGNIFICANT CHANGE UP (ref 70–99)
HCT VFR BLD CALC: 37.1 % — LOW (ref 39–50)
HGB BLD-MCNC: 12 G/DL — LOW (ref 13–17)
IMM GRANULOCYTES NFR BLD AUTO: 0.3 % — SIGNIFICANT CHANGE UP (ref 0–0.9)
INR BLD: 1.18 — SIGNIFICANT CHANGE UP (ref 0.85–1.18)
LYMPHOCYTES # BLD AUTO: 2.31 K/UL — SIGNIFICANT CHANGE UP (ref 1–3.3)
LYMPHOCYTES # BLD AUTO: 26.5 % — SIGNIFICANT CHANGE UP (ref 13–44)
MCHC RBC-ENTMCNC: 29.6 PG — SIGNIFICANT CHANGE UP (ref 27–34)
MCHC RBC-ENTMCNC: 32.3 GM/DL — SIGNIFICANT CHANGE UP (ref 32–36)
MCV RBC AUTO: 91.6 FL — SIGNIFICANT CHANGE UP (ref 80–100)
MONOCYTES # BLD AUTO: 0.76 K/UL — SIGNIFICANT CHANGE UP (ref 0–0.9)
MONOCYTES NFR BLD AUTO: 8.7 % — SIGNIFICANT CHANGE UP (ref 2–14)
NEUTROPHILS # BLD AUTO: 5.51 K/UL — SIGNIFICANT CHANGE UP (ref 1.8–7.4)
NEUTROPHILS NFR BLD AUTO: 63.4 % — SIGNIFICANT CHANGE UP (ref 43–77)
NRBC # BLD: 0 /100 WBCS — SIGNIFICANT CHANGE UP (ref 0–0)
PLATELET # BLD AUTO: 311 K/UL — SIGNIFICANT CHANGE UP (ref 150–400)
POTASSIUM SERPL-MCNC: 3.9 MMOL/L — SIGNIFICANT CHANGE UP (ref 3.5–5.3)
POTASSIUM SERPL-SCNC: 3.9 MMOL/L — SIGNIFICANT CHANGE UP (ref 3.5–5.3)
PROT SERPL-MCNC: 8.1 G/DL — SIGNIFICANT CHANGE UP (ref 6.4–8.2)
PROTHROM AB SERPL-ACNC: 12.9 SEC — SIGNIFICANT CHANGE UP (ref 9.5–13)
RBC # BLD: 4.05 M/UL — LOW (ref 4.2–5.8)
RBC # FLD: 14.7 % — HIGH (ref 10.3–14.5)
RSV RNA NPH QL NAA+NON-PROBE: SIGNIFICANT CHANGE UP
SARS-COV-2 RNA SPEC QL NAA+PROBE: SIGNIFICANT CHANGE UP
SODIUM SERPL-SCNC: 136 MMOL/L — SIGNIFICANT CHANGE UP (ref 132–145)
TROPONIN I, HIGH SENSITIVITY RESULT: 13.9 NG/L — SIGNIFICANT CHANGE UP
TROPONIN I, HIGH SENSITIVITY RESULT: 17.8 NG/L — SIGNIFICANT CHANGE UP
TROPONIN I, HIGH SENSITIVITY RESULT: 17.9 NG/L — SIGNIFICANT CHANGE UP
WBC # BLD: 8.71 K/UL — SIGNIFICANT CHANGE UP (ref 3.8–10.5)
WBC # FLD AUTO: 8.71 K/UL — SIGNIFICANT CHANGE UP (ref 3.8–10.5)

## 2024-02-02 PROCEDURE — 71275 CT ANGIOGRAPHY CHEST: CPT | Mod: 26

## 2024-02-02 PROCEDURE — 71046 X-RAY EXAM CHEST 2 VIEWS: CPT | Mod: 26

## 2024-02-02 PROCEDURE — 99223 1ST HOSP IP/OBS HIGH 75: CPT

## 2024-02-02 PROCEDURE — 99053 MED SERV 10PM-8AM 24 HR FAC: CPT

## 2024-02-02 RX ORDER — KETOROLAC TROMETHAMINE 30 MG/ML
15 SYRINGE (ML) INJECTION ONCE
Refills: 0 | Status: DISCONTINUED | OUTPATIENT
Start: 2024-02-02 | End: 2024-02-02

## 2024-02-02 RX ORDER — ACETAMINOPHEN 500 MG
650 TABLET ORAL ONCE
Refills: 0 | Status: COMPLETED | OUTPATIENT
Start: 2024-02-02 | End: 2024-02-02

## 2024-02-02 RX ORDER — IBUPROFEN 200 MG
600 TABLET ORAL ONCE
Refills: 0 | Status: COMPLETED | OUTPATIENT
Start: 2024-02-02 | End: 2024-02-02

## 2024-02-02 RX ADMIN — Medication 15 MILLIGRAM(S): at 08:00

## 2024-02-02 RX ADMIN — Medication 600 MILLIGRAM(S): at 15:11

## 2024-02-02 RX ADMIN — Medication 650 MILLIGRAM(S): at 12:35

## 2024-02-02 NOTE — ED ADULT NURSE NOTE - OBJECTIVE STATEMENT
pt is a 58y male c/o chest pain. Pt reports pain began last night, states it radiates to his chest, worsened with death breaths and activity. Pt endorses relapse on cocaine and alcohol last night, last use at 12 am, and believes it could be contributing to his symptoms. Respirations are payam and unlabored. Pt is tachycardic. Placed on cardiac monitor and pulse oximetry. Reports PMH HTN, HLD, pericarditis.

## 2024-02-02 NOTE — ED ADULT NURSE REASSESSMENT NOTE - NS ED NURSE REASSESS COMMENT FT1
Pt expresses desire to meet with social work to be connected with detox center.  Lydia notified. Nourishment provided.

## 2024-02-02 NOTE — ED ADULT NURSE REASSESSMENT NOTE - NS ED NURSE REASSESS COMMENT FT1
Pt resting in bed a this time. PT complaining of chest pain. Repeat trop drawn. EKG and repeat vitals initiated. Dr. Andrade made aware. Pt  also complaining of b/l foot pain/swelling. Pt on continuous cardiac and o2 monitoring. Pt resting in bed a this time. PT complaining of chest pain. Repeat trop drawn. EKG and repeat vitals initiated. Dr. Walker made aware. Pt  also complaining of b/l foot pain/swelling. Pt on continuous cardiac and o2 monitoring.

## 2024-02-02 NOTE — ED ADULT NURSE REASSESSMENT NOTE - NS ED NURSE REASSESS COMMENT FT1
Pt received from EVERT Montero. Pt is A&Ox4 and states improvement in chest pain after medications. Pt has no additional complaints at this time.

## 2024-02-02 NOTE — ED CDU PROVIDER INITIAL DAY NOTE - OBJECTIVE STATEMENT
57 yo m pmhx sig for htn, hld, pericarditis, cocaine use, and ETOH use disorder here with acute onset of L sided chest pain ongoing for several hours in duration exertional worse going up the stairs ongoing since PM. No n/v or diaphoresis. No unilateral swelling, hemoptysis, estrogen supplementation, malignancy, recent immobilization or surgery, or prior DVT/PE. Admits to cocaine and etoh use last night, last use 8 hr pta.    I have reviewed available current nursing and previous documentation of past medical, surgical, family, and/or social history.

## 2024-02-02 NOTE — ED CDU PROVIDER INITIAL DAY NOTE - PROGRESS NOTE DETAILS
Patient reports chest pain has returned. Gave different history than initially reported. Said he has also been having cough, diarrhea, and chills with the chest pain. went to discuss all results with patient in preparation for discharge. Patient eloped. Did not receive instructions.

## 2024-02-02 NOTE — ED PROVIDER NOTE - PROGRESS NOTE DETAILS
D-dimer elevated. Will place on observation and get CTA chest. MICHELLE: Patient signed out to me by JESUS Miles. Pending labs.

## 2024-02-02 NOTE — ED CDU PROVIDER DISPOSITION NOTE - CLINICAL COURSE
Patient with upper respiratory symptoms and chest pain. troponin negative x3. My suspicion for primary cardiac issue is low. Patient eloped before all results discussed.

## 2024-02-02 NOTE — ED PROVIDER NOTE - CLINICAL SUMMARY MEDICAL DECISION MAKING FREE TEXT BOX
well appearing here with L sided chest pain sharp worse with breathing ongoing since pm with sinus tachycardia, reports pain is worse with deep inspiration and walking up the stairs, no h/o cad or pe, but pt has tachycardia on exam, will r/o pe, acs, ptx, pnx, plan: cxr, ecg, cbc, cmp, trop, ddimer, pt/ptt

## 2024-02-02 NOTE — ED BEHAVIORAL HEALTH NOTE - BEHAVIORAL HEALTH NOTE
SW met with patient at discharge to discuss discharge disposition. Patient is agreeable to entering a detox facility. Patient is known to [facility] charly [facility], tel: [phone]. SW spoke to Kavitha in Admissions, and was informed that patient was discharged on December 10, 2023 and due to insurance he is unable to return as it is not 45 days. Patient cannot pay out of pocket costs. Patient is aware that he is unable to return to facility. Patient was given SBIRT resources. SW will remain available.

## 2024-02-02 NOTE — ED PROVIDER NOTE - PHYSICAL EXAMINATION
Physical Exam    Vital Signs: I have reviewed the initial vital signs.  Constitutional: well-appearing, appears stated age  Eyes: PERRLA, EOM intact, RAPD absent, and symmetrical lids.  ENT: Neck supple with no adenopathy, moist MM.  Cardiovascular: tachycardia rate, regular rhythm, well-perfused extremities, negative chest wall pain or ttp, POCUS no pericardial effusion  Respiratory: unlabored respiratory effort, clear to auscultation bilaterally  Gastrointestinal: soft, non-tender abdomen, no pulsatile mass  Musculoskeletal: supple neck, no lower extremity edema  Integumentary: warm, dry, no rash  Neurologic: extremities’ motor and sensory functions grossly intact  Psychiatric: A&Ox3, appropriate mood, appropriate affect

## 2024-02-02 NOTE — ED CDU PROVIDER INITIAL DAY NOTE - CLINICAL SUMMARY MEDICAL DECISION MAKING FREE TEXT BOX
Patient with cough, diarrhea, chest pain. CTA chest ordered due to elevated d-dimer. Will get repeat trponin, viral swab, reassess.

## 2024-02-02 NOTE — ED PROVIDER NOTE - OBJECTIVE STATEMENT
59 yo m pmhx sig for htn, hld, pericarditis, cocaine use, and ETOH use disorder here with acute onset of L sided chest pain ongoing for several hours in duration exertional worse going up the stairs ongoing since PM. No n/v or diaphoresis. No unilateral swelling, hemoptysis, estrogen supplementation, malignancy, recent immobilization or surgery, or prior DVT/PE. Admits to cocaine and etoh use last night, last use 8 hr pta.    I have reviewed available current nursing and previous documentation of past medical, surgical, family, and/or social history.

## 2024-02-02 NOTE — ED PROVIDER NOTE - ATTENDING APP SHARED VISIT CONTRIBUTION OF CARE
Patient signed out to me at 0800 by JESUS Miles. Sharp, left-sided, pleuritic chest pain since last night. Also worse with walking up stairs. No pain currently No fever, ha, sob, ap, n/v/d, diaphoresis.    Gen: Well-developed, well-nourished, NAD, VS as noted by nursing. HEENT: NCAT, mmm   Chest: RRR, nl S1 and S2, no m/r/g. Resp: CTAB, no w/r/r  Abd: nl BS, soft, nt/nd. Ext: Warm, dry  Neuro: CN II-XII intact, normal and equal strength, sensation, and reflexes bilaterally, normal gait  Psych: AAOx3     MDM: Patient with chest pain that is pleuritic and exertional. No ACS risk factors. will get labs, CXR, reassess.

## 2024-02-23 NOTE — ED BEHAVIORAL HEALTH ASSESSMENT NOTE - OTHER PAST PSYCHIATRIC HISTORY (INCLUDE DETAILS REGARDING ONSET, COURSE OF ILLNESS, INPATIENT/OUTPATIENT TREATMENT)
Unable to reach patient for one month post discharge follow up call.   LVM with call back number for patient to call if needed     
self reports hx of depression + anxiety  multiple past diagnoses as per Psyckes  high mental health utilizer including numerous Psych ED/ CPEP visits for SI, alcohol intoxication  prior psych admissions  chronically nonadherent with meds   claimed previously prescribed Zoloft at [facility]   - self-discontinued because developed diarrhea  stated past hx of allegedly self aborting jumping in front of a 7 train over the summer (2023)  denied past suicide attempts.   Denies ever being in consistent outpatient psychiatric care.   currently with a IMT ([facility] ([email or web address] [place] [facility], 8/22/2022 -  Current)

## 2024-02-29 NOTE — SOCIAL WORK POST DISCHARGE FOLLOW UP NOTE - NSBHSWFOLLOWUP_PSY_ALL_CORE_FT
Patient did not link to outpatient appointment. Patient in later days admitted to [facility] ED, discharged that same day. SW called and left voicemail offering assistance to reschedule patient's [facility] OP appointment. No SW interventions needed at this time. Case Closed.

## 2024-03-24 NOTE — ED ADULT NURSE NOTE - NS ED NURSE DC INFO COMPLEXITY
Simple: Patient demonstrates quick and easy understanding/Verbalized Understanding Patient in acute pulmonary edema, patient seen immediately upon arrival, respiratory called to bedside to attempt BiPAP.  Patient is very anxious.  Respiratory attempted for approximately 10 minutes slowly placing BiPAP on the patient however she is not able to comply.  Will give patient Ativan and retry in 10 minutes.  Patient's appears to have peaked T waves on the cardiac monitor, and too much respiratory distress to  lie her back for an EKG at this time, will give cardiac hyperkalemia cocktail.   Patient does urinate slightly so we will give Lasix and also will start nitroprusside infusion.  Immediately called Dr. Cuadra who will dialyze patient call Dr. Santamaria to admit patient.

## 2024-03-25 ENCOUNTER — HOSPITAL ENCOUNTER (INPATIENT)
Dept: HOSPITAL 74 - YASAS | Age: 59
LOS: 7 days | Discharge: HOME | DRG: 772 | End: 2024-04-01
Attending: PSYCHIATRY & NEUROLOGY | Admitting: ALLERGY & IMMUNOLOGY
Payer: COMMERCIAL

## 2024-03-25 VITALS — RESPIRATION RATE: 18 BRPM

## 2024-03-25 VITALS — BODY MASS INDEX: 34.9 KG/M2

## 2024-03-25 DIAGNOSIS — F14.20: Primary | ICD-10-CM

## 2024-03-25 DIAGNOSIS — I10: ICD-10-CM

## 2024-03-25 DIAGNOSIS — Z59.01: ICD-10-CM

## 2024-03-25 DIAGNOSIS — K21.9: ICD-10-CM

## 2024-03-25 DIAGNOSIS — F41.9: ICD-10-CM

## 2024-03-25 DIAGNOSIS — F10.20: ICD-10-CM

## 2024-03-25 PROCEDURE — HZ42ZZZ GROUP COUNSELING FOR SUBSTANCE ABUSE TREATMENT, COGNITIVE-BEHAVIORAL: ICD-10-PCS | Performed by: PSYCHIATRY & NEUROLOGY

## 2024-03-25 RX ADMIN — Medication SCH: at 23:03

## 2024-03-25 RX ADMIN — Medication SCH TAB: at 18:24

## 2024-03-25 RX ADMIN — TUBERCULIN PURIFIED PROTEIN DERIVATIVE ONE ML: 5 INJECTION, SOLUTION INTRADERMAL at 20:38

## 2024-03-25 SDOH — ECONOMIC STABILITY - HOUSING INSECURITY: SHELTERED HOMELESSNESS: Z59.01

## 2024-03-26 RX ADMIN — HYDROCHLOROTHIAZIDE SCH MG: 25 TABLET ORAL at 09:13

## 2024-03-26 RX ADMIN — FOLIC ACID SCH MG: 1 TABLET ORAL at 09:13

## 2024-03-26 RX ADMIN — IBUPROFEN PRN MG: 600 TABLET, FILM COATED ORAL at 06:16

## 2024-03-27 RX ADMIN — BENZONATATE PRN MG: 200 CAPSULE ORAL at 21:36

## 2024-03-27 NOTE — ED PROVIDER NOTE - NSCAREINITIATED _GEN_ER
Gastroenterology Procedure History & Physical    Date of Procedure:  3/27/2024   Referring Provider: Sami Vargas MD      PROCEDURE(S):  EGD and colonoscopy    REASON FOR PROCEDURE(S):  GERD, Dyspepsia, family history of carcinoma at the GE junction, change in bowel habit    HPI:    This is a 64 year old female with history of GERD, Dyspepsia, family history of carcinoma at the GE junction, change in bowel habit    MEDICATIONS:  Current Outpatient Medications   Medication Sig    rosuvastatin (CRESTOR) 40 MG tablet Take 1 tablet by mouth daily.    pantoprazole (PROTONIX) 40 MG tablet Take 1 tablet by mouth daily.    LORazepam (ATIVAN) 0.5 MG tablet Take 1 tablet by mouth 2 times daily as needed for Anxiety.    vortioxetine (TRINTELLIX) 10 MG tablet Take 10 mg by mouth daily.    ARIPiprazole (ABILIFY) 2 MG tablet Take 2 mg by mouth every morning.     Current Facility-Administered Medications   Medication    ondansetron (ZOFRAN) injection 4 mg    naLOXone (NARCAN) injection 0.2 mg    lactated ringers infusion    fentaNYL (SUBLIMAZE) injection 25 mcg     ALLERGIES:   Allergen Reactions    Heparin PRURITUS, Other (See Comments) and RASH    Epinephrine Other (See Comments)     chest pain      Latex RASH        PAST MEDICAL HISTORY:    RBBB                                                          Essential (primary) hypertension                                HYSTERECTOMY - CERVIX REMOVED                   2013            Comment: [facility]    REMOVAL OF TONSILS,<11 Y/O                                    FAMILY HISTORY:  Family History   Problem Relation Age of Onset    Heart disease Mother     Cancer Father        SOCIAL HISTORY:  Social History     Socioeconomic History    Marital status: /Civil Union     Spouse name: Not on file    Number of children: 2    Years of education: Not on file    Highest education level: Not on file   Occupational History    Occupation: works   Tobacco Use    Smoking status: Never     Smokeless tobacco: Never   Vaping Use    Vaping Use: never used   Substance and Sexual Activity    Alcohol use: Never    Drug use: Never    Sexual activity: Not Currently   Other Topics Concern    Not on file   Social History Narrative    Not on file     Social Determinants of Health     Financial Resource Strain: Not on file   Food Insecurity: Not on file   Transportation Needs: Not on file   Physical Activity: Not on file   Stress: Not on file   Social Connections: Not on file   Interpersonal Safety: Low Risk  (3/27/2024)    Interpersonal Safety     How often physically hurt: Never     How often insulted or talked down to: Never     How often threatened with harm: Never     How often scream or curse at: Never          REVIEW OF SYSTEMS:  All systems reviewed and negative except as mentioned in HPI      PHYSICAL EXAM:  Vitals:  Visit Vitals  BP (!) 162/82 (BP Location: LUE - Left upper extremity, Patient Position: Sitting)   Pulse (!) 59   Temp 99 °F (37.2 °C) (Temporal)   Resp 19   Ht 5' (1.524 m)   Wt 62.1 kg (137 lb)   SpO2 97%   BMI 26.76 kg/m²      Constitutional:  General appearance without acute distress  Skin:  No jaundice on inspection.  Skin is warm and dry on palpation  Eyes:  Normal conjunctiva and lids  Pulmonary:  Clear to auscultation bilaterally good respiratory effort  Cardiovascular:  Regular rate on auscultation.  No lower extremity edema  Abdomen:  Positive bowel sound, soft, non-tender, no distention.  No hepatosplenomegaly  Extremities: No pedal edema   Neuro: No gross motor deficit, movements coordinated  Psych:  Mood and affect were appropriate.  Judgement and insight appear appropriate    LABS AND IMAGING:  Reviewed in Epic      ASSESSMENT:  1. GERD, Dyspepsia, family history of carcinoma at the GE junction, change in bowel habit      PLAN:   1.  EGD and colonoscopy  The procedure was discussed with the patient in detail including but not limited to risks, including but not limited to  bleeding, perforation, anesthesia related complications.  The patient verbalized understanding and agreed to proceed.         Marquise Mesa(Attending)

## 2024-03-28 LAB
APPEARANCE UR: CLEAR
BILIRUB UR STRIP.AUTO-MCNC: NEGATIVE MG/DL
COLOR UR: YELLOW
KETONES UR QL STRIP: (no result)
LEUKOCYTE ESTERASE UR QL STRIP.AUTO: NEGATIVE
NITRITE UR QL STRIP: NEGATIVE
PH UR: 5.5 [PH] (ref 5–8)
PROT UR QL STRIP: NEGATIVE
PROT UR QL STRIP: NEGATIVE
SP GR UR: 1.03 (ref 1.01–1.03)
UROBILINOGEN UR STRIP-MCNC: 0.2 MG/DL (ref 0.2–1)

## 2024-03-29 RX ADMIN — GUAIFENESIN PRN ML: 100 SOLUTION ORAL at 14:04

## 2024-04-01 VITALS — SYSTOLIC BLOOD PRESSURE: 123 MMHG | DIASTOLIC BLOOD PRESSURE: 70 MMHG | TEMPERATURE: 97.4 F | HEART RATE: 71 BPM

## 2024-04-02 ENCOUNTER — INPATIENT (INPATIENT)
Facility: HOSPITAL | Age: 59
LOS: 2 days | Discharge: PSYCHIATRIC OR SUBSTANCE USE FACILITY | End: 2024-04-05
Attending: STUDENT IN AN ORGANIZED HEALTH CARE EDUCATION/TRAINING PROGRAM | Admitting: STUDENT IN AN ORGANIZED HEALTH CARE EDUCATION/TRAINING PROGRAM
Payer: MEDICAID

## 2024-04-02 VITALS
SYSTOLIC BLOOD PRESSURE: 126 MMHG | OXYGEN SATURATION: 100 % | HEART RATE: 67 BPM | DIASTOLIC BLOOD PRESSURE: 73 MMHG | TEMPERATURE: 98 F | RESPIRATION RATE: 18 BRPM

## 2024-04-02 LAB
ALBUMIN SERPL ELPH-MCNC: 4.7 G/DL — SIGNIFICANT CHANGE UP (ref 3.3–5)
ALP SERPL-CCNC: 67 U/L — SIGNIFICANT CHANGE UP (ref 40–120)
ALT FLD-CCNC: 70 U/L — HIGH (ref 4–41)
AMPHET UR-MCNC: NEGATIVE — SIGNIFICANT CHANGE UP
ANION GAP SERPL CALC-SCNC: 15 MMOL/L — HIGH (ref 7–14)
APAP SERPL-MCNC: <10 UG/ML — LOW (ref 15–25)
APPEARANCE UR: CLEAR — SIGNIFICANT CHANGE UP
AST SERPL-CCNC: 71 U/L — HIGH (ref 4–40)
BARBITURATES UR SCN-MCNC: NEGATIVE — SIGNIFICANT CHANGE UP
BASOPHILS # BLD AUTO: 0.03 K/UL — SIGNIFICANT CHANGE UP (ref 0–0.2)
BASOPHILS NFR BLD AUTO: 0.4 % — SIGNIFICANT CHANGE UP (ref 0–2)
BENZODIAZ UR-MCNC: NEGATIVE — SIGNIFICANT CHANGE UP
BILIRUB SERPL-MCNC: 0.8 MG/DL — SIGNIFICANT CHANGE UP (ref 0.2–1.2)
BILIRUB UR-MCNC: NEGATIVE — SIGNIFICANT CHANGE UP
BUN SERPL-MCNC: 13 MG/DL — SIGNIFICANT CHANGE UP (ref 7–23)
CALCIUM SERPL-MCNC: 9.5 MG/DL — SIGNIFICANT CHANGE UP (ref 8.4–10.5)
CHLORIDE SERPL-SCNC: 104 MMOL/L — SIGNIFICANT CHANGE UP (ref 98–107)
CO2 SERPL-SCNC: 22 MMOL/L — SIGNIFICANT CHANGE UP (ref 22–31)
COCAINE METAB.OTHER UR-MCNC: POSITIVE
COLOR SPEC: YELLOW — SIGNIFICANT CHANGE UP
CREAT SERPL-MCNC: 1.1 MG/DL — SIGNIFICANT CHANGE UP (ref 0.5–1.3)
CREATININE URINE RESULT, DAU: 77 MG/DL — SIGNIFICANT CHANGE UP
DIFF PNL FLD: NEGATIVE — SIGNIFICANT CHANGE UP
EGFR: 78 ML/MIN/1.73M2 — SIGNIFICANT CHANGE UP
EOSINOPHIL # BLD AUTO: 0.1 K/UL — SIGNIFICANT CHANGE UP (ref 0–0.5)
EOSINOPHIL NFR BLD AUTO: 1.4 % — SIGNIFICANT CHANGE UP (ref 0–6)
ETHANOL SERPL-MCNC: 46 MG/DL — HIGH
GLUCOSE SERPL-MCNC: 68 MG/DL — LOW (ref 70–99)
GLUCOSE UR QL: NEGATIVE MG/DL — SIGNIFICANT CHANGE UP
HCT VFR BLD CALC: 37.4 % — LOW (ref 39–50)
HGB BLD-MCNC: 12.8 G/DL — LOW (ref 13–17)
IANC: 4.11 K/UL — SIGNIFICANT CHANGE UP (ref 1.8–7.4)
IMM GRANULOCYTES NFR BLD AUTO: 0.3 % — SIGNIFICANT CHANGE UP (ref 0–0.9)
KETONES UR-MCNC: NEGATIVE MG/DL — SIGNIFICANT CHANGE UP
LEUKOCYTE ESTERASE UR-ACNC: NEGATIVE — SIGNIFICANT CHANGE UP
LYMPHOCYTES # BLD AUTO: 2.18 K/UL — SIGNIFICANT CHANGE UP (ref 1–3.3)
LYMPHOCYTES # BLD AUTO: 30.2 % — SIGNIFICANT CHANGE UP (ref 13–44)
MCHC RBC-ENTMCNC: 30.5 PG — SIGNIFICANT CHANGE UP (ref 27–34)
MCHC RBC-ENTMCNC: 34.2 GM/DL — SIGNIFICANT CHANGE UP (ref 32–36)
MCV RBC AUTO: 89 FL — SIGNIFICANT CHANGE UP (ref 80–100)
METHADONE UR-MCNC: NEGATIVE — SIGNIFICANT CHANGE UP
MONOCYTES # BLD AUTO: 0.78 K/UL — SIGNIFICANT CHANGE UP (ref 0–0.9)
MONOCYTES NFR BLD AUTO: 10.8 % — SIGNIFICANT CHANGE UP (ref 2–14)
NEUTROPHILS # BLD AUTO: 4.11 K/UL — SIGNIFICANT CHANGE UP (ref 1.8–7.4)
NEUTROPHILS NFR BLD AUTO: 56.9 % — SIGNIFICANT CHANGE UP (ref 43–77)
NITRITE UR-MCNC: NEGATIVE — SIGNIFICANT CHANGE UP
NRBC # BLD: 0 /100 WBCS — SIGNIFICANT CHANGE UP (ref 0–0)
NRBC # FLD: 0 K/UL — SIGNIFICANT CHANGE UP (ref 0–0)
OPIATES UR-MCNC: NEGATIVE — SIGNIFICANT CHANGE UP
OXYCODONE UR-MCNC: NEGATIVE — SIGNIFICANT CHANGE UP
PCP SPEC-MCNC: SIGNIFICANT CHANGE UP
PCP UR-MCNC: NEGATIVE — SIGNIFICANT CHANGE UP
PH UR: 5.5 — SIGNIFICANT CHANGE UP (ref 5–8)
PLATELET # BLD AUTO: 245 K/UL — SIGNIFICANT CHANGE UP (ref 150–400)
POTASSIUM SERPL-MCNC: 3.9 MMOL/L — SIGNIFICANT CHANGE UP (ref 3.5–5.3)
POTASSIUM SERPL-SCNC: 3.9 MMOL/L — SIGNIFICANT CHANGE UP (ref 3.5–5.3)
PROT SERPL-MCNC: 8.2 G/DL — SIGNIFICANT CHANGE UP (ref 6–8.3)
PROT UR-MCNC: NEGATIVE MG/DL — SIGNIFICANT CHANGE UP
RBC # BLD: 4.2 M/UL — SIGNIFICANT CHANGE UP (ref 4.2–5.8)
RBC # FLD: 15.6 % — HIGH (ref 10.3–14.5)
SALICYLATES SERPL-MCNC: <0.3 MG/DL — LOW (ref 15–30)
SARS-COV-2 RNA SPEC QL NAA+PROBE: SIGNIFICANT CHANGE UP
SODIUM SERPL-SCNC: 141 MMOL/L — SIGNIFICANT CHANGE UP (ref 135–145)
SP GR SPEC: 1.01 — SIGNIFICANT CHANGE UP (ref 1–1.03)
THC UR QL: NEGATIVE — SIGNIFICANT CHANGE UP
TOXICOLOGY SCREEN, DRUGS OF ABUSE, SERUM RESULT: SIGNIFICANT CHANGE UP
TROPONIN T, HIGH SENSITIVITY RESULT: 10 NG/L — SIGNIFICANT CHANGE UP
TROPONIN T, HIGH SENSITIVITY RESULT: 10 NG/L — SIGNIFICANT CHANGE UP
TSH SERPL-MCNC: 1.56 UIU/ML — SIGNIFICANT CHANGE UP (ref 0.27–4.2)
UROBILINOGEN FLD QL: 0.2 MG/DL — SIGNIFICANT CHANGE UP (ref 0.2–1)
VALPROATE SERPL-MCNC: <3.15 UG/ML — LOW (ref 50–100)
WBC # BLD: 7.22 K/UL — SIGNIFICANT CHANGE UP (ref 3.8–10.5)
WBC # FLD AUTO: 7.22 K/UL — SIGNIFICANT CHANGE UP (ref 3.8–10.5)

## 2024-04-02 PROCEDURE — 74174 CTA ABD&PLVS W/CONTRAST: CPT | Mod: 26,MC

## 2024-04-02 PROCEDURE — 71045 X-RAY EXAM CHEST 1 VIEW: CPT | Mod: 26

## 2024-04-02 PROCEDURE — 99285 EMERGENCY DEPT VISIT HI MDM: CPT

## 2024-04-02 PROCEDURE — 71275 CT ANGIOGRAPHY CHEST: CPT | Mod: 26,MC

## 2024-04-02 PROCEDURE — 70450 CT HEAD/BRAIN W/O DYE: CPT | Mod: 26,MC

## 2024-04-02 RX ORDER — ACETAMINOPHEN 500 MG
650 TABLET ORAL ONCE
Refills: 0 | Status: COMPLETED | OUTPATIENT
Start: 2024-04-02 | End: 2024-04-02

## 2024-04-02 RX ORDER — ASPIRIN/CALCIUM CARB/MAGNESIUM 324 MG
324 TABLET ORAL ONCE
Refills: 0 | Status: COMPLETED | OUTPATIENT
Start: 2024-04-02 | End: 2024-04-02

## 2024-04-02 RX ADMIN — Medication 2 MILLIGRAM(S): at 21:35

## 2024-04-02 RX ADMIN — Medication 324 MILLIGRAM(S): at 20:04

## 2024-04-02 NOTE — ED PROVIDER NOTE - NSICDXPASTMEDICALHX_GEN_ALL_CORE_FT
PAST MEDICAL HISTORY:  Hypertension     Pericarditis      PAST MEDICAL HISTORY:  Alcohol abuse     Cocaine abuse     Hypertension     Pericarditis

## 2024-04-02 NOTE — ED PROVIDER NOTE - ATTENDING APP SHARED VISIT CONTRIBUTION OF CARE
I performed a history and physical exam of the patient and discussed their management with the resident/fellow/ACP/student. I have reviewed the resident/fellow/ACP/student note and agree with the documented findings and plan of care, except as noted. I have personally performed a substantive portion of the visit including all aspects of the medical decision making. My medical decision making and observations are found above. Please refer to any progress notes for updates on clinical course.    Patient originally seen independently in behavioral health unit by NP for active suicidality and also presented to Dr. Galindo. Brought to main ER under my care due to new onset of chest pain/jaw pain with new neurologic deficit for dissection rule out as per Dr. Galindo. EKGs x 2 were normal. Received aspirin prior to my evaluation.     57 y/o male with pmhx of HTN, pericarditis, alcohol abuse, bipolar disorder who initially presented for depression and suicidal ideation. Had planned to use a gun to shoot himself. Has been nonadherent with Depakote, last took 1 month ago.  Initially denied any chest pain or neurologic symptoms but recently while in behavioral health unit developed new onset of left-sided chest pain/jaw pain and weakness of extremities, worse on left side, EKGs were performed x 2 that were nonischemic.  Patient also endorses cocaine use last night.  Denies any shortness of breath, palpitations, diaphoresis. No hx of alcohol withdrawal. Denies any leg swelling/pain, recent travel/bedbound nature, hemoptysis, or history of blood clots.    Gen: WDWN, NAD, comfortable appearing, hemodynamically stable   HEENT: No nasal discharge, mucous membranes moist   CV: RRR, +S1/S2, no M/R/G, equal b/l radial pulses 2+ and equal BP in b/l UE   Resp: CTAB, no W/R/R, SPO2 >95% on RA, no increased WOB   GI: Abdomen soft non-distended, NTTP, no masses/organomegaly   MSK/Skin: No CVA tenderness, no open wounds, no bruising, no LE edema  Neuro: CN2-12 grossly intact, A&Ox4, MS +4+/5 in RUE and 3/5 in LUE and 3/5 in b/l LE, gross sensation intact in UE and LE BL  Psych: Calm and cooperative     MDM:   57 y/o male with pmhx of HTN, pericarditis, alcohol abuse, bipolar disorder who initially presented for depression and suicidal ideation, due to new onset of chest pain/jaw pain with new neurologic deficit for dissection rule out, EKGs x 2 were normal, Equal radial pulses and equal bilateral upper extremity blood pressures, diffuse weakness throughout the body but more significant in left upper extremity, hemodynamically stable, no PE risk factors. Exam/history concerning for but not limited to ACS versus cocaine induced vasospasm versus aortic dissection. CTA aorta/AP, cardiac enzymes ordered (will require at lest 2 sets given recent onset of chest pain) and will continue to reassess on telemetry and constant observation. Received aspirin prior to my evaluation

## 2024-04-02 NOTE — ED PROVIDER NOTE - OBJECTIVE STATEMENT
This is a 58 yr old M, [facility] This is a 58 yr old M, pmh htn, pericarditis, alcohol abuse, bipolar disorder with complaining of depression and suicidal ideation with a plan to go to his friend house get a gun and shoot himself. Endorsees supposed to be on depakote the last time he took it 1 month ago. Last hospitalization at [facility] couple of month ago. Reports prior to arrival he drunk couple of pint of alcohol. AT this time denies drug abuse. Denies chest pain , abd pain, n, v, dizziness. Denies dt, or icu stay for withdrawal.

## 2024-04-02 NOTE — ED ADULT TRIAGE NOTE - ARRIVAL FROM
Home

## 2024-04-02 NOTE — ED ADULT NURSE NOTE - NSFALLUNIVINTERV_ED_ALL_ED
Bed/Stretcher in lowest position, wheels locked, appropriate side rails in place/Call bell, personal items and telephone in reach/Instruct patient to call for assistance before getting out of bed/chair/stretcher/Non-slip footwear applied when patient is off stretcher/[place] to call system/Physically safe environment - no spills, clutter or unnecessary equipment/Purposeful proactive rounding/Room/bathroom lighting operational, light cord in reach

## 2024-04-02 NOTE — ED ADULT NURSE NOTE - OBJECTIVE STATEMENT
Pt brought to [facility] crisis center by mom c/o changes in behavior over last 4 days. Pt not eating, sleeping or talking. Pt calm in triage, slow to respond. Pt c/o SI with plan to shoot self with gun. Denies any psych Hx. Reports he has access to guns

## 2024-04-02 NOTE — ED PROVIDER NOTE - PROGRESS NOTE DETAILS
NP Jaxson- pt complaining of chest pain, additional labs, and imaging ordered, endorsees used a lots of cocaine yesterday, repeat ekg - wnl   pt c/o left sided jaw numbness, and pain KATRIN Puri- pt assessed by er attending as well, and moved to main er for further work up [place] Galindo MD attending physician as per the ACP to see patient in psych.  Patient was being evaluated for his suicidality and a likely admission to psych except patient started then complaining about left-sided chest pain that also included left-sided facial numbness and he felt left-sided weakness.  He does have a history of hypertension pericarditis alcohol abuse bipolar disorder.  He takes hydrochlorothiazide for his hypertension.  He is an active cocaine user last use last night    Blood pressure was in the 130s over 80s respiratory rate 15 heart rate 91 temp 98 O2 sat 97    Pt alert and can phonate well  h at/nc  perrl, conj clear, sclera anicteric,  neck supple  cor rrr pos s1s2 there is no reproducible tenderness on the chest wall  abd soft no r/g/t  ext no edema no deformities patient without tremor  neueo awake, lucid on exam he subjectively says left side of the face has decreased sensation compared to the right side.   on left and right hands is weak bilaterally difficult to evaluate amount of effort he can lift left and right leg.  psych difficult to assess whether he is motivated participate in the exam is reticent to answer questions  vs reasonable    Given the combination of neurologic complaints with chest pain.  EKGs x 2 were normal.  Concern is for aortic dissection.  Patient transferred to [facility] of the emergency department under Dr. fernando with anticipated plan of CTA of his aorta and more complete evaluation Patient TBA for ongoing chest pain. Inpatient psych CL emailed.

## 2024-04-02 NOTE — ED ADULT TRIAGE NOTE - CHIEF COMPLAINT QUOTE
Pt AOX4 c/o suicidal ideation, has a plan to shoot himself, there is a firearm in the home; also has thoughts of hurting other people (no one specific); pt has made attempt in past (tried to jump in front of a train) Pt AOX4 c/o suicidal ideation, has a plan to shoot himself, there is a firearm in the home; also has thoughts of hurting other people (no one specific); pt has made attempt in past (tried to jump in front of a train); pt hearing voices telling him to do bad things

## 2024-04-02 NOTE — ED ADULT NURSE NOTE - CHIEF COMPLAINT QUOTE
Pt AOX4 c/o suicidal ideation, has a plan to shoot himself, there is a firearm in the home; also has thoughts of hurting other people (no one specific); pt has made attempt in past (tried to jump in front of a train); pt hearing voices telling him to do bad things

## 2024-04-02 NOTE — ED ADULT NURSE REASSESSMENT NOTE - NS ED NURSE REASSESS COMMENT FT1
Report given By RN Dakota, Pt brought from  due to pt c/o of having new onset of chest pain, as well as jaw pain. Pt breathing is equal and nonlabored. Pt is normal sinus on cardiac monitor. Pt put on a 1;1 due to SI. PCA within arms reach. Pt safety maintained.

## 2024-04-02 NOTE — ED ADULT NURSE NOTE - ED STAT RN HANDOFF DETAILS
Break RN note- Patient resting quietly in bed, breathing even and nonlabored. No acute distress. Cardiac monitor in place - sinus rhythm. 1:1 at bedside. Safety maintained. Patient stable upon exiting the ED.

## 2024-04-03 DIAGNOSIS — E78.5 HYPERLIPIDEMIA, UNSPECIFIED: ICD-10-CM

## 2024-04-03 DIAGNOSIS — R45.851 SUICIDAL IDEATIONS: ICD-10-CM

## 2024-04-03 DIAGNOSIS — R07.9 CHEST PAIN, UNSPECIFIED: ICD-10-CM

## 2024-04-03 DIAGNOSIS — E16.2 HYPOGLYCEMIA, UNSPECIFIED: ICD-10-CM

## 2024-04-03 DIAGNOSIS — F10.10 ALCOHOL ABUSE, UNCOMPLICATED: ICD-10-CM

## 2024-04-03 LAB — ADD ON TEST-SPECIMEN IN LAB: SIGNIFICANT CHANGE UP

## 2024-04-03 PROCEDURE — 90792 PSYCH DIAG EVAL W/MED SRVCS: CPT

## 2024-04-03 PROCEDURE — 93010 ELECTROCARDIOGRAM REPORT: CPT

## 2024-04-03 PROCEDURE — 99223 1ST HOSP IP/OBS HIGH 75: CPT

## 2024-04-03 RX ORDER — ACETAMINOPHEN 500 MG
650 TABLET ORAL EVERY 6 HOURS
Refills: 0 | Status: DISCONTINUED | OUTPATIENT
Start: 2024-04-03 | End: 2024-04-05

## 2024-04-03 RX ORDER — OLANZAPINE 15 MG/1
2.5 TABLET, FILM COATED ORAL AT BEDTIME
Refills: 0 | Status: DISCONTINUED | OUTPATIENT
Start: 2024-04-03 | End: 2024-04-05

## 2024-04-03 RX ORDER — IBUPROFEN 200 MG
400 TABLET ORAL ONCE
Refills: 0 | Status: COMPLETED | OUTPATIENT
Start: 2024-04-03 | End: 2024-04-03

## 2024-04-03 RX ORDER — ATORVASTATIN CALCIUM 80 MG/1
40 TABLET, FILM COATED ORAL AT BEDTIME
Refills: 0 | Status: DISCONTINUED | OUTPATIENT
Start: 2024-04-03 | End: 2024-04-05

## 2024-04-03 RX ORDER — ENOXAPARIN SODIUM 100 MG/ML
40 INJECTION SUBCUTANEOUS EVERY 24 HOURS
Refills: 0 | Status: DISCONTINUED | OUTPATIENT
Start: 2024-04-03 | End: 2024-04-05

## 2024-04-03 RX ORDER — DEXTROSE 50 % IN WATER 50 %
25 SYRINGE (ML) INTRAVENOUS ONCE
Refills: 0 | Status: DISCONTINUED | OUTPATIENT
Start: 2024-04-03 | End: 2024-04-05

## 2024-04-03 RX ORDER — GLUCAGON INJECTION, SOLUTION 0.5 MG/.1ML
1 INJECTION, SOLUTION SUBCUTANEOUS ONCE
Refills: 0 | Status: DISCONTINUED | OUTPATIENT
Start: 2024-04-03 | End: 2024-04-05

## 2024-04-03 RX ORDER — DEXTROSE 50 % IN WATER 50 %
15 SYRINGE (ML) INTRAVENOUS ONCE
Refills: 0 | Status: DISCONTINUED | OUTPATIENT
Start: 2024-04-03 | End: 2024-04-05

## 2024-04-03 RX ORDER — FOLIC ACID 0.8 MG
1 TABLET ORAL DAILY
Refills: 0 | Status: DISCONTINUED | OUTPATIENT
Start: 2024-04-03 | End: 2024-04-05

## 2024-04-03 RX ORDER — DEXTROSE 50 % IN WATER 50 %
12.5 SYRINGE (ML) INTRAVENOUS ONCE
Refills: 0 | Status: DISCONTINUED | OUTPATIENT
Start: 2024-04-03 | End: 2024-04-05

## 2024-04-03 RX ORDER — KETOROLAC TROMETHAMINE 30 MG/ML
15 SYRINGE (ML) INJECTION ONCE
Refills: 0 | Status: DISCONTINUED | OUTPATIENT
Start: 2024-04-03 | End: 2024-04-03

## 2024-04-03 RX ADMIN — Medication 400 MILLIGRAM(S): at 03:52

## 2024-04-03 RX ADMIN — Medication 15 MILLIGRAM(S): at 19:26

## 2024-04-03 RX ADMIN — OLANZAPINE 2.5 MILLIGRAM(S): 15 TABLET, FILM COATED ORAL at 22:13

## 2024-04-03 RX ADMIN — Medication 15 MILLIGRAM(S): at 18:26

## 2024-04-03 RX ADMIN — ATORVASTATIN CALCIUM 40 MILLIGRAM(S): 80 TABLET, FILM COATED ORAL at 22:13

## 2024-04-03 RX ADMIN — Medication 1 MILLIGRAM(S): at 18:27

## 2024-04-03 RX ADMIN — Medication 400 MILLIGRAM(S): at 02:52

## 2024-04-03 NOTE — PROGRESS NOTE ADULT - PROBLEM SELECTOR PLAN 3
-CIWA checks, low concern for withdrawal (states he was binging)  -encourage cessation of cocaine -on CIWA, low concern for withdrawal (states he was binging over 24h period)  -encourage cessation of cocaine

## 2024-04-03 NOTE — BH CONSULTATION LIAISON ASSESSMENT NOTE - SUMMARY
57 y/o M, PPH of Bipolar d/o, EtOH use d/o, Cocaine use d/o, PMH of HTN, HLD, pericarditis, adm. for chest pain w/ workup, SI, AH, and etoh withdrawal.     Patient with features of depression vs. depressive comedown from meds vs. substance/etoh induced mood d/o. Patient admits to relapse into etoh abuse more recently, minimal withdrawal features at this time. Is help seeking feels that he may benefit from dual med-substance abuse treatment such as [facility] where he has been before. Still +SI. Had been on abilify and VPA in the past.     Recs:  - C/w 1:1 for SI  - EKG for QTc  - IF QTc < 500 OK to begin Olanzapine 2.5mg qHS  - IF QTc >500 would Begin Abilify 2.5mg QD instead  - continue CIWA with PRN ativan, no current need for standing taper  - PRN for severe agitation Ativan 2mg q6h PRN  - Cannot leave AMA, psych will follow  - Needs inpt psych vs. inpt rehab referral, psych will follow to aid in dispo determination

## 2024-04-03 NOTE — BH CONSULTATION LIAISON ASSESSMENT NOTE - NSBHCHARTREVIEWVS_PSY_A_CORE FT
Vital Signs Last 24 Hrs  T(C): 36.5 (03 Apr 2024 10:00), Max: 36.9 (03 Apr 2024 02:00)  T(F): 97.7 (03 Apr 2024 10:00), Max: 98.4 (03 Apr 2024 02:00)  HR: 88 (03 Apr 2024 10:00) (67 - 99)  BP: 112/79 (03 Apr 2024 10:00) (112/79 - 131/81)  BP(mean): --  RR: 17 (03 Apr 2024 10:00) (15 - 18)  SpO2: 97% (03 Apr 2024 10:00) (97% - 100%)    Parameters below as of 03 Apr 2024 10:00  Patient On (Oxygen Delivery Method): room air

## 2024-04-03 NOTE — BH CONSULTATION LIAISON ASSESSMENT NOTE - CURRENT MEDICATION
MEDICATIONS  (STANDING):  atorvastatin 40 milliGRAM(s) Oral at bedtime  dextrose 50% Injectable 12.5 Gram(s) IV Push once  dextrose 50% Injectable 25 Gram(s) IV Push once  dextrose 50% Injectable 25 Gram(s) IV Push once  dextrose Oral Gel 15 Gram(s) Oral once  enoxaparin Injectable 40 milliGRAM(s) SubCutaneous every 24 hours  folic acid 1 milliGRAM(s) Oral daily  glucagon  Injectable 1 milliGRAM(s) IntraMuscular once    MEDICATIONS  (PRN):  acetaminophen     Tablet .. 650 milliGRAM(s) Oral every 6 hours PRN Temp greater or equal to 38C (100.4F), Mild Pain (1 - 3)

## 2024-04-03 NOTE — H&P ADULT - NSHPPHYSICALEXAM_GEN_ALL_CORE
PHYSICAL EXAM:  GENERAL: NAD, well-developed  HEAD:  Atraumatic, Normocephalic  EYES: EOMI, PERRLA, conjunctiva and sclera clear  NECK: Supple, No JVD  CHEST/LUNG: Clear to auscultation bilaterally; No wheeze  HEART: Regular rate and rhythm; No murmurs, rubs, or gallops  ABDOMEN: Soft, TTP RUQ, Nondistended; Bowel sounds present  EXTREMITIES:  2+ Peripheral Pulses, No clubbing, cyanosis, or edema  PSYCH: AAOx3  NEUROLOGY: non-focal  SKIN: No rashes or lesions

## 2024-04-03 NOTE — PATIENT PROFILE ADULT - DO YOU EVER NEED HELP READING HOSPITAL MATERIALS?
[facility]  [address] [facility] [address]  [phone]    2017      RE:  Lida Alexandria Ohjuli  : 1982      To whom it may concern:    This patient may continue walking or standing up to 4 hours for one or two more weeks.  She is progressing well but also her work duty's are very active and demanding.     Sincerely,          Delgado Martinez DPM                    
no

## 2024-04-03 NOTE — PROGRESS NOTE ADULT - SUBJECTIVE AND OBJECTIVE BOX
Giselle Mcfarland MD  PGY 2 Department of Internal Medicine        Patient is a 58y old  Male who presents with a chief complaint of SI (2024 02:10)      SUBJECTIVE / OVERNIGHT EVENTS: Pt seen and examined. No acute overnight events. Denies fevers, chills, CP, SOB, Abdominal pain, N/V, Constipation, Diarrhea        MEDICATIONS  (STANDING):  atorvastatin 40 milliGRAM(s) Oral at bedtime  dextrose 50% Injectable 25 Gram(s) IV Push once  dextrose 50% Injectable 12.5 Gram(s) IV Push once  dextrose 50% Injectable 25 Gram(s) IV Push once  dextrose Oral Gel 15 Gram(s) Oral once  enoxaparin Injectable 40 milliGRAM(s) SubCutaneous every 24 hours  folic acid 1 milliGRAM(s) Oral daily  glucagon  Injectable 1 milliGRAM(s) IntraMuscular once    MEDICATIONS  (PRN):  acetaminophen     Tablet .. 650 milliGRAM(s) Oral every 6 hours PRN Temp greater or equal to 38C (100.4F), Mild Pain (1 - 3)      I&O's Summary      Vital Signs Last 24 Hrs  T(C): 36.9 (2024 02:00), Max: 36.9 (2024 02:00)  T(F): 98.4 (2024 02:00), Max: 98.4 (2024 02:00)  HR: 99 (2024 02:00) (67 - 99)  BP: 130/95 (2024 02:00) (126/73 - 131/81)  BP(mean): --  RR: 16 (2024 02:00) (15 - 18)  SpO2: 98% (2024 02:00) (97% - 100%)    Parameters below as of 2024 02:00  Patient On (Oxygen Delivery Method): room air        CAPILLARY BLOOD GLUCOSE      POCT Blood Glucose.: 74 mg/dL (2024 18:48)      PHYSICAL EXAM:  GENERAL: NAD, lying in bed comfortably  HEAD:  Atraumatic, normocephalic  EYES: EOMI, PERRLA, conjunctiva clear, no conjunctival pallor, anicteric sclera  NECK: Supple, trachea midline, no JVD  HEART: Regular rate and rhythm, Normal S1 S2, no murmurs, rubs, or gallops  LUNGS: Unlabored respirations. Clear to ascultation bilaterally, no crackles, wheezing, or rhonchi  ABDOMEN: Soft, nondistended, nontender, no rebound or guarding, bowel sounds presents  EXTREMITIES: Warm extremities, no clubbing, cyanosis, or edema, peripheral pulses 2+ bilaterally  MUSCULOSKELETAL: No joint swelling or tenderness to palpation  NEURO: CN 2-12 grossly intact, moves all limbs spontaneously  SKIN: No rashes or lesions         LABS:                        12.8   7.22  )-----------( 245      ( 2024 19:00 )             37.4     Auto Eosinophil # 0.10  / Auto Eosinophil % 1.4   / Auto Neutrophil # 4.11  / Auto Neutrophil % 56.9  / BANDS % x        04-02    141  |  104  |  13  ----------------------------<  68<L>  3.9   |  22  |  1.10    Ca    9.5      2024 19:00  TPro  8.2  /  Alb  4.7  /  TBili  0.8  /  DBili  x   /  AST  71<H>  /  ALT  70<H>  /  AlkPhos  67  04-02          Urinalysis Basic - ( 2024 21:17 )    Color: Yellow / Appearance: Clear / S.011 / pH: x  Gluc: x / Ketone: Negative mg/dL  / Bili: Negative / Urobili: 0.2 mg/dL   Blood: x / Protein: Negative mg/dL / Nitrite: Negative   Leuk Esterase: Negative / RBC: x / WBC x   Sq Epi: x / Non Sq Epi: x / Bacteria: x            RADIOLOGY & ADDITIONAL TESTS:    Imaging Personally Reviewed:    Consultant(s) Notes Reviewed:      Care Discussed with Consultants/Other Providers:   Giselle Mcfarland MD  PGY 2 Department of Internal Medicine        Patient is a 58y old  Male who presents with a chief complaint of SI (2024 02:10)      SUBJECTIVE / OVERNIGHT EVENTS: Pt seen and examined. No acute overnight events. Notes 3/10 chest pain, exertional w/ dyspnea. Reports hx of congestive heart failure (EF 40%) for which he started a medication he doesn't recall the name of. Reports concern that he is being abandoned by his daughter. Denies fevers, chills, Abdominal pain, N/V, Constipation, Diarrhea        MEDICATIONS  (STANDING):  atorvastatin 40 milliGRAM(s) Oral at bedtime  dextrose 50% Injectable 25 Gram(s) IV Push once  dextrose 50% Injectable 12.5 Gram(s) IV Push once  dextrose 50% Injectable 25 Gram(s) IV Push once  dextrose Oral Gel 15 Gram(s) Oral once  enoxaparin Injectable 40 milliGRAM(s) SubCutaneous every 24 hours  folic acid 1 milliGRAM(s) Oral daily  glucagon  Injectable 1 milliGRAM(s) IntraMuscular once    MEDICATIONS  (PRN):  acetaminophen     Tablet .. 650 milliGRAM(s) Oral every 6 hours PRN Temp greater or equal to 38C (100.4F), Mild Pain (1 - 3)      I&O's Summary      Vital Signs Last 24 Hrs  T(C): 36.9 (2024 02:00), Max: 36.9 (2024 02:00)  T(F): 98.4 (2024 02:00), Max: 98.4 (2024 02:00)  HR: 99 (2024 02:00) (67 - 99)  BP: 130/95 (2024 02:00) (126/73 - 131/81)  BP(mean): --  RR: 16 (2024 02:00) (15 - 18)  SpO2: 98% (2024 02:00) (97% - 100%)    Parameters below as of 2024 02:00  Patient On (Oxygen Delivery Method): room air        CAPILLARY BLOOD GLUCOSE      POCT Blood Glucose.: 74 mg/dL (2024 18:48)      PHYSICAL EXAM:  GENERAL: NAD, lying in bed comfortably  HEAD:  Atraumatic, normocephalic  EYES: EOMI, PERRLA, conjunctiva clear, no conjunctival pallor, anicteric sclera  NECK: Supple, trachea midline, no JVD  HEART: Regular rate and rhythm, Normal S1 S2, no murmurs, rubs, or gallops  LUNGS: Unlabored respirations. Distant breath sounds bilaterally, no crackles, wheezing, or rhonchi  ABDOMEN: Soft, nondistended, nontender, no rebound or guarding, bowel sounds presents  EXTREMITIES: Warm extremities, no clubbing, cyanosis, or edema, peripheral pulses 2+ bilaterally  MUSCULOSKELETAL: No joint swelling or tenderness to palpation  NEURO: CN 2-12 grossly intact, moves all limbs spontaneously  SKIN: No rashes or lesions         LABS:                        12.8   7.22  )-----------( 245      ( 2024 19:00 )             37.4     Auto Eosinophil # 0.10  / Auto Eosinophil % 1.4   / Auto Neutrophil # 4.11  / Auto Neutrophil % 56.9  / BANDS % x        04-02    141  |  104  |  13  ----------------------------<  68<L>  3.9   |  22  |  1.10    Ca    9.5      2024 19:00  TPro  8.2  /  Alb  4.7  /  TBili  0.8  /  DBili  x   /  AST  71<H>  /  ALT  70<H>  /  AlkPhos  67  04-02          Urinalysis Basic - ( 2024 21:17 )    Color: Yellow / Appearance: Clear / S.011 / pH: x  Gluc: x / Ketone: Negative mg/dL  / Bili: Negative / Urobili: 0.2 mg/dL   Blood: x / Protein: Negative mg/dL / Nitrite: Negative   Leuk Esterase: Negative / RBC: x / WBC x   Sq Epi: x / Non Sq Epi: x / Bacteria: x            RADIOLOGY & ADDITIONAL TESTS:    Imaging Personally Reviewed: Yes    Consultant(s) Notes Reviewed:  Yes    Care Discussed with Consultants/Other Providers: Yes

## 2024-04-03 NOTE — H&P ADULT - HISTORY OF PRESENT ILLNESS
59 y/o M HTN, HLD, pericarditis, etoh abuse, BP disorder, cocaine use here w/ depression and SI. Plan is to go to friend's house to get a gun and shoot himself. He has not been taking his depakote or other medications, states he is undomiciled. Denies hallucinations or homicidal tendencies. Constant observation placed in ED. ROS + for mild abdominal tenderness. Had mild transaminitis. CT chest PE and a/p negative for PE, dissection, masses, or cholecystitis. Constant observation placed.

## 2024-04-03 NOTE — BH CONSULTATION LIAISON ASSESSMENT NOTE - MSE UNSTRUCTURED FT
Mental Status Exam:  Margaret: well groomed, fair hygiene     Behavior: calm, cooperative, no psychomotor retardation/agitation  Motor: no tremors, EPS, or rigidity  Gait: did not assess, pt in bed  Speech: normal rate, rhythm, prosedy and volume   Mood: "terrible "  Affect: depressed, congruent  Thought process: clear, goal directed   Thought Content: denies paranoia, delusions   Perception: +AH, +TH, no VH  SI: denies  HI: denies  Insight: poor  Judgment: poor    Cognitive Exam:  Orientation: AOx3

## 2024-04-03 NOTE — PROGRESS NOTE ADULT - PROBLEM SELECTOR PLAN 1
-constant observation order placed  -consult psychiatry in am  -bipolar, not taking meds Hx of bipolar disorder, recent 24h binge w/ cocaine and EtOH.   -constant observation order placed  -Psych consult placed, will f/u recs

## 2024-04-03 NOTE — PROGRESS NOTE ADULT - ATTENDING COMMENTS
58 year old male with a history of HTN, HLD, EtOH misuse, polysubstance use presenting with depression and SI; developed chest pain in ER.    pt seen and examined at bedside. pt still with depressed mood, states he needs to call and speak with his daughter. pt also upset that he did not get his lunch as requested, states he eats only Halal food and double portions. EKG not done yet as pt refused this morning- now agreeable to having it done.    #Major depression with SI  -behavioral health consulted  -c/w 1:1  -will obtain EKG and based on QTc, either start Zyprexa or Abilify  -pt cannot leave AMA    #Polysubstance use  -SBIRT eval  -avoid BB in setting of cocaine use  -had chest pain in ED- troponin 10 x2    d/w HS 5

## 2024-04-03 NOTE — H&P ADULT - NSHPLABSRESULTS_GEN_ALL_CORE
.  LABS:                         12.8   7.22  )-----------( 245      ( 2024 19:00 )             37.4     04    141  |  104  |  13  ----------------------------<  68<L>  3.9   |  22  |  1.10    Ca    9.5      2024 19:00    TPro  8.2  /  Alb  4.7  /  TBili  0.8  /  DBili  x   /  AST  71<H>  /  ALT  70<H>  /  AlkPhos  67  04-      Urinalysis Basic - ( 2024 21:17 )    Color: Yellow / Appearance: Clear / S.011 / pH: x  Gluc: x / Ketone: Negative mg/dL  / Bili: Negative / Urobili: 0.2 mg/dL   Blood: x / Protein: Negative mg/dL / Nitrite: Negative   Leuk Esterase: Negative / RBC: x / WBC x   Sq Epi: x / Non Sq Epi: x / Bacteria: x        < from: CT Angio Chest Aorta w/wo IV Cont (24 @ 22:47) >    IMPRESSION:    No evidence of aortic dissection.  No focal pulmonary opacity or pleural effusion.    No acute findings in the abdomen or pelvis.    < end of copied text >    < from: CT Angio Abdomen and Pelvis w/ IV Cont (24 @ 22:46) >    IMPRESSION:    No evidence of aortic dissection.  No focal pulmonary opacity or pleural effusion.    No acute findings in the abdomen or pelvis.    < end of copied text >    < from: CT Head No Cont (24 @ 23:15) >    IMPRESSION:    Mildly degraded by motion artifact.  No convincing intracranial hemorrhage, mass effect or large acute   cortical infarct. If clinical concern persists, repeat imaging may be   obtained.    < end of copied text >

## 2024-04-03 NOTE — PROGRESS NOTE ADULT - ASSESSMENT
57 y/o M HTN, HLD, pericarditis, etoh abuse, BP disorder, cocaine use here w/ depression and SI.  57 y/o M HTN, HLD, pericarditis, etoh abuse, BP disorder, cocaine use here w/ depression and SI - concomitant complaint of chest pain w/ workup for coronary and pulmonary etiologies negative.

## 2024-04-03 NOTE — BH CONSULTATION LIAISON ASSESSMENT NOTE - HPI (INCLUDE ILLNESS QUALITY, SEVERITY, DURATION, TIMING, CONTEXT, MODIFYING FACTORS, ASSOCIATED SIGNS AND SYMPTOMS)
59 y/o M, PPH of Bipolar d/o, EtOH use d/o, Cocaine use d/o, PMH of HTN, HLD, pericarditis, adm. for chest pain w/ workup, SI, AH, and etoh withdrawal.     Patient calm on exam and Aox3. Endorses some nausea though eating, +headache, no tremors or tonguq fasciculations, +formication felt "ants on skin" and some AH without command component. Not hyperverbal tangential euphoric or irritable on exam. Does endorse SI without plan currently but had plan previously to jump into traffic or obtain firearm from friend to shoot himself. Sleep has been very poor. Was in [place] rehab and discharged recently but then immediately relapsed. Wishes to rehab vs. inpt psych referral after withdrawal mgmt.

## 2024-04-03 NOTE — H&P ADULT - PROBLEM SELECTOR PLAN 4
-can do lipitor, was not taking any meds at home  -has no indication for need of aspirin juan, check lipid profile in AM (no prior stents)

## 2024-04-03 NOTE — PATIENT PROFILE ADULT - FALL HARM RISK - HARM RISK INTERVENTIONS

## 2024-04-03 NOTE — PATIENT PROFILE ADULT - NSPROPTRIGHTSUPPORTPERSON_GEN_A_NUR
"Admission Medication History     The home medication history was taken by Steph Carmichael CPhT.    Medication history obtained from,Patient verified    You may go to "Admission" then "Reconcile Home Medications" tabs to review and/or act upon these items.      The home medication list has been updated by the Pharmacy department.    Please read ALL comments highlighted in yellow.    Please address this information as you see fit.     Feel free to contact us if you have any questions or require assistance.      The medications listed below were removed from the home medication list.  Please reorder if appropriate:  Patient reports no longer taking the following medication(s):   diovan 160mg            Steph Carmichael CPhT.  Ext [phone]                  .          " same name as above

## 2024-04-04 LAB
GLUCOSE BLDC GLUCOMTR-MCNC: 104 MG/DL — HIGH (ref 70–99)
GLUCOSE BLDC GLUCOMTR-MCNC: 127 MG/DL — HIGH (ref 70–99)
SARS-COV-2 RNA SPEC QL NAA+PROBE: SIGNIFICANT CHANGE UP

## 2024-04-04 PROCEDURE — 99231 SBSQ HOSP IP/OBS SF/LOW 25: CPT

## 2024-04-04 PROCEDURE — 99232 SBSQ HOSP IP/OBS MODERATE 35: CPT | Mod: GC

## 2024-04-04 RX ORDER — LANOLIN ALCOHOL/MO/W.PET/CERES
3 CREAM (GRAM) TOPICAL AT BEDTIME
Refills: 0 | Status: DISCONTINUED | OUTPATIENT
Start: 2024-04-04 | End: 2024-04-05

## 2024-04-04 RX ORDER — DIPHENHYDRAMINE HCL 50 MG
25 CAPSULE ORAL ONCE
Refills: 0 | Status: COMPLETED | OUTPATIENT
Start: 2024-04-04 | End: 2024-04-04

## 2024-04-04 RX ADMIN — Medication 25 MILLIGRAM(S): at 23:11

## 2024-04-04 RX ADMIN — Medication 1 MILLIGRAM(S): at 12:43

## 2024-04-04 RX ADMIN — ATORVASTATIN CALCIUM 40 MILLIGRAM(S): 80 TABLET, FILM COATED ORAL at 23:11

## 2024-04-04 RX ADMIN — Medication 1 TABLET(S): at 14:15

## 2024-04-04 RX ADMIN — Medication 650 MILLIGRAM(S): at 15:00

## 2024-04-04 RX ADMIN — Medication 650 MILLIGRAM(S): at 14:13

## 2024-04-04 NOTE — BH CONSULTATION LIAISON PROGRESS NOTE - NSBHCHARTREVIEWLAB_PSY_A_CORE FT
12.8   7.22  )-----------( 245      ( 02 Apr 2024 19:00 )             37.4   04-02    141  |  104  |  13  ----------------------------<  68<L>  3.9   |  22  |  1.10    Ca    9.5      02 Apr 2024 19:00    TPro  8.2  /  Alb  4.7  /  TBili  0.8  /  DBili  x   /  AST  71<H>  /  ALT  70<H>  /  AlkPhos  67  04-02

## 2024-04-04 NOTE — PROGRESS NOTE ADULT - ASSESSMENT
57 y/o M HTN, HLD, pericarditis, etoh abuse, BP disorder, cocaine use here w/ depression and SI - concomitant complaint of chest pain w/ workup for coronary and pulmonary etiologies negative. Being followed by psychiatry and considered for inpatient admission.

## 2024-04-04 NOTE — PROGRESS NOTE ADULT - ATTENDING COMMENTS
58 year old male with a history of HTN, HLD, EtOH misuse, polysubstance use presenting with depression and SI; developed chest pain in ER.    pt seen and examined at bedside. pt still feels depressed. states that Zyprexa is making him feel groggy. wants to go to [facility] rehab.    #Major depression with SI  -behavioral health following, appreciate recs  -c/w 1:1  -started Zyprexa 2.5mg PO qHS  -pt cannot leave AMA    #Polysubstance use  -SBIRT eval  -avoid BB in setting of cocaine use    medically stable for d/c    d/w HS 5

## 2024-04-04 NOTE — PROGRESS NOTE ADULT - SUBJECTIVE AND OBJECTIVE BOX
Giselle Mcfarland MD  PGY 2 Department of Internal Medicine        Patient is a 58y old  Male who presents with a chief complaint of SI (2024 07:54)      SUBJECTIVE / OVERNIGHT EVENTS: Pt seen and examined. No acute overnight events. Denies fevers, chills, CP, SOB, Abdominal pain, N/V, Constipation, Diarrhea        MEDICATIONS  (STANDING):  atorvastatin 40 milliGRAM(s) Oral at bedtime  dextrose 50% Injectable 12.5 Gram(s) IV Push once  dextrose 50% Injectable 25 Gram(s) IV Push once  dextrose 50% Injectable 25 Gram(s) IV Push once  dextrose Oral Gel 15 Gram(s) Oral once  enoxaparin Injectable 40 milliGRAM(s) SubCutaneous every 24 hours  folic acid 1 milliGRAM(s) Oral daily  glucagon  Injectable 1 milliGRAM(s) IntraMuscular once  OLANZapine 2.5 milliGRAM(s) Oral at bedtime    MEDICATIONS  (PRN):  acetaminophen     Tablet .. 650 milliGRAM(s) Oral every 6 hours PRN Temp greater or equal to 38C (100.4F), Mild Pain (1 - 3)  LORazepam   Injectable 2 milliGRAM(s) IV Push every 6 hours PRN Agitation      I&O's Summary      Vital Signs Last 24 Hrs  T(C): 36.9 (2024 06:00), Max: 37.1 (2024 18:33)  T(F): 98.4 (2024 06:00), Max: 98.7 (2024 18:33)  HR: 60 (2024 06:00) (60 - 88)  BP: 102/75 (2024 06:00) (102/75 - 128/86)  BP(mean): --  RR: 17 (2024 06:00) (17 - 18)  SpO2: 98% (2024 06:00) (95% - 98%)    Parameters below as of 2024 06:00  Patient On (Oxygen Delivery Method): room air        CAPILLARY BLOOD GLUCOSE      POCT Blood Glucose.: 104 mg/dL (2024 06:04)  POCT Blood Glucose.: 127 mg/dL (2024 23:59)      PHYSICAL EXAM:  GENERAL: NAD, lying in bed comfortably  HEAD:  Atraumatic, normocephalic  EYES: EOMI, PERRLA, conjunctiva clear, no conjunctival pallor, anicteric sclera  NECK: Supple, trachea midline, no JVD  HEART: Regular rate and rhythm, Normal S1 S2, no murmurs, rubs, or gallops  LUNGS: Unlabored respirations. Clear to ascultation bilaterally, no crackles, wheezing, or rhonchi  ABDOMEN: Soft, nondistended, nontender, no rebound or guarding, bowel sounds presents  EXTREMITIES: Warm extremities, no clubbing, cyanosis, or edema, peripheral pulses 2+ bilaterally  MUSCULOSKELETAL: No joint swelling or tenderness to palpation  NEURO: CN 2-12 grossly intact, moves all limbs spontaneously  SKIN: No rashes or lesions         LABS:                        12.8   7.22  )-----------( 245      ( 2024 19:00 )             37.4     Auto Eosinophil # 0.10  / Auto Eosinophil % 1.4   / Auto Neutrophil # 4.11  / Auto Neutrophil % 56.9  / BANDS % x        04-02    141  |  104  |  13  ----------------------------<  68<L>  3.9   |  22  |  1.10    Ca    9.5      2024 19:00  TPro  8.2  /  Alb  4.7  /  TBili  0.8  /  DBili  x   /  AST  71<H>  /  ALT  70<H>  /  AlkPhos  67  04-02          Urinalysis Basic - ( 2024 21:17 )    Color: Yellow / Appearance: Clear / S.011 / pH: x  Gluc: x / Ketone: Negative mg/dL  / Bili: Negative / Urobili: 0.2 mg/dL   Blood: x / Protein: Negative mg/dL / Nitrite: Negative   Leuk Esterase: Negative / RBC: x / WBC x   Sq Epi: x / Non Sq Epi: x / Bacteria: x            RADIOLOGY & ADDITIONAL TESTS:    Imaging Personally Reviewed:    Consultant(s) Notes Reviewed:      Care Discussed with Consultants/Other Providers:

## 2024-04-04 NOTE — PROGRESS NOTE ADULT - PROBLEM SELECTOR PLAN 1
Hx of bipolar disorder, recent 24h binge w/ cocaine and EtOH.   -constant observation order placed  -Psych consult placed, will f/u recs

## 2024-04-04 NOTE — PROGRESS NOTE ADULT - PROBLEM SELECTOR PLAN 3
-on CIWA, low concern for withdrawal (states he was binging over 24h period)  -encourage cessation of cocaine

## 2024-04-04 NOTE — BH CONSULTATION LIAISON PROGRESS NOTE - MSE UNSTRUCTURED FT
Mental Status Exam:  Margaret: well groomed, fair hygiene     Behavior: calm, cooperative, no psychomotor retardation/agitation  Motor: no tremors, EPS, or rigidity  Gait: did not assess, pt in bed  Speech: normal rate, rhythm, prosedy and volume   Mood: low  Affect: depressed, congruent  Thought process: clear, goal directed   Thought Content: denies paranoia, delusions   Perception: denies at time of interview   SI: denies  HI: denies  Insight: poor  Judgment: poor    Cognitive Exam:  Orientation: AOx3

## 2024-04-04 NOTE — BH CONSULTATION LIAISON PROGRESS NOTE - NSBHTIMEACTIVITIESPERFORMED_PSY_A_CORE
25 minutes spent on total encounter. The necessity of the time spent during the encounter on this date of service was due to:     I had a face to face encounter with this patient. I spent 25total minutes on the bedside interview and examination, coordination of care, counseling, chart review, and documentation for this patient.

## 2024-04-04 NOTE — BH CONSULTATION LIAISON PROGRESS NOTE - NSBHASSESSMENTFT_PSY_ALL_CORE
59 y/o M, PPH of Bipolar d/o, EtOH use d/o, Cocaine use d/o, PMH of HTN, HLD, pericarditis, adm. for chest pain w/ workup, SI, AH, and etoh withdrawal.     Patient with features of depression vs. depressive comedown from meds vs. substance/etoh induced mood d/o. Patient admits to relapse into etoh abuse more recently, minimal withdrawal features at this time. Is help seeking feels that he may benefit from dual med-substance abuse treatment such as [facility] where he has been before. Still +SI. Had been on abilify and VPA in the past.     4/4: Continues to feel depressed, lethargic. reporting SI.     Recs:  - C/w 1:1 for SI  - EKG for QTc  - IF QTc < 500 OK to begin Olanzapine 2.5mg qHS  - continue CIWA with PRN ativan, no current need for standing taper  - PRN for severe agitation Ativan 2mg q6h PRN  - Cannot leave AMA, psych will follow  - Needs inpt psych vs. inpt rehab referral, psych will follow to aid in dispo determination

## 2024-04-05 VITALS
RESPIRATION RATE: 16 BRPM | TEMPERATURE: 98 F | HEART RATE: 62 BPM | OXYGEN SATURATION: 98 % | DIASTOLIC BLOOD PRESSURE: 80 MMHG | SYSTOLIC BLOOD PRESSURE: 129 MMHG

## 2024-04-05 DIAGNOSIS — Z29.9 ENCOUNTER FOR PROPHYLACTIC MEASURES, UNSPECIFIED: ICD-10-CM

## 2024-04-05 PROCEDURE — 99239 HOSP IP/OBS DSCHRG MGMT >30: CPT | Mod: GC

## 2024-04-05 PROCEDURE — 99232 SBSQ HOSP IP/OBS MODERATE 35: CPT

## 2024-04-05 RX ORDER — OLANZAPINE 15 MG/1
1 TABLET, FILM COATED ORAL
Qty: 0 | Refills: 0 | DISCHARGE
Start: 2024-04-05

## 2024-04-05 RX ORDER — ATORVASTATIN CALCIUM 80 MG/1
1 TABLET, FILM COATED ORAL
Qty: 0 | Refills: 0 | DISCHARGE
Start: 2024-04-05

## 2024-04-05 RX ORDER — FOLIC ACID 0.8 MG
1 TABLET ORAL
Qty: 0 | Refills: 0 | DISCHARGE
Start: 2024-04-05

## 2024-04-05 RX ADMIN — Medication 1 MILLIGRAM(S): at 11:36

## 2024-04-05 RX ADMIN — Medication 1 TABLET(S): at 11:36

## 2024-04-05 NOTE — DISCHARGE NOTE NURSING/CASE MANAGEMENT/SOCIAL WORK - PATIENT PORTAL LINK FT
You can access the FollowMyHealth Patient Portal offered by [facility] by registering at the following website: [email or web address]. By joining [employer]’s FollowMyHealth portal, you will also be able to view your health information using other applications (apps) compatible with our system.

## 2024-04-05 NOTE — PROGRESS NOTE ADULT - PROBLEM SELECTOR PLAN 1
Hx of bipolar disorder, recent 24h binge w/ cocaine and EtOH.   -constant observation  -Psych consult placed, recs appreciated -> zyprexa 2.5 nightly, ativan 2mg q6h PRN for agitation

## 2024-04-05 NOTE — BH CONSULTATION LIAISON PROGRESS NOTE - CURRENT MEDICATION
MEDICATIONS  (STANDING):  atorvastatin 40 milliGRAM(s) Oral at bedtime  dextrose 50% Injectable 12.5 Gram(s) IV Push once  dextrose 50% Injectable 25 Gram(s) IV Push once  dextrose 50% Injectable 25 Gram(s) IV Push once  dextrose Oral Gel 15 Gram(s) Oral once  enoxaparin Injectable 40 milliGRAM(s) SubCutaneous every 24 hours  folic acid 1 milliGRAM(s) Oral daily  glucagon  Injectable 1 milliGRAM(s) IntraMuscular once  OLANZapine 2.5 milliGRAM(s) Oral at bedtime    MEDICATIONS  (PRN):  acetaminophen     Tablet .. 650 milliGRAM(s) Oral every 6 hours PRN Temp greater or equal to 38C (100.4F), Mild Pain (1 - 3)  LORazepam   Injectable 2 milliGRAM(s) IV Push every 6 hours PRN Agitation  
MEDICATIONS  (STANDING):  atorvastatin 40 milliGRAM(s) Oral at bedtime  dextrose 50% Injectable 12.5 Gram(s) IV Push once  dextrose 50% Injectable 25 Gram(s) IV Push once  dextrose 50% Injectable 25 Gram(s) IV Push once  dextrose Oral Gel 15 Gram(s) Oral once  enoxaparin Injectable 40 milliGRAM(s) SubCutaneous every 24 hours  folic acid 1 milliGRAM(s) Oral daily  glucagon  Injectable 1 milliGRAM(s) IntraMuscular once  melatonin 3 milliGRAM(s) Oral at bedtime  multivitamin 1 Tablet(s) Oral daily  OLANZapine 2.5 milliGRAM(s) Oral at bedtime    MEDICATIONS  (PRN):  acetaminophen     Tablet .. 650 milliGRAM(s) Oral every 6 hours PRN Temp greater or equal to 38C (100.4F), Mild Pain (1 - 3)  LORazepam   Injectable 2 milliGRAM(s) IV Push every 6 hours PRN Agitation

## 2024-04-05 NOTE — DISCHARGE NOTE PROVIDER - NSDCCPTREATMENT_GEN_ALL_CORE_FT
PRINCIPAL PROCEDURE  Procedure: CTA chest w/w/o contrast  Findings and Treatment: FINDINGS:  There is no aortic dissection, aneurysm, intramural hematoma or   para-aortic hemorrhage.  There is inadequate pulmonary arterial   opacification to comment on pulmonary embolism.  The great vessels are   not dilated.  The visualized neck, axilla and subcutaneous tissues are unremarkable.  The tracheobronchial tree is patent proximally.  There is no significant   mediastinal or hilar adenopathy.  The heart is not enlarged.  There is no pericardial effusion.  Lungs: Dependent atelectatic changes at the lung bases.  There is no pleural abnormality.  There is no free air, fluid or focal collection.  The liver, spleen, pancreas, adrenal glands and kidneys are unremarkable.  Gallbladder: Unremarkable.  There is no small bowel obstruction. The appendix is unremarkable. The   colon is underdistended. No convincing focal bowel wall thickening or   diverticulitis.  Pelvic organs: The prostate gland is not enlarged.  The urinary bladder is unremarkable.  The aorta is not dilated.  There are atherosclerotic vascular   calcifications.  There is no significant adenopathy.  There is no retroperitoneal mass.  The visualized osseous structures are unremarkable.  Small fat-containing umbilical hernia.  IMPRESSION:  No evidence of aortic dissection.  No focal pulmonary opacity or pleural effusion.  No acute findings in the abdomen or pelvis.

## 2024-04-05 NOTE — BH CONSULTATION LIAISON PROGRESS NOTE - NSBHCONSULTPRIMARYDISCUSSYES_PSY_A_CORE FT
discussed w team - dr reinaldo Chiang, , pending bed at [facility]
discussed w team - dr najera  Aware to call keanu [facility] when medically cleared

## 2024-04-05 NOTE — PROVIDER CONTACT NOTE (OTHER) - ACTION/TREATMENT ORDERED:
MD made aware. no new orders
Provider made aware. No new orders at this time. Provider stated he will talk to patient upon rounds.  Ongoing monitor and care.

## 2024-04-05 NOTE — PROVIDER CONTACT NOTE (OTHER) - SITUATION
Patient refused vital signs. states he's not doing anything until he gets a shower
Patient refusing Fingerstick, Telemetry monitoring, AM labs, and Vital signs

## 2024-04-05 NOTE — BH CONSULTATION LIAISON PROGRESS NOTE - NSBHASSESSMENTFT_PSY_ALL_CORE
59 y/o M, PPH of Bipolar d/o, EtOH use d/o, Cocaine use d/o, PMH of HTN, HLD, pericarditis, adm. for chest pain w/ workup, SI, AH, and etoh withdrawal.     Patient with features of depression vs. depressive comedown from meds vs. substance/etoh induced mood d/o. Patient admits to relapse into etoh abuse more recently, minimal withdrawal features at this time. Is help seeking feels that he may benefit from dual med-substance abuse treatment such as [facility] where he has been before. Still +SI. Had been on abilify and VPA in the past.     4/4: Continues to feel depressed, lethargic. reporting SI.     Recs:  - C/w 1:1 for SI  - EKG for QTc  - IF QTc < 500 c/w Olanzapine 2.5mg qHS  - continue CIWA with PRN ativan, no current need for standing taper  - PRN for severe agitation Ativan 2mg q6h PRN  - Cannot leave AMA, psych will follow  - Dispo: inpatient admission, voluntary legals signed, pending bed at [facility]   59 y/o M, PPH of Bipolar d/o, EtOH use d/o, Cocaine use d/o, PMH of HTN, HLD, pericarditis, adm. for chest pain w/ workup, SI, AH, and etoh withdrawal.     Patient with features of depression vs. depressive comedown from meds vs. substance/etoh induced mood d/o. Patient admits to relapse into etoh abuse more recently, minimal withdrawal features at this time. Is help seeking feels that he may benefit from dual med-substance abuse treatment such as [facility] where he has been before. Still +SI. Had been on abilify and VPA in the past.     4/4: Continues to feel depressed, lethargic. reporting SI.   4/5: a&o, irritable, endorsing passive si with no plan or intent, denies ah/vh, signed voluntaries-received bed at [facility], handoff given to Dr. Urrutia    Recs:  - C/w 1:1 for SI  - EKG for QTc  - IF QTc < 500 c/w Olanzapine 2.5mg qHS  - continue CIWA with PRN ativan, no current need for standing taper  - PRN for severe agitation Ativan 2mg q6h PRN  - Cannot leave AMA, psych will follow  - Dispo: inpatient admission, voluntary legals signed, received bed at [facility]

## 2024-04-05 NOTE — BH CONSULTATION LIAISON PROGRESS NOTE - NSBHCHARTREVIEWVS_PSY_A_CORE FT
Vital Signs Last 24 Hrs  T(C): 36.9 (04 Apr 2024 06:00), Max: 37.1 (03 Apr 2024 18:33)  T(F): 98.4 (04 Apr 2024 06:00), Max: 98.7 (03 Apr 2024 18:33)  HR: 60 (04 Apr 2024 06:00) (60 - 72)  BP: 102/75 (04 Apr 2024 06:00) (102/75 - 128/86)  BP(mean): --  RR: 17 (04 Apr 2024 06:00) (17 - 18)  SpO2: 98% (04 Apr 2024 06:00) (95% - 98%)    Parameters below as of 04 Apr 2024 06:00  Patient On (Oxygen Delivery Method): room air    
Vital Signs Last 24 Hrs  T(C): 36.8 (05 Apr 2024 06:00), Max: 36.8 (05 Apr 2024 06:00)  T(F): 98.2 (05 Apr 2024 06:00), Max: 98.2 (05 Apr 2024 06:00)  HR: 62 (05 Apr 2024 06:00) (60 - 72)  BP: 129/80 (05 Apr 2024 06:00) (100/69 - 140/90)  BP(mean): --  RR: 16 (05 Apr 2024 06:00) (16 - 18)  SpO2: 98% (05 Apr 2024 06:00) (95% - 98%)    Parameters below as of 05 Apr 2024 02:00  Patient On (Oxygen Delivery Method): room air

## 2024-04-05 NOTE — PROGRESS NOTE ADULT - SUBJECTIVE AND OBJECTIVE BOX
Giselle Mcfarland MD  PGY 2 Department of Internal Medicine        Patient is a 58y old  Male who presents with a chief complaint of SI (04 Apr 2024 07:02)      SUBJECTIVE / OVERNIGHT EVENTS: Pt seen and examined. No acute overnight events. Denies fevers, chills, CP, SOB, Abdominal pain, N/V, Constipation, Diarrhea        MEDICATIONS  (STANDING):  atorvastatin 40 milliGRAM(s) Oral at bedtime  dextrose 50% Injectable 12.5 Gram(s) IV Push once  dextrose 50% Injectable 25 Gram(s) IV Push once  dextrose 50% Injectable 25 Gram(s) IV Push once  dextrose Oral Gel 15 Gram(s) Oral once  enoxaparin Injectable 40 milliGRAM(s) SubCutaneous every 24 hours  folic acid 1 milliGRAM(s) Oral daily  glucagon  Injectable 1 milliGRAM(s) IntraMuscular once  melatonin 3 milliGRAM(s) Oral at bedtime  multivitamin 1 Tablet(s) Oral daily  OLANZapine 2.5 milliGRAM(s) Oral at bedtime    MEDICATIONS  (PRN):  acetaminophen     Tablet .. 650 milliGRAM(s) Oral every 6 hours PRN Temp greater or equal to 38C (100.4F), Mild Pain (1 - 3)  LORazepam   Injectable 2 milliGRAM(s) IV Push every 6 hours PRN Agitation      I&O's Summary      Vital Signs Last 24 Hrs  T(C): 36.6 (05 Apr 2024 02:00), Max: 37.1 (04 Apr 2024 10:00)  T(F): 97.9 (05 Apr 2024 02:00), Max: 98.8 (04 Apr 2024 10:00)  HR: 60 (05 Apr 2024 02:00) (60 - 77)  BP: 100/69 (05 Apr 2024 02:00) (100/69 - 140/90)  BP(mean): --  RR: 17 (05 Apr 2024 02:00) (17 - 18)  SpO2: 95% (05 Apr 2024 02:00) (95% - 99%)    Parameters below as of 05 Apr 2024 02:00  Patient On (Oxygen Delivery Method): room air        CAPILLARY BLOOD GLUCOSE          PHYSICAL EXAM:  GENERAL: NAD, lying in bed comfortably  HEAD:  Atraumatic, normocephalic  EYES: EOMI, PERRLA, conjunctiva clear, no conjunctival pallor, anicteric sclera  NECK: Supple, trachea midline, no JVD  HEART: Regular rate and rhythm, Normal S1 S2, no murmurs, rubs, or gallops  LUNGS: Unlabored respirations. Clear to ascultation bilaterally, no crackles, wheezing, or rhonchi  ABDOMEN: Soft, nondistended, nontender, no rebound or guarding, bowel sounds presents  EXTREMITIES: Warm extremities, no clubbing, cyanosis, or edema, peripheral pulses 2+ bilaterally  MUSCULOSKELETAL: No joint swelling or tenderness to palpation  NEURO: CN 2-12 grossly intact, moves all limbs spontaneously  SKIN: No rashes or lesions         LABS:                      RADIOLOGY & ADDITIONAL TESTS:    Imaging Personally Reviewed:    Consultant(s) Notes Reviewed:      Care Discussed with Consultants/Other Providers:   Giselle Mcfarland MD  PGY 2 Department of Internal Medicine        Patient is a 58y old  Male who presents with a chief complaint of SI (04 Apr 2024 07:02)      SUBJECTIVE / OVERNIGHT EVENTS: Pt seen and examined. No acute overnight events. Requesting shower. Denies fevers, chills, CP, SOB, Abdominal pain, N/V, Constipation, Diarrhea        MEDICATIONS  (STANDING):  atorvastatin 40 milliGRAM(s) Oral at bedtime  dextrose 50% Injectable 12.5 Gram(s) IV Push once  dextrose 50% Injectable 25 Gram(s) IV Push once  dextrose 50% Injectable 25 Gram(s) IV Push once  dextrose Oral Gel 15 Gram(s) Oral once  enoxaparin Injectable 40 milliGRAM(s) SubCutaneous every 24 hours  folic acid 1 milliGRAM(s) Oral daily  glucagon  Injectable 1 milliGRAM(s) IntraMuscular once  melatonin 3 milliGRAM(s) Oral at bedtime  multivitamin 1 Tablet(s) Oral daily  OLANZapine 2.5 milliGRAM(s) Oral at bedtime    MEDICATIONS  (PRN):  acetaminophen     Tablet .. 650 milliGRAM(s) Oral every 6 hours PRN Temp greater or equal to 38C (100.4F), Mild Pain (1 - 3)  LORazepam   Injectable 2 milliGRAM(s) IV Push every 6 hours PRN Agitation      I&O's Summary      Vital Signs Last 24 Hrs  T(C): 36.6 (05 Apr 2024 02:00), Max: 37.1 (04 Apr 2024 10:00)  T(F): 97.9 (05 Apr 2024 02:00), Max: 98.8 (04 Apr 2024 10:00)  HR: 60 (05 Apr 2024 02:00) (60 - 77)  BP: 100/69 (05 Apr 2024 02:00) (100/69 - 140/90)  BP(mean): --  RR: 17 (05 Apr 2024 02:00) (17 - 18)  SpO2: 95% (05 Apr 2024 02:00) (95% - 99%)    Parameters below as of 05 Apr 2024 02:00  Patient On (Oxygen Delivery Method): room air        CAPILLARY BLOOD GLUCOSE          PHYSICAL EXAM:  GENERAL: NAD, lying in bed comfortably  HEAD:  Atraumatic, normocephalic  EYES: EOMI, PERRLA, conjunctiva clear, no conjunctival pallor, anicteric sclera  NECK: Supple, trachea midline, no JVD  HEART: Regular rate and rhythm, Normal S1 S2, no murmurs, rubs, or gallops  LUNGS: Unlabored respirations. Clear to ascultation bilaterally, no crackles, wheezing, or rhonchi  ABDOMEN: Soft, nondistended, nontender, no rebound or guarding, bowel sounds presents  EXTREMITIES: Warm extremities, no clubbing, cyanosis, or edema, peripheral pulses 2+ bilaterally  MUSCULOSKELETAL: No joint swelling or tenderness to palpation  NEURO: CN 2-12 grossly intact, moves all limbs spontaneously  SKIN: No rashes or lesions

## 2024-04-05 NOTE — PROGRESS NOTE ADULT - ATTENDING COMMENTS
58 year old male with a history of HTN, HLD, EtOH misuse, polysubstance use presenting with depression and SI; developed chest pain in ER.    pt seen and examined at bedside. pt asking if he can go to [facility] instead of [facility].    #Major depression with SI  -behavioral health following, appreciate recs  -c/w 1:1  -started Zyprexa 2.5mg PO qHS  -pt cannot leave AMA    #Polysubstance use  -SBIRT eval  -avoid BB in setting of cocaine use    medically stable for d/c to inpatient psych vs inpatient rehab    d/w HS 5 58 year old male with a history of HTN, HLD, EtOH misuse, polysubstance use presenting with depression and SI; developed chest pain in ER.    pt seen and examined at bedside. pt asking if he can go to [facility] instead of [facility].    #Major depression with SI  -behavioral health following, appreciate recs  -c/w 1:1  -started Zyprexa 2.5mg PO qHS  -pt cannot leave AMA    #Polysubstance use  -SBIRT eval  -avoid BB in setting of cocaine use    medically stable for d/c to [facility] rehab today    d/w HS 5

## 2024-04-05 NOTE — PROVIDER CONTACT NOTE (OTHER) - ASSESSMENT
Migraines are not well-controlled and patient is getting 1 to 2 per day.  We will trial propranolol extended release 60 mg daily.  This is for migraine prophylaxis as well as her tachycardia associated with her POTS.  We will therefore stop her metoprolol succinate 25 mg daily.  She may continue using rizatriptan 10 mg as needed for abortive therapy for her migraines.  Follow-up in 1 month if not improving.  
Patient appears in no acute distress
Pt A&Ox4, VSS, afebrile. Pt denies any chest pain or sob and displays no s/s of distress. Constant observation maintained. CIWA orders in place. Safety maintained. Pt resting comfortably in bed.

## 2024-04-05 NOTE — BH CONSULTATION LIAISON PROGRESS NOTE - NSBHFUPINTERVALHXFT_PSY_A_CORE
Patient was seen and assessed at bedside. patient is alert, oriented. States he feels physically unwell, tired and lethargic as well as mentally feeling depressed with SI. Expresses desire for ongoing treatment due to his active thoughts of suicide ( no plan in the hospital).  Patient denies AH and Vh at this time. Ciwa score most recently 0.
Chart reviewed, no prn's given, patient seen, a&o, endorsing desire to go to [facility] and rehab s/p discharge from [facility], currently endorsing passive suicidal ideation with no plan or intent, denies auditory/visual hallucinations, endorses feeling safe and being treated well in hospital.    Patient pending bed assignment at [facility].

## 2024-04-05 NOTE — PROGRESS NOTE ADULT - PROBLEM SELECTOR PLAN 2
-POC and hypoglycemia check  -CT scans negative for acute findings

## 2024-04-05 NOTE — BH CONSULTATION LIAISON PROGRESS NOTE - NSBHMSEKNOW_PSY_A_CORE
Monitor: The problem is stable.  Evaluation: No labs/tests required today.  Assessment/Treatment:  Continue current treatment/monitoring regimen.   Normal

## 2024-04-05 NOTE — DISCHARGE NOTE PROVIDER - NSDCCPCAREPLAN_GEN_ALL_CORE_FT
PRINCIPAL DISCHARGE DIAGNOSIS  Diagnosis: Suicidal ideation  Assessment and Plan of Treatment: You had plans to hurt yourself and we are concerned for your safety. We are admitting you to [facility] for evaluation and monitoring.   If you ever have plans to hurt yourself or others in the future, please seek medical attention.      SECONDARY DISCHARGE DIAGNOSES  Diagnosis: Chest pain  Assessment and Plan of Treatment: We evaluated your complaint of chest pain and your EKGs have all been normal, cardiac enzymes (a marker for heart damage) have been normal, and CT scan of your chest showed no clots or inflammation that we are concerned about. It's possible that a component of distress is driving your symptoms considering we have not found another source or explanation.   If you develop chest pain w/ sweating, radiation of the pain to the arm/neck, or other concerning symptoms, please seek medical attention.

## 2024-04-05 NOTE — BH CONSULTATION LIAISON PROGRESS NOTE - NSBHCONSULTSUBSTANCEALCOHOL_PSY_A_CORE FT
----- Message from Priscila Morejon MD sent at 9/3/2021 11:48 AM CDT -----  Can you please let patient know that blood work was normal. Protein level is improved from previous check. Electrolyte, kidney, liver function, and blood counts look good.   Thanks,Mary Beth  
symptom triggered ciwa
symptom triggered ciwa

## 2024-04-05 NOTE — DISCHARGE NOTE PROVIDER - HOSPITAL COURSE
HPI:  59 y/o M HTN, HLD, pericarditis, etoh abuse, BP disorder, cocaine use here w/ depression and SI. Plan is to go to friend's house to get a gun and shoot himself. He has not been taking his depakote or other medications, states he is undomiciled. Denies hallucinations or homicidal tendencies. Constant observation placed in ED. ROS + for mild abdominal tenderness. Had mild transaminitis. CT chest PE and a/p negative for PE, dissection, masses, or cholecystitis. Constant observation placed.    (03 Apr 2024 02:10)    Hospital Course:  58y M admitted for suicidal ideation w/ plan and chest pain w/ workup (CTA chest, EKG, troponin, CXR) negative for organic cause - presumed i/s/o distress w/ regard to social situation w/ daughter and feelings of "abandonment". Pt followed by  for suicidal ideation who recommended inpatient psychiatric admission and evaluation. Pt remained on 1:1 during admission     Pt stable and medically cleared for transfer to inpatient psychiatric facility.     Important Medication Changes and Reason:  1. Initiation of zyprexa 2.5mg qHS, ativan 2mg q6h PRN for agitation    Active or Pending Issues Requiring Follow-up:  1. Inpatient psychiatric admission    Advanced Directives:   [X] Full code  [ ] DNR  [ ] Hospice    Discharge Diagnoses:  1. Suicidal ideation

## 2024-04-05 NOTE — DISCHARGE NOTE PROVIDER - NSDCMRMEDTOKEN_GEN_ALL_CORE_FT
atorvastatin 40 mg oral tablet: 1 tab(s) orally once a day (at bedtime)  folic acid 1 mg oral tablet: 1 tab(s) orally once a day  LORazepam 1 mg/mL-NaCl 0.9% intravenous solution: 2 milliliter(s) intravenous every 6 hours As needed Agitation  Multiple Vitamins oral tablet: 1 tab(s) orally once a day  OLANZapine 2.5 mg oral tablet: 1 tab(s) orally once a day (at bedtime)

## 2024-04-05 NOTE — DISCHARGE NOTE NURSING/CASE MANAGEMENT/SOCIAL WORK - NSFLUVACAGEDISCH_IMM_ALL_CORE
"   09/22/21 1022   Child Life   Location Sedation   Intervention Supportive Check In;Family Support   Preparation Comment Patient and family very familiar with CFL services. Per RN, patient chose to have her port accessed versus getting an IV for her port removal procedure. Upon entering the room, patient was very pleasant, in high spirits. Buzzy was offered to patient, as he is a consistent part of the coping plan, patient denied use of buzzy stating \"I have EMLA (actually LMX) cream on, so I think I'm good without buzzy\". CFL intern left buzzy beside patient just in case she changed her mind. Patient understood she would wake up with an IV, acknowledged this was a big step forward in her journey. When asked if patient wanted CFL intern present during access, patient stated \"I normally don't talk or do anything, they just do it\" with a smile on her face. Intern conmended patient for making big choices and for this milestone.   Family Support Comment Mom and dad both present, in very high spirits, laughing. Both seemed very excited about this port removal, nodding their heads when intern said \"this is such a big step in your journey\". Mom and dad both appreciative of intern checking in.   Anxiety Low Anxiety  (Patient was well versed in medical setting, able to make choices for port access, positive affect throughout interaction)   Anxieties, Fears or Concerns none   Techniques to [place] with Loss/Stress/Change family presence;favorite toy/object/blanket  (Stuffed otter named Amanda)   Outcomes/Follow Up Continue to Follow/Support     " Adult

## 2024-04-05 NOTE — PROGRESS NOTE ADULT - ASSESSMENT
57 y/o M HTN, HLD, pericarditis, etoh abuse, BP disorder, cocaine use here w/ depression and SI - concomitant complaint of chest pain w/ workup for coronary and pulmonary etiologies negative. Being followed by psychiatry and awaiting inpatient admission to [facility].

## 2024-04-05 NOTE — DISCHARGE NOTE PROVIDER - NSFOLLOWUPCLINICS_GEN_ALL_ED_FT
[facility]  Internal Medicine  [address] [place] [address]  Phone: [phone]  Fax: [phone]

## 2024-05-03 ENCOUNTER — HOSPITAL ENCOUNTER (INPATIENT)
Dept: HOSPITAL 74 - YASAS | Age: 59
LOS: 4 days | Discharge: OTHER INSTITUTION | End: 2024-05-07
Attending: SURGERY | Admitting: ALLERGY & IMMUNOLOGY
Payer: COMMERCIAL

## 2024-05-03 VITALS — BODY MASS INDEX: 37.3 KG/M2

## 2024-05-03 DIAGNOSIS — R74.01: ICD-10-CM

## 2024-05-03 DIAGNOSIS — F19.282: ICD-10-CM

## 2024-05-03 DIAGNOSIS — K21.9: ICD-10-CM

## 2024-05-03 DIAGNOSIS — M54.50: ICD-10-CM

## 2024-05-03 DIAGNOSIS — F31.9: ICD-10-CM

## 2024-05-03 DIAGNOSIS — F41.9: ICD-10-CM

## 2024-05-03 DIAGNOSIS — F14.20: ICD-10-CM

## 2024-05-03 DIAGNOSIS — F10.230: Primary | ICD-10-CM

## 2024-05-03 DIAGNOSIS — I10: ICD-10-CM

## 2024-05-03 DIAGNOSIS — F19.24: ICD-10-CM

## 2024-05-03 DIAGNOSIS — E78.5: ICD-10-CM

## 2024-05-03 DIAGNOSIS — G89.29: ICD-10-CM

## 2024-05-03 PROCEDURE — HZ2ZZZZ DETOXIFICATION SERVICES FOR SUBSTANCE ABUSE TREATMENT: ICD-10-PCS | Performed by: SURGERY

## 2024-05-03 RX ADMIN — Medication SCH: at 22:43

## 2024-05-03 RX ADMIN — IBUPROFEN PRN MG: 600 TABLET, FILM COATED ORAL at 23:30

## 2024-05-04 LAB
ALBUMIN SERPL-MCNC: 3.5 G/DL (ref 3.4–5)
ALP SERPL-CCNC: 90 U/L (ref 45–117)
ALT SERPL-CCNC: 75 U/L (ref 13–61)
ANION GAP SERPL CALC-SCNC: 1 MMOL/L (ref 4–13)
AST SERPL-CCNC: 55 U/L (ref 15–37)
BILIRUB SERPL-MCNC: 0.6 MG/DL (ref 0.2–1)
BUN SERPL-MCNC: 14.4 MG/DL (ref 7–18)
CALCIUM SERPL-MCNC: 9 MG/DL (ref 8.5–10.1)
CHLORIDE SERPL-SCNC: 109 MMOL/L (ref 98–107)
CO2 SERPL-SCNC: 29 MMOL/L (ref 21–32)
CREAT SERPL-MCNC: 1 MG/DL (ref 0.55–1.3)
DEPRECATED RDW RBC AUTO: 15.6 % (ref 11.9–15.9)
GLUCOSE SERPL-MCNC: 97 MG/DL (ref 74–106)
HCT VFR BLD CALC: 37.3 % (ref 35.4–49)
HGB BLD-MCNC: 12.4 GM/DL (ref 11.7–16.9)
MCH RBC QN AUTO: 30.6 PG (ref 25.7–33.7)
MCHC RBC AUTO-ENTMCNC: 33.4 G/DL (ref 32–35.9)
MCV RBC: 91.6 FL (ref 80–96)
PLATELET # BLD AUTO: 238 10^3/UL (ref 134–434)
PMV BLD: 8.5 FL (ref 7.5–11.1)
POTASSIUM SERPLBLD-SCNC: 4.6 MMOL/L (ref 3.5–5.1)
PROT SERPL-MCNC: 7.1 G/DL (ref 6.4–8.2)
RBC # BLD AUTO: 4.07 M/MM3 (ref 4–5.6)
SODIUM SERPL-SCNC: 138 MMOL/L (ref 136–145)
WBC # BLD AUTO: 4.9 K/MM3 (ref 4–10)

## 2024-05-04 RX ADMIN — Medication SCH TAB: at 09:15

## 2024-05-04 RX ADMIN — GUAIFENESIN PRN ML: 100 SOLUTION ORAL at 18:51

## 2024-05-04 RX ADMIN — DIPHENHYDRAMINE HCL PRN MG: 25 CAPSULE ORAL at 21:27

## 2024-05-04 RX ADMIN — FOLIC ACID SCH MG: 1 TABLET ORAL at 09:15

## 2024-05-07 ENCOUNTER — HOSPITAL ENCOUNTER (INPATIENT)
Dept: HOSPITAL 74 - YASAS | Age: 59
LOS: 9 days | Discharge: HOME | DRG: 772 | End: 2024-05-16
Attending: PSYCHIATRY & NEUROLOGY | Admitting: ALLERGY & IMMUNOLOGY
Payer: COMMERCIAL

## 2024-05-07 VITALS — RESPIRATION RATE: 19 BRPM | TEMPERATURE: 97.3 F

## 2024-05-07 VITALS — DIASTOLIC BLOOD PRESSURE: 71 MMHG | SYSTOLIC BLOOD PRESSURE: 116 MMHG | HEART RATE: 74 BPM

## 2024-05-07 DIAGNOSIS — Z56.0: ICD-10-CM

## 2024-05-07 DIAGNOSIS — J06.9: ICD-10-CM

## 2024-05-07 DIAGNOSIS — I10: ICD-10-CM

## 2024-05-07 DIAGNOSIS — F19.282: ICD-10-CM

## 2024-05-07 DIAGNOSIS — F14.20: ICD-10-CM

## 2024-05-07 DIAGNOSIS — G89.29: ICD-10-CM

## 2024-05-07 DIAGNOSIS — F19.24: ICD-10-CM

## 2024-05-07 DIAGNOSIS — M54.50: ICD-10-CM

## 2024-05-07 DIAGNOSIS — F10.20: Primary | ICD-10-CM

## 2024-05-07 DIAGNOSIS — E72.20: ICD-10-CM

## 2024-05-07 DIAGNOSIS — F32.A: ICD-10-CM

## 2024-05-07 DIAGNOSIS — E78.5: ICD-10-CM

## 2024-05-07 PROCEDURE — HZ42ZZZ GROUP COUNSELING FOR SUBSTANCE ABUSE TREATMENT, COGNITIVE-BEHAVIORAL: ICD-10-PCS | Performed by: PSYCHIATRY & NEUROLOGY

## 2024-05-07 RX ADMIN — IBUPROFEN PRN MG: 600 TABLET, FILM COATED ORAL at 20:39

## 2024-05-07 RX ADMIN — Medication SCH MG: at 22:08

## 2024-05-07 RX ADMIN — DIPHENHYDRAMINE HCL ONE MG: 25 CAPSULE ORAL at 22:09

## 2024-05-07 RX ADMIN — Medication SCH: at 22:09

## 2024-05-07 SDOH — ECONOMIC STABILITY - INCOME SECURITY: UNEMPLOYMENT, UNSPECIFIED: Z56.0

## 2024-05-08 RX ADMIN — AMLODIPINE BESYLATE SCH: 2.5 TABLET ORAL at 09:57

## 2024-05-08 RX ADMIN — HYDROCHLOROTHIAZIDE SCH: 12.5 CAPSULE ORAL at 09:56

## 2024-05-08 RX ADMIN — FOLIC ACID SCH MG: 1 TABLET ORAL at 09:51

## 2024-05-08 RX ADMIN — Medication SCH TAB: at 09:51

## 2024-05-09 LAB
INR BLD: 1.06 (ref 0.83–1.09)
PT PNL PPP: 12 SEC (ref 9.7–13)

## 2024-05-10 RX ADMIN — Medication PRN APPLIC: at 10:17

## 2024-05-10 RX ADMIN — IBUPROFEN PRN MG: 400 TABLET, FILM COATED ORAL at 09:27

## 2024-05-10 RX ADMIN — CHOLECALCIFEROL TAB 10 MCG (400 UNIT) SCH UNIT: 10 TAB at 09:27

## 2024-05-11 RX ADMIN — ALUMINUM HYDROXIDE, MAGNESIUM HYDROXIDE, AND SIMETHICONE PRN ML: 200; 200; 20 SUSPENSION ORAL at 14:01

## 2024-05-14 RX ADMIN — GUAIFENESIN PRN ML: 100 SOLUTION ORAL at 11:10

## 2024-05-16 VITALS — DIASTOLIC BLOOD PRESSURE: 72 MMHG | HEART RATE: 89 BPM | SYSTOLIC BLOOD PRESSURE: 112 MMHG

## 2024-05-16 VITALS — RESPIRATION RATE: 18 BRPM | TEMPERATURE: 98.1 F

## 2024-05-21 ENCOUNTER — EMERGENCY (EMERGENCY)
Facility: HOSPITAL | Age: 59
LOS: 1 days | Discharge: OTHER INSTITUTION | End: 2024-05-21
Attending: EMERGENCY MEDICINE | Admitting: EMERGENCY MEDICINE
Payer: MEDICAID

## 2024-05-21 ENCOUNTER — INPATIENT (INPATIENT)
Facility: HOSPITAL | Age: 59
LOS: 9 days | Discharge: HOME | End: 2024-05-31
Attending: PSYCHIATRY & NEUROLOGY | Admitting: PSYCHIATRY & NEUROLOGY
Payer: MEDICAID

## 2024-05-21 VITALS
RESPIRATION RATE: 18 BRPM | TEMPERATURE: 98 F | DIASTOLIC BLOOD PRESSURE: 64 MMHG | OXYGEN SATURATION: 95 % | SYSTOLIC BLOOD PRESSURE: 129 MMHG | HEART RATE: 108 BPM

## 2024-05-21 VITALS
DIASTOLIC BLOOD PRESSURE: 87 MMHG | OXYGEN SATURATION: 99 % | HEIGHT: 74 IN | SYSTOLIC BLOOD PRESSURE: 133 MMHG | TEMPERATURE: 99 F | RESPIRATION RATE: 18 BRPM | HEART RATE: 83 BPM

## 2024-05-21 DIAGNOSIS — F31.30 BIPOLAR DISORDER, CURRENT EPISODE DEPRESSED, MILD OR MODERATE SEVERITY, UNSPECIFIED: ICD-10-CM

## 2024-05-21 LAB
ALBUMIN SERPL ELPH-MCNC: 4 G/DL — SIGNIFICANT CHANGE UP (ref 3.3–5)
ALP SERPL-CCNC: 64 U/L — SIGNIFICANT CHANGE UP (ref 40–120)
ALT FLD-CCNC: 82 U/L — HIGH (ref 4–41)
AMPHET UR-MCNC: NEGATIVE — SIGNIFICANT CHANGE UP
ANION GAP SERPL CALC-SCNC: 16 MMOL/L — HIGH (ref 7–14)
APAP SERPL-MCNC: <10 UG/ML — LOW (ref 15–25)
APPEARANCE UR: CLEAR — SIGNIFICANT CHANGE UP
AST SERPL-CCNC: 80 U/L — HIGH (ref 4–40)
BARBITURATES UR SCN-MCNC: NEGATIVE — SIGNIFICANT CHANGE UP
BASOPHILS # BLD AUTO: 0.05 K/UL — SIGNIFICANT CHANGE UP (ref 0–0.2)
BASOPHILS NFR BLD AUTO: 0.7 % — SIGNIFICANT CHANGE UP (ref 0–2)
BENZODIAZ UR-MCNC: NEGATIVE — SIGNIFICANT CHANGE UP
BILIRUB SERPL-MCNC: 0.5 MG/DL — SIGNIFICANT CHANGE UP (ref 0.2–1.2)
BILIRUB UR-MCNC: NEGATIVE — SIGNIFICANT CHANGE UP
BUN SERPL-MCNC: 17 MG/DL — SIGNIFICANT CHANGE UP (ref 7–23)
CALCIUM SERPL-MCNC: 8.9 MG/DL — SIGNIFICANT CHANGE UP (ref 8.4–10.5)
CHLORIDE SERPL-SCNC: 102 MMOL/L — SIGNIFICANT CHANGE UP (ref 98–107)
CO2 SERPL-SCNC: 19 MMOL/L — LOW (ref 22–31)
COCAINE METAB.OTHER UR-MCNC: POSITIVE
COLOR SPEC: YELLOW — SIGNIFICANT CHANGE UP
CREAT SERPL-MCNC: 1.17 MG/DL — SIGNIFICANT CHANGE UP (ref 0.5–1.3)
CREATININE URINE RESULT, DAU: 194 MG/DL — SIGNIFICANT CHANGE UP
DIFF PNL FLD: NEGATIVE — SIGNIFICANT CHANGE UP
EGFR: 73 ML/MIN/1.73M2 — SIGNIFICANT CHANGE UP
EOSINOPHIL # BLD AUTO: 0.08 K/UL — SIGNIFICANT CHANGE UP (ref 0–0.5)
EOSINOPHIL NFR BLD AUTO: 1.2 % — SIGNIFICANT CHANGE UP (ref 0–6)
ETHANOL SERPL-MCNC: 51 MG/DL — HIGH
FENTANYL UR QL SCN: NEGATIVE — SIGNIFICANT CHANGE UP
GLUCOSE SERPL-MCNC: 100 MG/DL — HIGH (ref 70–99)
GLUCOSE UR QL: NEGATIVE MG/DL — SIGNIFICANT CHANGE UP
HCT VFR BLD CALC: 35.3 % — LOW (ref 39–50)
HGB BLD-MCNC: 12.3 G/DL — LOW (ref 13–17)
IANC: 3.67 K/UL — SIGNIFICANT CHANGE UP (ref 1.8–7.4)
IMM GRANULOCYTES NFR BLD AUTO: 0.6 % — SIGNIFICANT CHANGE UP (ref 0–0.9)
KETONES UR-MCNC: NEGATIVE MG/DL — SIGNIFICANT CHANGE UP
LEUKOCYTE ESTERASE UR-ACNC: NEGATIVE — SIGNIFICANT CHANGE UP
LYMPHOCYTES # BLD AUTO: 2.33 K/UL — SIGNIFICANT CHANGE UP (ref 1–3.3)
LYMPHOCYTES # BLD AUTO: 34 % — SIGNIFICANT CHANGE UP (ref 13–44)
MCHC RBC-ENTMCNC: 30.6 PG — SIGNIFICANT CHANGE UP (ref 27–34)
MCHC RBC-ENTMCNC: 34.8 GM/DL — SIGNIFICANT CHANGE UP (ref 32–36)
MCV RBC AUTO: 87.8 FL — SIGNIFICANT CHANGE UP (ref 80–100)
METHADONE UR-MCNC: NEGATIVE — SIGNIFICANT CHANGE UP
MONOCYTES # BLD AUTO: 0.68 K/UL — SIGNIFICANT CHANGE UP (ref 0–0.9)
MONOCYTES NFR BLD AUTO: 9.9 % — SIGNIFICANT CHANGE UP (ref 2–14)
NEUTROPHILS # BLD AUTO: 3.67 K/UL — SIGNIFICANT CHANGE UP (ref 1.8–7.4)
NEUTROPHILS NFR BLD AUTO: 53.6 % — SIGNIFICANT CHANGE UP (ref 43–77)
NITRITE UR-MCNC: NEGATIVE — SIGNIFICANT CHANGE UP
NRBC # BLD: 0 /100 WBCS — SIGNIFICANT CHANGE UP (ref 0–0)
NRBC # FLD: 0 K/UL — SIGNIFICANT CHANGE UP (ref 0–0)
OPIATES UR-MCNC: NEGATIVE — SIGNIFICANT CHANGE UP
OXYCODONE UR-MCNC: NEGATIVE — SIGNIFICANT CHANGE UP
PCP SPEC-MCNC: SIGNIFICANT CHANGE UP
PCP UR-MCNC: NEGATIVE — SIGNIFICANT CHANGE UP
PH UR: 5.5 — SIGNIFICANT CHANGE UP (ref 5–8)
PLATELET # BLD AUTO: 257 K/UL — SIGNIFICANT CHANGE UP (ref 150–400)
POTASSIUM SERPL-MCNC: 4.1 MMOL/L — SIGNIFICANT CHANGE UP (ref 3.5–5.3)
POTASSIUM SERPL-SCNC: 4.1 MMOL/L — SIGNIFICANT CHANGE UP (ref 3.5–5.3)
PROT SERPL-MCNC: 7.5 G/DL — SIGNIFICANT CHANGE UP (ref 6–8.3)
PROT UR-MCNC: NEGATIVE MG/DL — SIGNIFICANT CHANGE UP
RBC # BLD: 4.02 M/UL — LOW (ref 4.2–5.8)
RBC # FLD: 15.2 % — HIGH (ref 10.3–14.5)
SALICYLATES SERPL-MCNC: <0.3 MG/DL — LOW (ref 15–30)
SARS-COV-2 RNA SPEC QL NAA+PROBE: SIGNIFICANT CHANGE UP
SODIUM SERPL-SCNC: 137 MMOL/L — SIGNIFICANT CHANGE UP (ref 135–145)
SP GR SPEC: 1.02 — SIGNIFICANT CHANGE UP (ref 1–1.03)
THC UR QL: NEGATIVE — SIGNIFICANT CHANGE UP
TOXICOLOGY SCREEN, DRUGS OF ABUSE, SERUM RESULT: SIGNIFICANT CHANGE UP
TSH SERPL-MCNC: 1.43 UIU/ML — SIGNIFICANT CHANGE UP (ref 0.27–4.2)
UROBILINOGEN FLD QL: 0.2 MG/DL — SIGNIFICANT CHANGE UP (ref 0.2–1)
WBC # BLD: 6.85 K/UL — SIGNIFICANT CHANGE UP (ref 3.8–10.5)
WBC # FLD AUTO: 6.85 K/UL — SIGNIFICANT CHANGE UP (ref 3.8–10.5)

## 2024-05-21 PROCEDURE — 99285 EMERGENCY DEPT VISIT HI MDM: CPT

## 2024-05-21 PROCEDURE — 70450 CT HEAD/BRAIN W/O DYE: CPT | Mod: 26,MC

## 2024-05-21 PROCEDURE — 72125 CT NECK SPINE W/O DYE: CPT | Mod: 26,MC

## 2024-05-21 PROCEDURE — 93010 ELECTROCARDIOGRAM REPORT: CPT

## 2024-05-21 RX ORDER — ATORVASTATIN CALCIUM 80 MG/1
40 TABLET, FILM COATED ORAL AT BEDTIME
Refills: 0 | Status: DISCONTINUED | OUTPATIENT
Start: 2024-05-21 | End: 2024-05-31

## 2024-05-21 RX ORDER — HYDROXYZINE HCL 10 MG
50 TABLET ORAL EVERY 6 HOURS
Refills: 0 | Status: DISCONTINUED | OUTPATIENT
Start: 2024-05-21 | End: 2024-05-31

## 2024-05-21 RX ORDER — IBUPROFEN 200 MG
400 TABLET ORAL ONCE
Refills: 0 | Status: COMPLETED | OUTPATIENT
Start: 2024-05-21 | End: 2024-05-21

## 2024-05-21 RX ORDER — ACETAMINOPHEN 500 MG
975 TABLET ORAL ONCE
Refills: 0 | Status: COMPLETED | OUTPATIENT
Start: 2024-05-21 | End: 2024-05-21

## 2024-05-21 RX ORDER — ASPIRIN/CALCIUM CARB/MAGNESIUM 324 MG
81 TABLET ORAL DAILY
Refills: 0 | Status: DISCONTINUED | OUTPATIENT
Start: 2024-05-21 | End: 2024-05-31

## 2024-05-21 RX ADMIN — Medication 400 MILLIGRAM(S): at 20:07

## 2024-05-21 RX ADMIN — Medication 975 MILLIGRAM(S): at 21:32

## 2024-05-21 RX ADMIN — Medication 400 MILLIGRAM(S): at 19:11

## 2024-05-21 RX ADMIN — Medication 50 MILLIGRAM(S): at 17:23

## 2024-05-21 NOTE — ED BEHAVIORAL HEALTH ASSESSMENT NOTE - DESCRIPTION
HTN, pericarditis, self reports hx of CHF (claims EF around ? 45%), CKD, GERD. per psyckes, past meds to include: HCTZ 25mg, lipitor 40mg, ASA 81mg, entresto 24-26mg, folic acid 1mg, thiamine 100mg single, unemployed, stays at a hotel, [employer] [place] and in the streets. has a 15 yr old daughter (living in [place], [place] - currently in custody of bio mother, speaks on the phone regularly). no reported access to guns in behavioral control  Vital Signs Last 24 Hrs  T(C): 36.8 (21 May 2024 13:29), Max: 36.8 (21 May 2024 13:29)  T(F): 98.2 (21 May 2024 13:29), Max: 98.2 (21 May 2024 13:29)  HR: 108 (21 May 2024 13:29) (108 - 108)  BP: 129/64 (21 May 2024 13:29) (129/64 - 129/64)  BP(mean): --  RR: 18 (21 May 2024 13:29) (18 - 18)  SpO2: 95% (21 May 2024 13:29) (95% - 95%)    Parameters below as of 21 May 2024 13:29  Patient On (Oxygen Delivery Method): room air

## 2024-05-21 NOTE — ED BEHAVIORAL HEALTH ASSESSMENT NOTE - HPI (INCLUDE ILLNESS QUALITY, SEVERITY, DURATION, TIMING, CONTEXT, MODIFYING FACTORS, ASSOCIATED SIGNS AND SYMPTOMS)
58 yr old male, single, undomiciled (lives in hotels) and unemployed.  self reports hx of bipolar disorder, depression and anxiety; as per Psyckes: with pan-diagnoses namely: Major Depressive Disorder, Substance-Induced Depressive Disorder, Unspecified/Other Bipolar, Depressive Disorder,  Antisocial Personality Disorder,  schizoaffective Disorder, Bipolar I, Bipolar II, Unspecified/Other Anxiety Disorder, Adjustment Disorder, Substance-Induced Sleep Disorder, Schizophrenia, Substance-Induced Psychotic Disorder, Unspecified/Other Psychotic Disorders, Attention Deficit Hyperactivity Disorder, Borderline Personality Disorder, Unspecified/Other Psychotic Disorders, Attention Deficit Hyperactivity Disorder, Borderline Personality Disorder, Insomnia Disorder, Other Mental Disorders  and Substance-Induced Anxiety Disorder, Pt is chronically nonadherent to meds/ psych appts.  has hx of multiple psych admissions (most recently [facility] in Jan 2024), self reports hx of self aborted SA (jumping in front of a 7 train - last summer). with polysubstance use: Alcohol (denied any seizures, stated in past he has had VH during detox - per psyckes, with multiple detoxes/ rehabs - last attendance at [facility]-  for Alcohol dependence, uncomplicated)/ Cocaine/ Other psychoactive substance related disorders/ Cannabis/ Tobacco/ Opioid/  Other stimulant related disorders.  Pt self reports hx of self aborted SA (jumping in front of a 7 train summer 2023. Pertinent medical issues include: HTN, CKD, GERD, pericarditis; self reports hx of CHF (with ? EF 45%), self-presents reporting SI with plan to jump in front of train or shoot himself.     Patient reports that he's feeling suicidal (for about 1 month) with a plan to jump in front of a train or shoot himself.  He states he has a gun stored with a friend in [place] and he admits to plans on shooting himself but refuses to provide further information. He reports persistently depressed mood and anhedonia. Patient feels hopeless, with low energy, not sleeping well and not eating well. He is unable and unwilling to engage in any safety planning. Pt says primary stressor is that he hasn't been able to reach his daughter (she lives in [place]) for 30 days. He reports "sometimes binging" on alcohol. BAL=51 at 14:12 today (5/21/24). States he had "a few shots" of hard liquor earlier today. He denies drug use. He denies manic or psychotic symptoms. He denies homicidal or violent ideation, intent, or plan. Pt states he hasn't taken prescribed meds (Abilify, Depakote, and Atorvastatin) in a few months. He is also nonadherent with Aspirin.

## 2024-05-21 NOTE — ED BEHAVIORAL HEALTH ASSESSMENT NOTE - PSYCHIATRIC ISSUES AND PLAN (INCLUDE STANDING AND PRN MEDICATION)
Defer standing medication to inpatient team; can use Hydroxyzine 50 mg po q6 prn acute anxiety, Ativan 2 mg IM q6 prn severe agitation

## 2024-05-21 NOTE — BH PATIENT PROFILE - NSPROGENOTHERPROVIDER_GEN_A_NUR
Pt is nonverbal and unable to follow commands at this time PERRLA intact. T/R q2hrs with frequent mouth cares performed. Incontinent of bladder this shift no BM's noted. New IV placed. Pt BG was 50 when checked. Provider updated and placed orders please see previous note. Continues on video monitor for safety. Pt was calm this shift with no restlessness or pain noted. Bed is locked and low, call light within reach, frequent rounding performed. Room near nurses station with room door open.      Face to face report given with opportunity to observe patient.    Report given to GIANNA Hall RN   3/22/2024  7:24 AM     none

## 2024-05-21 NOTE — ED PROVIDER NOTE - PSYCHIATRIC MEMORY
short term intact/long term intact Solaraze Counseling:  I discussed with the patient the risks of Solaraze including but not limited to erythema, scaling, itching, weeping, crusting, and pain.

## 2024-05-21 NOTE — ED ADULT TRIAGE NOTE - CHIEF COMPLAINT QUOTE
Patient brought to ER by EMS from [facility] for fall getting out of shower. patient hit his head and c/o head, neck and back pain. refused neck collar. Has staff at bedside. No LOC.  upon arrival, he c/o chest pain.

## 2024-05-21 NOTE — ED PROVIDER NOTE - ATTENDING CONTRIBUTION TO CARE
59 yo male with hx of HTN, depression, bipolar disorder, schizophrenia, SI admitted at [facility] presenting after slipping in the shower and hitting his head, denies LOC, no blood thinners, landed on his left side also complaining of shoulder pain.      patient examined after pain meds given, full rom of L shoulder, no midline spinal tenderness, no scalp hematoma, alexander sign or racoon eyes, no chest wall tenderness.    low suspicion for fracture or bleed but will obtain ct head neck and xray shoulder chest and dispo accordingly.

## 2024-05-21 NOTE — BH PATIENT PROFILE - NSNUTRITIONASSESS_GEN_ALL_CORE
Addended by: GOOD GUIDRY on: 7/6/2017 08:08 AM     Modules accepted: Orders     Patient does not exhibit any risk factors

## 2024-05-21 NOTE — ED PROVIDER NOTE - PROGRESS NOTE DETAILS
JESUS Granados- Psychiatry recommends admission. Labs and EKG stable. Medically cleared for psychiatric admission

## 2024-05-21 NOTE — ED ADULT NURSE NOTE - CHIEF COMPLAINT QUOTE
Patient brought to ER by EMS from [facility] for fall getting out of shower. patient hit his head and c/o head, neck and back pain. refused neck collar. Has staff at bedside. No LOC.  upon arrival, he c/o chest pain.

## 2024-05-21 NOTE — ED PROVIDER NOTE - CLINICAL SUMMARY MEDICAL DECISION MAKING FREE TEXT BOX
59 y/o M with h/o HTN, Drug/ETOH abuse, Depression, Bipolar, Schizophrenia, with previous admissions for SI/previous attempt (tried to jump in front of a train) pw increased depression, thoughts of SI starting last night when daughter wouldn't answer her phone when he called her with plan to "shoot himself in the head". Was rx'ed Abilify and Depakote from [facility] after last admission but stopped taking x 1 month "because the medications were too strong". Has not followed up with Psychiatry since DC. Denies VH/AH. Denies f/c, HA, dizziness, CP, SOB, ab pain, n/v/d, weakness. Denies current drug use. States ETOH socially.   Plan: Will draw psych labs, UA, EKG, tox screen, COVID, Psych Consult, medicate as needed, likely ADMIT

## 2024-05-21 NOTE — ED BEHAVIORAL HEALTH ASSESSMENT NOTE - SUMMARY
58 yr old male, single, undomiciled (lives in hotels) and unemployed.  self reports hx of bipolar disorder, depression and anxiety; as per Psyckes: with pan-diagnoses namely: Major Depressive Disorder, Substance-Induced Depressive Disorder, Unspecified/Other Bipolar, Depressive Disorder,  Antisocial Personality Disorder,  schizoaffective Disorder, Bipolar I, Bipolar II, Unspecified/Other Anxiety Disorder, Adjustment Disorder, Substance-Induced Sleep Disorder, Schizophrenia, Substance-Induced Psychotic Disorder, Unspecified/Other Psychotic Disorders, Attention Deficit Hyperactivity Disorder, Borderline Personality Disorder, Unspecified/Other Psychotic Disorders, Attention Deficit Hyperactivity Disorder, Borderline Personality Disorder, Insomnia Disorder, Other Mental Disorders  and Substance-Induced Anxiety Disorder, Pt is chronically nonadherent to meds/ psych appts.  has hx of multiple psych admissions (most recently [facility] in Jan 2024), self reports hx of self aborted SA (jumping in front of a 7 train - last summer). with polysubstance use: Alcohol (denied any seizures, stated in past he has had VH during detox - per psyckes, with multiple detoxes/ rehabs - last attendance at [facility]-  for Alcohol dependence, uncomplicated)/ Cocaine/ Other psychoactive substance related disorders/ Cannabis/ Tobacco/ Opioid/  Other stimulant related disorders.  Pt self reports hx of self aborted SA (jumping in front of a 7 train summer 2023. Pertinent medical issues include: HTN, CKD, GERD, pericarditis; self reports hx of CHF (with ? EF 45%), self-presents reporting SI with plan to jump in front of train or shoot himself. He is unable and unwilling to partake towards safety planning.   Currently, does not appear to be acutely manic nor psychotic. He is help seeking and wishes to pursue voluntary admission for safety and stabilization.

## 2024-05-21 NOTE — ED BEHAVIORAL HEALTH ASSESSMENT NOTE - NSBHMSEGAIT_PSY_A_CORE
Required Documentation:  Encounter provider Josesito Bob PA-C    Provider located at [facility] [id number]    Identify all parties in room with patient during virtual visit:  parent(s)-permission granted or assumed due to patient age    The patient was identified by name and date of birth. Kulwant Cooper was informed that this is a telemedicine visit and that the visit is being conducted through the [address] [place] [address] Dr platform. He agrees to proceed. .  My office door was closed. No one else was in the room. He acknowledged consent and understanding of privacy and security of the video platform. The patient has agreed to participate and understands they can discontinue the visit at any time. Verification of patient location:    Patient is located at home in the following state in which I hold an active license [place]    Patient is aware this is a billable service. Reason for visit is No chief complaint on file. Subjective  HPI   Cough x 3 weeks, green nasal discharge, congestion. Ear pain is resolved, but other sx never resolved after taking cefdinirx 7 days for AOM starting 10/26. Normal I&O. Past Medical History:   Diagnosis Date    Allergic     Eczema        No past surgical history on file. Allergies   Allergen Reactions    Milk-Related Compounds - Food Allergy Anaphylaxis    Amoxicillin Hives     Possible; had urticaria about 10 days after starting first course of amoxicillin. Has history of dairy allergies with urticaria, but no know dairy given. Sees allergist.    Cat Hair Extract Allergic Rhinitis    Molds & Smuts Nasal Congestion    Other Other (See Comments)     Unknown reaction allergy found through testing       Review of Systems   Constitutional:  Negative for activity change, appetite change and fever. HENT:  Positive for congestion and rhinorrhea. Negative for ear pain and sore throat. Eyes:  Negative for redness. Respiratory:  Negative for cough. Cardiovascular:  Negative for cyanosis. Gastrointestinal:  Negative for diarrhea, nausea and vomiting. Genitourinary:  Negative for hematuria. Musculoskeletal:  Negative for gait problem. Skin:  Negative for rash. Neurological:  Negative for seizures. Psychiatric/Behavioral:  Negative for behavioral problems. Video Exam    Vitals:    11/18/23 1023   Pulse: 105   Temp: 97.3 °F (36.3 °C)   SpO2: 97%       Physical Exam  Constitutional:       General: He is active. He is not in acute distress. Appearance: Normal appearance. He is well-developed and normal weight. He is not toxic-appearing. Comments: playful   HENT:      Head: Normocephalic and atraumatic. Nose: No rhinorrhea. Comments: Sounds congested     Mouth/Throat:      Mouth: Mucous membranes are moist.   Eyes:      Extraocular Movements: Extraocular movements intact. Cardiovascular:      Rate and Rhythm: Tachycardia present. Pulmonary:      Effort: Pulmonary effort is normal.   Musculoskeletal:         General: Normal range of motion. Cervical back: Normal range of motion. Neurological:      General: No focal deficit present. Mental Status: He is alert. Psychiatric:         Behavior: Behavior normal.         Visit Time  Total Visit Duration: 7 minutes    Assessment/Plan:    Diagnoses and all orders for this visit:    Acute bacterial rhinosinusitis  -     clindamycin (CLEOCIN) 75 mg/5 mL solution; Take 18 mL (270 mg total) by mouth 3 (three) times a day for 10 days        Patient Instructions   Schedule a follow-up appointment with your primary care physician for recheck in 2-3 days. If you cannot see your PCP, you can schedule a follow up appointment at a [facility]. Go to the emergency department if you develop any new or worsening symptoms including shortness of breath, or anything else that is concerning.     Excuses can be found in "Letters" section of [employer] carole. Can print if opened from a [address] phone number is [phone] if you need assistance or have further questions    1 21 [phone] ([phone])  Schedule or Reschedule Outpatient Testing - Option 2  Billing - Option 3  General Info - Option 4  AQUA PUREt Help - Option 5  Comprehensive Spine Program - Option 6   COVID - Option 7    Sinusitis in [address]:   Sinusitis is inflammation or infection of your child's sinuses. Sinusitis is most often caused by a virus. Acute sinusitis may last up to 30 days. Chronic sinusitis lasts longer than 90 days. Recurrent sinusitis means your child has sinusitis 3 times in 6 months or 4 times in 1 year. DISCHARGE INSTRUCTIONS:   Return to the emergency department if:   Your child's eye and eyelid are red, swollen, and painful. Your child cannot open his or her eye. Your child has vision changes, such as double vision. Your child's eyeball bulges out or your child cannot move his or her eye. Your child is more sleepy than normal, or you notice changes in his or her ability to think, move, or talk. Your child has a stiff neck, a fever, or a bad headache. Your child's forehead or scalp is swollen. Call your child's doctor if:   Your child's symptoms get worse after 5 to 7 days. Your child's symptoms do not go away after 10 days. Your child has nausea and is vomiting. Your child's nose is bleeding. You have questions or concerns about your child's condition or care. Medicines: Your child's symptoms may go away on their own. Your child's healthcare provider may recommend watchful waiting for 3 days before starting antibiotics. Your child may  need any of the following:  Acetaminophen  decreases pain and fever. It is available without a doctor's order. Ask how much to give your child and how often to give it. Follow directions.  Read the labels of all other medicines your child uses to see if they also contain acetaminophen, or ask your child's doctor or pharmacist. Acetaminophen can cause liver damage if not taken correctly. NSAIDs , such as ibuprofen, help decrease swelling, pain, and fever. This medicine is available with or without a doctor's order. NSAIDs can cause stomach bleeding or kidney problems in certain people. If your child takes blood thinner medicine, always ask if NSAIDs are safe for him or her. Always read the medicine label and follow directions. Do not give these medicines to children younger than 6 months without direction from a healthcare provider. Nasal steroid sprays  may help decrease inflammation in your child's nose and sinuses. Antibiotics  help treat or prevent a bacterial infection. Do not give aspirin to children younger than 18 years. Your child could develop Reye syndrome if he or she has the flu or a fever and takes aspirin. Reye syndrome can cause life-threatening brain and liver damage. Check your child's medicine labels for aspirin or salicylates. Give your child's medicine as directed. Contact your child's healthcare provider if you think the medicine is not working as expected. Tell the provider if your child is allergic to any medicine. Keep a current list of the medicines, vitamins, and herbs your child takes. Include the amounts, and when, how, and why they are taken. Bring the list or the medicines in their containers to follow-up visits. Carry your child's medicine list with you in case of an emergency. Manage your child's symptoms:   Use a humidifier to increase air moisture in your home. This may make it easier for your child to breathe and help decrease his or her cough. Help your child rinse his or her sinuses. Use a sinus rinse device to rinse your child's nasal passages with a saline (salt water) solution or distilled water. Do not use tap water.  A sinus rinse will help thin the mucus in your child's nose and rinse away pollen and dirt. It will also help reduce swelling so your child can breathe normally. Ask your child's healthcare provider how often to do this. Have your older child sleep with his or her head elevated. Place an extra pillow under your child's head before he or she goes to sleep to help the sinuses drain. Ask if your child is old enough to sleep with an extra pillow under his or her head. Give your child liquids as directed. Liquids will thin the mucus in your child's nose and help it drain. Ask your child's healthcare provider how much liquid to give your child and which liquids are best for him or her. Avoid drinks that contain caffeine. Prevent the spread of germs:   Help your child avoid others when he or she is sick. Some germs spread easily and quickly through contact. Have your child stay home from school or . Ask when it is okay for your child to return. Wash your and your child's hands often with soap and water. Encourage your child to wash his or her hands after using the bathroom, coughing, or sneezing. Follow up with your child's doctor as directed: Your child may be referred to an ear, nose, and throat specialist. Write down your questions so you remember to ask them during your child's visits. © Copyright Coni Mendoza 2023 Information is for End User's use only and may not be sold, redistributed or otherwise used for commercial purposes. The above information is an  only. It is not intended as medical advice for individual conditions or treatments. Talk to your doctor, nurse or pharmacist before following any medical regimen to see if it is safe and effective for you. Other

## 2024-05-21 NOTE — ED BEHAVIORAL HEALTH ASSESSMENT NOTE - NS ED BHA ED COURSE UTILIZATION OF 1 TO 1 IN ED YN
Left message notifying pt rx was sent.   
Was the patient seen in the last year in this department? Yes     Does patient have an active prescription for medications requested? No     Received Request Via: Patient    
No

## 2024-05-21 NOTE — ED BEHAVIORAL HEALTH ASSESSMENT NOTE - OTHER
homelessness, financial constraints, unemployment, limited social support, not being able to see 15 yr old daughter did not assess
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
## 2024-05-21 NOTE — ED PROVIDER NOTE - PROGRESS NOTE DETAILS
Tl BANGURA PGY3: ct negative xrays negative for fx or dislocation. safe for d/c with strict return precautions.

## 2024-05-21 NOTE — ED BEHAVIORAL HEALTH ASSESSMENT NOTE - DIFFERENTIAL
MALINGERING  axis II pathology   Unspecified mood disorder, substance-induced mood disorder  MDD vs bipolar depression by history

## 2024-05-21 NOTE — ED ADULT NURSE NOTE - NSFALLRISKINTERV_ED_ALL_ED

## 2024-05-21 NOTE — ED PROVIDER NOTE - PATIENT PORTAL LINK FT
You can access the FollowMyHealth Patient Portal offered by [facility] by registering at the following website: [email or web address]. By joining [employer]’s FollowMyHealth portal, you will also be able to view your health information using other applications (apps) compatible with our system.

## 2024-05-21 NOTE — ED ADULT TRIAGE NOTE - RESPIRATORY RATE (BREATHS/MIN)
[facility] ED  EMERGENCY DEPARTMENT ENCOUNTER      Pt Name: Bonnie Travis  MRN: [id number]  Birthdate 1953  Date of evaluation: 3/15/2024  Provider: RICA Mcintosh - CNP  12:25 AM    CHIEF COMPLAINT       Chief Complaint   Patient presents with    Dental Problem     Patient presents to the emergency department with complaint of her mouth bleeding post teeth extraction. Patient reports that  she had multiple teeth removed today and she has been bleeding since she was sent home. Patient does not take any blood thinners. ABD provided to patient and had patient bite down to apply pressure          HISTORY OF PRESENT ILLNESS        Bonnie Travis 71 yo yo female presents from home to ED with c/o bleeding since getting home from having all teeth extracted at [facility] at 11 am today.Pt reports bleeding through packing. Pt does smoke. Pt reports she tried to calldentist but they were closed. Pt reports her mouth is still numb. No hx of bleeding disorder.     The history is provided by the patient. No  was used.       Nursing Notes were reviewed.    REVIEW OF SYSTEMS       Review of Systems   HENT:  Positive for dental problem.    All other systems reviewed and are negative.      Except as noted above the remainder of the review of systems was reviewed and negative.       PAST MEDICAL HISTORY     Past Medical History:   Diagnosis Date    COPD (chronic obstructive pulmonary disease) (HCC)     Hyperlipidemia     Tuberculosis     Hx of tuberculosis back in the 70's         SURGICAL HISTORY       Past Surgical History:   Procedure Laterality Date    ARM SURGERY Right     Fracutre     DENTAL SURGERY      FACIAL FRACTURE SURGERY      FEMUR FRACTURE SURGERY Right     WRIST FRACTURE SURGERY Right          CURRENT MEDICATIONS       Discharge Medication List as of 3/15/2024  5:17 PM        CONTINUE these medications which have NOT CHANGED    Details   budesonide-formoterol 
18

## 2024-05-21 NOTE — BH PATIENT PROFILE - HOME MEDICATIONS
folic acid 1 mg oral tablet , 1 tab(s) orally once a day  Multiple Vitamins oral tablet , 1 tab(s) orally once a day  LORazepam 1 mg/mL-NaCl 0.9% intravenous solution , 2 milliliter(s) intravenous every 6 hours As needed Agitation  OLANZapine 2.5 mg oral tablet , 1 tab(s) orally once a day (at bedtime)  atorvastatin 40 mg oral tablet , 1 tab(s) orally once a day (at bedtime)
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
## 2024-05-21 NOTE — ED BEHAVIORAL HEALTH NOTE - BEHAVIORAL HEALTH NOTE
As per provider, worker called patient’s daughter Charo Dickerson ([phone]) and left a message for call back.

## 2024-05-21 NOTE — ED ADULT NURSE NOTE - OBJECTIVE STATEMENT
Pt received in no acute distress, speaking in full sentences, breathing unlabored. Pt reports a trip and fall in the shower today, report LOC for like a minute.  Pt reports he got up and called for help. Pt report head pain, shoulder/arm pain, and chest discomfort. Pt reports hx of pericarditis and report having sob worst with exertion that has not changed. No obvious injuries, deformities or lacerations noted. Pt from [facility] with staff member at the bedside.
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
## 2024-05-21 NOTE — ED ADULT NURSE NOTE - NS_ED_NURSE_RECEIVING_FACILITY _ED_ALL_ED
RNCM called patient's sister Vianey Rivera to update on room number for [facility]. Patient and sister, Vianey Rivera in agreement for SNF/[facility]. Patient with discharge orders today. Patient accepted to [facility], room 208 D. Transport to be provided via stretcher accompanied by Luisa;  time at 1400. No further supportive needs identified to CM. Patient has met all treatment goals and milestones. CM following until discharged. Care Management Interventions  PCP Verified by CM:  Yes Kt Ahumada NP)  Last Visit to PCP: 04/05/21  Mode of Transport at Discharge: BLS  Transition of Care Consult (CM Consult): Discharge Planning,SNF  Partner SNF: Yes  Physical Therapy Consult: Yes  Occupational Therapy Consult: Yes  Speech Therapy Consult: Yes  Support Systems: Other Family Member(s)  Confirm Follow Up Transport: Family  The Plan for Transition of Care is Related to the Following Treatment Goals : return to safe level of independence  The Patient and/or Patient Representative was Provided with a Choice of Provider and Agrees with the Discharge Plan?: Yes  Freedom of Choice List was Provided with Basic Dialogue that Supports the Patient's Individualized Plan of Care/Goals, Treatment Preferences and Shares the Quality Data Associated with the Providers?: Yes  The [employer] Information Provided?: No ([address] [facility] and Medicaid)  Discharge Location  Patient Expects to be Discharged to[de-identified] Skilled nursing facility [facility]

## 2024-05-21 NOTE — BH CHART NOTE - NSEVENTNOTEFT_PSY_ALL_CORE
[facility] Inpatient to ED Transfer Summary    Reason for Transfer/Medical Summary: Pt with unwitnessed fall at approx 5:30 pm. Pt reporting slipping on floor as he left the shower and landing on his back and hitting his head. Denied dizziness at that time, but now reporting dizziness, headache, L sided body pain, blurry vision. Attempted to get orthostatic vitals of pt but he refused standing set. Sitting BP was 133/82 and HR 99. Pt on aspirin. Fall likely mechanical in origin. Sent to ED for head imaging.    PAST MEDICAL & SURGICAL HISTORY:  Hypertension      Pericarditis      Alcohol abuse      Cocaine abuse      No significant past surgical history          Allergies    No Known Allergies    Intolerances        MEDICATIONS  (STANDING):  aspirin enteric coated 81 milliGRAM(s) Oral daily  atorvastatin 40 milliGRAM(s) Oral at bedtime  ibuprofen  Tablet. 400 milliGRAM(s) Oral once    MEDICATIONS  (PRN):  hydrOXYzine hydrochloride 50 milliGRAM(s) Oral every 6 hours PRN acute anxiety  LORazepam     Tablet 2 milliGRAM(s) Oral every 2 hours PRN CIWA score increase by 2 points and current CIWA score GREATER THAN 9  LORazepam   Injectable 2 milliGRAM(s) IntraMuscular Once PRN severe agitation      Vital Signs Last 24 Hrs  T(C): 36.7 (21 May 2024 17:32), Max: 36.8 (21 May 2024 13:29)  T(F): 98.1 (21 May 2024 17:32), Max: 98.2 (21 May 2024 13:29)  HR: 108 (21 May 2024 13:29) (108 - 108)  BP: 129/64 (21 May 2024 13:29) (129/64 - 129/64)  BP(mean): --  RR: 18 (21 May 2024 13:29) (18 - 18)  SpO2: 95% (21 May 2024 13:29) (95% - 95%)    Parameters below as of 21 May 2024 13:29  Patient On (Oxygen Delivery Method): room air      CAPILLARY BLOOD GLUCOSE      PHYSICAL EXAM:  GENERAL: Uncomfortable appearing, in pain  HEAD:  Atraumatic, Normocephalic  EYES: EOMI, PERRL, conjunctiva and sclera clear but exam limited by pt's poor cooperatviity  NECK: Supple, No JVD  PSYCH: AAOx3  NEUROLOGY: non-focal    LABS:                        12.3   6.85  )-----------( 257      ( 21 May 2024 14:12 )             35.3     -    137  |  102  |  17  ----------------------------<  100<H>  4.1   |  19<L>  |  1.17    Ca    8.9      21 May 2024 14:12    TPro  7.5  /  Alb  4.0  /  TBili  0.5  /  DBili  x   /  AST  80<H>  /  ALT  82<H>  /  AlkPhos  64            Urinalysis Basic - ( 21 May 2024 14:12 )    Color: Yellow / Appearance: Clear / S.024 / pH: x  Gluc: 100 mg/dL / Ketone: Negative mg/dL  / Bili: Negative / Urobili: 0.2 mg/dL   Blood: x / Protein: Negative mg/dL / Nitrite: Negative   Leuk Esterase: Negative / RBC: x / WBC x   Sq Epi: x / Non Sq Epi: x / Bacteria: x        Psychiatry Section:  Psychiatric Summary/[facility] admitting diagnosis: Depression    Psychiatric Recommendations:    Observation status (check one):   ( ) Constant Observation  ( ) Enhanced care  ( x) Routine checks    Risk Status (check all that apply if present):  ( ) at risk for suicide/self-injury  ( ) at risk for aggressive behavior  ( ) at risk for elopement  ( ) other risk:      [facility] Inpatient to ED Transfer Summary    Reason for Transfer/Medical Summary: Pt with unwitnessed fall at approx 6:30 pm. Pt reporting slipping on floor as he left the shower and landing on his back and hitting his head. Denied dizziness at that time, but now reporting dizziness, headache, L sided body pain, blurry vision. Attempted to get orthostatic vitals of pt but he refused standing set. Sitting BP was 133/82 and HR 99. Pt on aspirin. Fall likely mechanical in origin. Sent to ED for head imaging.    PAST MEDICAL & SURGICAL HISTORY:  Hypertension      Pericarditis      Alcohol abuse      Cocaine abuse      No significant past surgical history          Allergies    No Known Allergies    Intolerances        MEDICATIONS  (STANDING):  aspirin enteric coated 81 milliGRAM(s) Oral daily  atorvastatin 40 milliGRAM(s) Oral at bedtime  ibuprofen  Tablet. 400 milliGRAM(s) Oral once    MEDICATIONS  (PRN):  hydrOXYzine hydrochloride 50 milliGRAM(s) Oral every 6 hours PRN acute anxiety  LORazepam     Tablet 2 milliGRAM(s) Oral every 2 hours PRN CIWA score increase by 2 points and current CIWA score GREATER THAN 9  LORazepam   Injectable 2 milliGRAM(s) IntraMuscular Once PRN severe agitation      Vital Signs Last 24 Hrs  T(C): 36.7 (21 May 2024 17:32), Max: 36.8 (21 May 2024 13:29)  T(F): 98.1 (21 May 2024 17:32), Max: 98.2 (21 May 2024 13:29)  HR: 108 (21 May 2024 13:29) (108 - 108)  BP: 129/64 (21 May 2024 13:29) (129/64 - 129/64)  BP(mean): --  RR: 18 (21 May 2024 13:29) (18 - 18)  SpO2: 95% (21 May 2024 13:29) (95% - 95%)    Parameters below as of 21 May 2024 13:29  Patient On (Oxygen Delivery Method): room air      CAPILLARY BLOOD GLUCOSE      PHYSICAL EXAM:  GENERAL: Uncomfortable appearing, in pain  HEAD:  Atraumatic, Normocephalic  EYES: EOMI, PERRL, conjunctiva and sclera clear but exam limited by pt's poor cooperatviity  NECK: Supple, No JVD  PSYCH: AAOx3  NEUROLOGY: non-focal    LABS:                        12.3   6.85  )-----------( 257      ( 21 May 2024 14:12 )             35.3     -    137  |  102  |  17  ----------------------------<  100<H>  4.1   |  19<L>  |  1.17    Ca    8.9      21 May 2024 14:12    TPro  7.5  /  Alb  4.0  /  TBili  0.5  /  DBili  x   /  AST  80<H>  /  ALT  82<H>  /  AlkPhos  64            Urinalysis Basic - ( 21 May 2024 14:12 )    Color: Yellow / Appearance: Clear / S.024 / pH: x  Gluc: 100 mg/dL / Ketone: Negative mg/dL  / Bili: Negative / Urobili: 0.2 mg/dL   Blood: x / Protein: Negative mg/dL / Nitrite: Negative   Leuk Esterase: Negative / RBC: x / WBC x   Sq Epi: x / Non Sq Epi: x / Bacteria: x        Psychiatry Section:  Psychiatric Summary/[facility] admitting diagnosis: Depression    Psychiatric Recommendations:    Observation status (check one):   ( ) Constant Observation  ( ) Enhanced care  ( x) Routine checks    Risk Status (check all that apply if present):  ( ) at risk for suicide/self-injury  ( ) at risk for aggressive behavior  ( ) at risk for elopement  ( ) other risk:

## 2024-05-21 NOTE — ED ADULT TRIAGE NOTE - CHIEF COMPLAINT QUOTE
Pt presents to ED ambulatory from home with c/o suicidal ideation x 1 day. Pt reports hx of bipolar and schizophrenia has been off medication x 2 weeks. Pt reports last psychiatric admission approx 1 month ago. Pt denies auditory or visual hallucinations, homicidal ideations.

## 2024-05-21 NOTE — ED ADULT NURSE NOTE - NS_BH TRG QUESTION4_ED_ALL_ED
PATIENT INSTRUCTIONS    Treatment:  Corticosteroid injection given to the left hip    Follow-Up:  Please make an appointment with Dr. Micah Zhu in 3 months      Please note: 24 hour notice for cancellation of appointment is required.    You may receive a survey in the mail, or via the e-mail address that you have provided.  We would appreciate if you could fill out the survey and provide us with any feedback on your experience regarding your visit today. Thank you for allowing us to provide you with your health care needs.     Do not hesitate to call if you are experiencing severe pain, worsening or change in your pain, have symptoms of infection (fever, warmth, redness, increased drainage), or have any other problem that concerns you ~ [phone] (or [phone] after hours).    Please remember when requesting refills on pain medication that the request should be made by Thursday at the latest. [facility] is open Monday-Friday, 8am-5pm, and closed on the weekends.  No narcotic refills will be filled after hours.    Additional Educational Resources:  For additional resources regarding your symptoms, diagnosis, or further health information, please visit the Health Resources section on [email or web address] or the Online Health Resources section in [employer].        Yes

## 2024-05-21 NOTE — ED BEHAVIORAL HEALTH ASSESSMENT NOTE - OTHER PAST PSYCHIATRIC HISTORY (INCLUDE DETAILS REGARDING ONSET, COURSE OF ILLNESS, INPATIENT/OUTPATIENT TREATMENT)
self reports hx of depression + anxiety  multiple past diagnoses as per Psyckes  high mental health utilizer including numerous Psych ED/ CPEP visits for SI, alcohol intoxication  prior psych admissions  chronically nonadherent with meds   claimed previously prescribed Zoloft at [facility]   - self-discontinued because developed diarrhea  stated past hx of allegedly self aborting jumping in front of a 7 train over the summer (2023)  denied past suicide attempts.   Denies ever being in consistent outpatient psychiatric care.   currently with a IMT ([facility] ([facility] [place] [facility], 8/22/2022 -  Current) -LEFT VOICEMAIL

## 2024-05-21 NOTE — ED ADULT NURSE NOTE - OBJECTIVE STATEMENT
Pt arrives by walk-in ed with c/o worsening depression with SI, with plan to shoot himself in the head with a gun. Pt state he was triggered by his daughter not answering his calls. Pt calm and cooperative. Denies auditory or visual hallucinations, homicidal ideation

## 2024-05-22 VITALS
OXYGEN SATURATION: 97 % | DIASTOLIC BLOOD PRESSURE: 78 MMHG | HEART RATE: 78 BPM | SYSTOLIC BLOOD PRESSURE: 108 MMHG | TEMPERATURE: 98 F | RESPIRATION RATE: 16 BRPM

## 2024-05-22 DIAGNOSIS — F19.10 OTHER PSYCHOACTIVE SUBSTANCE ABUSE, UNCOMPLICATED: ICD-10-CM

## 2024-05-22 PROCEDURE — 71045 X-RAY EXAM CHEST 1 VIEW: CPT | Mod: 26

## 2024-05-22 PROCEDURE — 73030 X-RAY EXAM OF SHOULDER: CPT | Mod: 26,LT

## 2024-05-22 RX ORDER — POLYETHYLENE GLYCOL 3350 17 G/17G
17 POWDER, FOR SOLUTION ORAL DAILY
Refills: 0 | Status: DISCONTINUED | OUTPATIENT
Start: 2024-05-22 | End: 2024-05-31

## 2024-05-22 RX ORDER — LURASIDONE HYDROCHLORIDE 40 MG/1
1 TABLET ORAL
Qty: 30 | Refills: 0
Start: 2024-05-22 | End: 2024-06-20

## 2024-05-22 RX ORDER — LURASIDONE HYDROCHLORIDE 40 MG/1
20 TABLET ORAL
Refills: 0 | Status: DISCONTINUED | OUTPATIENT
Start: 2024-05-22 | End: 2024-05-31

## 2024-05-22 RX ORDER — IBUPROFEN 200 MG
400 TABLET ORAL EVERY 6 HOURS
Refills: 0 | Status: DISCONTINUED | OUTPATIENT
Start: 2024-05-22 | End: 2024-05-31

## 2024-05-22 RX ORDER — ACETAMINOPHEN 500 MG
650 TABLET ORAL EVERY 6 HOURS
Refills: 0 | Status: DISCONTINUED | OUTPATIENT
Start: 2024-05-22 | End: 2024-05-31

## 2024-05-22 RX ADMIN — Medication 650 MILLIGRAM(S): at 11:13

## 2024-05-22 RX ADMIN — Medication 400 MILLIGRAM(S): at 18:35

## 2024-05-22 RX ADMIN — Medication 1 TABLET(S): at 13:03

## 2024-05-22 RX ADMIN — ATORVASTATIN CALCIUM 40 MILLIGRAM(S): 80 TABLET, FILM COATED ORAL at 20:28

## 2024-05-22 RX ADMIN — Medication 650 MILLIGRAM(S): at 21:27

## 2024-05-22 RX ADMIN — Medication 650 MILLIGRAM(S): at 09:00

## 2024-05-22 RX ADMIN — Medication 81 MILLIGRAM(S): at 09:01

## 2024-05-22 RX ADMIN — Medication 400 MILLIGRAM(S): at 18:04

## 2024-05-22 NOTE — BH SOCIAL WORK INITIAL PSYCHOSOCIAL EVALUATION - NSBHCOMMCOORDAGENCY_PSY_ALL_CORE
CC Brief: 
 
Diagnosis: Cardiogenic shock I was called to the bedside as the patient was experiencing bradycardia as low as the 20s. The patient was requiring 4mcg/min epinephrine and 9mcg/min levophed to keep MAP >65. ECG showed sinus bradycardia with LBBB. I ordered a repeat BMP, Mg, Phos, and Lactic acid. The patient did not have neurological changes on exam. 
 
I also called the patient's wife to gain a better understanding of his condition and GOC. I explained that he continued to be critically ill without improvement. I explained that he required two vasoactive agents for BP support and was on mechanical ventilation. I explained that he would not likely benefit from ACLS if he experienced another cardiac arrest, and that it would be reasonable to not do CPR/shocks/ etc if he arrested again, though continuing everything else up to this point. She was agreeable to this plan given his overall very poor prognosis. His code status what changed to partial code (DNR, but I ok as he is already intubated). Total critical care time 30 minutes.  
 
Hiral Arana NP 
 [facility]

## 2024-05-22 NOTE — BH SOCIAL WORK INITIAL PSYCHOSOCIAL EVALUATION - NSBHSAALC_PSY_A_CORE FT
Pt reports he binges a couple of times a month and drinks a variety of all types of alcohol in excess.

## 2024-05-22 NOTE — BH INPATIENT PSYCHIATRY ASSESSMENT NOTE - DESCRIPTION
single, unemployed, stays at a hotel, [employer] park and in the streets. has a 15 yr old daughter (living in [place], [place] - currently in custody of bio mother, speaks on the phone regularly). no reported access to guns single, unemployed, stays at a hotel, [employer] park and in the streets. has a 15 yr old daughter (living in [place], [place] - currently in custody of bio mother, speaks on the phone regularly). No reported access to guns according to previous ED note  Single, undomiciled, unemployetd.  16 yr old daughter who lives with her mother in [place].

## 2024-05-22 NOTE — ED ADULT NURSE REASSESSMENT NOTE - NS ED NURSE REASSESS COMMENT FT1
Pt discharged back to [facility] 2N. Pt transported to [facility] by the security staff. [facility] staff remains with patient on discharge. No acute distress noted upon leaving the ED.

## 2024-05-22 NOTE — ED ADULT NURSE REASSESSMENT NOTE - NS ED NURSE REASSESS COMMENT FT1
Report received from EVERT Jackman. Pt resting in stretcher, offers no complaints. [facility] staff at bedside. Pending XR. No acute distress noted. Safety maintained.

## 2024-05-22 NOTE — BH INPATIENT PSYCHIATRY ASSESSMENT NOTE - NSBHCHARTREVIEWVS_PSY_A_CORE FT
Vital Signs Last 24 Hrs  T(C): 36.4 (05-22-24 @ 08:09), Max: 37.1 (05-21-24 @ 20:02)  T(F): 97.6 (05-22-24 @ 08:09), Max: 98.7 (05-21-24 @ 20:02)  HR: 101 (05-22-24 @ 01:49) (78 - 108)  BP: 138/86 (05-22-24 @ 01:49) (108/78 - 138/86)  BP(mean): 85 (05-22-24 @ 00:10) (85 - 99)  RR: 16 (05-22-24 @ 00:10) (16 - 18)  SpO2: 97% (05-22-24 @ 00:10) (95% - 99%)    Orthostatic VS  05-22-24 @ 08:09  Lying BP: --/-- HR: --  Sitting BP: 116/69 HR: 113  Standing BP: 107/65 HR: 111  Site: --  Mode: --   Vital Signs Last 24 Hrs  T(C): 36.4 (05-22-24 @ 08:09), Max: 37.1 (05-21-24 @ 20:02)  T(F): 97.6 (05-22-24 @ 08:09), Max: 98.7 (05-21-24 @ 20:02)  HR: 101 (05-22-24 @ 01:49) (78 - 101)  BP: 138/86 (05-22-24 @ 01:49) (108/78 - 138/86)  BP(mean): 85 (05-22-24 @ 00:10) (85 - 99)  RR: 16 (05-22-24 @ 00:10) (16 - 18)  SpO2: 97% (05-22-24 @ 00:10) (97% - 99%)    Orthostatic VS  05-22-24 @ 08:09  Lying BP: --/-- HR: --  Sitting BP: 116/69 HR: 113  Standing BP: 107/65 HR: 111  Site: --  Mode: --   Vital Signs Last 24 Hrs  T(C): 36.6 (05-23-24 @ 08:37), Max: 36.6 (05-23-24 @ 08:37)  T(F): 97.8 (05-23-24 @ 08:37), Max: 97.8 (05-23-24 @ 08:37)  HR: 100 (05-22-24 @ 21:02) (100 - 100)  BP: 124/75 (05-22-24 @ 21:02) (124/75 - 124/75)  BP(mean): --  RR: --  SpO2: 98% (05-22-24 @ 21:02) (98% - 98%)    Orthostatic VS  05-23-24 @ 08:37  Lying BP: --/-- HR: --  Sitting BP: 117/74 HR: 61  Standing BP: 128/91 HR: 80  Site: --  Mode: --  Orthostatic VS  05-22-24 @ 08:09  Lying BP: --/-- HR: --  Sitting BP: 116/69 HR: 113  Standing BP: 107/65 HR: 111  Site: --  Mode: --

## 2024-05-22 NOTE — BH INPATIENT PSYCHIATRY ASSESSMENT NOTE - NSSUICPROTFACT_PSY_ALL_CORE
Responsibility to children, family, or others Responsibility to children, family, or others/Identifies reasons for living

## 2024-05-22 NOTE — PSYCHIATRIC REHAB INITIAL EVALUATION - NSBHPRRECOMMEND_PSY_ALL_CORE
Writer met with patient to orient to unit and to introduce self and psychiatric rehabilitation staff and functions. When writer approached patient, he was in his room asleep. Patient fairly receptive and cooperative. Patient is a 58-year-old male undomiciled and unemployed. Patient reported having a history of depression, anxiety alcohol use, with an aborted SA plan in mind. Patient has history of multiple psych admissions (last one in Jan. 2024). In session patient reported feeling pain throughout his body. Patient reported feeling hopeless and poor sleep. Per chart patient has family problems and housing issues as his primary stressors. Hasn't been able to reach his daughter for a month. Writer created psych rehab goal for patient to identify 2 coping skills to improve mood. Over the next few days Psych rehab staff will continue to engage and support patient throughout.

## 2024-05-22 NOTE — BH INPATIENT PSYCHIATRY ASSESSMENT NOTE - OTHER PAST PSYCHIATRIC HISTORY (INCLUDE DETAILS REGARDING ONSET, COURSE OF ILLNESS, INPATIENT/OUTPATIENT TREATMENT)
self reports hx of depression + anxiety  multiple past diagnoses as per Psyckes  high mental health utilizer including numerous Psych ED/ CPEP visits for SI, alcohol intoxication  prior psych admissions  chronically nonadherent with meds   claimed previously prescribed Zoloft at [facility]   - self-discontinued because developed diarrhea  stated past hx of allegedly self aborting jumping in front of a 7 train over the summer (2023)  denied past suicide attempts.   Denies ever being in consistent outpatient psychiatric care.   currently with a IMT ([facility] [place] [facility], 8/22/2022 -  Current) -LEFT VOICEMAIL Bipolar depression and anxiety.  Substance use disorders.  Multiple other diagnoses in PSYCKES.  Many ER/CPEP visits for substance related issues and/or SI.   Prior psych admissions  Hx of suicide attempt - jumping in front of 7 train in summer of 2023.   Current outpatient care - [facility].

## 2024-05-22 NOTE — BH INPATIENT PSYCHIATRY ASSESSMENT NOTE - DETAILS
reports diarrhea with Zoloft refused to discuss see HPI self reports getting into fights - none recent per ED note, self reported getting into fights - none recent will assess at later interview Hx of GI upset with sertraline.

## 2024-05-22 NOTE — BH INPATIENT PSYCHIATRY ASSESSMENT NOTE - NSICDXBHSECONDARYDX_PSY_ALL_CORE
Multiple substance abuse   F19.10   Swelling of right lower extremity   M79.89  HTN (hypertension)   I10  HLD (hyperlipidemia)   E78.5  Substance use disorder   F19.90

## 2024-05-22 NOTE — BH INPATIENT PSYCHIATRY ASSESSMENT NOTE - NSBHASSESSSUMMFT_PSY_ALL_CORE
Dre Dickerson is a 58 year old male, single, undomiciled (lives in hotels) and unemployed, self-reports hx of bipolar disorder, depression and anxiety; as per Psyckes: with pan-diagnoses namely: MDD, Substance-Induced Depressive Disorder, Bipolar d/o,  Antisocial Personality Disorder, schizoaffective Disorder. Pt is chronically nonadherent to meds/ psych appts and has hx of multiple psych admissions (most recently [facility] in Jan 2024), self reports hx of self aborted SA (jumping in front of a 7 train in summer 2023). Pt with polysubstance use: Alcohol (denied any seizures, stated in past he has had VH during detox - per psyckes, with multiple detox treatments/ rehabs), Cocaine, possible other psychoactive substance related disorders: Cannabis/ Tobacco/ Opioid. Pertinent medical issues include: HTN, CKD, GERD, pericarditis; self reports hx of CHF (with ? EF 45%). Pt self-presented to ED reporting SI with plan to jump in front of train or shoot himself. Transferred to our psych unit for further stabilization and management of symptoms/medication adjustment.    On assessment, pt presents as largely euthymic while reporting depressive symptoms of anhedonia, low energy, lack of sleep, and substance use, involving alcohol and cocaine in the setting of psychosocial (estranged from daughter living in [place], [place]) and financial stressors, and absence of stable housing. Reported PPHx of bipolar d/o ddx MDD, but no manic or psychotic presentation on exam. Denies current SIIP/HIIP and is goal-oriented, trying to elicit optimal medication regimen to control depressive symptoms. Wants to go to substance abuse rehab (preferably [facility]). Pt's substance abuse history includes Alcohol, Cocaine and possibly further substances, need to elicit further.    PLAN  1.) Legal: voluntary admission (9.13)  2.) Observation: routine checks (q15), no need for 1:1 observation  3.) Medical:  - PMH of HTN, CKD, GERD, pericarditis, self reports hx of CHF (with ? EF 45%)  - labs completed at ED (May 21) showing slight transaminitis (AST 80, ALT 82), utox positive for cocaine  - Pt had unwitnessed fall at approx 6:30 pm yesterday (May 21), reportedly slipping on floor as he left the shower and landing on his back and hitting his head; reported dizziness, headache, L sided body pain, blurry vision. Head imaging performed at ED showing no ICH, no fractures, no subluxation of adjacent joints.  - restart Aspirin 81 mg po daily and Atorvastatin 40 mg po QHS; hospitalist to f/u  - c/w multiple vitamins po daily (preventive measure)  4.) Psychiatric:  - Ativan 2 mg po q2 for CIWA increase > 2 or score > 9  - PRNs: acetaminophen 650 mg po q6 for pain; Hydroxyzine 50 mg po q6 for acute anxiety; IM Ativan 2mg for severe agitation (stop after 1 time)  - start lurasidone  20 mg po daily for mood stabilization (in the s/o bipolar depression)  - tobacco usage to be addressed  5.) Therapy: G/M/I therapy as indicated  6.) Collateral: to be obtained from pt  7.) Dispo: when stable and no harm to himself or others Dre Dickerson is a 58 year old male, single, undomiciled (lives in hotels) and unemployed, self-reports hx of bipolar disorder, depression and anxiety; as per Psyckes: with pan-diagnoses namely: MDD, Substance-Induced Depressive Disorder, Bipolar d/o,  Antisocial Personality Disorder, Borderline Personality Disorder, schizoaffective Disorder. Pt is chronically nonadherent to meds/ psych appts and has hx of multiple psych admissions (most recently [facility] in Jan 2024), self reports hx of self aborted SA (jumping in front of a 7 train in summer 2023). Pt with polysubstance use: Alcohol (denied any seizures, stated in past he has had VH during detox - per psyckes, with multiple detox treatments/ rehabs), Cocaine, possible other psychoactive substance related disorders: Cannabis/ Tobacco/ Opioid. Pertinent medical issues include: HTN, CKD, GERD, pericarditis; self reports hx of CHF (with ? EF 45%). Pt self-presented to ED reporting SI with plan to jump in front of train or shoot himself. Transferred to our psych unit for further stabilization and management of symptoms/medication adjustment.    On assessment, pt presents as largely euthymic while reporting depressive symptoms of anhedonia, low energy, lack of sleep, and substance use, involving alcohol and cocaine in the setting of psychosocial (estranged from daughter living in [place], [place]) and financial stressors, and absence of stable housing. Reported PPHx of bipolar d/o ddx MDD, but no manic or psychotic presentation on exam. Denies current SIIP/HIIP and is goal-oriented, trying to elicit optimal medication regimen to control depressive symptoms. Wants to go to substance abuse rehab (preferably [facility]). Pt's substance abuse history includes Alcohol, Cocaine and possibly further substances, need to elicit further. Differential also includs personality disorders (antisocial/borderline). Also consider possible secondary gain.    PLAN  1.) Legal: voluntary admission (9.13)  2.) Observation: routine checks (q15), no need for 1:1 observation  3.) Medical:  - PMH of HTN, CKD, GERD, pericarditis, self reports hx of CHF (with ? EF 45%)  - labs completed at ED (May 21) showing slight transaminitis (AST 80, ALT 82), utox positive for cocaine  - Pt had unwitnessed fall at approx 6:30 pm yesterday (May 21), reportedly slipping on floor as he left the shower and landing on his back and hitting his head; reported dizziness, headache, L sided body pain, blurry vision. Head imaging performed at ED showing no ICH, no fractures, no subluxation of adjacent joints.  - restart Aspirin 81 mg po daily and Atorvastatin 40 mg po QHS; hospitalist to f/u  - c/w multiple vitamins po daily (preventive measure)  4.) Psychiatric:  - Ativan 2 mg po q2 for CIWA increase > 2 or score > 9  - PRNs: acetaminophen 650 mg po q6 for pain; Hydroxyzine 50 mg po q6 for acute anxiety; IM Ativan 2mg for severe agitation (stop after 1 time)  - start lurasidone  20 mg po daily for mood stabilization (in the s/o bipolar depression)  - tobacco usage to be addressed  5.) Therapy: G/M/I therapy as indicated  6.) Collateral: to be obtained from pt  7.) Dispo: when stable and no harm to himself or others Dre Dickerson is a 58 year old male, single, undomiciled (lives in hotels) and unemployed, self-reports hx of bipolar disorder, depression and anxiety; as per Psyckes: with pan-diagnoses namely: MDD, Substance-Induced Depressive Disorder, Bipolar d/o,  Antisocial Personality Disorder, Borderline Personality Disorder, schizoaffective Disorder. Pt is chronically nonadherent to meds/ psych appts and has hx of multiple psych admissions (most recently [facility] in Jan 2024), self reports hx of self aborted SA (jumping in front of a 7 train in summer 2023). Pt with polysubstance use: Alcohol (denied any seizures, stated in past he has had VH during detox - per psyckes, with multiple detox treatments/ rehabs), Cocaine, possible other psychoactive substance related disorders: Cannabis/ Tobacco/ Opioid. Pertinent medical issues include: HTN, CKD, GERD, pericarditis; self reports hx of CHF (with ? EF 45%). Pt self-presented to ED reporting SI with plan to jump in front of train or shoot himself. Transferred to our psych unit for further stabilization and management of symptoms/medication adjustment.    On assessment, pt presents as largely euthymic while reporting depressive symptoms of anhedonia, low energy, lack of sleep, and substance use, involving alcohol and cocaine in the setting of psychosocial (estranged from daughter living in [place], [place]) and financial stressors, and absence of stable housing. Reported PPHx of bipolar d/o ddx MDD, but no manic or psychotic presentation on exam. Denies current SIIP/HIIP and is goal-oriented, trying to elicit optimal medication regimen to control depressive symptoms. Wants to go to substance abuse rehab (preferably [facility]). Pt's substance abuse history includes Alcohol, Cocaine and possibly further substances, need to elicit further. Differential also includes personality disorders (antisocial/borderline). Also consider possible secondary gain.    PLAN  1.) Legal: voluntary admission (9.13)  2.) Observation: routine checks (q15), no need for 1:1 observation  3.) Medical:  - PMH of HTN, CKD, GERD, pericarditis, self reports hx of CHF (with ? EF 45%)  - labs completed at ED (May 21) showing slight transaminitis (AST 80, ALT 82), utox positive for cocaine  - Pt had unwitnessed fall at approx 6:30 pm yesterday (May 21), reportedly slipping on floor as he left the shower and landing on his back and hitting his head; reported dizziness, headache, L sided body pain, blurry vision. Head imaging performed at ED showing no ICH, no fractures, no subluxation of adjacent joints.  - restart Aspirin 81 mg po daily and Atorvastatin 40 mg po QHS; hospitalist to f/u  - c/w multiple vitamins po daily (preventive measure)  4.) Psychiatric:  - Ativan 2 mg po q2 for CIWA increase > 2 or score > 9  - PRNs: acetaminophen 650 mg po q6 for pain; Hydroxyzine 50 mg po q6 for acute anxiety; IM Ativan 2mg for severe agitation (stop after 1 time)  - start lurasidone  20 mg po daily for mood stabilization (in the s/o bipolar depression)  - tobacco usage to be addressed  5.) Therapy: G/M/I therapy as indicated  6.) Collateral: to be obtained from pt  7.) Dispo: when stable and no harm to himself or others

## 2024-05-22 NOTE — BH INPATIENT PSYCHIATRY ASSESSMENT NOTE - NSBHATTESTCOMMENTATTENDFT_PSY_A_CORE
Pt admitted with depression.  On MSE, pt is pleasant, linear in thought process, goal directed in behavior, without any objective signs of miquel or psychosis.  Etiology of presentation is likely multifactorial, with possible contributions from historical dx of bipolar depression, persistent depressive disorder, personality traits vs full disorder, adjustment, substance induced depressive symptoms, and secondary gain.  To cover differential, discussed medication management with pt, pt in agreement with trying lurasidone (benefits/indications/risk of side effects discussed with pt) plus psychotherapeutic interventions on unit.  [facility]

## 2024-05-22 NOTE — BH INPATIENT PSYCHIATRY ASSESSMENT NOTE - NSBHMETABOLIC_PSY_ALL_CORE_FT
BMI: BMI (kg/m2): 36.8 (05-21-24 @ 20:02)  HbA1c:   Glucose: POCT Blood Glucose.: 104 mg/dL (04-04-24 @ 06:04)    BP: 138/86 (05-22-24 @ 01:49) (129/64 - 138/86)Vital Signs Last 24 Hrs  T(C): 36.4 (05-22-24 @ 08:09), Max: 37.1 (05-21-24 @ 20:02)  T(F): 97.6 (05-22-24 @ 08:09), Max: 98.7 (05-21-24 @ 20:02)  HR: 101 (05-22-24 @ 01:49) (78 - 108)  BP: 138/86 (05-22-24 @ 01:49) (108/78 - 138/86)  BP(mean): 85 (05-22-24 @ 00:10) (85 - 99)  RR: 16 (05-22-24 @ 00:10) (16 - 18)  SpO2: 97% (05-22-24 @ 00:10) (95% - 99%)    Orthostatic VS  05-22-24 @ 08:09  Lying BP: --/-- HR: --  Sitting BP: 116/69 HR: 113  Standing BP: 107/65 HR: 111  Site: --  Mode: --    Lipid Panel: Date/Time: 04-02-24 @ 23:00  Cholesterol, Serum: 128  LDL Cholesterol Calculated: 55  HDL Cholesterol, Serum: 45  Total Cholesterol/HDL Ration Measurement: --  Triglycerides, Serum: 139   BMI: BMI (kg/m2): 36.8 (05-21-24 @ 20:02)  HbA1c:   Glucose: POCT Blood Glucose.: 104 mg/dL (04-04-24 @ 06:04)    BP: 138/86 (05-22-24 @ 01:49) (129/64 - 138/86)Vital Signs Last 24 Hrs  T(C): 36.4 (05-22-24 @ 08:09), Max: 37.1 (05-21-24 @ 20:02)  T(F): 97.6 (05-22-24 @ 08:09), Max: 98.7 (05-21-24 @ 20:02)  HR: 101 (05-22-24 @ 01:49) (78 - 101)  BP: 138/86 (05-22-24 @ 01:49) (108/78 - 138/86)  BP(mean): 85 (05-22-24 @ 00:10) (85 - 99)  RR: 16 (05-22-24 @ 00:10) (16 - 18)  SpO2: 97% (05-22-24 @ 00:10) (97% - 99%)    Orthostatic VS  05-22-24 @ 08:09  Lying BP: --/-- HR: --  Sitting BP: 116/69 HR: 113  Standing BP: 107/65 HR: 111  Site: --  Mode: --    Lipid Panel: Date/Time: 04-02-24 @ 23:00  Cholesterol, Serum: 128  LDL Cholesterol Calculated: 55  HDL Cholesterol, Serum: 45  Total Cholesterol/HDL Ration Measurement: --  Triglycerides, Serum: 139   BMI: BMI (kg/m2): 36.8 (05-21-24 @ 20:02)  HbA1c:   Glucose: POCT Blood Glucose.: 104 mg/dL (04-04-24 @ 06:04)    BP: 124/75 (05-22-24 @ 21:02) (124/75 - 138/86)Vital Signs Last 24 Hrs  T(C): 36.6 (05-23-24 @ 08:37), Max: 36.6 (05-23-24 @ 08:37)  T(F): 97.8 (05-23-24 @ 08:37), Max: 97.8 (05-23-24 @ 08:37)  HR: 100 (05-22-24 @ 21:02) (100 - 100)  BP: 124/75 (05-22-24 @ 21:02) (124/75 - 124/75)  BP(mean): --  RR: --  SpO2: 98% (05-22-24 @ 21:02) (98% - 98%)    Orthostatic VS  05-23-24 @ 08:37  Lying BP: --/-- HR: --  Sitting BP: 117/74 HR: 61  Standing BP: 128/91 HR: 80  Site: --  Mode: --  Orthostatic VS  05-22-24 @ 08:09  Lying BP: --/-- HR: --  Sitting BP: 116/69 HR: 113  Standing BP: 107/65 HR: 111  Site: --  Mode: --    Lipid Panel: Date/Time: 04-02-24 @ 23:00  Cholesterol, Serum: 128  LDL Cholesterol Calculated: 55  HDL Cholesterol, Serum: 45  Total Cholesterol/HDL Ration Measurement: --  Triglycerides, Serum: 139

## 2024-05-22 NOTE — BH INPATIENT PSYCHIATRY ASSESSMENT NOTE - CURRENT MEDICATION
MEDICATIONS  (STANDING):  aspirin enteric coated 81 milliGRAM(s) Oral daily  atorvastatin 40 milliGRAM(s) Oral at bedtime    MEDICATIONS  (PRN):  acetaminophen     Tablet .. 650 milliGRAM(s) Oral every 6 hours PRN Mild Pain (1 - 3), Moderate Pain (4 - 6), Severe Pain (7 - 10)  hydrOXYzine hydrochloride 50 milliGRAM(s) Oral every 6 hours PRN acute anxiety  LORazepam     Tablet 2 milliGRAM(s) Oral every 2 hours PRN CIWA score increase by 2 points and current CIWA score GREATER THAN 9  LORazepam   Injectable 2 milliGRAM(s) IntraMuscular Once PRN severe agitation   MEDICATIONS  (STANDING):  aspirin enteric coated 81 milliGRAM(s) Oral daily  atorvastatin 40 milliGRAM(s) Oral at bedtime  lurasidone 20 milliGRAM(s) Oral <User Schedule>  multivitamin 1 Tablet(s) Oral daily    MEDICATIONS  (PRN):  acetaminophen     Tablet .. 650 milliGRAM(s) Oral every 6 hours PRN Mild Pain (1 - 3), Moderate Pain (4 - 6), Severe Pain (7 - 10)  hydrOXYzine hydrochloride 50 milliGRAM(s) Oral every 6 hours PRN acute anxiety  ibuprofen  Tablet. 400 milliGRAM(s) Oral every 6 hours PRN Moderate Pain (4 - 6)  LORazepam     Tablet 2 milliGRAM(s) Oral every 2 hours PRN CIWA score increase by 2 points and current CIWA score GREATER THAN 9  LORazepam   Injectable 2 milliGRAM(s) IntraMuscular once PRN agitation/aggression  polyethylene glycol 3350 17 Gram(s) Oral daily PRN constipation   MEDICATIONS  (STANDING):  aspirin enteric coated 81 milliGRAM(s) Oral daily  atorvastatin 40 milliGRAM(s) Oral at bedtime  lurasidone 20 milliGRAM(s) Oral <User Schedule>  multivitamin 1 Tablet(s) Oral daily  PPD  5 Tuberculin Unit(s) Injectable 5 Unit(s) IntraDermal once    MEDICATIONS  (PRN):  acetaminophen     Tablet .. 650 milliGRAM(s) Oral every 6 hours PRN Mild Pain (1 - 3), Moderate Pain (4 - 6), Severe Pain (7 - 10)  hydrOXYzine hydrochloride 50 milliGRAM(s) Oral every 6 hours PRN acute anxiety  ibuprofen  Tablet. 400 milliGRAM(s) Oral every 6 hours PRN Moderate Pain (4 - 6)  LORazepam     Tablet 2 milliGRAM(s) Oral every 2 hours PRN CIWA score increase by 2 points and current CIWA score GREATER THAN 9  LORazepam   Injectable 2 milliGRAM(s) IntraMuscular once PRN agitation/aggression  polyethylene glycol 3350 17 Gram(s) Oral daily PRN constipation

## 2024-05-22 NOTE — BH INPATIENT PSYCHIATRY ASSESSMENT NOTE - NS ED BHA HOMICIDALITY PRESENT CURRENT PLAN
Pt c/o fever x1d. denies n/v/d/congestion. +PO/UO. Pt AAA, skin warm and dry, brisk cap refil, color pink. last tyl @2200. Born FT. (2) more than 100 beats/min None known

## 2024-05-22 NOTE — BH SOCIAL WORK INITIAL PSYCHOSOCIAL EVALUATION - NSBHCHILDRESP_PSY_ALL_CORE
Pt has a 16 yr old dtr - Charo Avendanoy, but she [place] out of state with her mother in [place], [place]. Pt has not seen his dtr since she was 3 weeks old./No

## 2024-05-22 NOTE — BH INPATIENT PSYCHIATRY ASSESSMENT NOTE - HPI (INCLUDE ILLNESS QUALITY, SEVERITY, DURATION, TIMING, CONTEXT, MODIFYING FACTORS, ASSOCIATED SIGNS AND SYMPTOMS)
58 yr old male, single, undomiciled (lives in hotels) and unemployed.  self reports hx of bipolar disorder, depression and anxiety; as per Psyckes: with pan-diagnoses namely: Major Depressive Disorder, Substance-Induced Depressive Disorder, Unspecified/Other Bipolar, Depressive Disorder,  Antisocial Personality Disorder,  schizoaffective Disorder, Bipolar I, Bipolar II, Unspecified/Other Anxiety Disorder, Adjustment Disorder, Substance-Induced Sleep Disorder, Schizophrenia, Substance-Induced Psychotic Disorder, Unspecified/Other Psychotic Disorders, Attention Deficit Hyperactivity Disorder, Borderline Personality Disorder, Unspecified/Other Psychotic Disorders, Attention Deficit Hyperactivity Disorder, Borderline Personality Disorder, Insomnia Disorder, Other Mental Disorders  and Substance-Induced Anxiety Disorder, Pt is chronically nonadherent to meds/ psych appts.  has hx of multiple psych admissions (most recently [facility] in Jan 2024), self reports hx of self aborted SA (jumping in front of a 7 train - last summer). with polysubstance use: Alcohol (denied any seizures, stated in past he has had VH during detox - per psyckes, with multiple detoxes/ rehabs - last attendance at [facility]-  for Alcohol dependence, uncomplicated)/ Cocaine/ Other psychoactive substance related disorders/ Cannabis/ Tobacco/ Opioid/  Other stimulant related disorders.  Pt self reports hx of self aborted SA (jumping in front of a 7 train summer 2023. Pertinent medical issues include: HTN, CKD, GERD, pericarditis; self reports hx of CHF (with ? EF 45%), self-presents reporting SI with plan to jump in front of train or shoot himself.     Patient reports that he's feeling suicidal (for about 1 month) with a plan to jump in front of a train or shoot himself.  He states he has a gun stored with a friend in [place] and he admits to plans on shooting himself but refuses to provide further information. He reports persistently depressed mood and anhedonia. Patient feels hopeless, with low energy, not sleeping well and not eating well. He is unable and unwilling to engage in any safety planning. Pt says primary stressor is that he hasn't been able to reach his daughter (she lives in [place]) for 30 days. He reports "sometimes binging" on alcohol. BAL=51 at 14:12 today (5/21/24). States he had "a few shots" of hard liquor earlier today. He denies drug use. He denies manic or psychotic symptoms. He denies homicidal or violent ideation, intent, or plan. Pt states he hasn't taken prescribed meds (Abilify, Depakote, and Atorvastatin) in a few months. He is also nonadherent with Aspirin. 58 yr old male, single, undomiciled (lives in hotels) and unemployed.  self reports hx of bipolar disorder, depression and anxiety; as per Psyckes: with pan-diagnoses namely: MDD, Substance-Induced Depressive Disorder, Bipolar d/o,  Antisocial Personality Disorder,  schizoaffective Disorder. Pt is chronically nonadherent to meds/ psych appts., has hx of multiple psych admissions (most recently [facility] in Jan 2024), self reports hx of self aborted SA (jumping in front of a 7 train - last summer). with polysubstance use: Alcohol (denied any seizures, stated in past he has had VH during detox - per psyckes, with multiple detox treatments/ rehabs - last attendance at [facility] -  for Alcohol dependence, uncomplicated)/ Cocaine/ Other psychoactive substance related disorders/ Cannabis/ Tobacco/ Opioid. Pertinent medical issues include: HTN, CKD, GERD, pericarditis; self reports hx of CHF (with ? EF 45%). Pt self-presented to ED reporting SI with plan to jump in front of train or shoot himself. Transferred to our psych unit for further stabilization and management of symptoms/medication adjustment.    Patient seen in interview room, reports having felt increasingly depressed and suicidal for the last weeks, superimposed on persistently depressed mood and anhedonia. Patient describes feeling hopeless, not sleeping well (early awakenings, restlessness) and not eating well. Identifies family problems and housing issues as his primary stressors. Hasn't been able to reach his daughter (lives in [place], per recent ED note) for around 1 month. He would have a conflicted relationship with the daughter's mother who would live with a young partner. Mentions that the daughter has a troubled relationship with her mother as well but he could not get more information, which would make him feel very depressed, resorting to alcohol and cocaine use. He reports binging episodes with alcohol and cocaine for 1-2 days each. This would include Budweiser and Vodka, and he would spend around $2000 on cocaine for a short time frame and consume accordingly, which all happened at the beginning of May. Pt outlines his therapeutic goals saying he wants to be out of the hospital by the beginning of June (upcoming birthday) and would just need to be put on a different medication or depressive symptom control. Reassures MDs that he is not substance-seeking on the unit, but would profit from rehab at [facility], saying that he also likes their fitness center and psychotherapy focus, especially DBT. Denies current SIIP, HIIP. Denies self-injurious behavior (no cutting). No manic or psychotic symptoms elicited. Pt states he has tried several meds in the past but complaints about how Abilify and Depakote gave him nightmares and he would not have tolerated other medications trial well or only experienced minimal effects. 58 yr old male, single, undomiciled (lives in hotels) and unemployed.  self reports hx of bipolar disorder, depression and anxiety; as per Psyckes: with pan-diagnoses namely: MDD, Substance-Induced Depressive Disorder, Bipolar d/o,  Antisocial Personality Disorder, Borderline Personality Disorder,  schizoaffective Disorder. Pt is chronically nonadherent to meds/ psych appts., has hx of multiple psych admissions (most recently [facility] in Jan 2024), self reports hx of self aborted SA (jumping in front of a 7 train - last summer). with polysubstance use: Alcohol (denied any seizures, stated in past he has had VH during detox - per psyckes, with multiple detox treatments/ rehabs - last attendance at [facility] -  for Alcohol dependence, uncomplicated)/ Cocaine/ Other psychoactive substance related disorders/ Cannabis/ Tobacco/ Opioid. Pertinent medical issues include: HTN, CKD, GERD, pericarditis; self reports hx of CHF (with ? EF 45%). Pt self-presented to ED reporting SI with plan to jump in front of train or shoot himself. Transferred to our psych unit for further stabilization and management of symptoms/medication adjustment.    Patient seen in interview room, reports having felt increasingly depressed and suicidal for the last weeks, superimposed on persistently depressed mood and anhedonia. Patient describes feeling hopeless, not sleeping well (early awakenings, restlessness) and not eating well. Identifies family problems and housing issues as his primary stressors. Hasn't been able to reach his daughter (lives in [place], per recent ED note) for around 1 month. He would have a conflicted relationship with the daughter's mother who would live with a young partner. Mentions that the daughter has a troubled relationship with her mother as well but he could not get more information, which would make him feel very depressed, resorting to alcohol and cocaine use. He reports binging episodes with alcohol and cocaine for 1-2 days each. This would include Budweiser and Vodka, and he would spend around $2000 on cocaine for a short time frame and consume accordingly, which all happened at the beginning of May. Pt outlines his therapeutic goals saying he wants to be out of the hospital by the beginning of June (upcoming birthday) and would just need to be put on a different medication or depressive symptom control. Reassures MDs that he is not substance-seeking on the unit, but would profit from rehab at [facility], saying that he also likes their fitness center and psychotherapy focus, especially DBT. Denies current SIIP, HIIP. Denies self-injurious behavior (no cutting). No manic or psychotic symptoms elicited. Pt states he has tried several meds in the past but complaints about how Abilify and Depakote gave him nightmares and he would not have tolerated other medications trial well or only experienced minimal effects. 58 yr old male, single, undomiciled (lives in hotels) and unemployed.  self reports hx of bipolar disorder, depression and anxiety; as per Psyckes: with pan-diagnoses namely: MDD, Substance-Induced Depressive Disorder, Bipolar d/o,  Antisocial Personality Disorder, Borderline Personality Disorder,  schizoaffective Disorder. Pt is chronically nonadherent to meds/ psych appts., has hx of multiple psych admissions (most recently [facility] in Jan 2024), self reports hx of self aborted SA (jumping in front of a 7 train - last summer). with polysubstance use: Alcohol (denied any seizures, stated in past he has had VH during detox - per psyckes, with multiple detox treatments/ rehabs - last attendance at [facility] -  for Alcohol dependence, uncomplicated)/ Cocaine/ Other psychoactive substance related disorders/ Cannabis/ Tobacco/ Opioid. Pertinent medical issues include: HTN, CKD, GERD, pericarditis; self reports hx of CHF (with ? EF 45%). Pt self-presented to ED reporting SI with plan to jump in front of train or shoot himself. Transferred to our psych unit for further stabilization and management of symptoms/medication adjustment.    Patient seen in interview room, reports having felt increasingly depressed and suicidal for the last weeks, superimposed on persistently depressed mood and anhedonia. Patient describes feeling hopeless, not sleeping well (early awakenings, restlessness) and not eating well. Identifies family problems and housing issues as his primary stressors. Hasn't been able to reach his daughter (lives in [place], per recent ED note) for around 1 month. He would have a conflicted relationship with the daughter's mother who would live with a young partner. Mentions that the daughter has a troubled relationship with her mother as well but he could not get more information, which would make him feel very depressed, resorting to alcohol and cocaine use. He reports binging episodes with alcohol and cocaine for 1-2 days each. This would include Budweiser and Vodka, and he would spend around $2000 on cocaine for a short time frame and consume accordingly, which all happened at the beginning of May. Pt outlines his therapeutic goals saying he wants to be out of the hospital by the beginning of June (upcoming birthday) and would just need to be put on a different medication or depressive symptom control. Requests rehab at [facility], saying that he also likes their fitness center and psychotherapy focus, especially DBT. Denies current SIIP, HIIP. Denies self-injurious behavior (no cutting). No manic or psychotic symptoms elicited. Pt states he has tried several meds in the past but describes how Abilify and Depakote gave him nightmares and he would not have tolerated other medications trial well or only experienced minimal effects.

## 2024-05-22 NOTE — BH INPATIENT PSYCHIATRY ASSESSMENT NOTE - VIOLENCE PROTECTIVE FACTORS:
Affective Stability/Insight into violence risk and need for management/treatment Engagement in treatment

## 2024-05-22 NOTE — BH INPATIENT PSYCHIATRY ASSESSMENT NOTE - NSTXPROBDEPRES_PSY_ALL_CORE
Update History & Physical    The patient's History and Physical of February 26, 2024 was reviewed with the patient and I examined the patient. There was no change. The surgical site was confirmed by the patient and me.     Plan: The risks, benefits, expected outcome, and alternative to the recommended procedure have been discussed with the patient. Patient understands and wants to proceed with the procedure.     Electronically signed by EDDIE WALKER MD on 2/26/2024 at 9:50 AM       85M with h/o HTN, HLD, DM, CAD on ASA/Plavix, PAD, CKD, prostate ca s/p radiation, anemia, COPD not on home O2, PAD, h/o TURBT (per chart review, last in 2020) who presents with hematuria. He is scheduled for cysto clot evacuation with    Plan  - Trend H/H -> stable  - Monitor urine color -> color is light pink vs clear yellow today. Will still plan for OR clot evac today given significant clot burden noted on cystoscopy Friday in the office and bleding when on ac   - Irrigate swenson if not draining  - Obtain medical optimization  - Hold eliquis  - Continue ASA  reviewed risk of bleeding infection damage to bladder Patient reports no complaints. Does not know his medications. Planned for cystoscopy for clot evacuation today. Continue to hold anti-coagulation prior to procedure. Patient high-risk per cardiology. Currently has Acute Kidney Injury with hyperkalemia. Discussed with Nephrology, Dr. Rosario. Will give lokelma and insulin/dextrose prior to procedure. Monitor on telemetry, repeat BMP. DEPRESSIVE SYMPTOMS Patient was seen and examined at bedside. Denies any complains, upset about discharging getting cancelled     ICU Vital Signs Last 24 Hrs  T(C): 37.1 (13 Sep 2023 14:07), Max: 37.1 (13 Sep 2023 05:06)  T(F): 98.8 (13 Sep 2023 14:07), Max: 98.8 (13 Sep 2023 05:06)  HR: 72 (13 Sep 2023 14:07) (63 - 72)  BP: 128/72 (13 Sep 2023 14:07) (122/52 - 128/72)  BP(mean): --  ABP: --  ABP(mean): --  RR: 18 (13 Sep 2023 14:07) (18 - 18)  SpO2: 98% (13 Sep 2023 14:07) (97% - 98%)    O2 Parameters below as of 13 Sep 2023 14:07  Patient On (Oxygen Delivery Method): room air      P/E:  NAD  AAOx2, no focal deficit   CTABL  S1S2 WNL  Abd soft, non tender, BS present   BL LE no edema or calf tenderness       labs noted     A/P:  Hyperkalemia persists after lokelma, insulin, dextrose and Albuterol. No EKG changes. Will repeat BMP after second round of above  Will discharge on Lokelma daily. Follows Dr. Moser. Spoke with Dr. Rosario, patient will have a follow up in one week  العلي discontinued. Follow urinary retention. Hb stable after AC started. Outpatient follow up with urology. Renal function at baseline   Rest of the management as above Patient was seen and examined at bedside. Denies any complains     ICU Vital Signs Last 24 Hrs  T(C): 36.6 (12 Sep 2023 13:34), Max: 36.7 (12 Sep 2023 05:06)  T(F): 97.9 (12 Sep 2023 13:34), Max: 98.1 (12 Sep 2023 05:06)  HR: 71 (12 Sep 2023 13:34) (61 - 71)  BP: 130/73 (12 Sep 2023 13:34) (130/73 - 177/61)  BP(mean): 98 (11 Sep 2023 22:45) (86 - 98)  ABP: --  ABP(mean): --  RR: 18 (12 Sep 2023 13:34) (14 - 18)  SpO2: 99% (12 Sep 2023 13:34) (97% - 100%)    O2 Parameters below as of 12 Sep 2023 13:34  Patient On (Oxygen Delivery Method): room air      P/E:  NAD  AAOx2, no focal deficit   CTABL  S1S2 WNL  Abd soft, non tender, BS present   BL LE no edema or calf tenderness       labs noted     A/P:  العلي discontinued. Follow urinary retention. Start AC from tonight. Outpatient follow up with urology. Renal function improved  Rest of the management as above

## 2024-05-22 NOTE — BH SOCIAL WORK INITIAL PSYCHOSOCIAL EVALUATION - OTHER PAST PSYCHIATRIC HISTORY (INCLUDE DETAILS REGARDING ONSET, COURSE OF ILLNESS, INPATIENT/OUTPATIENT TREATMENT)
58 yr old male, single, undomiciled (lives in hotels) and unemployed.  self reports hx of bipolar disorder, depression and anxiety. Self-presents reporting SI with plan to jump in front of train or shoot himself.

## 2024-05-22 NOTE — BH SOCIAL WORK INITIAL PSYCHOSOCIAL EVALUATION - NSBHBARRIERS_PSY_ALL_CORE
Co-morbid personality/Financial difficulties/Lack of support/Non-compliant with treatment/Poor judgement/Social withdrawal

## 2024-05-23 DIAGNOSIS — F19.90 OTHER PSYCHOACTIVE SUBSTANCE USE, UNSPECIFIED, UNCOMPLICATED: ICD-10-CM

## 2024-05-23 DIAGNOSIS — F31.9 BIPOLAR DISORDER, UNSPECIFIED: ICD-10-CM

## 2024-05-23 DIAGNOSIS — M79.89 OTHER SPECIFIED SOFT TISSUE DISORDERS: ICD-10-CM

## 2024-05-23 DIAGNOSIS — I10 ESSENTIAL (PRIMARY) HYPERTENSION: ICD-10-CM

## 2024-05-23 DIAGNOSIS — E78.5 HYPERLIPIDEMIA, UNSPECIFIED: ICD-10-CM

## 2024-05-23 PROCEDURE — 99232 SBSQ HOSP IP/OBS MODERATE 35: CPT | Mod: GC

## 2024-05-23 PROCEDURE — 99223 1ST HOSP IP/OBS HIGH 75: CPT

## 2024-05-23 RX ORDER — TUBERCULIN PURIFIED PROTEIN DERIVATIVE 5 [IU]/.1ML
5 INJECTION, SOLUTION INTRADERMAL ONCE
Refills: 0 | Status: COMPLETED | OUTPATIENT
Start: 2024-05-23 | End: 2024-05-23

## 2024-05-23 RX ADMIN — Medication 81 MILLIGRAM(S): at 08:32

## 2024-05-23 RX ADMIN — Medication 100 MILLIGRAM(S): at 17:57

## 2024-05-23 RX ADMIN — ATORVASTATIN CALCIUM 40 MILLIGRAM(S): 80 TABLET, FILM COATED ORAL at 20:21

## 2024-05-23 RX ADMIN — Medication 1 TABLET(S): at 08:31

## 2024-05-23 RX ADMIN — LURASIDONE HYDROCHLORIDE 20 MILLIGRAM(S): 40 TABLET ORAL at 20:21

## 2024-05-23 NOTE — CONSULT NOTE ADULT - ASSESSMENT
Patient is a 59yo M with HTN, HLD, pericarditis, EtOH use disorder, bipolar disorder, and substance use disorder who presented with suicidal ideation and medication non-adherence. He reported right foot swelling and inability to ambulate, and informed staff of a history of heart failure.       #RLE swelling  - obtain RLE duplex to r/o DVT  - encourage elevation  - no other evidence of volume overload to suggest decompensated heart failure  - no TTE on record – needs outpatient cardiology f/u    #HTN  - resume home antihypertensives, titrate to normotensin  - DASH diet    #HLD  - c/w statin    #bipolar disorder with SI  - plan and meds per psych

## 2024-05-23 NOTE — CONSULT NOTE ADULT - SUBJECTIVE AND OBJECTIVE BOX
HPI:   Patient is a 57yo M with HTN, HLD, pericarditis, EtOH use disorder, bipolar disorder, and substance use disorder who presented with suicidal ideation and medication non-adherence. He reported right foot swelling and inability to ambulate, and informed staff of a history of heart failure. Hospitalist progress note from 4/3/24 documented similar report of CHF with LVEF 40%.    He states his RLE has been swollen for the past 2 months but it was progressively worsened and now he has difficulty with ambulation. Reports a history of CHF which he attributes to alcohol use. States he used to binge drink once a month but has been sober for some time. Denies family history of blood clots. Denies chest pain, dizziness, lightheadedness, palpitations, dyspnea (at rest or on exertion), or orthopnea.       PAST MEDICAL & SURGICAL HISTORY:  Hypertension  Pericarditis  Alcohol abuse  Cocaine abuse  No significant past surgical history      Review of Systems:   CONSTITUTIONAL: No fever, weight loss, or fatigue  EYES: No eye pain, visual disturbances, or discharge  ENMT:  No difficulty hearing, tinnitus, vertigo; No sinus or throat pain  NECK: No pain or stiffness  RESPIRATORY: No cough, wheezing, chills or hemoptysis; No shortness of breath  CARDIOVASCULAR: No chest pain, palpitations, dizziness, or leg swelling  GASTROINTESTINAL: No abdominal or epigastric pain. No nausea, vomiting, or hematemesis; No diarrhea or constipation. No melena or hematochezia.  GENITOURINARY: No dysuria, frequency, hematuria, or incontinence  NEUROLOGICAL: No headaches, memory loss, loss of strength, numbness, or tremors  SKIN: No itching, burning, rashes, or lesions   LYMPH NODES: No enlarged glands  ENDOCRINE: No heat or cold intolerance; No hair loss  MUSCULOSKELETAL: No joint pain or swelling; No muscle, back, or extremity pain  HEME/LYMPH: No easy bruising, or bleeding gums  ALLERY AND IMMUNOLOGIC: No hives or eczema    Allergies    No Known Allergies    Intolerances        Social History: cocaine use, EtOH use (binge drinking), denies tobacco use    FAMILY HISTORY: lung cancer      MEDICATIONS  (STANDING):  aspirin enteric coated 81 milliGRAM(s) Oral daily  atorvastatin 40 milliGRAM(s) Oral at bedtime  lurasidone 20 milliGRAM(s) Oral <User Schedule>  multivitamin 1 Tablet(s) Oral daily  PPD  5 Tuberculin Unit(s) Injectable 5 Unit(s) IntraDermal once    MEDICATIONS  (PRN):  acetaminophen     Tablet .. 650 milliGRAM(s) Oral every 6 hours PRN Mild Pain (1 - 3), Moderate Pain (4 - 6), Severe Pain (7 - 10)  hydrOXYzine hydrochloride 50 milliGRAM(s) Oral every 6 hours PRN acute anxiety  ibuprofen  Tablet. 400 milliGRAM(s) Oral every 6 hours PRN Moderate Pain (4 - 6)  LORazepam     Tablet 2 milliGRAM(s) Oral every 2 hours PRN CIWA score increase by 2 points and current CIWA score GREATER THAN 9  LORazepam   Injectable 2 milliGRAM(s) IntraMuscular once PRN agitation/aggression  polyethylene glycol 3350 17 Gram(s) Oral daily PRN constipation      Vital Signs Last 24 Hrs  T(C): 36.6 (23 May 2024 08:37), Max: 36.6 (23 May 2024 08:37)  T(F): 97.8 (23 May 2024 08:37), Max: 97.8 (23 May 2024 08:37)  HR: 100 (22 May 2024 21:02) (100 - 100)  BP: 124/75 (22 May 2024 21:02) (124/75 - 124/75)  BP(mean): --  RR: --  SpO2: 98% (22 May 2024 21:02) (98% - 98%)    Parameters below as of 22 May 2024 21:02  Patient On (Oxygen Delivery Method): room air      CAPILLARY BLOOD GLUCOSE            PHYSICAL EXAM:  GENERAL: NAD, well-developed  HEAD:  Atraumatic, Normocephalic  EYES: EOMI, conjunctiva and sclera clear  NECK: Supple, No JVD  CHEST/LUNG: Clear to auscultation bilaterally; No wheeze  HEART: Regular rate and rhythm; No murmurs, rubs, or gallops  ABDOMEN: Soft, Nontender, Nondistended; Bowel sounds present  EXTREMITIES:  2+ Peripheral Pulses, No clubbing, cyanosis, or edema  NEUROLOGY: non-focal  SKIN: No rashes or lesions    LABS:                        12.3   6.85  )-----------( 257      ( 21 May 2024 14:12 )             35.3     05-21    137  |  102  |  17  ----------------------------<  100<H>  4.1   |  19<L>  |  1.17    Ca    8.9      21 May 2024 14:12    TPro  7.5  /  Alb  4.0  /  TBili  0.5  /  DBili  x   /  AST  80<H>  /  ALT  82<H>  /  AlkPhos  64  05-21          Urinalysis Basic - ( 21 May 2024 14:12 )    Color: Yellow / Appearance: Clear / S.024 / pH: x  Gluc: 100 mg/dL / Ketone: Negative mg/dL  / Bili: Negative / Urobili: 0.2 mg/dL   Blood: x / Protein: Negative mg/dL / Nitrite: Negative   Leuk Esterase: Negative / RBC: x / WBC x   Sq Epi: x / Non Sq Epi: x / Bacteria: x       HPI:   Patient is a 59yo M with HTN, HLD, pericarditis, EtOH use disorder, bipolar disorder, and substance use disorder who presented with suicidal ideation and medication non-adherence. He reported right foot swelling and inability to ambulate, and informed staff of a history of heart failure. Hospitalist progress note from 4/3/24 documented similar report of CHF with LVEF 40%.    He states his RLE has been swollen for the past 2 months but it was progressively worsened and now he has difficulty with ambulation. Reports a history of CHF which he attributes to alcohol use. States he used to binge drink once a month but has been sober for some time. Denies family history of blood clots. Denies chest pain, dizziness, lightheadedness, palpitations, dyspnea (at rest or on exertion), or orthopnea.       PAST MEDICAL & SURGICAL HISTORY:  Hypertension  Pericarditis  Alcohol abuse  Cocaine abuse  No significant past surgical history      Review of Systems:   CONSTITUTIONAL: No fever, weight loss, or fatigue  EYES: No eye pain, visual disturbances, or discharge  ENMT:  No difficulty hearing, tinnitus, vertigo; No sinus or throat pain  NECK: No pain or stiffness  RESPIRATORY: No cough, wheezing, chills or hemoptysis; No shortness of breath  CARDIOVASCULAR: No chest pain, palpitations, dizziness, or leg swelling  GASTROINTESTINAL: No abdominal or epigastric pain. No nausea, vomiting, or hematemesis; No diarrhea or constipation. No melena or hematochezia.  GENITOURINARY: No dysuria, frequency, hematuria, or incontinence  NEUROLOGICAL: No headaches, memory loss, loss of strength, numbness, or tremors  SKIN: No itching, burning, rashes, or lesions   LYMPH NODES: No enlarged glands  ENDOCRINE: No heat or cold intolerance; No hair loss  MUSCULOSKELETAL: No joint pain or swelling; No muscle, back, or extremity pain  HEME/LYMPH: No easy bruising, or bleeding gums  ALLERY AND IMMUNOLOGIC: No hives or eczema    Allergies    No Known Allergies    Intolerances        Social History: cocaine use, EtOH use (binge drinking), denies tobacco use    FAMILY HISTORY: lung cancer      MEDICATIONS  (STANDING):  aspirin enteric coated 81 milliGRAM(s) Oral daily  atorvastatin 40 milliGRAM(s) Oral at bedtime  lurasidone 20 milliGRAM(s) Oral <User Schedule>  multivitamin 1 Tablet(s) Oral daily  PPD  5 Tuberculin Unit(s) Injectable 5 Unit(s) IntraDermal once    MEDICATIONS  (PRN):  acetaminophen     Tablet .. 650 milliGRAM(s) Oral every 6 hours PRN Mild Pain (1 - 3), Moderate Pain (4 - 6), Severe Pain (7 - 10)  hydrOXYzine hydrochloride 50 milliGRAM(s) Oral every 6 hours PRN acute anxiety  ibuprofen  Tablet. 400 milliGRAM(s) Oral every 6 hours PRN Moderate Pain (4 - 6)  LORazepam     Tablet 2 milliGRAM(s) Oral every 2 hours PRN CIWA score increase by 2 points and current CIWA score GREATER THAN 9  LORazepam   Injectable 2 milliGRAM(s) IntraMuscular once PRN agitation/aggression  polyethylene glycol 3350 17 Gram(s) Oral daily PRN constipation      Vital Signs Last 24 Hrs  T(C): 36.6 (23 May 2024 08:37), Max: 36.6 (23 May 2024 08:37)  T(F): 97.8 (23 May 2024 08:37), Max: 97.8 (23 May 2024 08:37)  HR: 100 (22 May 2024 21:02) (100 - 100)  BP: 124/75 (22 May 2024 21:02) (124/75 - 124/75)  BP(mean): --  RR: --  SpO2: 98% (22 May 2024 21:02) (98% - 98%)    Parameters below as of 22 May 2024 21:02  Patient On (Oxygen Delivery Method): room air      CAPILLARY BLOOD GLUCOSE            PHYSICAL EXAM:  GENERAL: NAD, well-developed  HEAD:  Atraumatic, Normocephalic  EYES: EOMI, conjunctiva and sclera clear  NECK: Supple, No JVD  CHEST/LUNG: Clear to auscultation bilaterally; No wheeze  HEART: Regular rate and rhythm; No murmurs, rubs, or gallops  ABDOMEN: Soft, Nontender, Nondistended; Bowel sounds present  EXTREMITIES:  2+ Peripheral Pulses, No clubbing, cyanosis; RLE nonpitting edema  NEUROLOGY: non-focal  SKIN: No rashes or lesions    LABS:                        12.3   6.85  )-----------( 257      ( 21 May 2024 14:12 )             35.3     05-21    137  |  102  |  17  ----------------------------<  100<H>  4.1   |  19<L>  |  1.17    Ca    8.9      21 May 2024 14:12    TPro  7.5  /  Alb  4.0  /  TBili  0.5  /  DBili  x   /  AST  80<H>  /  ALT  82<H>  /  AlkPhos  64  05-21          Urinalysis Basic - ( 21 May 2024 14:12 )    Color: Yellow / Appearance: Clear / S.024 / pH: x  Gluc: 100 mg/dL / Ketone: Negative mg/dL  / Bili: Negative / Urobili: 0.2 mg/dL   Blood: x / Protein: Negative mg/dL / Nitrite: Negative   Leuk Esterase: Negative / RBC: x / WBC x   Sq Epi: x / Non Sq Epi: x / Bacteria: x

## 2024-05-23 NOTE — BH INPATIENT PSYCHIATRY PROGRESS NOTE - PRN MEDS
MEDICATIONS  (PRN):  acetaminophen     Tablet .. 650 milliGRAM(s) Oral every 6 hours PRN Mild Pain (1 - 3), Moderate Pain (4 - 6), Severe Pain (7 - 10)  hydrOXYzine hydrochloride 50 milliGRAM(s) Oral every 6 hours PRN acute anxiety  ibuprofen  Tablet. 400 milliGRAM(s) Oral every 6 hours PRN Moderate Pain (4 - 6)  LORazepam     Tablet 2 milliGRAM(s) Oral every 2 hours PRN CIWA score increase by 2 points and current CIWA score GREATER THAN 9  LORazepam   Injectable 2 milliGRAM(s) IntraMuscular once PRN agitation/aggression  polyethylene glycol 3350 17 Gram(s) Oral daily PRN constipation

## 2024-05-23 NOTE — BH INPATIENT PSYCHIATRY PROGRESS NOTE - NSBHASSESSSUMMFT_PSY_ALL_CORE
Dre Dickerson is a 58 year old male, single, undomiciled (lives in hotels) and unemployed, self-reports hx of bipolar disorder, depression and anxiety; as per Psyckes: with pan-diagnoses namely: MDD, Substance-Induced Depressive Disorder, Bipolar d/o,  Antisocial Personality Disorder, Borderline Personality Disorder, schizoaffective Disorder. Pt is chronically nonadherent to meds/ psych appts and has hx of multiple psych admissions (most recently [facility] in Jan 2024), self reports hx of self aborted SA (jumping in front of a 7 train in summer 2023). Pt with polysubstance use: Alcohol (denied any seizures, stated in past he has had VH during detox - per psyckes, with multiple detox treatments/ rehabs), Cocaine, possible other psychoactive substance related disorders: Cannabis/ Tobacco/ Opioid. Pertinent medical issues include: HTN, CKD, GERD, pericarditis; self reports hx of CHF (with ? EF 45%). Pt self-presented to ED reporting SI with plan to jump in front of train or shoot himself. Transferred to our psych unit for further stabilization and management of symptoms/medication adjustment.    On assessment, pt presents as largely euthymic while reporting depressive symptoms of anhedonia, low energy, lack of sleep, and substance use, involving alcohol and cocaine in the setting of psychosocial (estranged from daughter living in [place], [place]) and financial stressors, and absence of stable housing. Reported PPHx of bipolar d/o ddx MDD, but no manic or psychotic presentation on exam. Denies current SIIP/HIIP and is goal-oriented, trying to elicit optimal medication regimen to control depressive symptoms. Wants to go to substance abuse rehab (preferably [facility]). Pt's substance abuse history includes Alcohol, Cocaine and possibly further substances, need to elicit further. Differential also includs personality disorders (antisocial/borderline). Also consider possible secondary gain.    PLAN  1.) Legal: voluntary admission (9.13)  2.) Observation: routine checks (q15), no need for 1:1 observation  3.) Medical:  - PMH of HTN, CKD, GERD, pericarditis, self reports hx of CHF (with ? EF 45%)  - labs completed at ED (May 21) showing slight transaminitis (AST 80, ALT 82), utox positive for cocaine  - Pt had unwitnessed fall at approx 6:30 pm yesterday (May 21), reportedly slipping on floor as he left the shower and landing on his back and hitting his head; reported dizziness, headache, L sided body pain, blurry vision. Head imaging performed at ED showing no ICH, no fractures, no subluxation of adjacent joints.  - restart Aspirin 81 mg po daily and Atorvastatin 40 mg po QHS; hospitalist to f/u  - c/w multiple vitamins po daily (preventive measure)  4.) Psychiatric:  - Ativan 2 mg po q2 for CIWA increase > 2 or score > 9  - PRNs: acetaminophen 650 mg po q6 for pain; Hydroxyzine 50 mg po q6 for acute anxiety; IM Ativan 2mg for severe agitation (stop after 1 time)  - start lurasidone  20 mg po daily for mood stabilization (in the s/o bipolar depression)  - tobacco usage to be addressed  5.) Therapy: G/M/I therapy as indicated  6.) Collateral: to be obtained from pt  7.) Dispo: when stable and no harm to himself or others Dre Dickerson is a 58 year old male, single, undomiciled (lives in hotels) and unemployed, self-reports hx of bipolar disorder, depression and anxiety; as per Psyckes: with pan-diagnoses namely: MDD, Substance-Induced Depressive Disorder, Bipolar d/o,  Antisocial Personality Disorder, Borderline Personality Disorder, schizoaffective Disorder. Pt is chronically nonadherent to meds/ psych appts and has hx of multiple psych admissions (most recently [facility] in Jan 2024), self reports hx of self aborted SA (jumping in front of a 7 train in summer 2023). Pt with polysubstance use: Alcohol (denied any seizures, stated in past he has had VH during detox - per psyckes, with multiple detox treatments/ rehabs), Cocaine, possible other psychoactive substance related disorders: Cannabis/ Tobacco/ Opioid. Pertinent medical issues include: HTN, CKD, GERD, pericarditis; self reports hx of CHF (with ? EF 45%). Pt self-presented to ED reporting SI with plan to jump in front of train or shoot himself. Transferred to our psych unit for further stabilization and management of symptoms/medication adjustment.    On assessment, pt presents as largely euthymic while reporting depressive symptoms of anhedonia, low energy, lack of sleep, and substance use, involving alcohol and cocaine in the setting of psychosocial (estranged from daughter living in [place], [place]) and financial stressors, and absence of stable housing. Reported PPHx of bipolar d/o ddx MDD, but no manic or psychotic presentation on exam. Denies current SIIP/HIIP and is goal-oriented, trying to elicit optimal medication regimen to control depressive symptoms. Wants to go to substance abuse rehab (preferably [facility]). Pt's substance abuse history includes Alcohol, Cocaine; denies nicotine use or use of other substances such as amphetamines. Differential also includes personality disorders (antisocial/borderline). Also consider possible secondary gain. Pt noted to have swollen right foot (proximal dorsum) causing discomfort when walking (no induration, no redness, no pain on palpation).    PLAN  1.) Legal: voluntary admission (9.13)  2.) Observation: routine checks (q15), no need for 1:1 observation  3.) Medical:  - hospitalist paged for assessment of right foot swelling; r/o blood clot, consider comorbid factors, e.g., reported CHF (EF 45%?)   - PMH of HTN, CKD, GERD, pericarditis, self reports hx of CHF (with ? EF 45%)  - labs completed at ED (May 21) showing slight transaminitis (AST 80, ALT 82), utox positive for cocaine  - Pt had unwitnessed fall at approx 6:30 pm yesterday (May 21), reportedly slipping on floor as he left the shower and landing on his back and hitting his head; reported dizziness, headache, L sided body pain, blurry vision. Head imaging performed at ED showing no ICH, no fractures, no subluxation of adjacent joints.  - restart Aspirin 81 mg po daily and Atorvastatin 40 mg po QHS; hospitalist to f/u  - c/w multiple vitamins po daily (preventive measure)  4.) Psychiatric:  - Ativan 2 mg po q2 for CIWA increase > 2 or score > 9  - PRNs: acetaminophen 650 mg po q6 for pain; Hydroxyzine 50 mg po q6 for acute anxiety; IM Ativan 2mg for severe agitation (stop after 1 time)  - start lurasidone 20 mg po daily for mood stabilization (in the s/o bipolar depression)  - tobacco usage to be addressed  5.) Therapy: G/M/I therapy as indicated  6.) Collateral: to be obtained from pt  7.) Dispo: when stable and no harm to himself or others

## 2024-05-23 NOTE — BH INPATIENT PSYCHIATRY PROGRESS NOTE - CURRENT MEDICATION
MEDICATIONS  (STANDING):  aspirin enteric coated 81 milliGRAM(s) Oral daily  atorvastatin 40 milliGRAM(s) Oral at bedtime  lurasidone 20 milliGRAM(s) Oral <User Schedule>  multivitamin 1 Tablet(s) Oral daily  PPD  5 Tuberculin Unit(s) Injectable 5 Unit(s) IntraDermal once    MEDICATIONS  (PRN):  acetaminophen     Tablet .. 650 milliGRAM(s) Oral every 6 hours PRN Mild Pain (1 - 3), Moderate Pain (4 - 6), Severe Pain (7 - 10)  hydrOXYzine hydrochloride 50 milliGRAM(s) Oral every 6 hours PRN acute anxiety  ibuprofen  Tablet. 400 milliGRAM(s) Oral every 6 hours PRN Moderate Pain (4 - 6)  LORazepam     Tablet 2 milliGRAM(s) Oral every 2 hours PRN CIWA score increase by 2 points and current CIWA score GREATER THAN 9  LORazepam   Injectable 2 milliGRAM(s) IntraMuscular once PRN agitation/aggression  polyethylene glycol 3350 17 Gram(s) Oral daily PRN constipation

## 2024-05-23 NOTE — CONSULT NOTE ADULT - TIME BILLING
Time-based billing (NON-critical care).     80 minutes spent on total encounter; more than 50% of the visit was spent counseling and / or coordinating care by the attending physician.  The necessity of the time spent during the encounter on this date of service was due to:     review of laboratory data, radiology results, documentation in [place], discussion with patient/primary team

## 2024-05-23 NOTE — BH INPATIENT PSYCHIATRY PROGRESS NOTE - NSBHFUPINTERVALHXFT_PSY_A_CORE
Case discussed with interdisciplinary team, no acute events reported overnight. Required ibuprofen 400 mg po qhs for headache, but currently no complaints. No psychotropic PRNs required, calm on the unit.    Patient seen in hallway, attempted to call daughter to no avail. Reports good mood, denies depressive symptoms (anhedonia, low energy). Denies nicotine use and use of other substances (denies amphetamine use). Reports he has no weapons at home, saying that he sold a gun a while ago and would not have access to it anymore. Pt focused on discharge to rehab at [facility]. Denies current SIIP, HIIP. No manic or psychotic symptoms elicited. Denies side effects from medication saying he tolerates Latuda well and took it with food. Reports right foot swelling causing discomfort when walking.

## 2024-05-24 PROCEDURE — 99232 SBSQ HOSP IP/OBS MODERATE 35: CPT | Mod: GC

## 2024-05-24 RX ADMIN — Medication 100 MILLIGRAM(S): at 20:38

## 2024-05-24 RX ADMIN — LURASIDONE HYDROCHLORIDE 20 MILLIGRAM(S): 40 TABLET ORAL at 17:26

## 2024-05-24 RX ADMIN — Medication 81 MILLIGRAM(S): at 08:02

## 2024-05-24 RX ADMIN — Medication 400 MILLIGRAM(S): at 12:43

## 2024-05-24 RX ADMIN — Medication 1 TABLET(S): at 08:02

## 2024-05-24 RX ADMIN — Medication 100 MILLIGRAM(S): at 00:42

## 2024-05-24 RX ADMIN — Medication 100 MILLIGRAM(S): at 08:01

## 2024-05-24 NOTE — BH INPATIENT PSYCHIATRY PROGRESS NOTE - CURRENT MEDICATION
MEDICATIONS  (STANDING):  aspirin enteric coated 81 milliGRAM(s) Oral daily  atorvastatin 40 milliGRAM(s) Oral at bedtime  lurasidone 20 milliGRAM(s) Oral <User Schedule>  multivitamin 1 Tablet(s) Oral daily  PPD  5 Tuberculin Unit(s) Injectable 5 Unit(s) IntraDermal once    MEDICATIONS  (PRN):  acetaminophen     Tablet .. 650 milliGRAM(s) Oral every 6 hours PRN Mild Pain (1 - 3), Moderate Pain (4 - 6), Severe Pain (7 - 10)  guaiFENesin Oral Liquid (Sugar-Free) 100 milliGRAM(s) Oral every 6 hours PRN cough  hydrOXYzine hydrochloride 50 milliGRAM(s) Oral every 6 hours PRN acute anxiety  ibuprofen  Tablet. 400 milliGRAM(s) Oral every 6 hours PRN Moderate Pain (4 - 6)  LORazepam     Tablet 2 milliGRAM(s) Oral every 2 hours PRN CIWA score increase by 2 points and current CIWA score GREATER THAN 9  LORazepam   Injectable 2 milliGRAM(s) IntraMuscular once PRN agitation/aggression  polyethylene glycol 3350 17 Gram(s) Oral daily PRN constipation

## 2024-05-24 NOTE — BH INPATIENT PSYCHIATRY PROGRESS NOTE - NSBHFUPINTERVALHXFT_PSY_A_CORE
Case discussed with interdisciplinary team, no acute events reported overnight, no PRNs required, calm on the unit. Fair sleep per sleep log.    Patient seen in [facility], says his mood is fine and stable. Mentions that he could go either to rehab at [facility] or [facility], waiting for updates. Denies current SIIP, HIIP. No manic or psychotic symptoms elicited. Denies side effects from medication saying he tolerates Latuda well. Says his right foot swelling has improved and he has less discomfort walking, does not want further imaging to be done. Good sleep and appetite noted.

## 2024-05-24 NOTE — BH INPATIENT PSYCHIATRY PROGRESS NOTE - NSBHASSESSSUMMFT_PSY_ALL_CORE
Dre Dickerson is a 58 year old male, single, undomiciled (lives in hotels) and unemployed, self-reports hx of bipolar disorder, depression and anxiety; as per Psyckes: with pan-diagnoses namely: MDD, Substance-Induced Depressive Disorder, Bipolar d/o,  Antisocial Personality Disorder, Borderline Personality Disorder, schizoaffective Disorder. Pt is chronically nonadherent to meds/ psych appts and has hx of multiple psych admissions (most recently [facility] in Jan 2024), self reports hx of self aborted SA (jumping in front of a 7 train in summer 2023). Pt with polysubstance use: Alcohol (denied any seizures, stated in past he has had VH during detox - per psyckes, with multiple detox treatments/ rehabs), Cocaine, possible other psychoactive substance related disorders: Cannabis/ Tobacco/ Opioid. Pertinent medical issues include: HTN, CKD, GERD, pericarditis; self reports hx of CHF (with ? EF 45%). Pt self-presented to ED reporting SI with plan to jump in front of train or shoot himself. Transferred to our psych unit for further stabilization and management of symptoms/medication adjustment.    On assessment, pt presents as euthymic, denies current SIIP/HIIP and is goal-oriented, trying to elicit optimal medication regimen to control chronic depressive symptoms. Wants to go to substance abuse rehab (preferably [facility]) but would also be fine going to [facility]. Pt's substance abuse history includes Alcohol, Cocaine; denies nicotine use or use of other substances such as amphetamines. Differential also includes personality disorders (antisocial/borderline). Also consider possible secondary gain. Pt reporting improvement of right foot swelling (proximal dorsum) causing less discomfort when walking (no induration, no redness, no pain on palpation).    PLAN  1.) Legal: voluntary admission (9.13)  2.) Observation: routine checks (q15), no need for 1:1 observation  3.) Medical:  - hospitalist paged for assessment of right foot swelling; r/o blood clot, consider comorbid factors, e.g., reported CHF (EF 45%?)   - PMH of HTN, CKD, GERD, pericarditis, self reports hx of CHF (with ? EF 45%)  - labs completed at ED (May 21) showing slight transaminitis (AST 80, ALT 82), utox positive for cocaine  - Pt had unwitnessed fall at approx 6:30 pm yesterday (May 21), reportedly slipping on floor as he left the shower and landing on his back and hitting his head; reported dizziness, headache, L sided body pain, blurry vision. Head imaging performed at ED showing no ICH, no fractures, no subluxation of adjacent joints.  - restart Aspirin 81 mg po daily and Atorvastatin 40 mg po QHS; hospitalist to f/u  - c/w multiple vitamins po daily (preventive measure)  4.) Psychiatric:  - Ativan 2 mg po q2 for CIWA increase > 2 or score > 9  - PRNs: acetaminophen 650 mg po q6 for pain; Hydroxyzine 50 mg po q6 for acute anxiety; IM Ativan 2mg for severe agitation (stop after 1 time)  - c/w lurasidone 20 mg po daily for mood stabilization (in the s/o bipolar depression); so far tolerated well  - tobacco usage to be addressed  5.) Therapy: G/M/I therapy as indicated  6.) Collateral: to be obtained from pt  7.) Dispo: when stable and no harm to himself or others

## 2024-05-24 NOTE — BH INPATIENT PSYCHIATRY PROGRESS NOTE - PRN MEDS
MEDICATIONS  (PRN):  acetaminophen     Tablet .. 650 milliGRAM(s) Oral every 6 hours PRN Mild Pain (1 - 3), Moderate Pain (4 - 6), Severe Pain (7 - 10)  guaiFENesin Oral Liquid (Sugar-Free) 100 milliGRAM(s) Oral every 6 hours PRN cough  hydrOXYzine hydrochloride 50 milliGRAM(s) Oral every 6 hours PRN acute anxiety  ibuprofen  Tablet. 400 milliGRAM(s) Oral every 6 hours PRN Moderate Pain (4 - 6)  LORazepam     Tablet 2 milliGRAM(s) Oral every 2 hours PRN CIWA score increase by 2 points and current CIWA score GREATER THAN 9  LORazepam   Injectable 2 milliGRAM(s) IntraMuscular once PRN agitation/aggression  polyethylene glycol 3350 17 Gram(s) Oral daily PRN constipation

## 2024-05-24 NOTE — BH INPATIENT PSYCHIATRY PROGRESS NOTE - NSICDXBHSECONDARYDX_PSY_ALL_CORE
Swelling of right lower extremity   M79.89  HTN (hypertension)   I10  HLD (hyperlipidemia)   E78.5  Substance use disorder   F19.90

## 2024-05-25 LAB
FLUAV AG NPH QL: SIGNIFICANT CHANGE UP
FLUBV AG NPH QL: SIGNIFICANT CHANGE UP
RSV RNA NPH QL NAA+NON-PROBE: SIGNIFICANT CHANGE UP
SARS-COV-2 RNA SPEC QL NAA+PROBE: SIGNIFICANT CHANGE UP

## 2024-05-25 PROCEDURE — 99232 SBSQ HOSP IP/OBS MODERATE 35: CPT

## 2024-05-25 RX ADMIN — Medication 200 MILLIGRAM(S): at 15:15

## 2024-05-25 RX ADMIN — ATORVASTATIN CALCIUM 40 MILLIGRAM(S): 80 TABLET, FILM COATED ORAL at 20:30

## 2024-05-25 RX ADMIN — Medication 81 MILLIGRAM(S): at 08:00

## 2024-05-25 RX ADMIN — Medication 400 MILLIGRAM(S): at 08:00

## 2024-05-25 RX ADMIN — Medication 1 TABLET(S): at 08:00

## 2024-05-25 RX ADMIN — Medication 100 MILLIGRAM(S): at 08:00

## 2024-05-25 RX ADMIN — Medication 200 MILLIGRAM(S): at 20:28

## 2024-05-25 RX ADMIN — Medication 400 MILLIGRAM(S): at 09:05

## 2024-05-25 NOTE — BH INPATIENT PSYCHIATRY PROGRESS NOTE - NSBHASSESSSUMMFT_PSY_ALL_CORE
Dre Dickerson is a 58 year old male, single, undomiciled (lives in hotels) and unemployed, self-reports hx of bipolar disorder, depression and anxiety; as per Psyckes: with pan-diagnoses namely: MDD, Substance-Induced Depressive Disorder, Bipolar d/o,  Antisocial Personality Disorder, Borderline Personality Disorder, schizoaffective Disorder. Pt is chronically nonadherent to meds/ psych appts and has hx of multiple psych admissions (most recently [facility] in Jan 2024), self reports hx of self aborted SA (jumping in front of a 7 train in summer 2023). Pt with polysubstance use: Alcohol (denied any seizures, stated in past he has had VH during detox - per psyckes, with multiple detox treatments/ rehabs), Cocaine, possible other psychoactive substance related disorders: Cannabis/ Tobacco/ Opioid. Pertinent medical issues include: HTN, CKD, GERD, pericarditis; self reports hx of CHF (with ? EF 45%). Pt self-presented to ED reporting SI with plan to jump in front of train or shoot himself. Transferred to our psych unit for further stabilization and management of symptoms/medication adjustment.    attenuated mood symptoms. no overt psychosis. he is taking medications and tolerating without issues. pt with URI sx swabbed for COVId and flu and negative for both. supportive treatment already ordered. plan as below     PLAN  1.) Legal: voluntary admission (9.13)  2.) Observation: routine checks (q15), no need for 1:1 observation  3.) Medical:  - hospitalist paged for assessment of right foot swelling; r/o blood clot, consider comorbid factors, e.g., reported CHF (EF 45%?)   - PMH of HTN, CKD, GERD, pericarditis, self reports hx of CHF (with ? EF 45%)  - labs completed at ED (May 21) showing slight transaminitis (AST 80, ALT 82), utox positive for cocaine  - Pt had unwitnessed fall at approx 6:30 pm yesterday (May 21), reportedly slipping on floor as he left the shower and landing on his back and hitting his head; reported dizziness, headache, L sided body pain, blurry vision. Head imaging performed at ED showing no ICH, no fractures, no subluxation of adjacent joints.  - restart Aspirin 81 mg po daily and Atorvastatin 40 mg po QHS; hospitalist to f/u  - c/w multiple vitamins po daily (preventive measure)  4.) Psychiatric:  - Ativan 2 mg po q2 for CIWA increase > 2 or score > 9  - PRNs: acetaminophen 650 mg po q6 for pain; Hydroxyzine 50 mg po q6 for acute anxiety; IM Ativan 2mg for severe agitation (stop after 1 time)  - c/w lurasidone 20 mg po daily for mood stabilization (in the s/o bipolar depression); so far tolerated well  - tobacco usage to be addressed  5.) Therapy: G/M/I therapy as indicated  6.) Collateral: to be obtained from pt  7.) Dispo: when stable and no harm to himself or others

## 2024-05-25 NOTE — BH INPATIENT PSYCHIATRY PROGRESS NOTE - NSBHFUPINTERVALHXFT_PSY_A_CORE
chart reviewed including pertinent labs. Case discussed with nursing staff. patient reports congestion, cough, sore throat, denies fever. states it could be due to recent room change. pt offered flu and covid swab and he accepted. results negative. pt denies low mood. denies sx of psychosis or miquel. he is adherent to treatment and denies any side effects. he asks for increased cough medications

## 2024-05-25 NOTE — BH INPATIENT PSYCHIATRY PROGRESS NOTE - CURRENT MEDICATION
MEDICATIONS  (STANDING):  aspirin enteric coated 81 milliGRAM(s) Oral daily  atorvastatin 40 milliGRAM(s) Oral at bedtime  lurasidone 20 milliGRAM(s) Oral <User Schedule>  multivitamin 1 Tablet(s) Oral daily    MEDICATIONS  (PRN):  acetaminophen     Tablet .. 650 milliGRAM(s) Oral every 6 hours PRN Mild Pain (1 - 3), Moderate Pain (4 - 6), Severe Pain (7 - 10)  guaiFENesin Oral Liquid (Sugar-Free) 100 milliGRAM(s) Oral every 6 hours PRN cough  hydrOXYzine hydrochloride 50 milliGRAM(s) Oral every 6 hours PRN acute anxiety  ibuprofen  Tablet. 400 milliGRAM(s) Oral every 6 hours PRN Moderate Pain (4 - 6)  LORazepam     Tablet 2 milliGRAM(s) Oral every 2 hours PRN CIWA score increase by 2 points and current CIWA score GREATER THAN 9  LORazepam   Injectable 2 milliGRAM(s) IntraMuscular once PRN agitation/aggression  polyethylene glycol 3350 17 Gram(s) Oral daily PRN constipation

## 2024-05-26 PROCEDURE — 99231 SBSQ HOSP IP/OBS SF/LOW 25: CPT

## 2024-05-26 RX ORDER — LORATADINE 10 MG/1
10 TABLET ORAL DAILY
Refills: 0 | Status: DISCONTINUED | OUTPATIENT
Start: 2024-05-26 | End: 2024-05-31

## 2024-05-26 RX ADMIN — Medication 200 MILLIGRAM(S): at 20:37

## 2024-05-26 RX ADMIN — LORATADINE 10 MILLIGRAM(S): 10 TABLET ORAL at 11:12

## 2024-05-26 RX ADMIN — Medication 200 MILLIGRAM(S): at 04:21

## 2024-05-26 RX ADMIN — Medication 1 TABLET(S): at 08:30

## 2024-05-26 RX ADMIN — Medication 200 MILLIGRAM(S): at 12:52

## 2024-05-26 RX ADMIN — Medication 81 MILLIGRAM(S): at 08:30

## 2024-05-26 RX ADMIN — LURASIDONE HYDROCHLORIDE 20 MILLIGRAM(S): 40 TABLET ORAL at 16:55

## 2024-05-26 RX ADMIN — ATORVASTATIN CALCIUM 40 MILLIGRAM(S): 80 TABLET, FILM COATED ORAL at 20:36

## 2024-05-26 NOTE — BH INPATIENT PSYCHIATRY PROGRESS NOTE - PRN MEDS
MEDICATIONS  (PRN):  acetaminophen     Tablet .. 650 milliGRAM(s) Oral every 6 hours PRN Mild Pain (1 - 3), Moderate Pain (4 - 6), Severe Pain (7 - 10)  guaiFENesin Oral Liquid (Sugar-Free) 200 milliGRAM(s) Oral every 6 hours PRN cough  hydrOXYzine hydrochloride 50 milliGRAM(s) Oral every 6 hours PRN acute anxiety  ibuprofen  Tablet. 400 milliGRAM(s) Oral every 6 hours PRN Moderate Pain (4 - 6)  LORazepam     Tablet 2 milliGRAM(s) Oral every 2 hours PRN CIWA score increase by 2 points and current CIWA score GREATER THAN 9  LORazepam   Injectable 2 milliGRAM(s) IntraMuscular once PRN agitation/aggression  polyethylene glycol 3350 17 Gram(s) Oral daily PRN constipation

## 2024-05-26 NOTE — BH INPATIENT PSYCHIATRY PROGRESS NOTE - NSBHASSESSSUMMFT_PSY_ALL_CORE
Dre Dickerson is a 58 year old male, single, undomiciled (lives in hotels) and unemployed, self-reports hx of bipolar disorder, depression and anxiety; as per Psyckes: with pan-diagnoses namely: MDD, Substance-Induced Depressive Disorder, Bipolar d/o,  Antisocial Personality Disorder, Borderline Personality Disorder, schizoaffective Disorder. Pt is chronically nonadherent to meds/ psych appts and has hx of multiple psych admissions (most recently [facility] in Jan 2024), self reports hx of self aborted SA (jumping in front of a 7 train in summer 2023). Pt with polysubstance use: Alcohol (denied any seizures, stated in past he has had VH during detox - per psyckes, with multiple detox treatments/ rehabs), Cocaine, possible other psychoactive substance related disorders: Cannabis/ Tobacco/ Opioid. Pertinent medical issues include: HTN, CKD, GERD, pericarditis; self reports hx of CHF (with ? EF 45%). Pt self-presented to ED reporting SI with plan to jump in front of train or shoot himself. Transferred to our psych unit for further stabilization and management of symptoms/medication adjustment.    subjective improvements / stability in mood symptoms. c/w plan as below    PLAN  1.) Legal: voluntary admission (9.13)  2.) Observation: routine checks (q15), no need for 1:1 observation  3.) Medical:  - hospitalist paged for assessment of right foot swelling; r/o blood clot, consider comorbid factors, e.g., reported CHF (EF 45%?)   - PMH of HTN, CKD, GERD, pericarditis, self reports hx of CHF (with ? EF 45%)  - labs completed at ED (May 21) showing slight transaminitis (AST 80, ALT 82), utox positive for cocaine  - Pt had unwitnessed fall at approx 6:30 pm yesterday (May 21), reportedly slipping on floor as he left the shower and landing on his back and hitting his head; reported dizziness, headache, L sided body pain, blurry vision. Head imaging performed at ED showing no ICH, no fractures, no subluxation of adjacent joints.  - restart Aspirin 81 mg po daily and Atorvastatin 40 mg po QHS; hospitalist to f/u  - c/w multiple vitamins po daily (preventive measure)  4.) Psychiatric:  - Ativan 2 mg po q2 for CIWA increase > 2 or score > 9  - PRNs: acetaminophen 650 mg po q6 for pain; Hydroxyzine 50 mg po q6 for acute anxiety; IM Ativan 2mg for severe agitation (stop after 1 time)  - c/w lurasidone 20 mg po daily for mood stabilization (in the s/o bipolar depression); so far tolerated well  - tobacco usage to be addressed  5.) Therapy: G/M/I therapy as indicated  6.) Collateral: to be obtained from pt  7.) Dispo: when stable and no harm to himself or others

## 2024-05-26 NOTE — BH INPATIENT PSYCHIATRY PROGRESS NOTE - NSBHFUPINTERVALHXFT_PSY_A_CORE
No psychiatric complaints. Patient denies suicidal ideation. Denies symptoms of psychosis and miquel. He is adherent to psychiatric medications. He feels that his throat is raspy, and asks for something to help. We discussed that his cough medications were increased, and that I can provide him with medication for seasonal allergies, and it's explained to him that his viral panel was negative. He is reassured to hear this and agrees to the proposed plan.

## 2024-05-26 NOTE — BH INPATIENT PSYCHIATRY PROGRESS NOTE - CURRENT MEDICATION
MEDICATIONS  (STANDING):  aspirin enteric coated 81 milliGRAM(s) Oral daily  atorvastatin 40 milliGRAM(s) Oral at bedtime  loratadine 10 milliGRAM(s) Oral daily  lurasidone 20 milliGRAM(s) Oral <User Schedule>  multivitamin 1 Tablet(s) Oral daily    MEDICATIONS  (PRN):  acetaminophen     Tablet .. 650 milliGRAM(s) Oral every 6 hours PRN Mild Pain (1 - 3), Moderate Pain (4 - 6), Severe Pain (7 - 10)  guaiFENesin Oral Liquid (Sugar-Free) 200 milliGRAM(s) Oral every 6 hours PRN cough  hydrOXYzine hydrochloride 50 milliGRAM(s) Oral every 6 hours PRN acute anxiety  ibuprofen  Tablet. 400 milliGRAM(s) Oral every 6 hours PRN Moderate Pain (4 - 6)  LORazepam     Tablet 2 milliGRAM(s) Oral every 2 hours PRN CIWA score increase by 2 points and current CIWA score GREATER THAN 9  LORazepam   Injectable 2 milliGRAM(s) IntraMuscular once PRN agitation/aggression  polyethylene glycol 3350 17 Gram(s) Oral daily PRN constipation

## 2024-05-26 NOTE — BH INPATIENT PSYCHIATRY PROGRESS NOTE - NSTXDEPRESPROGRES_PSY_ALL_CORE
No Change Body Location Override (Optional - Billing Will Still Be Based On Selected Body Map Location If Applicable): Left Distal Pretibial

## 2024-05-27 RX ADMIN — Medication 1 TABLET(S): at 08:19

## 2024-05-27 RX ADMIN — Medication 200 MILLIGRAM(S): at 14:08

## 2024-05-27 RX ADMIN — Medication 81 MILLIGRAM(S): at 08:18

## 2024-05-27 RX ADMIN — LORATADINE 10 MILLIGRAM(S): 10 TABLET ORAL at 08:18

## 2024-05-27 RX ADMIN — ATORVASTATIN CALCIUM 40 MILLIGRAM(S): 80 TABLET, FILM COATED ORAL at 20:16

## 2024-05-27 RX ADMIN — Medication 200 MILLIGRAM(S): at 21:03

## 2024-05-27 RX ADMIN — LURASIDONE HYDROCHLORIDE 20 MILLIGRAM(S): 40 TABLET ORAL at 17:17

## 2024-05-27 RX ADMIN — Medication 200 MILLIGRAM(S): at 08:35

## 2024-05-28 PROCEDURE — 99232 SBSQ HOSP IP/OBS MODERATE 35: CPT

## 2024-05-28 PROCEDURE — 99231 SBSQ HOSP IP/OBS SF/LOW 25: CPT | Mod: GC

## 2024-05-28 RX ADMIN — Medication 81 MILLIGRAM(S): at 15:14

## 2024-05-28 RX ADMIN — Medication 1 TABLET(S): at 15:15

## 2024-05-28 RX ADMIN — LURASIDONE HYDROCHLORIDE 20 MILLIGRAM(S): 40 TABLET ORAL at 17:19

## 2024-05-28 RX ADMIN — LORATADINE 10 MILLIGRAM(S): 10 TABLET ORAL at 15:14

## 2024-05-28 RX ADMIN — ATORVASTATIN CALCIUM 40 MILLIGRAM(S): 80 TABLET, FILM COATED ORAL at 20:50

## 2024-05-28 RX ADMIN — Medication 600 MILLIGRAM(S): at 20:51

## 2024-05-28 NOTE — BH INPATIENT PSYCHIATRY PROGRESS NOTE - PRN MEDS
MEDICATIONS  (PRN):  acetaminophen     Tablet .. 650 milliGRAM(s) Oral every 6 hours PRN Mild Pain (1 - 3), Moderate Pain (4 - 6), Severe Pain (7 - 10)  guaiFENesin  milliGRAM(s) Oral every 12 hours PRN cough  guaiFENesin Oral Liquid (Sugar-Free) 200 milliGRAM(s) Oral every 6 hours PRN cough  hydrOXYzine hydrochloride 50 milliGRAM(s) Oral every 6 hours PRN acute anxiety  ibuprofen  Tablet. 400 milliGRAM(s) Oral every 6 hours PRN Moderate Pain (4 - 6)  LORazepam     Tablet 2 milliGRAM(s) Oral every 2 hours PRN CIWA score increase by 2 points and current CIWA score GREATER THAN 9  LORazepam   Injectable 2 milliGRAM(s) IntraMuscular once PRN agitation/aggression  polyethylene glycol 3350 17 Gram(s) Oral daily PRN constipation   MEDICATIONS  (PRN):  acetaminophen     Tablet .. 650 milliGRAM(s) Oral every 6 hours PRN Mild Pain (1 - 3), Moderate Pain (4 - 6), Severe Pain (7 - 10)  hydrOXYzine hydrochloride 50 milliGRAM(s) Oral every 6 hours PRN acute anxiety  ibuprofen  Tablet. 400 milliGRAM(s) Oral every 6 hours PRN Moderate Pain (4 - 6)  LORazepam     Tablet 2 milliGRAM(s) Oral every 2 hours PRN CIWA score increase by 2 points and current CIWA score GREATER THAN 9  LORazepam   Injectable 2 milliGRAM(s) IntraMuscular once PRN agitation/aggression  polyethylene glycol 3350 17 Gram(s) Oral daily PRN constipation

## 2024-05-28 NOTE — BH INPATIENT PSYCHIATRY PROGRESS NOTE - NSBHASSESSSUMMFT_PSY_ALL_CORE
Dre Dickerson is a 58 year old male, single, undomiciled (lives in hotels) and unemployed, self-reports hx of bipolar disorder, depression and anxiety; as per Psyckes: with pan-diagnoses namely: MDD, Substance-Induced Depressive Disorder, Bipolar d/o,  Antisocial Personality Disorder, Borderline Personality Disorder, schizoaffective Disorder. Pt is chronically nonadherent to meds/ psych appts and has hx of multiple psych admissions (most recently [facility] in Jan 2024), self reports hx of self aborted SA (jumping in front of a 7 train in summer 2023). Pt with polysubstance use: Alcohol (denied any seizures, stated in past he has had VH during detox - per psyckes, with multiple detox treatments/ rehabs), Cocaine, possible other psychoactive substance related disorders: Cannabis/ Tobacco/ Opioid. Pertinent medical issues include: HTN, CKD, GERD, pericarditis; self reports hx of CHF (with ? EF 45%). Pt self-presented to ED reporting SI with plan to jump in front of train or shoot himself. Transferred to our psych unit for further stabilization and management of symptoms/medication adjustment.    Pt reports consistent subjective improvements and stability in mood symptoms. No depressive, manic, or psychotic symptoms noted. Pt is in good behavioral control, tolerates lurasidone 20 mg po well. No substance withdrawal symptoms elicited. Will continue medication. Working on timely discharge to either [facility] or [facility].     PLAN  1.) Legal: voluntary admission (9.13)  2.) Observation: routine checks (q15), no need for 1:1 observation  3.) Medical:  - Pt with cold sypmtoms (congestion, coughing): started Mucinex (guaifenesin) 600 mg po q12 (9 AM, 9 PM) to alleviate cold symptoms  - hospitalist paged for assessment of right foot swelling; r/o blood clot, consider comorbid factors, e.g., reported CHF (EF 45%?)   - PMH of HTN, CKD, GERD, pericarditis, self reports hx of CHF (with ? EF 45%)  - labs completed at ED (May 21) showing slight transaminitis (AST 80, ALT 82), utox positive for cocaine  - Pt had unwitnessed fall at approx 6:30 pm yesterday (May 21), reportedly slipping on floor as he left the shower and landing on his back and hitting his head; reported dizziness, headache, L sided body pain, blurry vision. Head imaging performed at ED showing no ICH, no fractures, no subluxation of adjacent joints.  - restart Aspirin 81 mg po daily and Atorvastatin 40 mg po QHS; hospitalist to f/u  - c/w multiple vitamins po daily (preventive measure)  4.) Psychiatric:  - Ativan 2 mg po q2 for CIWA increase > 2 or score > 9  - PRNs: acetaminophen 650 mg po q6 for pain; Hydroxyzine 50 mg po q6 for acute anxiety; IM Ativan 2mg for severe agitation (stop after 1 time)  - c/w lurasidone 20 mg po daily (5 pm) for mood stabilization (in the s/o bipolar depression); so far tolerated well  - tobacco usage to be addressed  5.) Therapy: G/M/I therapy as indicated  6.) Collateral: to be obtained from pt  7.) Dispo: when stable and no harm to himself or others

## 2024-05-28 NOTE — PROGRESS NOTE ADULT - TIME BILLING
Time-based billing (NON-critical care).     36 minutes spent on total encounter; more than 50% of the visit was spent counseling and / or coordinating care by the attending physician.  The necessity of the time spent during the encounter on this date of service was due to:     review of laboratory data, radiology results, documentation in [place], discussion with patient

## 2024-05-28 NOTE — BH INPATIENT PSYCHIATRY PROGRESS NOTE - NSBHFUPINTERVALHXFT_PSY_A_CORE
Case discussed with interdisciplinary team, no acute overnight events reported, pt in good behavioral control. Fair sleep per sleep log.    Pt reports cold symptoms, moistly congestion and coughing, hoping he will be doing better on his upcoming birthday. No psychiatric complaints, no depressive/manic or psychotic symptoms elicited. Patient denies SIIP/HIIP. He is adherent to psychiatric medications. Interviewer discussed that his cough medication will be given in regular intervals. Pt confirms that his viral panel was negative. He wants to get discharged to [facility] rehab does not work out soon enough, pt wants to be discharged by the end of the week so he can celebrate his birthday. No side effects from medications or other physical symptoms noted.

## 2024-05-28 NOTE — PROGRESS NOTE ADULT - ASSESSMENT
Patient is a 57yo M with HTN, HLD, pericarditis, EtOH use disorder, bipolar disorder, and substance use disorder who presented with suicidal ideation and medication non-adherence. He reported right foot swelling and inability to ambulate, and informed staff of a history of heart failure.       #RLE swelling - largely resolved on exam  - obtain RLE duplex to r/o DVT - patient not agreeable at this time  - encourage elevation  - no other evidence of volume overload to suggest decompensated heart failure  - no TTE on record – needs outpatient cardiology f/u  - can consider PAD eval as outpatient, as well, may benefit from KEON/PVR    #HTN  - resume home antihypertensives, titrate to normotensin  - DASH diet    #HLD  - c/w statin    #bipolar disorder with SI  - plan and meds per psych

## 2024-05-28 NOTE — BH INPATIENT PSYCHIATRY PROGRESS NOTE - CURRENT MEDICATION
MEDICATIONS  (STANDING):  aspirin enteric coated 81 milliGRAM(s) Oral daily  atorvastatin 40 milliGRAM(s) Oral at bedtime  guaiFENesin  milliGRAM(s) Oral <User Schedule>  loratadine 10 milliGRAM(s) Oral daily  lurasidone 20 milliGRAM(s) Oral <User Schedule>  multivitamin 1 Tablet(s) Oral daily    MEDICATIONS  (PRN):  acetaminophen     Tablet .. 650 milliGRAM(s) Oral every 6 hours PRN Mild Pain (1 - 3), Moderate Pain (4 - 6), Severe Pain (7 - 10)  guaiFENesin  milliGRAM(s) Oral every 12 hours PRN cough  guaiFENesin Oral Liquid (Sugar-Free) 200 milliGRAM(s) Oral every 6 hours PRN cough  hydrOXYzine hydrochloride 50 milliGRAM(s) Oral every 6 hours PRN acute anxiety  ibuprofen  Tablet. 400 milliGRAM(s) Oral every 6 hours PRN Moderate Pain (4 - 6)  LORazepam     Tablet 2 milliGRAM(s) Oral every 2 hours PRN CIWA score increase by 2 points and current CIWA score GREATER THAN 9  LORazepam   Injectable 2 milliGRAM(s) IntraMuscular once PRN agitation/aggression  polyethylene glycol 3350 17 Gram(s) Oral daily PRN constipation   MEDICATIONS  (STANDING):  aspirin enteric coated 81 milliGRAM(s) Oral daily  atorvastatin 40 milliGRAM(s) Oral at bedtime  guaiFENesin  milliGRAM(s) Oral <User Schedule>  loratadine 10 milliGRAM(s) Oral daily  lurasidone 20 milliGRAM(s) Oral <User Schedule>  multivitamin 1 Tablet(s) Oral daily    MEDICATIONS  (PRN):  acetaminophen     Tablet .. 650 milliGRAM(s) Oral every 6 hours PRN Mild Pain (1 - 3), Moderate Pain (4 - 6), Severe Pain (7 - 10)  hydrOXYzine hydrochloride 50 milliGRAM(s) Oral every 6 hours PRN acute anxiety  ibuprofen  Tablet. 400 milliGRAM(s) Oral every 6 hours PRN Moderate Pain (4 - 6)  LORazepam     Tablet 2 milliGRAM(s) Oral every 2 hours PRN CIWA score increase by 2 points and current CIWA score GREATER THAN 9  LORazepam   Injectable 2 milliGRAM(s) IntraMuscular once PRN agitation/aggression  polyethylene glycol 3350 17 Gram(s) Oral daily PRN constipation

## 2024-05-28 NOTE — PROGRESS NOTE ADULT - SUBJECTIVE AND OBJECTIVE BOX
CC/Reason for Consult: RLE swelling    SUBJECTIVE / OVERNIGHT EVENTS:  Last week patient refused duplex RLE.  Seen and examined today. Says he does not feel like getting it done right now, will wait until after discharge from [facility] to do so.  Ambulatory, able to bear weight on RLE. No localized pain reported.  No current RLE swelling on exam.    MEDICATIONS  (STANDING):  aspirin enteric coated 81 milliGRAM(s) Oral daily  atorvastatin 40 milliGRAM(s) Oral at bedtime  guaiFENesin  milliGRAM(s) Oral <User Schedule>  loratadine 10 milliGRAM(s) Oral daily  lurasidone 20 milliGRAM(s) Oral <User Schedule>  multivitamin 1 Tablet(s) Oral daily    MEDICATIONS  (PRN):  acetaminophen     Tablet .. 650 milliGRAM(s) Oral every 6 hours PRN Mild Pain (1 - 3), Moderate Pain (4 - 6), Severe Pain (7 - 10)  hydrOXYzine hydrochloride 50 milliGRAM(s) Oral every 6 hours PRN acute anxiety  ibuprofen  Tablet. 400 milliGRAM(s) Oral every 6 hours PRN Moderate Pain (4 - 6)  LORazepam     Tablet 2 milliGRAM(s) Oral every 2 hours PRN CIWA score increase by 2 points and current CIWA score GREATER THAN 9  LORazepam   Injectable 2 milliGRAM(s) IntraMuscular once PRN agitation/aggression  polyethylene glycol 3350 17 Gram(s) Oral daily PRN constipation      Vital Signs Last 24 Hrs  T(C): 36.7 (28 May 2024 07:58), Max: 36.7 (28 May 2024 07:58)  T(F): 98 (28 May 2024 07:58), Max: 98 (28 May 2024 07:58)  HR: --  BP: --  BP(mean): --  RR: --  SpO2: --      CAPILLARY BLOOD GLUCOSE            PHYSICAL EXAM:  GENERAL: NAD, well-developed  HEAD:  Atraumatic, Normocephalic  EYES: EOMI, conjunctiva and sclera clear  NECK: Supple, No JVD  CHEST/LUNG: Clear to auscultation bilaterally; No wheeze  HEART: Regular rate and rhythm; No murmurs, rubs, or gallops  ABDOMEN: Soft, Nontender, Nondistended; Bowel sounds present  EXTREMITIES:  2+ Peripheral Pulses, No clubbing, cyanosis, or edema  PSYCH: AAOx3  NEUROLOGY: non-focal  SKIN: No rashes or lesions    LABS:

## 2024-05-29 PROCEDURE — 99232 SBSQ HOSP IP/OBS MODERATE 35: CPT | Mod: GC

## 2024-05-29 RX ADMIN — Medication 81 MILLIGRAM(S): at 09:43

## 2024-05-29 RX ADMIN — Medication 1 TABLET(S): at 09:44

## 2024-05-29 RX ADMIN — LORATADINE 10 MILLIGRAM(S): 10 TABLET ORAL at 09:43

## 2024-05-29 RX ADMIN — Medication 100 MILLIGRAM(S): at 11:56

## 2024-05-29 RX ADMIN — LURASIDONE HYDROCHLORIDE 20 MILLIGRAM(S): 40 TABLET ORAL at 17:21

## 2024-05-29 RX ADMIN — ATORVASTATIN CALCIUM 40 MILLIGRAM(S): 80 TABLET, FILM COATED ORAL at 21:25

## 2024-05-29 RX ADMIN — Medication 600 MILLIGRAM(S): at 21:23

## 2024-05-29 NOTE — BH INPATIENT PSYCHIATRY PROGRESS NOTE - CURRENT MEDICATION
MEDICATIONS  (STANDING):  aspirin enteric coated 81 milliGRAM(s) Oral daily  atorvastatin 40 milliGRAM(s) Oral at bedtime  guaiFENesin  milliGRAM(s) Oral <User Schedule>  loratadine 10 milliGRAM(s) Oral daily  lurasidone 20 milliGRAM(s) Oral <User Schedule>  multivitamin 1 Tablet(s) Oral daily    MEDICATIONS  (PRN):  acetaminophen     Tablet .. 650 milliGRAM(s) Oral every 6 hours PRN Mild Pain (1 - 3), Moderate Pain (4 - 6), Severe Pain (7 - 10)  hydrOXYzine hydrochloride 50 milliGRAM(s) Oral every 6 hours PRN acute anxiety  ibuprofen  Tablet. 400 milliGRAM(s) Oral every 6 hours PRN Moderate Pain (4 - 6)  LORazepam     Tablet 2 milliGRAM(s) Oral every 2 hours PRN CIWA score increase by 2 points and current CIWA score GREATER THAN 9  LORazepam   Injectable 2 milliGRAM(s) IntraMuscular once PRN agitation/aggression  polyethylene glycol 3350 17 Gram(s) Oral daily PRN constipation   MEDICATIONS  (STANDING):  aspirin enteric coated 81 milliGRAM(s) Oral daily  atorvastatin 40 milliGRAM(s) Oral at bedtime  guaiFENesin  milliGRAM(s) Oral <User Schedule>  loratadine 10 milliGRAM(s) Oral daily  lurasidone 20 milliGRAM(s) Oral <User Schedule>  multivitamin 1 Tablet(s) Oral daily    MEDICATIONS  (PRN):  acetaminophen     Tablet .. 650 milliGRAM(s) Oral every 6 hours PRN Mild Pain (1 - 3), Moderate Pain (4 - 6), Severe Pain (7 - 10)  guaiFENesin Oral Liquid (Sugar-Free) 100 milliGRAM(s) Oral every 6 hours PRN Cold sypmtoms  hydrOXYzine hydrochloride 50 milliGRAM(s) Oral every 6 hours PRN acute anxiety  ibuprofen  Tablet. 400 milliGRAM(s) Oral every 6 hours PRN Moderate Pain (4 - 6)  LORazepam     Tablet 2 milliGRAM(s) Oral every 2 hours PRN CIWA score increase by 2 points and current CIWA score GREATER THAN 9  LORazepam   Injectable 2 milliGRAM(s) IntraMuscular once PRN agitation/aggression  polyethylene glycol 3350 17 Gram(s) Oral daily PRN constipation

## 2024-05-29 NOTE — BH INPATIENT PSYCHIATRY PROGRESS NOTE - PRN MEDS
MEDICATIONS  (PRN):  acetaminophen     Tablet .. 650 milliGRAM(s) Oral every 6 hours PRN Mild Pain (1 - 3), Moderate Pain (4 - 6), Severe Pain (7 - 10)  hydrOXYzine hydrochloride 50 milliGRAM(s) Oral every 6 hours PRN acute anxiety  ibuprofen  Tablet. 400 milliGRAM(s) Oral every 6 hours PRN Moderate Pain (4 - 6)  LORazepam     Tablet 2 milliGRAM(s) Oral every 2 hours PRN CIWA score increase by 2 points and current CIWA score GREATER THAN 9  LORazepam   Injectable 2 milliGRAM(s) IntraMuscular once PRN agitation/aggression  polyethylene glycol 3350 17 Gram(s) Oral daily PRN constipation   MEDICATIONS  (PRN):  acetaminophen     Tablet .. 650 milliGRAM(s) Oral every 6 hours PRN Mild Pain (1 - 3), Moderate Pain (4 - 6), Severe Pain (7 - 10)  guaiFENesin Oral Liquid (Sugar-Free) 100 milliGRAM(s) Oral every 6 hours PRN Cold sypmtoms  hydrOXYzine hydrochloride 50 milliGRAM(s) Oral every 6 hours PRN acute anxiety  ibuprofen  Tablet. 400 milliGRAM(s) Oral every 6 hours PRN Moderate Pain (4 - 6)  LORazepam     Tablet 2 milliGRAM(s) Oral every 2 hours PRN CIWA score increase by 2 points and current CIWA score GREATER THAN 9  LORazepam   Injectable 2 milliGRAM(s) IntraMuscular once PRN agitation/aggression  polyethylene glycol 3350 17 Gram(s) Oral daily PRN constipation

## 2024-05-29 NOTE — BH INPATIENT PSYCHIATRY PROGRESS NOTE - NSBHFUPINTERVALHXFT_PSY_A_CORE
Case discussed with interdisciplinary team, no acute overnight events reported, pt in good behavioral control. Fair sleep per sleep log.    Pt reports cold symptoms, moistly congestion and coughing, hoping he will be doing better on his upcoming birthday. No psychiatric complaints, no depressive/manic or psychotic symptoms elicited. Patient denies SIIP/HIIP. He is adherent to psychiatric medications. Interviewer discussed that his cough medication will be given in regular intervals. Pt confirms that his viral panel was negative. He wants to get discharged to [facility] rehab does not work out soon enough, pt wants to be discharged by the end of the week so he can celebrate his birthday. No side effects from medications or other physical symptoms noted. Case discussed with interdisciplinary team, no acute overnight events reported, pt in good behavioral control. Fair sleep per sleep log.    Pt reports improvement of cold symptoms as he would cough up phlegm now and feels less congested. Pt is looking forward to his discharge and his birthday. No psychiatric complaints, no depressive/manic or psychotic symptoms elicited. Patient denies SIIP/HIIP. He is adherent to psychiatric medications. He is ready to get discharged to [facility] on Friday. No side effects from medications or other physical symptoms noted.

## 2024-05-29 NOTE — BH INPATIENT PSYCHIATRY PROGRESS NOTE - NSBHASSESSSUMMFT_PSY_ALL_CORE
Dre Dickerson is a 58 year old male, single, undomiciled (lives in hotels) and unemployed, self-reports hx of bipolar disorder, depression and anxiety; as per Psyckes: with pan-diagnoses namely: MDD, Substance-Induced Depressive Disorder, Bipolar d/o,  Antisocial Personality Disorder, Borderline Personality Disorder, schizoaffective Disorder. Pt is chronically nonadherent to meds/ psych appts and has hx of multiple psych admissions (most recently [facility] in Jan 2024), self reports hx of self aborted SA (jumping in front of a 7 train in summer 2023). Pt with polysubstance use: Alcohol (denied any seizures, stated in past he has had VH during detox - per psyckes, with multiple detox treatments/ rehabs), Cocaine, possible other psychoactive substance related disorders: Cannabis/ Tobacco/ Opioid. Pertinent medical issues include: HTN, CKD, GERD, pericarditis; self reports hx of CHF (with ? EF 45%). Pt self-presented to ED reporting SI with plan to jump in front of train or shoot himself. Transferred to our psych unit for further stabilization and management of symptoms/medication adjustment.    Pt reports consistent subjective improvements and stability in mood symptoms. No depressive, manic, or psychotic symptoms noted. Pt is in good behavioral control, tolerates lurasidone 20 mg po well. No substance withdrawal symptoms elicited. Will continue medication. Working on timely discharge to either [facility] or [facility].     PLAN  1.) Legal: voluntary admission (9.13)  2.) Observation: routine checks (q15), no need for 1:1 observation  3.) Medical:  - Pt with cold sypmtoms (congestion, coughing): started Mucinex (guaifenesin) 600 mg po q12 (9 AM, 9 PM) to alleviate cold symptoms  - hospitalist paged for assessment of right foot swelling; r/o blood clot, consider comorbid factors, e.g., reported CHF (EF 45%?)   - PMH of HTN, CKD, GERD, pericarditis, self reports hx of CHF (with ? EF 45%)  - labs completed at ED (May 21) showing slight transaminitis (AST 80, ALT 82), utox positive for cocaine  - Pt had unwitnessed fall at approx 6:30 pm yesterday (May 21), reportedly slipping on floor as he left the shower and landing on his back and hitting his head; reported dizziness, headache, L sided body pain, blurry vision. Head imaging performed at ED showing no ICH, no fractures, no subluxation of adjacent joints.  - restart Aspirin 81 mg po daily and Atorvastatin 40 mg po QHS; hospitalist to f/u  - c/w multiple vitamins po daily (preventive measure)  4.) Psychiatric:  - Ativan 2 mg po q2 for CIWA increase > 2 or score > 9  - PRNs: acetaminophen 650 mg po q6 for pain; Hydroxyzine 50 mg po q6 for acute anxiety; IM Ativan 2mg for severe agitation (stop after 1 time)  - c/w lurasidone 20 mg po daily (5 pm) for mood stabilization (in the s/o bipolar depression); so far tolerated well  - tobacco usage to be addressed  5.) Therapy: G/M/I therapy as indicated  6.) Collateral: to be obtained from pt  7.) Dispo: when stable and no harm to himself or others Dre Dickerson is a 58 year old male, single, undomiciled (lives in hotels) and unemployed, self-reports hx of bipolar disorder, depression and anxiety; as per Psyckes: with pan-diagnoses namely: MDD, Substance-Induced Depressive Disorder, Bipolar d/o,  Antisocial Personality Disorder, Borderline Personality Disorder, schizoaffective Disorder. Pt is chronically nonadherent to meds/ psych appts and has hx of multiple psych admissions (most recently [facility] in Jan 2024), self reports hx of self aborted SA (jumping in front of a 7 train in summer 2023). Pt with polysubstance use: Alcohol (denied any seizures, stated in past he has had VH during detox - per psyckes, with multiple detox treatments/ rehabs), Cocaine, possible other psychoactive substance related disorders: Cannabis/ Tobacco/ Opioid. Pertinent medical issues include: HTN, CKD, GERD, pericarditis; self reports hx of CHF (with ? EF 45%). Pt self-presented to ED reporting SI with plan to jump in front of train or shoot himself. Transferred to our psych unit for further stabilization and management of symptoms/medication adjustment.    Pt reports consistent subjective improvements and stability in mood symptoms. No depressive, manic, or psychotic symptoms noted. Pt is in good behavioral control, tolerates lurasidone 20 mg po well. No substance withdrawal symptoms elicited. Will continue medication. Discharge to [facility] planned for Friday, May 31. Will also have [facility] service appointment for outpatient addiction management.    PLAN  1.) Legal: voluntary admission (9.13)  2.) Observation: routine checks (q15), no need for 1:1 observation  3.) Medical:  - Pt with cold sypmtoms (congestion, coughing): started Mucinex (guaifenesin) 600 mg po q12 (9 AM, 9 PM) to alleviate cold symptoms  - hospitalist paged for assessment of right foot swelling; r/o blood clot, consider comorbid factors, e.g., reported CHF (EF 45%?)   - PMH of HTN, CKD, GERD, pericarditis, self reports hx of CHF (with ? EF 45%)  - labs completed at ED (May 21) showing slight transaminitis (AST 80, ALT 82), utox positive for cocaine  - Pt had unwitnessed fall at approx 6:30 pm yesterday (May 21), reportedly slipping on floor as he left the shower and landing on his back and hitting his head; reported dizziness, headache, L sided body pain, blurry vision. Head imaging performed at ED showing no ICH, no fractures, no subluxation of adjacent joints.  - restart Aspirin 81 mg po daily and Atorvastatin 40 mg po QHS; hospitalist to f/u  - c/w multiple vitamins po daily (preventive measure)  4.) Psychiatric:  - Ativan 2 mg po q2 for CIWA increase > 2 or score > 9  - PRNs: acetaminophen 650 mg po q6 for pain; Hydroxyzine 50 mg po q6 for acute anxiety; IM Ativan 2mg for severe agitation (stop after 1 time)  - c/w lurasidone 20 mg po daily (5 pm) for mood stabilization (in the s/o bipolar depression); so far tolerated well  - tobacco usage to be addressed  5.) Therapy: G/M/I therapy as indicated  6.) Collateral: to be obtained from pt  7.) Dispo: when stable and no harm to himself or others; planned for Friday, May 31. Dre Dickerson is a 58 year old male, single, undomiciled (lives in hotels) and unemployed, self-reports hx of bipolar disorder, depression and anxiety; as per Psyckes: with pan-diagnoses namely: MDD, Substance-Induced Depressive Disorder, Bipolar d/o,  Antisocial Personality Disorder, Borderline Personality Disorder, schizoaffective Disorder. Pt is chronically nonadherent to meds/ psych appts and has hx of multiple psych admissions (most recently [facility] in Jan 2024), self reports hx of self aborted SA (jumping in front of a 7 train in summer 2023). Pt with polysubstance use: Alcohol (denied any seizures, stated in past he has had VH during detox - per psyckes, with multiple detox treatments/ rehabs), Cocaine, possible other psychoactive substance related disorders: Cannabis/ Tobacco/ Opioid. Pertinent medical issues include: HTN, CKD, GERD, pericarditis; self reports hx of CHF (with ? EF 45%). Pt self-presented to ED reporting SI with plan to jump in front of train or shoot himself. Transferred to our psych unit for further stabilization and management of symptoms/medication adjustment.    Pt reports consistent subjective improvements and stability in mood symptoms. No depressive, manic, or psychotic symptoms noted. Pt is in good behavioral control, tolerates lurasidone 20 mg po well. No substance withdrawal symptoms elicited. Will continue medication. [facility] planned for Friday, May 31. Will also have [facility] service appointment for outpatient care.    PLAN  1.) Legal: voluntary admission (9.13)  2.) Observation: routine checks (q15), no need for 1:1 observation  3.) Medical:  - Pt with cold sypmtoms (congestion, coughing): started Mucinex (guaifenesin) 600 mg po q12 (9 AM, 9 PM) to alleviate cold symptoms  - hospitalist paged for assessment of right foot swelling; r/o blood clot, consider comorbid factors, e.g., reported CHF (EF 45%?)   - PMH of HTN, CKD, GERD, pericarditis, self reports hx of CHF (with ? EF 45%)  - labs completed at ED (May 21) showing slight transaminitis (AST 80, ALT 82), utox positive for cocaine  - Pt had unwitnessed fall at approx 6:30 pm yesterday (May 21), reportedly slipping on floor as he left the shower and landing on his back and hitting his head; reported dizziness, headache, L sided body pain, blurry vision. Head imaging performed at ED showing no ICH, no fractures, no subluxation of adjacent joints.  - restart Aspirin 81 mg po daily and Atorvastatin 40 mg po QHS; hospitalist to f/u  - c/w multiple vitamins po daily (preventive measure)  4.) Psychiatric:  - Ativan 2 mg po q2 for CIWA increase > 2 or score > 9  - PRNs: acetaminophen 650 mg po q6 for pain; Hydroxyzine 50 mg po q6 for acute anxiety; IM Ativan 2mg for severe agitation (stop after 1 time)  - c/w lurasidone 20 mg po daily (5 pm) for mood stabilization (in the s/o bipolar depression); so far tolerated well  - tobacco usage to be addressed  5.) Therapy: G/M/I therapy as indicated  6.) Collateral: to be obtained from pt  7.) Dispo: when stable and no harm to himself or others; planned for Friday, May 31.

## 2024-05-29 NOTE — BH SAFETY PLAN - WARNING SIGN 3
Add 52 Modifier (Optional): no Medical Necessity Information: It is in your best interest to select a reason for this procedure from the list below. All of these items fulfill various CMS LCD requirements except the new and changing color options. Detail Level: Detailed Post-Care Instructions: I reviewed with the patient in detail post-care instructions. Patient is to wear sunprotection, and avoid picking at any of the treated lesions. Pt may apply Vaseline to crusted or scabbing areas. Duration Of Freeze Thaw-Cycle (Seconds): 5-10 Show Aperture Variable?: Yes Medical Necessity Clause: This procedure was medically necessary because the lesions that were treated were: Spray Paint Text: The liquid nitrogen was applied to the skin utilizing a spray paint frosting technique. Number Of Freeze-Thaw Cycles: 2 freeze-thaw cycles Use substances Consent: The patient's consent was obtained including but not limited to risks of crusting, scabbing, blistering, scarring, darker or lighter pigmentary change, recurrence, incomplete removal and infection.

## 2024-05-30 PROCEDURE — 99232 SBSQ HOSP IP/OBS MODERATE 35: CPT | Mod: GC

## 2024-05-30 RX ORDER — ATORVASTATIN CALCIUM 80 MG/1
1 TABLET, FILM COATED ORAL
Qty: 14 | Refills: 0
Start: 2024-05-30 | End: 2024-06-12

## 2024-05-30 RX ORDER — LORATADINE 10 MG/1
1 TABLET ORAL
Qty: 14 | Refills: 0
Start: 2024-05-30 | End: 2024-06-12

## 2024-05-30 RX ORDER — ASPIRIN/CALCIUM CARB/MAGNESIUM 324 MG
1 TABLET ORAL
Qty: 14 | Refills: 0
Start: 2024-05-30 | End: 2024-06-12

## 2024-05-30 RX ORDER — LURASIDONE HYDROCHLORIDE 40 MG/1
1 TABLET ORAL
Qty: 14 | Refills: 0
Start: 2024-05-30 | End: 2024-06-12

## 2024-05-30 RX ADMIN — Medication 100 MILLIGRAM(S): at 03:16

## 2024-05-30 RX ADMIN — LORATADINE 10 MILLIGRAM(S): 10 TABLET ORAL at 08:19

## 2024-05-30 RX ADMIN — Medication 1 TABLET(S): at 08:20

## 2024-05-30 RX ADMIN — Medication 81 MILLIGRAM(S): at 08:19

## 2024-05-30 RX ADMIN — Medication 600 MILLIGRAM(S): at 20:32

## 2024-05-30 NOTE — BH DISCHARGE NOTE NURSING/SOCIAL WORK/PSYCH REHAB - NSDCPRRECOMMEND_PSY_ALL_CORE
Psychiatric rehabilitation staff recommends that patient continue to demonstrate daily medication compliance and utilize identified coping skills to manage symptoms. Patient would benefit from attending outpatient treatment for ongoing medication management, support and psychotherapy.

## 2024-05-30 NOTE — BH INPATIENT PSYCHIATRY PROGRESS NOTE - NSBHCHARTREVIEWVS_PSY_A_CORE FT
Vital Signs Last 24 Hrs  T(C): 36.9 (05-30-24 @ 08:04), Max: 36.9 (05-30-24 @ 08:04)  T(F): 98.4 (05-30-24 @ 08:04), Max: 98.4 (05-30-24 @ 08:04)  HR: --  BP: --  BP(mean): --  RR: --  SpO2: --    Orthostatic VS  05-30-24 @ 08:04  Lying BP: --/-- HR: --  Sitting BP: 111/74 HR: 96  Standing BP: --/-- HR: --  Site: --  Mode: --  Orthostatic VS  05-29-24 @ 08:42  Lying BP: --/-- HR: --  Sitting BP: 122/81 HR: 106  Standing BP: 124/83 HR: 108  Site: --  Mode: --  
Vital Signs Last 24 Hrs  T(C): 36.7 (05-24-24 @ 07:44), Max: 36.9 (05-23-24 @ 20:22)  T(F): 98 (05-24-24 @ 07:44), Max: 98.5 (05-23-24 @ 20:22)  HR: --  BP: --  BP(mean): --  RR: 16 (05-23-24 @ 20:22) (16 - 16)  SpO2: --    Orthostatic VS  05-24-24 @ 07:44  Lying BP: --/-- HR: --  Sitting BP: 135/78 HR: 78  Standing BP: 128/70 HR: 90  Site: --  Mode: --  Orthostatic VS  05-23-24 @ 20:22  Lying BP: --/-- HR: --  Sitting BP: 120/76 HR: 92  Standing BP: 114/72 HR: 97  Site: --  Mode: --  Orthostatic VS  05-23-24 @ 08:37  Lying BP: --/-- HR: --  Sitting BP: 117/74 HR: 61  Standing BP: 128/91 HR: 80  Site: --  Mode: --  
Vital Signs Last 24 Hrs  T(C): 36.7 (05-28-24 @ 07:58), Max: 36.7 (05-28-24 @ 07:58)  T(F): 98 (05-28-24 @ 07:58), Max: 98 (05-28-24 @ 07:58)  HR: --  BP: --  BP(mean): --  RR: --  SpO2: --    Orthostatic VS  05-28-24 @ 07:58  Lying BP: --/-- HR: --  Sitting BP: 119/78 HR: 80  Standing BP: --/-- HR: --  Site: --  Mode: --  Orthostatic VS  05-27-24 @ 08:23  Lying BP: --/-- HR: --  Sitting BP: 122/81 HR: 97  Standing BP: 123/85 HR: 99  Site: --  Mode: --  
Vital Signs Last 24 Hrs  T(C): 36.2 (05-26-24 @ 07:53), Max: 36.2 (05-26-24 @ 07:53)  T(F): 97.2 (05-26-24 @ 07:53), Max: 97.2 (05-26-24 @ 07:53)  HR: --  BP: --  BP(mean): --  RR: --  SpO2: --    Orthostatic VS  05-26-24 @ 07:53  Lying BP: --/-- HR: --  Sitting BP: 127/76 HR: 108  Standing BP: 111/82 HR: 111  Site: --  Mode: --  Orthostatic VS  05-25-24 @ 08:44  Lying BP: --/-- HR: --  Sitting BP: 115/74 HR: 91  Standing BP: 130/86 HR: 93  Site: --  Mode: --  Orthostatic VS  05-24-24 @ 20:46  Lying BP: 110/63 HR: 89  Sitting BP: --/-- HR: --  Standing BP: --/-- HR: --  Site: --  Mode: --  
Vital Signs Last 24 Hrs  T(C): 36.6 (05-23-24 @ 08:37), Max: 36.6 (05-23-24 @ 08:37)  T(F): 97.8 (05-23-24 @ 08:37), Max: 97.8 (05-23-24 @ 08:37)  HR: 100 (05-22-24 @ 21:02) (100 - 100)  BP: 124/75 (05-22-24 @ 21:02) (124/75 - 124/75)  BP(mean): --  RR: --  SpO2: 98% (05-22-24 @ 21:02) (98% - 98%)    Orthostatic VS  05-23-24 @ 08:37  Lying BP: --/-- HR: --  Sitting BP: 117/74 HR: 61  Standing BP: 128/91 HR: 80  Site: --  Mode: --  Orthostatic VS  05-22-24 @ 08:09  Lying BP: --/-- HR: --  Sitting BP: 116/69 HR: 113  Standing BP: 107/65 HR: 111  Site: --  Mode: --  
Vital Signs Last 24 Hrs  T(C): 36.1 (05-29-24 @ 08:42), Max: 36.1 (05-29-24 @ 08:42)  T(F): 97 (05-29-24 @ 08:42), Max: 97 (05-29-24 @ 08:42)  HR: --  BP: --  BP(mean): --  RR: --  SpO2: --    Orthostatic VS  05-29-24 @ 08:42  Lying BP: --/-- HR: --  Sitting BP: 122/81 HR: 106  Standing BP: 124/83 HR: 108  Site: --  Mode: --  Orthostatic VS  05-28-24 @ 07:58  Lying BP: --/-- HR: --  Sitting BP: 119/78 HR: 80  Standing BP: --/-- HR: --  Site: --  Mode: --  
Vital Signs Last 24 Hrs  T(C): 36.2 (05-25-24 @ 08:44), Max: 36.6 (05-24-24 @ 20:46)  T(F): 97.2 (05-25-24 @ 08:44), Max: 97.9 (05-24-24 @ 20:46)  HR: --  BP: --  BP(mean): --  RR: --  SpO2: --    Orthostatic VS  05-25-24 @ 08:44  Lying BP: --/-- HR: --  Sitting BP: 115/74 HR: 91  Standing BP: 130/86 HR: 93  Site: --  Mode: --  Orthostatic VS  05-24-24 @ 20:46  Lying BP: 110/63 HR: 89  Sitting BP: --/-- HR: --  Standing BP: --/-- HR: --  Site: --  Mode: --  Orthostatic VS  05-24-24 @ 07:44  Lying BP: --/-- HR: --  Sitting BP: 135/78 HR: 78  Standing BP: 128/70 HR: 90  Site: --  Mode: --  Orthostatic VS  05-23-24 @ 20:22  Lying BP: --/-- HR: --  Sitting BP: 120/76 HR: 92  Standing BP: 114/72 HR: 97  Site: --  Mode: --

## 2024-05-30 NOTE — BH INPATIENT PSYCHIATRY PROGRESS NOTE - NSBHCONSULTIPREASON_PSY_A_CORE
Inpatient Physical Exam
other reason
other reason
danger to self; mental illness expected to respond to inpatient care
other reason
other reason
danger to self; mental illness expected to respond to inpatient care
other reason

## 2024-05-30 NOTE — BH INPATIENT PSYCHIATRY PROGRESS NOTE - NSBHFUPINTERVALCCFT_PSY_A_CORE
follow up mood
f/u depressive symptoms and SI
follow up mood 
f/u depressive symptoms and SI
follow up mood

## 2024-05-30 NOTE — BH DISCHARGE NOTE NURSING/SOCIAL WORK/PSYCH REHAB - NSDCPRGOAL_PSY_ALL_CORE
Patient has demonstrated progress towards psychiatric rehabilitation goals during current hospitalization. Patient has demonstrated daily medication compliance and is tolerating medications well. Patient reports improvement in symptoms compared to admission. Patient denies SI/HI/AVH. Patient was able to identify prayer, talking to his daughter, listening to music and exercise as effective coping skills to manage symptoms. Patient reports feeling confident in ability to utilize coping skills post discharge. Patient attended 5 percent of psychiatric rehabilitation groups during admission. Patient attended only leisure-based groups (music and fresh air) and was able to tolerate group structure. Patient was visible in the milieu. Patient increasingly socialized with select peers appropriately. Patient was able to make needs known to staff. Patient has demonstrated improved insight into the current episode and was able to identify isolating from others, depression and using substances as warning signs that a crisis may be developing. Patient expressed readiness for discharge. Patient and staff collaborated on safety planning prior to discharge.

## 2024-05-30 NOTE — BH INPATIENT PSYCHIATRY PROGRESS NOTE - NSBHMSEIMPULSE_PSY_A_CORE
by hx: impaired/Normal

## 2024-05-30 NOTE — BH INPATIENT PSYCHIATRY PROGRESS NOTE - NSBHASSESSSUMMFT_PSY_ALL_CORE
Dre Dickerson is a 58 year old male, single, undomiciled (lives in hotels) and unemployed, self-reports hx of bipolar disorder, depression and anxiety; as per Psyckes: with pan-diagnoses namely: MDD, Substance-Induced Depressive Disorder, Bipolar d/o,  Antisocial Personality Disorder, Borderline Personality Disorder, schizoaffective Disorder. Pt is chronically nonadherent to meds/ psych appts and has hx of multiple psych admissions (most recently [facility] in Jan 2024), self reports hx of self aborted SA (jumping in front of a 7 train in summer 2023). Pt with polysubstance use: Alcohol (denied any seizures, stated in past he has had VH during detox - per psyckes, with multiple detox treatments/ rehabs), Cocaine, possible other psychoactive substance related disorders: Cannabis/ Tobacco/ Opioid. Pertinent medical issues include: HTN, CKD, GERD, pericarditis; self reports hx of CHF (with ? EF 45%). Pt self-presented to ED reporting SI with plan to jump in front of train or shoot himself. Transferred to our psych unit for further stabilization and management of symptoms/medication adjustment.    Pt reports consistent subjective improvements and stability in mood symptoms. No depressive, manic, or psychotic symptoms noted. Pt is in good behavioral control, tolerates lurasidone 20 mg po well. No substance withdrawal symptoms elicited. Will continue medication. Discharge to [facility] planned for tomorrow, Friday, May 31. Will also have [facility] service appointment for outpatient care.    PLAN  1.) Legal: voluntary admission (9.13)  2.) Observation: routine checks (q15), no need for 1:1 observation  3.) Medical:  - Pt with improving cold sypmtoms (congestion, coughing): continued Robitussin (guaifenesin) 600 mg po q12 (9 AM, 9 PM) to alleviate cold symptoms  - hospitalist paged for assessment of right foot swelling; r/o blood clot, consider comorbid factors, e.g., reported CHF (EF 45%?)   - PMH of HTN, CKD, GERD, pericarditis, self reports hx of CHF (with ? EF 45%)  - labs completed at ED (May 21) showing slight transaminitis (AST 80, ALT 82), utox positive for cocaine  - Pt had unwitnessed fall at approx 6:30 pm yesterday (May 21), reportedly slipping on floor as he left the shower and landing on his back and hitting his head; reported dizziness, headache, L sided body pain, blurry vision. Head imaging performed at ED showing no ICH, no fractures, no subluxation of adjacent joints.  - restart Aspirin 81 mg po daily and Atorvastatin 40 mg po QHS; hospitalist to f/u  - c/w multiple vitamins po daily (preventive measure)  4.) Psychiatric:  - Ativan 2 mg po q2 for CIWA increase > 2 or score > 9  - PRNs: acetaminophen 650 mg po q6 for pain; Hydroxyzine 50 mg po q6 for acute anxiety; IM Ativan 2mg for severe agitation (stop after 1 time)  - c/w lurasidone 20 mg po daily (5 pm) for mood stabilization (in the s/o bipolar depression); so far tolerated well  - tobacco usage to be addressed  5.) Therapy: G/M/I therapy as indicated  6.) Collateral: to be obtained from pt  7.) Dispo: when stable and no harm to himself or others; planned for tomorrow (May 31).

## 2024-05-30 NOTE — BH TREATMENT PLAN - NSTXDCOTHRINTERSW_PSY_ALL_CORE
Writer will encourage pt to accept a referral to a rehab to address substance abuse issues and make necessary referrals.
Writer will encourage pt to accept a referral to a rehab to address substance abuse issues and make necessary referrals.

## 2024-05-30 NOTE — BH DISCHARGE NOTE NURSING/SOCIAL WORK/PSYCH REHAB - PATIENT PORTAL LINK FT
You can access the FollowMyHealth Patient Portal offered by [facility] by registering at the following website: [email or web address]. By joining [employer]’s FollowMyHealth portal, you will also be able to view your health information using other applications (apps) compatible with our system.

## 2024-05-30 NOTE — BH TREATMENT PLAN - NSTXDEPRESINTERMD_PSY_ALL_CORE
psychotropic medication (antipsychotic, mood stabilizer)
psychotropic medication (antipsychotic, mood stabilizer)

## 2024-05-30 NOTE — BH INPATIENT PSYCHIATRY PROGRESS NOTE - NSBHATTESTBILLING_PSY_A_CORE
88981-Hdelqlxexj OBS or IP - moderate complexity OR 35-49 mins
11696-Fbdxjeohit OBS or IP - low complexity OR 25-34 mins
16192-Cpdakasbbs OBS or IP - low complexity OR 25-34 mins
42141-Vdfwestdyx OBS or IP - moderate complexity OR 35-49 mins
39886-Mdofmfinho OBS or IP - moderate complexity OR 35-49 mins
16826-Bynolwuwmv OBS or IP - moderate complexity OR 35-49 mins
21130-Qkutunhwem OBS or IP - moderate complexity OR 35-49 mins

## 2024-05-30 NOTE — BH TREATMENT PLAN - NSCMSPTSTRENGTHS_PSY_ALL_CORE
Jennifer/spirituality/Future/goal oriented/Leisure interest
Jennifer/spirituality/Future/goal oriented/Leisure interest

## 2024-05-30 NOTE — BH INPATIENT PSYCHIATRY PROGRESS NOTE - NSBHMETABOLIC_PSY_ALL_CORE_FT
BMI: BMI (kg/m2): 36.8 (05-21-24 @ 20:02)  HbA1c:   Glucose: POCT Blood Glucose.: 104 mg/dL (04-04-24 @ 06:04)    BP: 124/75 (05-22-24 @ 21:02) (124/75 - 138/86)Vital Signs Last 24 Hrs  T(C): 36.7 (05-24-24 @ 07:44), Max: 36.9 (05-23-24 @ 20:22)  T(F): 98 (05-24-24 @ 07:44), Max: 98.5 (05-23-24 @ 20:22)  HR: --  BP: --  BP(mean): --  RR: 16 (05-23-24 @ 20:22) (16 - 16)  SpO2: --    Orthostatic VS  05-24-24 @ 07:44  Lying BP: --/-- HR: --  Sitting BP: 135/78 HR: 78  Standing BP: 128/70 HR: 90  Site: --  Mode: --  Orthostatic VS  05-23-24 @ 20:22  Lying BP: --/-- HR: --  Sitting BP: 120/76 HR: 92  Standing BP: 114/72 HR: 97  Site: --  Mode: --  Orthostatic VS  05-23-24 @ 08:37  Lying BP: --/-- HR: --  Sitting BP: 117/74 HR: 61  Standing BP: 128/91 HR: 80  Site: --  Mode: --    Lipid Panel: Date/Time: 04-02-24 @ 23:00  Cholesterol, Serum: 128  LDL Cholesterol Calculated: 55  HDL Cholesterol, Serum: 45  Total Cholesterol/HDL Ration Measurement: --  Triglycerides, Serum: 139  
BMI: BMI (kg/m2): 36.8 (05-21-24 @ 20:02)  HbA1c:   Glucose: POCT Blood Glucose.: 104 mg/dL (04-04-24 @ 06:04)    BP: --Vital Signs Last 24 Hrs  T(C): 36.7 (05-28-24 @ 07:58), Max: 36.7 (05-28-24 @ 07:58)  T(F): 98 (05-28-24 @ 07:58), Max: 98 (05-28-24 @ 07:58)  HR: --  BP: --  BP(mean): --  RR: --  SpO2: --    Orthostatic VS  05-28-24 @ 07:58  Lying BP: --/-- HR: --  Sitting BP: 119/78 HR: 80  Standing BP: --/-- HR: --  Site: --  Mode: --  Orthostatic VS  05-27-24 @ 08:23  Lying BP: --/-- HR: --  Sitting BP: 122/81 HR: 97  Standing BP: 123/85 HR: 99  Site: --  Mode: --    Lipid Panel: Date/Time: 04-02-24 @ 23:00  Cholesterol, Serum: 128  LDL Cholesterol Calculated: 55  HDL Cholesterol, Serum: 45  Total Cholesterol/HDL Ration Measurement: --  Triglycerides, Serum: 139  
BMI: BMI (kg/m2): 36.8 (05-21-24 @ 20:02)  HbA1c:   Glucose: POCT Blood Glucose.: 104 mg/dL (04-04-24 @ 06:04)    BP: 124/75 (05-22-24 @ 21:02) (124/75 - 138/86)Vital Signs Last 24 Hrs  T(C): 36.6 (05-23-24 @ 08:37), Max: 36.6 (05-23-24 @ 08:37)  T(F): 97.8 (05-23-24 @ 08:37), Max: 97.8 (05-23-24 @ 08:37)  HR: 100 (05-22-24 @ 21:02) (100 - 100)  BP: 124/75 (05-22-24 @ 21:02) (124/75 - 124/75)  BP(mean): --  RR: --  SpO2: 98% (05-22-24 @ 21:02) (98% - 98%)    Orthostatic VS  05-23-24 @ 08:37  Lying BP: --/-- HR: --  Sitting BP: 117/74 HR: 61  Standing BP: 128/91 HR: 80  Site: --  Mode: --  Orthostatic VS  05-22-24 @ 08:09  Lying BP: --/-- HR: --  Sitting BP: 116/69 HR: 113  Standing BP: 107/65 HR: 111  Site: --  Mode: --    Lipid Panel: Date/Time: 04-02-24 @ 23:00  Cholesterol, Serum: 128  LDL Cholesterol Calculated: 55  HDL Cholesterol, Serum: 45  Total Cholesterol/HDL Ration Measurement: --  Triglycerides, Serum: 139  
BMI: BMI (kg/m2): 36.8 (05-21-24 @ 20:02)  HbA1c:   Glucose: POCT Blood Glucose.: 104 mg/dL (04-04-24 @ 06:04)    BP: --Vital Signs Last 24 Hrs  T(C): 36.1 (05-29-24 @ 08:42), Max: 36.1 (05-29-24 @ 08:42)  T(F): 97 (05-29-24 @ 08:42), Max: 97 (05-29-24 @ 08:42)  HR: --  BP: --  BP(mean): --  RR: --  SpO2: --    Orthostatic VS  05-29-24 @ 08:42  Lying BP: --/-- HR: --  Sitting BP: 122/81 HR: 106  Standing BP: 124/83 HR: 108  Site: --  Mode: --  Orthostatic VS  05-28-24 @ 07:58  Lying BP: --/-- HR: --  Sitting BP: 119/78 HR: 80  Standing BP: --/-- HR: --  Site: --  Mode: --    Lipid Panel: Date/Time: 04-02-24 @ 23:00  Cholesterol, Serum: 128  LDL Cholesterol Calculated: 55  HDL Cholesterol, Serum: 45  Total Cholesterol/HDL Ration Measurement: --  Triglycerides, Serum: 139  
BMI: BMI (kg/m2): 36.8 (05-21-24 @ 20:02)  HbA1c:   Glucose: POCT Blood Glucose.: 104 mg/dL (04-04-24 @ 06:04)    BP: --Vital Signs Last 24 Hrs  T(C): 36.9 (05-30-24 @ 08:04), Max: 36.9 (05-30-24 @ 08:04)  T(F): 98.4 (05-30-24 @ 08:04), Max: 98.4 (05-30-24 @ 08:04)  HR: --  BP: --  BP(mean): --  RR: --  SpO2: --    Orthostatic VS  05-30-24 @ 08:04  Lying BP: --/-- HR: --  Sitting BP: 111/74 HR: 96  Standing BP: --/-- HR: --  Site: --  Mode: --  Orthostatic VS  05-29-24 @ 08:42  Lying BP: --/-- HR: --  Sitting BP: 122/81 HR: 106  Standing BP: 124/83 HR: 108  Site: --  Mode: --    Lipid Panel: Date/Time: 04-02-24 @ 23:00  Cholesterol, Serum: 128  LDL Cholesterol Calculated: 55  HDL Cholesterol, Serum: 45  Total Cholesterol/HDL Ration Measurement: --  Triglycerides, Serum: 139  
BMI: BMI (kg/m2): 36.8 (05-21-24 @ 20:02)  HbA1c:   Glucose: POCT Blood Glucose.: 104 mg/dL (04-04-24 @ 06:04)    BP: --Vital Signs Last 24 Hrs  T(C): 36.2 (05-26-24 @ 07:53), Max: 36.2 (05-26-24 @ 07:53)  T(F): 97.2 (05-26-24 @ 07:53), Max: 97.2 (05-26-24 @ 07:53)  HR: --  BP: --  BP(mean): --  RR: --  SpO2: --    Orthostatic VS  05-26-24 @ 07:53  Lying BP: --/-- HR: --  Sitting BP: 127/76 HR: 108  Standing BP: 111/82 HR: 111  Site: --  Mode: --  Orthostatic VS  05-25-24 @ 08:44  Lying BP: --/-- HR: --  Sitting BP: 115/74 HR: 91  Standing BP: 130/86 HR: 93  Site: --  Mode: --  Orthostatic VS  05-24-24 @ 20:46  Lying BP: 110/63 HR: 89  Sitting BP: --/-- HR: --  Standing BP: --/-- HR: --  Site: --  Mode: --    Lipid Panel: Date/Time: 04-02-24 @ 23:00  Cholesterol, Serum: 128  LDL Cholesterol Calculated: 55  HDL Cholesterol, Serum: 45  Total Cholesterol/HDL Ration Measurement: --  Triglycerides, Serum: 139  
BMI: BMI (kg/m2): 36.8 (05-21-24 @ 20:02)  HbA1c:   Glucose: POCT Blood Glucose.: 104 mg/dL (04-04-24 @ 06:04)    BP: 124/75 (05-22-24 @ 21:02) (124/75 - 124/75)Vital Signs Last 24 Hrs  T(C): 36.2 (05-25-24 @ 08:44), Max: 36.6 (05-24-24 @ 20:46)  T(F): 97.2 (05-25-24 @ 08:44), Max: 97.9 (05-24-24 @ 20:46)  HR: --  BP: --  BP(mean): --  RR: --  SpO2: --    Orthostatic VS  05-25-24 @ 08:44  Lying BP: --/-- HR: --  Sitting BP: 115/74 HR: 91  Standing BP: 130/86 HR: 93  Site: --  Mode: --  Orthostatic VS  05-24-24 @ 20:46  Lying BP: 110/63 HR: 89  Sitting BP: --/-- HR: --  Standing BP: --/-- HR: --  Site: --  Mode: --  Orthostatic VS  05-24-24 @ 07:44  Lying BP: --/-- HR: --  Sitting BP: 135/78 HR: 78  Standing BP: 128/70 HR: 90  Site: --  Mode: --  Orthostatic VS  05-23-24 @ 20:22  Lying BP: --/-- HR: --  Sitting BP: 120/76 HR: 92  Standing BP: 114/72 HR: 97  Site: --  Mode: --    Lipid Panel: Date/Time: 04-02-24 @ 23:00  Cholesterol, Serum: 128  LDL Cholesterol Calculated: 55  HDL Cholesterol, Serum: 45  Total Cholesterol/HDL Ration Measurement: --  Triglycerides, Serum: 139

## 2024-05-30 NOTE — BH TREATMENT PLAN - NSTXPLANTHERAPYSESSIONSFT_PSY_ALL_CORE
05-29-24  Type of therapy: Safety planning  Type of session: Individual  Level of patient participation: Participates  Duration of participation: 15 minutes  Therapy conducted by: Psych rehab  Therapy Summary: Writer met with patient to assess patients progress towards psychiatric rehabilitation goal over the past seven days. Patient has been medication compliant and is tolerating medications well. Patient denies SI. Patient was discharge focused throughout session. Over the past seven days, patient has been working on the goal of identifying 2 coping skills to assist in improving mood. Patient has demonstrated fair progress towards this goal. Writer and patient discussed circumstance that led to patients’ admission. Patient reports overall improvement in mood and identified prayer, listening to music and talking to his daughter to be effective coping skills. Writer and patient collaborated on safety planning during today’s session. Patient has not attended any psychiatric rehabilitation groups. Patient is visible on the milieu and mostly keeps to himself. Patient demonstrates appropriate ADL’s. Patient has been in good behavioral. Psych rehab will continue to work with patient to provide support and psychoeducation.

## 2024-05-30 NOTE — BH TREATMENT PLAN - NSTXDEPRESINTERPR_PSY_ALL_CORE
Writer created psych rehab goal for patient to identify 2 coping skills to improve mood. Over the next few days Psych rehab staff will continue to engage and support patient throughout.
Patient has demonstrated fair progress towards this goal. Psych rehab recommends patient increases engagement in individual and group therapy sessions in order for patient to gain psychoeducation, psychotherapy and support over the next seven days.

## 2024-05-30 NOTE — BH INPATIENT PSYCHIATRY PROGRESS NOTE - NSTXDCOTHRGOAL_PSY_ALL_CORE
Would benefit from a substance abuse rehab referral.

## 2024-05-30 NOTE — BH INPATIENT PSYCHIATRY PROGRESS NOTE - GENERAL APPEARANCE
not actively internally stimulated/No deformities present

## 2024-05-30 NOTE — BH INPATIENT PSYCHIATRY PROGRESS NOTE - NSBHMSETHTPROC_PSY_A_CORE
Linear/Normal reasoning
Linear/Normal reasoning
Linear
Linear/Normal reasoning
Linear
Linear/Normal reasoning
Linear/Normal reasoning

## 2024-05-30 NOTE — BH INPATIENT PSYCHIATRY PROGRESS NOTE - NSBHATTESTCOMMENTATTENDFT_PSY_A_CORE
Pt remains euthymic and future oriented without any SIIP or HIIP.  Etiology of presentation remains multifactorial chronic depression, large component of substance induced symptoms as well as secondary gain.  Continue meds as ordered.  Dispo planning.
Presentation at this time continues to not be consistent with any miquel, MDE, or psychosis.  Possibly some level of multifactorial chronic depression, however is not impacting overall functioning on unit.  Continue meds as ordered.
Stable, dispo planning.  Continue meds as ordered.
Pt still does not present with any overt mood or psychotic episode, but has chronic subjective complaint of persistent depression, likely due to multifactorial etiology. Con tinue all meds.  
Emotionally regulated, in good behavioral control, continue plan above.  Dispo planning.

## 2024-05-30 NOTE — BH INPATIENT PSYCHIATRY PROGRESS NOTE - NSBHFUPINTERVALHXFT_PSY_A_CORE
Case discussed with interdisciplinary team, no acute overnight events reported, pt in good behavioral control. Fair sleep per sleep log.    Pt reports consistent improvement of cold symptoms and is confident that he will feel be fine on his birthday (coming Sunday, June 2). He reports having enjoyed some of the group therapy sessions. Denies acute episodes of anxiety or agitation on the unit. No psychiatric complaints, no depressive/manic or psychotic symptoms elicited. Patient denies SIIP/HIIP. Interviewer informs pt that he will be discharged tomorrow, which he happily receives. No side effects from medications or other physical symptoms noted.

## 2024-05-30 NOTE — BH INPATIENT PSYCHIATRY PROGRESS NOTE - NSDCCRITERIA_PSY_ALL_CORE
When deemed at no risk to harm himself or others.

## 2024-05-30 NOTE — BH TREATMENT PLAN - NSTXDCOTHRGOAL_PSY_ALL_CORE
Would benefit from a substance abuse rehab referral.
Would benefit from a substance abuse rehab referral.

## 2024-05-30 NOTE — BH INPATIENT PSYCHIATRY PROGRESS NOTE - PRN MEDS
MEDICATIONS  (PRN):  acetaminophen     Tablet .. 650 milliGRAM(s) Oral every 6 hours PRN Mild Pain (1 - 3), Moderate Pain (4 - 6), Severe Pain (7 - 10)  guaiFENesin Oral Liquid (Sugar-Free) 100 milliGRAM(s) Oral every 6 hours PRN Cold sypmtoms  hydrOXYzine hydrochloride 50 milliGRAM(s) Oral every 6 hours PRN acute anxiety  ibuprofen  Tablet. 400 milliGRAM(s) Oral every 6 hours PRN Moderate Pain (4 - 6)  LORazepam     Tablet 2 milliGRAM(s) Oral every 2 hours PRN CIWA score increase by 2 points and current CIWA score GREATER THAN 9  LORazepam   Injectable 2 milliGRAM(s) IntraMuscular once PRN agitation/aggression  polyethylene glycol 3350 17 Gram(s) Oral daily PRN constipation

## 2024-05-30 NOTE — BH INPATIENT PSYCHIATRY PROGRESS NOTE - NSTXDEPRESINTERMD_PSY_ALL_CORE
psychotropic medication (antipsychotic, mood stabilizer)

## 2024-05-30 NOTE — BH INPATIENT PSYCHIATRY PROGRESS NOTE - CURRENT MEDICATION
MEDICATIONS  (STANDING):  aspirin enteric coated 81 milliGRAM(s) Oral daily  atorvastatin 40 milliGRAM(s) Oral at bedtime  guaiFENesin  milliGRAM(s) Oral <User Schedule>  loratadine 10 milliGRAM(s) Oral daily  lurasidone 20 milliGRAM(s) Oral <User Schedule>  multivitamin 1 Tablet(s) Oral daily    MEDICATIONS  (PRN):  acetaminophen     Tablet .. 650 milliGRAM(s) Oral every 6 hours PRN Mild Pain (1 - 3), Moderate Pain (4 - 6), Severe Pain (7 - 10)  guaiFENesin Oral Liquid (Sugar-Free) 100 milliGRAM(s) Oral every 6 hours PRN Cold sypmtoms  hydrOXYzine hydrochloride 50 milliGRAM(s) Oral every 6 hours PRN acute anxiety  ibuprofen  Tablet. 400 milliGRAM(s) Oral every 6 hours PRN Moderate Pain (4 - 6)  LORazepam     Tablet 2 milliGRAM(s) Oral every 2 hours PRN CIWA score increase by 2 points and current CIWA score GREATER THAN 9  LORazepam   Injectable 2 milliGRAM(s) IntraMuscular once PRN agitation/aggression  polyethylene glycol 3350 17 Gram(s) Oral daily PRN constipation

## 2024-05-30 NOTE — BH DISCHARGE NOTE NURSING/SOCIAL WORK/PSYCH REHAB - NSDCADDINFO1FT_PSY_ALL_CORE
Please note, this is an IN-PERSON appt.  Please note, this is an IN-PERSON appt. Pt submitted a 3DL.

## 2024-05-30 NOTE — BH DISCHARGE NOTE NURSING/SOCIAL WORK/PSYCH REHAB - DISCHARGE INSTRUCTIONS AFTERCARE APPOINTMENTS
In order to check the location, date, or time of your aftercare appointment, please refer to your Discharge Instructions Document given to you upon leaving the hospital.  If you have lost the instructions please call [phone]

## 2024-05-30 NOTE — BH DISCHARGE NOTE NURSING/SOCIAL WORK/PSYCH REHAB - NSCDUDCCRISIS_PSY_A_CORE
[facility]  1 [phone]) [facility]-WELL ([phone])  Text "WELL" to 42718  Website: www.[employer]/.[employer] 1 (283) 621-EZZH (1899) Website: www.safehorizon.org/.National Suicide Prevention Lifeline 3 (086) 833-6767/.  [employer]  1 (405) LIFENET (195-0760)/.  [facility]  [address] [place] [phone]   Hours:  Monday through Friday from 9 AM to 3 PM/988 Suicide and Crisis Lifeline

## 2024-05-31 VITALS — TEMPERATURE: 98 F

## 2024-05-31 PROCEDURE — 99238 HOSP IP/OBS DSCHRG MGMT 30/<: CPT | Mod: GC

## 2024-05-31 RX ADMIN — Medication 1 TABLET(S): at 08:39

## 2024-05-31 RX ADMIN — Medication 81 MILLIGRAM(S): at 08:39

## 2024-05-31 RX ADMIN — LORATADINE 10 MILLIGRAM(S): 10 TABLET ORAL at 08:40

## 2024-05-31 NOTE — BH INPATIENT PSYCHIATRY DISCHARGE NOTE - NSBHASSESSSUMMFT_PSY_ALL_CORE
Dre Dickerson is a 58 year old male, single, undomiciled (lives in hotels) and unemployed, self-reports hx of bipolar disorder, depression and anxiety; as per Psyckes: with pan-diagnoses namely: MDD, Substance-Induced Depressive Disorder, Bipolar d/o,  Antisocial Personality Disorder, Borderline Personality Disorder, schizoaffective Disorder. Pt is chronically nonadherent to meds/ psych appts and has hx of multiple psych admissions (most recently [facility] in Jan 2024), self reports hx of self aborted SA (jumping in front of a 7 train in summer 2023). Pt with polysubstance use: Alcohol (denied any seizures, stated in past he has had [facility]H during detox - per psyckes, with multiple detox treatments/ rehabs), Cocaine, possible other psychoactive substance related disorders: Cannabis/ Tobacco/ Opioid. Pertinent medical issues include: HTN, CKD, GERD, pericarditis; self reports hx of CHF (with ? EF 45%). Pt self-presented to ED reporting SI with plan to jump in front of train or shoot himself. Transferred to our psych unit for further stabilization and management of symptoms/medication adjustment.    Pt reports consistent subjective improvements and stability in mood symptoms. No depressive, manic, or psychotic symptoms noted. Pt is in good behavioral control, has tolerated lurasidone 20 mg po well throughout his hospital stay. No substance withdrawal symptoms elicited. Pt to be discharged to [facility], planned [facility] service appointment for continued outpatient care. No safety concerns as pt consistently denied SIIP/HIIP. No depressive or amnic symptoms elicited on the unit. No psychotic symptoms. Pt consistently in good behavioral control. He defines prayer, exercise, and talking to his daughter as internal coping strategies.              Dre Dickerson is a 58 year old male, single, undomiciled (lives in hotels) and unemployed, self-reports hx of bipolar disorder, depression and anxiety; as per Psyckes: with pan-diagnoses namely: MDD, Substance-Induced Depressive Disorder, Bipolar d/o,  Antisocial Personality Disorder, Borderline Personality Disorder, schizoaffective Disorder. Pt is chronically nonadherent to meds/ psych appts and has hx of multiple psych admissions (most recently [facility] in Jan 2024), self reports hx of self aborted SA (jumping in front of a 7 train in summer 2023). Pt with polysubstance use: Alcohol (denied any seizures, stated in past he has had VH during detox - per psyckes, with multiple detox treatments/ rehabs), Cocaine, possible other psychoactive substance related disorders: Cannabis/ Tobacco/ Opioid. Pertinent medical issues include: HTN, CKD, GERD, pericarditis; self reports hx of CHF (with ? EF 45%). Pt self-presented to ED reporting SI with plan to jump in front of train or shoot himself. Transferred to our psych unit for further stabilization and management of symptoms/medication adjustment.    Pt reports consistent subjective improvements and stability in mood symptoms. No depressive, manic, or psychotic symptoms noted. Pt is in good behavioral control, has tolerated lurasidone 20 mg po well throughout his hospital stay. No substance withdrawal symptoms elicited. [facility] be [facility], planned [facility] service appointment for continued outpatient care. No safety concerns as pt consistently denied SIIP/HIIP. No depressive or manic symptoms elicited on the unit. No psychotic symptoms. Pt consistently in good behavioral control. Consider possible secondary gain.    Given the above, the patient is no longer felt to be at an acutely elevated risk of harm to himself and therefore no longer requires inpatient hospitalization. He is stable for transition to outpatient level of care.               Pt reports consistent subjective improvements and stability in mood symptoms. No depressive, manic, or psychotic symptoms noted. Pt is in good behavioral control, has tolerated lurasidone 20 mg po well throughout his hospital stay. No substance withdrawal symptoms elicited. Planned [facility] service appointment for continued outpatient care. No depressive or manic symptoms elicited on the unit. No psychotic symptoms. Pt consistently in good behavioral control. No safety concerns as pt consistently denied SIIP/HIIP.  Pt submitted 3 day letter.  Given the above, the patient is no longer felt to be at an acutely elevated risk of harm to himself.  Pt will be discharged from hospital.

## 2024-05-31 NOTE — BH INPATIENT PSYCHIATRY DISCHARGE NOTE - NSBHFUPINTERVALCCFT_PSY_A_CORE
Discharge Progress Note Date and Time: 05-31-24 @ 11:08  Discharge Progress Note Date and Time: 05-31-24 @ 11:08    Seen for follow up of depression.

## 2024-05-31 NOTE — BH INPATIENT PSYCHIATRY DISCHARGE NOTE - NSBHDCCONDITIONFT_PSY_A_CORE
Pt is no longer an acute or imminent risk of harm to self or others, pt was discharged on 3 day letter.

## 2024-05-31 NOTE — BH INPATIENT PSYCHIATRY DISCHARGE NOTE - OTHER PAST PSYCHIATRIC HISTORY (INCLUDE DETAILS REGARDING ONSET, COURSE OF ILLNESS, INPATIENT/OUTPATIENT TREATMENT)
58 yr old male, single, undomiciled (lives in hotels) and unemployed.  self reports hx of bipolar disorder, depression and anxiety. Self-presents reporting SI with plan to jump in front of train or shoot himself.  Numerous diagnoses on PSYCKES, however pt identifies bipolar disorder as his primary diagnosis.  Multiple past psychiatric hospitalizations.  Hx of suicide attempt last summer.

## 2024-05-31 NOTE — BH INPATIENT PSYCHIATRY DISCHARGE NOTE - ATTENDING DISCHARGE PHYSICAL EXAMINATION:
Pt is a 58M w/multiple past diagnoses in [facility], admitted with depression and SI in context of various psychosocial stressors ( from daughter, homeless, unemployed) and also substance use.  Etiology of depression is likely multifactorial - pt identified bipolar disorder as primary diagnosis, however there was also consideration for adjustment, persistent depressive disorder, personality traits vs full disorder, substance induced mood symptoms, as well as secondary gain.  Pt did not exhibit any objective signs of any full mood episode or psychosis.  Pt requested medication management with reduced side effect profile for bipolar disorder maintenance, pt agreed to lurasidone and was started on 20 mg with good tolerability.  Throughout admission, pt was euthymic in affect, linear in thought process, goal directed in behavior, visible on unit, social with select peers, was compliant with meds and basic care.     For dispo planning, pt requested specifically [facility] inpatient rehab or [facility] or [facility].  Application to [facility] rehab was rejected, and while team tried to arrange for shelter, pt submitted 3 day letter and indicated he would make his own arrangements.  Pt had consistently denied any suicidality or homicidality and was caring for ADLs independently.  Given this, treatment team had no clinical indication to seek out court order for involuntary retention, and pt was discharged from hospital with outpatient follow up.    See above for MSE on discharge.    Risk assessment on discharge: Pt has chronic risk factors of self reported hx bipolar disorder, past psychiatric hospitalizations, hx of suicide attempt, hx of substance use disorders, chronic medical problems, homelessness, unemployment, with acute risk factors of recent depression and SI, now treated with inpatient care consisting of medication management and psychotherapeutic interventions.  Protective factors of therapeutic alliances, has daughter, future orientation.  Pt submitted 3 day letter.   Pt has consistently denied suicidality and homicidality, is emotionally regulated and in good behavioral control, and is caring for ADLs independently.  While pt is chronic risk of harm, pt is NOT an acute or imminent risk of harm to self or others.  Therefore treatment team has NO clinical indication to seek court order for involuntary retention.  Pt will be discharged on 3 day letter.

## 2024-05-31 NOTE — BH INPATIENT PSYCHIATRY DISCHARGE NOTE - NSICDXPASTMEDICALHX_GEN_ALL_CORE_FT
PAST MEDICAL HISTORY:  Alcohol abuse     Cocaine abuse     Hypertension     Pericarditis      PAST MEDICAL HISTORY:  Chronic CHF     Depression     GERD (gastroesophageal reflux disease)     Hypertension     Pericarditis     Substance use disorder

## 2024-05-31 NOTE — BH INPATIENT PSYCHIATRY DISCHARGE NOTE - NSDCMRMEDTOKEN_GEN_ALL_CORE_FT
aspirin 81 mg oral delayed release tablet: 1 tab(s) orally once a day  atorvastatin 40 mg oral tablet: 1 tab(s) orally once a day (at bedtime)  loratadine 10 mg oral tablet: 1 tab(s) orally once a day  lurasidone 20 mg oral tablet: 1 tab(s) orally once a day Take with food  Multiple Vitamins oral tablet: 1 tab(s) orally once a day

## 2024-05-31 NOTE — BH INPATIENT PSYCHIATRY DISCHARGE NOTE - NSCURPASTPSYDX_PSY_ALL_CORE
VSS, patient remains intermittently tachycardic. Intermittent hallucinations and periods of sadness/regret. Tolerating liquids, minimal food intake. C/o nausea. Voiding appropriately, no bm over night.  PIV infusing with MIVF and electrolyte supplements per Mood disorder/Alcohol/Substance Use disorders

## 2024-05-31 NOTE — BH INPATIENT PSYCHIATRY DISCHARGE NOTE - NSDCCPCAREPLAN_GEN_ALL_CORE_FT
PRINCIPAL DISCHARGE DIAGNOSIS  Diagnosis: Depression  Assessment and Plan of Treatment: Medication management and psychotherapy     PRINCIPAL DISCHARGE DIAGNOSIS  Diagnosis: Depression  Assessment and Plan of Treatment: Medication management and psychotherapy      SECONDARY DISCHARGE DIAGNOSES  Diagnosis: Substance use disorder  Assessment and Plan of Treatment: Outpatient follow up

## 2024-05-31 NOTE — BH INPATIENT PSYCHIATRY DISCHARGE NOTE - DESCRIPTION
Single, undomiciled, unemployetd.  16 yr old daughter who lives with her mother in [facility].   Single, undomiciled, unemployed.  Pt has 16 yr old daughter who lives with her mother in [place], [place].

## 2024-05-31 NOTE — BH INPATIENT PSYCHIATRY DISCHARGE NOTE - NSBHMETABOLIC_PSY_ALL_CORE_FT
BMI: BMI (kg/m2): 36.8 (05-21-24 @ 20:02)  HbA1c:   Glucose: POCT Blood Glucose.: 104 mg/dL (04-04-24 @ 06:04)    BP: --Vital Signs Last 24 Hrs  T(C): 36.6 (05-31-24 @ 08:30), Max: 36.6 (05-31-24 @ 08:30)  T(F): 97.9 (05-31-24 @ 08:30), Max: 97.9 (05-31-24 @ 08:30)  HR: --  BP: --  BP(mean): --  RR: --  SpO2: --    Orthostatic VS  05-31-24 @ 08:30  Lying BP: --/-- HR: --  Sitting BP: 116/73 HR: 67  Standing BP: 133/84 HR: 95  Site: --  Mode: --  Orthostatic VS  05-30-24 @ 08:04  Lying BP: --/-- HR: --  Sitting BP: 111/74 HR: 96  Standing BP: --/-- HR: --  Site: --  Mode: --    Lipid Panel: Date/Time: 04-02-24 @ 23:00  Cholesterol, Serum: 128  LDL Cholesterol Calculated: 55  HDL Cholesterol, Serum: 45  Total Cholesterol/HDL Ration Measurement: --  Triglycerides, Serum: 139

## 2024-05-31 NOTE — BH INPATIENT PSYCHIATRY DISCHARGE NOTE - NSBHFUPINTERVALHXFT_PSY_A_CORE
Chart reviewed. Case discussed with interdisciplinary team. No acute overnight events reported, pt in good behavioral control. Pt compliant with standing medications. No PRNs required or requested. Per sleep log, fair sleep. Patient seen and examined for day of discharge follow up of mood symptoms.    Pt reports resolution of cold symptoms and looks forward to celebrating his birthday, upcoming in 2 days. He reports good mood and denies acute episodes of anxiety or agitation on the unit. No psychiatric complaints, no depressive/manic or psychotic symptoms elicited. Patient denies SIIP/HIIP. Pt reports good appetite and PO intake, as well as good sleep. No physical complaints. No side effects to medications reported.  Chart reviewed. Case discussed with interdisciplinary team. No acute overnight events reported, pt in good behavioral control. Pt compliant with standing medications. No PRNs required or requested. Per sleep log, fair sleep.  Pt reports resolution of cold symptoms and looks forward to celebrating his birthday, upcoming in 2 days. He reports good mood and denies acute episodes of anxiety or agitation on the unit. No psychiatric complaints, no depressive/manic or psychotic symptoms elicited. Patient denies SIIP/HIIP.  Pt reports good appetite and PO intake, as well as good sleep. No physical complaints. No side effects to medications reported.

## 2024-05-31 NOTE — BH INPATIENT PSYCHIATRY DISCHARGE NOTE - NSBHDCHANDOFFFT_PSY_ALL_CORE
Clinical handoff including hospital course, diagnosis, and treatment was sent to: Po  at [facility] [phone].

## 2024-05-31 NOTE — BH INPATIENT PSYCHIATRY DISCHARGE NOTE - NSBHMSEGAIT_PSY_A_CORE
Group Therapy Note    Date: 3/25/2022    Group Start Time: 11:20 AM  Group End Time: 12:00 PM  Group Topic: Psychoeducation    [facility]    [address]        Group Therapy Note    Attendees: 9    Participants engaged in an activity \"Handful of Compliments\" and then processed how it felt to give and receive compliments as well as accepting the compliments from others. Notes:  Tamela Becerra attended group and participated in the group activity and engaged with his peers.      Status After Intervention:  Improved    Participation Level: Interactive    Participation Quality: Appropriate, Attentive, Sharing and Supportive      Speech:  normal      Thought Process/Content: Logical      Affective Functioning: Congruent      Mood: euthymic      Level of consciousness:  Alert, Oriented x4 and Attentive      Response to Learning: Able to verbalize/acknowledge new learning and Progressing to goal      Endings: None Reported    Modes of Intervention: Support and Activity      Discipline Responsible: /Counselor      Signature:  [address] Normal gait / station

## 2024-05-31 NOTE — BH INPATIENT PSYCHIATRY DISCHARGE NOTE - HPI (INCLUDE ILLNESS QUALITY, SEVERITY, DURATION, TIMING, CONTEXT, MODIFYING FACTORS, ASSOCIATED SIGNS AND SYMPTOMS)
58 yr old male, single, undomiciled (lives in hotels) and unemployed.  self reports hx of bipolar disorder, depression and anxiety; as per Psyckes: with pan-diagnoses namely: MDD, Substance-Induced Depressive Disorder, Bipolar d/o,  Antisocial Personality Disorder, Borderline Personality Disorder,  schizoaffective Disorder. Pt is chronically nonadherent to meds/ psych appts., has hx of multiple psych admissions (most recently [facility] in Jan 2024), self reports hx of self aborted SA (jumping in front of a 7 train - last summer). with polysubstance use: Alcohol (denied any seizures, stated in past he has had VH during detox - per psyckes, with multiple detox treatments/ rehabs - last attendance at [facility] -  for Alcohol dependence, uncomplicated)/ Cocaine/ Other psychoactive substance related disorders/ Cannabis/ Tobacco/ Opioid. Pertinent medical issues include: HTN, CKD, GERD, pericarditis; self reports hx of CHF (with ? EF 45%). Pt self-presented to ED reporting SI with plan to jump in front of train or shoot himself. Transferred to our psych unit for further stabilization and management of symptoms/medication adjustment.    Patient seen in interview room, reports having felt increasingly depressed and suicidal for the last weeks, superimposed on persistently depressed mood and anhedonia. Patient describes feeling hopeless, not sleeping well (early awakenings, restlessness) and not eating well. Identifies family problems and housing issues as his primary stressors. Hasn't been able to reach his daughter (lives in [place], per recent ED note) for around 1 month. He would have a conflicted relationship with the daughter's mother who would live with a young partner. Mentions that the daughter has a troubled relationship with her mother as well but he could not get more information, which would make him feel very depressed, resorting to alcohol and cocaine use. He reports binging episodes with alcohol and cocaine for 1-2 days each. This would include Budweiser and Vodka, and he would spend around $2000 on cocaine for a short time frame and consume accordingly, which all happened at the beginning of May. Pt outlines his therapeutic goals saying he wants to be out of the hospital by the beginning of June (upcoming birthday) and would just need to be put on a different medication or depressive symptom control. Requests rehab at [facility], saying that he also likes their fitness center and psychotherapy focus, especially DBT. Denies current SIIP, HIIP. Denies self-injurious behavior (no cutting). No manic or psychotic symptoms elicited. Pt states he has tried several meds in the past but describes how Abilify and Depakote gave him nightmares and he would not have tolerated other medications trial well or only experienced minimal effects. As per ER Behavioral Health Assessment performed on 5/21/2024 at [facility]: "58 yr old male, single, undomiciled (lives in hotels) and unemployed.  self reports hx of bipolar disorder, depression and anxiety; as per Psyckes: with pan-diagnoses namely: Major Depressive Disorder, Substance-Induced Depressive Disorder, Unspecified/Other Bipolar, Depressive Disorder,  Antisocial Personality Disorder,  schizoaffective Disorder, Bipolar I, Bipolar II, Unspecified/Other Anxiety Disorder, Adjustment Disorder, Substance-Induced Sleep Disorder, Schizophrenia, Substance-Induced Psychotic Disorder, Unspecified/Other Psychotic Disorders, Attention Deficit Hyperactivity Disorder, Borderline Personality Disorder, Unspecified/Other Psychotic Disorders, Attention Deficit Hyperactivity Disorder, Borderline Personality Disorder, Insomnia Disorder, Other Mental Disorders  and Substance-Induced Anxiety Disorder, Pt is chronically nonadherent to meds/ psych appts.  has hx of multiple psych admissions (most recently [facility] in Jan 2024), self reports hx of self aborted SA (jumping in front of a 7 train - last summer). with polysubstance use: Alcohol (denied any seizures, stated in past he has had VH during detox - per psyckes, with multiple detoxes/ rehabs - last attendance at [facility]-  for Alcohol dependence, uncomplicated)/ Cocaine/ Other psychoactive substance related disorders/ Cannabis/ Tobacco/ Opioid/  Other stimulant related disorders.  Pt self reports hx of self aborted SA (jumping in front of a 7 train summer 2023. Pertinent medical issues include: HTN, CKD, GERD, pericarditis; self reports hx of CHF (with ? EF 45%), self-presents reporting SI with plan to jump in front of train or shoot himself.     Patient reports that he's feeling suicidal (for about 1 month) with a plan to jump in front of a train or shoot himself.  He states he has a gun stored with a friend in [place] and he admits to plans on shooting himself but refuses to provide further information. He reports persistently depressed mood and anhedonia. Patient feels hopeless, with low energy, not sleeping well and not eating well. He is unable and unwilling to engage in any safety planning. Pt says primary stressor is that he hasn't been able to reach his daughter (she lives in [place]) for 30 days. He reports "sometimes binging" on alcohol. BAL=51 at 14:12 today (5/21/24). States he had "a few shots" of hard liquor earlier today. He denies drug use. He denies manic or psychotic symptoms. He denies homicidal or violent ideation, intent, or plan. Pt states he hasn't taken prescribed meds (Abilify, Depakote, and Atorvastatin) in a few months. He is also nonadherent with Aspirin."

## 2024-05-31 NOTE — BH INPATIENT PSYCHIATRY DISCHARGE NOTE - HOSPITAL COURSE
On admission interview, patient presented with increasing depressive symptoms and suicidal ideation for the last weeks, superimposed on persistently depressed mood and anhedonia. He described feeling hopeless, not sleeping well (early awakenings, restlessness) and not eating well. Identified family problems and housing issues as his primary stressors. Hadn't been able to reach his daughter, who lives in [place],  or around 1 month. Identified recent alcohol and cocaine use with binging episodes including alcohol and cocaine for 1-2 days each. Pt initially requested rehab at [facility]. Denied SIIP, HIIP and self-injurious behavior. No manic or psychotic symptoms elicited.    Patient was started on lurasidone 20 mg po qd (5 pm per user schedule) which was kept at the same dose with good effect and tolerability.     Patient’s depressive symptoms gradually improved over the course of the hospitalization. At time of discharge, patient reported consistent subjective improvements and stability in mood symptoms. No depressive, manic, or psychotic symptoms noted. No substance withdrawal symptoms were noted.     There were no behavioral problems on the unit. Patient did not become agitated and did not require emergent intramuscular medications or seclusion/restraints. Patient did not self-harm on the unit. Patient remained actively engaged in treatment. Patient participated in individual, group, and milieu therapy. Patient got along appropriately with staff and peers. Patient did not have any medical problems during this hospitalization. There were no medical consultations.    On day of discharge, the patient has improved significantly and no longer requires inpatient treatment and care. Patient denies all suicidal and aggressive ideation, intent and plan. Patient displays improved mood symptoms and is not judged to be an acute danger to self or others at this time.    Risk Assessment: The patient is at chronic risk of harm to self and others given diagnosis of bipolar disorder with current depressive episode, psychosocial stressors (pt has unsuccessfully attempted to reach his daughter for several weeks; fearing estrangement).    Protective factors include: no pertinent medical issues, identifies reasons for living, future oriented, engagement in treatment.    On admission the patient was felt to be at an acutely elevated risk of harm due to depressive symptoms and substance use. Over the hospital course pt was adherent to standing meds and tolerated lurasidone 20 mg po qd well, provided to address his mood symptoms.  On day of discharge, pt has brightened affect, looks forward to discharge and does not display depressive symptoms, denying SIIP/HIIP.    Given the above, the patient is no longer felt to be at an acutely elevated risk of harm to himself and therefore no longer requires inpatient hospitalization. He is stable for transition to outpatient level of care.   On admission interview, patient presented with increasing depressive symptoms and suicidal ideation for the last weeks, superimposed on persistently depressed mood and anhedonia. He described feeling hopeless, not sleeping well (early awakenings, restlessness) and not eating well. Identified family problems and housing issues as his primary stressors. Hadn't been able to reach his daughter, who lives in [place], or around 1 month. Identified recent alcohol and cocaine use with binging episodes including alcohol and cocaine for 1-2 days each. Pt initially requested rehab at [facility]. Denied SIIP, HIIP and self-injurious behavior. No manic or psychotic symptoms elicited.  Patient was started on lurasidone 20 mg po qd (5 pm per user schedule) which was kept at the same dose with good effect and tolerability.  Patient’s depressive symptoms gradually improved over the course of the hospitalization. At time of discharge, patient reported consistent subjective improvements and stability in mood symptoms. No depressive, manic, or psychotic symptoms noted. No substance withdrawal symptoms were noted.     There were no behavioral problems on the unit. Patient did not become agitated and did not require emergent intramuscular medications or seclusion/restraints. Patient did not self-harm on the unit. Patient remained actively engaged in treatment. Patient participated in individual, group, and milieu therapy. Patient got along appropriately with staff and peers. Patient did not have any medical problems during this hospitalization. There were no medical consultations.  On day of discharge, the patient has improved significantly and no longer requires inpatient treatment and care. Patient denies all suicidal and aggressive ideation, intent and plan. Patient displays improved mood symptoms and is not judged to be an acute danger to self or others at this time.    Risk Assessment: The patient is at chronic risk of harm to self and others given diagnosis of bipolar disorder with current depressive episode, psychosocial stressors (pt has unsuccessfully attempted to reach his daughter for several weeks; fearing estrangement).  Protective factors include: no pertinent medical issues, identifies reasons for living, future oriented, engagement in treatment.  On admission the patient was felt to be at an acutely elevated risk of harm due to depressive symptoms and substance use. Over the hospital course pt was adherent to standing meds and tolerated lurasidone 20 mg po qd well, provided to address his mood symptoms.  On day of discharge, pt has brightened affect, looks forward to discharge and does not display depressive symptoms, denying SIIP/HIIP.  Given the above, the patient is no longer felt to be at an acutely elevated risk of harm to himself and can be discharged on 3 day letter.    *** Please refer to attending attestation at end of this document for additional clinical information.

## 2024-06-05 ENCOUNTER — EMERGENCY (EMERGENCY)
Facility: HOSPITAL | Age: 59
LOS: 1 days | Discharge: LEFT BEFORE DISCHARGE | End: 2024-06-05
Attending: EMERGENCY MEDICINE | Admitting: EMERGENCY MEDICINE
Payer: MEDICAID

## 2024-06-05 VITALS
HEART RATE: 89 BPM | HEIGHT: 74 IN | OXYGEN SATURATION: 95 % | SYSTOLIC BLOOD PRESSURE: 165 MMHG | RESPIRATION RATE: 16 BRPM | DIASTOLIC BLOOD PRESSURE: 90 MMHG | TEMPERATURE: 98 F

## 2024-06-05 DIAGNOSIS — F17.200 NICOTINE DEPENDENCE, UNSPECIFIED, UNCOMPLICATED: ICD-10-CM

## 2024-06-05 DIAGNOSIS — R07.89 OTHER CHEST PAIN: ICD-10-CM

## 2024-06-05 DIAGNOSIS — Z53.29 PROCEDURE AND TREATMENT NOT CARRIED OUT BECAUSE OF PATIENT'S DECISION FOR OTHER REASONS: ICD-10-CM

## 2024-06-05 PROCEDURE — 71045 X-RAY EXAM CHEST 1 VIEW: CPT | Mod: 26

## 2024-06-05 PROCEDURE — 99284 EMERGENCY DEPT VISIT MOD MDM: CPT

## 2024-06-05 RX ORDER — DIAZEPAM 5 MG
5 TABLET ORAL ONCE
Refills: 0 | Status: DISCONTINUED | OUTPATIENT
Start: 2024-06-05 | End: 2024-06-05

## 2024-06-05 RX ADMIN — Medication 5 MILLIGRAM(S): at 20:56

## 2024-06-05 NOTE — ED PROVIDER NOTE - PROGRESS NOTE DETAILS
I went to look for patient. I could not find him. He may have left. Per RN, pt has been walking around the ER. He has no IV. Will check again. I looked around for patient. Unable to find him. His things are also gone from his stretcher. He eloped.

## 2024-06-05 NOTE — ED PROVIDER NOTE - CLINICAL SUMMARY MEDICAL DECISION MAKING FREE TEXT BOX
Patient may have come for secondary gain. He kept getting up from stretcher and harassed our nurses to go into a room though we've told him multiple times that room is limited due to patient volume. Nonetheless, my plan had been to work him up for ACS and heart failure given his recent drug use. However, pt eloped before blood work could be done. His EKG and CXR were unremarkable. I ordered him Valium for anxiety since pt may have been withdrawing from alcohol.

## 2024-06-05 NOTE — ED PROVIDER NOTE - OBJECTIVE STATEMENT
Patient is a 59-year-old man with history of multi substance abuse and heart failure.  He admits to using cocaine and drinking heavily yesterday.  Today, he noticed left-sided chest pressure, constant and worse when he exerts himself.  Patient also feels generally unwell and anxious.  Patient notes he relapsed because his daughter has been acting out giving him grief.

## 2024-06-05 NOTE — ED PROVIDER NOTE - NSICDXPASTMEDICALHX_GEN_ALL_CORE_FT
PAST MEDICAL HISTORY:  Chronic CHF     Depression     GERD (gastroesophageal reflux disease)     Hypertension     Pericarditis     Substance use disorder

## 2024-06-05 NOTE — ED PROVIDER NOTE - WR ORDER DATE AND TIME 1
05-Jun-2024 19:45
Morphology Per Location (Optional): brown papule
Detail Level: Detailed
Size Of Lesion: 4mm
Size Of Lesion: 6x4mm

## 2024-06-05 NOTE — ED ADULT TRIAGE NOTE - CHIEF COMPLAINT QUOTE
Left sided chest pain x 20mins radiating down arm. hx htn. Pt mentioned depressed today but when triage rn asked about it, pt states "I don't want to talk about it, nevermind." Triage rn asked again and pt stated "nevermind". When bringing pt into the ER, pt states I need a bed and a room. Delayed triage because pt went straight into the bathroom.

## 2024-06-05 NOTE — ED PROVIDER NOTE - PHYSICAL EXAMINATION
*  Normocephalic, atraumatic  EOMI, pupils equal  No JVD, no nuchal rigidity  Lungs clear, normal effort  RRR, normal S1 and S2, no murmur  Abdomen soft, non-tender  Back normal on inspection, no CVA tenderness  Normal extremities, no deformity, no calf tenderness, no edema  GCS 15,  normal speech, steady gait    Anxious

## 2024-06-05 NOTE — ED PROVIDER NOTE - IN ACCORDANCE WITH [place] STATE LAW, WE OFFER EVERY PATIENT A HEPATITIS C TEST. WOULD YOU LIKE TO BE TESTED TODAY?
Asked patient to call office. Wanted to follow up with patient, to see if she was able to schedule her mammogram/breast ultrasound. Where and when is she scheduled?
Opt out

## 2024-06-06 PROBLEM — K21.9 GASTRO-ESOPHAGEAL REFLUX DISEASE WITHOUT ESOPHAGITIS: Chronic | Status: ACTIVE | Noted: 2024-05-31

## 2024-06-06 PROBLEM — F32.A DEPRESSION, UNSPECIFIED: Chronic | Status: ACTIVE | Noted: 2024-05-31

## 2024-06-06 PROBLEM — I50.9 HEART FAILURE, UNSPECIFIED: Chronic | Status: ACTIVE | Noted: 2024-05-31

## 2024-06-06 PROBLEM — F19.90 OTHER PSYCHOACTIVE SUBSTANCE USE, UNSPECIFIED, UNCOMPLICATED: Chronic | Status: ACTIVE | Noted: 2024-05-31

## 2024-06-08 ENCOUNTER — INPATIENT (INPATIENT)
Facility: HOSPITAL | Age: 59
LOS: 4 days | Discharge: HOME | End: 2024-06-13
Attending: STUDENT IN AN ORGANIZED HEALTH CARE EDUCATION/TRAINING PROGRAM | Admitting: STUDENT IN AN ORGANIZED HEALTH CARE EDUCATION/TRAINING PROGRAM
Payer: MEDICAID

## 2024-06-08 VITALS
DIASTOLIC BLOOD PRESSURE: 76 MMHG | OXYGEN SATURATION: 96 % | SYSTOLIC BLOOD PRESSURE: 129 MMHG | RESPIRATION RATE: 16 BRPM | TEMPERATURE: 98 F | HEART RATE: 105 BPM | HEIGHT: 74 IN

## 2024-06-08 DIAGNOSIS — R45.851 SUICIDAL IDEATIONS: ICD-10-CM

## 2024-06-08 DIAGNOSIS — F10.90 ALCOHOL USE, UNSPECIFIED, UNCOMPLICATED: ICD-10-CM

## 2024-06-08 DIAGNOSIS — F14.10 COCAINE ABUSE, UNCOMPLICATED: ICD-10-CM

## 2024-06-08 LAB
ADD ON TEST-SPECIMEN IN LAB: SIGNIFICANT CHANGE UP
ALBUMIN SERPL ELPH-MCNC: 4 G/DL — SIGNIFICANT CHANGE UP (ref 3.3–5)
ALP SERPL-CCNC: 66 U/L — SIGNIFICANT CHANGE UP (ref 40–120)
ALT FLD-CCNC: 66 U/L — HIGH (ref 4–41)
ANION GAP SERPL CALC-SCNC: 16 MMOL/L — HIGH (ref 7–14)
APAP SERPL-MCNC: <10 UG/ML — LOW (ref 15–25)
APPEARANCE UR: ABNORMAL
AST SERPL-CCNC: 68 U/L — HIGH (ref 4–40)
BACTERIA # UR AUTO: NEGATIVE /HPF — SIGNIFICANT CHANGE UP
BASOPHILS # BLD AUTO: 0.05 K/UL — SIGNIFICANT CHANGE UP (ref 0–0.2)
BASOPHILS NFR BLD AUTO: 0.6 % — SIGNIFICANT CHANGE UP (ref 0–2)
BILIRUB SERPL-MCNC: 0.8 MG/DL — SIGNIFICANT CHANGE UP (ref 0.2–1.2)
BILIRUB UR-MCNC: NEGATIVE — SIGNIFICANT CHANGE UP
BUN SERPL-MCNC: 18 MG/DL — SIGNIFICANT CHANGE UP (ref 7–23)
CALCIUM SERPL-MCNC: 9.1 MG/DL — SIGNIFICANT CHANGE UP (ref 8.4–10.5)
CAST: 0 /LPF — SIGNIFICANT CHANGE UP (ref 0–4)
CHLORIDE SERPL-SCNC: 103 MMOL/L — SIGNIFICANT CHANGE UP (ref 98–107)
CO2 SERPL-SCNC: 20 MMOL/L — LOW (ref 22–31)
COLOR SPEC: YELLOW — SIGNIFICANT CHANGE UP
CREAT SERPL-MCNC: 1.16 MG/DL — SIGNIFICANT CHANGE UP (ref 0.5–1.3)
DIFF PNL FLD: NEGATIVE — SIGNIFICANT CHANGE UP
EGFR: 73 ML/MIN/1.73M2 — SIGNIFICANT CHANGE UP
EOSINOPHIL # BLD AUTO: 0.09 K/UL — SIGNIFICANT CHANGE UP (ref 0–0.5)
EOSINOPHIL NFR BLD AUTO: 1.2 % — SIGNIFICANT CHANGE UP (ref 0–6)
ETHANOL SERPL-MCNC: 40 MG/DL — HIGH
FLUAV AG NPH QL: SIGNIFICANT CHANGE UP
FLUBV AG NPH QL: SIGNIFICANT CHANGE UP
GLUCOSE SERPL-MCNC: 75 MG/DL — SIGNIFICANT CHANGE UP (ref 70–99)
GLUCOSE UR QL: NEGATIVE MG/DL — SIGNIFICANT CHANGE UP
HCT VFR BLD CALC: 36.2 % — LOW (ref 39–50)
HGB BLD-MCNC: 11.9 G/DL — LOW (ref 13–17)
IANC: 4.72 K/UL — SIGNIFICANT CHANGE UP (ref 1.8–7.4)
IMM GRANULOCYTES NFR BLD AUTO: 0.9 % — SIGNIFICANT CHANGE UP (ref 0–0.9)
KETONES UR-MCNC: ABNORMAL MG/DL
LEUKOCYTE ESTERASE UR-ACNC: NEGATIVE — SIGNIFICANT CHANGE UP
LYMPHOCYTES # BLD AUTO: 2.06 K/UL — SIGNIFICANT CHANGE UP (ref 1–3.3)
LYMPHOCYTES # BLD AUTO: 26.7 % — SIGNIFICANT CHANGE UP (ref 13–44)
MCHC RBC-ENTMCNC: 29.8 PG — SIGNIFICANT CHANGE UP (ref 27–34)
MCHC RBC-ENTMCNC: 32.9 GM/DL — SIGNIFICANT CHANGE UP (ref 32–36)
MCV RBC AUTO: 90.5 FL — SIGNIFICANT CHANGE UP (ref 80–100)
MONOCYTES # BLD AUTO: 0.72 K/UL — SIGNIFICANT CHANGE UP (ref 0–0.9)
MONOCYTES NFR BLD AUTO: 9.3 % — SIGNIFICANT CHANGE UP (ref 2–14)
NEUTROPHILS # BLD AUTO: 4.72 K/UL — SIGNIFICANT CHANGE UP (ref 1.8–7.4)
NEUTROPHILS NFR BLD AUTO: 61.3 % — SIGNIFICANT CHANGE UP (ref 43–77)
NITRITE UR-MCNC: NEGATIVE — SIGNIFICANT CHANGE UP
NRBC # BLD: 0 /100 WBCS — SIGNIFICANT CHANGE UP (ref 0–0)
NRBC # FLD: 0 K/UL — SIGNIFICANT CHANGE UP (ref 0–0)
PH UR: 5.5 — SIGNIFICANT CHANGE UP (ref 5–8)
PLATELET # BLD AUTO: 310 K/UL — SIGNIFICANT CHANGE UP (ref 150–400)
POTASSIUM SERPL-MCNC: 3.9 MMOL/L — SIGNIFICANT CHANGE UP (ref 3.5–5.3)
POTASSIUM SERPL-SCNC: 3.9 MMOL/L — SIGNIFICANT CHANGE UP (ref 3.5–5.3)
PROT SERPL-MCNC: 7.6 G/DL — SIGNIFICANT CHANGE UP (ref 6–8.3)
PROT UR-MCNC: 30 MG/DL
RBC # BLD: 4 M/UL — LOW (ref 4.2–5.8)
RBC # FLD: 15.1 % — HIGH (ref 10.3–14.5)
RBC CASTS # UR COMP ASSIST: 1 /HPF — SIGNIFICANT CHANGE UP (ref 0–4)
RSV RNA NPH QL NAA+NON-PROBE: SIGNIFICANT CHANGE UP
SALICYLATES SERPL-MCNC: <0.3 MG/DL — LOW (ref 15–30)
SARS-COV-2 RNA SPEC QL NAA+PROBE: SIGNIFICANT CHANGE UP
SODIUM SERPL-SCNC: 139 MMOL/L — SIGNIFICANT CHANGE UP (ref 135–145)
SP GR SPEC: 1.03 — HIGH (ref 1–1.03)
SQUAMOUS # UR AUTO: 2 /HPF — SIGNIFICANT CHANGE UP (ref 0–5)
TROPONIN T, HIGH SENSITIVITY RESULT: 6 NG/L — SIGNIFICANT CHANGE UP
TROPONIN T, HIGH SENSITIVITY RESULT: 8 NG/L — SIGNIFICANT CHANGE UP
TSH SERPL-MCNC: 1.7 UIU/ML — SIGNIFICANT CHANGE UP (ref 0.27–4.2)
UROBILINOGEN FLD QL: 1 MG/DL — SIGNIFICANT CHANGE UP (ref 0.2–1)
WBC # BLD: 7.71 K/UL — SIGNIFICANT CHANGE UP (ref 3.8–10.5)
WBC # FLD AUTO: 7.71 K/UL — SIGNIFICANT CHANGE UP (ref 3.8–10.5)
WBC UR QL: 3 /HPF — SIGNIFICANT CHANGE UP (ref 0–5)

## 2024-06-08 PROCEDURE — 90792 PSYCH DIAG EVAL W/MED SRVCS: CPT | Mod: GC

## 2024-06-08 PROCEDURE — 99285 EMERGENCY DEPT VISIT HI MDM: CPT

## 2024-06-08 RX ORDER — THIAMINE MONONITRATE (VIT B1) 100 MG
100 TABLET ORAL DAILY
Refills: 0 | Status: DISCONTINUED | OUTPATIENT
Start: 2024-06-09 | End: 2024-06-13

## 2024-06-08 RX ORDER — HALOPERIDOL DECANOATE 100 MG/ML
5 INJECTION INTRAMUSCULAR ONCE
Refills: 0 | Status: DISCONTINUED | OUTPATIENT
Start: 2024-06-09 | End: 2024-06-13

## 2024-06-08 RX ORDER — HYDROXYZINE HCL 10 MG
50 TABLET ORAL EVERY 6 HOURS
Refills: 0 | Status: DISCONTINUED | OUTPATIENT
Start: 2024-06-09 | End: 2024-06-13

## 2024-06-08 RX ORDER — DIPHENHYDRAMINE HCL 50 MG
50 CAPSULE ORAL ONCE
Refills: 0 | Status: DISCONTINUED | OUTPATIENT
Start: 2024-06-09 | End: 2024-06-13

## 2024-06-08 RX ORDER — ASPIRIN/CALCIUM CARB/MAGNESIUM 324 MG
81 TABLET ORAL DAILY
Refills: 0 | Status: DISCONTINUED | OUTPATIENT
Start: 2024-06-09 | End: 2024-06-13

## 2024-06-08 RX ORDER — FOLIC ACID 0.8 MG
1 TABLET ORAL DAILY
Refills: 0 | Status: DISCONTINUED | OUTPATIENT
Start: 2024-06-09 | End: 2024-06-13

## 2024-06-08 RX ORDER — DIPHENHYDRAMINE HCL 50 MG
50 CAPSULE ORAL EVERY 4 HOURS
Refills: 0 | Status: DISCONTINUED | OUTPATIENT
Start: 2024-06-09 | End: 2024-06-13

## 2024-06-08 RX ORDER — ACETAMINOPHEN 500 MG
650 TABLET ORAL EVERY 6 HOURS
Refills: 0 | Status: DISCONTINUED | OUTPATIENT
Start: 2024-06-09 | End: 2024-06-13

## 2024-06-08 RX ORDER — ATORVASTATIN CALCIUM 80 MG/1
40 TABLET, FILM COATED ORAL AT BEDTIME
Refills: 0 | Status: DISCONTINUED | OUTPATIENT
Start: 2024-06-09 | End: 2024-06-13

## 2024-06-08 RX ORDER — IBUPROFEN 200 MG
400 TABLET ORAL ONCE
Refills: 0 | Status: COMPLETED | OUTPATIENT
Start: 2024-06-08 | End: 2024-06-08

## 2024-06-08 RX ORDER — LORATADINE 10 MG/1
10 TABLET ORAL DAILY
Refills: 0 | Status: DISCONTINUED | OUTPATIENT
Start: 2024-06-09 | End: 2024-06-13

## 2024-06-08 RX ORDER — HALOPERIDOL DECANOATE 100 MG/ML
5 INJECTION INTRAMUSCULAR EVERY 4 HOURS
Refills: 0 | Status: DISCONTINUED | OUTPATIENT
Start: 2024-06-09 | End: 2024-06-13

## 2024-06-08 RX ORDER — IBUPROFEN 200 MG
400 TABLET ORAL EVERY 6 HOURS
Refills: 0 | Status: DISCONTINUED | OUTPATIENT
Start: 2024-06-09 | End: 2024-06-13

## 2024-06-08 RX ADMIN — Medication 400 MILLIGRAM(S): at 16:23

## 2024-06-08 NOTE — ED BEHAVIORAL HEALTH ASSESSMENT NOTE - NSBHSUBSTUSESTATEMENT_PSY_A_CORE
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 Most recent tacrolimus level prior to discharge was 4.4 mcg/L on 1/4. Fluconazole booster of 100 mg PO daily added to tacrolimus 8 mg PO twice daily at that time.      Opportunistic pathogen prophylaxis includes:  - PJP: trimethoprim/sulfamethoxazole 400-80 mg - 1 tablet by mouth daily  - CMV D+R-: Valganciclovir for 6 months duration tentatively. Valganciclovir dose prior to discharge was 450 mg daily based on renal function.     Pharmacy has monitored for medication interactions and immunosuppression levels in conjunction with the multidisciplinary team. In anticipation for discharge, medication therapy needs have been addressed daily throughout the current admission via multidisciplinary rounds and/or discussions, order verification, daily clinical pharmacy review, and communication with prescriber.  Neftaly Clifton, PharmD  PGY-1 Pharmacy Resident           .

## 2024-06-08 NOTE — ED BEHAVIORAL HEALTH ASSESSMENT NOTE - OTHER PAST PSYCHIATRIC HISTORY (INCLUDE DETAILS REGARDING ONSET, COURSE OF ILLNESS, INPATIENT/OUTPATIENT TREATMENT)
self reports hx of depression + anxiety  multiple past diagnoses as per Psyckes: with pan-diagnoses namely: Major Depressive Disorder, Substance-Induced Depressive Disorder, Unspecified/Other Bipolar, Depressive Disorder,  Antisocial Personality Disorder,  schizoaffective Disorder, Bipolar I, Bipolar II, Unspecified/Other Anxiety Disorder, Adjustment Disorder, Substance-Induced Sleep Disorder, Schizophrenia, Substance-Induced Psychotic Disorder, Unspecified/Other Psychotic Disorders, Attention Deficit Hyperactivity Disorder, Borderline Personality Disorder, Unspecified/Other Psychotic Disorders, Attention Deficit Hyperactivity Disorder, Borderline Personality Disorder, Insomnia Disorder, Other Mental Disorders  and Substance-Induced Anxiety Disorder  high mental health utilizer including numerous Psych ED/ CPEP visits for SI, alcohol intoxication  multiple prior psych admissions  chronically nonadherent with meds   stated past hx of allegedly self aborting jumping in front of a 7 train over the summer ("stopped by an emre" (2023)  denied other past suicide attempts.   Denies ever being in consistent outpatient psychiatric care.

## 2024-06-08 NOTE — ED BEHAVIORAL HEALTH ASSESSMENT NOTE - RISK ASSESSMENT
Acute risk factors - recent inpatient discharge, ongoing substance use, current homelessness  Chronic risk factors - extensive psychiatric and substance hx, hx outpatient treatment nonadherence, suspected Cluster B pathology  Protective factors -  pt identifies his daughter as highly protective; pt is treatment-seeking and often self-presents to the ED for voluntary hospitalization

## 2024-06-08 NOTE — ED BEHAVIORAL HEALTH ASSESSMENT NOTE - PSYCHIATRIC ISSUES AND PLAN (INCLUDE STANDING AND PRN MEDICATION)
hold Sy, if declining voluntary admission re-evaluate suicidality risk before discharge + safety planning

## 2024-06-08 NOTE — ED BEHAVIORAL HEALTH ASSESSMENT NOTE - PAST PSYCHOTROPIC MEDICATION
MULTIPLE MEDS TRIAL: ativan, diazepam, librium, trazodone, atarax, haldol, VPA, zyprexa, abilify, Depakote, Latuda, Zoloft

## 2024-06-08 NOTE — ED ADULT NURSE NOTE - OBJECTIVE STATEMENT
Received pt. in ER 12a alert and oriented x 4, presenting to the ER with complaints of chest pain and suicidal ideations. Pt. stated " I have been depressed for about a year and today I feel like shooting myself". Pt. place on constant observation PCA at bedside, safety maintained. Pt. states that he has chest pain that started this morning. Place on cardiac monitor. Belongings in closet outside cat-scan.

## 2024-06-08 NOTE — ED BEHAVIORAL HEALTH ASSESSMENT NOTE - DETAILS
chest pain pending evaluation, will update inpatient team with dispo reports diarrhea with Zoloft, nightmares with Latuda, "high" on Depakote  parents' voices saying to "join them" refused to discuss discharged from [facility] 5/31/24 self reports getting into fights - none recent see HPI in custody of mother (currently based in [place], [place]) N/A

## 2024-06-08 NOTE — ED PROVIDER NOTE - PROGRESS NOTE DETAILS
Patient received on signout from  boarding in psychiatry.  During shift patient has been calm has not required any medication.  pending a bed availability.  Will continue to board in psychiatry.  Will sign out to night team at end of shift endorsed to Dr. Toussaint Leonid Toussaint DO: received at signout.  Patient medically cleared waiting for bed for suicidal ideology.  Inpatient psychiatric bed available and will admit for further care.

## 2024-06-08 NOTE — ED ADULT NURSE NOTE - CHIEF COMPLAINT QUOTE
pt expressing that he wants to kill himself. Pt states has a plan and has had previous attempt. Denies any medical complaints when triaged. When Dr. Driscoll came to [facility] pt. Pt states now has chest pain. Pt to be evaluated in main, brought to [facility] to secure belongings and undress. Phx CHF, substance abuse, GERD, pericarditis, HTN.

## 2024-06-08 NOTE — ED ADULT TRIAGE NOTE - CHIEF COMPLAINT QUOTE
pt expressing that he wants to kill himself. Pt states has a plan and has had previous attempt. Denies any medical complaints. pt expressing that he wants to kill himself. Pt states has a plan and has had previous attempt. Denies any medical complaints when triaged. When Dr. Driscoll came to eval pt. Pt states now has chest pain. Pt to be evaluated in main. Phx CHF, substance abuse, GERD, pericarditis, HTN. pt expressing that he wants to kill himself. Pt states has a plan and has had previous attempt. Denies any medical complaints when triaged. When Dr. Driscoll came to [facility] pt. Pt states now has chest pain. Pt to be evaluated in main, brought to [facility] to secure belongings and undress. Phx CHF, substance abuse, GERD, pericarditis, HTN.

## 2024-06-08 NOTE — ED BEHAVIORAL HEALTH ASSESSMENT NOTE - NSBHATTESTCOMMENTATTENDFT_PSY_A_CORE
57yo single man who is undomiciled (living at shelters, hotels, ERs/hospitals, and rehabs) and unemployed, with PMHx HTN and self-reported CHF, with PPHx of many documented diagnoses ranging across mood, psychotic, and personality disorders, hx of many prior hospitalizations and ED visits, discharged from [facility] 8 days ago on a 3-day letter, chronic nonadherence to meds and outpt treatment, currently has Intensive Mobile Treatment (IMT) team, hx of several prior self-aborted suicide attempts (planned to jump in front of train 2023, "stopped by an emre"), and significant hx of polysubstance use/abuse (recently EtOH and cocaine), hx of many prior detox/rehabs (last at [facility] Dec 2023), admitted due to report of SI (with several plans) and report of AH in the context of substance use, Per Psyckes, he has dozens of ED visits driven by alcohol and cocaine use.  SI 2/2 to substance use + possible secondary gain for housing, and is actively seeking voluntary care.     Plan: 9.13 admission, sw to locate bed, [place], daily vitals, boarding in ED, hold Latuda, if declining voluntary admission re-evaluate suicidality risk before discharge + safety planning, restart aspirin 81mg daily, atorvastatin 40mg daily, loratadine 10mg daily, multivitamin

## 2024-06-08 NOTE — ED BEHAVIORAL HEALTH ASSESSMENT NOTE - NSBHSACONSEQUENCE_PSY_A_CORE FT
numerous past detox and rehab admissions, including [place], [facility], [facility], etc (as per Psyckes)

## 2024-06-08 NOTE — ED BEHAVIORAL HEALTH ASSESSMENT NOTE - HPI (INCLUDE ILLNESS QUALITY, SEVERITY, DURATION, TIMING, CONTEXT, MODIFYING FACTORS, ASSOCIATED SIGNS AND SYMPTOMS)
59yo single man who is undomiciled (living at shelters, hotels, ERs/hospitals, and rehabs) and unemployed, with PMHx HTN and self-reported CHF, with PPHx of many documented diagnoses ranging across mood, psychotic, and personality disorders, hx of many prior hospitalizations and ED visits, discharged from [facility] 8 days ago on a 3-day letter, chronic nonadherence to meds and outpt treatment, currently has Intensive Mobile Treatment (IMT) team, hx of several prior self-aborted suicide attempts (planned to jump in front of train , "stopped by an emre"), and significant hx of polysubstance use/abuse (recently EtOH and cocaine), hx of many prior detox/rehabs (last at [facility] Dec 2023), admitted due to report of SI (with several vague plans) and report of AH in the context of substance use, recent discharge from [facility], and inability to reach his 14yo daughter (who lives in [place]). Of note, the patient frequently presents to with nearly identical chief complaints to various EDs, is briefly admitted and his symptoms quickly clear, he requests help connecting with housing or substance rehab, and then leaves on 3-day letters - this has been the pattern from his last 2 admissions at [facility] in 2024 and May 2024.     Pt initially reported chest pain and was evaluated medically and cleared. Pt is on 1:1 in medical ED, bed is in the hallway. Pt was interviewed in medical ED though he speaks in an almost inaudible voice and refuses to discuss much of his history given the public nature of the setting. Pt states that he came in today because he has been feeling suicidal with a plan to jump in front of a train or shoot himself, though he refuses to discuss further details about these plans. States that he has been hearing the voices of his  parents over the past day telling him to "join them" and states this is the first time he has experienced this (though per chart has long-documented history of endorsing AH). Pt was discharged from [facility] 8 days ago, has been staying in a hotel, and says he immediately started feeling suicidal upon discharge, states "I should have stayed longer." Reports he never took Latuda outside of the hospital because it gave him nightmares, though says he never reported this to inpatient team, and then states that he lost his medications immediately upon discharge. States he has been drinking alcohol the last 3 days (says he's been drinking "a variety" but will not attempt to quantify) but denies other substances (BAL = 40 on arrival today, urine tox not yet obtained). Says "I can't take it anymore" because he has been unable to get in touch with his daughter in [place] for at least 1 month. Endorses poor sleep and appetite over the last week. He denies homicidal or violent ideation, intent, or plan. States he wishes to be hospitalized "to clear my head" but is unable to state how a hospitalization would help his current symptoms. 57yo single man who is undomiciled (living at shelters, hotels, ERs/hospitals, and rehabs) and unemployed, with PMHx HTN and self-reported CHF, with PPHx of many documented diagnoses ranging across mood, psychotic, and personality disorders, hx of many prior hospitalizations and ED visits, discharged from [facility] 8 days ago on a 3-day letter, chronic nonadherence to meds and outpt treatment, currently has Intensive Mobile Treatment (IMT) team, hx of several prior self-aborted suicide attempts (planned to jump in front of train , "stopped by an emre"), and significant hx of polysubstance use/abuse (recently EtOH and cocaine), hx of many prior detox/rehabs (last at [facility] Dec 2023), admitted due to report of SI (with several vague plans) and report of AH in the context of substance use, recent discharge from [facility], and inability to reach his 16yo daughter (who lives in [place]). Of note, the patient frequently presents to with nearly identical chief complaints to various EDs, is briefly admitted and his symptoms quickly clear, he requests help connecting with housing or substance rehab, and then leaves on 3-day letters - this has been the pattern from his last 2 admissions at [facility] in 2024 and May 2024. Per Psycbisi, he has dozens of ED visits driven by alcohol and cocaine use.     Pt initially reported chest pain and was evaluated medically and cleared. Pt is on 1:1 in medical ED, bed is in the hallway. Pt was interviewed in medical ED though he speaks in an almost inaudible voice and refuses to discuss much of his history given the public nature of the setting. Pt states that he came in today because he has been feeling suicidal with a plan to jump in front of a train or shoot himself, though he refuses to discuss further details about these plans. States that he heard the voices of his  parents yesterday telling him to "join them" and states this is the first time he has experienced this (though per chart has long-documented history of endorsing AH). He denies current AH in the hospital. Pt was discharged from [facility] 8 days ago, has been staying in a hotel, and says he immediately started feeling suicidal upon discharge, states "I should have stayed longer." Reports he never took Latuda outside of the hospital because it gave him nightmares, though says he never reported this to inpatient team, and then states that he lost his medications immediately upon discharge. States he has been drinking alcohol the last 3 days (says he's been drinking "a variety" but will not attempt to quantify) but denies other substances (BAL = 40 on arrival today, urine tox not yet obtained). He denies hx of alcohol withdrawal seizures. States his main stressor is being unable to get in touch with his teenage daughter in [place] for at least 1 month. Endorses poor sleep and appetite over the last week. He denies homicidal or violent ideation, intent, or plan. States he wishes to be hospitalized "to clear my head" but is unable to state how a hospitalization would help his current symptoms.    Pt was interviewed in  ED area and is more forthcoming in private setting. He admits to using alcohol for several days and using cocaine several days ago, which he believes was laced with fentanyl because he nodded out several times and this scared him. He engages in discussion around his pattern of continued substance use with frequent ED visits and states that he would like help connecting with substance treatment at this time. He continues to endorse SI with vague plan as above but refuses to discuss details.

## 2024-06-08 NOTE — ED PROVIDER NOTE - PHYSICAL EXAMINATION
GEN: NAD. A&Ox3. Non-toxic appearing.  HEENT: normocephalic, atraumatic, EOMI, PERRL, no scleral icterus, no conjuntival pallor, moist MM  CARDIAC: RRR, S1, S2, no murmur. Radial pulses and DP pulses present and symmetric b/l  PULM: CTA B/L no wheeze, rhonchi, rales.   ABD: soft NT, ND, no rebound no guarding  MSK: Moving all extremities, no edema. 5/5 strength and full ROM in all extremities.     NEURO: gait normal, no focal neurological deficits, CN 2-12 grossly intact  SKIN: warm, dry, no rash.

## 2024-06-08 NOTE — ED BEHAVIORAL HEALTH ASSESSMENT NOTE - DESCRIPTION
single, unemployed, stays at a hotel, [employer] park and in the streets. has a 15 yr old daughter (living in [place], [place] - currently in custody of bio mother, speaks on the phone regularly). no reported access to guns HTN, pericarditis, self reports hx of CHF (claims EF around ? 45%), CKD, GERD. per psyckes, past meds to include: HCTZ 25mg, lipitor 40mg, ASA 81mg, entresto 24-26mg, folic acid 1mg, thiamine 100mg in behavioral control    Vital Signs Last 24 Hrs  T(C): 36.7 (08 Jun 2024 14:56), Max: 36.9 (08 Jun 2024 09:39)  T(F): 98.1 (08 Jun 2024 14:56), Max: 98.4 (08 Jun 2024 09:39)  HR: 82 (08 Jun 2024 14:56) (82 - 105)  BP: 125/85 (08 Jun 2024 14:56) (124/68 - 129/76)  BP(mean): --  RR: 18 (08 Jun 2024 14:56) (16 - 18)  SpO2: 96% (08 Jun 2024 14:56) (96% - 100%)    Parameters below as of 08 Jun 2024 14:56  Patient On (Oxygen Delivery Method): room air

## 2024-06-08 NOTE — ED BEHAVIORAL HEALTH ASSESSMENT NOTE - DIFFERENTIAL
MALINGERING  axis II pathology   substance-induced mood disorder  MDD vs bipolar depression by history substance-induced mood disorder  alcohol use disorder  cocaine use disorder  malingering  axis II pathology   MDD vs bipolar depression by history

## 2024-06-08 NOTE — ED BEHAVIORAL HEALTH ASSESSMENT NOTE - OTHER
homelessness, financial constraints, unemployment, limited social support, not being able to see 15 yr old daughter guarded, vague did not assess locate bed

## 2024-06-08 NOTE — ED ADULT NURSE NOTE - NS_BH TRG QUESTION5_ED_ALL_ED
"[facility] Hospital Medicine  History & Physical    Patient Name: Yasmin Asencio  MRN: [id number]  Admission Date: 1/5/2017  Attending Physician: Lashell Altamirano MD   Primary Care Provider: Urmila Luna MD         Patient information was obtained from patient and ER records.     Subjective:     Principal Problem:Pulmonary embolism without acute cor pulmonale    Chief Complaint: SOB     HPI:  68 year old female with PMH of pulmonary hemorrhage,S/P embolization in 11/2015 and pseudomonas pneumonia in 2016,has been treated with IV Abx, hypertension and COPD,AOCD,atrial tachycardia,depression,chronic slurred speech,has been followed by neurology,GERD,presents complaining of palpitations described as a "pounding" in her chest that started last night at approximately 11 PM after she had 2 back-to-back Atrovent nebulizer treatments. She also reports that she has elevated blood pressure since earlier tonight. She states she has a history of a-fib that occurred about 1.5 years ago while she was having a procedure. She states she was sent home with a monitor for 3 days but it did not occur at any other time since then.she had unremarkable LHC on 6/2016 by , She otherwise denies fever, chills, chest pain, , leg pain, leg swelling, abdominal pain, nausea, vomiting, and diarrhea at this time.patient has PE in right pulmonary brunch,patient denies history of PE and DVT ,no family history of clot disorder or long time recent travel,hospitalization,duo to history of hemoptysis,patient has not been yet started on anticoagulation,pulmnology has been consulted,she denies hemoptysis sine embolization and treatment of pseudomonas  pneumonia in last 7 month,CTA chest show no sign of hemorrhagia and her CBC  show chronic stable AOCD.she is stable  at this time on NC   O 2.      Past Medical History   Diagnosis Date    Acquired bronchiectasis      due to history of TB - followed by Dr. lacey" Burns    Allergy     Amblyopia      rt eye per pt    Anemia of other chronic disease     Anxiety     Cataract     Clotting disorder     COPD (chronic obstructive pulmonary disease)     COPD (chronic obstructive pulmonary disease)     Depression     Diverticulosis     Essential tremor     GERD (gastroesophageal reflux disease)     Hemangioma of liver     History of tuberculosis 1978    Hypertension     Mixed anxiety and depressive disorder     Psoriasis     S/P PICC central line placement Apr. 2016 - May 2016    Skin disease      Psoriasis    Spondylosis without myelopathy 8/23/2013    Thoracic aorta atherosclerosis      noted on CT scan of chest 1/3/2011    Tuberculosis     Vaginal delivery      x2    Vitamin D deficiency        Past Surgical History   Procedure Laterality Date    Gallbladder surgery  9/2011    Cataract extraction w/  intraocular lens implant  07/24/12     od dr spain    Cataract extraction w/  intraocular lens implant  08/07/12     left eye    Eye surgery       bilateral Cataract       Review of patient's allergies indicates:   Allergen Reactions    Bactrim [sulfamethoxazole-trimethoprim] Rash     Was hospitalized for rash    Albuterol Other (See Comments)     tremors    Restasis [cyclosporine] Itching       No current facility-administered medications on file prior to encounter.      Current Outpatient Prescriptions on File Prior to Encounter   Medication Sig    alprazolam (XANAX) 0.25 MG tablet Take 1 tablet (0.25 mg total) by mouth nightly as needed for Anxiety.    ANORO ELLIPTA 62.5-25 mcg/actuation DsDv Inhale 1 puff into the lungs once daily.    desoximetasone (TOPICORT) 0.25 % cream Apply topically 2 (two) times daily.      escitalopram oxalate (LEXAPRO) 20 MG tablet TAKE ONE TABLET BY MOUTH ONCE DAILY    gabapentin (NEURONTIN) 300 MG capsule Take 1 capsule (300 mg total) by mouth 3 (three) times daily.    guaifenesin (MUCINEX) 600 mg 12 hr tablet  Take 1,200 mg by mouth 2 (two) times daily.    ipratropium (ATROVENT) 0.02 % nebulizer solution Take 2.5 mLs (500 mcg total) by nebulization 4 (four) times daily.    LACTOBACILLUS RHAMNOSUS GG (CULTURELLE ORAL) Take by mouth.    metoprolol tartrate (LOPRESSOR) 25 MG tablet TAKE ONE TABLET BY MOUTH TWICE DAILY    omeprazole (PRILOSEC) 20 MG capsule TAKE ONE CAPSULE BY MOUTH ONCE DAILY    primidone (MYSOLINE) 50 MG Tab TAKE ONE TABLET BY MOUTH ONCE DAILY IN THE MORNING, ONE AT NOON, AND TWO AT BEDTIME    SODIUM CHLORIDE FOR INHALATION (SODIUM CHLORIDE 3%) 3 % nebulizer solution USE ONE VIAL IN NEBULIZER TWICE DAILY    topiramate (TOPAMAX) 25 MG tablet Take 1 tablet (25 mg total) by mouth 2 (two) times daily.    VITAMIN D2 50,000 unit capsule TAKE ONE CAPSULE BY MOUTH ONCE A WEEK    desonide (DESOWEN) 0.05 % lotion AAA face bid prn redness, scaling, or itching    tramadol (ULTRAM) 50 mg tablet Take 50 mg by mouth every 6 (six) hours as needed for Pain.     Family History     Problem Relation (Age of Onset)    Cancer Father, Brother, Maternal Aunt    Heart attack Mother, Brother, Son    Hyperlipidemia Sister    Hypertension Mother    Lung cancer Sister    Psoriasis Sister    Stroke Maternal Uncle        Social History Main Topics    Smoking status: Former Smoker     Packs/day: 2.00     Years: 50.00     Types: Cigarettes     Quit date: 5/27/2009    Smokeless tobacco: Never Used    Alcohol use No    Drug use: No    Sexual activity: Yes     Partners: Male     Review of Systems   Constitutional: Negative for activity change and appetite change.   HENT: Negative for congestion and dental problem.    Eyes: Negative for discharge and itching.   Respiratory: Positive for shortness of breath. Negative for apnea and wheezing.    Cardiovascular: Negative for chest pain and leg swelling.   Gastrointestinal: Positive for abdominal pain. Negative for abdominal distention.   Endocrine: Negative for cold intolerance and  heat intolerance.   Genitourinary: Negative for difficulty urinating and dyspareunia.   Musculoskeletal: Negative for arthralgias and back pain.   Allergic/Immunologic: Negative for environmental allergies and food allergies.   Neurological: Negative for dizziness.   Hematological: Negative for adenopathy. Does not bruise/bleed easily.   Psychiatric/Behavioral: Negative for agitation and behavioral problems.     Objective:     Vital Signs (Most Recent):  Temp: 98.3 °F (36.8 °C) (01/05/17 0738)  Pulse: 83 (01/05/17 0820)  Resp: 18 (01/05/17 0820)  BP: (!) 150/65 (01/05/17 0738)  SpO2: 98 % (01/05/17 0820) Vital Signs (24h Range):  Temp:  [97.6 °F (36.4 °C)-98.3 °F (36.8 °C)] 98.3 °F (36.8 °C)  Pulse:  [68-92] 83  Resp:  [16-22] 18  BP: (150-194)/() 150/65     Weight: 68.5 kg (151 lb)  Body mass index is 26.75 kg/(m^2).    Physical Exam   Constitutional: She is oriented to person, place, and time. No distress.   HENT:   Head: Normocephalic and atraumatic.   Eyes: EOM are normal. Pupils are equal, round, and reactive to light.   Neck: Normal range of motion. Neck supple.   Cardiovascular: Normal rate and regular rhythm.    Pulmonary/Chest: Breath sounds normal.   Abdominal: Soft. Bowel sounds are normal.   Musculoskeletal: Normal range of motion. She exhibits no edema or deformity.   Neurological: She is oriented to person, place, and time. No cranial nerve deficit. Coordination normal.   Skin: Skin is warm and dry. She is not diaphoretic.   Psychiatric: She has a normal mood and affect. Her behavior is normal.        Significant Labs:   BMP:   Recent Labs  Lab 01/05/17 0044   GLU 94      K 4.2      CO2 23   BUN 17   CREATININE 1.0   CALCIUM 9.4   MG 2.2     CBC:   Recent Labs  Lab 01/05/17 0044   WBC 8.16   HGB 11.7*   HCT 36.2*          Significant Imaging: CTA chest reviewed.    Assessment/Plan:     * Pulmonary embolism without acute cor pulmonale  Patient is at risk for pulmonary  hemorrhagia,duo to history with embolization and treatment for pneumonia,it has been done long discussion with patient and family regarding risk with bleeding after starting anticoagulation,she has stable H/H in last few month with no sign of hemoptysis,will discus case with Pulmonology,patient and family want think at this time.      Acquired bronchiectasis  Will continue monitor.      Anemia of chronic disease  Stable continue monitor.      Benign hypertension with chronic kidney disease, stage III  Continue with metoprolol.      Ectopic atrial tachycardia  Stable monitor in [place].      GERD (gastroesophageal reflux disease)  Continue with PPI.      Mild major depression  Continue with  lexapro.      COPD (chronic obstructive pulmonary disease)  Continue with Atrovent.      VTE Risk Mitigation         Ordered     High Risk of VTE  Once      01/05/17 0456     Place sequential compression device  Until discontinued      01/05/17 0456     Place HUSSEIN hose  Until discontinued      01/05/17 0456     Reason for No Pharmacological VTE Prophylaxis  Once      01/05/17 0456        Lashell Altamirano MD  Department of Hospital Medicine   [facility]   No

## 2024-06-08 NOTE — ED PROVIDER NOTE - CLINICAL SUMMARY MEDICAL DECISION MAKING FREE TEXT BOX
59-year-old male history of hypertension, alcohol use disorder, previous admission for SI and previous attempts, CHF, presents to ED with suicidal ideation with plan to either jump off a building or shoot himself.  States has a gun at home.  While in triage patient also developed substernal chest pain that lasted 15 minutes, now resolved.  Endorses cough with green sputum.  States had episode of chest pain last week as well.  Denies fevers, shortness of breath, abdominal pain, nausea, vomiting, urinary symptoms, cocaine use.     VSS.  Lungs clear, abdomen nontender, no lower extremity edema or swelling.  EKG NSR with no ischemic changes.    Will evaluate for ACS, pneumonia.  Labs, imaging, psych evaluation for SI.

## 2024-06-08 NOTE — ED BEHAVIORAL HEALTH ASSESSMENT NOTE - ADDITIONAL DETAILS ALL
one self reported aborted SA: via thinking about jumping in front of a 7 train (last summer 2023). no other reported hx of SA

## 2024-06-08 NOTE — ED BEHAVIORAL HEALTH ASSESSMENT NOTE - SUMMARY
59yo single man who is undomiciled (living at shelters, hotels, ERs/hospitals, and rehabs) and unemployed, with PMHx HTN and self-reported CHF, with PPHx of many documented diagnoses ranging across mood, psychotic, and personality disorders, hx of many prior hospitalizations and ED visits, discharged from [facility] 8 days ago on a 3-day letter, chronic nonadherence to meds and outpt treatment, currently has Intensive Mobile Treatment (IMT) team, hx of several prior self-aborted suicide attempts (planned to jump in front of train 2023, "stopped by an emre"), and significant hx of polysubstance use/abuse (recently EtOH and cocaine), hx of many prior detox/rehabs (last at [facility] Dec 2023), admitted due to report of SI (with several vague plans) and report of AH in the context of substance use, recent discharge from [facility], and inability to reach his 14yo daughter (who lives in [place]). Of note, the patient frequently presents to with nearly identical chief complaints to various EDs, is briefly admitted and his symptoms quickly clear, he requests help connecting with housing or substance rehab, and then leaves on 3-day letters - this has been the pattern from his last 2 admissions at [facility] in Jan 2024 and May 2024.     Today patient presents endorsing SI with stated plan to jump in front of train or shoot himself, unwilling to discuss further details at this time in public setting in medical ED. Will continue evaluation once patient is moved back to  ED area. 59yo single man who is undomiciled (living at shelters, hotels, ERs/hospitals, and rehabs) and unemployed, with PMHx HTN and self-reported CHF, with PPHx of many documented diagnoses ranging across mood, psychotic, and personality disorders, hx of many prior hospitalizations and ED visits, discharged from [facility] 8 days ago on a 3-day letter, chronic nonadherence to meds and outpt treatment, currently has Intensive Mobile Treatment (IMT) team, hx of several prior self-aborted suicide attempts (planned to jump in front of train 2023, "stopped by an emre"), and significant hx of polysubstance use/abuse (recently EtOH and cocaine), hx of many prior detox/rehabs (last at [facility] Dec 2023), admitted due to report of SI (with several vague plans) and report of AH in the context of substance use, recent discharge from [facility], and inability to reach his 16yo daughter (who lives in [place]). Of note, the patient frequently presents to with nearly identical chief complaints to various EDs, is briefly admitted and his symptoms quickly clear, he requests help connecting with housing or substance rehab, and then leaves on 3-day letters - this has been the pattern from his last 2 admissions at [facility] in Jan 2024 and May 2024. Per Psyckes, he has dozens of ED visits driven by alcohol and cocaine use.     Today patient presents endorsing SI with stated plan to jump in front of train or shoot himself, in the setting of recent alcohol and cocaine use. On interview, there is no evidence of psychosis, pt is linear, organized, fully oriented, does not appear to respond to internal stimuli. He denies current AH, VH, paranoia, and ideas of reference. He denies any violent/homicidal ideation towards anyone else. He denies current alcohol withdrawal symptoms. He demonstrates some insight into his pattern of substance use leading to frequent ED visits with stated SI that rapidly resolves upon admission. Presentation likely driven by substance use disorder, and suspect that stated suicidality is in service of obtaining psychiatric admission due to homelessness and minimal coping skills. At this time, patient requests connection to inpatient substance treatment. [facility] is reviewing patient's case, will board patient in ED for now.    Home meds - aspirin 81mg daily, atorvastatin 40mg daily, loratadine 10mg daily, multivitamin  Psych meds - none currently

## 2024-06-08 NOTE — ED PROVIDER NOTE - ATTENDING CONTRIBUTION TO CARE
Dr. Driscoll:  I have personally performed a face to face bedside history and physical examination of this patient. I have discussed the history, examination, review of systems, assessment and plan of management with the resident. I have reviewed the electronic medical record and amended it to reflect my history, review of systems, physical exam, assessment and plan.    59M h/o HTN, pericarditis, polysubstance abuse, depression, bipolar disorder, schizoaffective disorder, presents c/o SI.  Pt states he has had increased depression for months, tried to jump in front of a train last night.  While in ED waiting room, pt also c/o chest pain episode that lasted approximately 15min.  ROS otherwise negative.    Exam:  - nad  - rrr  - ctab   -abd soft ntnd    A/P  - CP, r/o ACS; SI, will consult psych  - cbc, cmp, trop, ekg, cxr, tox labs

## 2024-06-09 DIAGNOSIS — R45.851 SUICIDAL IDEATIONS: ICD-10-CM

## 2024-06-09 LAB
CULTURE RESULTS: SIGNIFICANT CHANGE UP
SPECIMEN SOURCE: SIGNIFICANT CHANGE UP

## 2024-06-09 PROCEDURE — 71045 X-RAY EXAM CHEST 1 VIEW: CPT | Mod: 26

## 2024-06-09 PROCEDURE — 99222 1ST HOSP IP/OBS MODERATE 55: CPT

## 2024-06-09 RX ORDER — RISPERIDONE 4 MG/1
1 TABLET ORAL AT BEDTIME
Refills: 0 | Status: DISCONTINUED | OUTPATIENT
Start: 2024-06-09 | End: 2024-06-13

## 2024-06-09 RX ADMIN — Medication 1 MILLIGRAM(S): at 08:02

## 2024-06-09 RX ADMIN — Medication 650 MILLIGRAM(S): at 08:03

## 2024-06-09 RX ADMIN — Medication 100 MILLIGRAM(S): at 08:03

## 2024-06-09 RX ADMIN — Medication 81 MILLIGRAM(S): at 08:02

## 2024-06-09 RX ADMIN — LORATADINE 10 MILLIGRAM(S): 10 TABLET ORAL at 08:02

## 2024-06-09 RX ADMIN — Medication 1 TABLET(S): at 08:02

## 2024-06-09 RX ADMIN — Medication 400 MILLIGRAM(S): at 04:50

## 2024-06-09 RX ADMIN — Medication 50 MILLIGRAM(S): at 21:58

## 2024-06-09 NOTE — BH INPATIENT PSYCHIATRY ASSESSMENT NOTE - HPI (INCLUDE ILLNESS QUALITY, SEVERITY, DURATION, TIMING, CONTEXT, MODIFYING FACTORS, ASSOCIATED SIGNS AND SYMPTOMS)
As per [facility] ED Assessment:  "59yo single man who is undomiciled (living at shelters, hotels, ERs/hospitals, and rehabs) and unemployed, with PMHx HTN and self-reported CHF, with PPHx of many documented diagnoses ranging across mood, psychotic, and personality disorders, hx of many prior hospitalizations and ED visits, discharged from [facility] 8 days ago on a 3-day letter, chronic nonadherence to meds and outpt treatment, currently has Intensive Mobile Treatment (IMT) team, hx of several prior self-aborted suicide attempts (planned to jump in front of train , "stopped by an emre"), and significant hx of polysubstance use/abuse (recently EtOH and cocaine), hx of many prior detox/rehabs (last at [facility] Dec 2023), admitted due to report of SI (with several vague plans) and report of AH in the context of substance use, recent discharge from [facility], and inability to reach his 14yo daughter (who lives in [place]). Of note, the patient frequently presents to with nearly identical chief complaints to various EDs, is briefly admitted and his symptoms quickly clear, he requests help connecting with housing or substance rehab, and then leaves on 3-day letters - this has been the pattern from his last 2 admissions at [facility] in 2024 and May 2024. Per Terrie, he has dozens of ED visits driven by alcohol and cocaine use.     Pt initially reported chest pain and was evaluated medically and cleared. Pt is on 1:1 in medical ED, bed is in the hallway. Pt was interviewed in medical ED though he speaks in an almost inaudible voice and refuses to discuss much of his history given the public nature of the setting. Pt states that he came in today because he has been feeling suicidal with a plan to jump in front of a train or shoot himself, though he refuses to discuss further details about these plans. States that he heard the voices of his  parents yesterday telling him to "join them" and states this is the first time he has experienced this (though per chart has long-documented history of endorsing AH). He denies current AH in the hospital. Pt was discharged from [facility] 8 days ago, has been staying in a hotel, and says he immediately started feeling suicidal upon discharge, states "I should have stayed longer." Reports he never took Latuda outside of the hospital because it gave him nightmares, though says he never reported this to inpatient team, and then states that he lost his medications immediately upon discharge. States he has been drinking alcohol the last 3 days (says he's been drinking "a variety" but will not attempt to quantify) but denies other substances (BAL = 40 on arrival today, urine tox not yet obtained). He denies hx of alcohol withdrawal seizures. States his main stressor is being unable to get in touch with his teenage daughter in [place] for at least 1 month. Endorses poor sleep and appetite over the last week. He denies homicidal or violent ideation, intent, or plan. States he wishes to be hospitalized "to clear my head" but is unable to state how a hospitalization would help his current symptoms.    Pt was interviewed in  ED area and is more forthcoming in private setting. He admits to using alcohol for several days and using cocaine several days ago, which he believes was laced with fentanyl because he nodded out several times and this scared him. He engages in discussion around his pattern of continued substance use with frequent ED visits and states that he would like help connecting with substance treatment at this time. He continues to endorse SI with vague plan as above but refuses to discuss details."    On the unit, patient is irritable but cooperative. Patient requesting sharps order to shave but due to recent admission for SI will defer to primary team. Patient denies any SI on the unit and stated that he is only depressed and suicidal because he is unable to get in touch with his daughter. Patient endorses +CAH of his  parents telling him to kill himself. Patient in agreement to start Risperdal, reported prev treatment with Latuda. No CO needed. CIWA score zero.

## 2024-06-09 NOTE — BH INPATIENT PSYCHIATRY ASSESSMENT NOTE - NSBHMSELANG_PSY_A_CORE
No abnormalities noted Localized Dermabrasion Text: The patient was draped in routine manner.  Localized dermabrasion using 3 x 17 mm wire brush was performed in routine manner to papillary dermis. This spot dermabrasion is being performed to complete skin cancer reconstruction. It also will eliminate the other sun damaged precancerous cells that are known to be part of the regional effect of a lifetime's worth of sun exposure. This localized dermabrasion is therapeutic and should not be considered cosmetic in any regard. Localized Dermabrasion With Wire Brush Text: The patient was draped in routine manner.  Localized dermabrasion using 3 x 17 mm wire brush was performed in routine manner to papillary dermis. This spot dermabrasion is being performed to complete skin cancer reconstruction. It also will eliminate the other sun damaged precancerous cells that are known to be part of the regional effect of a lifetime's worth of sun exposure. This localized dermabrasion is therapeutic and should not be considered cosmetic in any regard.

## 2024-06-09 NOTE — BH INPATIENT PSYCHIATRY ASSESSMENT NOTE - NSBHCHARTREVIEWVS_PSY_A_CORE FT
Vital Signs Last 24 Hrs  T(C): 36.8 (06-09-24 @ 13:14), Max: 36.9 (06-08-24 @ 16:14)  T(F): 98.2 (06-09-24 @ 13:14), Max: 98.4 (06-08-24 @ 16:14)  HR: 76 (06-09-24 @ 02:05) (76 - 87)  BP: 107/67 (06-09-24 @ 02:05) (107/67 - 125/85)  BP(mean): --  RR: 17 (06-09-24 @ 07:56) (17 - 18)  SpO2: 99% (06-09-24 @ 07:56) (96% - 100%)    Orthostatic VS  06-09-24 @ 13:14  Lying BP: --/-- HR: --  Sitting BP: 123/79 HR: 92  Standing BP: --/-- HR: --  Site: --  Mode: --  Orthostatic VS  06-09-24 @ 07:56  Lying BP: --/-- HR: --  Sitting BP: 114/71 HR: 64  Standing BP: 121/81 HR: 85  Site: --  Mode: --  Orthostatic VS  06-09-24 @ 04:30  Lying BP: --/-- HR: --  Sitting BP: 96/57 HR: 59  Standing BP: 117/85 HR: 58  Site: --  Mode: --

## 2024-06-09 NOTE — BH INPATIENT PSYCHIATRY ASSESSMENT NOTE - DETAILS
self reports getting into fights - none recent reports diarrhea with Zoloft, nightmares with Latuda, "high" on Depakote see HPI refused to discuss

## 2024-06-09 NOTE — BH INPATIENT PSYCHIATRY ASSESSMENT NOTE - NSBHATTESTATTENDBILLTIME_PSY_A_CORE
I attest my time as attending is greater than EMMIE time spent on qualifying patient care activities.

## 2024-06-09 NOTE — BH PATIENT PROFILE - NSPROPTRIGHTBILLOFRIGHTS_GEN_A_NUR
Gastroenterology Outpatient History and Physical    Patient: Vipul Mcconnell    Physician: Emmanuel Cantu MD    Chief Complaint: N/V  History of Present Illness: 80yo F with N/V. History:  Past Medical History:   Diagnosis Date    Essential hypertension     Hypercholesterolemia     Menopause     Stroke ([facility]       Past Surgical History:   Procedure Laterality Date    COLONOSCOPY N/A 2021    COLONOSCOPY performed by Lu Gonzalez MD at [facility] ENDOSCOPY    [address]  2021         HX COLONOSCOPY      HX GYN       VI,Para V,SAB i      Social History     Socioeconomic History    Marital status:      Spouse name: Not on file    Number of children: Not on file    Years of education: Not on file    Highest education level: Not on file   Tobacco Use    Smoking status: Former Smoker     Quit date: 1990     Years since quittin.8    Smokeless tobacco: Never Used   Vaping Use    Vaping Use: Never used   Substance and Sexual Activity    Alcohol use: Not Currently     Comment: stopped May 2021   Other Topics Concern     Service No    Blood Transfusions No    Caffeine Concern No    Occupational Exposure No    Hobby Hazards No    Sleep Concern No    Stress Concern No    Weight Concern No    Special Diet No    Back Care Yes    Exercise Yes     Comment: normal daily activity     Bike Helmet Yes    Seat Belt Yes    Self-Exams Yes     Social Determinants of Health     Financial Resource Strain:     Difficulty of Paying Living Expenses:    Food Insecurity:     Worried About Running Out of Food in the Last Year:     Ran Out of Food in the Last Year:    Transportation Needs:     Lack of Transportation (Medical):      Lack of Transportation (Non-Medical):    Physical Activity:     Days of Exercise per Week:     Minutes of Exercise per Session:    Stress:     Feeling of Stress :    Social Connections:     Frequency of Communication with Friends and Family:     Frequency of Social Gatherings with Friends and Family:     Attends Anab[employer]ist Services:     Active Member of Clubs or Organizations:     Attends Club or Organization Meetings:     Marital Status:       Family History   Problem Relation Age of Onset    Parkinson's Disease Mother     Cancer Father         Gastric      Patient Active Problem List   Diagnosis Code    OA (osteoarthritis) M19.90    [facility] I63.9    Menopausal state N95.1    Other and unspecified hyperlipidemia E78.5    HTN (hypertension) I10    Hyperlipidemia E78.5    Stenosis of both internal carotid arteries I65.23    Cerebral microvascular disease I67.89       Allergies: Allergies   Allergen Reactions    Statins-Hmg-Coa Reductase Inhibitors Other (comments)     Other reaction(s): Other (comments)  Severs leg pain(Zetia, Crestor)  Severs leg pain(Zetia, Crestor)     Medications:   Prior to Admission medications    Medication Sig Start Date End Date Taking? Authorizing Provider   atenolol (TENORMIN) 25 mg tablet TAKE ONE TABLET BY 2 times a DAY 7/29/19  Yes Maru Sadler NP   lisinopril (PRINIVIL, ZESTRIL) 40 mg tablet Take 1 Tab by mouth daily. Bedtime. 11/15/18  Yes Maru Sadler NP   fish oil-omega-3 fatty acids (FISH OIL) 340-1,000 mg capsule Take 1 Cap by mouth daily. Yes Provider, Historical   cyanocobalamin (VITAMIN B-12) 1,000 mcg tablet Take 1,000 mcg by mouth daily. Yes Provider, Historical   cholecalciferol, vitamin D3, (VITAMIN D3) 2,000 unit tab Take  by mouth. Yes Provider, Historical   ascorbic acid, vitamin C, (VITAMIN C) 500 mg tablet Take  by mouth. Yes Provider, Historical   multivitamin (ONE A DAY) tablet Take 1 Tab by mouth daily. Yes Provider, Historical   aspirin 81 mg tablet Take 81 mg by mouth daily. Yes Provider, Historical   clopidogreL (Plavix) 75 mg tab Take 75 mg by mouth daily.     Provider, Historical   atorvastatin (LIPITOR) 20 mg tablet TAKE 1 TABLET NIGHTLY 8/23/19   Vin Lowe NP   glucosamine-chondroitin (ARTHX) 500-400 mg cap Take 1 Cap by mouth daily. Patient not taking: Reported on 6/17/2021    Provider, Historical   ibuprofen (MOTRIN) 400 mg tablet Take 1 Tab by mouth every eight (8) hours as needed for Pain. Patient not taking: Reported on 6/17/2021 12/20/18   Vin Lowe NP   glucosamine 1,000 mg tab Take  by mouth daily. Patient not taking: Reported on 6/17/2021    Provider, Historical     Physical Exam:   Vital Signs: Blood pressure (!) 193/95, pulse (!) 57, temperature 97.8 °F (36.6 °C), resp. rate 9, height 5' 5\" (1.651 m), weight 54.4 kg (120 lb), SpO2 97 %, not currently breastfeeding.   General: well developed, well nourished   HEENT: unremarkable   Heart: regular rhythm no mumur    Lungs: clear   Abdominal:  benign   Neurological: unremarkable   Extremities: no edema     Findings/Diagnosis: N/V  Plan of Care/Planned Procedure: EGD with conscious/deep sedation    Signed:  Yue Oconnell MD 6/17/2021 patient

## 2024-06-09 NOTE — BH INPATIENT PSYCHIATRY ASSESSMENT NOTE - RISK ASSESSMENT
Acute risk factors - recent inpatient discharge, ongoing substance use, current homelessness  Chronic risk factors - extensive psychiatric and substance hx, hx outpatient treatment nonadherence, suspected Cluster B pathology  Protective factors -  pt identifies his daughter as highly protective; pt is treatment-seeking and often self-presents to the ED for voluntary hospitalization    Moderate Acute Suicide Risk

## 2024-06-09 NOTE — BH INPATIENT PSYCHIATRY ASSESSMENT NOTE - NSBHASSESSSUMMFT_PSY_ALL_CORE
59yo single man who is undomiciled (living at shelters, hotels, ERs/hospitals, and rehabs) and unemployed, with PMHx HTN and self-reported CHF, with PPHx of many documented diagnoses ranging across mood, psychotic, and personality disorders, hx of many prior hospitalizations and ED visits, discharged from [facility] 8 days ago on a 3-day letter, chronic nonadherence to meds and outpt treatment, currently has Intensive Mobile Treatment (IMT) team, hx of several prior self-aborted suicide attempts (planned to jump in front of train 2023, "stopped by an emre"), and significant hx of polysubstance use/abuse (recently EtOH and cocaine), hx of many prior detox/rehabs (last at [facility] Dec 2023), admitted due to report of SI (with several vague plans) and report of AH in the context of substance use, recent discharge from [facility], and inability to reach his 14yo daughter (who lives in [place]). Of note, the patient frequently presents to with nearly identical chief complaints to various EDs, is briefly admitted and his symptoms quickly clear, he requests help connecting with housing or substance rehab, and then leaves on 3-day letters - this has been the pattern from his last 2 admissions at [facility] in Jan 2024 and May 2024. Per Psyckes, he has dozens of ED visits driven by alcohol and cocaine use.   Today patient presents endorsing SI with stated plan to jump in front of train or shoot himself, in the setting of recent alcohol and cocaine use. On interview, there is no evidence of psychosis, pt is linear, organized, fully oriented, does not appear to respond to internal stimuli. He denies current AH, VH, paranoia, and ideas of reference. He denies any violent/homicidal ideation towards anyone else. He denies current alcohol withdrawal symptoms. He demonstrates some insight into his pattern of substance use leading to frequent ED visits with stated SI that rapidly resolves upon admission. Presentation likely driven by substance use disorder, and suspect that stated suicidality is in service of obtaining psychiatric admission due to homelessness and minimal coping skills. At this time, patient requests connection to inpatient substance treatment. [facility] is reviewing patient's case, will board patient in ED for now.    On assessment, patient endorses +CAH but denies any SI. In agreement to start Risperdal.    1. Admit to [facility], 9.13  2. No CO needed  3. Start Risperdal 1mg QHS  	Home meds - aspirin 81mg daily, atorvastatin 40mg daily, loratadine 10mg daily, multivitamin  4. Labs ordered for am

## 2024-06-09 NOTE — BH PATIENT PROFILE - HOME MEDICATIONS
Multiple Vitamins oral tablet , 1 tab(s) orally once a day  lurasidone 20 mg oral tablet , 1 tab(s) orally once a day Take with food  aspirin 81 mg oral delayed release tablet , 1 tab(s) orally once a day  atorvastatin 40 mg oral tablet , 1 tab(s) orally once a day (at bedtime)  loratadine 10 mg oral tablet , 1 tab(s) orally once a day

## 2024-06-09 NOTE — PSYCHIATRIC REHAB INITIAL EVALUATION - NSBHALCSUBCHOICE_PSY_ALL_CORE
Pt has hx of poly substance use. Pt has been drinking variety of ETOH for the past 3 days. Pt also used cocaine several days ago.

## 2024-06-09 NOTE — BH INPATIENT PSYCHIATRY ASSESSMENT NOTE - DESCRIPTION
single, unemployed, [place] streets. has a 15 yr old daughter (living in [place], [place] - currently in custody of bio mother, speaks on the phone regularly). reported access to guns on the streets from his "[employer] friends"

## 2024-06-09 NOTE — BH INPATIENT PSYCHIATRY ASSESSMENT NOTE - NSBHMETABOLIC_PSY_ALL_CORE_FT
BMI: BMI (kg/m2): 36.7 (06-09-24 @ 04:30)  HbA1c:   Glucose: POCT Blood Glucose.: 104 mg/dL (04-04-24 @ 06:04)    BP: 107/67 (06-09-24 @ 02:05) (107/67 - 129/76)Vital Signs Last 24 Hrs  T(C): 36.8 (06-09-24 @ 13:14), Max: 36.9 (06-08-24 @ 16:14)  T(F): 98.2 (06-09-24 @ 13:14), Max: 98.4 (06-08-24 @ 16:14)  HR: 76 (06-09-24 @ 02:05) (76 - 87)  BP: 107/67 (06-09-24 @ 02:05) (107/67 - 125/85)  BP(mean): --  RR: 17 (06-09-24 @ 07:56) (17 - 18)  SpO2: 99% (06-09-24 @ 07:56) (96% - 100%)    Orthostatic VS  06-09-24 @ 13:14  Lying BP: --/-- HR: --  Sitting BP: 123/79 HR: 92  Standing BP: --/-- HR: --  Site: --  Mode: --  Orthostatic VS  06-09-24 @ 07:56  Lying BP: --/-- HR: --  Sitting BP: 114/71 HR: 64  Standing BP: 121/81 HR: 85  Site: --  Mode: --  Orthostatic VS  06-09-24 @ 04:30  Lying BP: --/-- HR: --  Sitting BP: 96/57 HR: 59  Standing BP: 117/85 HR: 58  Site: --  Mode: --    Lipid Panel: Date/Time: 04-02-24 @ 23:00  Cholesterol, Serum: 128  LDL Cholesterol Calculated: 55  HDL Cholesterol, Serum: 45  Total Cholesterol/HDL Ration Measurement: --  Triglycerides, Serum: 139

## 2024-06-09 NOTE — BH INPATIENT PSYCHIATRY ASSESSMENT NOTE - ATTENDING COMMENTS
59yo single man who is undomiciled (living at shelters, hotels, ERs/hospitals, and rehabs) and unemployed, with PMHx HTN and self-reported CHF, with PPHx of many documented diagnoses ranging across mood, psychotic, and personality disorders, hx of many prior hospitalizations and ED visits, discharged from [facility] 8 days ago on a 3-day letter, chronic nonadherence to meds and outpt treatment, currently has Intensive Mobile Treatment (IMT) team, hx of several prior self-aborted suicide attempts (planned to jump in front of train 2023, "stopped by an emre"), and significant hx of polysubstance use/abuse (recently EtOH and cocaine), hx of many prior detox/rehabs (last at [facility] Dec 2023), admitted due to report of SI (with several vague plans) and report of AH in the context of substance use, recent discharge from [facility], and inability to reach his 16yo daughter (who lives in [place]). Of note, the patient frequently presents to with nearly identical chief complaints to various EDs, is briefly admitted and his symptoms quickly clear, he requests help connecting with housing or substance rehab, and then leaves on 3-day letters - this has been the pattern from his last 2 admissions at [facility] in Jan 2024 and May 2024. Per Pselmer, he has dozens of ED visits driven by alcohol and cocaine use.   Today patient presents endorsing SI with stated plan to jump in front of train or shoot himself, in the setting of recent alcohol and cocaine use. Will be stabilized inpatient.

## 2024-06-09 NOTE — PSYCHIATRIC REHAB INITIAL EVALUATION - NSBHPRRECOMMEND_PSY_ALL_CORE
Writer met with pt, pt was lying in bed, sedated, having difficulty to participate in this assessment. Writer brief oriented self  and provided pt with unit schedule and welcome letter. Writer will meet with pt in a later time to established rapport with pt. THE following information would be obtained from medical record.     Pt is a 58 y/o male, unemployed and undomiciled. Pt has hx of multiple psychiatric admissions and most recently discharged from [facility] 8 days ago after pt submitted 3DL. Pt has hx of several self-aborted SA. Pt has hx of polysubstance abuse and hx of many prior detox/rehab. Pt currently has IMT. Pt was admitted to [place] [id number] due to SI and substance abuse in context of medication non-compliance. Pt has been non-compliant with medication since he was discharged 8 days ago. Pt has suicidal ideation via jump in front of a train or shoot himself. Pt heard voices from his  parents telling him to join them prior to ED, but denied AH in ED. Pt has been drinking variety of ETOH for the past 3 days and his BAL= 40 in ED.  Pt also used cocaine few days ago, which pt believed was laced with fentanyl because he nodded out several times and that scared him. Pt’s main stressor is unable to get in touch with his teenager daughter in [place] for the past 1 month. Pt has poor appetite, poor sleep over the past week.

## 2024-06-09 NOTE — BH INPATIENT PSYCHIATRY ASSESSMENT NOTE - CURRENT MEDICATION
MEDICATIONS  (STANDING):  aspirin  chewable 81 milliGRAM(s) Oral daily  atorvastatin 40 milliGRAM(s) Oral at bedtime  folic acid 1 milliGRAM(s) Oral daily  loratadine 10 milliGRAM(s) Oral daily  multivitamin 1 Tablet(s) Oral daily  thiamine 100 milliGRAM(s) Oral daily    MEDICATIONS  (PRN):  acetaminophen     Tablet .. 650 milliGRAM(s) Oral every 6 hours PRN Mild Pain (1 - 3), Moderate Pain (4 - 6), Severe Pain (7 - 10)  diphenhydrAMINE 50 milliGRAM(s) Oral every 4 hours PRN agitation  diphenhydrAMINE Injectable 50 milliGRAM(s) IntraMuscular once PRN severe agitation  haloperidol     Tablet 5 milliGRAM(s) Oral every 4 hours PRN agitation  haloperidol    Injectable 5 milliGRAM(s) IntraMuscular Once PRN severe agitation  hydrOXYzine hydrochloride 50 milliGRAM(s) Oral every 6 hours PRN Anxiety  ibuprofen  Tablet. 400 milliGRAM(s) Oral every 6 hours PRN Mild Pain (1 - 3), Moderate Pain (4 - 6), Severe Pain (7 - 10)  LORazepam     Tablet 2 milliGRAM(s) Oral every 2 hours PRN CIWA score increase by 2 points and current CIWA score GREATER THAN 9  LORazepam   Injectable 2 milliGRAM(s) IntraMuscular every 2 hours PRN CIWA score increase by 2 points and current CIWA score GREATER THAN 9

## 2024-06-09 NOTE — BH INPATIENT PSYCHIATRY ASSESSMENT NOTE - NSBHSACONSEQUENCE_PSY_A_CORE FT
numerous past detox and rehab admissions, including [facility], [facility], [facility], etc (as per Psyckes)

## 2024-06-10 LAB
A1C WITH ESTIMATED AVERAGE GLUCOSE RESULT: 5.3 % — SIGNIFICANT CHANGE UP (ref 4–5.6)
CHOLEST SERPL-MCNC: 121 MG/DL — SIGNIFICANT CHANGE UP
ESTIMATED AVERAGE GLUCOSE: 105 — SIGNIFICANT CHANGE UP
HDLC SERPL-MCNC: 51 MG/DL — SIGNIFICANT CHANGE UP
LIPID PNL WITH DIRECT LDL SERPL: 42 MG/DL — SIGNIFICANT CHANGE UP
NON HDL CHOLESTEROL: 70 MG/DL — SIGNIFICANT CHANGE UP
TRIGL SERPL-MCNC: 139 MG/DL — SIGNIFICANT CHANGE UP

## 2024-06-10 PROCEDURE — 99222 1ST HOSP IP/OBS MODERATE 55: CPT

## 2024-06-10 PROCEDURE — 99232 SBSQ HOSP IP/OBS MODERATE 35: CPT

## 2024-06-10 RX ADMIN — Medication 50 MILLIGRAM(S): at 22:03

## 2024-06-10 RX ADMIN — Medication 81 MILLIGRAM(S): at 08:08

## 2024-06-10 RX ADMIN — Medication 1 MILLIGRAM(S): at 08:07

## 2024-06-10 RX ADMIN — Medication 100 MILLIGRAM(S): at 08:07

## 2024-06-10 RX ADMIN — Medication 1 TABLET(S): at 08:07

## 2024-06-10 RX ADMIN — LORATADINE 10 MILLIGRAM(S): 10 TABLET ORAL at 08:07

## 2024-06-10 NOTE — CONSULT NOTE ADULT - SUBJECTIVE AND OBJECTIVE BOX
HPI:     PAST MEDICAL & SURGICAL HISTORY:  Hypertension      Pericarditis      Chronic CHF      GERD (gastroesophageal reflux disease)      Depression      Substance use disorder      No significant past surgical history          Review of Systems:   CONSTITUTIONAL: No fever, weight loss, or fatigue  EYES: No eye pain, visual disturbances, or discharge  ENMT:  No difficulty hearing, tinnitus, vertigo; No sinus or throat pain  NECK: No pain or stiffness  RESPIRATORY: No cough, wheezing, chills or hemoptysis; No shortness of breath  CARDIOVASCULAR: No chest pain, palpitations, dizziness, or leg swelling  GASTROINTESTINAL: No abdominal or epigastric pain. No nausea, vomiting, or hematemesis; No diarrhea or constipation. No melena or hematochezia.  GENITOURINARY: No dysuria, frequency, hematuria, or incontinence  NEUROLOGICAL: No headaches, memory loss, loss of strength, numbness, or tremors  SKIN: No itching, burning, rashes, or lesions   LYMPH NODES: No enlarged glands  ENDOCRINE: No heat or cold intolerance; No hair loss  MUSCULOSKELETAL: No joint pain or swelling; No muscle, back, or extremity pain  HEME/LYMPH: No easy bruising, or bleeding gums  ALLERY AND IMMUNOLOGIC: No hives or eczema    Allergies    No Known Allergies    Intolerances        Social History:     FAMILY HISTORY:      MEDICATIONS  (STANDING):  aspirin  chewable 81 milliGRAM(s) Oral daily  atorvastatin 40 milliGRAM(s) Oral at bedtime  folic acid 1 milliGRAM(s) Oral daily  loratadine 10 milliGRAM(s) Oral daily  multivitamin 1 Tablet(s) Oral daily  risperiDONE   Tablet 1 milliGRAM(s) Oral at bedtime  thiamine 100 milliGRAM(s) Oral daily    MEDICATIONS  (PRN):  acetaminophen     Tablet .. 650 milliGRAM(s) Oral every 6 hours PRN Mild Pain (1 - 3), Moderate Pain (4 - 6), Severe Pain (7 - 10)  diphenhydrAMINE 50 milliGRAM(s) Oral every 4 hours PRN agitation  diphenhydrAMINE Injectable 50 milliGRAM(s) IntraMuscular once PRN severe agitation  haloperidol     Tablet 5 milliGRAM(s) Oral every 4 hours PRN agitation  haloperidol    Injectable 5 milliGRAM(s) IntraMuscular Once PRN severe agitation  hydrOXYzine hydrochloride 50 milliGRAM(s) Oral every 6 hours PRN Anxiety  ibuprofen  Tablet. 400 milliGRAM(s) Oral every 6 hours PRN Mild Pain (1 - 3), Moderate Pain (4 - 6), Severe Pain (7 - 10)  LORazepam     Tablet 2 milliGRAM(s) Oral every 2 hours PRN CIWA score increase by 2 points and current CIWA score GREATER THAN 9  LORazepam   Injectable 2 milliGRAM(s) IntraMuscular every 2 hours PRN CIWA score increase by 2 points and current CIWA score GREATER THAN 9      Vital Signs Last 24 Hrs  T(C): 36.9 (10 Maulik 2024 07:54), Max: 36.9 (10 Maulik 2024 07:54)  T(F): 98.4 (10 Maulik 2024 07:54), Max: 98.4 (10 Maulik 2024 07:54)  HR: --  BP: --  BP(mean): --  RR: 16 (2024 21:21) (16 - 16)  SpO2: --      Orthostatic VS    06-10-24 @ 07:54  Lying BP: --/-- HR: --   Sitting BP: Orthostatic BP (Sitting Systolic): 130/Orthostatic BP (Sitting Diastolic (mm Hg)): 76 HR: Orthostatic Pulse (Heart Rate (beats/min)): 81  Standing BP: --/-- HR: --  Site: --   Mode: --    24 @ 19:49  Lying BP: --/-- HR: --   Sitting BP: Orthostatic BP (Sitting Systolic): 124/Orthostatic BP (Sitting Diastolic (mm Hg)): 78 HR: Orthostatic Pulse (Heart Rate (beats/min)): 63  Standing BP: Orthostatic BP (Standing Systolic): 136/Orthostatic BP (Standing Diastolic (mm Hg)): 80 HR: Orthostatic Pulse (Heart Rate (beats/min)): 93  Site: --   Mode: --    24 @ 13:14  Lying BP: --/-- HR: --   Sitting BP: Orthostatic BP (Sitting Systolic): 123/Orthostatic BP (Sitting Diastolic (mm Hg)): 79 HR: Orthostatic Pulse (Heart Rate (beats/min)): 92  Standing BP: --/-- HR: --  Site: --   Mode: --    24 @ 07:56  Lying BP: --/-- HR: --   Sitting BP: Orthostatic BP (Sitting Systolic): 114/Orthostatic BP (Sitting Diastolic (mm Hg)): 71 HR: Orthostatic Pulse (Heart Rate (beats/min)): 64  Standing BP: Orthostatic BP (Standing Systolic): 121/Orthostatic BP (Standing Diastolic (mm Hg)): 81 HR: Orthostatic Pulse (Heart Rate (beats/min)): 85  Site: --   Mode: --    24 @ 04:30  Lying BP: --/-- HR: --   Sitting BP: Orthostatic BP (Sitting Systolic): 96/Orthostatic BP (Sitting Diastolic (mm Hg)): 57 HR: Orthostatic Pulse (Heart Rate (beats/min)): 59  Standing BP: Orthostatic BP (Standing Systolic): 117/Orthostatic BP (Standing Diastolic (mm Hg)): 85 HR: Orthostatic Pulse (Heart Rate (beats/min)): 58  Site: --   Mode: --    CAPILLARY BLOOD GLUCOSE            PHYSICAL EXAM:  GENERAL: NAD, well-developed  HEAD:  Atraumatic, Normocephalic  EYES: EOMI, conjunctiva and sclera clear  NECK: Supple, No JVD  CHEST/LUNG: Clear to auscultation bilaterally; No wheeze  HEART: Regular rate and rhythm; No murmurs, rubs, or gallops  ABDOMEN: Soft, Nontender, Nondistended; Bowel sounds present  EXTREMITIES:  2+ Peripheral Pulses, No clubbing, cyanosis, or edema  NEUROLOGY: non-focal  SKIN: No rashes or lesions    LABS:                Urinalysis Basic - ( 2024 15:36 )    Color: Yellow / Appearance: Cloudy / S.034 / pH: x  Gluc: x / Ketone: Trace mg/dL  / Bili: Negative / Urobili: 1.0 mg/dL   Blood: x / Protein: 30 mg/dL / Nitrite: Negative   Leuk Esterase: Negative / RBC: 1 /HPF / WBC 3 /HPF   Sq Epi: x / Non Sq Epi: 2 /HPF / Bacteria: Negative /HPF        EKG(personally reviewed):    RADIOLOGY & ADDITIONAL TESTS:    Imaging Personally Reviewed:    Consultant(s) Notes Reviewed:      Care Discussed with Consultants/Other Providers:   HPI:   58M with PMH of HTN, chronic systolic CHF (self-reported EF 40%), pericarditis (cause unknown), polysubstance use disorder, depression, admitted  for SI/AH. Context of prior hospitalizations/admissions reviewed. Medicine called by psych because pt is reporting right foot swelling w/ concerns for DVT. He reports pain on the dorsal aspect of his RIGHT foot w/ associated pain on palpation and standing. No recent falls/traumas/injuries to that area. No ankle/toe pain. Regarding chronic conditions - pt states he has no regular follow up. He is suppose to be on meds for HF, but isn't. He has no cardiologist.   Pt states he has poor follow up for his chronic conditions. He denies any SOB, BECERRA, CP, orthopnea. No bowel/urinary complaints. No fevers/chills. No calf pain.       PAST MEDICAL & SURGICAL HISTORY:  Hypertension      Pericarditis      Chronic CHF      GERD (gastroesophageal reflux disease)      Depression      Substance use disorder      No significant past surgical history          Review of Systems:   CONSTITUTIONAL: No fever, weight loss, or fatigue  EYES: No eye pain, visual disturbances, or discharge  ENMT:  No difficulty hearing, tinnitus, vertigo; No sinus or throat pain  NECK: No pain or stiffness  RESPIRATORY: No cough, wheezing, chills or hemoptysis; No shortness of breath  CARDIOVASCULAR: No chest pain, palpitations, dizziness, or leg swelling  GASTROINTESTINAL: No abdominal or epigastric pain. No nausea, vomiting, or hematemesis; No diarrhea or constipation. No melena or hematochezia.  GENITOURINARY: No dysuria, frequency, hematuria, or incontinence  NEUROLOGICAL: No headaches, memory loss, loss of strength, numbness, or tremors  SKIN: No itching, burning, rashes, or lesions   LYMPH NODES: No enlarged glands  ENDOCRINE: No heat or cold intolerance; No hair loss  MUSCULOSKELETAL: No joint pain or swelling; No muscle, back, or extremity pain  HEME/LYMPH: No easy bruising, or bleeding gums  ALLERY AND IMMUNOLOGIC: No hives or eczema    Allergies    No Known Allergies    Intolerances        Social History:     FAMILY HISTORY:      MEDICATIONS  (STANDING):  aspirin  chewable 81 milliGRAM(s) Oral daily  atorvastatin 40 milliGRAM(s) Oral at bedtime  folic acid 1 milliGRAM(s) Oral daily  loratadine 10 milliGRAM(s) Oral daily  multivitamin 1 Tablet(s) Oral daily  risperiDONE   Tablet 1 milliGRAM(s) Oral at bedtime  thiamine 100 milliGRAM(s) Oral daily    MEDICATIONS  (PRN):  acetaminophen     Tablet .. 650 milliGRAM(s) Oral every 6 hours PRN Mild Pain (1 - 3), Moderate Pain (4 - 6), Severe Pain (7 - 10)  diphenhydrAMINE 50 milliGRAM(s) Oral every 4 hours PRN agitation  diphenhydrAMINE Injectable 50 milliGRAM(s) IntraMuscular once PRN severe agitation  haloperidol     Tablet 5 milliGRAM(s) Oral every 4 hours PRN agitation  haloperidol    Injectable 5 milliGRAM(s) IntraMuscular Once PRN severe agitation  hydrOXYzine hydrochloride 50 milliGRAM(s) Oral every 6 hours PRN Anxiety  ibuprofen  Tablet. 400 milliGRAM(s) Oral every 6 hours PRN Mild Pain (1 - 3), Moderate Pain (4 - 6), Severe Pain (7 - 10)  LORazepam     Tablet 2 milliGRAM(s) Oral every 2 hours PRN CIWA score increase by 2 points and current CIWA score GREATER THAN 9  LORazepam   Injectable 2 milliGRAM(s) IntraMuscular every 2 hours PRN CIWA score increase by 2 points and current CIWA score GREATER THAN 9      Vital Signs Last 24 Hrs  T(C): 36.9 (10 Maulik 2024 07:54), Max: 36.9 (10 Maulik 2024 07:54)  T(F): 98.4 (10 Maulik 2024 07:54), Max: 98.4 (10 Maulik 2024 07:54)        Orthostatic VS    06-10-24 @ 07:54  Lying BP: --/-- HR: --   Sitting BP: Orthostatic BP (Sitting Systolic): 130/Orthostatic BP (Sitting Diastolic (mm Hg)): 76 HR: Orthostatic Pulse (Heart Rate (beats/min)): 81  Standing BP: --/-- HR: --  Site: --   Mode: --    24 @ 19:49  Lying BP: --/-- HR: --   Sitting BP: Orthostatic BP (Sitting Systolic): 124/Orthostatic BP (Sitting Diastolic (mm Hg)): 78 HR: Orthostatic Pulse (Heart Rate (beats/min)): 63  Standing BP: Orthostatic BP (Standing Systolic): 136/Orthostatic BP (Standing Diastolic (mm Hg)): 80 HR: Orthostatic Pulse (Heart Rate (beats/min)): 93  Site: --   Mode: --    24 @ 13:14  Lying BP: --/-- HR: --   Sitting BP: Orthostatic BP (Sitting Systolic): 123/Orthostatic BP (Sitting Diastolic (mm Hg)): 79 HR: Orthostatic Pulse (Heart Rate (beats/min)): 92  Standing BP: --/-- HR: --  Site: --   Mode: --    24 @ 07:56  Lying BP: --/-- HR: --   Sitting BP: Orthostatic BP (Sitting Systolic): 114/Orthostatic BP (Sitting Diastolic (mm Hg)): 71 HR: Orthostatic Pulse (Heart Rate (beats/min)): 64  Standing BP: Orthostatic BP (Standing Systolic): 121/Orthostatic BP (Standing Diastolic (mm Hg)): 81 HR: Orthostatic Pulse (Heart Rate (beats/min)): 85  Site: --   Mode: --    24 @ 04:30  Lying BP: --/-- HR: --   Sitting BP: Orthostatic BP (Sitting Systolic): 96/Orthostatic BP (Sitting Diastolic (mm Hg)): 57 HR: Orthostatic Pulse (Heart Rate (beats/min)): 59  Standing BP: Orthostatic BP (Standing Systolic): 117/Orthostatic BP (Standing Diastolic (mm Hg)): 85 HR: Orthostatic Pulse (Heart Rate (beats/min)): 58  Site: --   Mode: --    CAPILLARY BLOOD GLUCOSE        PHYSICAL EXAM:  GENERAL: NAD, well-developed  HEAD:  Atraumatic, Normocephalic  EYES: EOMI, conjunctiva and sclera clear  NECK: Supple, No JVD  CHEST/LUNG: Clear to auscultation bilaterally; No wheeze  HEART: Regular rate and rhythm; No murmurs, rubs, or gallops  ABDOMEN: Soft, Nontender, Nondistended; Bowel sounds present  EXTREMITIES: Right non-pitting PEDAL edema. Tender to palp at dorsum of right foot. No increase erythema/tenderness at the ankle/toes.  NEUROLOGY: non-focal  SKIN: No rashes or lesions    LABS:  Reviewed.            Urinalysis Basic - ( 2024 15:36 )    Color: Yellow / Appearance: Cloudy / S.034 / pH: x  Gluc: x / Ketone: Trace mg/dL  / Bili: Negative / Urobili: 1.0 mg/dL   Blood: x / Protein: 30 mg/dL / Nitrite: Negative   Leuk Esterase: Negative / RBC: 1 /HPF / WBC 3 /HPF   Sq Epi: x / Non Sq Epi: 2 /HPF / Bacteria: Negative /HPF        EKG(personally reviewed):    RADIOLOGY & ADDITIONAL TESTS:    Imaging Personally Reviewed:    Consultant(s) Notes Reviewed:      Care Discussed with Consultants/Other Providers:

## 2024-06-10 NOTE — BH INPATIENT PSYCHIATRY PROGRESS NOTE - CURRENT MEDICATION
MEDICATIONS  (STANDING):  aspirin  chewable 81 milliGRAM(s) Oral daily  atorvastatin 40 milliGRAM(s) Oral at bedtime  folic acid 1 milliGRAM(s) Oral daily  loratadine 10 milliGRAM(s) Oral daily  multivitamin 1 Tablet(s) Oral daily  risperiDONE   Tablet 1 milliGRAM(s) Oral at bedtime  thiamine 100 milliGRAM(s) Oral daily    MEDICATIONS  (PRN):  acetaminophen     Tablet .. 650 milliGRAM(s) Oral every 6 hours PRN Mild Pain (1 - 3), Moderate Pain (4 - 6), Severe Pain (7 - 10)  diphenhydrAMINE 50 milliGRAM(s) Oral every 4 hours PRN agitation  diphenhydrAMINE Injectable 50 milliGRAM(s) IntraMuscular once PRN severe agitation  haloperidol     Tablet 5 milliGRAM(s) Oral every 4 hours PRN agitation  haloperidol    Injectable 5 milliGRAM(s) IntraMuscular Once PRN severe agitation  hydrOXYzine hydrochloride 50 milliGRAM(s) Oral every 6 hours PRN Anxiety  ibuprofen  Tablet. 400 milliGRAM(s) Oral every 6 hours PRN Mild Pain (1 - 3), Moderate Pain (4 - 6), Severe Pain (7 - 10)  LORazepam     Tablet 2 milliGRAM(s) Oral every 2 hours PRN CIWA score increase by 2 points and current CIWA score GREATER THAN 9  LORazepam   Injectable 2 milliGRAM(s) IntraMuscular every 2 hours PRN CIWA score increase by 2 points and current CIWA score GREATER THAN 9

## 2024-06-10 NOTE — BH INPATIENT PSYCHIATRY PROGRESS NOTE - PRN MEDS
MEDICATIONS  (PRN):  acetaminophen     Tablet .. 650 milliGRAM(s) Oral every 6 hours PRN Mild Pain (1 - 3), Moderate Pain (4 - 6), Severe Pain (7 - 10)  diphenhydrAMINE 50 milliGRAM(s) Oral every 4 hours PRN agitation  diphenhydrAMINE Injectable 50 milliGRAM(s) IntraMuscular once PRN severe agitation  haloperidol     Tablet 5 milliGRAM(s) Oral every 4 hours PRN agitation  haloperidol    Injectable 5 milliGRAM(s) IntraMuscular Once PRN severe agitation  hydrOXYzine hydrochloride 50 milliGRAM(s) Oral every 6 hours PRN Anxiety  ibuprofen  Tablet. 400 milliGRAM(s) Oral every 6 hours PRN Mild Pain (1 - 3), Moderate Pain (4 - 6), Severe Pain (7 - 10)  LORazepam     Tablet 2 milliGRAM(s) Oral every 2 hours PRN CIWA score increase by 2 points and current CIWA score GREATER THAN 9  LORazepam   Injectable 2 milliGRAM(s) IntraMuscular every 2 hours PRN CIWA score increase by 2 points and current CIWA score GREATER THAN 9

## 2024-06-10 NOTE — CONSULT NOTE ADULT - ASSESSMENT
use disorder 58M with PMH of HTN, chronic systolic CHF (self-reported EF 40%), pericarditis (cause unknown), polysubstance use disorder, depression, admitted 6/9 for SI/AH.  Medicine called for right pedal edema.     #Right pedal edema  -Seen by medicine in May 2024 for same issue. Exam/assessment/recommendations at the time reviewed.   -Based on current exam - it appears to have improved compared to what was seen on last assessment.  -Swelling confined to the foot. No calf tenderness, neg Homans. Low suspicion for DVT.   -Rec elevation of right leg and compression stockings.   -If no improvement - consider low dose diuretic.    #Chronic systolic CHF  -Same  reported hx of CHF to prev hospitalist that saw pt. LVEF reported to br 40%. Pt is not on any meds.  -Besides R pedal edema, pt has no other overt signs of fluid overload. Chest is clear, No respiratory symptoms.   -Would recommend ECHO to document baseline cardiac function.  -As pt appears to be well compensated - defer on adding diuretic therapy unless edema becomes problematic despite conservative measures.   -GDMT may be recommended - but will depend on ECHO findings.    # polysubstance use disorder  -cessation encouraged.  -further management as per psych.    #Depression/SI  -Further management as per psych.     Pls call us w/ any questions. [id number].   use disorder 58M with PMH of HTN, chronic systolic CHF (self-reported EF 40%), pericarditis (cause unknown), polysubstance use disorder, depression, admitted 6/9 for SI/AH.  Medicine called for right pedal edema.     #Right pedal edema  -Seen by medicine in May 2024 for same issue. Exam/assessment/recommendations at the time reviewed.   -Based on current exam - it appears to have improved compared to what was seen on last assessment.  -Swelling confined to the foot. No calf tenderness, neg Homans. Low suspicion for DVT.   -Rec elevation of right leg and compression stockings.   -If no improvement - consider low dose diuretic.    #Chronic systolic CHF  -Same  reported hx of CHF to prev hospitalist that saw pt. LVEF reported to br 40%. Pt is not on any meds.  -Besides R pedal edema, pt has no other overt signs of fluid overload. Chest is clear, No respiratory symptoms.   -Would recommend ECHO to document baseline cardiac function.  -As pt appears to be well compensated - defer on adding diuretic therapy unless edema becomes problematic despite conservative measures.   -GDMT may be recommended - but will depend on ECHO findings.      #HTN  -BP acceptable. No on meds. Observe for now.    # polysubstance use disorder  -cessation encouraged.  -further management as per psych.    #Depression/SI  -Further management as per psych.     Pls call us w/ any questions. [phone].

## 2024-06-10 NOTE — BH INPATIENT PSYCHIATRY PROGRESS NOTE - NSBHCHARTREVIEWVS_PSY_A_CORE FT
Vital Signs Last 24 Hrs  T(C): 36.9 (06-10-24 @ 07:54), Max: 36.9 (06-10-24 @ 07:54)  T(F): 98.4 (06-10-24 @ 07:54), Max: 98.4 (06-10-24 @ 07:54)  HR: --  BP: --  BP(mean): --  RR: 16 (06-09-24 @ 21:21) (16 - 16)  SpO2: --    Orthostatic VS  06-10-24 @ 07:54  Lying BP: --/-- HR: --  Sitting BP: 130/76 HR: 81  Standing BP: --/-- HR: --  Site: --  Mode: --  Orthostatic VS  06-09-24 @ 19:49  Lying BP: --/-- HR: --  Sitting BP: 124/78 HR: 63  Standing BP: 136/80 HR: 93  Site: --  Mode: --  Orthostatic VS  06-09-24 @ 13:14  Lying BP: --/-- HR: --  Sitting BP: 123/79 HR: 92  Standing BP: --/-- HR: --  Site: --  Mode: --  Orthostatic VS  06-09-24 @ 07:56  Lying BP: --/-- HR: --  Sitting BP: 114/71 HR: 64  Standing BP: 121/81 HR: 85  Site: --  Mode: --  Orthostatic VS  06-09-24 @ 04:30  Lying BP: --/-- HR: --  Sitting BP: 96/57 HR: 59  Standing BP: 117/85 HR: 58  Site: --  Mode: --   Vital Signs Last 24 Hrs  T(C): 36.6 (06-11-24 @ 08:14), Max: 36.6 (06-11-24 @ 08:14)  T(F): 97.8 (06-11-24 @ 08:14), Max: 97.8 (06-11-24 @ 08:14)  HR: --  BP: --  BP(mean): --  RR: --  SpO2: --    Orthostatic VS  06-11-24 @ 08:14  Lying BP: --/-- HR: --  Sitting BP: 110/63 HR: 71  Standing BP: --/-- HR: --  Site: --  Mode: --  Orthostatic VS  06-10-24 @ 07:54  Lying BP: --/-- HR: --  Sitting BP: 130/76 HR: 81  Standing BP: --/-- HR: --  Site: --  Mode: --  Orthostatic VS  06-09-24 @ 19:49  Lying BP: --/-- HR: --  Sitting BP: 124/78 HR: 63  Standing BP: 136/80 HR: 93  Site: --  Mode: --  Orthostatic VS  06-09-24 @ 13:14  Lying BP: --/-- HR: --  Sitting BP: 123/79 HR: 92  Standing BP: --/-- HR: --  Site: --  Mode: --

## 2024-06-10 NOTE — BH INPATIENT PSYCHIATRY PROGRESS NOTE - NSBHMETABOLIC_PSY_ALL_CORE_FT
BMI: BMI (kg/m2): 36.8 (06-09-24 @ 04:30)  HbA1c: A1C with Estimated Average Glucose Result: 5.3 % (06-10-24 @ 09:00)    Glucose: POCT Blood Glucose.: 104 mg/dL (04-04-24 @ 06:04)    BP: 107/67 (06-09-24 @ 02:05) (107/67 - 125/85)Vital Signs Last 24 Hrs  T(C): 36.6 (06-11-24 @ 08:14), Max: 36.6 (06-11-24 @ 08:14)  T(F): 97.8 (06-11-24 @ 08:14), Max: 97.8 (06-11-24 @ 08:14)  HR: --  BP: --  BP(mean): --  RR: --  SpO2: --    Orthostatic VS  06-11-24 @ 08:14  Lying BP: --/-- HR: --  Sitting BP: 110/63 HR: 71  Standing BP: --/-- HR: --  Site: --  Mode: --  Orthostatic VS  06-10-24 @ 07:54  Lying BP: --/-- HR: --  Sitting BP: 130/76 HR: 81  Standing BP: --/-- HR: --  Site: --  Mode: --  Orthostatic VS  06-09-24 @ 19:49  Lying BP: --/-- HR: --  Sitting BP: 124/78 HR: 63  Standing BP: 136/80 HR: 93  Site: --  Mode: --  Orthostatic VS  06-09-24 @ 13:14  Lying BP: --/-- HR: --  Sitting BP: 123/79 HR: 92  Standing BP: --/-- HR: --  Site: --  Mode: --    Lipid Panel: Date/Time: 06-10-24 @ 09:00  Cholesterol, Serum: 121  LDL Cholesterol Calculated: 42  HDL Cholesterol, Serum: 51  Total Cholesterol/HDL Ration Measurement: --  Triglycerides, Serum: 139

## 2024-06-10 NOTE — BH SOCIAL WORK INITIAL PSYCHOSOCIAL EVALUATION - OTHER PAST PSYCHIATRIC HISTORY (INCLUDE DETAILS REGARDING ONSET, COURSE OF ILLNESS, INPATIENT/OUTPATIENT TREATMENT)
57yo single man who is undomiciled (living at shelters, hotels, ERs/hospitals, and rehabs) and unemployed, with PMHx HTN and self-reported CHF, with PPHx of many documented diagnoses ranging across mood, psychotic, and personality disorders, hx of many prior hospitalizations and ED visits, discharged from [facility] 8 days ago on a 3-day letter, chronic nonadherence to meds and outpt treatment, currently has Intensive Mobile Treatment (IMT) team, hx of several prior self-aborted suicide attempts (planned to jump in front of train 2023, "stopped by an erme"), and significant hx of polysubstance use/abuse (recently EtOH and cocaine), hx of many prior detox/rehabs (last at [facility] Dec 2023), admitted due to report of SI (with several vague plans) and report of AH in the context of substance use, recent discharge from [facility], and inability to reach his 16yo daughter (who lives in [place]). Of note, the patient frequently presents to with nearly identical chief complaints to various EDs, is briefly admitted and his symptoms quickly clear, he requests help connecting with housing or substance rehab, and then leaves on 3-day letters - this has been the pattern from his last 2 admissions at [facility] in Jan 2024 and May 2024. Per Psyckes, he has dozens of ED visits driven by alcohol and cocaine use.     LMSW met with pt to introduce self and obtain collateral. Pt reports that he was recently discharged from [facility] and that he put in a 3DL because he wanted to be out of the hospital for his birthday. Pt reports at that time they were working on trying to get him in to a respite. Pt has preference of respite's in [place]. Pt reports that he was art [facility] a month ago and since then has been staying in hotels. Pt reports this is the reason he wants to get in to a 28 day respite. Pt reports that a trigger for his admission and SI was being unable to reach his daughter. He reports that he was able to speak with his daughter while he has been on the unit. Pt reports that he was concerned because the last he heard from his daughter, prior to speaking to her on the unit, she had told him that her mother was going to be kicking her out. Pt reports that when he spoke to her on the unit she denied this and the pt believes the daughter "exaggerates". He reports that his daughter is 16 years old and lives with her mother in [place], [place]. Pt reports that he is willing to stay until July 1st without putting in a 3DL while waiting on respite bed availability. Pt did not provide consent to speak with anyone at this time.

## 2024-06-10 NOTE — BH SOCIAL WORK INITIAL PSYCHOSOCIAL EVALUATION - NSBHSACONSEQUENCE_PSY_A_CORE FT
numerous past detox and rehab admissions, including [facility], [facility], [facility], etc (as per Psyckes)

## 2024-06-10 NOTE — BH SOCIAL WORK INITIAL PSYCHOSOCIAL EVALUATION - NSCMSPTSTRENGTHS_PSY_ALL_CORE
From: Juli Dickson  To: Yenni Lea  Sent: 10/15/2022 9:00 AM CDT  Subject: A1C Test -- Need an order    I have an appt with Dr. Lea on Thursday, 10/20.    I would like to get my A1C done prior to the visit. My previous order is . Can I get a year's worth of orders sent to the lab? I would like to see if I can get on the lab schedule for after work on Tuesday, 10/18.     My appt is at the [facility]. Can this test be done there the day of the appt. and get results before I see Dr. Lea?    Thanks--Juli   Future/goal oriented

## 2024-06-11 PROCEDURE — 99232 SBSQ HOSP IP/OBS MODERATE 35: CPT

## 2024-06-11 RX ADMIN — RISPERIDONE 1 MILLIGRAM(S): 4 TABLET ORAL at 20:52

## 2024-06-11 RX ADMIN — Medication 81 MILLIGRAM(S): at 08:09

## 2024-06-11 RX ADMIN — Medication 1 MILLIGRAM(S): at 08:09

## 2024-06-11 RX ADMIN — Medication 100 MILLIGRAM(S): at 08:09

## 2024-06-11 RX ADMIN — LORATADINE 10 MILLIGRAM(S): 10 TABLET ORAL at 08:09

## 2024-06-11 RX ADMIN — ATORVASTATIN CALCIUM 40 MILLIGRAM(S): 80 TABLET, FILM COATED ORAL at 20:53

## 2024-06-11 RX ADMIN — Medication 1 TABLET(S): at 08:09

## 2024-06-11 RX ADMIN — Medication 50 MILLIGRAM(S): at 20:53

## 2024-06-11 NOTE — PROGRESS NOTE ADULT - ASSESSMENT
use disorder 58M with PMH of HTN, chronic systolic CHF (self-reported EF 40%), pericarditis (cause unknown), polysubstance use disorder, depression, admitted 6/9 for SI/AH.  Medicine called for right pedal edema.     #Right pedal edema  IMPROVED today.  -No calf tenderness, neg Homans. Low suspicion for DVT.   -Rec elevation of right leg and compression stockings.   -If no improvement - consider low dose diuretic.    #Chronic systolic CHF  Fairly euvolemic besides peripheral edema, which is likely dependent in nature.   -Reported hx of CHF to prev hospitalist that saw pt. LVEF reported to be 40%. Pt is not on any meds.  -Besides R pedal edema, pt has no other overt signs of fluid overload. Chest is clear, No respiratory symptoms.   -Would recommend ECHO to document baseline cardiac function - not urgent - can be pursued on OP basis.  -As pt appears to be well compensated - defer on adding diuretic therapy unless edema becomes problematic despite conservative measures. GDMT may be recommended - but will depend on ECHO findings.    #HTN  -BP acceptable. No on meds. Observe for now.    # polysubstance use disorder  -cessation encouraged.  -further management as per psych.    #Depression/SI  -Further management as per psych.     Will sign off. If worsening symptoms or other concerning findings- pls reach out.  Pls call us w/ any questions. [id number].

## 2024-06-11 NOTE — BH INPATIENT PSYCHIATRY PROGRESS NOTE - CURRENT MEDICATION
MEDICATIONS  (STANDING):  aspirin  chewable 81 milliGRAM(s) Oral daily  atorvastatin 40 milliGRAM(s) Oral at bedtime  folic acid 1 milliGRAM(s) Oral daily  loratadine 10 milliGRAM(s) Oral daily  multivitamin 1 Tablet(s) Oral daily  risperiDONE   Tablet 1 milliGRAM(s) Oral at bedtime  thiamine 100 milliGRAM(s) Oral daily    MEDICATIONS  (PRN):  acetaminophen     Tablet .. 650 milliGRAM(s) Oral every 6 hours PRN Mild Pain (1 - 3), Moderate Pain (4 - 6), Severe Pain (7 - 10)  diphenhydrAMINE 50 milliGRAM(s) Oral every 4 hours PRN agitation  diphenhydrAMINE Injectable 50 milliGRAM(s) IntraMuscular once PRN severe agitation  haloperidol     Tablet 5 milliGRAM(s) Oral every 4 hours PRN agitation  haloperidol    Injectable 5 milliGRAM(s) IntraMuscular Once PRN severe agitation  hydrOXYzine hydrochloride 50 milliGRAM(s) Oral every 6 hours PRN Anxiety  ibuprofen  Tablet. 400 milliGRAM(s) Oral every 6 hours PRN Mild Pain (1 - 3), Moderate Pain (4 - 6), Severe Pain (7 - 10)

## 2024-06-11 NOTE — BH INPATIENT PSYCHIATRY PROGRESS NOTE - PRN MEDS
MEDICATIONS  (PRN):  acetaminophen     Tablet .. 650 milliGRAM(s) Oral every 6 hours PRN Mild Pain (1 - 3), Moderate Pain (4 - 6), Severe Pain (7 - 10)  diphenhydrAMINE 50 milliGRAM(s) Oral every 4 hours PRN agitation  diphenhydrAMINE Injectable 50 milliGRAM(s) IntraMuscular once PRN severe agitation  haloperidol     Tablet 5 milliGRAM(s) Oral every 4 hours PRN agitation  haloperidol    Injectable 5 milliGRAM(s) IntraMuscular Once PRN severe agitation  hydrOXYzine hydrochloride 50 milliGRAM(s) Oral every 6 hours PRN Anxiety  ibuprofen  Tablet. 400 milliGRAM(s) Oral every 6 hours PRN Mild Pain (1 - 3), Moderate Pain (4 - 6), Severe Pain (7 - 10)

## 2024-06-11 NOTE — BH INPATIENT PSYCHIATRY PROGRESS NOTE - NSBHMETABOLIC_PSY_ALL_CORE_FT
BMI: BMI (kg/m2): 36.8 (06-09-24 @ 04:30)  HbA1c: A1C with Estimated Average Glucose Result: 5.3 % (06-10-24 @ 09:00)    Glucose: POCT Blood Glucose.: 104 mg/dL (04-04-24 @ 06:04)    BP: 107/67 (06-09-24 @ 02:05) (107/67 - 125/85)Vital Signs Last 24 Hrs  T(C): 36.6 (06-11-24 @ 08:14), Max: 36.6 (06-11-24 @ 08:14)  T(F): 97.8 (06-11-24 @ 08:14), Max: 97.8 (06-11-24 @ 08:14)  HR: --  BP: --  BP(mean): --  RR: --  SpO2: --    Orthostatic VS  06-11-24 @ 08:14  Lying BP: --/-- HR: --  Sitting BP: 110/63 HR: 71  Standing BP: --/-- HR: --  Site: --  Mode: --  Orthostatic VS  06-10-24 @ 07:54  Lying BP: --/-- HR: --  Sitting BP: 130/76 HR: 81  Standing BP: --/-- HR: --  Site: --  Mode: --  Orthostatic VS  06-09-24 @ 19:49  Lying BP: --/-- HR: --  Sitting BP: 124/78 HR: 63  Standing BP: 136/80 HR: 93  Site: --  Mode: --    Lipid Panel: Date/Time: 06-10-24 @ 09:00  Cholesterol, Serum: 121  LDL Cholesterol Calculated: 42  HDL Cholesterol, Serum: 51  Total Cholesterol/HDL Ration Measurement: --  Triglycerides, Serum: 139   BMI: BMI (kg/m2): 36.8 (06-09-24 @ 04:30)  HbA1c: A1C with Estimated Average Glucose Result: 5.3 % (06-10-24 @ 09:00)    Glucose: POCT Blood Glucose.: 104 mg/dL (04-04-24 @ 06:04)    BP: --Vital Signs Last 24 Hrs  T(C): 36.3 (06-12-24 @ 06:09), Max: 36.7 (06-11-24 @ 19:31)  T(F): 97.3 (06-12-24 @ 06:09), Max: 98.1 (06-11-24 @ 19:31)  HR: --  BP: --  BP(mean): --  RR: --  SpO2: --    Orthostatic VS  06-12-24 @ 06:09  Lying BP: --/-- HR: --  Sitting BP: 111/77 HR: 83  Standing BP: --/-- HR: --  Site: --  Mode: --  Orthostatic VS  06-11-24 @ 19:31  Lying BP: --/-- HR: --  Sitting BP: 133/60 HR: 114  Standing BP: 125/70 HR: 109  Site: --  Mode: electronic  Orthostatic VS  06-11-24 @ 08:14  Lying BP: --/-- HR: --  Sitting BP: 110/63 HR: 71  Standing BP: --/-- HR: --  Site: --  Mode: --    Lipid Panel: Date/Time: 06-10-24 @ 09:00  Cholesterol, Serum: 121  LDL Cholesterol Calculated: 42  HDL Cholesterol, Serum: 51  Total Cholesterol/HDL Ration Measurement: --  Triglycerides, Serum: 139

## 2024-06-11 NOTE — PROGRESS NOTE ADULT - SUBJECTIVE AND OBJECTIVE BOX
[facility] Division of Hospital Medicine  Gustavo Wilhelm) MD Darryl  Pager [phone]    SUBJECTIVE:  Chief complaint: foot swelling.    Pt was seen and evaluated this AM. No o/n events. States his swelling is not better. No changes. No SOb/CP/orthopnea.      ROS: All systems negative except as noted.      Vital Signs Last 24 Hrs  T(C): 36.6 (11 Jun 2024 08:14), Max: 36.6 (11 Jun 2024 08:14)  T(F): 97.8 (11 Jun 2024 08:14), Max: 97.8 (11 Jun 2024 08:14)          PHYSICAL EXAM:  Gen- In chair, NAD  Resp- CTAB, normal effort. no rrw.  CVS- RRR, S1S2, no g/r/m.  GI- Soft abd ,NT, ND, +BSx4  Ext- No C/C. RIGHT pedal edema overall improved on assessment.        MEDICATION:  MEDICATIONS  (STANDING):  aspirin  chewable 81 milliGRAM(s) Oral daily  atorvastatin 40 milliGRAM(s) Oral at bedtime  folic acid 1 milliGRAM(s) Oral daily  loratadine 10 milliGRAM(s) Oral daily  multivitamin 1 Tablet(s) Oral daily  risperiDONE   Tablet 1 milliGRAM(s) Oral at bedtime  thiamine 100 milliGRAM(s) Oral daily    MEDICATIONS  (PRN):  acetaminophen     Tablet .. 650 milliGRAM(s) Oral every 6 hours PRN Mild Pain (1 - 3), Moderate Pain (4 - 6), Severe Pain (7 - 10)  diphenhydrAMINE 50 milliGRAM(s) Oral every 4 hours PRN agitation  diphenhydrAMINE Injectable 50 milliGRAM(s) IntraMuscular once PRN severe agitation  haloperidol     Tablet 5 milliGRAM(s) Oral every 4 hours PRN agitation  haloperidol    Injectable 5 milliGRAM(s) IntraMuscular Once PRN severe agitation  hydrOXYzine hydrochloride 50 milliGRAM(s) Oral every 6 hours PRN Anxiety  ibuprofen  Tablet. 400 milliGRAM(s) Oral every 6 hours PRN Mild Pain (1 - 3), Moderate Pain (4 - 6), Severe Pain (7 - 10)        LABORATORY:  None recently            COVID-19 PCR: NotDetec (21 May 2024 15:01)  COVID-19 PCR: NotDetec (04 Apr 2024 16:35)  COVID-19 PCR: NotDetec (02 Apr 2024 18:57)  SARS-CoV-2: NotDetec (21 Jan 2024 08:10)  COVID-19 PCR: NotDetec (18 Jan 2024 14:13)  COVID-19 PCR: NotDetec (29 Dec 2023 00:54)  COVID-19 PCR: NotDetec (28 Dec 2023 08:00)

## 2024-06-11 NOTE — BH INPATIENT PSYCHIATRY PROGRESS NOTE - NSBHCHARTREVIEWVS_PSY_A_CORE FT
Vital Signs Last 24 Hrs  T(C): 36.6 (06-11-24 @ 08:14), Max: 36.6 (06-11-24 @ 08:14)  T(F): 97.8 (06-11-24 @ 08:14), Max: 97.8 (06-11-24 @ 08:14)  HR: --  BP: --  BP(mean): --  RR: --  SpO2: --    Orthostatic VS  06-11-24 @ 08:14  Lying BP: --/-- HR: --  Sitting BP: 110/63 HR: 71  Standing BP: --/-- HR: --  Site: --  Mode: --  Orthostatic VS  06-10-24 @ 07:54  Lying BP: --/-- HR: --  Sitting BP: 130/76 HR: 81  Standing BP: --/-- HR: --  Site: --  Mode: --  Orthostatic VS  06-09-24 @ 19:49  Lying BP: --/-- HR: --  Sitting BP: 124/78 HR: 63  Standing BP: 136/80 HR: 93  Site: --  Mode: --   Vital Signs Last 24 Hrs  T(C): 36.3 (06-12-24 @ 06:09), Max: 36.7 (06-11-24 @ 19:31)  T(F): 97.3 (06-12-24 @ 06:09), Max: 98.1 (06-11-24 @ 19:31)  HR: --  BP: --  BP(mean): --  RR: --  SpO2: --    Orthostatic VS  06-12-24 @ 06:09  Lying BP: --/-- HR: --  Sitting BP: 111/77 HR: 83  Standing BP: --/-- HR: --  Site: --  Mode: --  Orthostatic VS  06-11-24 @ 19:31  Lying BP: --/-- HR: --  Sitting BP: 133/60 HR: 114  Standing BP: 125/70 HR: 109  Site: --  Mode: electronic  Orthostatic VS  06-11-24 @ 08:14  Lying BP: --/-- HR: --  Sitting BP: 110/63 HR: 71  Standing BP: --/-- HR: --  Site: --  Mode: --

## 2024-06-12 PROCEDURE — 99232 SBSQ HOSP IP/OBS MODERATE 35: CPT

## 2024-06-12 RX ADMIN — Medication 81 MILLIGRAM(S): at 07:55

## 2024-06-12 RX ADMIN — Medication 100 MILLIGRAM(S): at 07:56

## 2024-06-12 RX ADMIN — LORATADINE 10 MILLIGRAM(S): 10 TABLET ORAL at 07:58

## 2024-06-12 RX ADMIN — Medication 1 MILLIGRAM(S): at 07:56

## 2024-06-12 RX ADMIN — Medication 1 TABLET(S): at 07:56

## 2024-06-12 NOTE — BH INPATIENT PSYCHIATRY PROGRESS NOTE - NSTXDEPRESGOAL_PSY_ALL_CORE
Exhibit improvements in self-grooming, hygiene, sleep and appetite
Attend and participate in at least 2 groups daily despite low mood/energy
Exhibit improvements in self-grooming, hygiene, sleep and appetite

## 2024-06-12 NOTE — BH INPATIENT PSYCHIATRY PROGRESS NOTE - NSBHMETABOLIC_PSY_ALL_CORE_FT
BMI: BMI (kg/m2): 36.8 (06-09-24 @ 04:30)  HbA1c: A1C with Estimated Average Glucose Result: 5.3 % (06-10-24 @ 09:00)    Glucose: POCT Blood Glucose.: 104 mg/dL (04-04-24 @ 06:04)    BP: --Vital Signs Last 24 Hrs  T(C): 36.3 (06-12-24 @ 06:09), Max: 36.7 (06-11-24 @ 19:31)  T(F): 97.3 (06-12-24 @ 06:09), Max: 98.1 (06-11-24 @ 19:31)  HR: --  BP: --  BP(mean): --  RR: --  SpO2: --    Orthostatic VS  06-12-24 @ 06:09  Lying BP: --/-- HR: --  Sitting BP: 111/77 HR: 83  Standing BP: --/-- HR: --  Site: --  Mode: --  Orthostatic VS  06-11-24 @ 19:31  Lying BP: --/-- HR: --  Sitting BP: 133/60 HR: 114  Standing BP: 125/70 HR: 109  Site: --  Mode: electronic  Orthostatic VS  06-11-24 @ 08:14  Lying BP: --/-- HR: --  Sitting BP: 110/63 HR: 71  Standing BP: --/-- HR: --  Site: --  Mode: --    Lipid Panel: Date/Time: 06-10-24 @ 09:00  Cholesterol, Serum: 121  LDL Cholesterol Calculated: 42  HDL Cholesterol, Serum: 51  Total Cholesterol/HDL Ration Measurement: --  Triglycerides, Serum: 139   BMI: BMI (kg/m2): 36.8 (06-09-24 @ 04:30)  HbA1c: A1C with Estimated Average Glucose Result: 5.3 % (06-10-24 @ 09:00)    Glucose: POCT Blood Glucose.: 104 mg/dL (04-04-24 @ 06:04)    BP: --Vital Signs Last 24 Hrs  T(C): 36.2 (06-13-24 @ 08:09), Max: 36.2 (06-13-24 @ 08:09)  T(F): 97.1 (06-13-24 @ 08:09), Max: 97.1 (06-13-24 @ 08:09)  HR: --  BP: --  BP(mean): --  RR: --  SpO2: --    Orthostatic VS  06-13-24 @ 08:09  Lying BP: --/-- HR: --  Sitting BP: 117/72 HR: 68  Standing BP: --/-- HR: --  Site: --  Mode: --  Orthostatic VS  06-12-24 @ 06:09  Lying BP: --/-- HR: --  Sitting BP: 111/77 HR: 83  Standing BP: --/-- HR: --  Site: --  Mode: --  Orthostatic VS  06-11-24 @ 19:31  Lying BP: --/-- HR: --  Sitting BP: 133/60 HR: 114  Standing BP: 125/70 HR: 109  Site: --  Mode: electronic    Lipid Panel: Date/Time: 06-10-24 @ 09:00  Cholesterol, Serum: 121  LDL Cholesterol Calculated: 42  HDL Cholesterol, Serum: 51  Total Cholesterol/HDL Ration Measurement: --  Triglycerides, Serum: 139

## 2024-06-12 NOTE — BH INPATIENT PSYCHIATRY PROGRESS NOTE - OTHER
guarded, vague
engaged in interview but nonspecific in responses
engaged in interview but nonspecific in responses

## 2024-06-12 NOTE — BH INPATIENT PSYCHIATRY PROGRESS NOTE - NSBHATTESTTYPEVISIT_PSY_A_CORE
On-site Attending supervising EMMIE (99XXX codes)

## 2024-06-12 NOTE — BH INPATIENT PSYCHIATRY PROGRESS NOTE - ATTENDING COMMENTS
Chart was reviewed and case discussed with the Psych NP. I agree with the assessment and plan as documented in the Psych NP's progress note and was directly involved in medical decision making.   

## 2024-06-12 NOTE — BH INPATIENT PSYCHIATRY PROGRESS NOTE - NSBHATTESTBILLING_PSY_A_CORE
46585-Baykxmvlqf OBS or IP - moderate complexity OR 35-49 mins
60372-Farbxsgxhw OBS or IP - moderate complexity OR 35-49 mins
47466-Pfogogqvps OBS or IP - moderate complexity OR 35-49 mins

## 2024-06-12 NOTE — BH INPATIENT PSYCHIATRY PROGRESS NOTE - NSDCCRITERIA_PSY_ALL_CORE
symptom management, safety planning, care coordination

## 2024-06-12 NOTE — BH INPATIENT PSYCHIATRY PROGRESS NOTE - NSBHFUPINTERVALHXFT_PSY_A_CORE
Patient is followed up for psychosis/SI. Chart, medications and labs reviewed. Patient is discussed during morning brief, no overnight events, has been in good behavioral control.   Patient was seen and is evaluated on the unit. He states that he relapsed after leaving the hospital, was drinking alcohol and using cocaine, vague in discussing his use. He verbalizes that primary concern was being unable to reach his 17 y/o daughter in [place]. Does report being able to contact her after being discharged. He reports having passive SI w/o plan or intent. Denies AVH or delusional thoughts. He has been compliant with standing medication, denies SE. Pt reports pain in his right foot, seen by writer and mild pedal edema present, pt w/ hx of CHF and tx noncompliance. Case discussed with hospitalist, pt to be seen on the unit. No acute medical concerns, VSS.
Patient is followed up for psychosis/SI. Chart, medications and labs reviewed. Patient is discussed during morning brief, no overnight events, has been in good behavioral control.     Patient was seen and is evaluated on the unit. He states that he relapsed after leaving the hospital, was drinking alcohol and using cocaine -- vague in discussing his use. He verbalizes that primary concern was being unable to reach his 15 y/o daughter in [place]. Reports that his daughter is his "coping mechanism" but that he "calls her too much."  States that he is interested in returning to a Respite program he attended last year; SW in contact with facility and discharge planning in progress with goal of end of week/Friday.    Today he denies SI w/o plan or intent. Denies AVH or delusional thoughts. He has been compliant with standing medication, denies SE. No acute medical concerns, VSS.
Patient is followed up for psychosis/SI. Chart, medications and labs reviewed. Patient is discussed during morning brief, no overnight events, has been in good behavioral control.     Patient was seen and is evaluated on the unit. He states that he relapsed after leaving the hospital, was drinking alcohol and using cocaine -- vague in discussing his use. He verbalizes that primary concern was being unable to reach his 17 y/o daughter in [place]. Reports that his daughter is his "coping mechanism" but that he "calls her too much."  States that he is interested in returning to a Respite program he attended last year; SW in contact with facility and discharge planning in progress with goal of end of week/Friday.    Today he states that he is "doing pretty good."  States he has not called his daughter since yesterday morning and that this is difficult for him.  Otherwise denies anxiety or depression, denies SI w/o plan or intent. Denies AVH or delusional thoughts. He has been compliant with standing medication, denies SE. No acute medical concerns, VSS.     Discharge planning in progress with current goal D/C Friday 6/14/2024 with plan for self-admission to Respite program by the patient on Sunday 6/16/2024.

## 2024-06-12 NOTE — BH INPATIENT PSYCHIATRY PROGRESS NOTE - CURRENT MEDICATION
MEDICATIONS  (STANDING):  aspirin  chewable 81 milliGRAM(s) Oral daily  atorvastatin 40 milliGRAM(s) Oral at bedtime  folic acid 1 milliGRAM(s) Oral daily  loratadine 10 milliGRAM(s) Oral daily  multivitamin 1 Tablet(s) Oral daily  risperiDONE   Tablet 1 milliGRAM(s) Oral at bedtime  thiamine 100 milliGRAM(s) Oral daily    MEDICATIONS  (PRN):  acetaminophen     Tablet .. 650 milliGRAM(s) Oral every 6 hours PRN Mild Pain (1 - 3), Moderate Pain (4 - 6), Severe Pain (7 - 10)  diphenhydrAMINE 50 milliGRAM(s) Oral every 4 hours PRN agitation  diphenhydrAMINE Injectable 50 milliGRAM(s) IntraMuscular once PRN severe agitation  haloperidol     Tablet 5 milliGRAM(s) Oral every 4 hours PRN agitation  haloperidol    Injectable 5 milliGRAM(s) IntraMuscular Once PRN severe agitation  hydrOXYzine hydrochloride 50 milliGRAM(s) Oral every 6 hours PRN Anxiety  ibuprofen  Tablet. 400 milliGRAM(s) Oral every 6 hours PRN Mild Pain (1 - 3), Moderate Pain (4 - 6), Severe Pain (7 - 10)   MEDICATIONS  (STANDING):  aspirin  chewable 81 milliGRAM(s) Oral daily  atorvastatin 40 milliGRAM(s) Oral at bedtime  folic acid 1 milliGRAM(s) Oral daily  loratadine 10 milliGRAM(s) Oral daily  multivitamin 1 Tablet(s) Oral daily  risperiDONE   Tablet 1 milliGRAM(s) Oral at bedtime  thiamine 100 milliGRAM(s) Oral daily    MEDICATIONS  (PRN):  acetaminophen     Tablet .. 650 milliGRAM(s) Oral every 6 hours PRN Mild Pain (1 - 3), Moderate Pain (4 - 6), Severe Pain (7 - 10)  benzocaine/menthol Lozenge 1 Lozenge Oral every 3 hours PRN sore throat  diphenhydrAMINE 50 milliGRAM(s) Oral every 4 hours PRN agitation  diphenhydrAMINE Injectable 50 milliGRAM(s) IntraMuscular once PRN severe agitation  haloperidol     Tablet 5 milliGRAM(s) Oral every 4 hours PRN agitation  haloperidol    Injectable 5 milliGRAM(s) IntraMuscular Once PRN severe agitation  hydrOXYzine hydrochloride 50 milliGRAM(s) Oral every 6 hours PRN Anxiety  ibuprofen  Tablet. 400 milliGRAM(s) Oral every 6 hours PRN Mild Pain (1 - 3), Moderate Pain (4 - 6), Severe Pain (7 - 10)

## 2024-06-12 NOTE — BH INPATIENT PSYCHIATRY PROGRESS NOTE - NSBHASSESSSUMMFT_PSY_ALL_CORE
57yo single man who is undomiciled (living at shelters, hotels, ERs/hospitals, and rehabs) and unemployed, with PMHx HTN and self-reported CHF, with PPHx of many documented diagnoses ranging across mood, psychotic, and personality disorders, hx of many prior hospitalizations and ED visits, discharged from [facility] 8 days ago on a 3-day letter, chronic nonadherence to meds and outpt treatment, currently has Intensive Mobile Treatment (IMT) team, hx of several prior self-aborted suicide attempts (planned to jump in front of train 2023, "stopped by an emre"), and significant hx of polysubstance use/abuse (recently EtOH and cocaine), hx of many prior detox/rehabs (last at [facility] Dec 2023), admitted due to report of SI (with several vague plans) and report of AH in the context of substance use, recent discharge from [facility], and inability to reach his 14yo daughter (who lives in [place]). Of note, the patient frequently presents to with nearly identical chief complaints to various EDs, is briefly admitted and his symptoms quickly clear, he requests help connecting with housing or substance rehab, and then leaves on 3-day letters - this has been the pattern from his last 2 admissions at [facility] in Jan 2024 and May 2024. Per Psyckes, he has dozens of ED visits driven by alcohol and cocaine use.   Today patient presents endorsing SI with stated plan to jump in front of train or shoot himself, in the setting of recent alcohol and cocaine use. On interview, there is no evidence of psychosis, pt is linear, organized, fully oriented, does not appear to respond to internal stimuli. He denies current AH, VH, paranoia, and ideas of reference. He denies any violent/homicidal ideation towards anyone else. He denies current alcohol withdrawal symptoms. He demonstrates some insight into his pattern of substance use leading to frequent ED visits with stated SI that rapidly resolves upon admission. Presentation likely driven by substance use disorder, and suspect that stated suicidality is in service of obtaining psychiatric admission due to homelessness and minimal coping skills. At this time, patient requests connection to inpatient substance treatment. [facility] is reviewing patient's case, will board patient in ED for now.    On assessment, patient denying AH, reports some passive SI w/o plan or intent. Noted w/ pedal edema on right foot, hx of CHF. Consulting w/ hospitalist for pedal edema related to CHF vs. DVT.    1. Admit to Low4, 9.13  2. No CO needed  3. Continue Risperdal 1mg QHS  	Home meds - aspirin 81mg daily, atorvastatin 40mg daily, loratadine 10mg daily, multivitamin  4. Labs ordered for am  5. Dispo: upon symptom improvement and stabilization
57yo single man who is undomiciled (living at shelters, hotels, ERs/hospitals, and rehabs) and unemployed, with PMHx HTN and self-reported CHF, with PPHx of many documented diagnoses ranging across mood, psychotic, and personality disorders, hx of many prior hospitalizations and ED visits, discharged from [facility] 8 days ago on a 3-day letter, chronic nonadherence to meds and outpt treatment, currently has Intensive Mobile Treatment (IMT) team, hx of several prior self-aborted suicide attempts (planned to jump in front of train 2023, "stopped by an emre"), and significant hx of polysubstance use/abuse (recently EtOH and cocaine), hx of many prior detox/rehabs (last at [facility] Dec 2023), admitted due to report of SI (with several vague plans) and report of AH in the context of substance use, recent discharge from [facility], and inability to reach his 14yo daughter (who lives in [place]). Of note, the patient frequently presents with nearly identical chief complaints to various EDs, is briefly admitted and his symptoms quickly clear, he requests help connecting with housing or substance rehab, and then leaves on 3-day letters - this has been the pattern from his last 2 admissions at [facility] in Jan 2024 and May 2024. Per Psyckes, he has dozens of ED visits driven by alcohol and cocaine use. The patient presented endorsing SI with stated plan to jump in front of train or shoot himself, in the setting of recent alcohol and cocaine use. On interview, there is no evidence of psychosis, pt is linear, organized, fully oriented, does not appear to respond to internal stimuli. He denies current AH, VH, paranoia, and ideas of reference. He denies any violent/homicidal ideation towards anyone else. He denies current alcohol withdrawal symptoms. He demonstrates some insight into his pattern of substance use leading to frequent ED visits with stated SI that rapidly resolves upon admission. Presentation likely driven by substance use disorder, and suspect that stated suicidality is in service of obtaining psychiatric admission due to homelessness and minimal coping skills. At this time, patient requests connection to Respite facility which he has attended previously (in 2023).    On assessment, patient denying AH, HI, and SI w/o plan or intent. No longer complaining of right foot pain.    1. Admit to Low4, 9.13  2. No CO needed  3. Continue Risperdal 1mg QHS  	Home meds - aspirin 81mg daily, atorvastatin 40mg daily, loratadine 10mg daily, multivitamin  4. Labs ordered for am  5. Dispo: Respite program, upon symptom improvement and stabilization; SW in contact with facility; dispo planning in progress
59yo single man who is undomiciled (living at shelters, hotels, ERs/hospitals, and rehabs) and unemployed, with PMHx HTN and self-reported CHF, with PPHx of many documented diagnoses ranging across mood, psychotic, and personality disorders, hx of many prior hospitalizations and ED visits, discharged from [facility] 8 days ago on a 3-day letter, chronic nonadherence to meds and outpt treatment, currently has Intensive Mobile Treatment (IMT) team, hx of several prior self-aborted suicide attempts (planned to jump in front of train 2023, "stopped by an emre"), and significant hx of polysubstance use/abuse (recently EtOH and cocaine), hx of many prior detox/rehabs (last at [facility] Dec 2023), admitted due to report of SI (with several vague plans) and report of AH in the context of substance use, recent discharge from [facility], and inability to reach his 14yo daughter (who lives in [place]). Of note, the patient frequently presents with nearly identical chief complaints to various EDs, is briefly admitted and his symptoms quickly clear, he requests help connecting with housing or substance rehab, and then leaves on 3-day letters - this has been the pattern from his last 2 admissions at [facility] in Jan 2024 and May 2024. Per Psyckes, he has dozens of ED visits driven by alcohol and cocaine use. The patient presented endorsing SI with stated plan to jump in front of train or shoot himself, in the setting of recent alcohol and cocaine use. On interview, there is no evidence of psychosis, pt is linear, organized, fully oriented, does not appear to respond to internal stimuli. He denies current AH, VH, paranoia, and ideas of reference. He denies any violent/homicidal ideation towards anyone else. He denies current alcohol withdrawal symptoms. He demonstrates some insight into his pattern of substance use leading to frequent ED visits with stated SI that rapidly resolves upon admission. Presentation likely driven by substance use disorder, and suspect that stated suicidality is in service of obtaining psychiatric admission due to homelessness and minimal coping skills. At this time, patient requests connection to Respite facility which he has attended previously (in 2023).    On assessment, patient denying AH, HI, and SI w/o plan or intent. No longer complaining of right foot pain.    1. Admit to Low4, 9.13  2. No CO needed  3. Continue Risperdal 1mg QHS  	Home meds - aspirin 81mg daily, atorvastatin 40mg daily, loratadine 10mg daily, multivitamin  4. Labs ordered for am  5. Dispo: Respite program, upon symptom improvement and stabilization; SW in contact with facility; dispo planning in progress; current goal D/C Friday 6/14/2024

## 2024-06-12 NOTE — BH INPATIENT PSYCHIATRY PROGRESS NOTE - PRN MEDS
MEDICATIONS  (PRN):  acetaminophen     Tablet .. 650 milliGRAM(s) Oral every 6 hours PRN Mild Pain (1 - 3), Moderate Pain (4 - 6), Severe Pain (7 - 10)  diphenhydrAMINE 50 milliGRAM(s) Oral every 4 hours PRN agitation  diphenhydrAMINE Injectable 50 milliGRAM(s) IntraMuscular once PRN severe agitation  haloperidol     Tablet 5 milliGRAM(s) Oral every 4 hours PRN agitation  haloperidol    Injectable 5 milliGRAM(s) IntraMuscular Once PRN severe agitation  hydrOXYzine hydrochloride 50 milliGRAM(s) Oral every 6 hours PRN Anxiety  ibuprofen  Tablet. 400 milliGRAM(s) Oral every 6 hours PRN Mild Pain (1 - 3), Moderate Pain (4 - 6), Severe Pain (7 - 10)   MEDICATIONS  (PRN):  acetaminophen     Tablet .. 650 milliGRAM(s) Oral every 6 hours PRN Mild Pain (1 - 3), Moderate Pain (4 - 6), Severe Pain (7 - 10)  benzocaine/menthol Lozenge 1 Lozenge Oral every 3 hours PRN sore throat  diphenhydrAMINE 50 milliGRAM(s) Oral every 4 hours PRN agitation  diphenhydrAMINE Injectable 50 milliGRAM(s) IntraMuscular once PRN severe agitation  haloperidol     Tablet 5 milliGRAM(s) Oral every 4 hours PRN agitation  haloperidol    Injectable 5 milliGRAM(s) IntraMuscular Once PRN severe agitation  hydrOXYzine hydrochloride 50 milliGRAM(s) Oral every 6 hours PRN Anxiety  ibuprofen  Tablet. 400 milliGRAM(s) Oral every 6 hours PRN Mild Pain (1 - 3), Moderate Pain (4 - 6), Severe Pain (7 - 10)

## 2024-06-12 NOTE — BH INPATIENT PSYCHIATRY PROGRESS NOTE - NSICDXBHSECONDARYDX_PSY_ALL_CORE
Alcohol use disorder   F10.90  Cocaine use disorder   F14.10  

## 2024-06-12 NOTE — BH INPATIENT PSYCHIATRY PROGRESS NOTE - NSBHMSEEYE_PSY_A_CORE
Call to patient to explain that her CT Neck was not approved yet and would like to move her appointment to the next week. She stated that if her insurance didn't approve it she didn't want the test. Explained that her insurance sometimes will take up to 2 wks for approval so we just needed to move it to next week or after when she could go to the appointment. She stated she was not moving the appointment and if her insurance didn't approve it tomorrow then she didn't need it. Stated would let Dr. Steward know.   
Poor

## 2024-06-12 NOTE — BH INPATIENT PSYCHIATRY PROGRESS NOTE - NSBHCHARTREVIEWVS_PSY_A_CORE FT
Vital Signs Last 24 Hrs  T(C): 36.3 (06-12-24 @ 06:09), Max: 36.7 (06-11-24 @ 19:31)  T(F): 97.3 (06-12-24 @ 06:09), Max: 98.1 (06-11-24 @ 19:31)  HR: --  BP: --  BP(mean): --  RR: --  SpO2: --    Orthostatic VS  06-12-24 @ 06:09  Lying BP: --/-- HR: --  Sitting BP: 111/77 HR: 83  Standing BP: --/-- HR: --  Site: --  Mode: --  Orthostatic VS  06-11-24 @ 19:31  Lying BP: --/-- HR: --  Sitting BP: 133/60 HR: 114  Standing BP: 125/70 HR: 109  Site: --  Mode: electronic  Orthostatic VS  06-11-24 @ 08:14  Lying BP: --/-- HR: --  Sitting BP: 110/63 HR: 71  Standing BP: --/-- HR: --  Site: --  Mode: --   Vital Signs Last 24 Hrs  T(C): 36.2 (06-13-24 @ 08:09), Max: 36.2 (06-13-24 @ 08:09)  T(F): 97.1 (06-13-24 @ 08:09), Max: 97.1 (06-13-24 @ 08:09)  HR: --  BP: --  BP(mean): --  RR: --  SpO2: --    Orthostatic VS  06-13-24 @ 08:09  Lying BP: --/-- HR: --  Sitting BP: 117/72 HR: 68  Standing BP: --/-- HR: --  Site: --  Mode: --  Orthostatic VS  06-12-24 @ 06:09  Lying BP: --/-- HR: --  Sitting BP: 111/77 HR: 83  Standing BP: --/-- HR: --  Site: --  Mode: --  Orthostatic VS  06-11-24 @ 19:31  Lying BP: --/-- HR: --  Sitting BP: 133/60 HR: 114  Standing BP: 125/70 HR: 109  Site: --  Mode: electronic

## 2024-06-13 VITALS — RESPIRATION RATE: 18 BRPM | TEMPERATURE: 97 F

## 2024-06-13 PROCEDURE — 99238 HOSP IP/OBS DSCHRG MGMT 30/<: CPT

## 2024-06-13 RX ORDER — BENZOCAINE AND MENTHOL 5; 1 G/100ML; G/100ML
1 LIQUID ORAL
Refills: 0 | Status: DISCONTINUED | OUTPATIENT
Start: 2024-06-13 | End: 2024-06-13

## 2024-06-13 RX ORDER — ATORVASTATIN CALCIUM 80 MG/1
1 TABLET, FILM COATED ORAL
Qty: 30 | Refills: 0
Start: 2024-06-13 | End: 2024-07-12

## 2024-06-13 RX ORDER — ASPIRIN/CALCIUM CARB/MAGNESIUM 324 MG
1 TABLET ORAL
Qty: 30 | Refills: 0
Start: 2024-06-13 | End: 2024-07-12

## 2024-06-13 RX ORDER — RISPERIDONE 4 MG/1
1 TABLET ORAL
Qty: 30 | Refills: 0
Start: 2024-06-13 | End: 2024-07-12

## 2024-06-13 RX ORDER — LORATADINE 10 MG/1
1 TABLET ORAL
Qty: 30 | Refills: 0
Start: 2024-06-13 | End: 2024-07-12

## 2024-06-13 RX ADMIN — Medication 100 MILLIGRAM(S): at 07:56

## 2024-06-13 RX ADMIN — BENZOCAINE AND MENTHOL 1 LOZENGE: 5; 1 LIQUID ORAL at 06:13

## 2024-06-13 RX ADMIN — LORATADINE 10 MILLIGRAM(S): 10 TABLET ORAL at 07:56

## 2024-06-13 RX ADMIN — Medication 81 MILLIGRAM(S): at 07:56

## 2024-06-13 RX ADMIN — Medication 1 TABLET(S): at 07:56

## 2024-06-13 RX ADMIN — Medication 1 MILLIGRAM(S): at 07:58

## 2024-06-13 NOTE — BH INPATIENT PSYCHIATRY DISCHARGE NOTE - HPI (INCLUDE ILLNESS QUALITY, SEVERITY, DURATION, TIMING, CONTEXT, MODIFYING FACTORS, ASSOCIATED SIGNS AND SYMPTOMS)
As per [facility] ED Assessment:  "59yo single man who is undomiciled (living at shelters, hotels, ERs/hospitals, and rehabs) and unemployed, with PMHx HTN and self-reported CHF, with PPHx of many documented diagnoses ranging across mood, psychotic, and personality disorders, hx of many prior hospitalizations and ED visits, discharged from [facility] 8 days ago on a 3-day letter, chronic nonadherence to meds and outpt treatment, currently has Intensive Mobile Treatment (IMT) team, hx of several prior self-aborted suicide attempts (planned to jump in front of train , "stopped by an emre"), and significant hx of polysubstance use/abuse (recently EtOH and cocaine), hx of many prior detox/rehabs (last at [facility] Dec 2023), admitted due to report of SI (with several vague plans) and report of AH in the context of substance use, recent discharge from [facility], and inability to reach his 14yo daughter (who lives in [place]). Of note, the patient frequently presents to with nearly identical chief complaints to various EDs, is briefly admitted and his symptoms quickly clear, he requests help connecting with housing or substance rehab, and then leaves on 3-day letters - this has been the pattern from his last 2 admissions at [facility] in 2024 and May 2024. Per Terrie, he has dozens of ED visits driven by alcohol and cocaine use.     Pt initially reported chest pain and was evaluated medically and cleared. Pt is on 1:1 in medical ED, bed is in the hallway. Pt was interviewed in medical ED though he speaks in an almost inaudible voice and refuses to discuss much of his history given the public nature of the setting. Pt states that he came in today because he has been feeling suicidal with a plan to jump in front of a train or shoot himself, though he refuses to discuss further details about these plans. States that he heard the voices of his  parents yesterday telling him to "join them" and states this is the first time he has experienced this (though per chart has long-documented history of endorsing AH). He denies current AH in the hospital. Pt was discharged from [facility] 8 days ago, has been staying in a hotel, and says he immediately started feeling suicidal upon discharge, states "I should have stayed longer." Reports he never took Latuda outside of the hospital because it gave him nightmares, though says he never reported this to inpatient team, and then states that he lost his medications immediately upon discharge. States he has been drinking alcohol the last 3 days (says he's been drinking "a variety" but will not attempt to quantify) but denies other substances (BAL = 40 on arrival today, urine tox not yet obtained). He denies hx of alcohol withdrawal seizures. States his main stressor is being unable to get in touch with his teenage daughter in [place] for at least 1 month. Endorses poor sleep and appetite over the last week. He denies homicidal or violent ideation, intent, or plan. States he wishes to be hospitalized "to clear my head" but is unable to state how a hospitalization would help his current symptoms.    Pt was interviewed in  ED area and is more forthcoming in private setting. He admits to using alcohol for several days and using cocaine several days ago, which he believes was laced with fentanyl because he nodded out several times and this scared him. He engages in discussion around his pattern of continued substance use with frequent ED visits and states that he would like help connecting with substance treatment at this time. He continues to endorse SI with vague plan as above but refuses to discuss details."    On the unit, patient is irritable but cooperative. Patient requesting sharps order to shave but due to recent admission for SI will defer to primary team. Patient denies any SI on the unit and stated that he is only depressed and suicidal because he is unable to get in touch with his daughter. Patient endorses +CAH of his  parents telling him to kill himself. Patient in agreement to start Risperdal, reported prev treatment with Latuda. No CO needed. CIWA score zero.

## 2024-06-13 NOTE — BH INPATIENT PSYCHIATRY DISCHARGE NOTE - NSBHFUPINTERVALHXFT_PSY_A_CORE
... Patient is followed up for psychosis/SI. Chart, medications and labs reviewed. Patient is discussed during morning brief, no overnight events, has been in good behavioral control.     Patient was seen and is evaluated on the unit. He states that he relapsed after leaving the hospital, was drinking alcohol and using cocaine -- vague in discussing his use. He verbalizes that primary concern was being unable to reach his 15 y/o daughter in [place]. Reports that his daughter is his "coping mechanism" but that he "calls her too much."  States that he is interested in returning to a Respite program he attended last year; a bed is to be available as of 7/9/24.    Today he states that he is "doing pretty good."  States he has not called his daughter since yesterday morning and that this is difficult for him.  Otherwise denies anxiety or depression, denies SI w/o plan or intent. Denies AVH or delusional thoughts. He has been compliant with standing medication, denies SE. No acute medical concerns, VSS.

## 2024-06-13 NOTE — BH INPATIENT PSYCHIATRY DISCHARGE NOTE - NSBHASSESSSUMMFT_PSY_ALL_CORE
... 59yo single man who is undomiciled (living at shelters, hotels, ERs/hospitals, and rehabs) and unemployed, with PMHx HTN and self-reported CHF, with PPHx of many documented diagnoses ranging across mood, psychotic, and personality disorders, hx of many prior hospitalizations and ED visits, discharged from [facility] 8 days ago on a 3-day letter, chronic nonadherence to meds and outpt treatment, currently has Intensive Mobile Treatment (IMT) team, hx of several prior self-aborted suicide attempts (planned to jump in front of train 2023, "stopped by an emre"), and significant hx of polysubstance use/abuse (recently EtOH and cocaine), hx of many prior detox/rehabs (last at [facility] Dec 2023), admitted due to report of SI (with several vague plans) and report of AH in the context of substance use, recent discharge from [facility], and inability to reach his 14yo daughter (who lives in [place]). Of note, the patient frequently presents with nearly identical chief complaints to various EDs, is briefly admitted and his symptoms quickly clear, he requests help connecting with housing or substance rehab, and then leaves on 3-day letters - this has been the pattern from his last 2 admissions at [facility] in Jan 2024 and May 2024. Per Psyckes, he has dozens of ED visits driven by alcohol and cocaine use. The patient presented endorsing SI with stated plan to jump in front of train or shoot himself, in the setting of recent alcohol and cocaine use. On interview, there is no evidence of psychosis, pt is linear, organized, fully oriented, does not appear to respond to internal stimuli. He denies current AH, VH, paranoia, and ideas of reference. He denies any violent/homicidal ideation towards anyone else. He denies current alcohol withdrawal symptoms. He demonstrates some insight into his pattern of substance use leading to frequent ED visits with stated SI that rapidly resolves upon admission. Presentation likely driven by substance use disorder, and suspect that stated suicidality is in service of obtaining psychiatric admission due to homelessness and minimal coping skills. At this time, patient requests connection to Respite facility which he has attended previously (in 2023).    On assessment, patient denying AH, HI, and SI w/o plan or intent. No longer complaining of right foot pain.    1. Admit to Low4, 9.13  2. No CO needed  3. Continue Risperdal 1mg QHS  	Home meds - aspirin 81mg daily, atorvastatin 40mg daily, loratadine 10mg daily, multivitamin  4. Labs ordered for am  5. Dispo: Shelter with plan to self-report to Respite program on 7/9/24.

## 2024-06-13 NOTE — BH INPATIENT PSYCHIATRY DISCHARGE NOTE - NSBHMETABOLIC_PSY_ALL_CORE_FT
BMI: BMI (kg/m2): 36.8 (06-09-24 @ 04:30)  HbA1c: A1C with Estimated Average Glucose Result: 5.3 % (06-10-24 @ 09:00)    Glucose: POCT Blood Glucose.: 104 mg/dL (04-04-24 @ 06:04)    BP: --Vital Signs Last 24 Hrs  T(C): 36.2 (06-13-24 @ 08:09), Max: 36.2 (06-13-24 @ 08:09)  T(F): 97.1 (06-13-24 @ 08:09), Max: 97.1 (06-13-24 @ 08:09)  HR: --  BP: --  BP(mean): --  RR: --  SpO2: --    Orthostatic VS  06-13-24 @ 08:09  Lying BP: --/-- HR: --  Sitting BP: 117/72 HR: 68  Standing BP: --/-- HR: --  Site: --  Mode: --  Orthostatic VS  06-12-24 @ 06:09  Lying BP: --/-- HR: --  Sitting BP: 111/77 HR: 83  Standing BP: --/-- HR: --  Site: --  Mode: --  Orthostatic VS  06-11-24 @ 19:31  Lying BP: --/-- HR: --  Sitting BP: 133/60 HR: 114  Standing BP: 125/70 HR: 109  Site: --  Mode: electronic    Lipid Panel: Date/Time: 06-10-24 @ 09:00  Cholesterol, Serum: 121  LDL Cholesterol Calculated: 42  HDL Cholesterol, Serum: 51  Total Cholesterol/HDL Ration Measurement: --  Triglycerides, Serum: 139

## 2024-06-13 NOTE — BH INPATIENT PSYCHIATRY DISCHARGE NOTE - HOSPITAL COURSE
... Patient is hospitalized with a primary problem of mood disorder and polysubstance abuse.    The patient reportedly endorsed AH to ED staff but did not display any psychotic symptoms during his inpatient stay. Initially patient was irritable, but his mood was generally stable and he kept mostly to himself.  He denied depression or anxiety, no SI/HI, and no additional hallucinations or delusions. He was eating well, sleeping well, and ADLs adequate.  Patient consented to initiate Risperidone and reported no side effects during his stay.  The patient engaged minimally in therapy groups and did not participate in activities on the milieu.  The patient identifies his daughter and alexandra as protective factors, he is future-oriented.  Patient no longer desires inpatient treatment and care, placing a 3DL on 6/12/24.     Patient is not judged to be an acute danger to self or others at this time.     PROCEDURES AND TREATMENT:  >Individual psychotherapy/CBT modality and group therapy  >Milieu therapy and supportive therapy  >Psychopharmacologic management.  >Motivational counseling.     Patient labs were all WNL. Labs include---CBC/Chemistry/LFT/A1C/Lipid Panel/TSH/CPK/U-A/U-Tox/COVID  Covid at discharge: nondetectable   Consultation: NA  Medical Issues: NA  Imaging: NA    Medications at D/C  Psychiatric Medications: Risperidone 2mg  Medical: NA    Problem Pertinent Review of Symptoms/Associated Signs and Symptoms: Patient is visible on the unit and is calm, cooperative, pleasant, AAOX3 upon approach. Immediate risk was minimized by inpatient admission to a safe environment with appropriate supervision and limited access to narcotics and lethal means. On suicide risk assessment at the time of discharge, patient is at elevated chronic risk due the following factors: history of psychiatric illness, polysubstance abuse, unhoused status, history on noncompliance, hx of multiple inpatient psychiatric admissions, and recent inpatient psychiatric discharge.   Protective factors include the patient’s daughter and his alexandra, with patient denying any anxiety, panic attacks, global insomnia, severe anhedonia.  Patient denies any suicidal/homicidal ideations, intent, or plan at the time of discharge.  Given the mitigation of aforementioned acute risk factors and considerable protective factors, patient's acute risk for self-harm has been minimized and is currently low.     Future risk was minimized before discharge by treatment of anxiety/depressive symptoms, providing relevant patient education and discussing emergency procedures. Patient denies all suicidal and aggressive/homicidal ideation, intent and plan. Although the patient remains at their chronic baseline risk of self-harm, such risk cannot be further ameliorated by continued inpatient treatment and the patient is therefore appropriate for discharge.     On the day of discharge, the patient’s mood and affect are normal/euthymic. Patient denies depressed mood and anxiety. Patient is not hopeless; states he is ready to take things “one day at a time.” Sleep and appetite are also normal (at baseline).  Pt’s motor performance and productivity of speech are WNL. Pt denies symptoms of psychosis. Patient denies S/H/I/I/P.     There are no other acute risk factors that could be mitigated by further inpatient hospitalization.  Patient is not deemed to be an imminent danger to self or others at the time of discharge.  The patient has been instructed that should she develop thoughts of self-harm, suicidal thoughts, homicidal thoughts, aggression, agitation or any other distressing psychiatric symptoms, she should call 911 or go the emergency room. The patient has expressed understanding and is in agreement with the above plan.     Discharge Pan:  -Risk, benefits and alternatives discussed with patient. Patient verbalized understanding and in accord with aftercare recommendations.   -  Patient instructed to call 911 or go to nearest ER in case of emergency.   -Patient given Suicide Prevention Lifeline number 1-802.372.7526 and provided instructions on its use

## 2024-06-13 NOTE — BH DISCHARGE NOTE NURSING/SOCIAL WORK/PSYCH REHAB - PATIENT PORTAL LINK FT
You can access the FollowMyHealth Patient Portal offered by [facility] by registering at the following website: [email or web address]. By joining [facility]’s FollowMyHealth portal, you will also be able to view your health information using other applications (apps) compatible with our system.

## 2024-06-13 NOTE — BH DISCHARGE NOTE NURSING/SOCIAL WORK/PSYCH REHAB - NSDCPRGOAL_PSY_ALL_CORE
Patient was hospitalized due to worsening SI and CAH secondary to substance use and noncompliance with medication. Writer met with patient in order to review progress toward rehabilitation goals and assess current functioning. Patient was cooperative. Patient was able to engage in safety planning, and identified his early warning signs as well as coping skills to manage symptoms such as prayer, exercise, and speaking with his daughter. Currently, patient denies AH/VH/SI/HI   On the unit, patient did not attend groups and generally kept to himself. Patient was irritable at times. Patient minimizes his substance use, though he was able to identify adverse effects of using substances, such as mood changes and frequent hospitalizations.  Patient maintained his ADLs and was compliant with treatment. Patient submitted a three day letter and will be leaving against medical advice. Patient is encouraged to continue to comply with treatment, utilize coping skills mentioned above, and continue to work on sobriety maintenance.

## 2024-06-13 NOTE — BH INPATIENT PSYCHIATRY DISCHARGE NOTE - NSDCCPCAREPLAN_GEN_ALL_CORE_FT
PRINCIPAL DISCHARGE DIAGNOSIS  Diagnosis: Mood disorder  Assessment and Plan of Treatment:       SECONDARY DISCHARGE DIAGNOSES  Diagnosis: Polysubstance abuse  Assessment and Plan of Treatment:

## 2024-06-13 NOTE — BH DISCHARGE NOTE NURSING/SOCIAL WORK/PSYCH REHAB - NSDCPRRECOMMEND_PSY_ALL_CORE
Patient will attend [facility] for care coordination.

## 2024-06-13 NOTE — BH DISCHARGE NOTE NURSING/SOCIAL WORK/PSYCH REHAB - DISCHARGE INSTRUCTIONS AFTERCARE APPOINTMENTS
In order to check the location, date, or time of your aftercare appointment, please refer to your Discharge Instructions Document given to you upon leaving the hospital.  If you have lost the instructions please call [phone]

## 2024-06-13 NOTE — BH INPATIENT PSYCHIATRY DISCHARGE NOTE - DESCRIPTION
single, unemployed, [place], [place] and in [place] streets. has a 15 yr old daughter (living in [place], [place] - currently in custody of bio mother, speaks on the phone regularly). reported access to guns on the streets from his "[employer] friends"

## 2024-06-13 NOTE — BH DISCHARGE NOTE NURSING/SOCIAL WORK/PSYCH REHAB - NSCDUDCCRISIS_PSY_A_CORE
[facility]  1 (477) [facility]-WELL ([phone])  Text "WELL" to 64520  Website: www.[employer].Bevii/.National Suicide Prevention Lifeline 1 (369) 397-4185/.  [facility]  1 (177) LIFENET (651-9179)/.  [facility]  [address] [place] [address] [phone]   Hours:  Monday through Friday from 9 AM to 3 PM

## 2024-06-13 NOTE — BH INPATIENT PSYCHIATRY DISCHARGE NOTE - NSDCMRMEDTOKEN_GEN_ALL_CORE_FT
aspirin 81 mg oral delayed release tablet: 1 tab(s) orally once a day  atorvastatin 40 mg oral tablet: 1 tab(s) orally once a day (at bedtime)  loratadine 10 mg oral tablet: 1 tab(s) orally once a day  lurasidone 20 mg oral tablet: 1 tab(s) orally once a day Take with food  Multiple Vitamins oral tablet: 1 tab(s) orally once a day   aspirin 81 mg oral tablet, chewable: 1 tab(s) orally once a day  atorvastatin 40 mg oral tablet: 1 tab(s) orally once a day (at bedtime)  loratadine 10 mg oral tablet: 1 tab(s) orally once a day  risperiDONE 1 mg oral tablet: 1 tab(s) orally once a day (at bedtime)

## 2024-06-13 NOTE — BH INPATIENT PSYCHIATRY DISCHARGE NOTE - OTHER PAST PSYCHIATRIC HISTORY (INCLUDE DETAILS REGARDING ONSET, COURSE OF ILLNESS, INPATIENT/OUTPATIENT TREATMENT)
59yo single man who is undomiciled (living at shelters, hotels, ERs/hospitals, and rehabs) and unemployed, with PMHx HTN and self-reported CHF, with PPHx of many documented diagnoses ranging across mood, psychotic, and personality disorders, hx of many prior hospitalizations and ED visits, discharged from [facility] 8 days ago on a 3-day letter, chronic nonadherence to meds and outpt treatment, currently has Intensive Mobile Treatment (IMT) team, hx of several prior self-aborted suicide attempts (planned to jump in front of train 2023, "stopped by an emre"), and significant hx of polysubstance use/abuse (recently EtOH and cocaine), hx of many prior detox/rehabs (last at [facility] Dec 2023), admitted due to report of SI (with several vague plans) and report of AH in the context of substance use, recent discharge from [facility], and inability to reach his 16yo daughter (who lives in [place]). Of note, the patient frequently presents to with nearly identical chief complaints to various EDs, is briefly admitted and his symptoms quickly clear, he requests help connecting with housing or substance rehab, and then leaves on 3-day letters - this has been the pattern from his last 2 admissions at [facility] in Jan 2024 and May 2024. Per Psyckes, he has dozens of ED visits driven by alcohol and cocaine use.     LMSW met with pt to introduce self and obtain collateral. Pt reports that he was recently discharged from [facility] and that he put in a 3DL because he wanted to be out of the hospital for his birthday. Pt reports at that time they were working on trying to get him in to a respite. Pt has preference of respite's in [place]. Pt reports that he was art [facility] rehab a month ago and since then has been staying in hotels. Pt reports this is the reason he wants to get in to a 28 day respite. Pt reports that a trigger for his admission and SI was being unable to reach his daughter. He reports that he was able to speak with his daughter while he has been on the unit. Pt reports that he was concerned because the last he heard from his daughter, prior to speaking to her on the unit, she had told him that her mother was going to be kicking her out. Pt reports that when he spoke to her on the unit she denied this and the pt believes the daughter "exaggerates". He reports that his daughter is 16 years old and lives with her mother in [place], [place]. Pt reports that he is willing to stay until July 1st without putting in a 3DL while waiting on respite bed availability. Pt did not provide consent to speak with anyone at this time.

## 2024-06-14 NOTE — SOCIAL WORK POST DISCHARGE FOLLOW UP NOTE - NSBHSWFOLLOWUP_PSY_ALL_CORE_FT
Writer was informed that pt [facility], however it was also revealed that pt was re-admitted to [facility] on 6/9. Case to be closed as pt was psychiatrically rehospitalized.

## 2024-06-15 ENCOUNTER — HOSPITAL ENCOUNTER (INPATIENT)
Dept: HOSPITAL 74 - YASAS | Age: 59
LOS: 2 days | Discharge: LEFT BEFORE DISCHARGE | DRG: 770 | End: 2024-06-17
Attending: SURGERY | Admitting: ALLERGY & IMMUNOLOGY
Payer: COMMERCIAL

## 2024-06-15 VITALS — BODY MASS INDEX: 36.7 KG/M2

## 2024-06-15 DIAGNOSIS — Z87.891: ICD-10-CM

## 2024-06-15 DIAGNOSIS — F31.9: ICD-10-CM

## 2024-06-15 DIAGNOSIS — G89.29: ICD-10-CM

## 2024-06-15 DIAGNOSIS — F41.9: ICD-10-CM

## 2024-06-15 DIAGNOSIS — K21.9: ICD-10-CM

## 2024-06-15 DIAGNOSIS — M54.50: ICD-10-CM

## 2024-06-15 DIAGNOSIS — I10: ICD-10-CM

## 2024-06-15 DIAGNOSIS — F19.282: ICD-10-CM

## 2024-06-15 DIAGNOSIS — F14.20: ICD-10-CM

## 2024-06-15 DIAGNOSIS — F10.230: Primary | ICD-10-CM

## 2024-06-15 DIAGNOSIS — E78.5: ICD-10-CM

## 2024-06-15 DIAGNOSIS — F11.10: ICD-10-CM

## 2024-06-15 PROCEDURE — HZ2ZZZZ DETOXIFICATION SERVICES FOR SUBSTANCE ABUSE TREATMENT: ICD-10-PCS | Performed by: SURGERY

## 2024-06-15 RX ADMIN — IBUPROFEN PRN MG: 600 TABLET, FILM COATED ORAL at 12:51

## 2024-06-15 RX ADMIN — Medication SCH TAB: at 10:30

## 2024-06-15 RX ADMIN — POLYETHYLENE GLYCOL 3350 PRN GM: 17 POWDER, FOR SOLUTION ORAL at 16:12

## 2024-06-15 RX ADMIN — Medication SCH: at 23:42

## 2024-06-16 RX ADMIN — LORATADINE SCH: 10 TABLET ORAL at 23:52

## 2024-06-16 RX ADMIN — IBUPROFEN PRN MG: 400 TABLET, FILM COATED ORAL at 06:22

## 2024-06-16 RX ADMIN — BENZONATATE PRN MG: 200 CAPSULE ORAL at 04:07

## 2024-06-17 VITALS — DIASTOLIC BLOOD PRESSURE: 61 MMHG | HEART RATE: 70 BPM | SYSTOLIC BLOOD PRESSURE: 124 MMHG | TEMPERATURE: 98 F

## 2024-06-17 VITALS — RESPIRATION RATE: 18 BRPM

## 2024-06-17 LAB
ALBUMIN SERPL-MCNC: 3.7 G/DL (ref 3.4–5)
ALP SERPL-CCNC: 75 U/L (ref 45–117)
ALT SERPL-CCNC: 103 U/L (ref 13–61)
ANION GAP SERPL CALC-SCNC: 6 MMOL/L (ref 4–13)
AST SERPL-CCNC: 58 U/L (ref 15–37)
BILIRUB SERPL-MCNC: 0.4 MG/DL (ref 0.2–1)
BUN SERPL-MCNC: 15.4 MG/DL (ref 7–18)
CALCIUM SERPL-MCNC: 9.3 MG/DL (ref 8.5–10.1)
CHLORIDE SERPL-SCNC: 109 MMOL/L (ref 98–107)
CO2 SERPL-SCNC: 27 MMOL/L (ref 21–32)
CREAT SERPL-MCNC: 1 MG/DL (ref 0.55–1.3)
DEPRECATED RDW RBC AUTO: 15.6 % (ref 11.9–15.9)
GLUCOSE SERPL-MCNC: 103 MG/DL (ref 74–106)
HCT VFR BLD CALC: 35.7 % (ref 35.4–49)
HGB BLD-MCNC: 12 GM/DL (ref 11.7–16.9)
MCH RBC QN AUTO: 31.3 PG (ref 25.7–33.7)
MCHC RBC AUTO-ENTMCNC: 33.7 G/DL (ref 32–35.9)
MCV RBC: 92.7 FL (ref 80–96)
PLATELET # BLD AUTO: 265 10^3/UL (ref 134–434)
PMV BLD: 8.5 FL (ref 7.5–11.1)
POTASSIUM SERPLBLD-SCNC: 4.4 MMOL/L (ref 3.5–5.1)
PROT SERPL-MCNC: 7.5 G/DL (ref 6.4–8.2)
RBC # BLD AUTO: 3.85 M/MM3 (ref 4–5.6)
SODIUM SERPL-SCNC: 142 MMOL/L (ref 136–145)
WBC # BLD AUTO: 4.4 K/MM3 (ref 4–10)

## 2024-06-18 NOTE — ED ADULT NURSE NOTE - NSFALLRSKASSESSDT_ED_ALL_ED
Respiratory Care Note: Tracheostomy tube changed by Mom & bedside RN with tracheostomy review completed by RCP.  Mom, Aunt & Sister present to observe trach change.  Child life present to discuss distraction methods; Kianna was swaddled in a blanket and placed with bed flat and a shoulder roll and was suctioned pre trach change.  RN removed trach and Mom placed trach easily.  Kianna was allowed to sit up prior to traach care and trach securement.  Pt. Was un-swaddled and was comforted by her Sister with base line level of comfort. Kianna tolerated procedure well.   17-Mar-2023 10:47

## 2024-07-03 ENCOUNTER — INPATIENT (INPATIENT)
Facility: HOSPITAL | Age: 59
LOS: 11 days | Discharge: HOME | End: 2024-07-15
Attending: PSYCHIATRY & NEUROLOGY | Admitting: PSYCHIATRY & NEUROLOGY
Payer: MEDICAID

## 2024-07-03 VITALS
SYSTOLIC BLOOD PRESSURE: 104 MMHG | OXYGEN SATURATION: 100 % | HEART RATE: 95 BPM | HEIGHT: 74 IN | DIASTOLIC BLOOD PRESSURE: 53 MMHG | RESPIRATION RATE: 16 BRPM | TEMPERATURE: 98 F

## 2024-07-03 DIAGNOSIS — F31.9 BIPOLAR DISORDER, UNSPECIFIED: ICD-10-CM

## 2024-07-03 LAB
ALBUMIN SERPL ELPH-MCNC: 4.2 G/DL — SIGNIFICANT CHANGE UP (ref 3.3–5)
ALP SERPL-CCNC: 63 U/L — SIGNIFICANT CHANGE UP (ref 40–120)
ALT FLD-CCNC: 44 U/L — HIGH (ref 4–41)
ANION GAP SERPL CALC-SCNC: 12 MMOL/L — SIGNIFICANT CHANGE UP (ref 7–14)
APAP SERPL-MCNC: <10 UG/ML — LOW (ref 15–25)
AST SERPL-CCNC: 48 U/L — HIGH (ref 4–40)
BASOPHILS # BLD AUTO: 0.03 K/UL — SIGNIFICANT CHANGE UP (ref 0–0.2)
BASOPHILS NFR BLD AUTO: 0.5 % — SIGNIFICANT CHANGE UP (ref 0–2)
BILIRUB SERPL-MCNC: 0.7 MG/DL — SIGNIFICANT CHANGE UP (ref 0.2–1.2)
BUN SERPL-MCNC: 18 MG/DL — SIGNIFICANT CHANGE UP (ref 7–23)
CALCIUM SERPL-MCNC: 9 MG/DL — SIGNIFICANT CHANGE UP (ref 8.4–10.5)
CHLORIDE SERPL-SCNC: 101 MMOL/L — SIGNIFICANT CHANGE UP (ref 98–107)
CO2 SERPL-SCNC: 22 MMOL/L — SIGNIFICANT CHANGE UP (ref 22–31)
CREAT SERPL-MCNC: 1.11 MG/DL — SIGNIFICANT CHANGE UP (ref 0.5–1.3)
EGFR: 77 ML/MIN/1.73M2 — SIGNIFICANT CHANGE UP
EOSINOPHIL # BLD AUTO: 0.1 K/UL — SIGNIFICANT CHANGE UP (ref 0–0.5)
EOSINOPHIL NFR BLD AUTO: 1.7 % — SIGNIFICANT CHANGE UP (ref 0–6)
ETHANOL SERPL-MCNC: <10 MG/DL — SIGNIFICANT CHANGE UP
GLUCOSE SERPL-MCNC: 87 MG/DL — SIGNIFICANT CHANGE UP (ref 70–99)
HCT VFR BLD CALC: 35.3 % — LOW (ref 39–50)
HGB BLD-MCNC: 12.3 G/DL — LOW (ref 13–17)
IANC: 3.01 K/UL — SIGNIFICANT CHANGE UP (ref 1.8–7.4)
IMM GRANULOCYTES NFR BLD AUTO: 0.3 % — SIGNIFICANT CHANGE UP (ref 0–0.9)
LYMPHOCYTES # BLD AUTO: 2.07 K/UL — SIGNIFICANT CHANGE UP (ref 1–3.3)
LYMPHOCYTES # BLD AUTO: 34.7 % — SIGNIFICANT CHANGE UP (ref 13–44)
MCHC RBC-ENTMCNC: 31.3 PG — SIGNIFICANT CHANGE UP (ref 27–34)
MCHC RBC-ENTMCNC: 34.8 GM/DL — SIGNIFICANT CHANGE UP (ref 32–36)
MCV RBC AUTO: 89.8 FL — SIGNIFICANT CHANGE UP (ref 80–100)
MONOCYTES # BLD AUTO: 0.74 K/UL — SIGNIFICANT CHANGE UP (ref 0–0.9)
MONOCYTES NFR BLD AUTO: 12.4 % — SIGNIFICANT CHANGE UP (ref 2–14)
NEUTROPHILS # BLD AUTO: 3.01 K/UL — SIGNIFICANT CHANGE UP (ref 1.8–7.4)
NEUTROPHILS NFR BLD AUTO: 50.4 % — SIGNIFICANT CHANGE UP (ref 43–77)
NRBC # BLD: 0 /100 WBCS — SIGNIFICANT CHANGE UP (ref 0–0)
NRBC # FLD: 0 K/UL — SIGNIFICANT CHANGE UP (ref 0–0)
PLATELET # BLD AUTO: 282 K/UL — SIGNIFICANT CHANGE UP (ref 150–400)
POTASSIUM SERPL-MCNC: 4.3 MMOL/L — SIGNIFICANT CHANGE UP (ref 3.5–5.3)
POTASSIUM SERPL-SCNC: 4.3 MMOL/L — SIGNIFICANT CHANGE UP (ref 3.5–5.3)
PROT SERPL-MCNC: 7.5 G/DL — SIGNIFICANT CHANGE UP (ref 6–8.3)
RBC # BLD: 3.93 M/UL — LOW (ref 4.2–5.8)
RBC # FLD: 14.6 % — HIGH (ref 10.3–14.5)
SALICYLATES SERPL-MCNC: <0.3 MG/DL — LOW (ref 15–30)
SARS-COV-2 RNA SPEC QL NAA+PROBE: SIGNIFICANT CHANGE UP
SODIUM SERPL-SCNC: 135 MMOL/L — SIGNIFICANT CHANGE UP (ref 135–145)
TOXICOLOGY SCREEN, DRUGS OF ABUSE, SERUM RESULT: SIGNIFICANT CHANGE UP
TSH SERPL-MCNC: 1.52 UIU/ML — SIGNIFICANT CHANGE UP (ref 0.27–4.2)
WBC # BLD: 5.97 K/UL — SIGNIFICANT CHANGE UP (ref 3.8–10.5)
WBC # FLD AUTO: 5.97 K/UL — SIGNIFICANT CHANGE UP (ref 3.8–10.5)

## 2024-07-03 PROCEDURE — 99285 EMERGENCY DEPT VISIT HI MDM: CPT

## 2024-07-03 PROCEDURE — 99285 EMERGENCY DEPT VISIT HI MDM: CPT | Mod: GC

## 2024-07-03 RX ORDER — ACETAMINOPHEN 325 MG
650 TABLET ORAL ONCE
Refills: 0 | Status: COMPLETED | OUTPATIENT
Start: 2024-07-03 | End: 2024-07-03

## 2024-07-03 RX ORDER — DIPHENHYDRAMINE HCL 12.5MG/5ML
50 ELIXIR ORAL EVERY 6 HOURS
Refills: 0 | Status: DISCONTINUED | OUTPATIENT
Start: 2024-07-04 | End: 2024-07-15

## 2024-07-03 RX ORDER — LORAZEPAM 0.5 MG
2 TABLET ORAL EVERY 6 HOURS
Refills: 0 | Status: DISCONTINUED | OUTPATIENT
Start: 2024-07-04 | End: 2024-07-10

## 2024-07-03 RX ORDER — HALOPERIDOL DECANOATE 100 MG/ML
5 VIAL (ML) INTRAMUSCULAR EVERY 6 HOURS
Refills: 0 | Status: DISCONTINUED | OUTPATIENT
Start: 2024-07-04 | End: 2024-07-15

## 2024-07-03 RX ORDER — FAMOTIDINE 40 MG
20 TABLET ORAL DAILY
Refills: 0 | Status: DISCONTINUED | OUTPATIENT
Start: 2024-07-03 | End: 2024-07-03

## 2024-07-03 RX ORDER — DIPHENHYDRAMINE HCL 12.5MG/5ML
50 ELIXIR ORAL ONCE
Refills: 0 | Status: DISCONTINUED | OUTPATIENT
Start: 2024-07-04 | End: 2024-07-15

## 2024-07-03 RX ORDER — LORAZEPAM 0.5 MG
2 TABLET ORAL
Refills: 0 | Status: DISCONTINUED | OUTPATIENT
Start: 2024-07-04 | End: 2024-07-10

## 2024-07-03 RX ORDER — LORATADINE 10 MG/1
10 TABLET ORAL DAILY
Refills: 0 | Status: DISCONTINUED | OUTPATIENT
Start: 2024-07-04 | End: 2024-07-15

## 2024-07-03 RX ORDER — MAGNESIUM, ALUMINUM HYDROXIDE 400-400
30 TABLET,CHEWABLE ORAL ONCE
Refills: 0 | Status: COMPLETED | OUTPATIENT
Start: 2024-07-03 | End: 2024-07-03

## 2024-07-03 RX ORDER — ASPIRIN 325 MG/1
81 TABLET, FILM COATED ORAL DAILY
Refills: 0 | Status: DISCONTINUED | OUTPATIENT
Start: 2024-07-04 | End: 2024-07-15

## 2024-07-03 RX ORDER — ATORVASTATIN CALCIUM 20 MG/1
40 TABLET, FILM COATED ORAL AT BEDTIME
Refills: 0 | Status: DISCONTINUED | OUTPATIENT
Start: 2024-07-04 | End: 2024-07-15

## 2024-07-03 RX ORDER — LORAZEPAM 0.5 MG
2 TABLET ORAL ONCE
Refills: 0 | Status: DISCONTINUED | OUTPATIENT
Start: 2024-07-04 | End: 2024-07-10

## 2024-07-03 RX ORDER — HALOPERIDOL DECANOATE 100 MG/ML
5 VIAL (ML) INTRAMUSCULAR ONCE
Refills: 0 | Status: DISCONTINUED | OUTPATIENT
Start: 2024-07-04 | End: 2024-07-15

## 2024-07-03 RX ORDER — RISPERIDONE 0.5 MG/1
1 TABLET ORAL AT BEDTIME
Refills: 0 | Status: DISCONTINUED | OUTPATIENT
Start: 2024-07-04 | End: 2024-07-15

## 2024-07-03 RX ADMIN — Medication 650 MILLIGRAM(S): at 22:38

## 2024-07-03 RX ADMIN — Medication 20 MILLIGRAM(S): at 22:38

## 2024-07-03 RX ADMIN — Medication 30 MILLILITER(S): at 22:39

## 2024-07-03 NOTE — ED BEHAVIORAL HEALTH ASSESSMENT NOTE - SUMMARY
60yo single man who is undomiciled (living at shelters, hotels, ERs/hospitals, and rehabs) and unemployed, with PMHx HTN and self-reported CHF, with PPHx of many documented diagnoses ranging across mood, psychotic, and personality disorders, hx of many prior hospitalizations and ED visits, discharged from [facility] on 6/13/24 on risperdal 1 mg, chronic nonadherence to meds and outpt treatment, currently has Intensive Mobile Treatment (IMT) team, hx of several prior self-aborted suicide attempts (planned to jump in front of train 2023, "stopped by an emre"), and significant hx of polysubstance use/abuse (recently EtOH and cocaine), hx of many prior detox/rehabs (last at [facility] Dec 2023), admitted due to report of SI (with several vague plans) and report of AH in the context of substance use, recent discharge from [facility], and inability to reach his 16yo daughter (who lives in [place]). Of note, the patient frequently presents to with nearly identical chief complaints to various EDs, is briefly admitted and his symptoms quickly clear, he requests help connecting with housing or substance rehab, and then leaves on 3-day letters - this has been the pattern from his last 2 admissions at [facility] in Jan 2024 and May 2024, and doesn't f/u with outpatient care. Per Terrie, he has dozens of ED visits driven by alcohol and cocaine use.     Today patient presents endorsing SI with stated plan to jump in front of train or shoot himself, in the setting of recent alcohol use and not being in contact with his daughter. On interview, there is no evidence of psychosis, pt is linear, organized, fully oriented, does not appear to respond to internal stimuli. He appears mildly depressed. No signs of alcohol withdrawal. However, he endorses VH, CAH, SI and HI with similarly vague explanations documented in prior visits. Says he had interrupted SA today where he was about to jump in front of train but saved by an emre. Pt also reports having access to firearms but refuses to disclose location. Suspect malingering but given serious nature of complaints, and pt desiring inpatient hospitalization, will admit pt under voluntary status.    Home meds - aspirin 81mg daily, atorvastatin 40mg daily, loratadine 10mg daily, multivitamin  Psych meds - discharged on risperdal 1 mg 60yo single man who is undomiciled (living at shelters, hotels, ERs/hospitals, and rehabs) and unemployed, with PMHx HTN and self-reported CHF, with PPHx of many documented diagnoses ranging across mood, psychotic, and personality disorders, hx of many prior hospitalizations and ED visits, discharged from [facility] on 6/13/24 on risperdal 1 mg, chronic nonadherence to meds and outpt treatment, currently has Intensive Mobile Treatment (IMT) team, hx of several prior self-aborted suicide attempts (planned to jump in front of train 2023, "stopped by an emre"), and significant hx of polysubstance use/abuse (recently EtOH and cocaine), hx of many prior detox/rehabs (last at [facility] Dec 2023), admitted due to report of SI (with several vague plans) and report of AH in the context of substance use, recent discharge from [facility], and inability to reach his 14yo daughter (who lives in [place]). Of note, the patient frequently presents to with nearly identical chief complaints to various EDs, is briefly admitted and his symptoms quickly clear, he requests help connecting with housing or substance rehab, and then leaves on 3-day letters - this has been the pattern from his last 2 admissions at [facility] in Jan 2024 and May 2024, and doesn't f/u with outpatient care. Per Terrie, he has dozens of ED visits driven by alcohol and cocaine use.     Today patient presents endorsing SI with stated plan to jump in front of train or shoot himself, in the setting of recent alcohol use and not being in contact with his daughter. On interview, there is no evidence of psychosis, pt is linear, organized, fully oriented, does not appear to respond to internal stimuli. He appears mildly depressed. No signs of alcohol withdrawal. However, he endorses VH, CAH, SI and HI with similarly vague explanations documented in prior visits. Says he had interrupted SA today where he was about to jump in front of train but saved by an emre. Pt also reports having access to firearms but refuses to disclose location. Suspect malingering but given serious nature of complaints, and pt desiring inpatient hospitalization, will admit pt under voluntary status.    Home meds - aspirin 81mg daily, atorvastatin 40mg daily, loratadine 10mg daily, multivitamin  Psych meds - discharged on risperdal 1 mg    safe acted pt Reference Number: [id number]

## 2024-07-03 NOTE — ED BEHAVIORAL HEALTH NOTE - BEHAVIORAL HEALTH NOTE
Writer met with pt at bedside. he says we can reach out to his daughter Charo ([phone]) but says she is a minor. He says to ask for her mother daniella.    As per request of provider, writer contacted the mother of the pt’s daughter at [phone]) but writer was unable to leave voicemail. Writer will continue to f/u as needed. Writer met with pt at bedside. he says we can reach out to his daughter Charo ([phone]) but says she is a minor. He says to ask for her mother daniella.    As per request of provider, writer contacted the mother of the pt’s daughter at [phone]) but writer was unable to leave voicemail. Writer will continue to f/u as needed.    psykes provided to treatment team.

## 2024-07-03 NOTE — ED ADULT NURSE NOTE - HISTORY OF COVID-19 VACCINATION
Interventional Neuro- Radiology   Procedure Note PA-C    Procedure: Selective Cerebral Angiography   Pre- Procedure Diagnosis:  Post- Procedure Diagnosis:    : Dr Steven Gordon  Fellow:    Dr Hood Almonte   Physician Assistant: Jolene Ghosh PA-C    Nurse:                   Mary Rose RN  Radiologic Tech:  Anesthesiologist:  Sheath:      I/Os: EBL less than 10cc  IV fluids:     cc     Urine output     cc    Contrast Omnipaque 240      cc             Vitals: BP         HR      Spo2     %          Preliminary Report:  Using a 5 Montserratian long sheath to the right groin under MAC sedation via left vertebral artery,  left internal carotid artery, left external carotid artery, right vertebral artery, right internal carotid artery, right external carotid artery a selective cerebral angiography was performed and demonstrated                       Official note to follow.  Patient tolerated procedure well, hemodynamically stable, no change in neurological status compared to baseline.  Results discussed with neuro ICU team, patient and patient's family. Right groin sheath was removed, manual compression held to hemostasis for 20 minutes, no active bleeding, no hematoma, quick clot and safeguard balloon dressing applied at Interventional Neuro- Radiology   Procedure Note PA-C    Procedure: Selective Cerebral Angiography   Pre- Procedure Diagnosis:  Post- Procedure Diagnosis:    : Dr Steven Gordon  Fellow:    Dr Hood Almonte   Physician Assistant: Jolene Ghosh PA-C    Nurse:                   Mary Rose RN  Radiologic Tech:   aWlly Collier LRT  Anesthesiologist:  Sheath:      I/Os: EBL less than 10cc  IV fluids:     cc     Urine output     cc    Contrast Omnipaque 240      cc             Vitals: BP         HR      Spo2     %          Preliminary Report:  Using a 5 Ghanaian long sheath to the right groin under MAC sedation via left vertebral artery,  left internal carotid artery, left external carotid artery, right vertebral artery, right internal carotid artery, right external carotid artery a selective cerebral angiography was performed and demonstrated                       Official note to follow.  Patient tolerated procedure well, hemodynamically stable, no change in neurological status compared to baseline.  Results discussed with neuro ICU team, patient and patient's family. Right groin sheath was removed, manual compression held to hemostasis for 20 minutes, no active bleeding, no hematoma, quick clot and safeguard balloon dressing applied at Interventional Neuro- Radiology   Procedure Note PA-C    Procedure: Selective Cerebral Angiography   Pre- Procedure Diagnosis:  Post- Procedure Diagnosis:    : Dr Steven Gordon  Fellow:    Dr Hood Almonte   Physician Assistant: Jolene Ghosh PA-C    Nurse:                   Mary Rose RN  Radiologic Tech:   Wally Collier LRT  Anesthesiologist:  Dr Svetlana Pena  Sheath:                 8 Finnish short sheath right femoral vein   Sheath:                 8 Finnish short sheath right femoral artery     I/Os: EBL less than 10cc  IV fluids: 750cc  Urine due to void  Contrast Omnipaque 240 87cc        Decadron 10mg once             Vitals: BP 84/58         HR 75      Spo2  100%          Preliminary Report:  Using a 8 Finnish short sheath to the right femoral artery and a 8 Finnish short sheath to the right femoral vein a catheter cerebral venogram, angiography and embolization of fistula was performed and demonstrated                       Official note to follow.  Patient tolerated procedure well, hemodynamically stable, no change in neurological status compared to baseline.  Results discussed with neuro ICU team, patient and patient's . Right groin sheaths were removed, manual compression held to hemostasis for 20 minutes, no active bleeding, no hematoma, quick clot and safeguard balloon dressing applied at 11:45am. 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 with Spring  Pre- Procedure Diagnosis: Dural AV fistula   Post- Procedure Diagnosis:    : Dr Steven Gordon  Fellow:    Dr Hood Almonte   Physician Assistant: Jolene Ghosh PA-C    Nurse:                   Mary Rose RN  Radiologic Tech:   Wally Collier LRT  Anesthesiologist:  Dr Svetlana Pena  Sheath:                 8 Liberian short sheath right femoral vein   Sheath:                 8 Liberian short sheath right femoral artery     I/Os: EBL less than 10cc  IV fluids: 750cc  Urine 400cc  Contrast Omnipaque 270    87cc        Decadron 10mg once         Heparin 3,ooo units      Rohit 18 1.2cc     Vitals: BP 84/58         HR 75      Spo2  100%          Preliminary Report:  Using a 8 Liberian short sheath to the right femoral artery and a 8 Liberian short sheath to the right femoral vein a catheter cerebral venogram, angiography and embolization of right sigmoid fistula was performed, with Spring 18. Official note to follow.  Patient tolerated procedure well, hemodynamically stable, no change in neurological status compared to baseline. Results discussed with neuro ICU team, patient and patient's . Right groin sheaths were removed, vascade devices and manual compression held to hemostasis for 20 minutes, no active bleeding, no hematoma, quick clot and safeguard balloon dressing applied at 11:45am. Disposition NSCU. ASA 81mg daily. Dr Gordon spoke with patient's  post procedure. No

## 2024-07-03 NOTE — ED PROVIDER NOTE - PROGRESS NOTE DETAILS
NP Bereczky- labs, unremarkable, c/o acid refulex medication with good effects, psych recommendation inpatient voluntary tx.

## 2024-07-03 NOTE — ED BEHAVIORAL HEALTH ASSESSMENT NOTE - DETAILS
chest pain see HPI refused to discuss sent email reports diarrhea with Zoloft, nightmares with Latuda, "high" on Depakote nausea N/A in custody of mother (currently based in [place], [place]) ML3 reports he has guns but refuses to disclose location discharged from [place] [address] 6/13 to ANOOP Dr. Ochoa sent email to Dr. Givens

## 2024-07-03 NOTE — ED BEHAVIORAL HEALTH ASSESSMENT NOTE - NSBHATTESTCOMMENTATTENDFT_PSY_A_CORE
60yo single man who is undomiciled (living at shelters, hotels, ERs/hospitals, and rehabs) and unemployed, with PMHx HTN and self-reported CHF, with PPHx of many documented diagnoses ranging across mood, psychotic, and personality disorders, hx of many prior hospitalizations and ED visits, discharged from [facility] on 6/13/24 on risperdal 1 mg, chronic nonadherence to meds and outpt treatment, currently has Intensive Mobile Treatment (IMT) team, hx of several prior self-aborted suicide attempts (planned to jump in front of train 2023, "stopped by an emre"), and significant hx of polysubstance use/abuse (recently EtOH and cocaine), hx of many prior detox/rehabs (last at [facility] Dec 2023), admitted due to report of SI (with several vague plans) and report of AH in the context of substance use, recent discharge from [facility], and inability to reach his 14yo daughter (who lives in [place]). Of note, the patient frequently presents to with nearly identical chief complaints to various EDs, is briefly admitted and his symptoms quickly clear, he requests help connecting with housing or substance rehab, and then leaves on 3-day letters - this has been the pattern from his last 2 admissions at [facility] in Jan 2024 and May 2024, and doesn't f/u with outpatient care. Per Terrie, he has dozens of ED visits driven by alcohol and cocaine use.    Patient presents with +cah to hurt himself in the setting of psychosocial stressors . He reports he had plan to jump in front of the train or shoot himself. He reports he has access to a gun  but would not disclose where he keeps "I can't tell you". Patient is not able to engage in safety planning , requests voluntary admission. This is similar presentation to previous admission and malingering can't be ruled out at this time. Patient will be admitted under voluntary admission for safety and stabilization.

## 2024-07-03 NOTE — ED PROVIDER NOTE - OBJECTIVE STATEMENT
This is a 59-year-old male past medical history chronic CHF GERD pericarditis hypertension past psychiatric history depression mood disorder with complaint of suicidal ideation severe depression with plan to jump in front of the train. He reports he tried?, but very vague.  Patient reports frequent alcohol intoxication last alcohol intake last night he reports hard liquor 4 to 8 pints? Reports his stressors is his baby's mother turning his daughter against him. Denies si, ah, vh, paraonia, or delusions, denies fever, sob, chest pain, headeache, dizziness, abd pain, n, v. Currently homeless. Frequent er visits and psychiatric admissions, non compliance with out patient treatment.

## 2024-07-03 NOTE — ED BEHAVIORAL HEALTH ASSESSMENT NOTE - NSBHSACONSEQUENCE_PSY_A_CORE FT
numerous past detox and rehab admissions, including [facility], [facility], [facility], etc (as per Psyckes)

## 2024-07-03 NOTE — ED BEHAVIORAL HEALTH ASSESSMENT NOTE - NSBHMSEBEHAV_PSY_A_CORE
K 8  Emergent HD  Hold losartan  Calcium gluconate, insulin, albuterol given  Monitor bmp closely  Hyperkalemic protocol  Monitor on tele closely  Nephrology is consulting   Cooperative/Other

## 2024-07-03 NOTE — ED BEHAVIORAL HEALTH ASSESSMENT NOTE - DIFFERENTIAL
substance-induced mood disorder  alcohol use disorder  cocaine use disorder  malingering  axis II pathology   MDD vs bipolar depression by history

## 2024-07-03 NOTE — ED PROVIDER NOTE - CLINICAL SUMMARY MEDICAL DECISION MAKING FREE TEXT BOX
This is a 59-year-old male past medical history chronic CHF GERD pericarditis hypertension past psychiatric history depression mood disorder with complaint of suicidal ideation severe depression with plan to jump in front of the train. He reports he tried?, but very vague.  Patient reports frequent alcohol intoxication last alcohol intake last night he reports hard liquor 4 to 8 pints? Reports his stressors is his baby's mother turning his daughter against him. Denies si, ah, vh, paraonia, or delusions, denies fever, sob, chest pain, headache, dizziness, abd pain, n, v. Currently homeless. Frequent er visits and psychiatric admissions, non compliance with out patient treatment.   labs, psych

## 2024-07-03 NOTE — ED BEHAVIORAL HEALTH ASSESSMENT NOTE - PAST PSYCHOTROPIC MEDICATION
MULTIPLE MEDS TRIAL: ativan, diazepam, librium, trazodone, atarax, haldol, VPA, zyprexa, abilify, Depakote, Latuda, Zoloft, risperdal

## 2024-07-03 NOTE — ED ADULT NURSE NOTE - OBJECTIVE STATEMENT
Called and spoke to the pt's  Brenna Young. Discussed that Dr. Mitzy Luis is on vacation. . Office note did not state if bike riding was ok. Over hearing the conversation was NEREIDA Coker. She was here and rooming the day the patient saw Dr. Mitzy Luis.  Florentin Received pt in hallway in chairs A and XO 3 in NAD calm and cooperative pt reports was attempting to jump in front fo an E train earlier, pt reports feels sad because unable to speak with daughter states ex spouse alienated his daughter from him, pt admits to excessive ETOH use  last night, unable to recall amount r any other specifics. No tremors noted, pt denies HA, N/V, A/V/H. answers to questions appropriately thoughts are linear and logical, well groomed well nourished.

## 2024-07-03 NOTE — ED ADULT TRIAGE NOTE - CHIEF COMPLAINT QUOTE
Pt endorsing suicidal ideation, reports earlier today he attempted to jump in front of the "A train." States he is hearing voices telling him to kill himself. Also endorsing visual hallucinations, stating "I see big bugs." Reports he drank 4 pints of Yessi this morning, states he had not been drinking for a while but relapsed yesterday. Denies illicit drug use, homicidal ideation. pmhx: substance use disorder, depression, chf Pt endorsing suicidal ideation, reports earlier today he attempted to jump in front of the "A train." States he is hearing voices telling him to kill himself. Also endorsing visual hallucinations, stating "I see big bugs." Reports he drank 4 pints of Yessi this morning, states he had not been drinking for a while but relapsed yesterday. Denies illicit drug use, homicidal ideation. pmhx: substance use disorder, depression, chf    Addendum: Pt moved to [facility] to lay in [facility] while awaiting  intake, NP Gretel made aware,  RN Ivy made aware-report handed off, PCA Anthony transported pt-made aware to stay with pt until goes to .

## 2024-07-03 NOTE — ED BEHAVIORAL HEALTH ASSESSMENT NOTE - OTHER
vague did not assess homelessness, financial constraints, unemployment, limited social support, not being able to see 15 yr old daughter

## 2024-07-03 NOTE — ED BEHAVIORAL HEALTH ASSESSMENT NOTE - DESCRIPTION
in behavioral control    T(C): 36.7 (07-03-24 @ 20:05), Max: 36.7 (07-03-24 @ 20:05)  T(F): 98 (07-03-24 @ 20:05), Max: 98 (07-03-24 @ 20:05)  HR: 95 (07-03-24 @ 20:05) (95 - 95)  BP: 104/53 (07-03-24 @ 20:05) (104/53 - 104/53)  RR: 16 (07-03-24 @ 20:05) (16 - 16)  SpO2: 100% (07-03-24 @ 20:05) (100% - 100%)  Wt(kg): -- single, unemployed, stays at a hotel, [employer] park and in the streets. has a 15 yr old daughter (living in [place], [place] - currently in custody of bio mother, speaks on the phone regularly). no reported access to guns HTN, pericarditis, self reports hx of CHF (claims EF around ? 45%), CKD, GERD. per psyckes, past meds to include: HCTZ 25mg, lipitor 40mg, ASA 81mg, entresto 24-26mg, folic acid 1mg, thiamine 100mg

## 2024-07-03 NOTE — ED BEHAVIORAL HEALTH ASSESSMENT NOTE - RISK ASSESSMENT
Acute risk factors - recent inpatient discharge, ongoing substance use, current homelessness  Chronic risk factors - extensive psychiatric and substance hx, hx outpatient treatment nonadherence, suspected Cluster B pathology  Protective factors -   pt is treatment-seeking and often self-presents to the ED for voluntary hospitalization

## 2024-07-03 NOTE — ED BEHAVIORAL HEALTH ASSESSMENT NOTE - OTHER PAST PSYCHIATRIC HISTORY (INCLUDE DETAILS REGARDING ONSET, COURSE OF ILLNESS, INPATIENT/OUTPATIENT TREATMENT)
self reports hx of depression + anxiety  multiple past diagnoses as per Psyckes: with pan-diagnoses namely: Major Depressive Disorder, Substance-Induced Depressive Disorder, Unspecified/Other Bipolar, Depressive Disorder,  Antisocial Personality Disorder,  schizoaffective Disorder, Bipolar I, Bipolar II, Unspecified/Other Anxiety Disorder, Adjustment Disorder, Substance-Induced Sleep Disorder, Schizophrenia, Substance-Induced Psychotic Disorder, Unspecified/Other Psychotic Disorders, Attention Deficit Hyperactivity Disorder, Borderline Personality Disorder, Unspecified/Other Psychotic Disorders, Attention Deficit Hyperactivity Disorder, Borderline Personality Disorder, Insomnia Disorder, Other Mental Disorders  and Substance-Induced Anxiety Disorder  high mental health utilizer including numerous Psych ED/ CPEP visits for SI, alcohol intoxication  multiple prior psych admissions  chronically nonadherent with meds   stated past hx of allegedly self aborting jumping in front of a 7 train over the summer ("stopped by an emre" (2023), reporting the same again in july 2024  denied other past suicide attempts.   Denies ever being in consistent outpatient psychiatric care.

## 2024-07-03 NOTE — ED BEHAVIORAL HEALTH ASSESSMENT NOTE - HPI (INCLUDE ILLNESS QUALITY, SEVERITY, DURATION, TIMING, CONTEXT, MODIFYING FACTORS, ASSOCIATED SIGNS AND SYMPTOMS)
58yo single man who is undomiciled (living at shelters, hotels, ERs/hospitals, and rehabs) and unemployed, with PMHx HTN and self-reported CHF, with PPHx of many documented diagnoses ranging across mood, psychotic, and personality disorders, hx of many prior hospitalizations and ED visits, discharged from [facility] on 24 on risperdal 1 mg, chronic nonadherence to meds and outpt treatment, currently has Intensive Mobile Treatment (IMT) team, hx of several prior self-aborted suicide attempts (planned to jump in front of train , "stopped by an emre"), and significant hx of polysubstance use/abuse (recently EtOH and cocaine), hx of many prior detox/rehabs (last at [facility] Dec 2023), admitted due to report of SI (with several vague plans) and report of AH in the context of substance use, recent discharge from [facility], and inability to reach his 14yo daughter (who lives in [place]). Of note, the patient frequently presents to with nearly identical chief complaints to various EDs, is briefly admitted and his symptoms quickly clear, he requests help connecting with housing or substance rehab, and then leaves on 3-day letters - this has been the pattern from his last 2 admissions at [facility] in 2024 and May 2024, and doesn't f/u with outpatient care. Per Pselmer, he has dozens of ED visits driven by alcohol and cocaine use.     Pt states that he came in today because he has been feeling suicidal with a plan to jump in front of a train or shoot himself for the past two days (same complaint as per documentation in last  ED note). Says this was triggered by his daughter not answering her phone. Pt says he walked to the ledge of [employer] today wanting to die, and an "emre" pulled him back (same as in  note). States he has guns but won't disclose where they are. Says that he heard the voices of his  family members yesterday and today telling him to kill himself. Pt says he is hopeless and wants to act on this. Also endorses VH of seeing his dead mother. Reports HI towards daughter's mother of wishing she were dead but denies any plans or hx of HI/violence. Reports drinking multiple pints of alcohol today, but not in the days prior. Denies withdrawal sx other than nausea, VS WNL. He denies hx of alcohol withdrawal seizures. Denies any other substance use.     Pt was discharged from [facility] on 24, has been staying in a hotel, says he didn't follow up with care and could not provide a reason why. Says he has not been taking any of his medications since d/c. Pt requests to be admitted to [facility]. Is unable to safety plan. 58yo single man who is undomiciled (living at shelters, hotels, ERs/hospitals, and rehabs) and unemployed, with PMHx HTN and self-reported CHF, with PPHx of many documented diagnoses ranging across mood, psychotic, and personality disorders, hx of many prior hospitalizations and ED visits, discharged from [facility] on 24 on Risperdal 1 mg, chronic nonadherence to meds and outpt treatment, currently has Intensive Mobile Treatment (IMT) team, hx of several prior self-aborted suicide attempts (planned to jump in front of train , "stopped by an emre"), and significant hx of polysubstance use/abuse (recently EtOH and cocaine), hx of many prior detox/rehabs (last at [facility] Dec 2023), admitted due to report of SI (with several vague plans) and report of AH in the context of substance use, recent discharge from [facility], and inability to reach his 16yo daughter (who lives in [place]). Of note, the patient frequently presents to with nearly identical chief complaints to various EDs, is briefly admitted and his symptoms quickly clear, he requests help connecting with housing or substance rehab, and then leaves on 3-day letters - this has been the pattern from his last 2 admissions at [facility] in 2024 and May 2024, and doesn't f/u with outpatient care. Per Pselmer, he has dozens of ED visits driven by alcohol and cocaine use.     Pt states that he came in today because he has been feeling suicidal with a plan to jump in front of a train or shoot himself for the past two days (same complaint as per documentation in last  ED note). Says this was triggered by his daughter not answering her phone. Pt says he walked to the ledge of [employer] today wanting to die, and an "emre" pulled him back (same as in  note). States he has guns but won't disclose where they are. Says that he heard the voices of his  family members yesterday and today telling him to kill himself. Pt says he is hopeless and wants to act on this. Also endorses VH of seeing his dead mother. Reports HI towards daughter's mother of wishing she were dead but denies any plans or hx of HI/violence. Reports drinking multiple pints of alcohol today, but not in the days prior. Denies withdrawal sx other than nausea, VS WNL. He denies hx of alcohol withdrawal seizures. Denies any other substance use.     Pt was discharged from [facility] on 24, has been staying in a hotel, says he didn't follow up with care and could not provide a reason why. Says he has not been taking any of his medications since d/c. Pt requests to be admitted to [facility]. Is unable to safety plan.

## 2024-07-03 NOTE — ED ADULT NURSE NOTE - CHIEF COMPLAINT QUOTE
Pt endorsing suicidal ideation, reports earlier today he attempted to jump in front of the "A train." States he is hearing voices telling him to kill himself. Also endorsing visual hallucinations, stating "I see big bugs." Reports he drank 4 pints of Yessi this morning, states he had not been drinking for a while but relapsed yesterday. Denies illicit drug use, homicidal ideation. pmhx: substance use disorder, depression, chf    Addendum: Pt moved to [facility] to lay in [facility] while awaiting  intake, NP Gretel made aware,  RN Ivy made aware-report handed off, PCA Anthony transported pt-made aware to stay with pt until goes to .

## 2024-07-04 DIAGNOSIS — Z78.9 OTHER SPECIFIED HEALTH STATUS: ICD-10-CM

## 2024-07-04 PROCEDURE — 99222 1ST HOSP IP/OBS MODERATE 55: CPT

## 2024-07-04 RX ORDER — BENZOCAINE AND MENTHOL 15; 3.6 MG/1; MG/1
1 LOZENGE ORAL
Refills: 0 | Status: DISCONTINUED | OUTPATIENT
Start: 2024-07-04 | End: 2024-07-15

## 2024-07-04 RX ORDER — ACETAMINOPHEN 325 MG
650 TABLET ORAL EVERY 6 HOURS
Refills: 0 | Status: DISCONTINUED | OUTPATIENT
Start: 2024-07-04 | End: 2024-07-05

## 2024-07-04 RX ORDER — GUAIFENESIN 100 %
100 POWDER (GRAM) MISCELLANEOUS EVERY 6 HOURS
Refills: 0 | Status: DISCONTINUED | OUTPATIENT
Start: 2024-07-04 | End: 2024-07-15

## 2024-07-04 RX ADMIN — LORATADINE 10 MILLIGRAM(S): 10 TABLET ORAL at 01:04

## 2024-07-04 RX ADMIN — Medication 100 MILLIGRAM(S): at 09:12

## 2024-07-04 RX ADMIN — Medication 50 MILLIGRAM(S): at 01:04

## 2024-07-04 RX ADMIN — Medication 650 MILLIGRAM(S): at 09:12

## 2024-07-04 RX ADMIN — BENZOCAINE AND MENTHOL 1 LOZENGE: 15; 3.6 LOZENGE ORAL at 08:40

## 2024-07-04 RX ADMIN — ASPIRIN 81 MILLIGRAM(S): 325 TABLET, FILM COATED ORAL at 01:04

## 2024-07-04 RX ADMIN — ATORVASTATIN CALCIUM 40 MILLIGRAM(S): 20 TABLET, FILM COATED ORAL at 01:04

## 2024-07-04 RX ADMIN — Medication 650 MILLIGRAM(S): at 09:42

## 2024-07-04 RX ADMIN — ATORVASTATIN CALCIUM 40 MILLIGRAM(S): 20 TABLET, FILM COATED ORAL at 21:36

## 2024-07-04 RX ADMIN — Medication 100 MILLIGRAM(S): at 21:36

## 2024-07-04 RX ADMIN — Medication 50 MILLIGRAM(S): at 21:36

## 2024-07-04 NOTE — BH INPATIENT PSYCHIATRY ASSESSMENT NOTE - NSBHCRANIAL_PSY_ALL_CORE
Recognizes 2 fingers or can read (II)/Normal speech (IX, X, XII)/Shoulder shrug (XI)/Hearing intact (VIII) Recognizes 2 fingers or can read (II)/Smiles, shows teeth, opens mouth, sticks out tongue (V, VII, XI)/Normal speech (IX, X, XII)/Extraocular Eye Movement Intact  (III, IV, VI)/Shoulder shrug (XI)/Hearing intact (VIII)

## 2024-07-04 NOTE — BH INPATIENT PSYCHIATRY ASSESSMENT NOTE - NSBHATTESTATTENDBILLTIME_PSY_A_CORE
I attest my time as attending is greater than EMMIE time spent on qualifying patient care activities. I independently performed the documented

## 2024-07-04 NOTE — BH CHART NOTE - NSEVENTNOTEFT_PSY_ALL_CORE
I have reviewed the information from the ED and evaluated the patient. I confirm that the patient has a mental illness for which care and treatment in an inpatient psychiatric hospital is appropriate. The patient is suitable for a voluntary status.

## 2024-07-04 NOTE — ED BEHAVIORAL HEALTH NOTE - BEHAVIORAL HEALTH NOTE
Schuylerr made several attempts to outreach [employer] ([phone]) to obtain pre cert but was unable to speak w/ someone and received automated message saying the office is closed. Schuylerr faxed clinicals to  and included UR contact information.

## 2024-07-04 NOTE — BH INPATIENT PSYCHIATRY ASSESSMENT NOTE - NSBHCHARTREVIEWVS_PSY_A_CORE FT
Vital Signs Last 24 Hrs  T(C): 36.9 (07-04-24 @ 00:36), Max: 36.9 (07-04-24 @ 00:36)  T(F): 98.5 (07-04-24 @ 00:36), Max: 98.5 (07-04-24 @ 00:36)  HR: 75 (07-03-24 @ 23:38) (75 - 95)  BP: 118/72 (07-03-24 @ 23:38) (104/53 - 132/80)  BP(mean): --  RR: 16 (07-03-24 @ 23:38) (16 - 18)  SpO2: 97% (07-03-24 @ 23:38) (97% - 100%)    Orthostatic VS  07-04-24 @ 00:36  Lying BP: --/-- HR: --  Sitting BP: 104/68 HR: 83  Standing BP: --/-- HR: --  Site: --  Mode: --

## 2024-07-04 NOTE — BH INPATIENT PSYCHIATRY ASSESSMENT NOTE - HPI (INCLUDE ILLNESS QUALITY, SEVERITY, DURATION, TIMING, CONTEXT, MODIFYING FACTORS, ASSOCIATED SIGNS AND SYMPTOMS)
As per [facility] ED Assessment:  "60yo single man who is undomiciled (living at shelters, hotels, ERs/hospitals, and rehabs) and unemployed, with PMHx HTN and self-reported CHF, with PPHx of many documented diagnoses ranging across mood, psychotic, and personality disorders, hx of many prior hospitalizations and ED visits, discharged from [facility] on 24 on Risperdal 1 mg, chronic nonadherence to meds and outpt treatment, currently has Intensive Mobile Treatment (IMT) team, hx of several prior self-aborted suicide attempts (planned to jump in front of train , "stopped by an emre"), and significant hx of polysubstance use/abuse (recently EtOH and cocaine), hx of many prior detox/rehabs (last at [facility] Dec 2023), admitted due to report of SI (with several vague plans) and report of AH in the context of substance use, recent discharge from [facility], and inability to reach his 16yo daughter (who lives in [place]). Of note, the patient frequently presents to with nearly identical chief complaints to various EDs, is briefly admitted and his symptoms quickly clear, he requests help connecting with housing or substance rehab, and then leaves on 3-day letters - this has been the pattern from his last 2 admissions at [facility] in 2024 and May 2024, and doesn't f/u with outpatient care. Per Psyckes, he has dozens of ED visits driven by alcohol and cocaine use.     Pt states that he came in today because he has been feeling suicidal with a plan to jump in front of a train or shoot himself for the past two days (same complaint as per documentation in last  ED note). Says this was triggered by his daughter not answering her phone. Pt says he walked to the ledge of [employer] today wanting to die, and an "emre" pulled him back (same as in  note). States he has guns but won't disclose where they are. Says that he heard the voices of his  family members yesterday and today telling him to kill himself. Pt says he is hopeless and wants to act on this. Also endorses VH of seeing his dead mother. Reports HI towards daughter's mother of wishing she were dead but denies any plans or hx of HI/violence. Reports drinking multiple pints of alcohol today, but not in the days prior. Denies withdrawal sx other than nausea, VS WNL. He denies hx of alcohol withdrawal seizures. Denies any other substance use.     Pt was discharged from [facility] on 24, has been staying in a hotel, says he didn't follow up with care and could not provide a reason why. Says he has not been taking any of his medications since d/c. Pt requests to be admitted to [facility]. Is unable to safety plan."    On the unit, patient was minimally cooperative, laying down with sheets over his head. Patient endorses +SI with plan to shoot himself or jump in front of a train. Patient denies any intent or plan on the unit. Patient stated that his depressed mood has been worsening, sleep and appetite is poor. Patient also endorses +AH of his  mother's voice telling him to join her, denies any CAH. Patient is in agreement to restart the Risperdal. No CO needed.

## 2024-07-04 NOTE — BH INPATIENT PSYCHIATRY ASSESSMENT NOTE - OTHER
MA called pt w/ results. Pt verbally understood.----- Message from Jacquelyn Nolan DNP sent at 10/26/2020 11:52 AM CDT -----  Blood count looks good! No anemia or sign of infection     did not assess vague, minimal avoided

## 2024-07-04 NOTE — BH INPATIENT PSYCHIATRY ASSESSMENT NOTE - NSBHASSESSSUMMFT_PSY_ALL_CORE
60yo single man who is undomiciled (living at shelters, hotels, ERs/hospitals, and rehabs) and unemployed, with PMHx HTN and self-reported CHF, with PPHx of many documented diagnoses ranging across mood, psychotic, and personality disorders, hx of many prior hospitalizations and ED visits, discharged from [facility] on 6/13/24 on risperdal 1 mg, chronic nonadherence to meds and outpt treatment, currently has Intensive Mobile Treatment (IMT) team, hx of several prior self-aborted suicide attempts (planned to jump in front of train 2023, "stopped by an emre"), and significant hx of polysubstance use/abuse (recently EtOH and cocaine), hx of many prior detox/rehabs (last at [facility] Dec 2023), admitted due to report of SI (with several vague plans) and report of AH in the context of substance use, recent discharge from [facility], and inability to reach his 14yo daughter (who lives in [place]). Of note, the patient frequently presents to with nearly identical chief complaints to various EDs, is briefly admitted and his symptoms quickly clear, he requests help connecting with housing or substance rehab, and then leaves on 3-day letters - this has been the pattern from his last 2 admissions at [facility] in Jan 2024 and May 2024, and doesn't f/u with outpatient care. Per Terrie, he has dozens of ED visits driven by alcohol and cocaine use.   Today patient presents endorsing SI with stated plan to jump in front of train or shoot himself, in the setting of recent alcohol use and not being in contact with his daughter. On interview, there is no evidence of psychosis, pt is linear, organized, fully oriented, does not appear to respond to internal stimuli. He appears mildly depressed. No signs of alcohol withdrawal. However, he endorses VH, CAH, SI and HI with similarly vague explanations documented in prior visits. Says he had interrupted SA today where he was about to jump in front of train but saved by an emre. Pt also reports having access to firearms but refuses to disclose location. Suspect malingering but given serious nature of complaints, and pt desiring inpatient hospitalization, will admit pt under voluntary status.    On the unit, patient continues to endorse +SI and AH, denies any intent or plan to hurt himself on the unit. LANDRY score 1 for anxiety.     1. Admit to Low[facility], 9.13  2. No CO needed  3. Home meds - aspirin 81mg daily, atorvastatin 40mg daily, loratadine 10mg daily, multivitamin  Psych meds - Start Risperdal 1 mg  4. Labs ordered for the am.  5. D/C planning per SW pending clinical improvement.

## 2024-07-04 NOTE — PSYCHIATRIC REHAB INITIAL EVALUATION - NSBHPRRECOMMEND_PSY_ALL_CORE
Writer met with pt, pt was lying in bed, sedated, having difficulty to participate in this assessment. Writer brief oriented self  and provided pt with unit schedule and welcome letter. Writer will meet with pt in a later time to established rapport with pt. The following information would be obtained from medical record.     Pt is a 60 y/o male, unemployed and undomiciled. Pt has hx of multiple psychiatric admissions and most recently discharged from [facility] last month after pt submitted 3DL. Pt has hx of several self-aborted SA. Pt has hx of polysubstance abuse and hx of many prior detox/rehab. Pt currently has IMT. Pt was admitted to [place] [id number] due to SI and substance abuse in context of medication non-compliance. Pt has been non-compliant with medication since his discharge and has a hx of chronic noncompliance.  Pt has suicidal ideation via jump in front of a train or shoot himself. Pt heard voices from his  parents telling him to join them as well as VH of the same  relatives.  Pt has been drinking variety of ETOH and using cocaine prior to his admission.  Medical records indicate that the patient has had recent HI toward the mother of his daughter.

## 2024-07-04 NOTE — ED ADULT NURSE REASSESSMENT NOTE - NS ED NURSE REASSESS COMMENT FT1
Patient is being discharged today. No complaints/signs/symptoms of pain, distress, or discomfort at this time. Patient transported with PES and security.

## 2024-07-04 NOTE — BH INPATIENT PSYCHIATRY ASSESSMENT NOTE - DESCRIPTION
single, unemployed, stays at a hotel, [place] and in the streets. has a 15 yr old daughter (living in [place], [place] - currently in custody of bio mother, speaks on the phone regularly). no reported access to guns

## 2024-07-04 NOTE — BH PATIENT PROFILE - STATED REASON FOR ADMISSION
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  Pt. was recently discharged from [facility] 24 and has been living either on the streets or in hotels.  He has been noncompliant with medication since discharge from [facility].  Pt. experiences worsening depression and S/I with thoughts of jumping in front of a train (stopped by an emre).  Pt. also endorsed psychotic sxs and experiences V/H of  mother.  Stressors include not being able to contact 16yo daughter and believing that ex-wife is trying to alienate him from her causing him to develop S/I toward her.  PMH includes HTN, pericarditis, CHF, CKD and GERD.

## 2024-07-04 NOTE — BH INPATIENT PSYCHIATRY ASSESSMENT NOTE - NSBHMETABOLIC_PSY_ALL_CORE_FT
BMI: BMI (kg/m2): 36.8 (07-04-24 @ 00:36)  HbA1c: A1C with Estimated Average Glucose Result: 5.3 % (06-10-24 @ 09:00)    Glucose: POCT Blood Glucose.: 104 mg/dL (04-04-24 @ 06:04)    BP: 118/72 (07-03-24 @ 23:38) (104/53 - 132/80)Vital Signs Last 24 Hrs  T(C): 36.9 (07-04-24 @ 00:36), Max: 36.9 (07-04-24 @ 00:36)  T(F): 98.5 (07-04-24 @ 00:36), Max: 98.5 (07-04-24 @ 00:36)  HR: 75 (07-03-24 @ 23:38) (75 - 95)  BP: 118/72 (07-03-24 @ 23:38) (104/53 - 132/80)  BP(mean): --  RR: 16 (07-03-24 @ 23:38) (16 - 18)  SpO2: 97% (07-03-24 @ 23:38) (97% - 100%)    Orthostatic VS  07-04-24 @ 00:36  Lying BP: --/-- HR: --  Sitting BP: 104/68 HR: 83  Standing BP: --/-- HR: --  Site: --  Mode: --    Lipid Panel: Date/Time: 06-10-24 @ 09:00  Cholesterol, Serum: 121  LDL Cholesterol Calculated: 42  HDL Cholesterol, Serum: 51  Total Cholesterol/HDL Ration Measurement: --  Triglycerides, Serum: 139

## 2024-07-04 NOTE — BH INPATIENT PSYCHIATRY ASSESSMENT NOTE - PATIENT'S CHIEF COMPLAINT
[facility]   Section Discharge Summary    Earl Brantley Patient Status:  Inpatient    2/10/1982 MRN [id number]   [facility] 2SW-J Attending Stacie Jones MD   [facility] Day # 2 PCP Annabelle Barth MD     Date of Admission: 
"I need to be committed at least 30 days"

## 2024-07-04 NOTE — BH INPATIENT PSYCHIATRY ASSESSMENT NOTE - DETAILS
reports diarrhea with Zoloft, nightmares with Latuda, "high" on Depakote refused to discuss see HPI reports he has guns but refuses to disclose location

## 2024-07-04 NOTE — BH INPATIENT PSYCHIATRY ASSESSMENT NOTE - ATTENDING COMMENTS
Patient seen by me individually for initial inpatient interview.  I was physically present during the service to the patient and personally examined the patient and I was directly involved in the management plan and recommendations of the care provided to the patient. I have reviewed the admission record and reviewed the patient’s physical examination, review of systems and admission labs. I have discussed the case with the NP, and I have reviewed the NP’s admission assessment. I agree with the above admission progress notes’ contents and the treatment plan and recommendations described above, and I have made changes as indicated.    60yo single man who is undomiciled (living at shelters, hotels, ERs/hospitals, and rehabs) and unemployed, with PMHx HTN and self-reported CHF, with PPHx of many documented diagnoses ranging across mood, psychotic, and personality disorders, hx of many prior hospitalizations and ED visits, discharged from [facility] on 6/13/24 on risperdal 1 mg, chronic nonadherence to meds and outpt treatment, currently has Intensive Mobile Treatment (IMT) team, hx of several prior self-aborted suicide attempts (planned to jump in front of train 2023, "stopped by an emre"), and significant hx of polysubstance use/abuse (recently EtOH and cocaine), hx of many prior detox/rehabs (last at [facility] Dec 2023), admitted due to report of SI (with several vague plans) and report of AH in the context of substance use, recent discharge from [facility], and inability to reach his 16yo daughter (who lives in [place]).     Patient hopeless and psychotic on exam today  PLAN  1. Admit to [facility], 9.13  2. No CO needed  3. Home meds - aspirin 81mg daily, atorvastatin 40mg daily, loratadine 10mg daily, multivitamin  Psych meds - Start Risperdal 1 mg  PLAN

## 2024-07-04 NOTE — BH INPATIENT PSYCHIATRY ASSESSMENT NOTE - CURRENT MEDICATION
MEDICATIONS  (STANDING):  aspirin  chewable 81 milliGRAM(s) Oral daily  atorvastatin 40 milliGRAM(s) Oral at bedtime  loratadine 10 milliGRAM(s) Oral daily  risperiDONE   Tablet 1 milliGRAM(s) Oral at bedtime    MEDICATIONS  (PRN):  acetaminophen     Tablet .. 650 milliGRAM(s) Oral every 6 hours PRN Mild Pain (1 - 3), Moderate Pain (4 - 6)  benzocaine/menthol Lozenge 1 Lozenge Oral every 2 hours PRN throat pain  diphenhydrAMINE 50 milliGRAM(s) Oral every 6 hours PRN agitation/eps/rash  diphenhydrAMINE Injectable 50 milliGRAM(s) IntraMuscular once PRN agitation/eps/rash  guaiFENesin Oral Liquid (Sugar-Free) 100 milliGRAM(s) Oral every 6 hours PRN sore throat  haloperidol     Tablet 5 milliGRAM(s) Oral every 6 hours PRN agitation  haloperidol    Injectable 5 milliGRAM(s) IntraMuscular once PRN Agitation  LORazepam     Tablet 2 milliGRAM(s) Oral every 2 hours PRN CIWA score increase by 2 points and current CIWA score GREATER THAN 9  LORazepam     Tablet 2 milliGRAM(s) Oral every 6 hours PRN Agitation  LORazepam   Injectable 2 milliGRAM(s) IntraMuscular Once PRN Agitation

## 2024-07-04 NOTE — BH INPATIENT PSYCHIATRY ASSESSMENT NOTE - NSBHSACONSEQUENCE_PSY_A_CORE FT
numerous past detox and rehab admissions, including [facility], [facility], [facility], etc (as per Psyckes)

## 2024-07-05 PROCEDURE — 99232 SBSQ HOSP IP/OBS MODERATE 35: CPT

## 2024-07-05 RX ORDER — MAGNESIUM, ALUMINUM HYDROXIDE 400-400
30 TABLET,CHEWABLE ORAL EVERY 6 HOURS
Refills: 0 | Status: DISCONTINUED | OUTPATIENT
Start: 2024-07-05 | End: 2024-07-15

## 2024-07-05 RX ADMIN — Medication 650 MILLIGRAM(S): at 09:13

## 2024-07-05 RX ADMIN — ASPIRIN 81 MILLIGRAM(S): 325 TABLET, FILM COATED ORAL at 08:43

## 2024-07-05 RX ADMIN — LORATADINE 10 MILLIGRAM(S): 10 TABLET ORAL at 08:43

## 2024-07-05 RX ADMIN — Medication 650 MILLIGRAM(S): at 08:43

## 2024-07-05 RX ADMIN — Medication 400 MILLIGRAM(S): at 15:03

## 2024-07-05 RX ADMIN — Medication 400 MILLIGRAM(S): at 14:33

## 2024-07-05 RX ADMIN — ATORVASTATIN CALCIUM 40 MILLIGRAM(S): 20 TABLET, FILM COATED ORAL at 20:15

## 2024-07-05 NOTE — BH SOCIAL WORK INITIAL PSYCHOSOCIAL EVALUATION - OTHER PAST PSYCHIATRIC HISTORY (INCLUDE DETAILS REGARDING ONSET, COURSE OF ILLNESS, INPATIENT/OUTPATIENT TREATMENT)
59yo single man who is undomiciled (living at shelters, hotels, ERs/hospitals, and rehabs) and unemployed, with PMHx HTN and self-reported CHF, with PPHx of many documented diagnoses ranging across mood, psychotic, and personality disorders, hx of many prior hospitalizations and ED visits, discharged from [facility] 8 days ago on a 3-day letter, chronic nonadherence to meds and outpt treatment, currently has Intensive Mobile Treatment (IMT) team, hx of several prior self-aborted suicide attempts (planned to jump in front of train 2023, "stopped by an emre"), and significant hx of polysubstance use/abuse (recently EtOH and cocaine), hx of many prior detox/rehabs, admitted due to report of SI (with several vague plans) and report of AH in the context of substance use, recent discharge from [facility], and inability to reach his 17yo daughter (who lives in [place]). Of note, the patient frequently presents to with nearly identical chief complaints to various EDs, is briefly admitted and his symptoms quickly clear, he requests help connecting with housing or substance rehab, and then leaves on 3-day letters - this has been the pattern from his last 3 admissions at [facility] in Jan, May, and June 2024. Per Pselmer, he has dozens of ED visits driven by alcohol and cocaine use.  59yo single man who is undomiciled (living at shelters, hotels, ERs/hospitals, and rehabs) and unemployed, with PMHx HTN and self-reported CHF, with PPHx of many documented diagnoses ranging across mood, psychotic, and personality disorders, hx of many prior hospitalizations and ED visits, discharged from [facility] 8 days ago on a 3-day letter, chronic nonadherence to meds and outpt treatment, currently has Intensive Mobile Treatment (IMT) team, hx of several prior self-aborted suicide attempts (planned to jump in front of train 2023, "stopped by an emre"), and significant hx of polysubstance use/abuse (recently EtOH and cocaine), hx of many prior detox/rehabs, admitted due to report of SI (with several vague plans) and report of AH in the context of substance use, recent discharge from [facility], and inability to reach his 15yo daughter (who lives in [place]). Of note, the patient frequently presents to with nearly identical chief complaints to various EDs, is briefly admitted and his symptoms quickly clear, he requests help connecting with housing or substance rehab, and then leaves on 3-day letters - this has been the pattern from his last 3 admissions at [facility] in Jan, May, and June 2024. Per Psyckes, he has dozens of ED visits driven by alcohol and cocaine use.     On Interview:  Patient was polite, responsive, and reporting better mood. He states that one of his main stressors is when he cannot get in contact with his daughter, and then he starts to worry and his problems exacerbate. Patient states that he was able to make contact with his daughter morning of 7/5/24, and feels much better about his future. Patient worries that this struggle with coping creates a pattern, and would like help with this.

## 2024-07-05 NOTE — BH TREATMENT PLAN - NSTXSUICIDINTERMD_PSY_ALL_CORE
Diagnosis: fall/ Head injury     - activity as tolerated    - for headaches- over the counter tylenol every 4 hrs    - her chances of a severe brain injury are extremely low and decrease substantially over time    - please return to  t he er for any persistent vomiting or any new/ worsening/concerning symptoms to you med management/ group, milieu therapy

## 2024-07-05 NOTE — BH TREATMENT PLAN - NSTXDEPRESDATEEST_PSY_ALL_CORE
[Dear  ___] : Dear  [unfilled], [Consult Letter:] : I had the pleasure of evaluating your patient, [unfilled]. [Please see my note below.] : Please see my note below. 04-Jul-2024

## 2024-07-05 NOTE — BH SOCIAL WORK INITIAL PSYCHOSOCIAL EVALUATION - NSBHEDULITERACY_PSY_ALL_CORE
Patient Seen in: THE [facility] Emergency Department In [place]    History   Patient presents with:  Head Neck Injury (neurologic, musculoskeletal)    Stated Complaint: FALL YESTERDAY, TODAY CRYING NOT HIS SELF    HPI    Child had a witnessed fall out of a wa Exam  General: The patient is alert, happy, smiling, jumping up and down to give me high fives, laughing, and in no distress.   There is a contusion with hematoma noted on the left forehead at the hairline  Eyes: sclera white, conjunctiva pink and moist, mi Able to read and write English

## 2024-07-05 NOTE — BH TREATMENT PLAN - NSTXMEDICINTERPR_PSY_ALL_CORE
Psychiatric rehabilitation staff will provide encouragement, support, and psychoeducation to assist the patient in the progression of his treatment goal.

## 2024-07-05 NOTE — BH SOCIAL WORK INITIAL PSYCHOSOCIAL EVALUATION - NSBHSACONSEQUENCE_PSY_A_CORE FT
numerous past detox and rehab admissions, including [place], [facility], [facility], etc (as per Psyckes)

## 2024-07-05 NOTE — BH SOCIAL WORK INITIAL PSYCHOSOCIAL EVALUATION - NSBHBARRIERS_PSY_ALL_CORE
Co-morbid personality/Lack of insight/Medical co-morbidities/Non-compliant with treatment Co-morbid personality/Medical co-morbidities/Non-compliant with treatment

## 2024-07-06 PROCEDURE — 99232 SBSQ HOSP IP/OBS MODERATE 35: CPT

## 2024-07-06 RX ADMIN — Medication 400 MILLIGRAM(S): at 08:45

## 2024-07-06 RX ADMIN — LORATADINE 10 MILLIGRAM(S): 10 TABLET ORAL at 08:45

## 2024-07-06 RX ADMIN — Medication 50 MILLIGRAM(S): at 20:10

## 2024-07-06 RX ADMIN — Medication 400 MILLIGRAM(S): at 17:05

## 2024-07-06 RX ADMIN — ASPIRIN 81 MILLIGRAM(S): 325 TABLET, FILM COATED ORAL at 08:44

## 2024-07-06 RX ADMIN — ATORVASTATIN CALCIUM 40 MILLIGRAM(S): 20 TABLET, FILM COATED ORAL at 20:09

## 2024-07-07 PROCEDURE — 99232 SBSQ HOSP IP/OBS MODERATE 35: CPT

## 2024-07-07 RX ADMIN — ASPIRIN 81 MILLIGRAM(S): 325 TABLET, FILM COATED ORAL at 08:03

## 2024-07-07 RX ADMIN — ATORVASTATIN CALCIUM 40 MILLIGRAM(S): 20 TABLET, FILM COATED ORAL at 20:19

## 2024-07-07 RX ADMIN — Medication 400 MILLIGRAM(S): at 10:06

## 2024-07-07 RX ADMIN — Medication 400 MILLIGRAM(S): at 08:03

## 2024-07-07 RX ADMIN — Medication 1 TABLET(S): at 08:03

## 2024-07-07 RX ADMIN — LORATADINE 10 MILLIGRAM(S): 10 TABLET ORAL at 08:03

## 2024-07-07 RX ADMIN — Medication 100 MILLIGRAM(S): at 08:03

## 2024-07-08 PROCEDURE — 99232 SBSQ HOSP IP/OBS MODERATE 35: CPT

## 2024-07-08 RX ADMIN — ASPIRIN 81 MILLIGRAM(S): 325 TABLET, FILM COATED ORAL at 08:00

## 2024-07-08 RX ADMIN — LORATADINE 10 MILLIGRAM(S): 10 TABLET ORAL at 08:01

## 2024-07-08 RX ADMIN — Medication 30 MILLILITER(S): at 05:54

## 2024-07-08 RX ADMIN — Medication 50 MILLIGRAM(S): at 20:24

## 2024-07-08 RX ADMIN — Medication 1 TABLET(S): at 08:00

## 2024-07-09 RX ADMIN — ASPIRIN 81 MILLIGRAM(S): 325 TABLET, FILM COATED ORAL at 08:17

## 2024-07-09 RX ADMIN — Medication 50 MILLIGRAM(S): at 20:21

## 2024-07-09 RX ADMIN — ATORVASTATIN CALCIUM 40 MILLIGRAM(S): 20 TABLET, FILM COATED ORAL at 20:21

## 2024-07-09 RX ADMIN — Medication 1 TABLET(S): at 08:17

## 2024-07-09 NOTE — BH DISCHARGE NOTE NURSING/SOCIAL WORK/PSYCH REHAB - DISCHARGE INSTRUCTIONS AFTERCARE APPOINTMENTS
In order to check the location, date, or time of your aftercare appointment, please refer to your Discharge Instructions Document given to you upon leaving the hospital.  If you have lost the instructions please call [phone]

## 2024-07-09 NOTE — BH DISCHARGE NOTE NURSING/SOCIAL WORK/PSYCH REHAB - NSDCPRRECOMMEND_PSY_ALL_CORE
Psychiatric Rehabilitation staff recommends that the patient engage in outpatient services for continued medication and symptom management. Upon discharge, patient has an appointment scheduled see aftercare referrals for more information.

## 2024-07-09 NOTE — BH DISCHARGE NOTE NURSING/SOCIAL WORK/PSYCH REHAB - NSBHDCNOREFERRAL_PSY_ALL_CORE
Pt submitted three day letter, refusing to wait for inpt rehab, multiple referrals sent. Writer submitted referral to the shelter system however pt requested discharge today. List of outpatient referrals provided to pt for substance treatment at discharge./Left against medical advice

## 2024-07-09 NOTE — BH DISCHARGE NOTE NURSING/SOCIAL WORK/PSYCH REHAB - PATIENT PORTAL LINK FT
You can access the FollowMyHealth Patient Portal offered by [facility] by registering at the following website: [email or web address]. By joining [facility]’s FollowMyHealth portal, you will also be able to view your health information using other applications (apps) compatible with our system.

## 2024-07-09 NOTE — BH DISCHARGE NOTE NURSING/SOCIAL WORK/PSYCH REHAB - NSPROPASSIVESMOKEEXPOSURE_GEN_A_NUR
[de-identified] : 87 y/o female w/ PMHx HTN, HLD, CAD (RIAN x3 in 2015 as per MD note in [employer]), Hypothyroidism, and IBS. She p/w near syncopal episode to [facility] ED recently. She felt a "severe vertigo sensation", the whole world was spinning, associated w/ SOB. Patient states this lasted about 30 minutes, until the EMS arrived. She had similar episodes before, most recently in 06/2020 when she fell and hit her head on the [facility] sidewalk. As per ED signout, EMS personnel reported the patient was in rapid A-fib when they arrived to the [facility] scene and she was given IV Cardizem and IV Magnesium w/ subsequent improvement in her rate and rhythm and patient returned to sinus rhythm. In ED. Patient was admitted to cardiology/telemetry for further management of near syncopal episode. Recent holter monitor in 6/2020 revealed paroxysmal Afib and occasional PVC's with rates in the 160's. TTE 6/5/20 with LVEF 75-80%, mild MR and TR, PASP 37mmHg, no pericardial effusion. [facility] EP was consulted to evaluate for possible tachy-kirsten syndrome, a loop recorder was placed on 10/15/20. She is here for follow up, feels well, no syncope/near-syncope. She denied any palpitations/chest pain/shortness of breath. She is concerned about LE edema. 
Unknown

## 2024-07-09 NOTE — BH DISCHARGE NOTE NURSING/SOCIAL WORK/PSYCH REHAB - NSDCPRGOAL_PSY_ALL_CORE
Writer met with patient to safety plan for discharge and discuss the patient’s progress throughout the inpatient stay. Patient was receptive to safety planning and readily identified coping skills, triggers, social support, and reasons for living. Pt. is visible on the unit, has been compliant with medication and was focused on discharge for majority of his stay. Pt. made progress towards established psych rehab goal and is encouraged to continue to make progress with coping skills, frustration tolerance.

## 2024-07-10 PROCEDURE — 99232 SBSQ HOSP IP/OBS MODERATE 35: CPT

## 2024-07-10 RX ORDER — LORAZEPAM 0.5 MG
2 TABLET ORAL ONCE
Refills: 0 | Status: DISCONTINUED | OUTPATIENT
Start: 2024-07-10 | End: 2024-07-15

## 2024-07-10 RX ORDER — LORAZEPAM 0.5 MG
2 TABLET ORAL EVERY 6 HOURS
Refills: 0 | Status: DISCONTINUED | OUTPATIENT
Start: 2024-07-10 | End: 2024-07-15

## 2024-07-10 RX ADMIN — ASPIRIN 81 MILLIGRAM(S): 325 TABLET, FILM COATED ORAL at 08:22

## 2024-07-10 RX ADMIN — Medication 50 MILLIGRAM(S): at 20:29

## 2024-07-10 RX ADMIN — Medication 1 TABLET(S): at 08:23

## 2024-07-10 RX ADMIN — LORATADINE 10 MILLIGRAM(S): 10 TABLET ORAL at 08:23

## 2024-07-11 PROCEDURE — 99232 SBSQ HOSP IP/OBS MODERATE 35: CPT

## 2024-07-11 RX ADMIN — ASPIRIN 81 MILLIGRAM(S): 325 TABLET, FILM COATED ORAL at 08:14

## 2024-07-11 RX ADMIN — Medication 1 TABLET(S): at 08:14

## 2024-07-11 RX ADMIN — Medication 400 MILLIGRAM(S): at 17:37

## 2024-07-12 PROCEDURE — 99232 SBSQ HOSP IP/OBS MODERATE 35: CPT

## 2024-07-12 RX ADMIN — ASPIRIN 81 MILLIGRAM(S): 325 TABLET, FILM COATED ORAL at 08:15

## 2024-07-12 RX ADMIN — Medication 1 TABLET(S): at 08:15

## 2024-07-12 NOTE — BH INPATIENT PSYCHIATRY PROGRESS NOTE - NSBHMSEPERCEPT_PSY_A_CORE
recent AVH but no current/No abnormalities

## 2024-07-12 NOTE — BH INPATIENT PSYCHIATRY PROGRESS NOTE - NSDCCRITERIA_PSY_ALL_CORE
symptom management, safety planning, care coordination  

## 2024-07-12 NOTE — BH INPATIENT PSYCHIATRY PROGRESS NOTE - NSBHASSESSSUMMFT_PSY_ALL_CORE
60yo single man who is undomiciled (living at shelters, hotels, ERs/hospitals, and rehabs) and unemployed, with PMHx HTN and self-reported CHF, with PPHx of many documented diagnoses ranging across mood, psychotic, and personality disorders, hx of many prior hospitalizations and ED visits, discharged from [facility] on 6/13/24 on risperdal 1 mg, chronic nonadherence to meds and outpt treatment, currently has Intensive Mobile Treatment (IMT) team, hx of several prior self-aborted suicide attempts (planned to jump in front of train 2023, "stopped by an emre"), and significant hx of polysubstance use/abuse (recently EtOH and cocaine), hx of many prior detox/rehabs (last at [facility] Dec 2023), admitted due to report of SI (with several vague plans) and report of AH in the context of substance use, recent discharge from [facility], and inability to reach his 16yo daughter (who lives in [place]). Of note, the patient frequently presents to with nearly identical chief complaints to various EDs, is briefly admitted and his symptoms quickly clear, he requests help connecting with housing or substance rehab, and then leaves on 3-day letters - this has been the pattern from his last 2 admissions at [facility] in Jan 2024 and May 2024, and doesn't f/u with outpatient care. Per Terrie, he has dozens of ED visits driven by alcohol and cocaine use.   Today patient presents endorsing SI with stated plan to jump in front of train or shoot himself, in the setting of recent alcohol use and not being in contact with his daughter. On interview, there is no evidence of psychosis, pt is linear, organized, fully oriented, does not appear to respond to internal stimuli. He appears mildly depressed. No signs of alcohol withdrawal. However, he endorses VH, CAH, SI and HI with similarly vague explanations documented in prior visits. Says he had interrupted SA today where he was about to jump in front of train but saved by an emre. Pt also reports having access to firearms but refuses to disclose location. Suspect malingering but given serious nature of complaints, and pt desiring inpatient hospitalization, will admit pt under voluntary status.    On the unit, patient continues to endorse +SI and AH, denies any intent or plan to hurt himself on the unit. LANDRY score 1 for anxiety.     1. Admit to Low3, 9.13  2. No CO needed  3. Home meds - aspirin 81mg daily, atorvastatin 40mg daily, loratadine 10mg daily, multivitamin  Psych meds - Start Risperdal 1 mg  4. Labs ordered for the am.  5. D/C planning per SW pending clinical improvement.      
60yo single man who is undomiciled (living at shelters, hotels, ERs/hospitals, and rehabs) and unemployed, with PMHx HTN and self-reported CHF, with PPHx of many documented diagnoses ranging across mood, psychotic, and personality disorders, hx of many prior hospitalizations and ED visits, discharged from [facility] on 6/13/24 on risperdal 1 mg, chronic nonadherence to meds and outpt treatment, currently has Intensive Mobile Treatment (IMT) team, hx of several prior self-aborted suicide attempts (planned to jump in front of train 2023, "stopped by an emre"), and significant hx of polysubstance use/abuse (recently EtOH and cocaine), hx of many prior detox/rehabs (last at [facility] Dec 2023), admitted due to report of SI (with several vague plans) and report of AH in the context of substance use, recent discharge from [facility], and inability to reach his 14yo daughter (who lives in [place]). Of note, the patient frequently presents to with nearly identical chief complaints to various EDs, is briefly admitted and his symptoms quickly clear, he requests help connecting with housing or substance rehab, and then leaves on 3-day letters - this has been the pattern from his last 2 admissions at [facility] in Jan 2024 and May 2024, and doesn't f/u with outpatient care. Per Terrie, he has dozens of ED visits driven by alcohol and cocaine use.   Today patient presents endorsing SI with stated plan to jump in front of train or shoot himself, in the setting of recent alcohol use and not being in contact with his daughter. On interview, there is no evidence of psychosis, pt is linear, organized, fully oriented, does not appear to respond to internal stimuli. He appears mildly depressed. No signs of alcohol withdrawal. However, he endorses VH, CAH, SI and HI with similarly vague explanations documented in prior visits. Says he had interrupted SA today where he was about to jump in front of train but saved by an emre. Pt also reports having access to firearms but refuses to disclose location. Suspect malingering but given serious nature of complaints, and pt desiring inpatient hospitalization, will admit pt under voluntary status.    On the unit, patient denies any SI/HI or AVH. Refusing the Risperdal    1. Admit to Low3, 9.13  2. No CO needed  3. Home meds - aspirin 81mg daily, atorvastatin 40mg daily, loratadine 10mg daily, multivitamin  Psych meds - Start Risperdal 1 mg  4. Labs ordered for the am.  5. D/C planning per  pending clinical improvement.      
58yo single man who is undomiciled (living at shelters, hotels, ERs/hospitals, and rehabs) and unemployed, with PMHx HTN and self-reported CHF, with PPHx of many documented diagnoses ranging across mood, psychotic, and personality disorders, hx of many prior hospitalizations and ED visits, discharged from [facility] on 6/13/24 on risperdal 1 mg, chronic nonadherence to meds and outpt treatment, currently has Intensive Mobile Treatment (IMT) team, hx of several prior self-aborted suicide attempts (planned to jump in front of train 2023, "stopped by an emre"), and significant hx of polysubstance use/abuse (recently EtOH and cocaine), hx of many prior detox/rehabs (last at [facility] Dec 2023), admitted due to report of SI (with several vague plans) and report of AH in the context of substance use, recent discharge from [facility], and inability to reach his 14yo daughter (who lives in [place]). Of note, the patient frequently presents to with nearly identical chief complaints to various EDs, is briefly admitted and his symptoms quickly clear, he requests help connecting with housing or substance rehab, and then leaves on 3-day letters - this has been the pattern from his last 2 admissions at [facility] in Jan 2024 and May 2024, and doesn't f/u with outpatient care. Per Terrie, he has dozens of ED visits driven by alcohol and cocaine use.   Today patient presents endorsing SI with stated plan to jump in front of train or shoot himself, in the setting of recent alcohol use and not being in contact with his daughter. On interview, there is no evidence of psychosis, pt is linear, organized, fully oriented, does not appear to respond to internal stimuli. He appears mildly depressed. No signs of alcohol withdrawal. However, he endorses VH, CAH, SI and HI with similarly vague explanations documented in prior visits. Says he had interrupted SA today where he was about to jump in front of train but saved by an emre. Pt also reports having access to firearms but refuses to disclose location. Suspect malingering but given serious nature of complaints, and pt desiring inpatient hospitalization, will admit pt under voluntary status.    On the unit, patient denies any SI/HI or AVH. Refusing the Risperdal    1. Admit to Low3, 9.13  2. No CO needed  3. Home meds - aspirin 81mg daily, atorvastatin 40mg daily, loratadine 10mg daily, multivitamin  Psych meds - Start Risperdal 1 mg  4. Labs ordered for the am.  5. D/C planning per  pending clinical improvement.      
60yo single man who is undomiciled (living at shelters, hotels, ERs/hospitals, and rehabs) and unemployed, with PMHx HTN and self-reported CHF, with PPHx of many documented diagnoses ranging across mood, psychotic, and personality disorders, hx of many prior hospitalizations and ED visits, discharged from [facility] on 6/13/24 on risperdal 1 mg, chronic nonadherence to meds and outpt treatment, currently has Intensive Mobile Treatment (IMT) team, hx of several prior self-aborted suicide attempts (planned to jump in front of train 2023, "stopped by an emre"), and significant hx of polysubstance use/abuse (recently EtOH and cocaine), hx of many prior detox/rehabs (last at [facility] Dec 2023), admitted due to report of SI (with several vague plans) and report of AH in the context of substance use, recent discharge from [facility], and inability to reach his 14yo daughter (who lives in [place]). Of note, the patient frequently presents to with nearly identical chief complaints to various EDs, is briefly admitted and his symptoms quickly clear, he requests help connecting with housing or substance rehab, and then leaves on 3-day letters - this has been the pattern from his last 2 admissions at [facility] in Jan 2024 and May 2024, and doesn't f/u with outpatient care. Per Terrie, he has dozens of ED visits driven by alcohol and cocaine use.   Today patient presents endorsing SI with stated plan to jump in front of train or shoot himself, in the setting of recent alcohol use and not being in contact with his daughter. On interview, there is no evidence of psychosis, pt is linear, organized, fully oriented, does not appear to respond to internal stimuli. He appears mildly depressed. No signs of alcohol withdrawal. However, he endorses VH, CAH, SI and HI with similarly vague explanations documented in prior visits. Says he had interrupted SA today where he was about to jump in front of train but saved by an emre. Pt also reports having access to firearms but refuses to disclose location. Suspect malingering but given serious nature of complaints, and pt desiring inpatient hospitalization, will admit pt under voluntary status.    On the unit, patient denies any SI/HI or AVH, he is requesting to be discharged to [facility].     1. Admit to Low3, 9.13  2. No CO needed  3. Home meds - aspirin 81mg daily, atorvastatin 40mg daily, loratadine 10mg daily, multivitamin  Psych meds - Start Risperdal 1 mg  4. Labs ordered for the am.  5. D/C planning per  pending clinical improvement.      
60yo single man who is undomiciled (living at shelters, hotels, ERs/hospitals, and rehabs) and unemployed, with PMHx HTN and self-reported CHF, with PPHx of many documented diagnoses ranging across mood, psychotic, and personality disorders, hx of many prior hospitalizations and ED visits, discharged from [facility] on 6/13/24 on risperdal 1 mg, chronic nonadherence to meds and outpt treatment, currently has Intensive Mobile Treatment (IMT) team, hx of several prior self-aborted suicide attempts (planned to jump in front of train 2023, "stopped by an emre"), and significant hx of polysubstance use/abuse (recently EtOH and cocaine), hx of many prior detox/rehabs (last at [facility] Dec 2023), admitted due to report of SI (with several vague plans) and report of AH in the context of substance use, recent discharge from [facility], and inability to reach his 16yo daughter (who lives in [place]). Of note, the patient frequently presents to with nearly identical chief complaints to various EDs, is briefly admitted and his symptoms quickly clear, he requests help connecting with housing or substance rehab, and then leaves on 3-day letters - this has been the pattern from his last 2 admissions at [facility] in Jan 2024 and May 2024, and doesn't f/u with outpatient care. Per Terrie, he has dozens of ED visits driven by alcohol and cocaine use.   Today patient presents endorsing SI with stated plan to jump in front of train or shoot himself, in the setting of recent alcohol use and not being in contact with his daughter. On interview, there is no evidence of psychosis, pt is linear, organized, fully oriented, does not appear to respond to internal stimuli. He appears mildly depressed. No signs of alcohol withdrawal. However, he endorses VH, CAH, SI and HI with similarly vague explanations documented in prior visits. Says he had interrupted SA today where he was about to jump in front of train but saved by an emre. Pt also reports having access to firearms but refuses to disclose location. Suspect malingering but given serious nature of complaints, and pt desiring inpatient hospitalization, will admit pt under voluntary status.    On the unit, patient denies any SI/HI or AVH. Refusing the Risperdal    1. Admit to Low3, 9.13  2. No CO needed  3. Home meds - aspirin 81mg daily, atorvastatin 40mg daily, loratadine 10mg daily, multivitamin  Psych meds - Start Risperdal 1 mg  4. Labs ordered for the am.  5. D/C planning per  pending clinical improvement.      
60yo single man who is undomiciled (living at shelters, hotels, ERs/hospitals, and rehabs) and unemployed, with PMHx HTN and self-reported CHF, with PPHx of many documented diagnoses ranging across mood, psychotic, and personality disorders, hx of many prior hospitalizations and ED visits, discharged from [facility] on 6/13/24 on risperdal 1 mg, chronic nonadherence to meds and outpt treatment, currently has Intensive Mobile Treatment (IMT) team, hx of several prior self-aborted suicide attempts (planned to jump in front of train 2023, "stopped by an emre"), and significant hx of polysubstance use/abuse (recently EtOH and cocaine), hx of many prior detox/rehabs (last at [facility] Dec 2023), admitted due to report of SI (with several vague plans) and report of AH in the context of substance use, recent discharge from [facility], and inability to reach his 14yo daughter (who lives in [place]). Of note, the patient frequently presents to with nearly identical chief complaints to various EDs, is briefly admitted and his symptoms quickly clear, he requests help connecting with housing or substance rehab, and then leaves on 3-day letters - this has been the pattern from his last 2 admissions at [facility] in Jan 2024 and May 2024, and doesn't f/u with outpatient care. Per Terrie, he has dozens of ED visits driven by alcohol and cocaine use.   Today patient presents endorsing SI with stated plan to jump in front of train or shoot himself, in the setting of recent alcohol use and not being in contact with his daughter. On interview, there is no evidence of psychosis, pt is linear, organized, fully oriented, does not appear to respond to internal stimuli. He appears mildly depressed. No signs of alcohol withdrawal. However, he endorses VH, CAH, SI and HI with similarly vague explanations documented in prior visits. Says he had interrupted SA today where he was about to jump in front of train but saved by an emre. Pt also reports having access to firearms but refuses to disclose location. Suspect malingering but given serious nature of complaints, and pt desiring inpatient hospitalization, will admit pt under voluntary status.    On the unit, patient denies any SI/HI or AVH. Refusing the Risperdal    1. Admit to Low3, 9.13  2. No CO needed  3. Home meds - aspirin 81mg daily, atorvastatin 40mg daily, loratadine 10mg daily, multivitamin  Psych meds - Start Risperdal 1 mg  4. Labs ordered for the am.  5. D/C planning per  pending clinical improvement.      
58yo single man who is undomiciled (living at shelters, hotels, ERs/hospitals, and rehabs) and unemployed, with PMHx HTN and self-reported CHF, with PPHx of many documented diagnoses ranging across mood, psychotic, and personality disorders, hx of many prior hospitalizations and ED visits, discharged from [facility] on 6/13/24 on risperdal 1 mg, chronic nonadherence to meds and outpt treatment, currently has Intensive Mobile Treatment (IMT) team, hx of several prior self-aborted suicide attempts (planned to jump in front of train 2023, "stopped by an emre"), and significant hx of polysubstance use/abuse (recently EtOH and cocaine), hx of many prior detox/rehabs (last at [facility] Dec 2023), admitted due to report of SI (with several vague plans) and report of AH in the context of substance use, recent discharge from [facility], and inability to reach his 16yo daughter (who lives in [place]). Of note, the patient frequently presents to with nearly identical chief complaints to various EDs, is briefly admitted and his symptoms quickly clear, he requests help connecting with housing or substance rehab, and then leaves on 3-day letters - this has been the pattern from his last 2 admissions at [facility] in Jan 2024 and May 2024, and doesn't f/u with outpatient care. Per Terrie, he has dozens of ED visits driven by alcohol and cocaine use.   Today patient presents endorsing SI with stated plan to jump in front of train or shoot himself, in the setting of recent alcohol use and not being in contact with his daughter. On interview, there is no evidence of psychosis, pt is linear, organized, fully oriented, does not appear to respond to internal stimuli. He appears mildly depressed. No signs of alcohol withdrawal. However, he endorses VH, CAH, SI and HI with similarly vague explanations documented in prior visits. Says he had interrupted SA today where he was about to jump in front of train but saved by an emre. Pt also reports having access to firearms but refuses to disclose location. Suspect malingering but given serious nature of complaints, and pt desiring inpatient hospitalization, will admit pt under voluntary status.    On the unit, patient denies any SI/HI or AVH. Refusing the Risperdal    1. Admit to Low3, 9.13  2. No CO needed  3. Home meds - aspirin 81mg daily, atorvastatin 40mg daily, loratadine 10mg daily, multivitamin  Psych meds - Start Risperdal 1 mg  4. Labs ordered for the am.  5. D/C planning per  pending clinical improvement.      
58yo single man who is undomiciled (living at shelters, hotels, ERs/hospitals, and rehabs) and unemployed, with PMHx HTN and self-reported CHF, with PPHx of many documented diagnoses ranging across mood, psychotic, and personality disorders, hx of many prior hospitalizations and ED visits, discharged from [facility] on 6/13/24 on risperdal 1 mg, chronic nonadherence to meds and outpt treatment, currently has Intensive Mobile Treatment (IMT) team, hx of several prior self-aborted suicide attempts (planned to jump in front of train 2023, "stopped by an emre"), and significant hx of polysubstance use/abuse (recently EtOH and cocaine), hx of many prior detox/rehabs (last at [facility] Dec 2023), admitted due to report of SI (with several vague plans) and report of AH in the context of substance use, recent discharge from [facility], and inability to reach his 14yo daughter (who lives in [place]). Of note, the patient frequently presents to with nearly identical chief complaints to various EDs, is briefly admitted and his symptoms quickly clear, he requests help connecting with housing or substance rehab, and then leaves on 3-day letters - this has been the pattern from his last 2 admissions at [facility] in Jan 2024 and May 2024, and doesn't f/u with outpatient care. Per Terrie, he has dozens of ED visits driven by alcohol and cocaine use.   Today patient presents endorsing SI with stated plan to jump in front of train or shoot himself, in the setting of recent alcohol use and not being in contact with his daughter. On interview, there is no evidence of psychosis, pt is linear, organized, fully oriented, does not appear to respond to internal stimuli. He appears mildly depressed. No signs of alcohol withdrawal. However, he endorses VH, CAH, SI and HI with similarly vague explanations documented in prior visits. Says he had interrupted SA today where he was about to jump in front of train but saved by an emre. Pt also reports having access to firearms but refuses to disclose location. Suspect malingering but given serious nature of complaints, and pt desiring inpatient hospitalization, will admit pt under voluntary status.    On the unit, patient denies any SI/HI or AVH. Refusing the Risperdal    1. Admit to Low3, 9.13  2. No CO needed  3. Home meds - aspirin 81mg daily, atorvastatin 40mg daily, loratadine 10mg daily, multivitamin  Psych meds - Start Risperdal 1 mg  4. Labs ordered for the am.  5. D/C planning per  pending clinical improvement.

## 2024-07-12 NOTE — BH INPATIENT PSYCHIATRY PROGRESS NOTE - NSTXALCDRGGOAL_PSY_ALL_CORE
18-Jun-2018 12:07
18-Jun-2018 20:06
19-Jun-2018 14:49
19-Jun-2018 15:30
20-Jun-2018 11:17
25-Jun-2018 16:09
Score on withdrawal scale will be WNL

## 2024-07-12 NOTE — BH INPATIENT PSYCHIATRY PROGRESS NOTE - NSICDXBHSECONDARYDX_PSY_ALL_CORE
Alcohol use   Z78.9  

## 2024-07-12 NOTE — BH INPATIENT PSYCHIATRY PROGRESS NOTE - NSBHMETABOLIC_PSY_ALL_CORE_FT
BMI: BMI (kg/m2): 36.8 (07-04-24 @ 00:36)  HbA1c: A1C with Estimated Average Glucose Result: 5.3 % (06-10-24 @ 09:00)    Glucose: POCT Blood Glucose.: 104 mg/dL (04-04-24 @ 06:04)    BP: --Vital Signs Last 24 Hrs  T(C): 36.5 (07-10-24 @ 07:43), Max: 36.5 (07-10-24 @ 07:43)  T(F): 97.7 (07-10-24 @ 07:43), Max: 97.7 (07-10-24 @ 07:43)  HR: --  BP: --  BP(mean): --  RR: --  SpO2: --    Orthostatic VS  07-10-24 @ 07:43  Lying BP: --/-- HR: --  Sitting BP: 105/76 HR: 67  Standing BP: --/-- HR: --  Site: --  Mode: --  Orthostatic VS  07-09-24 @ 08:14  Lying BP: --/-- HR: --  Sitting BP: 120/67 HR: 78  Standing BP: 125/73 HR: 100  Site: --  Mode: --    Lipid Panel: Date/Time: 06-10-24 @ 09:00  Cholesterol, Serum: 121  LDL Cholesterol Calculated: 42  HDL Cholesterol, Serum: 51  Total Cholesterol/HDL Ration Measurement: --  Triglycerides, Serum: 139  
BMI: BMI (kg/m2): 36.8 (07-04-24 @ 00:36)  HbA1c: A1C with Estimated Average Glucose Result: 5.3 % (06-10-24 @ 09:00)    Glucose: POCT Blood Glucose.: 104 mg/dL (04-04-24 @ 06:04)    BP: --Vital Signs Last 24 Hrs  T(C): 36.6 (07-12-24 @ 08:37), Max: 36.6 (07-12-24 @ 08:37)  T(F): 97.9 (07-12-24 @ 08:37), Max: 97.9 (07-12-24 @ 08:37)  HR: --  BP: --  BP(mean): --  RR: --  SpO2: --    Orthostatic VS  07-12-24 @ 08:37  Lying BP: --/-- HR: --  Sitting BP: 104/61 HR: 86  Standing BP: 111/65 HR: 96  Site: --  Mode: --  Orthostatic VS  07-11-24 @ 08:42  Lying BP: --/-- HR: --  Sitting BP: 110/71 HR: 81  Standing BP: --/-- HR: --  Site: --  Mode: --    Lipid Panel: Date/Time: 06-10-24 @ 09:00  Cholesterol, Serum: 121  LDL Cholesterol Calculated: 42  HDL Cholesterol, Serum: 51  Total Cholesterol/HDL Ration Measurement: --  Triglycerides, Serum: 139  
BMI: BMI (kg/m2): 36.8 (07-04-24 @ 00:36)  HbA1c: A1C with Estimated Average Glucose Result: 5.3 % (06-10-24 @ 09:00)    Glucose: POCT Blood Glucose.: 104 mg/dL (04-04-24 @ 06:04)    BP: 118/72 (07-03-24 @ 23:38) (104/53 - 132/80)Vital Signs Last 24 Hrs  T(C): 37.1 (07-06-24 @ 07:49), Max: 37.1 (07-06-24 @ 07:49)  T(F): 98.8 (07-06-24 @ 07:49), Max: 98.8 (07-06-24 @ 07:49)  HR: --  BP: --  BP(mean): --  RR: --  SpO2: --    Orthostatic VS  07-06-24 @ 07:49  Lying BP: --/-- HR: --  Sitting BP: 103/63 HR: 57  Standing BP: --/-- HR: --  Site: --  Mode: --  Orthostatic VS  07-05-24 @ 20:00  Lying BP: --/-- HR: --  Sitting BP: 11/68 HR: 84  Standing BP: --/-- HR: --  Site: --  Mode: --  Orthostatic VS  07-05-24 @ 08:05  Lying BP: --/-- HR: --  Sitting BP: 108/75 HR: 79  Standing BP: --/-- HR: --  Site: --  Mode: --  Orthostatic VS  07-04-24 @ 20:12  Lying BP: --/-- HR: --  Sitting BP: 134/62 HR: 85  Standing BP: --/-- HR: --  Site: --  Mode: --    Lipid Panel: Date/Time: 06-10-24 @ 09:00  Cholesterol, Serum: 121  LDL Cholesterol Calculated: 42  HDL Cholesterol, Serum: 51  Total Cholesterol/HDL Ration Measurement: --  Triglycerides, Serum: 139  
BMI: BMI (kg/m2): 36.8 (07-04-24 @ 00:36)  HbA1c: A1C with Estimated Average Glucose Result: 5.3 % (06-10-24 @ 09:00)    Glucose: POCT Blood Glucose.: 104 mg/dL (04-04-24 @ 06:04)    BP: 107/79 (07-07-24 @ 08:06) (107/79 - 107/79)Vital Signs Last 24 Hrs  T(C): 36.8 (07-08-24 @ 08:41), Max: 36.8 (07-08-24 @ 08:41)  T(F): 98.2 (07-08-24 @ 08:41), Max: 98.2 (07-08-24 @ 08:41)  HR: --  BP: --  BP(mean): --  RR: --  SpO2: --    Orthostatic VS  07-08-24 @ 08:41  Lying BP: --/-- HR: --  Sitting BP: 143/89 HR: 67  Standing BP: 126/86 HR: 79  Site: --  Mode: --    Lipid Panel: Date/Time: 06-10-24 @ 09:00  Cholesterol, Serum: 121  LDL Cholesterol Calculated: 42  HDL Cholesterol, Serum: 51  Total Cholesterol/HDL Ration Measurement: --  Triglycerides, Serum: 139  
BMI: BMI (kg/m2): 36.8 (07-04-24 @ 00:36)  HbA1c: A1C with Estimated Average Glucose Result: 5.3 % (06-10-24 @ 09:00)    Glucose: POCT Blood Glucose.: 104 mg/dL (04-04-24 @ 06:04)    BP: --Vital Signs Last 24 Hrs  T(C): 36.7 (07-11-24 @ 08:42), Max: 36.7 (07-11-24 @ 08:42)  T(F): 98.1 (07-11-24 @ 08:42), Max: 98.1 (07-11-24 @ 08:42)  HR: --  BP: --  BP(mean): --  RR: --  SpO2: --    Orthostatic VS  07-11-24 @ 08:42  Lying BP: --/-- HR: --  Sitting BP: 110/71 HR: 81  Standing BP: --/-- HR: --  Site: --  Mode: --  Orthostatic VS  07-10-24 @ 07:43  Lying BP: --/-- HR: --  Sitting BP: 105/76 HR: 67  Standing BP: --/-- HR: --  Site: --  Mode: --    Lipid Panel: Date/Time: 06-10-24 @ 09:00  Cholesterol, Serum: 121  LDL Cholesterol Calculated: 42  HDL Cholesterol, Serum: 51  Total Cholesterol/HDL Ration Measurement: --  Triglycerides, Serum: 139  
BMI: BMI (kg/m2): 36.8 (07-04-24 @ 00:36)  HbA1c: A1C with Estimated Average Glucose Result: 5.3 % (06-10-24 @ 09:00)    Glucose: POCT Blood Glucose.: 104 mg/dL (04-04-24 @ 06:04)    BP: 107/79 (07-07-24 @ 08:06) (107/79 - 107/79)Vital Signs Last 24 Hrs  T(C): 36.7 (07-07-24 @ 08:06), Max: 36.7 (07-07-24 @ 08:06)  T(F): 98 (07-07-24 @ 08:06), Max: 98 (07-07-24 @ 08:06)  HR: 60 (07-07-24 @ 08:06) (60 - 60)  BP: 107/79 (07-07-24 @ 08:06) (107/79 - 107/79)  BP(mean): --  RR: --  SpO2: --    Orthostatic VS  07-06-24 @ 07:49  Lying BP: --/-- HR: --  Sitting BP: 103/63 HR: 57  Standing BP: --/-- HR: --  Site: --  Mode: --  Orthostatic VS  07-05-24 @ 20:00  Lying BP: --/-- HR: --  Sitting BP: 11/68 HR: 84  Standing BP: --/-- HR: --  Site: --  Mode: --    Lipid Panel: Date/Time: 06-10-24 @ 09:00  Cholesterol, Serum: 121  LDL Cholesterol Calculated: 42  HDL Cholesterol, Serum: 51  Total Cholesterol/HDL Ration Measurement: --  Triglycerides, Serum: 139  
BMI: BMI (kg/m2): 36.8 (07-04-24 @ 00:36)  HbA1c: A1C with Estimated Average Glucose Result: 5.3 % (06-10-24 @ 09:00)    Glucose: POCT Blood Glucose.: 104 mg/dL (04-04-24 @ 06:04)    BP: 118/72 (07-03-24 @ 23:38) (104/53 - 132/80)Vital Signs Last 24 Hrs  T(C): 36.3 (07-05-24 @ 08:05), Max: 36.8 (07-04-24 @ 20:12)  T(F): 97.3 (07-05-24 @ 08:05), Max: 98.3 (07-04-24 @ 20:12)  HR: --  BP: --  BP(mean): --  RR: --  SpO2: --    Orthostatic VS  07-05-24 @ 08:05  Lying BP: --/-- HR: --  Sitting BP: 108/75 HR: 79  Standing BP: --/-- HR: --  Site: --  Mode: --  Orthostatic VS  07-04-24 @ 20:12  Lying BP: --/-- HR: --  Sitting BP: 134/62 HR: 85  Standing BP: --/-- HR: --  Site: --  Mode: --  Orthostatic VS  07-04-24 @ 00:36  Lying BP: --/-- HR: --  Sitting BP: 104/68 HR: 83  Standing BP: --/-- HR: --  Site: --  Mode: --    Lipid Panel: Date/Time: 06-10-24 @ 09:00  Cholesterol, Serum: 121  LDL Cholesterol Calculated: 42  HDL Cholesterol, Serum: 51  Total Cholesterol/HDL Ration Measurement: --  Triglycerides, Serum: 139  
BMI: BMI (kg/m2): 36.8 (07-04-24 @ 00:36)  HbA1c: A1C with Estimated Average Glucose Result: 5.3 % (06-10-24 @ 09:00)    Glucose: POCT Blood Glucose.: 104 mg/dL (04-04-24 @ 06:04)    BP: --Vital Signs Last 24 Hrs  T(C): 36.5 (07-10-24 @ 07:43), Max: 36.5 (07-10-24 @ 07:43)  T(F): 97.7 (07-10-24 @ 07:43), Max: 97.7 (07-10-24 @ 07:43)  HR: --  BP: --  BP(mean): --  RR: --  SpO2: --    Orthostatic VS  07-10-24 @ 07:43  Lying BP: --/-- HR: --  Sitting BP: 105/76 HR: 67  Standing BP: --/-- HR: --  Site: --  Mode: --  Orthostatic VS  07-09-24 @ 08:14  Lying BP: --/-- HR: --  Sitting BP: 120/67 HR: 78  Standing BP: 125/73 HR: 100  Site: --  Mode: --    Lipid Panel: Date/Time: 06-10-24 @ 09:00  Cholesterol, Serum: 121  LDL Cholesterol Calculated: 42  HDL Cholesterol, Serum: 51  Total Cholesterol/HDL Ration Measurement: --  Triglycerides, Serum: 139

## 2024-07-12 NOTE — BH INPATIENT PSYCHIATRY PROGRESS NOTE - NSBHFUPINTERVALHXFT_PSY_A_CORE
Chart reviewed and case discussed with treatment team. No events reported overnight. Sleep and appetite is fair. Patient is out on the unit and denies any SI/HI or AVH. Patient has been refusing the Risperdal and stated that he feels hopeful now.  
Chart reviewed and case discussed with treatment team. No events reported overnight. Sleep and appetite is fair. Patient is out on the unit and denies any SI/HI or AVH. Patient has been refusing the Risperdal and requesting a multivitamin. Patient discharge focused and wants to go to [facility] rehab.  
Chart reviewed and case discussed with treatment team. No events reported overnight. Sleep and appetite is fair. Patient is out on the unit and denies any SI/HI or AVH. Patient has been refusing the Risperdal and stated that he feels hopeful now.

## 2024-07-12 NOTE — BH INPATIENT PSYCHIATRY PROGRESS NOTE - NSBHATTESTBILLING_PSY_A_CORE
06745-Ciqkydqatb OBS or IP - moderate complexity OR 35-49 mins
94955-Sjvlvggqcb OBS or IP - moderate complexity OR 35-49 mins
[id number]-Ujolbcdvhq OBS or IP - moderate complexity OR 35-49 mins
15177-Qcrhomdkry OBS or IP - moderate complexity OR 35-49 mins
70192-Wybjizliog OBS or IP - moderate complexity OR 35-49 mins
[id number]-Ydeqjectxz OBS or IP - moderate complexity OR 35-49 mins
99337-Sodcgdfipg OBS or IP - moderate complexity OR 35-49 mins
[id number]-Nafgxionrs OBS or IP - moderate complexity OR 35-49 mins

## 2024-07-12 NOTE — BH INPATIENT PSYCHIATRY PROGRESS NOTE - GENERAL APPEARANCE
not actively internally stimulated/No deformities present

## 2024-07-12 NOTE — BH INPATIENT PSYCHIATRY PROGRESS NOTE - NSTXPROBALCDRG_PSY_ALL_CORE
ALCOHOL OR DRUG WITHDRAWAL

## 2024-07-12 NOTE — BH INPATIENT PSYCHIATRY PROGRESS NOTE - NSBHMSETHTCONTENT_PSY_A_CORE
Unremarkable
Unremarkable
Ruminations/Hopelessness/Suicidality
Unremarkable

## 2024-07-12 NOTE — BH INPATIENT PSYCHIATRY PROGRESS NOTE - NSTXMEDICINTERMD_PSY_ALL_CORE
med management/ group, milieu therapy

## 2024-07-12 NOTE — BH INPATIENT PSYCHIATRY PROGRESS NOTE - NSBHCHARTREVIEWVS_PSY_A_CORE FT
Vital Signs Last 24 Hrs  T(C): 36.7 (07-11-24 @ 08:42), Max: 36.7 (07-11-24 @ 08:42)  T(F): 98.1 (07-11-24 @ 08:42), Max: 98.1 (07-11-24 @ 08:42)  HR: --  BP: --  BP(mean): --  RR: --  SpO2: --    Orthostatic VS  07-11-24 @ 08:42  Lying BP: --/-- HR: --  Sitting BP: 110/71 HR: 81  Standing BP: --/-- HR: --  Site: --  Mode: --  Orthostatic VS  07-10-24 @ 07:43  Lying BP: --/-- HR: --  Sitting BP: 105/76 HR: 67  Standing BP: --/-- HR: --  Site: --  Mode: --  
Vital Signs Last 24 Hrs  T(C): 36.5 (07-10-24 @ 07:43), Max: 36.5 (07-10-24 @ 07:43)  T(F): 97.7 (07-10-24 @ 07:43), Max: 97.7 (07-10-24 @ 07:43)  HR: --  BP: --  BP(mean): --  RR: --  SpO2: --    Orthostatic VS  07-10-24 @ 07:43  Lying BP: --/-- HR: --  Sitting BP: 105/76 HR: 67  Standing BP: --/-- HR: --  Site: --  Mode: --  Orthostatic VS  07-09-24 @ 08:14  Lying BP: --/-- HR: --  Sitting BP: 120/67 HR: 78  Standing BP: 125/73 HR: 100  Site: --  Mode: --  
Vital Signs Last 24 Hrs  T(C): 36.8 (07-08-24 @ 08:41), Max: 36.8 (07-08-24 @ 08:41)  T(F): 98.2 (07-08-24 @ 08:41), Max: 98.2 (07-08-24 @ 08:41)  HR: --  BP: --  BP(mean): --  RR: --  SpO2: --    Orthostatic VS  07-08-24 @ 08:41  Lying BP: --/-- HR: --  Sitting BP: 143/89 HR: 67  Standing BP: 126/86 HR: 79  Site: --  Mode: --  
Vital Signs Last 24 Hrs  T(C): 36.3 (07-05-24 @ 08:05), Max: 36.8 (07-04-24 @ 20:12)  T(F): 97.3 (07-05-24 @ 08:05), Max: 98.3 (07-04-24 @ 20:12)  HR: --  BP: --  BP(mean): --  RR: --  SpO2: --    Orthostatic VS  07-05-24 @ 08:05  Lying BP: --/-- HR: --  Sitting BP: 108/75 HR: 79  Standing BP: --/-- HR: --  Site: --  Mode: --  Orthostatic VS  07-04-24 @ 20:12  Lying BP: --/-- HR: --  Sitting BP: 134/62 HR: 85  Standing BP: --/-- HR: --  Site: --  Mode: --  Orthostatic VS  07-04-24 @ 00:36  Lying BP: --/-- HR: --  Sitting BP: 104/68 HR: 83  Standing BP: --/-- HR: --  Site: --  Mode: --  
Vital Signs Last 24 Hrs  T(C): 36.6 (07-12-24 @ 08:37), Max: 36.6 (07-12-24 @ 08:37)  T(F): 97.9 (07-12-24 @ 08:37), Max: 97.9 (07-12-24 @ 08:37)  HR: --  BP: --  BP(mean): --  RR: --  SpO2: --    Orthostatic VS  07-12-24 @ 08:37  Lying BP: --/-- HR: --  Sitting BP: 104/61 HR: 86  Standing BP: 111/65 HR: 96  Site: --  Mode: --  Orthostatic VS  07-11-24 @ 08:42  Lying BP: --/-- HR: --  Sitting BP: 110/71 HR: 81  Standing BP: --/-- HR: --  Site: --  Mode: --  
Vital Signs Last 24 Hrs  T(C): 37.1 (07-06-24 @ 07:49), Max: 37.1 (07-06-24 @ 07:49)  T(F): 98.8 (07-06-24 @ 07:49), Max: 98.8 (07-06-24 @ 07:49)  HR: --  BP: --  BP(mean): --  RR: --  SpO2: --    Orthostatic VS  07-06-24 @ 07:49  Lying BP: --/-- HR: --  Sitting BP: 103/63 HR: 57  Standing BP: --/-- HR: --  Site: --  Mode: --  Orthostatic VS  07-05-24 @ 20:00  Lying BP: --/-- HR: --  Sitting BP: 11/68 HR: 84  Standing BP: --/-- HR: --  Site: --  Mode: --  Orthostatic VS  07-05-24 @ 08:05  Lying BP: --/-- HR: --  Sitting BP: 108/75 HR: 79  Standing BP: --/-- HR: --  Site: --  Mode: --  Orthostatic VS  07-04-24 @ 20:12  Lying BP: --/-- HR: --  Sitting BP: 134/62 HR: 85  Standing BP: --/-- HR: --  Site: --  Mode: --  
Vital Signs Last 24 Hrs  T(C): 36.7 (07-07-24 @ 08:06), Max: 36.7 (07-07-24 @ 08:06)  T(F): 98 (07-07-24 @ 08:06), Max: 98 (07-07-24 @ 08:06)  HR: 60 (07-07-24 @ 08:06) (60 - 60)  BP: 107/79 (07-07-24 @ 08:06) (107/79 - 107/79)  BP(mean): --  RR: --  SpO2: --    Orthostatic VS  07-06-24 @ 07:49  Lying BP: --/-- HR: --  Sitting BP: 103/63 HR: 57  Standing BP: --/-- HR: --  Site: --  Mode: --  Orthostatic VS  07-05-24 @ 20:00  Lying BP: --/-- HR: --  Sitting BP: 11/68 HR: 84  Standing BP: --/-- HR: --  Site: --  Mode: --  
Vital Signs Last 24 Hrs  T(C): 36.5 (07-10-24 @ 07:43), Max: 36.5 (07-10-24 @ 07:43)  T(F): 97.7 (07-10-24 @ 07:43), Max: 97.7 (07-10-24 @ 07:43)  HR: --  BP: --  BP(mean): --  RR: --  SpO2: --    Orthostatic VS  07-10-24 @ 07:43  Lying BP: --/-- HR: --  Sitting BP: 105/76 HR: 67  Standing BP: --/-- HR: --  Site: --  Mode: --  Orthostatic VS  07-09-24 @ 08:14  Lying BP: --/-- HR: --  Sitting BP: 120/67 HR: 78  Standing BP: 125/73 HR: 100  Site: --  Mode: --

## 2024-07-12 NOTE — BH INPATIENT PSYCHIATRY PROGRESS NOTE - NSBHATTESTTYPEVISIT_PSY_A_CORE
Attending Only
EMMIE without on-site Attending supervision
Attending Only
EMMIE without on-site Attending supervision
Attending Only

## 2024-07-12 NOTE — BH INPATIENT PSYCHIATRY PROGRESS NOTE - PRN MEDS
MEDICATIONS  (PRN):  aluminum hydroxide/magnesium hydroxide/simethicone Suspension 30 milliLiter(s) Oral every 6 hours PRN Dyspepsia  benzocaine/menthol Lozenge 1 Lozenge Oral every 2 hours PRN throat pain  diphenhydrAMINE 50 milliGRAM(s) Oral every 6 hours PRN agitation/eps/rash  diphenhydrAMINE Injectable 50 milliGRAM(s) IntraMuscular once PRN agitation/eps/rash  guaiFENesin Oral Liquid (Sugar-Free) 100 milliGRAM(s) Oral every 6 hours PRN sore throat  haloperidol     Tablet 5 milliGRAM(s) Oral every 6 hours PRN agitation  haloperidol    Injectable 5 milliGRAM(s) IntraMuscular once PRN Agitation  ibuprofen  Tablet. 400 milliGRAM(s) Oral every 6 hours PRN Temp greater or equal to 38C (100.4F), Mild Pain (1 - 3), Moderate Pain (4 - 6)  LORazepam     Tablet 2 milliGRAM(s) Oral every 6 hours PRN Agitation  LORazepam   Injectable 2 milliGRAM(s) IntraMuscular once PRN Agitation  melatonin. 3 milliGRAM(s) Oral at bedtime PRN Insomnia  
MEDICATIONS  (PRN):  aluminum hydroxide/magnesium hydroxide/simethicone Suspension 30 milliLiter(s) Oral every 6 hours PRN Dyspepsia  benzocaine/menthol Lozenge 1 Lozenge Oral every 2 hours PRN throat pain  diphenhydrAMINE 50 milliGRAM(s) Oral every 6 hours PRN agitation/eps/rash  diphenhydrAMINE Injectable 50 milliGRAM(s) IntraMuscular once PRN agitation/eps/rash  guaiFENesin Oral Liquid (Sugar-Free) 100 milliGRAM(s) Oral every 6 hours PRN sore throat  haloperidol     Tablet 5 milliGRAM(s) Oral every 6 hours PRN agitation  haloperidol    Injectable 5 milliGRAM(s) IntraMuscular once PRN Agitation  ibuprofen  Tablet. 400 milliGRAM(s) Oral every 6 hours PRN Temp greater or equal to 38C (100.4F), Mild Pain (1 - 3), Moderate Pain (4 - 6)  LORazepam     Tablet 2 milliGRAM(s) Oral every 6 hours PRN Agitation  LORazepam     Tablet 2 milliGRAM(s) Oral every 2 hours PRN CIWA score increase by 2 points and current CIWA score GREATER THAN 9  LORazepam   Injectable 2 milliGRAM(s) IntraMuscular Once PRN Agitation  melatonin. 3 milliGRAM(s) Oral at bedtime PRN Insomnia  
MEDICATIONS  (PRN):  aluminum hydroxide/magnesium hydroxide/simethicone Suspension 30 milliLiter(s) Oral every 6 hours PRN Dyspepsia  benzocaine/menthol Lozenge 1 Lozenge Oral every 2 hours PRN throat pain  diphenhydrAMINE 50 milliGRAM(s) Oral every 6 hours PRN agitation/eps/rash  diphenhydrAMINE Injectable 50 milliGRAM(s) IntraMuscular once PRN agitation/eps/rash  guaiFENesin Oral Liquid (Sugar-Free) 100 milliGRAM(s) Oral every 6 hours PRN sore throat  haloperidol     Tablet 5 milliGRAM(s) Oral every 6 hours PRN agitation  haloperidol    Injectable 5 milliGRAM(s) IntraMuscular once PRN Agitation  ibuprofen  Tablet. 400 milliGRAM(s) Oral every 6 hours PRN Temp greater or equal to 38C (100.4F), Mild Pain (1 - 3), Moderate Pain (4 - 6)  LORazepam     Tablet 2 milliGRAM(s) Oral every 6 hours PRN Agitation  LORazepam     Tablet 2 milliGRAM(s) Oral every 2 hours PRN CIWA score increase by 2 points and current CIWA score GREATER THAN 9  LORazepam   Injectable 2 milliGRAM(s) IntraMuscular Once PRN Agitation  melatonin. 3 milliGRAM(s) Oral at bedtime PRN Insomnia  
MEDICATIONS  (PRN):  aluminum hydroxide/magnesium hydroxide/simethicone Suspension 30 milliLiter(s) Oral every 6 hours PRN Dyspepsia  benzocaine/menthol Lozenge 1 Lozenge Oral every 2 hours PRN throat pain  diphenhydrAMINE 50 milliGRAM(s) Oral every 6 hours PRN agitation/eps/rash  diphenhydrAMINE Injectable 50 milliGRAM(s) IntraMuscular once PRN agitation/eps/rash  guaiFENesin Oral Liquid (Sugar-Free) 100 milliGRAM(s) Oral every 6 hours PRN sore throat  haloperidol     Tablet 5 milliGRAM(s) Oral every 6 hours PRN agitation  haloperidol    Injectable 5 milliGRAM(s) IntraMuscular once PRN Agitation  ibuprofen  Tablet. 400 milliGRAM(s) Oral every 6 hours PRN Temp greater or equal to 38C (100.4F), Mild Pain (1 - 3), Moderate Pain (4 - 6)  LORazepam     Tablet 2 milliGRAM(s) Oral every 2 hours PRN CIWA score increase by 2 points and current CIWA score GREATER THAN 9  LORazepam     Tablet 2 milliGRAM(s) Oral every 6 hours PRN Agitation  LORazepam   Injectable 2 milliGRAM(s) IntraMuscular Once PRN Agitation  melatonin. 3 milliGRAM(s) Oral at bedtime PRN Insomnia  
MEDICATIONS  (PRN):  aluminum hydroxide/magnesium hydroxide/simethicone Suspension 30 milliLiter(s) Oral every 6 hours PRN Dyspepsia  benzocaine/menthol Lozenge 1 Lozenge Oral every 2 hours PRN throat pain  diphenhydrAMINE 50 milliGRAM(s) Oral every 6 hours PRN agitation/eps/rash  diphenhydrAMINE Injectable 50 milliGRAM(s) IntraMuscular once PRN agitation/eps/rash  guaiFENesin Oral Liquid (Sugar-Free) 100 milliGRAM(s) Oral every 6 hours PRN sore throat  haloperidol     Tablet 5 milliGRAM(s) Oral every 6 hours PRN agitation  haloperidol    Injectable 5 milliGRAM(s) IntraMuscular once PRN Agitation  ibuprofen  Tablet. 400 milliGRAM(s) Oral every 6 hours PRN Temp greater or equal to 38C (100.4F), Mild Pain (1 - 3), Moderate Pain (4 - 6)  LORazepam     Tablet 2 milliGRAM(s) Oral every 2 hours PRN CIWA score increase by 2 points and current CIWA score GREATER THAN 9  LORazepam     Tablet 2 milliGRAM(s) Oral every 6 hours PRN Agitation  LORazepam   Injectable 2 milliGRAM(s) IntraMuscular Once PRN Agitation  melatonin. 3 milliGRAM(s) Oral at bedtime PRN Insomnia  
MEDICATIONS  (PRN):  aluminum hydroxide/magnesium hydroxide/simethicone Suspension 30 milliLiter(s) Oral every 6 hours PRN Dyspepsia  benzocaine/menthol Lozenge 1 Lozenge Oral every 2 hours PRN throat pain  diphenhydrAMINE 50 milliGRAM(s) Oral every 6 hours PRN agitation/eps/rash  diphenhydrAMINE Injectable 50 milliGRAM(s) IntraMuscular once PRN agitation/eps/rash  guaiFENesin Oral Liquid (Sugar-Free) 100 milliGRAM(s) Oral every 6 hours PRN sore throat  haloperidol     Tablet 5 milliGRAM(s) Oral every 6 hours PRN agitation  haloperidol    Injectable 5 milliGRAM(s) IntraMuscular once PRN Agitation  ibuprofen  Tablet. 400 milliGRAM(s) Oral every 6 hours PRN Temp greater or equal to 38C (100.4F), Mild Pain (1 - 3), Moderate Pain (4 - 6)  LORazepam     Tablet 2 milliGRAM(s) Oral every 6 hours PRN Agitation  LORazepam   Injectable 2 milliGRAM(s) IntraMuscular once PRN Agitation  melatonin. 3 milliGRAM(s) Oral at bedtime PRN Insomnia  

## 2024-07-12 NOTE — BH INPATIENT PSYCHIATRY PROGRESS NOTE - CURRENT MEDICATION
MEDICATIONS  (STANDING):  aspirin  chewable 81 milliGRAM(s) Oral daily  atorvastatin 40 milliGRAM(s) Oral at bedtime  loratadine 10 milliGRAM(s) Oral daily  risperiDONE   Tablet 1 milliGRAM(s) Oral at bedtime    MEDICATIONS  (PRN):  aluminum hydroxide/magnesium hydroxide/simethicone Suspension 30 milliLiter(s) Oral every 6 hours PRN Dyspepsia  benzocaine/menthol Lozenge 1 Lozenge Oral every 2 hours PRN throat pain  diphenhydrAMINE 50 milliGRAM(s) Oral every 6 hours PRN agitation/eps/rash  diphenhydrAMINE Injectable 50 milliGRAM(s) IntraMuscular once PRN agitation/eps/rash  guaiFENesin Oral Liquid (Sugar-Free) 100 milliGRAM(s) Oral every 6 hours PRN sore throat  haloperidol     Tablet 5 milliGRAM(s) Oral every 6 hours PRN agitation  haloperidol    Injectable 5 milliGRAM(s) IntraMuscular once PRN Agitation  ibuprofen  Tablet. 400 milliGRAM(s) Oral every 6 hours PRN Temp greater or equal to 38C (100.4F), Mild Pain (1 - 3), Moderate Pain (4 - 6)  LORazepam     Tablet 2 milliGRAM(s) Oral every 2 hours PRN CIWA score increase by 2 points and current CIWA score GREATER THAN 9  LORazepam     Tablet 2 milliGRAM(s) Oral every 6 hours PRN Agitation  LORazepam   Injectable 2 milliGRAM(s) IntraMuscular Once PRN Agitation  melatonin. 3 milliGRAM(s) Oral at bedtime PRN Insomnia  
MEDICATIONS  (STANDING):  aspirin  chewable 81 milliGRAM(s) Oral daily  atorvastatin 40 milliGRAM(s) Oral at bedtime  loratadine 10 milliGRAM(s) Oral daily  multivitamin 1 Tablet(s) Oral daily  risperiDONE   Tablet 1 milliGRAM(s) Oral at bedtime    MEDICATIONS  (PRN):  aluminum hydroxide/magnesium hydroxide/simethicone Suspension 30 milliLiter(s) Oral every 6 hours PRN Dyspepsia  benzocaine/menthol Lozenge 1 Lozenge Oral every 2 hours PRN throat pain  diphenhydrAMINE 50 milliGRAM(s) Oral every 6 hours PRN agitation/eps/rash  diphenhydrAMINE Injectable 50 milliGRAM(s) IntraMuscular once PRN agitation/eps/rash  guaiFENesin Oral Liquid (Sugar-Free) 100 milliGRAM(s) Oral every 6 hours PRN sore throat  haloperidol     Tablet 5 milliGRAM(s) Oral every 6 hours PRN agitation  haloperidol    Injectable 5 milliGRAM(s) IntraMuscular once PRN Agitation  ibuprofen  Tablet. 400 milliGRAM(s) Oral every 6 hours PRN Temp greater or equal to 38C (100.4F), Mild Pain (1 - 3), Moderate Pain (4 - 6)  LORazepam     Tablet 2 milliGRAM(s) Oral every 6 hours PRN Agitation  LORazepam   Injectable 2 milliGRAM(s) IntraMuscular once PRN Agitation  melatonin. 3 milliGRAM(s) Oral at bedtime PRN Insomnia  
MEDICATIONS  (STANDING):  aspirin  chewable 81 milliGRAM(s) Oral daily  atorvastatin 40 milliGRAM(s) Oral at bedtime  loratadine 10 milliGRAM(s) Oral daily  multivitamin 1 Tablet(s) Oral daily  risperiDONE   Tablet 1 milliGRAM(s) Oral at bedtime    MEDICATIONS  (PRN):  aluminum hydroxide/magnesium hydroxide/simethicone Suspension 30 milliLiter(s) Oral every 6 hours PRN Dyspepsia  benzocaine/menthol Lozenge 1 Lozenge Oral every 2 hours PRN throat pain  diphenhydrAMINE 50 milliGRAM(s) Oral every 6 hours PRN agitation/eps/rash  diphenhydrAMINE Injectable 50 milliGRAM(s) IntraMuscular once PRN agitation/eps/rash  guaiFENesin Oral Liquid (Sugar-Free) 100 milliGRAM(s) Oral every 6 hours PRN sore throat  haloperidol     Tablet 5 milliGRAM(s) Oral every 6 hours PRN agitation  haloperidol    Injectable 5 milliGRAM(s) IntraMuscular once PRN Agitation  ibuprofen  Tablet. 400 milliGRAM(s) Oral every 6 hours PRN Temp greater or equal to 38C (100.4F), Mild Pain (1 - 3), Moderate Pain (4 - 6)  LORazepam     Tablet 2 milliGRAM(s) Oral every 2 hours PRN CIWA score increase by 2 points and current CIWA score GREATER THAN 9  LORazepam     Tablet 2 milliGRAM(s) Oral every 6 hours PRN Agitation  LORazepam   Injectable 2 milliGRAM(s) IntraMuscular Once PRN Agitation  melatonin. 3 milliGRAM(s) Oral at bedtime PRN Insomnia  
MEDICATIONS  (STANDING):  aspirin  chewable 81 milliGRAM(s) Oral daily  atorvastatin 40 milliGRAM(s) Oral at bedtime  loratadine 10 milliGRAM(s) Oral daily  multivitamin 1 Tablet(s) Oral daily  risperiDONE   Tablet 1 milliGRAM(s) Oral at bedtime    MEDICATIONS  (PRN):  aluminum hydroxide/magnesium hydroxide/simethicone Suspension 30 milliLiter(s) Oral every 6 hours PRN Dyspepsia  benzocaine/menthol Lozenge 1 Lozenge Oral every 2 hours PRN throat pain  diphenhydrAMINE 50 milliGRAM(s) Oral every 6 hours PRN agitation/eps/rash  diphenhydrAMINE Injectable 50 milliGRAM(s) IntraMuscular once PRN agitation/eps/rash  guaiFENesin Oral Liquid (Sugar-Free) 100 milliGRAM(s) Oral every 6 hours PRN sore throat  haloperidol     Tablet 5 milliGRAM(s) Oral every 6 hours PRN agitation  haloperidol    Injectable 5 milliGRAM(s) IntraMuscular once PRN Agitation  ibuprofen  Tablet. 400 milliGRAM(s) Oral every 6 hours PRN Temp greater or equal to 38C (100.4F), Mild Pain (1 - 3), Moderate Pain (4 - 6)  LORazepam     Tablet 2 milliGRAM(s) Oral every 6 hours PRN Agitation  LORazepam   Injectable 2 milliGRAM(s) IntraMuscular once PRN Agitation  melatonin. 3 milliGRAM(s) Oral at bedtime PRN Insomnia  
MEDICATIONS  (STANDING):  aspirin  chewable 81 milliGRAM(s) Oral daily  atorvastatin 40 milliGRAM(s) Oral at bedtime  loratadine 10 milliGRAM(s) Oral daily  multivitamin 1 Tablet(s) Oral daily  risperiDONE   Tablet 1 milliGRAM(s) Oral at bedtime    MEDICATIONS  (PRN):  aluminum hydroxide/magnesium hydroxide/simethicone Suspension 30 milliLiter(s) Oral every 6 hours PRN Dyspepsia  benzocaine/menthol Lozenge 1 Lozenge Oral every 2 hours PRN throat pain  diphenhydrAMINE 50 milliGRAM(s) Oral every 6 hours PRN agitation/eps/rash  diphenhydrAMINE Injectable 50 milliGRAM(s) IntraMuscular once PRN agitation/eps/rash  guaiFENesin Oral Liquid (Sugar-Free) 100 milliGRAM(s) Oral every 6 hours PRN sore throat  haloperidol     Tablet 5 milliGRAM(s) Oral every 6 hours PRN agitation  haloperidol    Injectable 5 milliGRAM(s) IntraMuscular once PRN Agitation  ibuprofen  Tablet. 400 milliGRAM(s) Oral every 6 hours PRN Temp greater or equal to 38C (100.4F), Mild Pain (1 - 3), Moderate Pain (4 - 6)  LORazepam     Tablet 2 milliGRAM(s) Oral every 6 hours PRN Agitation  LORazepam   Injectable 2 milliGRAM(s) IntraMuscular once PRN Agitation  melatonin. 3 milliGRAM(s) Oral at bedtime PRN Insomnia  
MEDICATIONS  (STANDING):  aspirin  chewable 81 milliGRAM(s) Oral daily  atorvastatin 40 milliGRAM(s) Oral at bedtime  loratadine 10 milliGRAM(s) Oral daily  risperiDONE   Tablet 1 milliGRAM(s) Oral at bedtime    MEDICATIONS  (PRN):  aluminum hydroxide/magnesium hydroxide/simethicone Suspension 30 milliLiter(s) Oral every 6 hours PRN Dyspepsia  benzocaine/menthol Lozenge 1 Lozenge Oral every 2 hours PRN throat pain  diphenhydrAMINE 50 milliGRAM(s) Oral every 6 hours PRN agitation/eps/rash  diphenhydrAMINE Injectable 50 milliGRAM(s) IntraMuscular once PRN agitation/eps/rash  guaiFENesin Oral Liquid (Sugar-Free) 100 milliGRAM(s) Oral every 6 hours PRN sore throat  haloperidol     Tablet 5 milliGRAM(s) Oral every 6 hours PRN agitation  haloperidol    Injectable 5 milliGRAM(s) IntraMuscular once PRN Agitation  ibuprofen  Tablet. 400 milliGRAM(s) Oral every 6 hours PRN Temp greater or equal to 38C (100.4F), Mild Pain (1 - 3), Moderate Pain (4 - 6)  LORazepam     Tablet 2 milliGRAM(s) Oral every 6 hours PRN Agitation  LORazepam     Tablet 2 milliGRAM(s) Oral every 2 hours PRN CIWA score increase by 2 points and current CIWA score GREATER THAN 9  LORazepam   Injectable 2 milliGRAM(s) IntraMuscular Once PRN Agitation  melatonin. 3 milliGRAM(s) Oral at bedtime PRN Insomnia  
MEDICATIONS  (STANDING):  aspirin  chewable 81 milliGRAM(s) Oral daily  atorvastatin 40 milliGRAM(s) Oral at bedtime  loratadine 10 milliGRAM(s) Oral daily  multivitamin 1 Tablet(s) Oral daily  risperiDONE   Tablet 1 milliGRAM(s) Oral at bedtime    MEDICATIONS  (PRN):  aluminum hydroxide/magnesium hydroxide/simethicone Suspension 30 milliLiter(s) Oral every 6 hours PRN Dyspepsia  benzocaine/menthol Lozenge 1 Lozenge Oral every 2 hours PRN throat pain  diphenhydrAMINE 50 milliGRAM(s) Oral every 6 hours PRN agitation/eps/rash  diphenhydrAMINE Injectable 50 milliGRAM(s) IntraMuscular once PRN agitation/eps/rash  guaiFENesin Oral Liquid (Sugar-Free) 100 milliGRAM(s) Oral every 6 hours PRN sore throat  haloperidol     Tablet 5 milliGRAM(s) Oral every 6 hours PRN agitation  haloperidol    Injectable 5 milliGRAM(s) IntraMuscular once PRN Agitation  ibuprofen  Tablet. 400 milliGRAM(s) Oral every 6 hours PRN Temp greater or equal to 38C (100.4F), Mild Pain (1 - 3), Moderate Pain (4 - 6)  LORazepam     Tablet 2 milliGRAM(s) Oral every 6 hours PRN Agitation  LORazepam     Tablet 2 milliGRAM(s) Oral every 2 hours PRN CIWA score increase by 2 points and current CIWA score GREATER THAN 9  LORazepam   Injectable 2 milliGRAM(s) IntraMuscular Once PRN Agitation  melatonin. 3 milliGRAM(s) Oral at bedtime PRN Insomnia  
MEDICATIONS  (STANDING):  aspirin  chewable 81 milliGRAM(s) Oral daily  atorvastatin 40 milliGRAM(s) Oral at bedtime  loratadine 10 milliGRAM(s) Oral daily  multivitamin 1 Tablet(s) Oral daily  risperiDONE   Tablet 1 milliGRAM(s) Oral at bedtime    MEDICATIONS  (PRN):  aluminum hydroxide/magnesium hydroxide/simethicone Suspension 30 milliLiter(s) Oral every 6 hours PRN Dyspepsia  benzocaine/menthol Lozenge 1 Lozenge Oral every 2 hours PRN throat pain  diphenhydrAMINE 50 milliGRAM(s) Oral every 6 hours PRN agitation/eps/rash  diphenhydrAMINE Injectable 50 milliGRAM(s) IntraMuscular once PRN agitation/eps/rash  guaiFENesin Oral Liquid (Sugar-Free) 100 milliGRAM(s) Oral every 6 hours PRN sore throat  haloperidol     Tablet 5 milliGRAM(s) Oral every 6 hours PRN agitation  haloperidol    Injectable 5 milliGRAM(s) IntraMuscular once PRN Agitation  ibuprofen  Tablet. 400 milliGRAM(s) Oral every 6 hours PRN Temp greater or equal to 38C (100.4F), Mild Pain (1 - 3), Moderate Pain (4 - 6)  LORazepam     Tablet 2 milliGRAM(s) Oral every 6 hours PRN Agitation  LORazepam   Injectable 2 milliGRAM(s) IntraMuscular once PRN Agitation  melatonin. 3 milliGRAM(s) Oral at bedtime PRN Insomnia

## 2024-07-12 NOTE — BH INPATIENT PSYCHIATRY PROGRESS NOTE - NSBHMSEBEHAV_PSY_A_CORE
Cooperative/Other
Cooperative

## 2024-07-12 NOTE — BH INPATIENT PSYCHIATRY PROGRESS NOTE - NSBHMSEGAIT_PSY_A_CORE
Normal gait / station
Other
Normal gait / station
Normal gait / station

## 2024-07-13 RX ADMIN — ASPIRIN 81 MILLIGRAM(S): 325 TABLET, FILM COATED ORAL at 08:15

## 2024-07-13 RX ADMIN — Medication 1 TABLET(S): at 08:15

## 2024-07-13 NOTE — CHART NOTE - NSCHARTNOTEFT_GEN_A_CORE
Notified by RN that pt with complaints of general of malaise and slight cough and requesting a Covid test. Pt seen and assessed. Pt reports sore throat and feeling congested. Pt is requesting to be covid tested as he reports being exposed to pt on unit whom tested positive for covid. Pt further stated  "this is how I felt the last time I had covid". Pt denies any difficulty swallowing/breathing, SOB, stuffy/runny nose, coughing phlegm, CP, N/V, fever, chills, hoarseness. No tonsillar exudates or petechiae noted. Uvula is midline.  Lungs CTA. VSS. Pt has PRN Lozenges for throat pain which was offered to pt. Covid test ordered due to exposure.

## 2024-07-14 VITALS — HEART RATE: 84 BPM | DIASTOLIC BLOOD PRESSURE: 78 MMHG | SYSTOLIC BLOOD PRESSURE: 119 MMHG | TEMPERATURE: 98 F

## 2024-07-14 RX ADMIN — Medication 1 TABLET(S): at 08:11

## 2024-07-14 RX ADMIN — ASPIRIN 81 MILLIGRAM(S): 325 TABLET, FILM COATED ORAL at 08:12

## 2024-07-15 RX ORDER — ASPIRIN 325 MG/1
1 TABLET, FILM COATED ORAL
Qty: 0 | Refills: 0 | DISCHARGE
Start: 2024-07-15

## 2024-07-15 RX ADMIN — LORATADINE 10 MILLIGRAM(S): 10 TABLET ORAL at 08:03

## 2024-07-15 RX ADMIN — ASPIRIN 81 MILLIGRAM(S): 325 TABLET, FILM COATED ORAL at 08:02

## 2024-07-15 RX ADMIN — Medication 1 TABLET(S): at 08:03

## 2024-07-15 NOTE — BH INPATIENT PSYCHIATRY DISCHARGE NOTE - OTHER PAST PSYCHIATRIC HISTORY (INCLUDE DETAILS REGARDING ONSET, COURSE OF ILLNESS, INPATIENT/OUTPATIENT TREATMENT)
57yo single man who is undomiciled (living at shelters, hotels, ERs/hospitals, and rehabs) and unemployed, with PMHx HTN and self-reported CHF, with PPHx of many documented diagnoses ranging across mood, psychotic, and personality disorders, hx of many prior hospitalizations and ED visits, discharged from [facility] 8 days ago on a 3-day letter, chronic nonadherence to meds and outpt treatment, currently has Intensive Mobile Treatment (IMT) team, hx of several prior self-aborted suicide attempts (planned to jump in front of train 2023, "stopped by an emre"), and significant hx of polysubstance use/abuse (recently EtOH and cocaine), hx of many prior detox/rehabs, admitted due to report of SI (with several vague plans) and report of AH in the context of substance use, recent discharge from [facility], and inability to reach his 15yo daughter (who lives in [place]). Of note, the patient frequently presents to with nearly identical chief complaints to various EDs, is briefly admitted and his symptoms quickly clear, he requests help connecting with housing or substance rehab, and then leaves on 3-day letters - this has been the pattern from his last 3 admissions at [facility] in Jan, May, and June 2024. Per Psyckes, he has dozens of ED visits driven by alcohol and cocaine use.     On Interview:  Patient was polite, responsive, and reporting better mood. He states that one of his main stressors is when he cannot get in contact with his daughter, and then he starts to worry and his problems exacerbate. Patient states that he was able to make contact with his daughter morning of 7/5/24, and feels much better about his future. Patient worries that this struggle with coping creates a pattern, and would like help with this.

## 2024-07-15 NOTE — BH INPATIENT PSYCHIATRY DISCHARGE NOTE - NSBHMETABOLIC_PSY_ALL_CORE_FT
BMI: BMI (kg/m2): 36.8 (07-04-24 @ 00:36)  HbA1c: A1C with Estimated Average Glucose Result: 5.3 % (06-10-24 @ 09:00)    Glucose: POCT Blood Glucose.: 104 mg/dL (04-04-24 @ 06:04)    BP: 119/78 (07-14-24 @ 08:18) (108/70 - 119/78)Vital Signs Last 24 Hrs  T(C): --  T(F): --  HR: --  BP: --  BP(mean): --  RR: --  SpO2: --      Lipid Panel: Date/Time: 06-10-24 @ 09:00  Cholesterol, Serum: 121  LDL Cholesterol Calculated: 42  HDL Cholesterol, Serum: 51  Total Cholesterol/HDL Ration Measurement: --  Triglycerides, Serum: 139

## 2024-07-15 NOTE — BH INPATIENT PSYCHIATRY DISCHARGE NOTE - NSDCMRMEDTOKEN_GEN_ALL_CORE_FT
aspirin 81 mg oral tablet, chewable: 1 tab(s) orally once a day  atorvastatin 40 mg oral tablet: 1 tab(s) orally once a day (at bedtime)  loratadine 10 mg oral tablet: 1 tab(s) orally once a day  Multiple Vitamins oral tablet: 1 tab(s) orally once a day

## 2024-07-15 NOTE — BH INPATIENT PSYCHIATRY DISCHARGE NOTE - DESCRIPTION
single, unemployed, stays at a hotel, [employer] [place] and in the streets. has a 15 yr old daughter (living in [place], [place] - currently in custody of bio mother, speaks on the phone regularly). no reported access to guns

## 2024-07-15 NOTE — BH INPATIENT PSYCHIATRY DISCHARGE NOTE - NSDCCPCAREPLAN_GEN_ALL_CORE_FT
PRINCIPAL DISCHARGE DIAGNOSIS  Diagnosis: Mood disorder  Assessment and Plan of Treatment:       SECONDARY DISCHARGE DIAGNOSES  Diagnosis: Alcohol use  Assessment and Plan of Treatment:

## 2024-07-22 ENCOUNTER — HOSPITAL ENCOUNTER (INPATIENT)
Dept: HOSPITAL 74 - YASAS | Age: 59
LOS: 10 days | Discharge: HOME | DRG: 772 | End: 2024-08-01
Attending: PSYCHIATRY & NEUROLOGY | Admitting: ALLERGY & IMMUNOLOGY
Payer: COMMERCIAL

## 2024-07-22 VITALS — BODY MASS INDEX: 36.9 KG/M2

## 2024-07-22 DIAGNOSIS — F32.A: ICD-10-CM
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
## 2024-07-22 DIAGNOSIS — E78.5: ICD-10-CM
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
## 2024-07-22 DIAGNOSIS — M54.59: ICD-10-CM
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
## 2024-07-22 DIAGNOSIS — I10: ICD-10-CM

## 2024-07-22 DIAGNOSIS — F14.20: ICD-10-CM

## 2024-07-22 DIAGNOSIS — G89.29: ICD-10-CM

## 2024-07-22 DIAGNOSIS — F10.20: Primary | ICD-10-CM

## 2024-07-22 PROCEDURE — HZ42ZZZ GROUP COUNSELING FOR SUBSTANCE ABUSE TREATMENT, COGNITIVE-BEHAVIORAL: ICD-10-PCS | Performed by: PSYCHIATRY & NEUROLOGY

## 2024-07-22 RX ADMIN — Medication SCH MG: at 21:26

## 2024-07-23 RX ADMIN — Medication SCH TAB: at 09:41

## 2024-07-24 LAB
ALBUMIN SERPL-MCNC: 3.8 G/DL (ref 3.4–5)
ALP SERPL-CCNC: 69 U/L (ref 45–117)
ALT SERPL-CCNC: 64 U/L (ref 13–61)
ANION GAP SERPL CALC-SCNC: 7 MMOL/L (ref 4–13)
AST SERPL-CCNC: 30 U/L (ref 15–37)
BILIRUB SERPL-MCNC: 0.5 MG/DL (ref 0.2–1)
BUN SERPL-MCNC: 14.2 MG/DL (ref 7–18)
CALCIUM SERPL-MCNC: 9.7 MG/DL (ref 8.5–10.1)
CHLORIDE SERPL-SCNC: 106 MMOL/L (ref 98–107)
CO2 SERPL-SCNC: 26 MMOL/L (ref 21–32)
CREAT SERPL-MCNC: 1.1 MG/DL (ref 0.55–1.3)
DEPRECATED RDW RBC AUTO: 15.1 % (ref 11.9–15.9)
GLUCOSE SERPL-MCNC: 73 MG/DL (ref 74–106)
HCT VFR BLD CALC: 38.6 % (ref 35.4–49)
HGB BLD-MCNC: 12.7 GM/DL (ref 11.7–16.9)
INR BLD: 1.12 (ref 0.83–1.09)
MAGNESIUM SERPL-MCNC: 2 MG/DL (ref 1.8–2.4)
MCH RBC QN AUTO: 30.8 PG (ref 25.7–33.7)
MCHC RBC AUTO-ENTMCNC: 32.9 G/DL (ref 32–35.9)
MCV RBC: 93.6 FL (ref 80–96)
PLATELET # BLD AUTO: 226 10^3/UL (ref 134–434)
PMV BLD: 8.5 FL (ref 7.5–11.1)
POTASSIUM SERPLBLD-SCNC: 4.2 MMOL/L (ref 3.5–5.1)
PROT SERPL-MCNC: 7.8 G/DL (ref 6.4–8.2)
PT PNL PPP: 12.8 SEC (ref 9.7–13)
RBC # BLD AUTO: 4.12 M/MM3 (ref 4–5.6)
SODIUM SERPL-SCNC: 139 MMOL/L (ref 136–145)
WBC # BLD AUTO: 4.6 K/MM3 (ref 4–10)

## 2024-07-24 RX ADMIN — IBUPROFEN PRN MG: 600 TABLET, FILM COATED ORAL at 17:25

## 2024-07-24 RX ADMIN — Medication SCH TAB: at 07:45

## 2024-07-24 NOTE — BH INPATIENT PSYCHIATRY PROGRESS NOTE - NSBHFUPINTERVALCCFT_PSY_A_CORE

## 2024-07-25 RX ADMIN — LACTULOSE SCH GM: 20 SOLUTION ORAL at 10:10

## 2024-07-30 RX ADMIN — ANALGESIC BALM SCH: 1.74; 4.06 OINTMENT TOPICAL at 10:46

## 2024-07-31 RX ADMIN — CHOLECALCIFEROL TAB 10 MCG (400 UNIT) SCH UNIT: 10 TAB at 12:36

## 2024-08-01 VITALS — HEART RATE: 85 BPM | DIASTOLIC BLOOD PRESSURE: 79 MMHG | SYSTOLIC BLOOD PRESSURE: 153 MMHG

## 2024-08-01 VITALS — TEMPERATURE: 97.1 F

## 2024-08-01 VITALS — RESPIRATION RATE: 18 BRPM

## 2024-08-10 ENCOUNTER — EMERGENCY (EMERGENCY)
Facility: HOSPITAL | Age: 59
LOS: 1 days | Discharge: HOME | End: 2024-08-10
Admitting: EMERGENCY MEDICINE
Payer: MEDICAID

## 2024-08-10 VITALS
HEART RATE: 97 BPM | RESPIRATION RATE: 18 BRPM | TEMPERATURE: 98 F | DIASTOLIC BLOOD PRESSURE: 79 MMHG | OXYGEN SATURATION: 96 % | SYSTOLIC BLOOD PRESSURE: 119 MMHG

## 2024-08-10 VITALS
TEMPERATURE: 98 F | OXYGEN SATURATION: 99 % | HEIGHT: 74 IN | DIASTOLIC BLOOD PRESSURE: 82 MMHG | SYSTOLIC BLOOD PRESSURE: 119 MMHG | RESPIRATION RATE: 16 BRPM | WEIGHT: 285.06 LBS | HEART RATE: 100 BPM

## 2024-08-10 DIAGNOSIS — R05.9 COUGH, UNSPECIFIED: ICD-10-CM

## 2024-08-10 DIAGNOSIS — I10 ESSENTIAL (PRIMARY) HYPERTENSION: ICD-10-CM

## 2024-08-10 DIAGNOSIS — B34.9 VIRAL INFECTION, UNSPECIFIED: ICD-10-CM

## 2024-08-10 DIAGNOSIS — R07.89 OTHER CHEST PAIN: ICD-10-CM

## 2024-08-10 DIAGNOSIS — F20.9 SCHIZOPHRENIA, UNSPECIFIED: ICD-10-CM

## 2024-08-10 DIAGNOSIS — Z86.79 PERSONAL HISTORY OF OTHER DISEASES OF THE CIRCULATORY SYSTEM: ICD-10-CM

## 2024-08-10 DIAGNOSIS — F31.9 BIPOLAR DISORDER, UNSPECIFIED: ICD-10-CM

## 2024-08-10 DIAGNOSIS — Z20.822 CONTACT WITH AND (SUSPECTED) EXPOSURE TO COVID-19: ICD-10-CM

## 2024-08-10 LAB
ALBUMIN SERPL ELPH-MCNC: 3.9 G/DL — SIGNIFICANT CHANGE UP (ref 3.4–5)
ALP SERPL-CCNC: 60 U/L — SIGNIFICANT CHANGE UP (ref 40–120)
ALT FLD-CCNC: 100 U/L — HIGH (ref 12–42)
ANION GAP SERPL CALC-SCNC: 12 MMOL/L — SIGNIFICANT CHANGE UP (ref 9–16)
AST SERPL-CCNC: 69 U/L — HIGH (ref 15–37)
BASOPHILS # BLD AUTO: 0.03 K/UL — SIGNIFICANT CHANGE UP (ref 0–0.2)
BASOPHILS NFR BLD AUTO: 0.4 % — SIGNIFICANT CHANGE UP (ref 0–2)
BILIRUB SERPL-MCNC: 0.9 MG/DL — SIGNIFICANT CHANGE UP (ref 0.2–1.2)
BUN SERPL-MCNC: 20 MG/DL — SIGNIFICANT CHANGE UP (ref 7–23)
CALCIUM SERPL-MCNC: 9.3 MG/DL — SIGNIFICANT CHANGE UP (ref 8.5–10.5)
CHLORIDE SERPL-SCNC: 104 MMOL/L — SIGNIFICANT CHANGE UP (ref 96–108)
CO2 SERPL-SCNC: 25 MMOL/L — SIGNIFICANT CHANGE UP (ref 22–31)
CREAT SERPL-MCNC: 1.26 MG/DL — SIGNIFICANT CHANGE UP (ref 0.5–1.3)
EGFR: 66 ML/MIN/1.73M2 — SIGNIFICANT CHANGE UP
EOSINOPHIL # BLD AUTO: 0.06 K/UL — SIGNIFICANT CHANGE UP (ref 0–0.5)
EOSINOPHIL NFR BLD AUTO: 0.8 % — SIGNIFICANT CHANGE UP (ref 0–6)
GLUCOSE SERPL-MCNC: 71 MG/DL — SIGNIFICANT CHANGE UP (ref 70–99)
HCT VFR BLD CALC: 39.1 % — SIGNIFICANT CHANGE UP (ref 39–50)
HGB BLD-MCNC: 13 G/DL — SIGNIFICANT CHANGE UP (ref 13–17)
IMM GRANULOCYTES NFR BLD AUTO: 0.4 % — SIGNIFICANT CHANGE UP (ref 0–0.9)
LYMPHOCYTES # BLD AUTO: 2.52 K/UL — SIGNIFICANT CHANGE UP (ref 1–3.3)
LYMPHOCYTES # BLD AUTO: 32.9 % — SIGNIFICANT CHANGE UP (ref 13–44)
MCHC RBC-ENTMCNC: 31 PG — SIGNIFICANT CHANGE UP (ref 27–34)
MCHC RBC-ENTMCNC: 33.2 GM/DL — SIGNIFICANT CHANGE UP (ref 32–36)
MCV RBC AUTO: 93.3 FL — SIGNIFICANT CHANGE UP (ref 80–100)
MONOCYTES # BLD AUTO: 0.98 K/UL — HIGH (ref 0–0.9)
MONOCYTES NFR BLD AUTO: 12.8 % — SIGNIFICANT CHANGE UP (ref 2–14)
NEUTROPHILS # BLD AUTO: 4.04 K/UL — SIGNIFICANT CHANGE UP (ref 1.8–7.4)
NEUTROPHILS NFR BLD AUTO: 52.7 % — SIGNIFICANT CHANGE UP (ref 43–77)
NRBC # BLD: 0 /100 WBCS — SIGNIFICANT CHANGE UP (ref 0–0)
PLATELET # BLD AUTO: 268 K/UL — SIGNIFICANT CHANGE UP (ref 150–400)
POTASSIUM SERPL-MCNC: 4.4 MMOL/L — SIGNIFICANT CHANGE UP (ref 3.5–5.3)
POTASSIUM SERPL-SCNC: 4.4 MMOL/L — SIGNIFICANT CHANGE UP (ref 3.5–5.3)
PROT SERPL-MCNC: 8.4 G/DL — HIGH (ref 6.4–8.2)
RBC # BLD: 4.19 M/UL — LOW (ref 4.2–5.8)
RBC # FLD: 15 % — HIGH (ref 10.3–14.5)
SARS-COV-2 RNA SPEC QL NAA+PROBE: SIGNIFICANT CHANGE UP
SODIUM SERPL-SCNC: 141 MMOL/L — SIGNIFICANT CHANGE UP (ref 132–145)
TROPONIN I, HIGH SENSITIVITY RESULT: 12 NG/L — SIGNIFICANT CHANGE UP
WBC # BLD: 7.66 K/UL — SIGNIFICANT CHANGE UP (ref 3.8–10.5)
WBC # FLD AUTO: 7.66 K/UL — SIGNIFICANT CHANGE UP (ref 3.8–10.5)

## 2024-08-10 PROCEDURE — 71045 X-RAY EXAM CHEST 1 VIEW: CPT | Mod: 26

## 2024-08-10 PROCEDURE — 99285 EMERGENCY DEPT VISIT HI MDM: CPT

## 2024-08-10 RX ORDER — KETOROLAC TROMETHAMINE 10 MG
15 TABLET ORAL ONCE
Refills: 0 | Status: DISCONTINUED | OUTPATIENT
Start: 2024-08-10 | End: 2024-08-10

## 2024-08-10 RX ADMIN — Medication 15 MILLIGRAM(S): at 18:35

## 2024-08-10 NOTE — ED PROVIDER NOTE - CARE PROVIDER_API CALL
Getachew Guardado  Cardiovascular Disease  [address] [place] [address], [address]  [place] [address]  Phone: [phone]  Fax: [phone]  Follow Up Time:

## 2024-08-10 NOTE — ED ADULT NURSE NOTE - OBJECTIVE STATEMENT
Patient presents with complaints of left sided chest pain radiating up to the temple started today, associated with SOB. Denies fever, nausea, vomiting, weakness. patient was at [facility] ED on 8/7. Tested + for covid.

## 2024-08-10 NOTE — ED PROVIDER NOTE - OBJECTIVE STATEMENT
60 yo male history of HTN, alcohol/polysubstance use, Depression, Bipolar, Schizophrenia, pericarditis presents c/o cough, congestion  with assoc chest pressure today. denies fever/chills/sob/dizziness/syncope. patient tested positive for covid-19 three days but then states he went to another hospital where he tested negative for  covid-19.

## 2024-08-10 NOTE — ED PROVIDER NOTE - NSFOLLOWUPINSTRUCTIONS_ED_ALL_ED_FT
Chest Pain    Chest pain can be caused by many different conditions which may or may not be dangerous. Causes include heartburn, lung infections, heart attack, blood clot in lungs, skin infections, strain or damage to muscle, cartilage, or bones, etc. In addition to a history and physical examination, an electrocardiogram (ECG) or other lab tests may have been performed to determine the cause of your chest pain. Follow up with your primary care provider or with a cardiologist as instructed.     SEEK IMMEDIATE MEDICAL CARE IF YOU HAVE ANY OF THE FOLLOWING SYMPTOMS: worsening chest pain, coughing up blood, unexplained back/neck/jaw pain, severe abdominal pain, dizziness or lightheadedness, fainting, shortness of breath, sweaty or clammy skin, vomiting, or racing heart beat. These symptoms may represent a serious problem that is an emergency. Do not wait to see if the symptoms will go away. Get medical help right away. Call 911 and do not drive yourself to the hospital.     PLEASE FOLLOW-UP WITH YOUR PRIMARY CARE DOCTOR IN 1-2 DAYS FOR FURTHER EVALUATION.      PLEASE TAKE ALL PAPERWORK FROM TODAY'S VISIT TO YOUR PRIMARY DOCTOR.     IF YOU DO NOT HAVE A PRIMARY CARE DOCTOR PLEASE REFER TO THE OFFICE/CLINIC INFORMATION GIVEN BELOW:    If you do not have a doctor, you can call our referral line to find a doctor that matches your insurance; the number is [phone].     You can also follow up with clinics listed below, if you do not have a doctor:  [place] [address]  To make an appointment, call [phone]    [facility]  Address: [address] [place] [address]  Appointment Center: [phone])     To access your record on the patient portal [facility], please visit:  [email or web address]  If you are having difficulties setting this up, call [phone] and someone can assist you over the phone.     PLEASE RETURN TO THE ER IMMEDIATELY OR CALL 911 ANY HIGH FEVER, CHEST PAIN, TROUBLE BREATHING, VOMITING, SEVERE PAIN, OR ANY OTHER CONCERNS

## 2024-08-10 NOTE — ED PROVIDER NOTE - PATIENT PORTAL LINK FT
You can access the FollowMyHealth Patient Portal offered by [facility] by registering at the following website: [email or web address]. By joining [facility]’s FollowMyHealth portal, you will also be able to view your health information using other applications (apps) compatible with our system.

## 2024-08-10 NOTE — ED ADULT NURSE NOTE - NSFALLUNIVINTERV_ED_ALL_ED
Bed/Stretcher in lowest position, wheels locked, appropriate side rails in place/Call bell, personal items and telephone in reach/Instruct patient to call for assistance before getting out of bed/chair/stretcher/Non-slip footwear applied when patient is off stretcher/[place] to call system/Physically safe environment - no spills, clutter or unnecessary equipment/Purposeful proactive rounding/Room/bathroom lighting operational, light cord in reach

## 2024-08-10 NOTE — ED PROVIDER NOTE - PHYSICAL EXAMINATION
CONSTITUTIONAL: Well-appearing; well-nourished; in no apparent distress.   	HEAD: Normocephalic; atraumatic.   	EYES:  conjunctiva and sclera clear  	ENT: normal nose; no rhinorrhea; normal pharynx with no erythema or lesions.   	NECK: Supple; non-tender;   	CARDIOVASCULAR: Normal S1, S2; no murmurs, rubs, or gallops. Regular rate and rhythm.   	RESPIRATORY: Breathing easily; breath sounds clear and equal bilaterally; no wheezes, rhonchi, or rales.  	GI: Soft; non-distended; non-tender  	EXT: GOEL x 4 . normal gait.   	SKIN: Normal for age and race; warm; dry; good turgor; no apparent lesions or rash.   	NEURO: A & O x 3; face symmetric; grossly unremarkable.   PSYCHOLOGICAL: The patient’s mood and manner are appropriate.

## 2024-08-10 NOTE — ED PROVIDER NOTE - CLINICAL SUMMARY MEDICAL DECISION MAKING FREE TEXT BOX
60 yo male history of HTN, alcohol/polysubstance use, Depression, Bipolar, Schizophrenia, pericarditis presents c/o cough, congestion  with assoc chest pressure today. denies fever/chills/sob/dizziness/syncope.    looks well, vss, ekg non ischemic, most likely viral syndrome, will check labs, patient asking for toradol which was ordered. covid-19 testing    //labs/cxr unremarkable, covid-19 negative, patient feeling better, advised f/u pcp, we discussed supportive care, will dc.

## 2024-08-12 ENCOUNTER — EMERGENCY (EMERGENCY)
Facility: HOSPITAL | Age: 59
LOS: 1 days | Discharge: HOME | End: 2024-08-12
Attending: STUDENT IN AN ORGANIZED HEALTH CARE EDUCATION/TRAINING PROGRAM | Admitting: STUDENT IN AN ORGANIZED HEALTH CARE EDUCATION/TRAINING PROGRAM
Payer: COMMERCIAL

## 2024-08-12 VITALS
TEMPERATURE: 98 F | DIASTOLIC BLOOD PRESSURE: 71 MMHG | RESPIRATION RATE: 18 BRPM | WEIGHT: 285.06 LBS | SYSTOLIC BLOOD PRESSURE: 135 MMHG | OXYGEN SATURATION: 95 % | HEART RATE: 70 BPM | HEIGHT: 74 IN

## 2024-08-12 DIAGNOSIS — U07.1 COVID-19: ICD-10-CM

## 2024-08-12 DIAGNOSIS — E66.9 OBESITY, UNSPECIFIED: ICD-10-CM

## 2024-08-12 DIAGNOSIS — R07.89 OTHER CHEST PAIN: ICD-10-CM

## 2024-08-12 LAB
ANION GAP SERPL CALC-SCNC: 10 MMOL/L — SIGNIFICANT CHANGE UP (ref 5–17)
BASOPHILS # BLD AUTO: 0.02 K/UL — SIGNIFICANT CHANGE UP (ref 0–0.2)
BASOPHILS NFR BLD AUTO: 0.4 % — SIGNIFICANT CHANGE UP (ref 0–2)
BUN SERPL-MCNC: 19 MG/DL — SIGNIFICANT CHANGE UP (ref 7–23)
CALCIUM SERPL-MCNC: 8.7 MG/DL — SIGNIFICANT CHANGE UP (ref 8.4–10.5)
CHLORIDE SERPL-SCNC: 106 MMOL/L — SIGNIFICANT CHANGE UP (ref 96–108)
CO2 SERPL-SCNC: 22 MMOL/L — SIGNIFICANT CHANGE UP (ref 22–31)
CREAT SERPL-MCNC: 1.17 MG/DL — SIGNIFICANT CHANGE UP (ref 0.5–1.3)
EGFR: 72 ML/MIN/1.73M2 — SIGNIFICANT CHANGE UP
EOSINOPHIL # BLD AUTO: 0.17 K/UL — SIGNIFICANT CHANGE UP (ref 0–0.5)
EOSINOPHIL NFR BLD AUTO: 3.1 % — SIGNIFICANT CHANGE UP (ref 0–6)
FLUAV AG NPH QL: SIGNIFICANT CHANGE UP
FLUBV AG NPH QL: SIGNIFICANT CHANGE UP
GLUCOSE SERPL-MCNC: 155 MG/DL — HIGH (ref 70–99)
HCT VFR BLD CALC: 36.5 % — LOW (ref 39–50)
HGB BLD-MCNC: 11.7 G/DL — LOW (ref 13–17)
IMM GRANULOCYTES NFR BLD AUTO: 0.2 % — SIGNIFICANT CHANGE UP (ref 0–0.9)
LYMPHOCYTES # BLD AUTO: 1.9 K/UL — SIGNIFICANT CHANGE UP (ref 1–3.3)
LYMPHOCYTES # BLD AUTO: 35.2 % — SIGNIFICANT CHANGE UP (ref 13–44)
MCHC RBC-ENTMCNC: 30.1 PG — SIGNIFICANT CHANGE UP (ref 27–34)
MCHC RBC-ENTMCNC: 32.1 GM/DL — SIGNIFICANT CHANGE UP (ref 32–36)
MCV RBC AUTO: 93.8 FL — SIGNIFICANT CHANGE UP (ref 80–100)
MONOCYTES # BLD AUTO: 0.57 K/UL — SIGNIFICANT CHANGE UP (ref 0–0.9)
MONOCYTES NFR BLD AUTO: 10.6 % — SIGNIFICANT CHANGE UP (ref 2–14)
NEUTROPHILS # BLD AUTO: 2.73 K/UL — SIGNIFICANT CHANGE UP (ref 1.8–7.4)
NEUTROPHILS NFR BLD AUTO: 50.5 % — SIGNIFICANT CHANGE UP (ref 43–77)
NRBC # BLD: 0 /100 WBCS — SIGNIFICANT CHANGE UP (ref 0–0)
PLATELET # BLD AUTO: 258 K/UL — SIGNIFICANT CHANGE UP (ref 150–400)
POTASSIUM SERPL-MCNC: 4 MMOL/L — SIGNIFICANT CHANGE UP (ref 3.5–5.3)
POTASSIUM SERPL-SCNC: 4 MMOL/L — SIGNIFICANT CHANGE UP (ref 3.5–5.3)
RBC # BLD: 3.89 M/UL — LOW (ref 4.2–5.8)
RBC # FLD: 14.9 % — HIGH (ref 10.3–14.5)
RSV RNA NPH QL NAA+NON-PROBE: SIGNIFICANT CHANGE UP
SARS-COV-2 RNA SPEC QL NAA+PROBE: DETECTED
SODIUM SERPL-SCNC: 138 MMOL/L — SIGNIFICANT CHANGE UP (ref 135–145)
TROPONIN T, HIGH SENSITIVITY RESULT: 6 NG/L — SIGNIFICANT CHANGE UP (ref 0–51)
WBC # BLD: 5.4 K/UL — SIGNIFICANT CHANGE UP (ref 3.8–10.5)
WBC # FLD AUTO: 5.4 K/UL — SIGNIFICANT CHANGE UP (ref 3.8–10.5)

## 2024-08-12 PROCEDURE — 84484 ASSAY OF TROPONIN QUANT: CPT

## 2024-08-12 PROCEDURE — 87637 SARSCOV2&INF A&B&RSV AMP PRB: CPT

## 2024-08-12 PROCEDURE — 85025 COMPLETE CBC W/AUTO DIFF WBC: CPT

## 2024-08-12 PROCEDURE — 99283 EMERGENCY DEPT VISIT LOW MDM: CPT

## 2024-08-12 PROCEDURE — 80048 BASIC METABOLIC PNL TOTAL CA: CPT

## 2024-08-12 PROCEDURE — 36415 COLL VENOUS BLD VENIPUNCTURE: CPT

## 2024-08-12 PROCEDURE — 99284 EMERGENCY DEPT VISIT MOD MDM: CPT

## 2024-08-12 RX ORDER — IBUPROFEN 200 MG
800 TABLET ORAL ONCE
Refills: 0 | Status: COMPLETED | OUTPATIENT
Start: 2024-08-12 | End: 2024-08-12

## 2024-08-12 RX ADMIN — Medication 800 MILLIGRAM(S): at 09:45

## 2024-08-12 NOTE — ED PROVIDER NOTE - OBJECTIVE STATEMENT
The pt is a 60 y/o M, pmh alcohol/polysubstance abuse, bipolar/schizophrenia, htn, who presents to ED c/o congestion, cough x 2 wks, L sided cp x 10 yrs. Pt states "they keep telling me the pain is because of the drugs I use, but I've been clean for 2 d", has not seen cards, no acute changes in symptoms today. Cough and congestion x 2wks, claims to be covid+ but when seen at [facility] on 8/10 had a neg covid test, clear cxr. Denies fevers, chills, dizziness, syncope, n/v/d, abd pain. Pt requesting another covid test - does not believe the neg results.

## 2024-08-12 NOTE — ED PROVIDER NOTE - NSFOLLOWUPINSTRUCTIONS_ED_ALL_ED_FT
Chest Pain  Chest pain can be caused by many different conditions which may or may not be dangerous. Causes include heartburn, lung infections, heart attack, blood clot in lungs, skin infections, strain or damage to muscle, cartilage, or bones, etc. In addition to a history and physical examination, an electrocardiogram (ECG) or other lab tests may have been performed to determine the cause of your chest pain. Follow up with your primary care provider or with a cardiologist as instructed.   SEEK IMMEDIATE MEDICAL CARE IF YOU HAVE ANY OF THE FOLLOWING SYMPTOMS: worsening chest pain, coughing up blood, unexplained back/neck/jaw pain, severe abdominal pain, dizziness or lightheadedness, fainting, shortness of breath, sweaty or clammy skin, vomiting, or racing heart beat. These symptoms may represent a serious problem that is an emergency. Do not wait to see if the symptoms will go away. Get medical help right away. Call 911 and do not drive yourself to the hospital.   COVID-19  COVID-19 is an infection caused by a virus called SARS-CoV-2. This type of virus is called a coronavirus. People with COVID-19 may:  Have little to no symptoms.  Have mild to moderate symptoms that affect their lungs and breathing.  Get very sick.  What are the causes?  The human body, showing how the coronavirus travels from the air to a person's lungs.  COVID-19 is caused by a virus. This virus may be in the air as droplets or on surfaces. It can spread from an infected person when they cough, sneeze, speak, sing, or breathe. You may become infected if:  You breathe in the infected droplets in the air.  You touch an object that has the virus on it.  What increases the risk?  You are at risk of getting COVID-19 if you have been around someone with the infection. You may be more likely to get very sick if:  You are 65 years old or older.  You have certain medical conditions, such as:  Heart disease.  Diabetes.  Chronic respiratory disease.  Cancer.  Pregnancy.  You are immunocompromised. This means your body cannot fight infections easily.  You have a disability or trouble moving, meaning you're immobile.  What are the signs or symptoms?  People may have different symptoms from COVID-19. The symptoms can also be mild to severe. They often show up in 5–6 days after being infected. But they can take up to 14 days to appear. Common symptoms are:  Cough.  Feeling tired.  New loss of taste or smell.  Fever.  Less common symptoms are:  Sore throat.  Headache.  Body or muscle aches.  Diarrhea.  A skin rash or odd-colored fingers or toes.  Red or irritated eyes.  Sometimes, COVID-19 does not cause symptoms.  How is this diagnosed?  COVID-19 can be diagnosed with tests done in the lab or at home. Fluid from your nose, mouth, or lungs will be used to check for the virus.  How is this treated?  Treatment for COVID-19 depends on how sick you are.  Mild symptoms can be treated at home with rest, fluids, and over-the-counter medicines.  Severe symptoms may be treated in a hospital intensive care unit (ICU).  If you have symptoms and are at risk of getting very sick, you may be given a medicine that fights viruses. This medicine is called an antiviral.  How is this prevented?  To protect yourself from COVID-19:  Know your risk factors.  Get vaccinated.  If your body cannot fight infections easily, talk to your provider about treatment to help prevent COVID-19.  Stay at least 1 meter away from others.  Wear a well-fitted mask when:  You can't stay at a distance from people.  You're in a place with poor air flow.  Try to be in open spaces with good air flow when in public.  Wash your hands often or use an alcohol-based hand .  Cover your nose and mouth when coughing and sneezing.  If you think you have COVID-19 or have been around someone who has it, stay home and be by yourself for 5–10 days.  Where to find more information  Centers for Disease Control and Prevention (CDC): cdc.gov  World Health Organization (WHO): who.int  Get help right away if:  You have trouble breathing or get short of breath.  You have pain or pressure in your chest.  You cannot speak or move any part of your body.  You are confused.  Your symptoms get worse.  These symptoms may be an emergency. Get help right away. Call 911.  Do not wait to see if the symptoms will go away.  Do not drive yourself to the hospital.

## 2024-08-12 NOTE — ED PROVIDER NOTE - CLINICAL SUMMARY MEDICAL DECISION MAKING FREE TEXT BOX
pt c/o L sided cp x 10 yrs - states that pain is 2/2 to drug use, no acute changes in symptoms today, also c/o cough/congestion x 2 wks, of note was seen at [facility] 8/10 for same - had neg labs, clear cxr, neg covid swab. pt demanding that all be repeated because he doesn't trust the results, nad, hemodynamically stable, cp non exertional w/o associated sob/n/dizziness. ekg non ischemic, no indication to repeat cxr. labs             , given ibuprofen for pain. to f/u w/clnic pt c/o L sided cp x 10 yrs - states that pain is 2/2 to drug use, no acute changes in symptoms today, also c/o cough/congestion x 2 wks, of note was seen at [facility] 8/10 for same - had neg labs, clear cxr, neg covid swab. pt demanding that all be repeated because he doesn't trust the results, nad, hemodynamically stable, cp non exertional w/o associated sob/n/dizziness. ekg non ischemic, no indication to repeat cxr. labs wnl, viral panel + covid, given ibuprofen for pain. to f/u w/clnic

## 2024-08-12 NOTE — ED ADULT TRIAGE NOTE - CHIEF COMPLAINT QUOTE
Pt covid+ on 8/7, co rhinorrhea, congestion, productive cough with green sputum and L sided CP radiating to back x15 min. EKG done. HX HTN, HLD, pericarditis 2013.

## 2024-08-12 NOTE — ED ADULT NURSE NOTE - NSFALLCONCLUSION_ED_ALL_ED
Addended by: JAZIEL LAWLER on: 6/20/2024 01:05 PM     Modules accepted: Orders     Universal Safety Interventions

## 2024-08-12 NOTE — ED ADULT NURSE NOTE - OBJECTIVE STATEMENT
58 yo M PMHx HTN, HLD, Pericarditis presents to the ED co congestion, couch, chest pain, SOB x 1 day. Pt awake and alert, AOx4. pt reports he was COVID positive on 8/7. Pt reports yesterday he started to develop sx so he went to a clinic where they told him he was Covid negative. pt reports productive cough x 1 day, coughing up green sputum. Pt reports L sided chest pain / SOB x 1 day. Pt breathing spontaneously and unlabored. Pt reports runny nose, body aches, and chills. pt denies known fevers. Pt denies N/V/D, lightheadedness, dizziness, numbness, tingling, vision changes. EKG done in triage.

## 2024-08-12 NOTE — ED ADULT NURSE NOTE - IS THE PATIENT ABLE TO BE SCREENED?
I called patient regarding positive strep culture results.  No answer, left message return call.  We are about to close.  I sent in amoxicillin to the pharmacy, as strep that was untreated previously.  Will attempt to call in the morning.   Yes

## 2024-08-12 NOTE — ED ADULT NURSE NOTE - NSFALLUNIVINTERV_ED_ALL_ED
Bed/Stretcher in lowest position, wheels locked, appropriate side rails in place/Call bell, personal items and telephone in reach/Instruct patient to call for assistance before getting out of bed/chair/stretcher/Non-slip footwear applied when patient is off stretcher/[place] to call system/Physically safe environment - no spills, clutter or unnecessary equipment/Purposeful proactive rounding/Room/bathroom lighting operational, light cord in reach

## 2024-08-12 NOTE — ED PROVIDER NOTE - PATIENT PORTAL LINK FT
You can access the FollowMyHealth Patient Portal offered by [facility] by registering at the following website: [email or web address]. By joining [facility]’s FollowMyHealth portal, you will also be able to view your health information using other applications (apps) compatible with our system.

## 2024-08-19 ENCOUNTER — HOSPITAL ENCOUNTER (EMERGENCY)
Dept: HOSPITAL 74 - JER | Age: 59
LOS: 1 days | Discharge: HOME | End: 2024-08-20
Payer: COMMERCIAL

## 2024-08-19 VITALS — TEMPERATURE: 98 F

## 2024-08-19 VITALS — BODY MASS INDEX: 35.9 KG/M2

## 2024-08-19 DIAGNOSIS — U07.1: ICD-10-CM

## 2024-08-19 DIAGNOSIS — R07.9: ICD-10-CM

## 2024-08-19 DIAGNOSIS — R06.02: Primary | ICD-10-CM
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
## 2024-08-19 DIAGNOSIS — F10.20: ICD-10-CM

## 2024-08-19 LAB
ALBUMIN SERPL-MCNC: 3.8 G/DL (ref 3.4–5)
ALP SERPL-CCNC: 68 U/L (ref 45–117)
ALT SERPL-CCNC: 68 U/L (ref 13–61)
ANION GAP SERPL CALC-SCNC: 10 MMOL/L (ref 4–13)
AST SERPL-CCNC: 52 U/L (ref 15–37)
BASOPHILS # BLD: 1 % (ref 0–2)
BILIRUB SERPL-MCNC: 0.9 MG/DL (ref 0.2–1)
BNP SERPL-MCNC: 46.7 PG/ML (ref 5–125)
BUN SERPL-MCNC: 20.4 MG/DL (ref 7–18)
CALCIUM SERPL-MCNC: 9.1 MG/DL (ref 8.5–10.1)
CHLORIDE SERPL-SCNC: 109 MMOL/L (ref 98–107)
CO2 SERPL-SCNC: 24 MMOL/L (ref 21–32)
CREAT SERPL-MCNC: 1.1 MG/DL (ref 0.55–1.3)
DEPRECATED RDW RBC AUTO: 15.2 % (ref 11.9–15.9)
EOSINOPHIL # BLD: 1.5 % (ref 0–4.5)
GLUCOSE SERPL-MCNC: 87 MG/DL (ref 74–106)
HCT VFR BLD CALC: 35.8 % (ref 35.4–49)
HGB BLD-MCNC: 12.1 GM/DL (ref 11.7–16.9)
LYMPHOCYTES # BLD: 35 % (ref 8–40)
MCH RBC QN AUTO: 31.2 PG (ref 25.7–33.7)
MCHC RBC AUTO-ENTMCNC: 33.8 G/DL (ref 32–35.9)
MCV RBC: 92.3 FL (ref 80–96)
MONOCYTES # BLD AUTO: 11.4 % (ref 3.8–10.2)
NEUTROPHILS # BLD: 51.1 % (ref 42.8–82.8)
PLATELET # BLD AUTO: 300 10^3/UL (ref 134–434)
PMV BLD: 7.9 FL (ref 7.5–11.1)
POTASSIUM SERPLBLD-SCNC: 4.5 MMOL/L (ref 3.5–5.1)
PROT SERPL-MCNC: 7.5 G/DL (ref 6.4–8.2)
RBC # BLD AUTO: 3.88 M/MM3 (ref 4–5.6)
SODIUM SERPL-SCNC: 143 MMOL/L (ref 136–145)
WBC # BLD AUTO: 5.2 K/MM3 (ref 4–10)

## 2024-08-19 PROCEDURE — 3E0333Z INTRODUCTION OF ANTI-INFLAMMATORY INTO PERIPHERAL VEIN, PERCUTANEOUS APPROACH: ICD-10-PCS

## 2024-08-19 RX ADMIN — KETOROLAC TROMETHAMINE ONE: 30 INJECTION INTRAMUSCULAR; INTRAVENOUS at 21:54

## 2024-08-19 RX ADMIN — KETOROLAC TROMETHAMINE ONE MG: 15 INJECTION, SOLUTION INTRAMUSCULAR; INTRAVENOUS at 21:47

## 2024-08-20 VITALS — RESPIRATION RATE: 16 BRPM | HEART RATE: 72 BPM | DIASTOLIC BLOOD PRESSURE: 57 MMHG | SYSTOLIC BLOOD PRESSURE: 101 MMHG

## 2024-08-22 NOTE — ED PROVIDER NOTE - IV ALTEPLASE DOOR HIDDEN
Ira EOS to BE's nurse, Cathy, on 8/22/2024    Preliminary findings:    32 episodes of VT  with an avg rate of 144 bpm    22 episodes of -182 with an avg rate of 133 bpm    Sinus rhythm  with an avg rate of 75 bpm    Supraventricular ectopy: 1.0% isolated, rare couplets and triplets    Ventriculare ectopy: 13.0% isolated, 1.2% couplets, rare triplets    No patient events   show

## 2024-08-26 NOTE — BH INPATIENT PSYCHIATRY ASSESSMENT NOTE - NSBHMSEINTELL_PSY_A_CORE
Lumbar Fusion (Dr. Ventura)    REMEMBER:  ACTIVTY:  Move regularly. Avoid staying in any one position longer than 1-2 hours, ambulate/walk frequently while awake. This will help with stiffness.  May sleep in any position that is most comfortable, in a bed or recliner.     No NSAIDs (Advil, Aleve, Motrin, ibuprofen, naproxen, diclofenac, meloxicam, Celebrex, etc) for 3 months following fusion surgery, except if prescribed one week of Ketorolac.      RESTRICTIONS:  Do not drive for at least 24 hours after surgery or while taking narcotics (Percocet/oxycodone, hydrocodone, tramadol, etc) pain medication.    No pushing, pulling, or lifting of objects greater than 5-10 pounds for 3 weeks. A gallon of milk is 8 pounds for reference. Then may lift up to 15 pounds.   No extreme bending, twisting, or turning of low back. May move as needed for activities of daily living.       BLOOD THINNERS:  May restart 3-5 days after surgery if taking blood thinners (Coumadin, warfarin, Lovenox, etc), or as directed by prescribing provider.   Continue aspirin daily without restriction.      WOUND CARE:  Do not shower for 48 hours following surgery.   Keep surgical incision clean and dry until shower. Change with non-adherent dressing daily and as needed.  If no drainage, may cover or leave uncovered based on personal preference.   Do not remove Steri-Strips they will fall off on their own.   Any nylon sutures will be removed by provider at follow up visit.     Remove lidocaine patch after 12 hours.       DIET:  Continue on clear liquid diet until passing gas.  Then may resume regular diet.   Continue Docusate until bowel movement.   If needed add Milk of Magnesia or Magnesium Citrate.  If you do not have a bowel movement in 72 hours with magnesium citrate, go to the Emergency Department.     REMEMBER:   It is normal to have post-surgical back pain/spasms, even with small incisions.   Ice and/or heat may be helpful.    It is normal to have  coming and going nerve pain in the legs as the nerve heals.   Nerve pain may be replaced initially with numbness and tingling/ pins and needles.    Week to week, post-surgical pain should become less often and less intense.   Continue medication as prescribed.      CALL PHYSICIAN:   Signs of infection at incision site:   progressive drainage or an unusual odor  increased redness and or swelling  increased pain and or tenderness    Excessive Bleeding:  Slow general oozing that completely soaks dressing  Fresh bright red bleeding or bleeding that will not stop.   It is normal to have a couple of days of drainage, but continues beyond 5 days of surgery.   Inability to go the bathroom    FOR PRESCRIPTION REFILLS GIVE 48 HOURS NOTICE. Do not wait until Friday after 3pm to call.    Follow-up 2-3 weeks from surgery for radiographs and suture removal.    Dr. Kermit Ventura' Office  [facility] - : [phone]  [place] Voicemail (Marc Martini): [phone]  *Please leave Name, , & call back number   Average

## 2024-08-29 ENCOUNTER — HOSPITAL ENCOUNTER (INPATIENT)
Dept: HOSPITAL 74 - YASAS | Age: 59
LOS: 4 days | Discharge: HOME | End: 2024-09-02
Attending: PSYCHIATRY & NEUROLOGY | Admitting: ALLERGY & IMMUNOLOGY
Payer: COMMERCIAL

## 2024-08-29 VITALS — BODY MASS INDEX: 36.6 KG/M2

## 2024-08-29 DIAGNOSIS — K21.9: ICD-10-CM

## 2024-08-29 DIAGNOSIS — E78.5: ICD-10-CM

## 2024-08-29 DIAGNOSIS — F32.A: ICD-10-CM

## 2024-08-29 DIAGNOSIS — F39: ICD-10-CM

## 2024-08-29 DIAGNOSIS — F41.9: ICD-10-CM

## 2024-08-29 DIAGNOSIS — F10.230: Primary | ICD-10-CM

## 2024-08-29 DIAGNOSIS — F14.20: ICD-10-CM

## 2024-08-29 DIAGNOSIS — I10: ICD-10-CM

## 2024-08-29 DIAGNOSIS — F19.282: ICD-10-CM

## 2024-08-29 DIAGNOSIS — M54.50: ICD-10-CM

## 2024-08-29 DIAGNOSIS — G89.29: ICD-10-CM

## 2024-08-29 PROCEDURE — HZ2ZZZZ DETOXIFICATION SERVICES FOR SUBSTANCE ABUSE TREATMENT: ICD-10-PCS | Performed by: SURGERY

## 2024-08-29 RX ADMIN — Medication SCH MG: at 22:30

## 2024-08-29 RX ADMIN — Medication SCH: at 22:30

## 2024-08-30 RX ADMIN — Medication SCH TAB: at 10:03

## 2024-08-30 RX ADMIN — IBUPROFEN PRN MG: 600 TABLET, FILM COATED ORAL at 16:58

## 2024-08-31 LAB
ALBUMIN SERPL-MCNC: 3.5 G/DL (ref 3.4–5)
ALP SERPL-CCNC: 77 U/L (ref 45–117)
ALT SERPL-CCNC: 54 U/L (ref 13–61)
ANION GAP SERPL CALC-SCNC: 6 MMOL/L (ref 4–13)
AST SERPL-CCNC: 47 U/L (ref 15–37)
BILIRUB SERPL-MCNC: 0.4 MG/DL (ref 0.2–1)
BUN SERPL-MCNC: 14.4 MG/DL (ref 7–18)
CALCIUM SERPL-MCNC: 9 MG/DL (ref 8.5–10.1)
CHLORIDE SERPL-SCNC: 107 MMOL/L (ref 98–107)
CO2 SERPL-SCNC: 28 MMOL/L (ref 21–32)
CREAT SERPL-MCNC: 1 MG/DL (ref 0.55–1.3)
DEPRECATED RDW RBC AUTO: 15.8 % (ref 11.9–15.9)
GLUCOSE SERPL-MCNC: 88 MG/DL (ref 74–106)
HCT VFR BLD CALC: 35.4 % (ref 35.4–49)
HGB BLD-MCNC: 11.9 GM/DL (ref 11.7–16.9)
MCH RBC QN AUTO: 31.6 PG (ref 25.7–33.7)
MCHC RBC AUTO-ENTMCNC: 33.5 G/DL (ref 32–35.9)
MCV RBC: 94.1 FL (ref 80–96)
PLATELET # BLD AUTO: 236 10^3/UL (ref 134–434)
PMV BLD: 8.9 FL (ref 7.5–11.1)
POTASSIUM SERPLBLD-SCNC: 4.2 MMOL/L (ref 3.5–5.1)
PROT SERPL-MCNC: 6.8 G/DL (ref 6.4–8.2)
RBC # BLD AUTO: 3.76 M/MM3 (ref 4–5.6)
SODIUM SERPL-SCNC: 140 MMOL/L (ref 136–145)
WBC # BLD AUTO: 4.3 K/MM3 (ref 4–10)

## 2024-08-31 RX ADMIN — DIPHENHYDRAMINE HCL PRN MG: 25 CAPSULE ORAL at 21:48

## 2024-09-02 VITALS — SYSTOLIC BLOOD PRESSURE: 132 MMHG | HEART RATE: 67 BPM | DIASTOLIC BLOOD PRESSURE: 61 MMHG

## 2024-09-02 VITALS — RESPIRATION RATE: 18 BRPM | TEMPERATURE: 97.7 F

## 2024-09-24 NOTE — BH INPATIENT PSYCHIATRY ASSESSMENT NOTE - NS ED BHA REVIEW OF ED CHART VITAL SIGNS REVIEWED
[de-identified] : Cough  [FreeTextEntry6] : Chely  is a 13 year old female with hx of ALL s/p chemotherapy, total body irradiation and BMT 9/13/21 with remission  since 10/2021, currently on HRT here for cough. As per patient with cough and throat pain for past 2-3 days. No fever. some congestion. No other symptoms.   Yes

## 2024-09-25 ENCOUNTER — HOSPITAL ENCOUNTER (INPATIENT)
Dept: HOSPITAL 74 - YASAS | Age: 59
LOS: 6 days | Discharge: HOME | End: 2024-10-01
Attending: SURGERY | Admitting: ALLERGY & IMMUNOLOGY
Payer: COMMERCIAL

## 2024-09-25 VITALS — BODY MASS INDEX: 38.2 KG/M2

## 2024-09-25 DIAGNOSIS — E78.5: ICD-10-CM

## 2024-09-25 DIAGNOSIS — F10.230: Primary | ICD-10-CM

## 2024-09-25 DIAGNOSIS — G89.29: ICD-10-CM

## 2024-09-25 DIAGNOSIS — F32.A: ICD-10-CM

## 2024-09-25 DIAGNOSIS — F14.20: ICD-10-CM

## 2024-09-25 DIAGNOSIS — M54.50: ICD-10-CM

## 2024-09-25 DIAGNOSIS — I10: ICD-10-CM

## 2024-09-25 DIAGNOSIS — Z59.01: ICD-10-CM

## 2024-09-25 DIAGNOSIS — F41.9: ICD-10-CM

## 2024-09-25 RX ADMIN — Medication SCH MG: at 22:29

## 2024-09-25 RX ADMIN — ONDANSETRON PRN MG: 4 TABLET, ORALLY DISINTEGRATING ORAL at 21:18

## 2024-09-25 SDOH — ECONOMIC STABILITY - HOUSING INSECURITY: SHELTERED HOMELESSNESS: Z59.01

## 2024-09-26 PROCEDURE — HZ2ZZZZ DETOXIFICATION SERVICES FOR SUBSTANCE ABUSE TREATMENT: ICD-10-PCS | Performed by: SURGERY

## 2024-09-26 RX ADMIN — HYDROCHLOROTHIAZIDE SCH MG: 25 TABLET ORAL at 09:52

## 2024-09-26 RX ADMIN — Medication SCH TAB: at 09:52

## 2024-09-26 RX ADMIN — IBUPROFEN PRN MG: 600 TABLET, FILM COATED ORAL at 11:55

## 2024-09-28 RX ADMIN — BISMUTH SUBSALICYLATE PRN MG: 525 LIQUID ORAL at 04:42

## 2024-10-01 VITALS
SYSTOLIC BLOOD PRESSURE: 127 MMHG | RESPIRATION RATE: 17 BRPM | TEMPERATURE: 97.7 F | DIASTOLIC BLOOD PRESSURE: 77 MMHG | HEART RATE: 77 BPM

## 2024-10-22 NOTE — BH INPATIENT PSYCHIATRY PROGRESS NOTE - NSTXSUBMISGOAL_PSY_ALL_CORE
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  Has had recurrent admissions for pneumonia and Pseudomonas/MSSA and status post pneumonectomy    Continue immunosuppression medications including Prograf, atovaquone, prednisone  On prophylactic Zithromax and acyclovir    
Will verbalize 2 areas that serve as motivation for change as it pertains to addiction
Be able to acknowledge that substance abuse is a problem
Be able to acknowledge that substance abuse is a problem

## 2024-11-03 ENCOUNTER — HOSPITAL ENCOUNTER (INPATIENT)
Dept: HOSPITAL 74 - YASAS | Age: 59
LOS: 4 days | Discharge: OTHER INSTITUTION | End: 2024-11-07
Attending: SURGERY | Admitting: SURGERY
Payer: COMMERCIAL

## 2024-11-03 VITALS — BODY MASS INDEX: 36.4 KG/M2

## 2024-11-03 DIAGNOSIS — F14.20: ICD-10-CM

## 2024-11-03 DIAGNOSIS — G89.29: ICD-10-CM

## 2024-11-03 DIAGNOSIS — F41.9: ICD-10-CM

## 2024-11-03 DIAGNOSIS — K21.9: ICD-10-CM

## 2024-11-03 DIAGNOSIS — F19.282: ICD-10-CM

## 2024-11-03 DIAGNOSIS — F10.230: Primary | ICD-10-CM

## 2024-11-03 DIAGNOSIS — J18.9: ICD-10-CM

## 2024-11-03 DIAGNOSIS — M54.50: ICD-10-CM

## 2024-11-03 DIAGNOSIS — I10: ICD-10-CM

## 2024-11-03 DIAGNOSIS — F39: ICD-10-CM

## 2024-11-03 PROCEDURE — HZ2ZZZZ DETOXIFICATION SERVICES FOR SUBSTANCE ABUSE TREATMENT: ICD-10-PCS | Performed by: SURGERY

## 2024-11-03 RX ADMIN — FAMOTIDINE SCH: 20 TABLET ORAL at 22:48

## 2024-11-03 RX ADMIN — GUAIFENESIN PRN ML: 100 SOLUTION ORAL at 13:09

## 2024-11-03 RX ADMIN — IBUPROFEN PRN MG: 600 TABLET, FILM COATED ORAL at 23:59

## 2024-11-03 RX ADMIN — Medication SCH: at 22:48

## 2024-11-04 LAB
ALBUMIN SERPL-MCNC: 3.4 G/DL (ref 3.4–5)
ALP SERPL-CCNC: 99 U/L (ref 45–117)
ALT SERPL-CCNC: 49 U/L (ref 13–61)
ANION GAP SERPL CALC-SCNC: 6 MMOL/L (ref 4–13)
AST SERPL-CCNC: 46 U/L (ref 15–37)
BILIRUB SERPL-MCNC: 0.5 MG/DL (ref 0.2–1)
BUN SERPL-MCNC: 14.4 MG/DL (ref 7–18)
CALCIUM SERPL-MCNC: 8.8 MG/DL (ref 8.5–10.1)
CHLORIDE SERPL-SCNC: 114 MMOL/L (ref 98–107)
CO2 SERPL-SCNC: 25 MMOL/L (ref 21–32)
CREAT SERPL-MCNC: 1.2 MG/DL (ref 0.55–1.3)
DEPRECATED RDW RBC AUTO: 14.3 % (ref 11.9–15.9)
GLUCOSE SERPL-MCNC: 108 MG/DL (ref 74–106)
HCT VFR BLD CALC: 36.3 % (ref 35.4–49)
HGB BLD-MCNC: 12.1 GM/DL (ref 11.7–16.9)
MCH RBC QN AUTO: 31 PG (ref 25.7–33.7)
MCHC RBC AUTO-ENTMCNC: 33.3 G/DL (ref 32–35.9)
MCV RBC: 93 FL (ref 80–96)
PLATELET # BLD AUTO: 189 10^3/UL (ref 134–434)
PMV BLD: 8.8 FL (ref 7.5–11.1)
POTASSIUM SERPLBLD-SCNC: 3.8 MMOL/L (ref 3.5–5.1)
PROT SERPL-MCNC: 6.8 G/DL (ref 6.4–8.2)
RBC # BLD AUTO: 3.9 M/MM3 (ref 4–5.6)
SODIUM SERPL-SCNC: 145 MMOL/L (ref 136–145)
WBC # BLD AUTO: 4.9 K/MM3 (ref 4–10)

## 2024-11-04 RX ADMIN — DIPHENHYDRAMINE HCL PRN MG: 25 CAPSULE ORAL at 22:39

## 2024-11-04 RX ADMIN — Medication SCH TAB: at 10:23

## 2024-11-04 RX ADMIN — HYDROCHLOROTHIAZIDE SCH: 25 TABLET ORAL at 10:23

## 2024-11-05 RX ADMIN — AMOXICILLIN AND CLAVULANATE POTASSIUM SCH TAB: 875; 125 TABLET, FILM COATED ORAL at 13:52

## 2024-11-06 VITALS — RESPIRATION RATE: 18 BRPM

## 2024-11-06 RX ADMIN — IBUPROFEN PRN MG: 400 TABLET, FILM COATED ORAL at 18:48

## 2024-11-07 ENCOUNTER — HOSPITAL ENCOUNTER (INPATIENT)
Dept: HOSPITAL 74 - YASAS | Age: 59
LOS: 9 days | Discharge: HOME | DRG: 772 | End: 2024-11-16
Attending: PSYCHIATRY & NEUROLOGY | Admitting: PSYCHIATRY & NEUROLOGY
Payer: COMMERCIAL

## 2024-11-07 VITALS — DIASTOLIC BLOOD PRESSURE: 75 MMHG | HEART RATE: 86 BPM | SYSTOLIC BLOOD PRESSURE: 118 MMHG | TEMPERATURE: 97.3 F

## 2024-11-07 DIAGNOSIS — I10: ICD-10-CM

## 2024-11-07 DIAGNOSIS — F10.20: Primary | ICD-10-CM

## 2024-11-07 DIAGNOSIS — Z87.891: ICD-10-CM

## 2024-11-07 DIAGNOSIS — M25.552: ICD-10-CM

## 2024-11-07 DIAGNOSIS — F14.20: ICD-10-CM

## 2024-11-07 PROCEDURE — HZ42ZZZ GROUP COUNSELING FOR SUBSTANCE ABUSE TREATMENT, COGNITIVE-BEHAVIORAL: ICD-10-PCS | Performed by: PSYCHIATRY & NEUROLOGY

## 2024-11-07 RX ADMIN — IBUPROFEN PRN MG: 600 TABLET, FILM COATED ORAL at 22:19

## 2024-11-07 RX ADMIN — AMOXICILLIN AND CLAVULANATE POTASSIUM SCH TAB: 875; 125 TABLET, FILM COATED ORAL at 17:15

## 2024-11-07 RX ADMIN — Medication SCH: at 21:49

## 2024-11-07 RX ADMIN — FAMOTIDINE SCH MG: 20 TABLET ORAL at 16:15

## 2024-11-07 RX ADMIN — Medication SCH: at 21:48

## 2024-11-08 RX ADMIN — IBUPROFEN PRN MG: 400 TABLET, FILM COATED ORAL at 22:08

## 2024-11-08 RX ADMIN — HYDROCHLOROTHIAZIDE SCH MG: 25 TABLET ORAL at 09:45

## 2024-11-08 RX ADMIN — Medication SCH TAB: at 09:45

## 2024-11-09 RX ADMIN — Medication SCH EACH: at 21:58

## 2024-11-12 RX ADMIN — PSEUDOEPHEDRINE HCL AND TRIPROLIDINE HCL PRN COMBO: 60; 2.5 TABLET, FILM COATED ORAL at 21:37

## 2024-11-12 RX ADMIN — LIDOCAINE PRN PATCH: 50 PATCH TOPICAL at 09:46

## 2024-11-12 RX ADMIN — GUAIFENESIN PRN ML: 100 SOLUTION ORAL at 21:37

## 2024-11-13 RX ADMIN — Medication PRN EACH: at 15:07

## 2024-11-14 VITALS — RESPIRATION RATE: 18 BRPM

## 2024-11-14 NOTE — BH INPATIENT PSYCHIATRY ASSESSMENT NOTE - NSICDXBHPRIMARYDX_PSY_ALL_CORE
Left message on voice mail to call our office. Please offer [facility] 5:15 opening this evening. Thanks,   Patient called back and accepted 5:15pm time slot   Ambulatory Substance use disorder   F19.90   Substance induced mood disorder   F19.56

## 2024-11-16 VITALS — HEART RATE: 102 BPM | DIASTOLIC BLOOD PRESSURE: 73 MMHG | SYSTOLIC BLOOD PRESSURE: 132 MMHG

## 2024-11-16 VITALS — TEMPERATURE: 97.3 F

## 2024-11-16 RX ADMIN — NALOXONE HYDROCHLORIDE SCH: 4 SPRAY NASAL at 13:56

## 2024-12-03 ENCOUNTER — INPATIENT (INPATIENT)
Facility: HOSPITAL | Age: 59
LOS: 9 days | Discharge: HOME | End: 2024-12-13
Attending: STUDENT IN AN ORGANIZED HEALTH CARE EDUCATION/TRAINING PROGRAM | Admitting: STUDENT IN AN ORGANIZED HEALTH CARE EDUCATION/TRAINING PROGRAM
Payer: MEDICAID

## 2024-12-03 VITALS
WEIGHT: 281.09 LBS | OXYGEN SATURATION: 96 % | TEMPERATURE: 98 F | HEART RATE: 63 BPM | SYSTOLIC BLOOD PRESSURE: 115 MMHG | RESPIRATION RATE: 16 BRPM | DIASTOLIC BLOOD PRESSURE: 77 MMHG | HEIGHT: 74 IN

## 2024-12-03 DIAGNOSIS — F39 UNSPECIFIED MOOD [AFFECTIVE] DISORDER: ICD-10-CM

## 2024-12-03 LAB
ALBUMIN SERPL ELPH-MCNC: 4 G/DL — SIGNIFICANT CHANGE UP (ref 3.3–5)
ALP SERPL-CCNC: 68 U/L — SIGNIFICANT CHANGE UP (ref 40–120)
ALT FLD-CCNC: 33 U/L — SIGNIFICANT CHANGE UP (ref 4–41)
ANION GAP SERPL CALC-SCNC: 13 MMOL/L — SIGNIFICANT CHANGE UP (ref 7–14)
APAP SERPL-MCNC: <10 UG/ML — LOW (ref 15–25)
AST SERPL-CCNC: 46 U/L — HIGH (ref 4–40)
BASOPHILS # BLD AUTO: 0.02 K/UL — SIGNIFICANT CHANGE UP (ref 0–0.2)
BASOPHILS NFR BLD AUTO: 0.4 % — SIGNIFICANT CHANGE UP (ref 0–2)
BILIRUB SERPL-MCNC: 0.7 MG/DL — SIGNIFICANT CHANGE UP (ref 0.2–1.2)
BUN SERPL-MCNC: 21 MG/DL — SIGNIFICANT CHANGE UP (ref 7–23)
CALCIUM SERPL-MCNC: 9 MG/DL — SIGNIFICANT CHANGE UP (ref 8.4–10.5)
CHLORIDE SERPL-SCNC: 106 MMOL/L — SIGNIFICANT CHANGE UP (ref 98–107)
CO2 SERPL-SCNC: 21 MMOL/L — LOW (ref 22–31)
CREAT SERPL-MCNC: 1.26 MG/DL — SIGNIFICANT CHANGE UP (ref 0.5–1.3)
EGFR: 66 ML/MIN/1.73M2 — SIGNIFICANT CHANGE UP
EOSINOPHIL # BLD AUTO: 0.1 K/UL — SIGNIFICANT CHANGE UP (ref 0–0.5)
EOSINOPHIL NFR BLD AUTO: 2.1 % — SIGNIFICANT CHANGE UP (ref 0–6)
ETHANOL SERPL-MCNC: <10 MG/DL — SIGNIFICANT CHANGE UP
GLUCOSE SERPL-MCNC: 92 MG/DL — SIGNIFICANT CHANGE UP (ref 70–99)
HCT VFR BLD CALC: 34.8 % — LOW (ref 39–50)
HGB BLD-MCNC: 12 G/DL — LOW (ref 13–17)
IANC: 2.54 K/UL — SIGNIFICANT CHANGE UP (ref 1.8–7.4)
IMM GRANULOCYTES NFR BLD AUTO: 0.2 % — SIGNIFICANT CHANGE UP (ref 0–0.9)
LYMPHOCYTES # BLD AUTO: 1.61 K/UL — SIGNIFICANT CHANGE UP (ref 1–3.3)
LYMPHOCYTES # BLD AUTO: 33.3 % — SIGNIFICANT CHANGE UP (ref 13–44)
MCHC RBC-ENTMCNC: 30.7 PG — SIGNIFICANT CHANGE UP (ref 27–34)
MCHC RBC-ENTMCNC: 34.5 G/DL — SIGNIFICANT CHANGE UP (ref 32–36)
MCV RBC AUTO: 89 FL — SIGNIFICANT CHANGE UP (ref 80–100)
MONOCYTES # BLD AUTO: 0.56 K/UL — SIGNIFICANT CHANGE UP (ref 0–0.9)
MONOCYTES NFR BLD AUTO: 11.6 % — SIGNIFICANT CHANGE UP (ref 2–14)
NEUTROPHILS # BLD AUTO: 2.54 K/UL — SIGNIFICANT CHANGE UP (ref 1.8–7.4)
NEUTROPHILS NFR BLD AUTO: 52.4 % — SIGNIFICANT CHANGE UP (ref 43–77)
NRBC # BLD: 0 /100 WBCS — SIGNIFICANT CHANGE UP (ref 0–0)
NRBC # FLD: 0 K/UL — SIGNIFICANT CHANGE UP (ref 0–0)
PLATELET # BLD AUTO: 258 K/UL — SIGNIFICANT CHANGE UP (ref 150–400)
POTASSIUM SERPL-MCNC: 3.9 MMOL/L — SIGNIFICANT CHANGE UP (ref 3.5–5.3)
POTASSIUM SERPL-SCNC: 3.9 MMOL/L — SIGNIFICANT CHANGE UP (ref 3.5–5.3)
PROT SERPL-MCNC: 7.1 G/DL — SIGNIFICANT CHANGE UP (ref 6–8.3)
RBC # BLD: 3.91 M/UL — LOW (ref 4.2–5.8)
RBC # FLD: 14.3 % — SIGNIFICANT CHANGE UP (ref 10.3–14.5)
SALICYLATES SERPL-MCNC: <0.3 MG/DL — LOW (ref 15–30)
SARS-COV-2 RNA SPEC QL NAA+PROBE: SIGNIFICANT CHANGE UP
SODIUM SERPL-SCNC: 140 MMOL/L — SIGNIFICANT CHANGE UP (ref 135–145)
TOXICOLOGY SCREEN, DRUGS OF ABUSE, SERUM RESULT: SIGNIFICANT CHANGE UP
TSH SERPL-MCNC: 2.43 UIU/ML — SIGNIFICANT CHANGE UP (ref 0.27–4.2)
WBC # BLD: 4.84 K/UL — SIGNIFICANT CHANGE UP (ref 3.8–10.5)
WBC # FLD AUTO: 4.84 K/UL — SIGNIFICANT CHANGE UP (ref 3.8–10.5)

## 2024-12-03 PROCEDURE — 99285 EMERGENCY DEPT VISIT HI MDM: CPT

## 2024-12-03 PROCEDURE — 99285 EMERGENCY DEPT VISIT HI MDM: CPT | Mod: GC

## 2024-12-03 RX ORDER — HALOPERIDOL 2 MG
5 TABLET ORAL ONCE
Refills: 0 | Status: DISCONTINUED | OUTPATIENT
Start: 2024-12-04 | End: 2024-12-13

## 2024-12-03 RX ORDER — LORAZEPAM 2 MG/1
2 TABLET ORAL ONCE
Refills: 0 | Status: DISCONTINUED | OUTPATIENT
Start: 2024-12-04 | End: 2024-12-11

## 2024-12-03 RX ORDER — ARIPIPRAZOLE 15 MG/1
10 TABLET ORAL AT BEDTIME
Refills: 0 | Status: DISCONTINUED | OUTPATIENT
Start: 2024-12-04 | End: 2024-12-04

## 2024-12-03 RX ORDER — IBUPROFEN 200 MG
600 TABLET ORAL ONCE
Refills: 0 | Status: COMPLETED | OUTPATIENT
Start: 2024-12-03 | End: 2024-12-03

## 2024-12-03 RX ORDER — LORAZEPAM 2 MG/1
2 TABLET ORAL EVERY 6 HOURS
Refills: 0 | Status: DISCONTINUED | OUTPATIENT
Start: 2024-12-04 | End: 2024-12-04

## 2024-12-03 RX ORDER — HALOPERIDOL 2 MG
5 TABLET ORAL EVERY 6 HOURS
Refills: 0 | Status: DISCONTINUED | OUTPATIENT
Start: 2024-12-04 | End: 2024-12-13

## 2024-12-03 RX ADMIN — Medication 600 MILLIGRAM(S): at 22:11

## 2024-12-03 NOTE — ED BEHAVIORAL HEALTH ASSESSMENT NOTE - AXIS III
HTN, pericarditis Per chart review: HTN, pericarditis, self reports hx of CHF (claims EF around ? 45%), CKD, GERD. per psyckes, past meds to include: HCTZ 25mg, lipitor 40mg, ASA 81mg, entresto 24-26mg, folic acid 1mg, thiamine 100mg

## 2024-12-03 NOTE — ED BEHAVIORAL HEALTH ASSESSMENT NOTE - NSACTIVEVENT_PSY_ALL_CORE
Pending incarceration or homelessness Triggering events leading to humiliation, shame, and/or despair (e.g., Loss of relationship, financial or health status) (real or anticipated)/Pending incarceration or homelessness

## 2024-12-03 NOTE — ED BEHAVIORAL HEALTH ASSESSMENT NOTE - VIOLENCE RISK FACTORS:
Substance abuse/Community stressors that increase the risk of destabilization Substance abuse/Noncompliance with treatment/Community stressors that increase the risk of destabilization

## 2024-12-03 NOTE — ED BEHAVIORAL HEALTH ASSESSMENT NOTE - OTHER PAST PSYCHIATRIC HISTORY (INCLUDE DETAILS REGARDING ONSET, COURSE OF ILLNESS, INPATIENT/OUTPATIENT TREATMENT)
see HPI self reports hx of depression + anxiety  multiple past diagnoses as per Psyckes: with pan-diagnoses namely: Major Depressive Disorder, Substance-Induced Depressive Disorder, Unspecified/Other Bipolar, Depressive Disorder,  Antisocial Personality Disorder,  schizoaffective Disorder, Bipolar I, Bipolar II, Unspecified/Other Anxiety Disorder, Adjustment Disorder, Substance-Induced Sleep Disorder, Schizophrenia, Substance-Induced Psychotic Disorder, Unspecified/Other Psychotic Disorders, Attention Deficit Hyperactivity Disorder, Borderline Personality Disorder, Unspecified/Other Psychotic Disorders, Attention Deficit Hyperactivity Disorder, Borderline Personality Disorder, Insomnia Disorder, Other Mental Disorders  and Substance-Induced Anxiety Disorder  high mental health utilizer including numerous Psych ED/ CPEP visits for SI, alcohol intoxication  multiple prior psych admissions  chronically nonadherent with meds   stated past hx of allegedly self aborting jumping [place] front [place] a 7 train over the summer ("stopped by an emre" (2023), reporting the same again in july 2024  denied other past suicide attempts.   Denies ever being in consistent outpatient psychiatric care.

## 2024-12-03 NOTE — ED BEHAVIORAL HEALTH ASSESSMENT NOTE - NSBHSAALC_PSY_A_CORE FT
Per chart review: "Pt reports daily ~3 pints of vodka for 'a long time' Per chart review: "Pt reports daily ~3 pints of vodka for 'a long time', reports last use was yesterday

## 2024-12-03 NOTE — ED BEHAVIORAL HEALTH ASSESSMENT NOTE - NSBHSACOC_PSY_A_CORE FT
Pt declined to discuss use; prior chart indicates history of use Prior chart indicates history of use

## 2024-12-03 NOTE — ED BEHAVIORAL HEALTH ASSESSMENT NOTE - SUMMARY
60 yo male, homeless, reports staying on the street at this time, with dubious diagnosis of bipolar disorder, also with alcohol use disorder, prior documented cocaine use disorder, antisocial pd per PSYCKES, suspicion of malingering on previous visits, multiple ED visits reporting chest pain and/or SI and/or command AH to kill himself, HTN, pericarditis, self-presents reporting SI and command AH to kill himself.   Patient is not engaged in outpt psychiatric care, has many past detox/rehab visits, who presents with SI/command AH to kill himself. In my opinion, pt is malingering to obtain shelter. He does not appear manic or psychotic, and he does not meet criteria for a major depressive episode. Though he reports command AH to kill himself, he does not appear internally preoccupied/psychotic. Additionally, when informed of discharge, pt reported chest pain. Pt does not present an acute danger to self or others and would not benefit from inpatient psychiatric admission at this time.    SAFE ACT REPORT  Submitted On: 8/7/2024 4:30:46 PM  Reference Number: [id number]  By: Tara Gottlieb  For: Dre Dickerson 59 yr old male, single, undomiciled (lives in hotels) and unemployed. PPHx of self-reported diagnosis of bipolar disorder, but many documented diagnoses ranging across mood, psychotic, and personality disorders, hx of many prior hospitalizations and ED visits,  discharged from [facility] on 07/15/24  with no psychotropic medication, chronic nonadherence to meds and outpt treatment, polysubstance use disorder (cannabis, alcohol, prior documented cocaine use disorder), suspicion of malingering on previous visits, hx of several prior self-aborted suicide attempts (planned to jump in front of train 2023, "stopped by an emre"),  hx of many prior detox/rehabs (last at [facility] Dec 2023), hx of multiple ED visits reporting chest pain and/or SI and/or command AH to kill himself, PMHx of  HTN, pericarditis, BIB by self, reporting SI and worsening depressed mood.    Today patient presents endorsing SI with stated plan to jump in front of train or shoot himself, in the setting of chronic psychosocial stressors and  likely secondary gain. On interview, there is no evidence of psychosis, pt is linear, organized, fully oriented, does not appear to respond to internal stimuli. He subjectively reports worsening depressive sxs, endorses recent active SI with plan, and access to firearms. Patient also reports AH and HI with similarly vague explanations documented in prior visits. High suspicion for malingering but given acute risk factors and serious nature of patient's complaints, and pt is seeking voluntary inpatient hospitalization.    PLAN: 59 yr old male, single, undomiciled (lives in hotels) and unemployed. PPHx of self-reported diagnosis of bipolar disorder, but many documented diagnoses ranging across mood, psychotic, and personality disorders, hx of many prior hospitalizations and ED visits,  discharged from [facility] on 07/15/24  with no psychotropic medication, chronic nonadherence to meds and outpt treatment, polysubstance use disorder (cannabis, alcohol, prior documented cocaine use disorder), suspicion of malingering on previous visits, hx of several prior self-aborted suicide attempts (planned to jump in front of train 2023, "stopped by an emre"),  hx of many prior detox/rehabs (last at [facility] Dec 2023), hx of multiple ED visits reporting chest pain and/or SI and/or command AH to kill himself, PMHx of  HTN, pericarditis, BIB by self, reporting SI and worsening depressed mood.    Today patient presents endorsing SI with stated plan to jump in front of train or shoot himself, in the setting of chronic psychosocial stressors and  likely secondary gain. On interview, there is no evidence of psychosis, pt is linear, organized, fully oriented, does not appear to respond to internal stimuli. He subjectively reports worsening depressive sxs, endorses recent active SI with plan, and access to firearms. Patient also reports AH and HI with similarly vague explanations documented in prior visits. High suspicion for malingering but given acute risk factors and serious nature of patient's complaints, and pt is seeking voluntary inpatient hospitalization.    PLAN:  1. Admit to  on 9.13  2. Routine Obs: denies current active SIIP/HIIP, no need for CO 1:1  3. PRNs: Haldol 5mg/Benadryl 50mg IM/PO, q6h, prn for agitation in the context of acute psychiatric illness   4. Psychiatric:  - Start Abilify 5mg HS for mood disorder   5. Medical: Per chart review: HTN, pericarditis, self reports hx of CHF (claims EF around ? 45%), CKD, GERD. non-compliant with past meds: HCTZ 25mg, lipitor 40mg, ASA 81mg, entresto 24-26mg, folic acid 1mg, thiamine 100mg, no acute   - Recommend consult w/ Hospitalist to determine need to restart home medical medication, will hold for now   6. Individual, group, milleu therapy, as indicated   7. Dispo: pending clinical improvement 59 yr old male, single, undomiciled (lives in hotels) and unemployed. PPHx of self-reported diagnosis of bipolar disorder, but many documented diagnoses ranging across mood, psychotic, and personality disorders, hx of many prior hospitalizations and ED visits,  discharged from [facility] on 07/15/24  with no psychotropic medication, chronic nonadherence to meds and outpt treatment, polysubstance use disorder (cannabis, alcohol, prior documented cocaine use disorder), suspicion of malingering on previous visits, hx of several prior self-aborted suicide attempts (planned to jump in front of train 2023, "stopped by an emre"),  hx of many prior detox/rehabs (last at [facility] Dec 2023), hx of multiple ED visits reporting chest pain and/or SI and/or command AH to kill himself, PMHx of  HTN, pericarditis, BIB by self, reporting SI and worsening depressed mood.    Today patient presents endorsing SI with stated plan to jump in front of train or shoot himself, in the setting of chronic psychosocial stressors and  likely secondary gain. On interview, there is no evidence of psychosis, pt is linear, organized, fully oriented, does not appear to respond to internal stimuli. He subjectively reports worsening depressive sxs, endorses recent active SI with plan, and access to firearms. Patient also reports AH and HI with similarly vague explanations documented in prior visits. High suspicion for malingering but given acute risk factors and serious nature of patient's complaints, and pt is seeking voluntary inpatient hospitalization.    PLAN:  1. Admit to [facility] on 9.13  2. Routine Obs: denies current active SIIP/HIIP, no need for CO 1:1  3. PRNs: Haldol 5mg/Benadryl 50mg IM/PO, q6h, prn for agitation in the context of acute psychiatric illness   4. Psychiatric:  - Start Abilify 5mg HS for mood disorder   5. Medical: Per chart review: HTN, pericarditis, self reports hx of CHF (claims EF around ? 45%), CKD, GERD. non-compliant with past meds: HCTZ 25mg, lipitor 40mg, ASA 81mg, entresto 24-26mg, folic acid 1mg, thiamine 100mg, no acute   - Recommend consult w/ Hospitalist to determine need to restart home medical medication, will hold for now   6. Individual, group, milleu therapy, as indicated   7. Dispo: pending clinical improvement 59 yr old male, single, undomiciled (lives in hotels) and unemployed. PPHx of self-reported diagnosis of bipolar disorder, but many documented diagnoses ranging across mood, psychotic, and personality disorders, hx of many prior hospitalizations and ED visits,  discharged from [facility] on 07/15/24  with no psychotropic medication, chronic nonadherence to meds and outpt treatment, polysubstance use disorder (cannabis, alcohol, prior documented cocaine use disorder), suspicion of malingering on previous visits, hx of several prior self-aborted suicide attempts (planned to jump in front of train 2023, "stopped by an emre"),  hx of many prior detox/rehabs (last at [facility] Dec 2023), hx of multiple ED visits reporting chest pain and/or SI and/or command AH to kill himself, PMHx of  HTN, pericarditis, BIB by self, reporting SI and worsening depressed mood.    Today patient presents endorsing SI with stated plan to jump in front of train or shoot himself, in the setting of chronic psychosocial stressors and  likely secondary gain. On interview, there is no evidence of psychosis, pt is linear, organized, fully oriented, does not appear to respond to internal stimuli. He subjectively reports worsening depressive sxs, endorses recent active SI with plan, and access to firearms. Patient also reports AH and HI with similarly vague explanations documented in prior visits. High suspicion for malingering but given acute risk factors and serious nature of patient's complaints, and pt is seeking voluntary inpatient hospitalization.    PLAN:  1. Admit to [facility] on 9.13  2. Routine Obs: denies current active SIIP/HIIP, no need for CO 1:1  3. PRNs: Haldol 5mg/ativan 2mg IM/PO, q6h, prn for agitation in the context of acute psychiatric illness   4. Psychiatric:  - Start Abilify 10mg HS for mood disorder   5. Medical: Per chart review: HTN, pericarditis, self reports hx of CHF (claims EF around ? 45%), CKD, GERD. non-compliant with past meds: HCTZ 25mg, lipitor 40mg, ASA 81mg, entresto 24-26mg, folic acid 1mg, thiamine 100mg, no acute   - Recommend consult w/ Hospitalist to determine need to restart home medical medication, will hold for now   6. Individual, group, milleu therapy, as indicated   7. Dispo: pending clinical improvement

## 2024-12-03 NOTE — ED BEHAVIORAL HEALTH ASSESSMENT NOTE - NSBHATTESTCOMMENTATTENDFT_PSY_A_CORE
59/M with documented pandiagnoses ranging across affective disorders, psychotic disorders, and personality disorders; historically, a high utilizer of mental health facilities including multiple psych admissions + ED visits; he is chronically nonadherent to psych care. with hx of polysubstance use (cannabis, alcohol, cocaine).  there has also been a strong suspicion of Pt malingering from past visits (centered on procuring housing - would present to the ED as experiencing worsening depression, harboring SI and experiencing AH.  he claimed to have had multiple prior self-aborted suicide attempts (planned to jump in front of train 2023, "stopped by an emre").  there is documented prior numerous detox/rehabs.  Medical issues include:  HTN, pericarditis, self reported hx of CHF (claims EF around ? 45%), CKD, and GERD.  tonight,  he self presented to the ED complaining of worsening depression , having HI + SI with plan to jump in front of a train or shoot self as well as experiencing AH    at this time, endorses worsening symptoms of depression + harboring SI with plans + HI.  he also endorses experiencing AH.  said symptoms are similar in past presentations.  claims that he has been feeling increasingly depressed as precipitated and perpetuated by multiple chronic psychosocial stressors - predominating factor would be his chronic homelessness - of which, he is trying to obtain housing    whilst he endorses experiencing AH, the Pt is NOT manifesting any signs/ symptoms suggestive of acute florid psychosis.  in addition, Pt is NOT manic.  he subjectively reports worsening depressive symptoms, endorses recent active SI with plan along with HI.  as with past presentations, there is once again high suspicion for malingering but given acute risk factors + gravity of Pt's complaints + inability to partake towards safety planning, he cannot be discharged back to the community.  rather, he actively seeks 9.13 psych admission.
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
## 2024-12-03 NOTE — ED BEHAVIORAL HEALTH ASSESSMENT NOTE - NSBHSACONSEQUENCE_PSY_A_CORE FT
Per chart review: "Pt reports many past detox and rehab admissions, including [facility], [facility], others."

## 2024-12-03 NOTE — ED BEHAVIORAL HEALTH ASSESSMENT NOTE - RISK ASSESSMENT
Patient is at chronically elevated risk of harm to self given homelessness, poor social support, past psych admissions, h/o substance abuse.  Protective factors include no clear h/o suicide attempts, no known violence history, treatment-seeking, sense of commitment to 16yo daughter, does not appear manic, psychotic, does not meet criteria for  major depressive episode.   Pt is not at acutely elevated risk of harm to self or others. Acute risk factors - housing instability, ongoing substance use  Chronic risk factors - extensive psychiatric and substance hx, hx outpatient treatment nonadherence, suspected Cluster B pathology  Protective factors -   pt is treatment-seeking and often self-presents to the ED for voluntary hospitalization, no clear h/o suicide attempts, no known violence history, treatment-seeking, sense of commitment to 14yo daughter, does not appear manic, psychotic

## 2024-12-03 NOTE — ED ADULT NURSE NOTE - OBJECTIVE STATEMENT
Pt c/o auditory/visual hallucination, SI and HI x2 days. PPH for Bipolar, Depression. Schizophrenia, substance use disorder, GERD; non-compliant with medication).  Denies alcohol or illicit drug use. Pt is a&ox4, calm and cooperative; denies any physical complaints or discomfort.

## 2024-12-03 NOTE — ED BEHAVIORAL HEALTH ASSESSMENT NOTE - DESCRIPTION
HTN, pericarditis see HPI In the ED, patient in fair behavorial control. Did not require PRNs, emergent IM medication, or use of 4 point restraints.    ICU Vital Signs Last 24 Hrs  T(C): 36.8 (03 Dec 2024 20:29), Max: 36.8 (03 Dec 2024 20:29)  T(F): 98.3 (03 Dec 2024 20:29), Max: 98.3 (03 Dec 2024 20:29)  HR: 63 (03 Dec 2024 20:29) (63 - 63)  BP: 115/77 (03 Dec 2024 20:29) (115/77 - 115/77)  BP(mean): --  ABP: --  ABP(mean): --  RR: 16 (03 Dec 2024 20:29) (16 - 16)  SpO2: 96% (03 Dec 2024 20:29) (96% - 96%)    O2 Parameters below as of 03 Dec 2024 20:29  Patient On (Oxygen Delivery Method): room air Per chart review: HTN, pericarditis, self reports hx of CHF (claims EF around ? 45%), CKD, GERD. per psyckes, past meds to include: HCTZ 25mg, lipitor 40mg, ASA 81mg, entresto 24-26mg, folic acid 1mg, thiamine 100mg

## 2024-12-03 NOTE — ED ADULT TRIAGE NOTE - CHIEF COMPLAINT QUOTE
c/o auditory/visual hallucination, SI and HI x2 days. History of bipolar, depression. schizophrenia, non compliant with medication, substance use disorder, depression, GERD, Denies alcohol/ilicit drug use. c/o auditory/visual hallucination, SI and HI x2 days. History of bipolar, depression. schizophrenia, substance use disorder, depression, GERD (non-compliant with medication).  Denies alcohol/ilicit drug use.
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
## 2024-12-03 NOTE — ED BEHAVIORAL HEALTH ASSESSMENT NOTE - NSSUICPROTFACT_PSY_ALL_CORE
no clear h/o suicide attempts/Responsibility to children, family, or others/Identifies reasons for living

## 2024-12-03 NOTE — ED PROVIDER NOTE - MUSCULOSKELETAL, MLM
According to 3.28.17 encounter  Called [employer] at [phone] to try to obtain health care provider code so patient can unlock this feature. According to [employer] representative, there was an error in the programming in the advisor. This program is currently offline and is expected to be fixed in 2-3 weeks.    Called [employer] and spoke to Bret. This problem is still not fixed. They were expecting it to be fixed in May. No sure when this will be fixed. Called patient and informed her of this information. Also provided 1-800 number as listed above if she wants to continue to call them and check if the \"bug\" is fixed. Patient verbalized understanding of all.    Spine appears normal, range of motion is not limited, no muscle or joint tenderness

## 2024-12-03 NOTE — ED BEHAVIORAL HEALTH ASSESSMENT NOTE - NSBHPSYCHOLCOGORIENT_PSY_A_CORE
Consult has been called to Dr. Alexander Dubon on 5/17/22. Spoke with keo.  3:29 PM    Sherif Rowan  5/17/2022 Oriented to time, place, person, situation

## 2024-12-03 NOTE — ED BEHAVIORAL HEALTH ASSESSMENT NOTE - DIFFERENTIAL
malingering  substance abuse  substance-induced mood disorder substance-induced mood disorder  malingering  substance abuse

## 2024-12-03 NOTE — ED ADULT TRIAGE NOTE - BP NONINVASIVE DIASTOLIC (MM HG)
Walking - Outdoors allowed/Walking - Indoors allowed/No heavy lifting/straining No heavy lifting/straining/Showering allowed/Walking - Outdoors allowed/Walking - Indoors allowed 77

## 2024-12-03 NOTE — ED BEHAVIORAL HEALTH ASSESSMENT NOTE - HPI (INCLUDE ILLNESS QUALITY, SEVERITY, DURATION, TIMING, CONTEXT, MODIFYING FACTORS, ASSOCIATED SIGNS AND SYMPTOMS)
59 yr old male, single, undomiciled (lives in hotels) and unemployed.  self reports hx of bipolar disorder, depression and anxiety; as per Psyckes: with pan-diagnoses namely: Major Depressive Disorder, Substance-Induced Depressive Disorder, Unspecified/Other Bipolar, Depressive Disorder,  Antisocial Personality Disorder,  schizoaffective Disorder, Bipolar I, Bipolar II, Unspecified/Other Anxiety Disorder, Adjustment Disorder, Substance-Induced Sleep Disorder, Schizophrenia, Substance-Induced Psychotic Disorder, Unspecified/Other Psychotic Disorders, Attention Deficit Hyperactivity Disorder, Borderline Personality Disorder, Unspecified/Other Psychotic Disorders, Attention Deficit Hyperactivity Disorder, Borderline Personality Disorder, Insomnia Disorder, Other Mental Disorders  and Substance-Induced Anxiety Disorder, Pt is chronically nonadherent to meds/ psych appts.  has hx of multiple psych admissions (most recently [facility] in Jan 2024), self reports hx of self aborted SA (jumping in front of a 7 train - last summer). with polysubstance use: Alcohol (denied any seizures, stated in past he has had VH during detox - per psyckes, with multiple detoxes/ rehabs - last attendance at [facility]-  for Alcohol dependence, uncomplicated)/ Cocaine/ Other psychoactive substance related disorders/ Cannabis/ Tobacco/ Opioid/  Other stimulant related disorders.  Pt self reports hx of self aborted SA (jumping in front of a 7 train summer 2023. Pertinent medical issues include: HTN, CKD, GERD, pericarditis; self reports hx of CHF (with ? EF 45%), self-presents reporting SI with plan to jump in front of train or shoot himself. 59 yr old male, single, undomiciled (lives in hotels) and unemployed. PPHx of self-reported diagnosis of bipolar disorder, but many documented diagnoses ranging across mood, psychotic, and personality disorders, hx of many prior hospitalizations and ED visits,  discharged from [facility] on 07/15/24  with no psychotropic medication, chronic nonadherence to meds and outpt treatment, polysubstance use disorder (cannabis, alcohol, prior documented cocaine use disorder), suspicion of malingering on previous visits, hx of several prior self-aborted suicide attempts (planned to jump in front of train , "stopped by an emre"),  hx of many prior detox/rehabs (last at [facility] Dec 2023), hx of multiple ED visits reporting chest pain and/or SI and/or command AH to kill himself, PMHx of  HTN, pericarditis, BIB by self, reporting SI and worsening depressed mood.    On exam, patient is calm and cooperative. Provides history almost identical to documented history in previous  ED Assessments from  and . Initially, patient states that he came to the ED because "counselors at [facility] told me that they could help me with housing". When asked to elaborate on psychiatric sxs, patient  reports that he came today feeling suicidal and worsening depressive sxs. States that he has been suicidal "since the beginning of this year ". Patient identifies primary acute stressor, precipitating current depressive sxs (+depressed mood, poor appetite, insomnia w/ ~3 hours/night), as him not being unable to speak to his daughter. Patient acknowledges that he has not spoken to his daughter since he was hospitalized on [place] [facility] in 2024. Identifies additional stressors as loss of "shopping letter" to obtain voucher for housing. States that this letter is sent to  on 24.Patient endorses current passive SI, but reports most recently having active SI w/ plan this evening. Patient states that his plan was to "jump in front of the 1 train". Denies engaging in behavior/acts to attempt this plan. States that he decided not to attempt this evening because "there were some guys on the platform and I didn't want witnesses". Patient reports closest that he has come to a suicide attempt occurred two years ago, where he states " walked to the ledge of [email or web address] station  wanting to die, and an "emre" pulled him back. Patient also reports alternative plan to attempt via "using a gun". Patient reports access to means (states that he "knows people in [place] with a gun"). Also states that he has access to firearms via his brother, who currently lives in [place]? . Patient also endorses vague AH, in which he describes intermittently hearing "his  mother's voice". Patient reports hearing AH during this assessment, but does not appear to be internally preoccupied at any point during the exam. Denies CAH. Patient also reports HI towards daughter's mother, but denies any plans or hx of HI/violence towards her.     Patient reports chronic history of non-adherence. When asked this non-adherence following after multiple hospitalizations, patient is unable to provide clear reason as to why. States that he had last been on Latuda but is unsure of the dose and has not taken any medication in several months. Patient reports closest that he has come to a suicide attempt occurred two years ago, where he states " walked to the ledge of [email or web address] station  wanting to die, and an "emre" pulled him back. Reports use of alcohol and "laced" cannabis 1-2 days ago. Denies current withdrawal symptoms.

## 2024-12-03 NOTE — ED ADULT NURSE NOTE - CHIEF COMPLAINT QUOTE
c/o auditory/visual hallucination, SI and HI x2 days. History of bipolar, depression. schizophrenia, substance use disorder, depression, GERD (non-compliant with medication).  Denies alcohol/ilicit drug use.

## 2024-12-03 NOTE — ED BEHAVIORAL HEALTH ASSESSMENT NOTE - NSBHMSESPEECH_PSY_A_CORE
Normal volume, rate, productivity, spontaneity and articulation no disorganization noted/Normal volume, rate, productivity, spontaneity and articulation

## 2024-12-04 DIAGNOSIS — F19.10 OTHER PSYCHOACTIVE SUBSTANCE ABUSE, UNCOMPLICATED: ICD-10-CM

## 2024-12-04 DIAGNOSIS — F32.A DEPRESSION, UNSPECIFIED: ICD-10-CM

## 2024-12-04 PROCEDURE — 99223 1ST HOSP IP/OBS HIGH 75: CPT

## 2024-12-04 RX ORDER — DIPHENHYDRAMINE HCL 25 MG
50 CAPSULE ORAL ONCE
Refills: 0 | Status: COMPLETED | OUTPATIENT
Start: 2024-12-04 | End: 2024-12-04

## 2024-12-04 RX ORDER — GUAIFENESIN 400 MG
200 TABLET ORAL EVERY 6 HOURS
Refills: 0 | Status: DISCONTINUED | OUTPATIENT
Start: 2024-12-04 | End: 2024-12-13

## 2024-12-04 RX ORDER — CYANOCOBALAMIN/FOLIC AC/VIT B6 1-2.2-25MG
1 TABLET ORAL DAILY
Refills: 0 | Status: DISCONTINUED | OUTPATIENT
Start: 2024-12-05 | End: 2024-12-13

## 2024-12-04 RX ORDER — IBUPROFEN 200 MG
400 TABLET ORAL ONCE
Refills: 0 | Status: COMPLETED | OUTPATIENT
Start: 2024-12-04 | End: 2024-12-04

## 2024-12-04 RX ORDER — BENZOCAINE, MENTHOL 15; 3.6 MG/1; MG/1
1 LOZENGE ORAL THREE TIMES A DAY
Refills: 0 | Status: DISCONTINUED | OUTPATIENT
Start: 2024-12-04 | End: 2024-12-13

## 2024-12-04 RX ORDER — IBUPROFEN 200 MG
600 TABLET ORAL EVERY 8 HOURS
Refills: 0 | Status: DISCONTINUED | OUTPATIENT
Start: 2024-12-04 | End: 2024-12-13

## 2024-12-04 RX ORDER — GUAIFENESIN/DEXTROMETHORPHAN 100-10MG/5
10 SYRUP ORAL EVERY 6 HOURS
Refills: 0 | Status: DISCONTINUED | OUTPATIENT
Start: 2024-12-04 | End: 2024-12-04

## 2024-12-04 RX ORDER — LURASIDONE HYDROCHLORIDE 120 MG/1
20 TABLET, FILM COATED ORAL
Refills: 0 | Status: DISCONTINUED | OUTPATIENT
Start: 2024-12-04 | End: 2024-12-13

## 2024-12-04 RX ADMIN — Medication 50 MILLIGRAM(S): at 01:01

## 2024-12-04 RX ADMIN — LORAZEPAM 2 MILLIGRAM(S): 2 TABLET ORAL at 20:25

## 2024-12-04 RX ADMIN — Medication 400 MILLIGRAM(S): at 01:01

## 2024-12-04 RX ADMIN — Medication 600 MILLIGRAM(S): at 12:14

## 2024-12-04 RX ADMIN — LORAZEPAM 2 MILLIGRAM(S): 2 TABLET ORAL at 01:01

## 2024-12-04 RX ADMIN — Medication 200 MILLIGRAM(S): at 11:12

## 2024-12-04 RX ADMIN — BENZOCAINE, MENTHOL 1 LOZENGE: 15; 3.6 LOZENGE ORAL at 08:58

## 2024-12-04 RX ADMIN — Medication 200 MILLIGRAM(S): at 20:25

## 2024-12-04 NOTE — BH INPATIENT PSYCHIATRY ASSESSMENT NOTE - CURRENT MEDICATION
MEDICATIONS  (STANDING):  lurasidone 20 milliGRAM(s) Oral <User Schedule>    MEDICATIONS  (PRN):  benzocaine/menthol Lozenge 1 Lozenge Oral three times a day PRN cough/throat pain  guaiFENesin Oral Liquid (Sugar-Free) 200 milliGRAM(s) Oral every 6 hours PRN cough  haloperidol     Tablet 5 milliGRAM(s) Oral every 6 hours PRN psychotic agitation  haloperidol    Injectable 5 milliGRAM(s) IntraMuscular Once PRN severe psychotic agitation  ibuprofen  Tablet. 600 milliGRAM(s) Oral every 8 hours PRN Mild Pain (1 - 3), Moderate Pain (4 - 6)  LORazepam     Tablet 2 milliGRAM(s) Oral every 6 hours PRN Agitation  LORazepam   Injectable 2 milliGRAM(s) IntraMuscular Once PRN severe agitation   MEDICATIONS  (STANDING):  aspirin  chewable 81 milliGRAM(s) Oral daily  lurasidone 20 milliGRAM(s) Oral <User Schedule>  multivitamin 1 Tablet(s) Oral daily    MEDICATIONS  (PRN):  benzocaine/menthol Lozenge 1 Lozenge Oral three times a day PRN cough/throat pain  guaiFENesin Oral Liquid (Sugar-Free) 200 milliGRAM(s) Oral every 6 hours PRN cough  haloperidol     Tablet 5 milliGRAM(s) Oral every 6 hours PRN psychotic agitation  haloperidol    Injectable 5 milliGRAM(s) IntraMuscular Once PRN severe psychotic agitation  ibuprofen  Tablet. 600 milliGRAM(s) Oral every 8 hours PRN Mild Pain (1 - 3), Moderate Pain (4 - 6)  LORazepam     Tablet 2 milliGRAM(s) Oral every 6 hours PRN Agitation  LORazepam   Injectable 2 milliGRAM(s) IntraMuscular Once PRN severe agitation

## 2024-12-04 NOTE — PSYCHIATRIC REHAB INITIAL EVALUATION - NSBHPRRECOMMEND_PSY_ALL_CORE
Writer made attempt to meet with patient to orient patient to unit ML4 as well as the role/function of Activities Specialists and Inpatient Psychiatric Rehabilitation programming. However, patient expressed reluctance to participate in the assessment, requesting that it not be conducted and indicating an interest solely in discussions related to housing. During brief interaction writer was able to orient patient to unit ML4 as well as the role/function of Activities Specialists and Inpatient Psychiatric Rehabilitation programming, though this assessment is primarily based off medical records, observations, and brief encounter with patient. Patient presented appropriately dressed and well-groomed with a euthymic mood. Due to writer be unable to collaborate with patient to identify an appropriate goal for patient, writer has defined in the behavioral health plan of care as take all medication as prescribed. Will reassess goal with patient at later date. Psychiatric Rehabilitation staff will meet with patient weekly to review progress towards identified goal.

## 2024-12-04 NOTE — BH PATIENT PROFILE - VISION (WITH CORRECTIVE LENSES IF THE PATIENT USUALLY WEARS THEM):
Patient : Beatriz Gar Age: 69year old Sex: male   MRN: [id number] Encounter Date: 5/22/2022    History     Chief Complaint   Patient presents with   â¢ Hip Pain     HPI  69year old y/o male with h/o Gastroenteritis urinary retention, CHF, sick sinus syndrome, NSTEMI, atrial fibrillation on Eliquis, hyperlipidemia, BPH, CKD, cardiomyopathy, pacemaker, hypertension, anemia presents to ED with  left hip pain after fall out of recliner yesterday evening. Patient was recently discharged home from a rehab facility. Prior to being admitted to this facility he was able to ambulate without difficulty, however after discharge from facility he was wheelchair-bound. Patient lives at home with his wife who is in her 76s and unable to move the patient without help from other people. Patient has been sitting in his recliner for the past week secondary to this issue. Patient fell out of the chair yesterday and EMS had to be called to help lift patient back into the chair. Patient has had persistent left hip pain since this time. Patient is on a blood thinner however he denies any head or neck pain. Denies pain elsewhere. Wife is reporting the patient has been generally weak since discharge. Patient is denying any chest pain, shortness of breath, abdominal pain, nausea, vomiting, diarrhea, headaches, vision changes, fevers, chills. No Known Allergies  Current Discharge Medication List      Prior to Admission Medications    Details   Simethicone 125 MG Tab Take 125 mg by mouth every 6 hours as needed (gas and abdominal discomfort). sacubitril-valsartan (ENTRESTO) 24-26 MG per tablet Take 1 tablet by mouth 2 times daily. Multiple Vitamin (MULTIVITAMIN ADULT PO) Take 1 Dose by mouth every morning. potassium CHLORIDE 10 MEQ ER tablet Take 10 mEq by mouth 2 times daily. ascorbic acid (VITAMIN C) 500 MG tablet Take 500 mg by mouth daily.       cholecalciferol (VITAMIN D) 25 mcg (1,000 units) tablet Take "25 mcg by mouth daily. atorvastatin (LIPITOR) 40 MG tablet Take 1 tablet by mouth at bedtime. Qty: 90 tablet, Refills: 0      folic acid (FOLATE) 1 MG tablet Take 5 tablets by mouth daily. Qty: 90 tablet, Refills: 0      metoPROLOL tartrate (LOPRESSOR) 100 MG tablet Take 1 tablet by mouth every 12 hours. Qty: 90 tablet, Refills: 0      pantoprazole (PROTONIX) 40 MG tablet Take 1 tablet by mouth daily (before breakfast). Do not start before May 1, 2022. Qty: 90 tablet, Refills: 0      pneumococcal 20-valent vaccine (PREVNAR 20) injection Inject 0.5 mLs into the muscle Prior to discharge (prior to discharge). apixaBAN (Eliquis) 5 MG Tab Take 0.5 tablets by mouth every 12 hours. Qty: 180 tablet, Refills: 0      ondansetron (ZOFRAN) 4 MG tablet Take 4 mg by mouth every 6 hours as needed for Nausea (and vomiting). nystatin (MYCOSTATIN) 769575 UNIT/GM powder Apply topically 3 times daily. Qty: 30 g, Refills: 0      furosemide (LASIX) 20 MG tablet Take 1 tablet by mouth daily. Qty: 90 tablet, Refills: 1      spironolactone (ALDACTONE) 25 MG tablet Take 0.5 tablets by mouth daily. Qty: 30 tablet, Refills: 1      insulin NPH (HumuLIN N, NovoLIN N) 100 UNIT/ML injectable suspension Inject 8 Units into the skin 2 times daily. Qty: 10 mL, Refills: 5      acetaminophen (TYLENOL) 325 MG tablet Take 650 mg by mouth every 6 hours as needed for Pain. cyanocobalamin 1000 MCG tablet Take 1 tablet by mouth daily. Do not start before December 14, 2021. Qty: 30 tablet, Refills: 0      Droplet Insulin Syringe 31G X 15/64"" 0.5 ML Misc INJECT 2 TIMES DAILY. (NEED MD APPOINTMENT)  Qty: 200 each, Refills: 1      blood glucose (Accu-Chek Madelyn Plus) test strip 3 times daily. Qty: 300 each, Refills: 3      SOFTCLIX LANCETS Misc TEST THREE TIMES DAILY  Qty: 300 each, Refills: 3      Blood Pressure Monitoring (BLOOD PRESSURE KIT) Device 1 Device 2 days a week.   Qty: 1 Device, Refills: 1      finasteride (PROSCAR) 5 " MG tablet Take 1 tablet by mouth daily. Qty: 90 tablet, Refills: 3           Current Discharge Medication List        Past Medical History:   Diagnosis Date   â¢ Allergic rhinitis 03/12/2018   â¢ Arthritis    â¢ Atrial flutter (CMS/[facility])    â¢ Blood clot associated with vein wall inflammation 1994    Left ankle   â¢ BPH (benign prostatic hyperplasia)    â¢ Diabetes mellitus (CMS/[facility])     IDDM   â¢ DVT (deep venous thrombosis) (CMS/[facility]) 03/27/2020    Marily, Bon L:of leg, 1986   â¢ Essential (primary) hypertension    â¢ Gout    â¢ High cholesterol    â¢ Kidney stones    â¢ Morbid obesity (CMS/[facility])    â¢ Pacemaker    â¢ Recurrent UTI      Past Surgical History:   Procedure Laterality Date   â¢ CATARACT EXTRACTION, BILATERAL  2016    [facility]   â¢ CYSTOSCOPY  2011   â¢ JOINT REPLACEMENT Left    â¢ TOTAL HIP REPLACEMENT Right 2005    [facility]   â¢ Yennifer Sprout Left 05/10/2021    Dr. Adams Figueroa   â¢ TRANSURETHRAL ELEC-SURG PROSTATECTOM  05/23/2016     Family History   Problem Relation Age of Onset   â¢ Patient is unaware of any medical problems Mother      Social History     Tobacco Use   â¢ Smoking status: Former Smoker     Packs/day: 0.00     Types: Cigars   â¢ Smokeless tobacco: Former User     Quit date: 11/22/1971   â¢ Tobacco comment: Smokes cigars whenever he has/gets them   Vaping Use   â¢ Vaping Use: never used   Substance Use Topics   â¢ Alcohol use: Not Currently     Comment: occ    â¢ Drug use: Not Currently     Types: Marijuana     Review of Systems   Constitutional: Negative for chills and fever. HENT: Negative for hearing loss and sore throat. Eyes: Negative for pain and visual disturbance. Respiratory: Negative for cough and shortness of breath. Cardiovascular: Negative for chest pain and palpitations. Gastrointestinal: Negative for nausea and vomiting. Endocrine: Negative for polydipsia and polyuria. Genitourinary: Negative for frequency and urgency.    Musculoskeletal: Negative for back pain Normal vision: sees adequately in most situations; can see medication labels, newsprint and myalgias. Skin: Negative for pallor and rash. Neurological: Negative for speech difficulty and headaches. Physical Exam     ED Triage Vitals   ED Triage Vitals Group      Temp 05/22/22 2224 97.5 Â°F (36.4 Â°C)      Heart Rate 05/22/22 2224 85      Resp 05/22/22 2224 16      BP 05/22/22 2224 130/83      SpO2 05/22/22 2224 100 %      EtCO2 mmHg --       Height --       Weight 05/22/22 2219 152 lb 12.5 oz (69.3 kg)      Weight Scale Used 05/22/22 2219 Scale in bed      BMI (Calculated) --       IBW/kg (Calculated) --      Physical Exam    Constitutional: Awake, alert, NAD  Head: Atraumatic, normocephalic  Eyes: PERRL, EOMI, sclera non-icteric  ENT: Mucous membranes moist, no pharyngeal erythema or exudate  Neck: Supple, trachea midline, no midline tenderness to palpation  CVS: RRR, no murmurs, 2+ radial pulses  Respiratory/chest: No respiratory distress, breathing not labored, normal chest rise, lungs CTA bilaterally, no wheezing  Abdomen: soft, non-tender, non-distended  Back: No step-offs, no CVA tenderness,   No midline tenderness to palpation  Neuro: A&Ox3, no facial droop, normal speech, moves all extremities equally, sensation grossly intact  Extremities:  Tenderness to palpation along the left lateral hip. Patient is able to lift his leg off the bed with some difficulty. Patient has not been ambulatory in the department secondary to weakness.  Normal ROM, no swelling, no deformities  Skin: Warm, dry  Psych: Cooperative, normal mood, normal affect    Lab Results     Results for orders placed or performed during the hospital encounter of 05/22/22   Comprehensive Metabolic Panel   Result Value Ref Range    Fasting Status      Sodium 143 135 - 145 mmol/L    Potassium 3.7 3.4 - 5.1 mmol/L    Chloride 112 (H) 97 - 110 mmol/L    Carbon Dioxide 25 21 - 32 mmol/L    Anion Gap 10 7 - 19 mmol/L    Glucose 112 (H) 70 - 99 mg/dL    BUN 30 (H) 6 - 20 mg/dL    Creatinine 0.91 0.67 - 1.17 mg/dL    Glomerular Filtration Rate 87 >=60    BUN/ Creatinine Ratio 33 (H) 7 - 25    Calcium 8.3 (L) 8.4 - 10.2 mg/dL    Bilirubin, Total 0.3 0.2 - 1.0 mg/dL    GOT/AST 34 <=37 Units/L    GPT/ALT 36 <64 Units/L    Alkaline Phosphatase 101 45 - 117 Units/L    Albumin 1.7 (L) 3.6 - 5.1 g/dL    Protein, Total 4.9 (L) 6.4 - 8.2 g/dL    Globulin 3.2 2.0 - 4.0 g/dL    A/G Ratio 0.5 (L) 1.0 - 2.4   Prothrombin Time   Result Value Ref Range    Prothrombin Time 11.1 9.7 - 11.8 sec    INR 1.0     Partial Thromboplastin Time   Result Value Ref Range    PTT 37 (H) 22 - 30 sec   COVID/Flu/RSV panel   Result Value Ref Range    Rapid SARS-COV-2 by PCR Detected (A) Not Detected / Detected / Presumptive Positive / Inhibitors present    Influenza A by PCR Not Detected Not Detected    Influenza B by PCR Not Detected Not Detected    RSV BY PCR Not Detected Not Detected    Procedural Comment      SARS-CoV-2 Ct Value 32.3    TROPONIN I, HIGH SENSITIVITY   Result Value Ref Range    Troponin I, High Sensitivity 41 <77 ng/L   Magnesium   Result Value Ref Range    Magnesium 1.8 1.7 - 2.4 mg/dL   CBC with Automated Differential (performable only)   Result Value Ref Range    WBC 13.9 (H) 4.2 - 11.0 K/mcL    RBC 4.07 (L) 4.50 - 5.90 mil/mcL    HGB 11.4 (L) 13.0 - 17.0 g/dL    HCT 36.8 (L) 39.0 - 51.0 %    MCV 90.4 78.0 - 100.0 fl    MCH 28.0 26.0 - 34.0 pg    MCHC 31.0 (L) 32.0 - 36.5 g/dL    RDW-CV 21.5 (H) 11.0 - 15.0 %    RDW-SD 68.5 (H) 39.0 - 50.0 fL     140 - 450 K/mcL    NRBC 0 <=0 /100 WBC    Neutrophil, Percent 76 %    Lymphocytes, Percent 14 %    Mono, Percent 9 %    Eosinophils, Percent 1 %    Basophils, Percent 0 %    Immature Granulocytes 0 %    Absolute Neutrophils 10.6 (H) 1.8 - 7.7 K/mcL    Absolute Lymphocytes 1.9 1.0 - 4.0 K/mcL    Absolute Monocytes 1.2 (H) 0.3 - 0.9 K/mcL    Absolute Eosinophils  0.1 0.0 - 0.5 K/mcL    Absolute Basophils 0.0 0.0 - 0.3 K/mcL    Absolute Immmature Granulocytes 0.1 0.0 - 0.2 K/mcL   NT proBNP   Result Value Ref Range    NT-proBNP 8,241 (H) <=450 pg/mL     EKG Results     EKG Interpretation  Rate: 78  Rhythm: Ventricularly paced   Abnormality: No ST elevation or depression    EKG tracing interpreted by ED physician    Radiology Results     Imaging Results          CT HEAD WO CONTRAST (Final result)  Result time 05/23/22 00:12:24    Final result                 Impression:      Mild diffuse atrophy. Senescent or small vessel ischemic changes in the deep white matter. Electronically Signed by: Lashay Ely M.D. Signed on: 5/23/2022 12:12 AM                Narrative:    EXAM: CT HEAD WO CONTRAST    HISTORY: Trauma and headache. TECHNIQUE: 5 mm sequential axial CT images through the posterior fossa and  the supratentorial compartment were acquired without contrast and imaged  using soft tissue and bone algorithms. All CT scans are performed using dose optimization techniques as  appropriate to the exam being performed. These techniques include automatic  exposure control and/or standardized protocols utilizing dose matching  according to exam type and patient size. .    COMPARISON: 04/14/2022. FINDINGS: The ventricles are normal in size. Foci of low attenuation in  the deep white matter likely represents senescent or chronic small vessel  ischemic changes. There is mild diffuse atrophy. No mass, hemorrhage or  loss of the gray-white junction is present. No extra-axial fluid  collections are seen. The calvarium and soft tissues are unremarkable. CT CERVICAL SPINE WO CONTRAST (Final result)  Result time 05/23/22 00:13:34    Final result                 Impression:      No evidence of fracture or malalignment. Electronically Signed by: Lashay Ely M.D. Signed on: 5/23/2022 12:13 AM                Narrative:    EXAM:  CT cervical spine without contrast.    HISTORY:  Trauma and neck pain.     TECHNIQUE:  CT cervical spine performed without intravenous contrast. All  CT scans are performed using dose optimization techniques as appropriate to  the exam being performed. These techniques include automatic exposure  control and/or standardized protocols utilizing dose matching according to  exam type and patient size. COMPARISON: None    FINDINGS:     No evidence of fracture. Vertebral body heights are maintained. Alignment is maintained. Atlanto-occipital articulation intact. Prevertebral soft tissues normal.      Other:  Multilevel degenerative spondylosis. XR CHEST PA OR AP 1 VIEW (Final result)  Result time 05/22/22 23:15:51    Final result                 Impression:       Hazy opacity in the LEFT upper lobe. Otherwise unremarkable. Electronically Signed by: Lashay Ely M.D. Signed on: 5/22/2022 11:15 PM                Narrative:    EXAM: XR CHEST PA OR AP 1 VIEW    HISTORY: Dyspnea    TECHNIQUE: Single AP chest x-ray. COMPARISON: 04/25/2022    FINDINGS:    Heart and Mediastinum: Stable cardiomegaly. Pulmonary vessels: Normal.    Lungs: Haziness in the LEFT upper lung may be due to some focal  consolidation. A skinfold is present as well. Isaiahlla Felisha No pneumothorax  identified. Support devices: Stable. XR HIP 2 VIEWS LEFT W PELVIS 2 VIEWS (Final result)  Result time 05/22/22 23:14:21    Final result                 Impression:      No evidence of fracture. Electronically Signed by: Lashay Ely M.D. Signed on: 5/22/2022 11:14 PM                Narrative:    [place]on [place]: XR HIP 2 VIEWS LEFT W PELVIS 2 VIEWS    HISTORY: Trauma and pain. LEFT hip    TECHNIQUE: Three view(s) of the pelvis and LEFT hip. COMPARISON: 05/10/2021    FINDINGS: No evidence of fracture, dislocation, or bony destructive lesion. Bone mineralization is normal.  Bilateral total hip arthroplasties appear  stable.                                 ED Medication Orders (From admission, onward)    Ordered Start     Status Ordering Provider    05/23/22 0124 05/23/22 0130  cefTRIAXone (ROCEPHIN) syringe 1,000 mg  ONCE         Last MAR action: Given Stephanie Diop    05/23/22 0124 05/23/22 0130  azithromycin (ZITHROMAX) 500 mg in sodium chloride 0.9 % 250 mL IVPB  ONCE         Last MAR action: New Bag IVAN BARNES        MDM  Pt w/ hx and physical exam as above. Presentation most concerning for   Hip fracture, intracranial bleed, cervical spine fracture, infectious process, electrolyte abnormality. Patient has had progressive weakness. Patient fell out of armchair yesterday evening and wife was unable to help him back up into the chair. Seems patient has become increasingly difficult for wife to care for at home given that he has no wheelchair-bound after being discharged from rehab facility. She believes the weakness is gotten progressively worse since his discharge. Patient is complaining of left hip pain otherwise no other symptoms. Patient was found to have new opacity on chest x-ray. Patient was COVID-positive and it appears this was diagnosed prior to discharge from the rehab 2 weeks ago. Patient started on ceftriaxone and azithromycin. Lab work otherwise unremarkable. Patient admitted for treatment of pneumonia and may require placement into new facility or outpatient therapy. Patient understands and agrees with plan for admission. All questions answered and addressed. Clinical Impression     ED Diagnosis   1. Pneumonia due to infectious organism, unspecified laterality, unspecified part of lung         Disposition        Admit 5/23/2022  1:22 AM  Telemetry Bed?: No  Admitting Physician: Claudia [place] [[id number]]  Is this a telephone or verbal order?: This is a telephone order from the admitting physician  Transferring Patient to? Only adjust for transfers between [facility]'s and [address] [facility] and [facility]):  [address] [facility] [[id number]]    Case discussed with and pt evaluated by attending Dr. Saji Florian. Refer to attending note for further documentation.       Freya Back DO  PGY-1 Emergency medicine resident       Freya Back  Resident  05/23/22 [id number]

## 2024-12-04 NOTE — DIETITIAN INITIAL EVALUATION ADULT - PHYSCIAL ASSESSMENT
Date: 2/28/2019    Patient Name: Phylicia Alexis          To Whom it may concern:    Patient does not have significant scoliosis. He has never had any back issues and no limitations with movement. He does not require any work restrictions.         Yannick Lieberman obese

## 2024-12-04 NOTE — DIETITIAN INITIAL EVALUATION ADULT - PERTINENT MEDS FT
MEDICATIONS  (STANDING):  lurasidone 20 milliGRAM(s) Oral <User Schedule>    MEDICATIONS  (PRN):  benzocaine/menthol Lozenge 1 Lozenge Oral three times a day PRN cough/throat pain  guaiFENesin Oral Liquid (Sugar-Free) 200 milliGRAM(s) Oral every 6 hours PRN cough  haloperidol     Tablet 5 milliGRAM(s) Oral every 6 hours PRN psychotic agitation  haloperidol    Injectable 5 milliGRAM(s) IntraMuscular Once PRN severe psychotic agitation  ibuprofen  Tablet. 600 milliGRAM(s) Oral every 8 hours PRN Mild Pain (1 - 3), Moderate Pain (4 - 6)  LORazepam     Tablet 2 milliGRAM(s) Oral every 6 hours PRN Agitation  LORazepam   Injectable 2 milliGRAM(s) IntraMuscular Once PRN severe agitation

## 2024-12-04 NOTE — BH INPATIENT PSYCHIATRY ASSESSMENT NOTE - NSBHLEGALSTATUSDT_PSY_ALL_CORE
[address]  [facility] [address] [facility] [address]  Dept: [phone]    Patient:  Mirta Guerrero  YOB: 1971  Date: 5/16/23    The patient is a 49 y.o. female who presents today for consult of the following problems:     Chief Complaint   Patient presents with    Follow-up     3-4 weeks; ANDRIA Injections. HPI:     Mirta Guerreor is a 49 y.o. female who presents for hospital follow up for neck and back pain. Patient with diffuse axial spine pain, bilateral shoulder pain, bilateral hips radiating diffusely to bilateral lower remedies. Left leg worse than the right. Patient with a longstanding history of neck and back pain, has had 2 previous cervical surgeries, as well as lumbar fusion. Has had extensive work-up recently including with local surgeon, as well as [facility] clinic. Has not been advised for any surgical interventions at this time. Upon hospital discharge, patient was referred to pain management. Did also receive lumbar epidural injection with IR provided no symptomatic improvement. Scattered numbness and tingling to legs. Cramping to both thighs. Having foot cramps. Using narcotics, muscle relaxer for symptomatic management. Also utilizes heat and ice, ice slightly more helpful. Neither provide any lasting relief. Was able to complete lumbar injection in IR-reports no benefit, only worsened pain. ANDRIA at L4-L5. Cervical surgery: 2016, 2019-Dr Kolb  Lumbar surgery: 2004    Has pain management appointment on Friday.     +Daily smoker  BMI 36    History:     Past Medical History:   Diagnosis Date    Abnormal uterine bleeding (AUB)     Anxiety     Arthritis     CAD (coronary artery disease)     Dr. Maria Teresa Crespo    Chronic neck pain     COVID-19     fever, slight cough, fatigue,lost taste,  admitted for couple days bc of elevated troponins    Depression
03-Dec-2024 14:19

## 2024-12-04 NOTE — BH SOCIAL WORK INITIAL PSYCHOSOCIAL EVALUATION - NSBHSAALC_PSY_A_CORE FT
Per chart review: "Pt reports daily ~3 pints of vodka for 'a long time', reports last use was yesterday

## 2024-12-04 NOTE — BH INPATIENT PSYCHIATRY ASSESSMENT NOTE - NSBHCRANIAL_PSY_ALL_CORE
0 Recognizes 2 fingers or can read (II)/Smiles, shows teeth, opens mouth, sticks out tongue (V, VII, XI)/Normal speech (IX, X, XII)/Extraocular Eye Movement Intact  (III, IV, VI)/Shoulder shrug (XI)/Hearing intact (VIII)

## 2024-12-04 NOTE — PSYCHIATRIC REHAB INITIAL EVALUATION - NSBHALCSUBCHOICE_PSY_ALL_CORE
Per chart, Alcohol; Cocaine/Crack, "Pt reports daily ~3 pints of vodka for 'a long time', reports last use was yesterday

## 2024-12-04 NOTE — BH SOCIAL WORK INITIAL PSYCHOSOCIAL EVALUATION - NSHIGHRISKBEHFT_PSY_ALL_CORE
As per self reported record, pt shares he has had self aborted attempts such as plans to jump in front of train in 2023.

## 2024-12-04 NOTE — BH INPATIENT PSYCHIATRY ASSESSMENT NOTE - NSBHSACONSEQUENCE_PSY_A_CORE FT
numerous past detox and rehab admissions, including [place], [facility], [facility], etc (as per Psyckes)

## 2024-12-04 NOTE — BH SOCIAL WORK INITIAL PSYCHOSOCIAL EVALUATION - OTHER PAST PSYCHIATRIC HISTORY (INCLUDE DETAILS REGARDING ONSET, COURSE OF ILLNESS, INPATIENT/OUTPATIENT TREATMENT)
Pt is 59 year old male, , street homeless, with history of psychitric hospitalizations, as well as referrals to in rehabiliations programs. As per record pt has a psychiatric diagnostic hx of polysubstance use disorder, reports alcohol/marijuana/cocaine use. Pt shares he has been feeling more depressed, shares he has been street homeless for some time and relapsed a couple of days ago, has strong desire to do things right this time requesting referrals to rehab substance. SW has received signed consent to speak with [facility] Ms Tang to request [facility] Voucher acceptance letter pt says he lost. SW will assist with request of voucher as well as sending rehab referrals. Pt reports feeling he will not push to leave the hospital this time around, he is motivated and wants to do things the right way.

## 2024-12-04 NOTE — BH INPATIENT PSYCHIATRY ASSESSMENT NOTE - NSBHASSESSSUMMFT_PSY_ALL_CORE
Patient is a 59 year old male, single, undomiciled (lives in hotels) and unemployed. PPHx of self-reported diagnosis of bipolar disorder, but many documented diagnoses ranging across mood, psychotic, and personality disorders, hx of many prior hospitalizations and ED visits,  discharged from [facility] on 07/15/24  with no psychotropic medication, chronic nonadherence to meds and outpt treatment, polysubstance use disorder (cannabis, alcohol, prior documented cocaine use disorder), suspicion of malingering on previous visits, hx of several prior self-aborted suicide attempts (planned to jump in front of train 2023, "stopped by an emre"), hx of many prior detox/rehabs (last at [facility] Dec 2023), hx of multiple ED visits reporting chest pain and/or SI and/or command AH to kill himself, PMHx of HTN, pericarditis, BIB by self, reporting SI and worsening depressed mood.      Working diagnosis:  Mood disorder NOS  Malingering        Plan:  >Legal: 9.13                                                                                                                                                                                    >Obs: Routine observation, denies active SIIP and is able to engage in safety planning.      >Psychiatric Meds:  Start Latuda 20mg, PM for mood/psychosis      >PRN Meds:  Haldol 5mg IM/PO, Q6H for psychotic agitation  Lorazepam 2mg PO, Q6H for agitation        >Labs: Admission labs ordered. Hold antipsychotics if QTc >500.  >Medical: No acute concerns. Patient w/o indication for CIWA, no visible physical symptoms of withdrawal. During the course of treatment, will collaborate with medical team to manage medical issues.  >Diet: DASH  >Social: Milieu/structured therapy  >Treatment Interventions: Groups and Individual Therapy/CBT.  >Dispo: symptom improvement

## 2024-12-04 NOTE — BH INPATIENT PSYCHIATRY ASSESSMENT NOTE - ATTENDING COMMENTS
Chart was reviewed and case discussed with the Psych NP. I agree with the history, examination, assessment and plan as documented in the Psych NP's assessment note and was directly involved in medical decision making. Pt is well known to [facility], presents with worsening depressed mood with passive SI and AH, presents with secondary gain for housing. Pt denies current active SI/SP, future oriented, motivated for treatement. Endorses intermittent AH, however not distressed by this.

## 2024-12-04 NOTE — PSYCHIATRIC REHAB INITIAL EVALUATION - NSBHEDULEVEL_PSY_ALL_CORE
Report given to [facility]. Pt stable for transfer to floor. Information was not provided by patient./Other (Specify)

## 2024-12-04 NOTE — BH SOCIAL WORK INITIAL PSYCHOSOCIAL EVALUATION - NSBHFINANCESOCIAL_PSY_ALL_CORE
Pt reports having an active [facility] voucher that will  on 2024. Pt also shares he receives food stamps no other form of entitlements./Food stamps

## 2024-12-04 NOTE — PSYCHIATRIC REHAB INITIAL EVALUATION - NSBHALCSUBTREAT_PSY_ALL_CORE
Per chart, patient reports many past detox and rehab admissions, including [facility], [facility], others.

## 2024-12-04 NOTE — BH INPATIENT PSYCHIATRY ASSESSMENT NOTE - HPI (INCLUDE ILLNESS QUALITY, SEVERITY, DURATION, TIMING, CONTEXT, MODIFYING FACTORS, ASSOCIATED SIGNS AND SYMPTOMS)
Patient is a 59 year old male, single, undomiciled (lives in hotels) and unemployed. PPHx of self-reported diagnosis of bipolar disorder, but many documented diagnoses ranging across mood, psychotic, and personality disorders, hx of many prior hospitalizations and ED visits,  discharged from [facility] on 07/15/24  with no psychotropic medication, chronic nonadherence to meds and outpt treatment, polysubstance use disorder (cannabis, alcohol, prior documented cocaine use disorder), suspicion of malingering on previous visits, hx of several prior self-aborted suicide attempts (planned to jump in front of train , "stopped by an emre"), hx of many prior detox/rehabs (last at [facility] Dec 2023), hx of multiple ED visits reporting chest pain and/or SI and/or command AH to kill himself, PMHx of HTN, pericarditis, BIB by self, reporting SI and worsening depressed mood.      As per ED behavioral health assessment note:  "On exam, patient is calm and cooperative. Provides history almost identical to documented history in previous  ED Assessments from  and . Initially, patient states that he came to the ED because "counselors at [facility] told me that they could help me with housing". When asked to elaborate on psychiatric sxs, patient  reports that he came today feeling suicidal and worsening depressive sxs. States that he has been suicidal "since the beginning of this year ". Patient identifies primary acute stressor, precipitating current depressive sxs (+depressed mood, poor appetite, insomnia w/ ~3 hours/night), as him not being unable to speak to his daughter. Patient acknowledges that he has not spoken to his daughter since he was hospitalized on [facility] in 2024. Identifies additional stressors as loss of "shopping letter" to obtain voucher for housing. States that this letter is sent to  on 24.Patient endorses current passive SI, but reports most recently having active SI w/ plan this evening. Patient states that his plan was to "jump in front of the 1 train". Denies engaging in behavior/acts to attempt this plan. States that he decided not to attempt this evening because "there were some guys on the platform and I didn't want witnesses". Patient reports closest that he has come to a suicide attempt occurred two years ago, where he states " walked to the ledge of subway station  wanting to die, and an "emre" pulled him back. Patient also reports alternative plan to attempt via "using a gun". Patient reports access to means (states that he "knows people in [place] with a gun"). Also states that he has access to firearms via his brother, who currently lives in [place]? . Patient also endorses vague AH, in which he describes intermittently hearing "his  mother's voice". Patient reports hearing AH during this assessment, but does not appear to be internally preoccupied at any point during the exam. Denies CAH. Patient also reports HI towards daughter's mother, but denies any plans or hx of HI/violence towards her.     Patient reports chronic history of non-adherence. When asked this non-adherence following after multiple hospitalizations, patient is unable to provide clear reason as to why. States that he had last been on Latuda but is unsure of the dose and has not taken any medication in several months. Patient reports closest that he has come to a suicide attempt occurred two years ago, where he states " walked to the ledge of subway station  wanting to die, and an "emre" pulled him back. Reports use of alcohol and "laced" cannabis 1-2 days ago. Denies current withdrawal symptoms."      On unit:  Patient was seen and is evaluated in the interview room. He presents logical and linear in thought process, calm and cooperative with interview. Known to writer from prior hospitalization on [facility]. He initially asks to speak with  to discuss potential housing opportunity. He states that he has been feeling depressed and was having suicidal thoughts to jump in front of the 1 train. Without inquiry adds that he could access a firearm though his brother who resides in [place]. He reports feeling depressed due to ongoing homelessness and poor relationship with his 15 y/o daughter who resides with her mother in [place]. Does verbalize that his daughter usually blocks his number, but he was able to contact her earlier this morning using the hospital phone. He states that he has been noncompliant with medications and does not want to become "dependent" on medications. He does verbalize ETOH and cocaine use on , unable to quantify his use. Does not exhibit or endorse any sxs of alcohol withdrawal. He reports AH of "whispers", says that he finds himself communicating with voices at times. On exam pt is not thought disordered and does not appear internally preoccupied. He currently denies any active SIIP, states that he feels safe in the hospital and denies urges of harming self. Denies any HIIP. He states that he does not want to take Abilify as this gives him "nightmares", pt is agreeable to restarting Latuda for mood sx, HgA1c and lipid panel ordered for tomorrow AM.

## 2024-12-04 NOTE — BH PATIENT PROFILE - HOME MEDICATIONS
aspirin 81 mg oral tablet, chewable , 1 tab(s) orally once a day  Multiple Vitamins oral tablet , 1 tab(s) orally once a day  atorvastatin 40 mg oral tablet , 1 tab(s) orally once a day (at bedtime)  loratadine 10 mg oral tablet , 1 tab(s) orally once a day

## 2024-12-04 NOTE — BH INPATIENT PSYCHIATRY ASSESSMENT NOTE - DETAILS
reports diarrhea with Zoloft, nightmares with Latuda, "high" on Depakote reports access to firearms via his brother located in [place] Deferred no clear h/o suicide attempts

## 2024-12-04 NOTE — DIETITIAN INITIAL EVALUATION ADULT - OTHER INFO
Pt is a 60 y/o male with PPHx of self-reported diagnosis of bipolar disorder, polysubstance use disorder (cannabis, alcohol, prior documented cocaine use disorder)and/or SI and/or command AH to kill himself. PMHx of  HTN, pericarditis. Pt BIB self, reporting SI and worsening depressed mood.  Admitted to [place] [id number].   Met Pt in his room. Reports good appetite/po intake at present. No GI distress noted. Follows regular no red meat diet. Food preferences taken and implemented. Pt is not a candidate for diet education.   UBW: 250 lbs. Weight gain of 31 lbs with unknown time. Pt could not provide more information at this time.

## 2024-12-04 NOTE — BH INPATIENT PSYCHIATRY ASSESSMENT NOTE - NSBHMETABOLIC_PSY_ALL_CORE_FT
full liquids solids BMI: BMI (kg/m2): 36.1 (12-04-24 @ 00:46)  HbA1c: A1C with Estimated Average Glucose Result: 5.3 % (06-10-24 @ 09:00)    Glucose: POCT Blood Glucose.: 104 mg/dL (04-04-24 @ 06:04)    BP: 115/77 (12-03-24 @ 20:29) (115/77 - 115/77)Vital Signs Last 24 Hrs  T(C): 36.6 (12-04-24 @ 00:46), Max: 36.8 (12-03-24 @ 20:29)  T(F): 97.9 (12-04-24 @ 00:46), Max: 98.3 (12-03-24 @ 20:29)  HR: 63 (12-03-24 @ 20:29) (63 - 63)  BP: 115/77 (12-03-24 @ 20:29) (115/77 - 115/77)  BP(mean): --  RR: 16 (12-04-24 @ 00:46) (16 - 16)  SpO2: 96% (12-03-24 @ 20:29) (96% - 96%)    Orthostatic VS  12-04-24 @ 00:46  Lying BP: --/-- HR: --  Sitting BP: 124/77 HR: 89  Standing BP: --/-- HR: --  Site: --  Mode: --    Lipid Panel: Date/Time: 06-10-24 @ 09:00  Cholesterol, Serum: 121  LDL Cholesterol Calculated: 42  HDL Cholesterol, Serum: 51  Total Cholesterol/HDL Ration Measurement: --  Triglycerides, Serum: 139   BMI: BMI (kg/m2): 36.1 (12-04-24 @ 00:46)  HbA1c: A1C with Estimated Average Glucose Result: 5.3 % (06-10-24 @ 09:00)    Glucose: POCT Blood Glucose.: 104 mg/dL (04-04-24 @ 06:04)    BP: 115/77 (12-03-24 @ 20:29) (115/77 - 115/77)Vital Signs Last 24 Hrs  T(C): 36.7 (12-05-24 @ 07:40), Max: 36.7 (12-05-24 @ 06:03)  T(F): 98.1 (12-05-24 @ 07:40), Max: 98.1 (12-05-24 @ 07:40)  HR: --  BP: --  BP(mean): --  RR: 17 (12-05-24 @ 07:40) (17 - 17)  SpO2: --    Orthostatic VS  12-05-24 @ 07:40  Lying BP: --/-- HR: --  Sitting BP: 127/87 HR: 102  Standing BP: 128/90 HR: 90  Site: --  Mode: --  Orthostatic VS  12-05-24 @ 06:03  Lying BP: --/-- HR: --  Sitting BP: 125/84 HR: 111  Standing BP: 130/99 HR: 92  Site: --  Mode: --  Orthostatic VS  12-04-24 @ 00:46  Lying BP: --/-- HR: --  Sitting BP: 124/77 HR: 89  Standing BP: --/-- HR: --  Site: --  Mode: --    Lipid Panel: Date/Time: 06-10-24 @ 09:00  Cholesterol, Serum: 121  LDL Cholesterol Calculated: 42  HDL Cholesterol, Serum: 51  Total Cholesterol/HDL Ration Measurement: --  Triglycerides, Serum: 139

## 2024-12-04 NOTE — BH INPATIENT PSYCHIATRY ASSESSMENT NOTE - NSBHCHARTREVIEWVS_PSY_A_CORE FT
Vital Signs Last 24 Hrs  T(C): 36.6 (12-04-24 @ 00:46), Max: 36.8 (12-03-24 @ 20:29)  T(F): 97.9 (12-04-24 @ 00:46), Max: 98.3 (12-03-24 @ 20:29)  HR: 63 (12-03-24 @ 20:29) (63 - 63)  BP: 115/77 (12-03-24 @ 20:29) (115/77 - 115/77)  BP(mean): --  RR: 16 (12-04-24 @ 00:46) (16 - 16)  SpO2: 96% (12-03-24 @ 20:29) (96% - 96%)    Orthostatic VS  12-04-24 @ 00:46  Lying BP: --/-- HR: --  Sitting BP: 124/77 HR: 89  Standing BP: --/-- HR: --  Site: --  Mode: --   Vital Signs Last 24 Hrs  T(C): 36.7 (12-05-24 @ 07:40), Max: 36.7 (12-05-24 @ 06:03)  T(F): 98.1 (12-05-24 @ 07:40), Max: 98.1 (12-05-24 @ 07:40)  HR: --  BP: --  BP(mean): --  RR: 17 (12-05-24 @ 07:40) (17 - 17)  SpO2: --    Orthostatic VS  12-05-24 @ 07:40  Lying BP: --/-- HR: --  Sitting BP: 127/87 HR: 102  Standing BP: 128/90 HR: 90  Site: --  Mode: --  Orthostatic VS  12-05-24 @ 06:03  Lying BP: --/-- HR: --  Sitting BP: 125/84 HR: 111  Standing BP: 130/99 HR: 92  Site: --  Mode: --  Orthostatic VS  12-04-24 @ 00:46  Lying BP: --/-- HR: --  Sitting BP: 124/77 HR: 89  Standing BP: --/-- HR: --  Site: --  Mode: --

## 2024-12-04 NOTE — PSYCHIATRIC REHAB INITIAL EVALUATION - ABILITY TO HEAR (WITH HEARING AID OR HEARING APPLIANCE IF NORMALLY USED):
Recent blood test are normal with no clinically significant  findings.  Thyroid normal.  No anemia.  Please follow-up with hematologist as discussed Adequate: hears normal conversation without difficulty

## 2024-12-04 NOTE — PSYCHIATRIC REHAB INITIAL EVALUATION - NSBHSIGPRIORMED_PSY_ALL_CORE
Per chart, pmhx of substance use disorder, depression, GERD, chronic CHF, pericarditis, hypertension

## 2024-12-05 LAB
A1C WITH ESTIMATED AVERAGE GLUCOSE RESULT: 5.4 % — SIGNIFICANT CHANGE UP (ref 4–5.6)
CHOLEST SERPL-MCNC: 152 MG/DL — SIGNIFICANT CHANGE UP
ESTIMATED AVERAGE GLUCOSE: 108 — SIGNIFICANT CHANGE UP
HDLC SERPL-MCNC: 42 MG/DL — SIGNIFICANT CHANGE UP
LIPID PNL WITH DIRECT LDL SERPL: 82 MG/DL — SIGNIFICANT CHANGE UP
NON HDL CHOLESTEROL: 110 MG/DL — SIGNIFICANT CHANGE UP
TRIGL SERPL-MCNC: 160 MG/DL — HIGH

## 2024-12-05 PROCEDURE — 99232 SBSQ HOSP IP/OBS MODERATE 35: CPT

## 2024-12-05 RX ADMIN — Medication 1 TABLET(S): at 08:02

## 2024-12-05 RX ADMIN — Medication 81 MILLIGRAM(S): at 08:02

## 2024-12-05 RX ADMIN — LURASIDONE HYDROCHLORIDE 20 MILLIGRAM(S): 120 TABLET, FILM COATED ORAL at 16:55

## 2024-12-05 RX ADMIN — Medication 200 MILLIGRAM(S): at 20:40

## 2024-12-05 RX ADMIN — Medication 200 MILLIGRAM(S): at 05:38

## 2024-12-05 NOTE — BH INPATIENT PSYCHIATRY PROGRESS NOTE - NSBHMETABOLIC_PSY_ALL_CORE_FT
BMI: BMI (kg/m2): 36.1 (12-04-24 @ 00:46)  HbA1c: A1C with Estimated Average Glucose Result: 5.4 % (12-05-24 @ 10:00)    Glucose: POCT Blood Glucose.: 104 mg/dL (04-04-24 @ 06:04)    BP: 115/77 (12-03-24 @ 20:29) (115/77 - 115/77)Vital Signs Last 24 Hrs  T(C): 36.7 (12-05-24 @ 07:40), Max: 36.7 (12-05-24 @ 06:03)  T(F): 98.1 (12-05-24 @ 07:40), Max: 98.1 (12-05-24 @ 07:40)  HR: --  BP: --  BP(mean): --  RR: 17 (12-05-24 @ 07:40) (17 - 17)  SpO2: --    Orthostatic VS  12-05-24 @ 07:40  Lying BP: --/-- HR: --  Sitting BP: 127/87 HR: 102  Standing BP: 128/90 HR: 90  Site: --  Mode: --  Orthostatic VS  12-05-24 @ 06:03  Lying BP: --/-- HR: --  Sitting BP: 125/84 HR: 111  Standing BP: 130/99 HR: 92  Site: --  Mode: --  Orthostatic VS  12-04-24 @ 00:46  Lying BP: --/-- HR: --  Sitting BP: 124/77 HR: 89  Standing BP: --/-- HR: --  Site: --  Mode: --    Lipid Panel: Date/Time: 12-05-24 @ 10:00  Cholesterol, Serum: 152  LDL Cholesterol Calculated: 82  HDL Cholesterol, Serum: 42  Total Cholesterol/HDL Ration Measurement: --  Triglycerides, Serum: 160   BMI: BMI (kg/m2): 36.1 (12-04-24 @ 00:46)  HbA1c: A1C with Estimated Average Glucose Result: 5.4 % (12-05-24 @ 10:00)    Glucose: POCT Blood Glucose.: 104 mg/dL (04-04-24 @ 06:04)    BP: 115/77 (12-03-24 @ 20:29) (115/77 - 115/77)Vital Signs Last 24 Hrs  T(C): 36.2 (12-06-24 @ 08:43), Max: 36.4 (12-05-24 @ 19:46)  T(F): 97.1 (12-06-24 @ 08:43), Max: 97.6 (12-05-24 @ 19:46)  HR: --  BP: --  BP(mean): --  RR: --  SpO2: --    Orthostatic VS  12-06-24 @ 08:43  Lying BP: --/-- HR: --  Sitting BP: 135/70 HR: 73  Standing BP: --/-- HR: --  Site: --  Mode: electronic  Orthostatic VS  12-05-24 @ 19:46  Lying BP: --/-- HR: --  Sitting BP: 122/88 HR: 97  Standing BP: 129/79 HR: 110  Site: --  Mode: --  Orthostatic VS  12-05-24 @ 07:40  Lying BP: --/-- HR: --  Sitting BP: 127/87 HR: 102  Standing BP: 128/90 HR: 90  Site: --  Mode: --  Orthostatic VS  12-05-24 @ 06:03  Lying BP: --/-- HR: --  Sitting BP: 125/84 HR: 111  Standing BP: 130/99 HR: 92  Site: --  Mode: --    Lipid Panel: Date/Time: 12-05-24 @ 10:00  Cholesterol, Serum: 152  LDL Cholesterol Calculated: 82  HDL Cholesterol, Serum: 42  Total Cholesterol/HDL Ration Measurement: --  Triglycerides, Serum: 160

## 2024-12-05 NOTE — BH INPATIENT PSYCHIATRY PROGRESS NOTE - NSBHCHARTREVIEWVS_PSY_A_CORE FT
Vital Signs Last 24 Hrs  T(C): 36.7 (12-05-24 @ 07:40), Max: 36.7 (12-05-24 @ 06:03)  T(F): 98.1 (12-05-24 @ 07:40), Max: 98.1 (12-05-24 @ 07:40)  HR: --  BP: --  BP(mean): --  RR: 17 (12-05-24 @ 07:40) (17 - 17)  SpO2: --    Orthostatic VS  12-05-24 @ 07:40  Lying BP: --/-- HR: --  Sitting BP: 127/87 HR: 102  Standing BP: 128/90 HR: 90  Site: --  Mode: --  Orthostatic VS  12-05-24 @ 06:03  Lying BP: --/-- HR: --  Sitting BP: 125/84 HR: 111  Standing BP: 130/99 HR: 92  Site: --  Mode: --  Orthostatic VS  12-04-24 @ 00:46  Lying BP: --/-- HR: --  Sitting BP: 124/77 HR: 89  Standing BP: --/-- HR: --  Site: --  Mode: --   Vital Signs Last 24 Hrs  T(C): 36.2 (12-06-24 @ 08:43), Max: 36.4 (12-05-24 @ 19:46)  T(F): 97.1 (12-06-24 @ 08:43), Max: 97.6 (12-05-24 @ 19:46)  HR: --  BP: --  BP(mean): --  RR: --  SpO2: --    Orthostatic VS  12-06-24 @ 08:43  Lying BP: --/-- HR: --  Sitting BP: 135/70 HR: 73  Standing BP: --/-- HR: --  Site: --  Mode: electronic  Orthostatic VS  12-05-24 @ 19:46  Lying BP: --/-- HR: --  Sitting BP: 122/88 HR: 97  Standing BP: 129/79 HR: 110  Site: --  Mode: --  Orthostatic VS  12-05-24 @ 07:40  Lying BP: --/-- HR: --  Sitting BP: 127/87 HR: 102  Standing BP: 128/90 HR: 90  Site: --  Mode: --  Orthostatic VS  12-05-24 @ 06:03  Lying BP: --/-- HR: --  Sitting BP: 125/84 HR: 111  Standing BP: 130/99 HR: 92  Site: --  Mode: --

## 2024-12-06 PROCEDURE — 99232 SBSQ HOSP IP/OBS MODERATE 35: CPT

## 2024-12-06 RX ORDER — INFLUENZA VIRUS VACCINE 15; 15; 15; 15 UG/.5ML; UG/.5ML; UG/.5ML; UG/.5ML
0.5 SUSPENSION INTRAMUSCULAR ONCE
Refills: 0 | Status: COMPLETED | OUTPATIENT
Start: 2024-12-06 | End: 2024-12-06

## 2024-12-06 RX ORDER — DIPHENHYDRAMINE HCL 25 MG
50 CAPSULE ORAL AT BEDTIME
Refills: 0 | Status: DISCONTINUED | OUTPATIENT
Start: 2024-12-06 | End: 2024-12-13

## 2024-12-06 RX ADMIN — Medication 81 MILLIGRAM(S): at 08:10

## 2024-12-06 RX ADMIN — Medication 200 MILLIGRAM(S): at 06:40

## 2024-12-06 RX ADMIN — Medication 1 TABLET(S): at 08:10

## 2024-12-06 RX ADMIN — Medication 200 MILLIGRAM(S): at 13:10

## 2024-12-06 RX ADMIN — Medication 50 MILLIGRAM(S): at 20:48

## 2024-12-06 NOTE — BH INPATIENT PSYCHIATRY PROGRESS NOTE - NSBHMETABOLIC_PSY_ALL_CORE_FT
BMI: BMI (kg/m2): 36.1 (12-04-24 @ 00:46)  HbA1c: A1C with Estimated Average Glucose Result: 5.4 % (12-05-24 @ 10:00)    Glucose: POCT Blood Glucose.: 104 mg/dL (04-04-24 @ 06:04)    BP: 115/77 (12-03-24 @ 20:29) (115/77 - 115/77)Vital Signs Last 24 Hrs  T(C): 36.2 (12-06-24 @ 08:43), Max: 36.4 (12-05-24 @ 19:46)  T(F): 97.1 (12-06-24 @ 08:43), Max: 97.6 (12-05-24 @ 19:46)  HR: --  BP: --  BP(mean): --  RR: --  SpO2: --    Orthostatic VS  12-06-24 @ 08:43  Lying BP: --/-- HR: --  Sitting BP: 135/70 HR: 73  Standing BP: --/-- HR: --  Site: --  Mode: electronic  Orthostatic VS  12-05-24 @ 19:46  Lying BP: --/-- HR: --  Sitting BP: 122/88 HR: 97  Standing BP: 129/79 HR: 110  Site: --  Mode: --  Orthostatic VS  12-05-24 @ 07:40  Lying BP: --/-- HR: --  Sitting BP: 127/87 HR: 102  Standing BP: 128/90 HR: 90  Site: --  Mode: --  Orthostatic VS  12-05-24 @ 06:03  Lying BP: --/-- HR: --  Sitting BP: 125/84 HR: 111  Standing BP: 130/99 HR: 92  Site: --  Mode: --    Lipid Panel: Date/Time: 12-05-24 @ 10:00  Cholesterol, Serum: 152  LDL Cholesterol Calculated: 82  HDL Cholesterol, Serum: 42  Total Cholesterol/HDL Ration Measurement: --  Triglycerides, Serum: 160

## 2024-12-06 NOTE — BH INPATIENT PSYCHIATRY PROGRESS NOTE - NSBHCHARTREVIEWVS_PSY_A_CORE FT
Vital Signs Last 24 Hrs  T(C): 36.2 (12-06-24 @ 08:43), Max: 36.4 (12-05-24 @ 19:46)  T(F): 97.1 (12-06-24 @ 08:43), Max: 97.6 (12-05-24 @ 19:46)  HR: --  BP: --  BP(mean): --  RR: --  SpO2: --    Orthostatic VS  12-06-24 @ 08:43  Lying BP: --/-- HR: --  Sitting BP: 135/70 HR: 73  Standing BP: --/-- HR: --  Site: --  Mode: electronic  Orthostatic VS  12-05-24 @ 19:46  Lying BP: --/-- HR: --  Sitting BP: 122/88 HR: 97  Standing BP: 129/79 HR: 110  Site: --  Mode: --  Orthostatic VS  12-05-24 @ 07:40  Lying BP: --/-- HR: --  Sitting BP: 127/87 HR: 102  Standing BP: 128/90 HR: 90  Site: --  Mode: --  Orthostatic VS  12-05-24 @ 06:03  Lying BP: --/-- HR: --  Sitting BP: 125/84 HR: 111  Standing BP: 130/99 HR: 92  Site: --  Mode: --

## 2024-12-07 PROCEDURE — 99232 SBSQ HOSP IP/OBS MODERATE 35: CPT

## 2024-12-07 RX ADMIN — BENZOCAINE, MENTHOL 1 LOZENGE: 15; 3.6 LOZENGE ORAL at 11:26

## 2024-12-07 RX ADMIN — Medication 1 TABLET(S): at 08:04

## 2024-12-07 RX ADMIN — Medication 81 MILLIGRAM(S): at 08:04

## 2024-12-07 RX ADMIN — LURASIDONE HYDROCHLORIDE 20 MILLIGRAM(S): 120 TABLET, FILM COATED ORAL at 16:57

## 2024-12-07 RX ADMIN — Medication 200 MILLIGRAM(S): at 11:25

## 2024-12-07 NOTE — BH INPATIENT PSYCHIATRY PROGRESS NOTE - NSBHMETABOLIC_PSY_ALL_CORE_FT
BMI: BMI (kg/m2): 36.1 (12-04-24 @ 00:46)  HbA1c: A1C with Estimated Average Glucose Result: 5.4 % (12-05-24 @ 10:00)    Glucose: POCT Blood Glucose.: 104 mg/dL (04-04-24 @ 06:04)    BP: --Vital Signs Last 24 Hrs  T(C): 35.7 (12-07-24 @ 06:38), Max: 36.7 (12-06-24 @ 18:36)  T(F): 96.3 (12-07-24 @ 06:38), Max: 98 (12-06-24 @ 18:36)  HR: --  BP: --  BP(mean): --  RR: 17 (12-07-24 @ 06:38) (17 - 18)  SpO2: --    Orthostatic VS  12-07-24 @ 06:38  Lying BP: --/-- HR: --  Sitting BP: 144/95 HR: 84  Standing BP: --/-- HR: --  Site: --  Mode: --  Orthostatic VS  12-06-24 @ 18:36  Lying BP: --/-- HR: --  Sitting BP: 123/85 HR: 84  Standing BP: --/-- HR: --  Site: upper left arm  Mode: electronic  Orthostatic VS  12-06-24 @ 08:43  Lying BP: --/-- HR: --  Sitting BP: 135/70 HR: 73  Standing BP: --/-- HR: --  Site: --  Mode: electronic  Orthostatic VS  12-05-24 @ 19:46  Lying BP: --/-- HR: --  Sitting BP: 122/88 HR: 97  Standing BP: 129/79 HR: 110  Site: --  Mode: --    Lipid Panel: Date/Time: 12-05-24 @ 10:00  Cholesterol, Serum: 152  LDL Cholesterol Calculated: 82  HDL Cholesterol, Serum: 42  Total Cholesterol/HDL Ration Measurement: --  Triglycerides, Serum: 160

## 2024-12-07 NOTE — BH INPATIENT PSYCHIATRY PROGRESS NOTE - NSBHCHARTREVIEWVS_PSY_A_CORE FT
Vital Signs Last 24 Hrs  T(C): 35.7 (12-07-24 @ 06:38), Max: 36.7 (12-06-24 @ 18:36)  T(F): 96.3 (12-07-24 @ 06:38), Max: 98 (12-06-24 @ 18:36)  HR: --  BP: --  BP(mean): --  RR: 17 (12-07-24 @ 06:38) (17 - 18)  SpO2: --    Orthostatic VS  12-07-24 @ 06:38  Lying BP: --/-- HR: --  Sitting BP: 144/95 HR: 84  Standing BP: --/-- HR: --  Site: --  Mode: --  Orthostatic VS  12-06-24 @ 18:36  Lying BP: --/-- HR: --  Sitting BP: 123/85 HR: 84  Standing BP: --/-- HR: --  Site: upper left arm  Mode: electronic  Orthostatic VS  12-06-24 @ 08:43  Lying BP: --/-- HR: --  Sitting BP: 135/70 HR: 73  Standing BP: --/-- HR: --  Site: --  Mode: electronic  Orthostatic VS  12-05-24 @ 19:46  Lying BP: --/-- HR: --  Sitting BP: 122/88 HR: 97  Standing BP: 129/79 HR: 110  Site: --  Mode: --

## 2024-12-08 PROCEDURE — 99232 SBSQ HOSP IP/OBS MODERATE 35: CPT

## 2024-12-08 RX ADMIN — Medication 600 MILLIGRAM(S): at 12:30

## 2024-12-08 RX ADMIN — Medication 200 MILLIGRAM(S): at 06:37

## 2024-12-08 RX ADMIN — Medication 81 MILLIGRAM(S): at 08:13

## 2024-12-08 RX ADMIN — Medication 50 MILLIGRAM(S): at 20:49

## 2024-12-08 RX ADMIN — Medication 200 MILLIGRAM(S): at 20:49

## 2024-12-08 RX ADMIN — Medication 1 TABLET(S): at 08:13

## 2024-12-08 NOTE — BH INPATIENT PSYCHIATRY PROGRESS NOTE - NSBHCHARTREVIEWVS_PSY_A_CORE FT
Vital Signs Last 24 Hrs  T(C): 36.7 (12-08-24 @ 08:12), Max: 36.7 (12-08-24 @ 08:12)  T(F): 98 (12-08-24 @ 08:12), Max: 98 (12-08-24 @ 08:12)  HR: --  BP: --  BP(mean): --  RR: 16 (12-08-24 @ 08:12) (16 - 16)  SpO2: --    Orthostatic VS  12-08-24 @ 08:12  Lying BP: --/-- HR: --  Sitting BP: 130/89 HR: 89  Standing BP: 134/87 HR: 94  Site: --  Mode: --  Orthostatic VS  12-07-24 @ 19:54  Lying BP: --/-- HR: --  Sitting BP: 144/95 HR: 84  Standing BP: --/-- HR: --  Site: --  Mode: --  Orthostatic VS  12-07-24 @ 06:38  Lying BP: --/-- HR: --  Sitting BP: 144/95 HR: 84  Standing BP: --/-- HR: --  Site: --  Mode: --  Orthostatic VS  12-06-24 @ 18:36  Lying BP: --/-- HR: --  Sitting BP: 123/85 HR: 84  Standing BP: --/-- HR: --  Site: upper left arm  Mode: electronic

## 2024-12-08 NOTE — BH INPATIENT PSYCHIATRY PROGRESS NOTE - NSBHMETABOLIC_PSY_ALL_CORE_FT
BMI: BMI (kg/m2): 36.1 (12-04-24 @ 00:46)  HbA1c: A1C with Estimated Average Glucose Result: 5.4 % (12-05-24 @ 10:00)    Glucose: POCT Blood Glucose.: 104 mg/dL (04-04-24 @ 06:04)    BP: --Vital Signs Last 24 Hrs  T(C): 36.7 (12-08-24 @ 08:12), Max: 36.7 (12-08-24 @ 08:12)  T(F): 98 (12-08-24 @ 08:12), Max: 98 (12-08-24 @ 08:12)  HR: --  BP: --  BP(mean): --  RR: 16 (12-08-24 @ 08:12) (16 - 16)  SpO2: --    Orthostatic VS  12-08-24 @ 08:12  Lying BP: --/-- HR: --  Sitting BP: 130/89 HR: 89  Standing BP: 134/87 HR: 94  Site: --  Mode: --  Orthostatic VS  12-07-24 @ 19:54  Lying BP: --/-- HR: --  Sitting BP: 144/95 HR: 84  Standing BP: --/-- HR: --  Site: --  Mode: --  Orthostatic VS  12-07-24 @ 06:38  Lying BP: --/-- HR: --  Sitting BP: 144/95 HR: 84  Standing BP: --/-- HR: --  Site: --  Mode: --  Orthostatic VS  12-06-24 @ 18:36  Lying BP: --/-- HR: --  Sitting BP: 123/85 HR: 84  Standing BP: --/-- HR: --  Site: upper left arm  Mode: electronic    Lipid Panel: Date/Time: 12-05-24 @ 10:00  Cholesterol, Serum: 152  LDL Cholesterol Calculated: 82  HDL Cholesterol, Serum: 42  Total Cholesterol/HDL Ration Measurement: --  Triglycerides, Serum: 160

## 2024-12-09 PROCEDURE — 99232 SBSQ HOSP IP/OBS MODERATE 35: CPT

## 2024-12-09 RX ADMIN — Medication 1 TABLET(S): at 08:06

## 2024-12-09 RX ADMIN — Medication 81 MILLIGRAM(S): at 08:06

## 2024-12-09 RX ADMIN — Medication 200 MILLIGRAM(S): at 05:29

## 2024-12-09 RX ADMIN — Medication 600 MILLIGRAM(S): at 18:06

## 2024-12-09 NOTE — BH INPATIENT PSYCHIATRY PROGRESS NOTE - NSBHCHARTREVIEWVS_PSY_A_CORE FT
Vital Signs Last 24 Hrs  T(C): 36.3 (12-09-24 @ 08:40), Max: 36.4 (12-08-24 @ 18:53)  T(F): 97.4 (12-09-24 @ 08:40), Max: 97.6 (12-08-24 @ 18:53)  HR: --  BP: --  BP(mean): --  RR: 17 (12-09-24 @ 08:40) (17 - 17)  SpO2: --    Orthostatic VS  12-09-24 @ 08:40  Lying BP: --/-- HR: --  Sitting BP: 113/84 HR: 81  Standing BP: --/-- HR: --  Site: --  Mode: --  Orthostatic VS  12-08-24 @ 21:30  Lying BP: --/-- HR: --  Sitting BP: 110/76 HR: 102  Standing BP: --/-- HR: --  Site: --  Mode: --  Orthostatic VS  12-08-24 @ 18:53  Lying BP: --/-- HR: --  Sitting BP: 110/76 HR: 102  Standing BP: --/-- HR: --  Site: --  Mode: --  Orthostatic VS  12-08-24 @ 08:12  Lying BP: --/-- HR: --  Sitting BP: 130/89 HR: 89  Standing BP: 134/87 HR: 94  Site: --  Mode: --  Orthostatic VS  12-07-24 @ 19:54  Lying BP: --/-- HR: --  Sitting BP: 144/95 HR: 84  Standing BP: --/-- HR: --  Site: --  Mode: --

## 2024-12-10 LAB — SARS-COV-2 RNA SPEC QL NAA+PROBE: SIGNIFICANT CHANGE UP

## 2024-12-10 PROCEDURE — 99232 SBSQ HOSP IP/OBS MODERATE 35: CPT

## 2024-12-10 RX ADMIN — Medication 81 MILLIGRAM(S): at 08:12

## 2024-12-10 RX ADMIN — Medication 600 MILLIGRAM(S): at 12:16

## 2024-12-10 RX ADMIN — Medication 1 TABLET(S): at 08:11

## 2024-12-10 RX ADMIN — Medication 200 MILLIGRAM(S): at 05:08

## 2024-12-10 NOTE — BH INPATIENT PSYCHIATRY PROGRESS NOTE - NSBHMETABOLIC_PSY_ALL_CORE_FT
BMI: BMI (kg/m2): 36.1 (12-04-24 @ 00:46)  HbA1c: A1C with Estimated Average Glucose Result: 5.4 % (12-05-24 @ 10:00)    Glucose: POCT Blood Glucose.: 104 mg/dL (04-04-24 @ 06:04)    BP: --Vital Signs Last 24 Hrs  T(C): 36.6 (12-10-24 @ 08:13), Max: 36.6 (12-10-24 @ 08:13)  T(F): 97.8 (12-10-24 @ 08:13), Max: 97.8 (12-10-24 @ 08:13)  HR: --  BP: --  BP(mean): --  RR: --  SpO2: --    Orthostatic VS  12-10-24 @ 08:13  Lying BP: --/-- HR: --  Sitting BP: 148/81 HR: 65  Standing BP: --/-- HR: --  Site: --  Mode: --  Orthostatic VS  12-09-24 @ 08:40  Lying BP: --/-- HR: --  Sitting BP: 113/84 HR: 81  Standing BP: --/-- HR: --  Site: --  Mode: --  Orthostatic VS  12-08-24 @ 21:30  Lying BP: --/-- HR: --  Sitting BP: 110/76 HR: 102  Standing BP: --/-- HR: --  Site: --  Mode: --  Orthostatic VS  12-08-24 @ 18:53  Lying BP: --/-- HR: --  Sitting BP: 110/76 HR: 102  Standing BP: --/-- HR: --  Site: --  Mode: --    Lipid Panel: Date/Time: 12-05-24 @ 10:00  Cholesterol, Serum: 152  LDL Cholesterol Calculated: 82  HDL Cholesterol, Serum: 42  Total Cholesterol/HDL Ration Measurement: --  Triglycerides, Serum: 160

## 2024-12-10 NOTE — BH INPATIENT PSYCHIATRY PROGRESS NOTE - NSBHCHARTREVIEWVS_PSY_A_CORE FT
Vital Signs Last 24 Hrs  T(C): 36.6 (12-10-24 @ 08:13), Max: 36.6 (12-10-24 @ 08:13)  T(F): 97.8 (12-10-24 @ 08:13), Max: 97.8 (12-10-24 @ 08:13)  HR: --  BP: --  BP(mean): --  RR: --  SpO2: --    Orthostatic VS  12-10-24 @ 08:13  Lying BP: --/-- HR: --  Sitting BP: 148/81 HR: 65  Standing BP: --/-- HR: --  Site: --  Mode: --  Orthostatic VS  12-09-24 @ 08:40  Lying BP: --/-- HR: --  Sitting BP: 113/84 HR: 81  Standing BP: --/-- HR: --  Site: --  Mode: --  Orthostatic VS  12-08-24 @ 21:30  Lying BP: --/-- HR: --  Sitting BP: 110/76 HR: 102  Standing BP: --/-- HR: --  Site: --  Mode: --  Orthostatic VS  12-08-24 @ 18:53  Lying BP: --/-- HR: --  Sitting BP: 110/76 HR: 102  Standing BP: --/-- HR: --  Site: --  Mode: --

## 2024-12-11 PROCEDURE — 99232 SBSQ HOSP IP/OBS MODERATE 35: CPT

## 2024-12-11 RX ADMIN — Medication 50 MILLIGRAM(S): at 20:12

## 2024-12-11 RX ADMIN — Medication 81 MILLIGRAM(S): at 08:13

## 2024-12-11 RX ADMIN — Medication 200 MILLIGRAM(S): at 04:24

## 2024-12-11 RX ADMIN — Medication 600 MILLIGRAM(S): at 04:23

## 2024-12-11 RX ADMIN — Medication 1 TABLET(S): at 08:14

## 2024-12-11 NOTE — BH INPATIENT PSYCHIATRY PROGRESS NOTE - NSBHCHARTREVIEWVS_PSY_A_CORE FT
Vital Signs Last 24 Hrs  T(C): 36.3 (12-11-24 @ 08:23), Max: 36.5 (12-10-24 @ 19:32)  T(F): 97.3 (12-11-24 @ 08:23), Max: 97.7 (12-10-24 @ 19:32)  HR: --  BP: 127/72 (12-11-24 @ 08:23) (127/72 - 127/72)  BP(mean): 54 (12-11-24 @ 08:23) (54 - 54)  RR: --  SpO2: --    Orthostatic VS  12-10-24 @ 19:32  Lying BP: --/-- HR: --  Sitting BP: 129/75 HR: 87  Standing BP: --/-- HR: --  Site: --  Mode: --  Orthostatic VS  12-10-24 @ 08:13  Lying BP: --/-- HR: --  Sitting BP: 148/81 HR: 65  Standing BP: --/-- HR: --  Site: --  Mode: --

## 2024-12-11 NOTE — BH INPATIENT PSYCHIATRY PROGRESS NOTE - NSBHMETABOLIC_PSY_ALL_CORE_FT
BMI: BMI (kg/m2): 36.1 (12-04-24 @ 00:46)  HbA1c: A1C with Estimated Average Glucose Result: 5.4 % (12-05-24 @ 10:00)    Glucose: POCT Blood Glucose.: 104 mg/dL (04-04-24 @ 06:04)    BP: 127/72 (12-11-24 @ 08:23) (127/72 - 127/72)Vital Signs Last 24 Hrs  T(C): 36.3 (12-11-24 @ 08:23), Max: 36.5 (12-10-24 @ 19:32)  T(F): 97.3 (12-11-24 @ 08:23), Max: 97.7 (12-10-24 @ 19:32)  HR: --  BP: 127/72 (12-11-24 @ 08:23) (127/72 - 127/72)  BP(mean): 54 (12-11-24 @ 08:23) (54 - 54)  RR: --  SpO2: --    Orthostatic VS  12-10-24 @ 19:32  Lying BP: --/-- HR: --  Sitting BP: 129/75 HR: 87  Standing BP: --/-- HR: --  Site: --  Mode: --  Orthostatic VS  12-10-24 @ 08:13  Lying BP: --/-- HR: --  Sitting BP: 148/81 HR: 65  Standing BP: --/-- HR: --  Site: --  Mode: --    Lipid Panel: Date/Time: 12-05-24 @ 10:00  Cholesterol, Serum: 152  LDL Cholesterol Calculated: 82  HDL Cholesterol, Serum: 42  Total Cholesterol/HDL Ration Measurement: --  Triglycerides, Serum: 160

## 2024-12-12 PROCEDURE — 99232 SBSQ HOSP IP/OBS MODERATE 35: CPT

## 2024-12-12 RX ADMIN — Medication 1 TABLET(S): at 08:35

## 2024-12-12 RX ADMIN — Medication 81 MILLIGRAM(S): at 08:35

## 2024-12-12 RX ADMIN — Medication 200 MILLIGRAM(S): at 05:21

## 2024-12-12 NOTE — BH DISCHARGE NOTE NURSING/SOCIAL WORK/PSYCH REHAB - NSDCPRGOAL_PSY_ALL_CORE
Patient has made some improvements on Psychiatric Rehabilitation goal of taking medications as prescribed. Patient continues to present as demanding and needy at times but is overall calm and compliant on the unit. Additionally, patient is known for malingering behaviors and presenting as slightly irritable. However, patient is redirectable. Patient explained that he does not have concerns following discharge and is looking forward to continuing progress. Patient has not attended groups despite frequent encouragement. Patient has completed a safety plan and will receive a copy upon discharge.

## 2024-12-12 NOTE — BH INPATIENT PSYCHIATRY PROGRESS NOTE - NSBHMSETHTCONTENT_PSY_A_CORE
Unremarkable
Hopelessness/Suicidality
Hopelessness/Suicidality
Unremarkable
Unremarkable

## 2024-12-12 NOTE — BH INPATIENT PSYCHIATRY PROGRESS NOTE - NSBHMETABOLIC_PSY_ALL_CORE_FT
BMI: BMI (kg/m2): 36.1 (12-04-24 @ 00:46)  HbA1c: A1C with Estimated Average Glucose Result: 5.4 % (12-05-24 @ 10:00)    Glucose: POCT Blood Glucose.: 104 mg/dL (04-04-24 @ 06:04)    BP: 127/72 (12-11-24 @ 08:23) (127/72 - 127/72)Vital Signs Last 24 Hrs  T(C): 36.7 (12-12-24 @ 05:33), Max: 36.7 (12-12-24 @ 05:33)  T(F): 98 (12-12-24 @ 05:33), Max: 98 (12-12-24 @ 05:33)  HR: --  BP: --  BP(mean): --  RR: --  SpO2: --    Orthostatic VS  12-12-24 @ 05:33  Lying BP: --/-- HR: --  Sitting BP: 123/55 HR: 75  Standing BP: --/-- HR: --  Site: --  Mode: --  Orthostatic VS  12-11-24 @ 19:35  Lying BP: --/-- HR: --  Sitting BP: 141/83 HR: 89  Standing BP: --/-- HR: --  Site: --  Mode: --  Orthostatic VS  12-10-24 @ 19:32  Lying BP: --/-- HR: --  Sitting BP: 129/75 HR: 87  Standing BP: --/-- HR: --  Site: --  Mode: --    Lipid Panel: Date/Time: 12-05-24 @ 10:00  Cholesterol, Serum: 152  LDL Cholesterol Calculated: 82  HDL Cholesterol, Serum: 42  Total Cholesterol/HDL Ration Measurement: --  Triglycerides, Serum: 160

## 2024-12-12 NOTE — BH INPATIENT PSYCHIATRY PROGRESS NOTE - CURRENT MEDICATION
MEDICATIONS  (STANDING):  aspirin  chewable 81 milliGRAM(s) Oral daily  lurasidone 20 milliGRAM(s) Oral <User Schedule>  multivitamin 1 Tablet(s) Oral daily    MEDICATIONS  (PRN):  benzocaine/menthol Lozenge 1 Lozenge Oral three times a day PRN cough/throat pain  diphenhydrAMINE 50 milliGRAM(s) Oral at bedtime PRN insomnia  guaiFENesin Oral Liquid (Sugar-Free) 200 milliGRAM(s) Oral every 6 hours PRN cough  haloperidol     Tablet 5 milliGRAM(s) Oral every 6 hours PRN psychotic agitation  haloperidol    Injectable 5 milliGRAM(s) IntraMuscular Once PRN severe psychotic agitation  ibuprofen  Tablet. 600 milliGRAM(s) Oral every 8 hours PRN Mild Pain (1 - 3), Moderate Pain (4 - 6)  LORazepam     Tablet 2 milliGRAM(s) Oral every 6 hours PRN Agitation  LORazepam   Injectable 2 milliGRAM(s) IntraMuscular Once PRN severe agitation  
MEDICATIONS  (STANDING):  aspirin  chewable 81 milliGRAM(s) Oral daily  lurasidone 20 milliGRAM(s) Oral <User Schedule>  multivitamin 1 Tablet(s) Oral daily    MEDICATIONS  (PRN):  benzocaine/menthol Lozenge 1 Lozenge Oral three times a day PRN cough/throat pain  diphenhydrAMINE 50 milliGRAM(s) Oral at bedtime PRN insomnia  guaiFENesin Oral Liquid (Sugar-Free) 200 milliGRAM(s) Oral every 6 hours PRN cough  haloperidol     Tablet 5 milliGRAM(s) Oral every 6 hours PRN psychotic agitation  haloperidol    Injectable 5 milliGRAM(s) IntraMuscular Once PRN severe psychotic agitation  ibuprofen  Tablet. 600 milliGRAM(s) Oral every 8 hours PRN Mild Pain (1 - 3), Moderate Pain (4 - 6)  
MEDICATIONS  (STANDING):  aspirin  chewable 81 milliGRAM(s) Oral daily  lurasidone 20 milliGRAM(s) Oral <User Schedule>  multivitamin 1 Tablet(s) Oral daily    MEDICATIONS  (PRN):  benzocaine/menthol Lozenge 1 Lozenge Oral three times a day PRN cough/throat pain  diphenhydrAMINE 50 milliGRAM(s) Oral at bedtime PRN insomnia  guaiFENesin Oral Liquid (Sugar-Free) 200 milliGRAM(s) Oral every 6 hours PRN cough  haloperidol     Tablet 5 milliGRAM(s) Oral every 6 hours PRN psychotic agitation  haloperidol    Injectable 5 milliGRAM(s) IntraMuscular Once PRN severe psychotic agitation  ibuprofen  Tablet. 600 milliGRAM(s) Oral every 8 hours PRN Mild Pain (1 - 3), Moderate Pain (4 - 6)  LORazepam     Tablet 2 milliGRAM(s) Oral every 6 hours PRN Agitation  LORazepam   Injectable 2 milliGRAM(s) IntraMuscular Once PRN severe agitation  
MEDICATIONS  (STANDING):  aspirin  chewable 81 milliGRAM(s) Oral daily  lurasidone 20 milliGRAM(s) Oral <User Schedule>  multivitamin 1 Tablet(s) Oral daily    MEDICATIONS  (PRN):  benzocaine/menthol Lozenge 1 Lozenge Oral three times a day PRN cough/throat pain  guaiFENesin Oral Liquid (Sugar-Free) 200 milliGRAM(s) Oral every 6 hours PRN cough  haloperidol     Tablet 5 milliGRAM(s) Oral every 6 hours PRN psychotic agitation  haloperidol    Injectable 5 milliGRAM(s) IntraMuscular Once PRN severe psychotic agitation  ibuprofen  Tablet. 600 milliGRAM(s) Oral every 8 hours PRN Mild Pain (1 - 3), Moderate Pain (4 - 6)  LORazepam     Tablet 2 milliGRAM(s) Oral every 6 hours PRN Agitation  LORazepam   Injectable 2 milliGRAM(s) IntraMuscular Once PRN severe agitation  
MEDICATIONS  (STANDING):  aspirin  chewable 81 milliGRAM(s) Oral daily  lurasidone 20 milliGRAM(s) Oral <User Schedule>  multivitamin 1 Tablet(s) Oral daily    MEDICATIONS  (PRN):  benzocaine/menthol Lozenge 1 Lozenge Oral three times a day PRN cough/throat pain  diphenhydrAMINE 50 milliGRAM(s) Oral at bedtime PRN insomnia  guaiFENesin Oral Liquid (Sugar-Free) 200 milliGRAM(s) Oral every 6 hours PRN cough  haloperidol     Tablet 5 milliGRAM(s) Oral every 6 hours PRN psychotic agitation  haloperidol    Injectable 5 milliGRAM(s) IntraMuscular Once PRN severe psychotic agitation  ibuprofen  Tablet. 600 milliGRAM(s) Oral every 8 hours PRN Mild Pain (1 - 3), Moderate Pain (4 - 6)  LORazepam     Tablet 2 milliGRAM(s) Oral every 6 hours PRN Agitation  LORazepam   Injectable 2 milliGRAM(s) IntraMuscular Once PRN severe agitation  
MEDICATIONS  (STANDING):  aspirin  chewable 81 milliGRAM(s) Oral daily  lurasidone 20 milliGRAM(s) Oral <User Schedule>  multivitamin 1 Tablet(s) Oral daily    MEDICATIONS  (PRN):  benzocaine/menthol Lozenge 1 Lozenge Oral three times a day PRN cough/throat pain  guaiFENesin Oral Liquid (Sugar-Free) 200 milliGRAM(s) Oral every 6 hours PRN cough  haloperidol     Tablet 5 milliGRAM(s) Oral every 6 hours PRN psychotic agitation  haloperidol    Injectable 5 milliGRAM(s) IntraMuscular Once PRN severe psychotic agitation  ibuprofen  Tablet. 600 milliGRAM(s) Oral every 8 hours PRN Mild Pain (1 - 3), Moderate Pain (4 - 6)  LORazepam     Tablet 2 milliGRAM(s) Oral every 6 hours PRN Agitation  LORazepam   Injectable 2 milliGRAM(s) IntraMuscular Once PRN severe agitation

## 2024-12-12 NOTE — BH INPATIENT PSYCHIATRY PROGRESS NOTE - NSTXSUBMISDATEEST_PSY_ALL_CORE
Home
04-Dec-2024

## 2024-12-12 NOTE — BH INPATIENT PSYCHIATRY PROGRESS NOTE - NSBHATTESTBILLING_PSY_A_CORE
[id number]-Njxsazanry OBS or IP - moderate complexity OR 35-49 mins
[id number]-Ohjqxelwuw OBS or IP - moderate complexity OR 35-49 mins
[id number]-Ffrbgikvad OBS or IP - moderate complexity OR 35-49 mins
[id number]-Dsllunbujn OBS or IP - moderate complexity OR 35-49 mins
[id number]-Rywzjvqftz OBS or IP - moderate complexity OR 35-49 mins
[id number]-Utumecjxfm OBS or IP - moderate complexity OR 35-49 mins
[id number]-Sevtxpdxis OBS or IP - moderate complexity OR 35-49 mins
[id number]-Cevhkylxfe OBS or IP - moderate complexity OR 35-49 mins

## 2024-12-12 NOTE — BH INPATIENT PSYCHIATRY PROGRESS NOTE - NSBHASSESSSUMMFT_PSY_ALL_CORE
Patient is a 59 year old male, single, undomiciled (lives in hotels) and unemployed. PPHx of self-reported diagnosis of bipolar disorder, but many documented diagnoses ranging across mood, psychotic, and personality disorders, hx of many prior hospitalizations and ED visits,  discharged from [facility] on 07/15/24  with no psychotropic medication, chronic nonadherence to meds and outpt treatment, polysubstance use disorder (cannabis, alcohol, prior documented cocaine use disorder), suspicion of malingering on previous visits, hx of several prior self-aborted suicide attempts (planned to jump in front of train 2023, "stopped by an emre"), hx of many prior detox/rehabs (last at [facility] Dec 2023), hx of multiple ED visits reporting chest pain and/or SI and/or command AH to kill himself, PMHx of HTN, pericarditis, BIB by self, reporting SI and worsening depressed mood.      12/12: On assessment, pt reports improved mood. He denies AVH or SHIIP. Has been refusing standing Latuda. Plan to discharge pt tomorrow, shelter application submitted.      Working diagnosis:  Mood disorder NOS  Malingering      Plan:  >Legal: 9.13                                                                                                                                                                                    >Obs: Routine observation, denies active SIIP and is able to engage in safety planning.      >Psychiatric Meds:  Continue Latuda 20mg, PM for mood/psychosis      >PRN Meds:  Haldol 5mg IM/PO, Q6H for psychotic agitation  Lorazepam 2mg PO, Q6H for agitation        >Labs: Admission labs ordered. Hold antipsychotics if QTc >500.  >Medical: No acute concerns. Patient w/o indication for CIWA, no visible physical symptoms of withdrawal. During the course of treatment, will collaborate with medical team to manage medical issues.  >Diet: DASH  >Social: Milieu/structured therapy  >Treatment Interventions: Groups and Individual Therapy/CBT.  >Dispo: Shelter
Patient is a 59 year old male, single, undomiciled (lives in hotels) and unemployed. PPHx of self-reported diagnosis of bipolar disorder, but many documented diagnoses ranging across mood, psychotic, and personality disorders, hx of many prior hospitalizations and ED visits,  discharged from [facility] on 07/15/24  with no psychotropic medication, chronic nonadherence to meds and outpt treatment, polysubstance use disorder (cannabis, alcohol, prior documented cocaine use disorder), suspicion of malingering on previous visits, hx of several prior self-aborted suicide attempts (planned to jump in front of train 2023, "stopped by an emre"), hx of many prior detox/rehabs (last at [facility] Dec 2023), hx of multiple ED visits reporting chest pain and/or SI and/or command AH to kill himself, PMHx of HTN, pericarditis, BIB by self, reporting SI and worsening depressed mood.      12/9: On assessment, pt reports ongoing depression. He denies AVH or SHIIP. Has been compliant with standing medications/ Pt requesting placement in inpatient rehab.      Working diagnosis:  Mood disorder NOS  Malingering      Plan:  >Legal: 9.13                                                                                                                                                                                    >Obs: Routine observation, denies active SIIP and is able to engage in safety planning.      >Psychiatric Meds:  Continue Latuda 20mg, PM for mood/psychosis      >PRN Meds:  Haldol 5mg IM/PO, Q6H for psychotic agitation  Lorazepam 2mg PO, Q6H for agitation        >Labs: Admission labs ordered. Hold antipsychotics if QTc >500.  >Medical: No acute concerns. Patient w/o indication for CIWA, no visible physical symptoms of withdrawal. During the course of treatment, will collaborate with medical team to manage medical issues.  >Diet: DASH  >Social: Milieu/structured therapy  >Treatment Interventions: Groups and Individual Therapy/CBT.  >Dispo: symptom improvement
Patient is a 59 year old male, single, undomiciled (lives in hotels) and unemployed. PPHx of self-reported diagnosis of bipolar disorder, but many documented diagnoses ranging across mood, psychotic, and personality disorders, hx of many prior hospitalizations and ED visits,  discharged from [facility] on 07/15/24  with no psychotropic medication, chronic nonadherence to meds and outpt treatment, polysubstance use disorder (cannabis, alcohol, prior documented cocaine use disorder), suspicion of malingering on previous visits, hx of several prior self-aborted suicide attempts (planned to jump in front of train 2023, "stopped by an emre"), hx of many prior detox/rehabs (last at [facility] Dec 2023), hx of multiple ED visits reporting chest pain and/or SI and/or command AH to kill himself, PMHx of HTN, pericarditis, BIB by self, reporting SI and worsening depressed mood.      12/10: On assessment, pt reports ongoing depression and intermittent anxiety. He denies AVH or SHIIP. Has been compliant with standing medications. Pt requesting placement in inpatient rehab, declined from [facility]. Signed consent for shelter referral.      Working diagnosis:  Mood disorder NOS  Malingering      Plan:  >Legal: 9.13                                                                                                                                                                                    >Obs: Routine observation, denies active SIIP and is able to engage in safety planning.      >Psychiatric Meds:  Continue Latuda 20mg, PM for mood/psychosis      >PRN Meds:  Haldol 5mg IM/PO, Q6H for psychotic agitation  Lorazepam 2mg PO, Q6H for agitation        >Labs: Admission labs ordered. Hold antipsychotics if QTc >500.  >Medical: No acute concerns. Patient w/o indication for CIWA, no visible physical symptoms of withdrawal. During the course of treatment, will collaborate with medical team to manage medical issues.  >Diet: DASH  >Social: Milieu/structured therapy  >Treatment Interventions: Groups and Individual Therapy/CBT.  >Dispo: symptom improvement
Patient is a 59 year old male, single, undomiciled (lives in hotels) and unemployed. PPHx of self-reported diagnosis of bipolar disorder, but many documented diagnoses ranging across mood, psychotic, and personality disorders, hx of many prior hospitalizations and ED visits,  discharged from [facility] on 07/15/24  with no psychotropic medication, chronic nonadherence to meds and outpt treatment, polysubstance use disorder (cannabis, alcohol, prior documented cocaine use disorder), suspicion of malingering on previous visits, hx of several prior self-aborted suicide attempts (planned to jump in front of train 2023, "stopped by an emre"), hx of many prior detox/rehabs (last at [facility] Dec 2023), hx of multiple ED visits reporting chest pain and/or SI and/or command AH to kill himself, PMHx of HTN, pericarditis, BIB by self, reporting SI and worsening depressed mood.      On assessment, patient denies any current AVH or SI.      Working diagnosis:  Mood disorder NOS  Malingering      Plan:  >Legal: 9.13                                                                                                                                                                                    >Obs: Routine observation, denies active SIIP and is able to engage in safety planning.      >Psychiatric Meds:  Continue Latuda 20mg, PM for mood/psychosis      >PRN Meds:  Haldol 5mg IM/PO, Q6H for psychotic agitation  Lorazepam 2mg PO, Q6H for agitation        >Labs: Admission labs ordered. Hold antipsychotics if QTc >500.  >Medical: No acute concerns. Patient w/o indication for CIWA, no visible physical symptoms of withdrawal. During the course of treatment, will collaborate with medical team to manage medical issues.  >Diet: DASH  >Social: Milieu/structured therapy  >Treatment Interventions: Groups and Individual Therapy/CBT.  >Dispo: symptom improvement
Patient is a 59 year old male, single, undomiciled (lives in hotels) and unemployed. PPHx of self-reported diagnosis of bipolar disorder, but many documented diagnoses ranging across mood, psychotic, and personality disorders, hx of many prior hospitalizations and ED visits,  discharged from [facility] on 07/15/24  with no psychotropic medication, chronic nonadherence to meds and outpt treatment, polysubstance use disorder (cannabis, alcohol, prior documented cocaine use disorder), suspicion of malingering on previous visits, hx of several prior self-aborted suicide attempts (planned to jump in front of train 2023, "stopped by an emre"), hx of many prior detox/rehabs (last at [facility] Dec 2023), hx of multiple ED visits reporting chest pain and/or SI and/or command AH to kill himself, PMHx of HTN, pericarditis, BIB by self, reporting SI and worsening depressed mood.      On assessment, patient denies any current AVH or SI. He is future oriented.      Working diagnosis:  Mood disorder NOS  Malingering      Plan:  >Legal: 9.13                                                                                                                                                                                    >Obs: Routine observation, denies active SIIP and is able to engage in safety planning.      >Psychiatric Meds:  Continue Latuda 20mg, PM for mood/psychosis      >PRN Meds:  Haldol 5mg IM/PO, Q6H for psychotic agitation  Lorazepam 2mg PO, Q6H for agitation        >Labs: Admission labs ordered. Hold antipsychotics if QTc >500.  >Medical: No acute concerns. Patient w/o indication for CIWA, no visible physical symptoms of withdrawal. During the course of treatment, will collaborate with medical team to manage medical issues.  >Diet: DASH  >Social: Milieu/structured therapy  >Treatment Interventions: Groups and Individual Therapy/CBT.  >Dispo: symptom improvement
Patient is a 59 year old male, single, undomiciled (lives in hotels) and unemployed. PPHx of self-reported diagnosis of bipolar disorder, but many documented diagnoses ranging across mood, psychotic, and personality disorders, hx of many prior hospitalizations and ED visits,  discharged from [facility] on 07/15/24  with no psychotropic medication, chronic nonadherence to meds and outpt treatment, polysubstance use disorder (cannabis, alcohol, prior documented cocaine use disorder), suspicion of malingering on previous visits, hx of several prior self-aborted suicide attempts (planned to jump in front of train 2023, "stopped by an emre"), hx of many prior detox/rehabs (last at [facility] Dec 2023), hx of multiple ED visits reporting chest pain and/or SI and/or command AH to kill himself, PMHx of HTN, pericarditis, BIB by self, reporting SI and worsening depressed mood.      12/11: On assessment, pt reports ongoing depression and intermittent anxiety. He denies AVH or SHIIP. Has been refusing standing Latuda. Plan to discharge pt on Friday, shelter application submitted.      Working diagnosis:  Mood disorder NOS  Malingering      Plan:  >Legal: 9.13                                                                                                                                                                                    >Obs: Routine observation, denies active SIIP and is able to engage in safety planning.      >Psychiatric Meds:  Continue Latuda 20mg, PM for mood/psychosis      >PRN Meds:  Haldol 5mg IM/PO, Q6H for psychotic agitation  Lorazepam 2mg PO, Q6H for agitation        >Labs: Admission labs ordered. Hold antipsychotics if QTc >500.  >Medical: No acute concerns. Patient w/o indication for CIWA, no visible physical symptoms of withdrawal. During the course of treatment, will collaborate with medical team to manage medical issues.  >Diet: DASH  >Social: Milieu/structured therapy  >Treatment Interventions: Groups and Individual Therapy/CBT.  >Dispo: symptom improvement
Patient is a 59 year old male, single, undomiciled (lives in hotels) and unemployed. PPHx of self-reported diagnosis of bipolar disorder, but many documented diagnoses ranging across mood, psychotic, and personality disorders, hx of many prior hospitalizations and ED visits,  discharged from [facility] on 07/15/24  with no psychotropic medication, chronic nonadherence to meds and outpt treatment, polysubstance use disorder (cannabis, alcohol, prior documented cocaine use disorder), suspicion of malingering on previous visits, hx of several prior self-aborted suicide attempts (planned to jump in front of train 2023, "stopped by an emre"), hx of many prior detox/rehabs (last at [facility] Dec 2023), hx of multiple ED visits reporting chest pain and/or SI and/or command AH to kill himself, PMHx of HTN, pericarditis, BIB by self, reporting SI and worsening depressed mood.      12/5: On assessment, pt denying AH and also denying SIIP. Does report feeling depressed, declined Latuda yesterday and has pattern of declining psychiatric medications during hospitalizations.      Working diagnosis:  Mood disorder NOS  Malingering      Plan:  >Legal: 9.13                                                                                                                                                                                    >Obs: Routine observation, denies active SIIP and is able to engage in safety planning.      >Psychiatric Meds:  Continue Latuda 20mg, PM for mood/psychosis      >PRN Meds:  Haldol 5mg IM/PO, Q6H for psychotic agitation  Lorazepam 2mg PO, Q6H for agitation        >Labs: Admission labs ordered. Hold antipsychotics if QTc >500.  >Medical: No acute concerns. Patient w/o indication for CIWA, no visible physical symptoms of withdrawal. During the course of treatment, will collaborate with medical team to manage medical issues.  >Diet: DASH  >Social: Milieu/structured therapy  >Treatment Interventions: Groups and Individual Therapy/CBT.  >Dispo: symptom improvement
Patient is a 59 year old male, single, undomiciled (lives in hotels) and unemployed. PPHx of self-reported diagnosis of bipolar disorder, but many documented diagnoses ranging across mood, psychotic, and personality disorders, hx of many prior hospitalizations and ED visits,  discharged from [facility] on 07/15/24  with no psychotropic medication, chronic nonadherence to meds and outpt treatment, polysubstance use disorder (cannabis, alcohol, prior documented cocaine use disorder), suspicion of malingering on previous visits, hx of several prior self-aborted suicide attempts (planned to jump in front of train 2023, "stopped by an emre"), hx of many prior detox/rehabs (last at [facility] Dec 2023), hx of multiple ED visits reporting chest pain and/or SI and/or command AH to kill himself, PMHx of HTN, pericarditis, BIB by self, reporting SI and worsening depressed mood.      12/6: On assessment, pt reporting AH, depression, and passive SI. Was able to accepted Latuda yesterday after refusing the previous day. Pt requesting placement in inpatient rehab.      Working diagnosis:  Mood disorder NOS  Malingering      Plan:  >Legal: 9.13                                                                                                                                                                                    >Obs: Routine observation, denies active SIIP and is able to engage in safety planning.      >Psychiatric Meds:  Continue Latuda 20mg, PM for mood/psychosis      >PRN Meds:  Haldol 5mg IM/PO, Q6H for psychotic agitation  Lorazepam 2mg PO, Q6H for agitation        >Labs: Admission labs ordered. Hold antipsychotics if QTc >500.  >Medical: No acute concerns. Patient w/o indication for CIWA, no visible physical symptoms of withdrawal. During the course of treatment, will collaborate with medical team to manage medical issues.  >Diet: DASH  >Social: Milieu/structured therapy  >Treatment Interventions: Groups and Individual Therapy/CBT.  >Dispo: symptom improvement

## 2024-12-12 NOTE — BH INPATIENT PSYCHIATRY PROGRESS NOTE - NSICDXBHTERTIARYDX_PSY_ALL_CORE
R/O Malingering   Z76.5  

## 2024-12-12 NOTE — BH INPATIENT PSYCHIATRY PROGRESS NOTE - NSBHFUPINTERVALHXFT_PSY_A_CORE
Patient is followed up for psychosis/SI. Chart, medications and labs reviewed. Case discussed with treatment team. Patient had no overnight events, has been in good behavioral control.   Patient was seen and is evaluated on the unit. He reports having fair sleep and good appetite. He states that is doing "good" and reports improvement in mood sx. Writer discussed discharge scheduled for tomorrow 12/13. He denies any SIIP or urges to harm self. Denies AVH or paranoia. He has been refusing standing Latuda, denies SE. No acute medical concerns, VSS.
Patient is followed up for psychosis/SI. Chart, medications and labs reviewed. Case discussed with treatment team. Patient had no overnight events, has been in good behavioral control.   Patient was seen and is evaluated on the unit. He reports having fair sleep and good appetite. He reports having nightmares overnight, continues to endorse feeling depressed. He remains primarily focused on obtaining housing, additionally asks for rehab placement. He endorses passive SI w/o plan or intent. He reports vague AH. He was compliant with standing medications yesterday. No acute medical concerns, VSS.
Patient is followed up for psychosis/SI. Chart, medications and labs reviewed. Case discussed with treatment team. Patient had no overnight events, has been in good behavioral control.   Patient was seen and is evaluated on the unit. He reports having fair sleep and good appetite. He states that he has "a lot on my mind" which keeps him up at night, discusses wanting to be able to send his daughter money and needing to quit spending money on substances in order to do this. He remains focused on obtaining housing and rehab placement. He denies any SIIP or urges to harm self. Denies AVH or paranoia. He has been compliant with standing medications. No acute medical concerns, VSS.
Patient is followed up for psychosis/SI. Chart, medications and labs reviewed. Case discussed with treatment team. Patient had no overnight events, has been in good behavioral control.   Patient was seen and is evaluated on the unit. He reports having fair sleep and good appetite. He states that is doing "alright", has been thinking about everyone else's problems besides his own. He remains focused on obtaining housing and rehab placement, referral sent to [facility] and pt was denied. Does sign consent for shelter referral, writer discussed plan for pt is discharge this week. He denies any SIIP or urges to harm self. Denies AVH or paranoia. He has been compliant with standing medications. No acute medical concerns, VSS.
Chart reviewed and case discussed with treatment team. No events reported overnight. Sleep and appetite is fair. Patient endorses feeling hopeful today and denies SI or AVH. Patient is compliant with medications, no adverse effects reported.  
Chart reviewed and case discussed with treatment team. No events reported overnight. Sleep and appetite is fair. Patient denies any current SI/HI or AVH. He endorses feeling hopeful and is future oriented. Patient is compliant with medications, no adverse effects reported.  
Patient is followed up for psychosis/SI. Chart, medications and labs reviewed. Case discussed with treatment team. Patient had no overnight events, has been in good behavioral control.   Patient was seen and is evaluated on the unit. He reports having fair sleep and good appetite. He states that is doing "alright", frustrated at times towards other peers on the unit. He remains focused on obtaining housing and rehab placement, shelter referral submitted w/ plan for pt to discharge on Friday 12/13. He denies any SIIP or urges to harm self. Denies AVH or paranoia. He has been refusing standing Latuda, denies SE. No acute medical concerns, VSS.
Patient is followed up for psychosis/SI. Chart, medications and labs reviewed. Case discussed with treatment team. Patient had no overnight events, has been in good behavioral control.   Patient was seen and is evaluated on the unit. He reports having good sleep and appetite. He states that he remains depressed, although feels better in being able to contact his daughter who he identifies as a protective factor. Pt verbalizes that his primary focus is to obtain housing, provides team with contact info for [facility] staff. He currently denies any SHIIP. He denies AVH, does not appear internally preoccupied. He refused Latuda yesterday evening. Does request for sharps order which is being granted to him. No acute medical concerns, VSS.

## 2024-12-12 NOTE — BH INPATIENT PSYCHIATRY PROGRESS NOTE - NSTXSUBMISGOAL_PSY_ALL_CORE
Will verbalize 3 adverse consequences of use

## 2024-12-12 NOTE — BH INPATIENT PSYCHIATRY PROGRESS NOTE - NSBHMSEBEHAV_PSY_A_CORE
Cooperative
Cooperative
Cooperative/Other
Cooperative

## 2024-12-12 NOTE — BH INPATIENT PSYCHIATRY PROGRESS NOTE - PRN MEDS
MEDICATIONS  (PRN):  benzocaine/menthol Lozenge 1 Lozenge Oral three times a day PRN cough/throat pain  guaiFENesin Oral Liquid (Sugar-Free) 200 milliGRAM(s) Oral every 6 hours PRN cough  haloperidol     Tablet 5 milliGRAM(s) Oral every 6 hours PRN psychotic agitation  haloperidol    Injectable 5 milliGRAM(s) IntraMuscular Once PRN severe psychotic agitation  ibuprofen  Tablet. 600 milliGRAM(s) Oral every 8 hours PRN Mild Pain (1 - 3), Moderate Pain (4 - 6)  LORazepam     Tablet 2 milliGRAM(s) Oral every 6 hours PRN Agitation  LORazepam   Injectable 2 milliGRAM(s) IntraMuscular Once PRN severe agitation  
MEDICATIONS  (PRN):  benzocaine/menthol Lozenge 1 Lozenge Oral three times a day PRN cough/throat pain  diphenhydrAMINE 50 milliGRAM(s) Oral at bedtime PRN insomnia  guaiFENesin Oral Liquid (Sugar-Free) 200 milliGRAM(s) Oral every 6 hours PRN cough  haloperidol     Tablet 5 milliGRAM(s) Oral every 6 hours PRN psychotic agitation  haloperidol    Injectable 5 milliGRAM(s) IntraMuscular Once PRN severe psychotic agitation  ibuprofen  Tablet. 600 milliGRAM(s) Oral every 8 hours PRN Mild Pain (1 - 3), Moderate Pain (4 - 6)  LORazepam     Tablet 2 milliGRAM(s) Oral every 6 hours PRN Agitation  LORazepam   Injectable 2 milliGRAM(s) IntraMuscular Once PRN severe agitation  
MEDICATIONS  (PRN):  benzocaine/menthol Lozenge 1 Lozenge Oral three times a day PRN cough/throat pain  diphenhydrAMINE 50 milliGRAM(s) Oral at bedtime PRN insomnia  guaiFENesin Oral Liquid (Sugar-Free) 200 milliGRAM(s) Oral every 6 hours PRN cough  haloperidol     Tablet 5 milliGRAM(s) Oral every 6 hours PRN psychotic agitation  haloperidol    Injectable 5 milliGRAM(s) IntraMuscular Once PRN severe psychotic agitation  ibuprofen  Tablet. 600 milliGRAM(s) Oral every 8 hours PRN Mild Pain (1 - 3), Moderate Pain (4 - 6)  
MEDICATIONS  (PRN):  benzocaine/menthol Lozenge 1 Lozenge Oral three times a day PRN cough/throat pain  guaiFENesin Oral Liquid (Sugar-Free) 200 milliGRAM(s) Oral every 6 hours PRN cough  haloperidol     Tablet 5 milliGRAM(s) Oral every 6 hours PRN psychotic agitation  haloperidol    Injectable 5 milliGRAM(s) IntraMuscular Once PRN severe psychotic agitation  ibuprofen  Tablet. 600 milliGRAM(s) Oral every 8 hours PRN Mild Pain (1 - 3), Moderate Pain (4 - 6)  LORazepam     Tablet 2 milliGRAM(s) Oral every 6 hours PRN Agitation  LORazepam   Injectable 2 milliGRAM(s) IntraMuscular Once PRN severe agitation  
MEDICATIONS  (PRN):  benzocaine/menthol Lozenge 1 Lozenge Oral three times a day PRN cough/throat pain  diphenhydrAMINE 50 milliGRAM(s) Oral at bedtime PRN insomnia  guaiFENesin Oral Liquid (Sugar-Free) 200 milliGRAM(s) Oral every 6 hours PRN cough  haloperidol     Tablet 5 milliGRAM(s) Oral every 6 hours PRN psychotic agitation  haloperidol    Injectable 5 milliGRAM(s) IntraMuscular Once PRN severe psychotic agitation  ibuprofen  Tablet. 600 milliGRAM(s) Oral every 8 hours PRN Mild Pain (1 - 3), Moderate Pain (4 - 6)  LORazepam     Tablet 2 milliGRAM(s) Oral every 6 hours PRN Agitation  LORazepam   Injectable 2 milliGRAM(s) IntraMuscular Once PRN severe agitation  
MEDICATIONS  (PRN):  benzocaine/menthol Lozenge 1 Lozenge Oral three times a day PRN cough/throat pain  diphenhydrAMINE 50 milliGRAM(s) Oral at bedtime PRN insomnia  guaiFENesin Oral Liquid (Sugar-Free) 200 milliGRAM(s) Oral every 6 hours PRN cough  haloperidol     Tablet 5 milliGRAM(s) Oral every 6 hours PRN psychotic agitation  haloperidol    Injectable 5 milliGRAM(s) IntraMuscular Once PRN severe psychotic agitation  ibuprofen  Tablet. 600 milliGRAM(s) Oral every 8 hours PRN Mild Pain (1 - 3), Moderate Pain (4 - 6)  LORazepam     Tablet 2 milliGRAM(s) Oral every 6 hours PRN Agitation  LORazepam   Injectable 2 milliGRAM(s) IntraMuscular Once PRN severe agitation

## 2024-12-12 NOTE — BH INPATIENT PSYCHIATRY PROGRESS NOTE - NSBHFUPINTERVALCCFT_PSY_A_CORE
follow up for depression/SI/psychosis
Patient seen for follow up for depression and psychosis.
follow up for depression/SI/psychosis
follow up for depression/SI/psychosis
Patient seen for follow up for depression and psychosis.
follow up for depression/SI/psychosis

## 2024-12-12 NOTE — BH DISCHARGE NOTE NURSING/SOCIAL WORK/PSYCH REHAB - NSDCADDINFO1FT_PSY_ALL_CORE
Please arrive with your insurance and identification card. Your discharge summary will be faxed to your clinic prior to your appointment

## 2024-12-12 NOTE — BH DISCHARGE NOTE NURSING/SOCIAL WORK/PSYCH REHAB - PATIENT PORTAL LINK FT
You can access the FollowMyHealth Patient Portal offered by [facility] by registering at the following website: [email or web address]. By joining [employer]’s FollowMyHealth portal, you will also be able to view your health information using other applications (apps) compatible with our system.

## 2024-12-12 NOTE — BH INPATIENT PSYCHIATRY PROGRESS NOTE - NSBHATTESTTYPEVISIT_PSY_A_CORE
EMMIE without on-site Attending supervision
EMMIE without on-site Attending supervision
On-site Attending supervising EMMIE (99XXX codes)

## 2024-12-12 NOTE — BH INPATIENT PSYCHIATRY PROGRESS NOTE - NSTXMEDICGOAL_PSY_ALL_CORE
Take all medications as prescribed
Take all medications as prescribed
Be able to describe the benefit of medication/treatment
Take all medications as prescribed

## 2024-12-12 NOTE — BH DISCHARGE NOTE NURSING/SOCIAL WORK/PSYCH REHAB - MODE OF TRANSPORTATION
At request of pt, [facility] arranged for 12/13/2024 at 11am invoice#[id number], 5th Bga-[phone]/Medicaid Transportation

## 2024-12-12 NOTE — BH INPATIENT PSYCHIATRY PROGRESS NOTE - NSICDXBHPRIMARYDX_PSY_ALL_CORE
Unspecified mood [affective] disorder   F39  

## 2024-12-12 NOTE — BH INPATIENT PSYCHIATRY PROGRESS NOTE - NSBHMSEPERCEPT_PSY_A_CORE
recent AVH but no current/No abnormalities
recent AVH but no current/Auditory hallucinations
recent AVH but no current/No abnormalities
recent AVH but no current/Auditory hallucinations

## 2024-12-12 NOTE — BH INPATIENT PSYCHIATRY PROGRESS NOTE - NSICDXBHSECONDARYDX_PSY_ALL_CORE
Polysubstance abuse   F19.10  

## 2024-12-12 NOTE — BH DISCHARGE NOTE NURSING/SOCIAL WORK/PSYCH REHAB - DISCHARGE INSTRUCTIONS AFTERCARE APPOINTMENTS
In order to check the location, date, or time of your aftercare appointment, please refer to your Discharge Instructions Document given to you upon leaving the hospital.  If you have lost the instructions please call [phone]

## 2024-12-12 NOTE — BH INPATIENT PSYCHIATRY PROGRESS NOTE - NSBHCHARTREVIEWVS_PSY_A_CORE FT
Vital Signs Last 24 Hrs  T(C): 36.7 (12-12-24 @ 05:33), Max: 36.7 (12-12-24 @ 05:33)  T(F): 98 (12-12-24 @ 05:33), Max: 98 (12-12-24 @ 05:33)  HR: --  BP: --  BP(mean): --  RR: --  SpO2: --    Orthostatic VS  12-12-24 @ 05:33  Lying BP: --/-- HR: --  Sitting BP: 123/55 HR: 75  Standing BP: --/-- HR: --  Site: --  Mode: --  Orthostatic VS  12-11-24 @ 19:35  Lying BP: --/-- HR: --  Sitting BP: 141/83 HR: 89  Standing BP: --/-- HR: --  Site: --  Mode: --  Orthostatic VS  12-10-24 @ 19:32  Lying BP: --/-- HR: --  Sitting BP: 129/75 HR: 87  Standing BP: --/-- HR: --  Site: --  Mode: --

## 2024-12-13 VITALS — TEMPERATURE: 98 F | RESPIRATION RATE: 18 BRPM

## 2024-12-13 PROCEDURE — 99238 HOSP IP/OBS DSCHRG MGMT 30/<: CPT

## 2024-12-13 RX ADMIN — Medication 200 MILLIGRAM(S): at 06:28

## 2024-12-13 RX ADMIN — Medication 1 TABLET(S): at 08:11

## 2024-12-13 RX ADMIN — Medication 81 MILLIGRAM(S): at 08:11

## 2024-12-13 NOTE — BH INPATIENT PSYCHIATRY DISCHARGE NOTE - NSDCCPCAREPLAN_GEN_ALL_CORE_FT
PRINCIPAL DISCHARGE DIAGNOSIS  Diagnosis: Unspecified mood [affective] disorder  Assessment and Plan of Treatment:       SECONDARY DISCHARGE DIAGNOSES  Diagnosis: Polysubstance abuse  Assessment and Plan of Treatment:

## 2024-12-13 NOTE — BH INPATIENT PSYCHIATRY DISCHARGE NOTE - NSBHMETABOLIC_PSY_ALL_CORE_FT
BMI: BMI (kg/m2): 36.1 (12-04-24 @ 00:46)  HbA1c: A1C with Estimated Average Glucose Result: 5.4 % (12-05-24 @ 10:00)    Glucose: POCT Blood Glucose.: 104 mg/dL (04-04-24 @ 06:04)    BP: 127/72 (12-11-24 @ 08:23) (127/72 - 127/72)Vital Signs Last 24 Hrs  T(C): 36.6 (12-13-24 @ 06:53), Max: 36.6 (12-13-24 @ 06:53)  T(F): 97.8 (12-13-24 @ 06:53), Max: 97.8 (12-13-24 @ 06:53)  HR: --  BP: --  BP(mean): --  RR: --  SpO2: --    Orthostatic VS  12-13-24 @ 06:53  Lying BP: --/-- HR: --  Sitting BP: 122/68 HR: 64  Standing BP: --/-- HR: --  Site: --  Mode: --  Orthostatic VS  12-12-24 @ 05:33  Lying BP: --/-- HR: --  Sitting BP: 123/55 HR: 75  Standing BP: --/-- HR: --  Site: --  Mode: --  Orthostatic VS  12-11-24 @ 19:35  Lying BP: --/-- HR: --  Sitting BP: 141/83 HR: 89  Standing BP: --/-- HR: --  Site: --  Mode: --    Lipid Panel: Date/Time: 12-05-24 @ 10:00  Cholesterol, Serum: 152  LDL Cholesterol Calculated: 82  HDL Cholesterol, Serum: 42  Total Cholesterol/HDL Ration Measurement: --  Triglycerides, Serum: 160

## 2024-12-13 NOTE — BH INPATIENT PSYCHIATRY DISCHARGE NOTE - NSBHASSESSSUMMFT_PSY_ALL_CORE
... Patient is a 59 year old male, single, undomiciled (lives in hotels) and unemployed. PPHx of self-reported diagnosis of bipolar disorder, but many documented diagnoses ranging across mood, psychotic, and personality disorders, hx of many prior hospitalizations and ED visits,  discharged from [facility] on 07/15/24  with no psychotropic medication, chronic nonadherence to meds and outpt treatment, polysubstance use disorder (cannabis, alcohol, prior documented cocaine use disorder), suspicion of malingering on previous visits, hx of several prior self-aborted suicide attempts (planned to jump in front of train 2023, "stopped by an emre"), hx of many prior detox/rehabs (last at [facility] Dec 2023), hx of multiple ED visits reporting chest pain and/or SI and/or command AH to kill himself, PMHx of HTN, pericarditis, BIB by self, reporting SI and worsening depressed mood.      12/13: On assessment, pt reports improved mood. He denies AVH or SHIIP, no overt psychotic or depressive sx noted. Has been refusing standing Latuda. Patient being discharged today to shelter.      Working diagnosis:  Mood disorder NOS  Malingering      Plan:  >Discharge patient    >Psychiatric Meds:  Discharged w/o medications as pt declined        >Dispo: Shelter

## 2024-12-13 NOTE — BH INPATIENT PSYCHIATRY DISCHARGE NOTE - HPI (INCLUDE ILLNESS QUALITY, SEVERITY, DURATION, TIMING, CONTEXT, MODIFYING FACTORS, ASSOCIATED SIGNS AND SYMPTOMS)
Patient is a 59 year old male, single, undomiciled (lives in hotels) and unemployed. PPHx of self-reported diagnosis of bipolar disorder, but many documented diagnoses ranging across mood, psychotic, and personality disorders, hx of many prior hospitalizations and ED visits,  discharged from [facility] on 07/15/24  with no psychotropic medication, chronic nonadherence to meds and outpt treatment, polysubstance use disorder (cannabis, alcohol, prior documented cocaine use disorder), suspicion of malingering on previous visits, hx of several prior self-aborted suicide attempts (planned to jump in front of train , "stopped by an emre"), hx of many prior detox/rehabs (last at [facility] Dec 2023), hx of multiple ED visits reporting chest pain and/or SI and/or command AH to kill himself, PMHx of HTN, pericarditis, BIB by self, reporting SI and worsening depressed mood.      As per ED behavioral health assessment note:  "On exam, patient is calm and cooperative. Provides history almost identical to documented history in previous  ED Assessments from  and . Initially, patient states that he came to the ED because "counselors at [facility] told me that they could help me with housing". When asked to elaborate on psychiatric sxs, patient  reports that he came today feeling suicidal and worsening depressive sxs. States that he has been suicidal "since the beginning of this year ". Patient identifies primary acute stressor, precipitating current depressive sxs (+depressed mood, poor appetite, insomnia w/ ~3 hours/night), as him not being unable to speak to his daughter. Patient acknowledges that he has not spoken to his daughter since he was hospitalized on [facility] in 2024. Identifies additional stressors as loss of "shopping letter" to obtain voucher for housing. States that this letter is sent to  on 24.Patient endorses current passive SI, but reports most recently having active SI w/ plan this evening. Patient states that his plan was to "jump in front of the 1 train". Denies engaging in behavior/acts to attempt this plan. States that he decided not to attempt this evening because "there were some guys on the platform and I didn't want witnesses". Patient reports closest that he has come to a suicide attempt occurred two years ago, where he states " walked to the ledge of subway station  wanting to die, and an "emre" pulled him back. Patient also reports alternative plan to attempt via "using a gun". Patient reports access to means (states that he "knows people in [place] with a gun"). Also states that he has access to firearms via his brother, who currently lives in [place]? . Patient also endorses vague AH, in which he describes intermittently hearing "his  mother's voice". Patient reports hearing AH during this assessment, but does not appear to be internally preoccupied at any point during the exam. Denies CAH. Patient also reports HI towards daughter's mother, but denies any plans or hx of HI/violence towards her.     Patient reports chronic history of non-adherence. When asked this non-adherence following after multiple hospitalizations, patient is unable to provide clear reason as to why. States that he had last been on Latuda but is unsure of the dose and has not taken any medication in several months. Patient reports closest that he has come to a suicide attempt occurred two years ago, where he states " walked to the ledge of subway station  wanting to die, and an "emre" pulled him back. Reports use of alcohol and "laced" cannabis 1-2 days ago. Denies current withdrawal symptoms."      On unit:  Patient was seen and is evaluated in the interview room. He presents logical and linear in thought process, calm and cooperative with interview. Known to writer from prior hospitalization on [facility]. He initially asks to speak with  to discuss potential housing opportunity. He states that he has been feeling depressed and was having suicidal thoughts to jump in front of the 1 train. Without inquiry adds that he could access a firearm though his brother who resides in [place]. He reports feeling depressed due to ongoing homelessness and poor relationship with his 15 y/o daughter who resides with her mother in [place]. Does verbalize that his daughter usually blocks his number, but he was able to contact her earlier this morning using the hospital phone. He states that he has been noncompliant with medications and does not want to become "dependent" on medications. He does verbalize ETOH and cocaine use on , unable to quantify his use. Does not exhibit or endorse any sxs of alcohol withdrawal. He reports AH of "whispers", says that he finds himself communicating with voices at times. On exam pt is not thought disordered and does not appear internally preoccupied. He currently denies any active SIIP, states that he feels safe in the hospital and denies urges of harming self. Denies any HIIP. He states that he does not want to take Abilify as this gives him "nightmares", pt is agreeable to restarting Latuda for mood sx, HgA1c and lipid panel ordered for tomorrow AM.

## 2024-12-13 NOTE — BH INPATIENT PSYCHIATRY DISCHARGE NOTE - NSBHFUPINTERVALHXFT_PSY_A_CORE
... Patient is followed up for psychosis/SI. Chart, medications and labs reviewed. Case discussed with treatment team. Patient had no overnight events, has been in good behavioral control.   Patient was seen and is evaluated on the unit. He reports having fair sleep and good appetite. He states that is doing "good" and reports improvement in mood sx, verbalizes that he is looking forward to being discharged and "embracing the cold". He adamantly denies any SIIP or urges to harm self. Denies AVH or paranoia. No overt psychotic or depressive sx noted. He has been refusing standing Latuda, denies SE. No acute medical concerns, VSS. Pt being discharged today to shelter.

## 2024-12-13 NOTE — BH INPATIENT PSYCHIATRY DISCHARGE NOTE - HOSPITAL COURSE
... Patient is hospitalized voluntarily with a primary problem of worsening mood, suicidal ideations, psychosis in the context of psychosocial stressors and polysubstance abuse.    The patient on initial evaluation, states that he has been felling more depressed, having SI w/ plan of jumping in front of a train, and endorsed vague AH of whispers. He denied CAH. He verbalizes that he is in the process of obtaining housing. Does report being street homeless currently and the temperature getting colder outside, c/f malingering behavior. He denied delusional thoughts, no evidence of thought disorder and pt did not appear to be RIS. During hospitalization pt was agreeable to taking Latuda for mood sx/psychosis. He took Latuda for a few days and started refusing medications, which is typical of previous admissions where he would deny sx and refuse to continue taking medications. He was primarily focused on obtaining housing and asked treatment team for referral to inpatient rehab. Referral was sent to [facility] and pt was denied, has been restricted to services at [facility]. He continued to endorse depressive sx, although did not present w/ any overt sx of depression. He also denied any SHIIP or urges to harm self. The patient engaged in therapy groups and participated in activities on the milieu. He reported improvement in mood and was more future oriented, team discussed discharge and pt was agreeable to shelter referral. He adamantly denied any SHIIP on discharge. Patient able to identify protective factors and he is future-oriented. Patient no longer required inpatient treatment and care. Patient is not judged to be an acute danger to self or others at this time.       PROCEDURES AND TREATMENT:  >Individual psychotherapy/CBT modality and group therapy  >Milieu therapy and supportive therapy  >Psychopharmacologic management.  >Motivational counseling.       Medications at discharge:  Psychiatric Medications:   N/A    Medical:   N/A    Problem Pertinent Review of Symptoms/Associated Signs and Symptoms: Patient is visible on the unit and is calm, cooperative, pleasant, AAOX3 upon approach. Immediate risk was minimized by inpatient admission to a safe environment with appropriate supervision and limited access to lethal means. On suicide risk assessment at the time of discharge, patient is at elevated chronic risk due the following factors: history of psychiatric illness, history of noncompliance, hx of multiple inpatient psychiatric admissions, hx of polysubstance abuse, and recent inpatient psychiatric discharge.    Protective factors include patient denying any anxiety, panic attacks, global insomnia, severe anhedonia. Patient denies any suicidal/homicidal ideations, intent, or plan at the time of discharge. Given the mitigation of aforementioned acute risk factors and considerable protective factors, patient's acute risk for self-harm has been minimized and is currently low.   Future risk was minimized before discharge by treatment of anxiety/depressive symptoms, providing relevant patient education, discussing emergency procedures, provided with outpatient referrals. Patient denies all suicidal and aggressive/homicidal ideation, intent and plan. Although the patient remains at their chronic baseline risk of self-harm, such risk cannot be further ameliorated by continued inpatient treatment and the patient is therefore appropriate for discharge.   On the day of discharge, the patient’s mood and affect are normal/euthymic. Patient denies depressed mood and anxiety. Patient is not hopeless. Sleep and appetite are also normal (at baseline). Pt’s motor performance and productivity of speech are WNL. Pt denies symptoms of psychosis including AVH and is at baseline. Patient denies S/H/I/I/P.   There are no other acute risk factors that could be mitigated by further inpatient hospitalization. Patient is not deemed to be an imminent danger to self or others at the time of discharge. The patient has been instructed that should he develop thoughts of self-harm, suicidal thoughts, homicidal thoughts, aggression, agitation, or any other distressing psychiatric symptoms, he should call 911 or go the emergency room. The patient has expressed understanding and is in agreement with the above plan.     Discharge Plan:  -Risk, benefits and alternatives discussed with patient. Patient verbalized understanding and in accord with aftercare recommendations.   -Patient instructed to call 911 or go to nearest ER in case of emergency.   -Patient given Suicide Prevention Lifeline number 6-166-391-2287 and provided instructions on its use  -Patient provided with outpatient referrals

## 2024-12-13 NOTE — BH PATIENT PROFILE - FUNCTIONAL SCREEN CURRENT LEVEL: SWALLOWING (IF SCORE 2 OR MORE FOR ANY ITEM, CONSULT REHAB SERVICES), MLM)
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 Recommendation for   antibiotic was made. The patient was started on antibiotic therapy in the   hospital. Also, the patient has very bad dentition and underwent eventually   extraction of his teeth. Recommendation was made to continue with antibiotic   therapy and repeat a THELMA to evaluate the valve. If the valve continued to   have significant burden of vegetation with the large vegetation, lack of   resolution or destruction of the valve, to proceed with replacement or repair   of the tricuspid valve after adequate antibiotic therapy was given. The   patient was started on antibiotic therapy and observed. Repeat THELMA recently   revealed that the patient had persistent large vegetation with some of the   vegetation removed. Also, the valve was starting to get destructed with   tricuspid regurgitation. The patient had a cardiac catheterization which   revealed patent coronaries. The patient additionally had developed a septic   emboli to his lung, a lung abscess which was being also seen and treated with   Pulmonary Service. The patient was being followed by Cardiology Service and   Infectious Disease Service, as well as Cardiac Surgery. Finally, after   extensive discussion with all the consultants, the recommendation was made to   proceed with tricuspid valve surgery.         Procedures 2:  This is a 52-year-old gentleman who had undergone a tricuspid valve   replacement earlier in the day, complicated with renal failure and continued   to have excessive coagulopathy and chest tube drainage, despite the massive   volume of product replacement and infusion of FFP, platelets factor VII and   cryo, the patient continued to have excessive bleeding, developing potential   signs of tamponade. The patient was taken back to the OR for exploration.    0 = swallows foods/liquids without difficulty

## 2024-12-13 NOTE — BH INPATIENT PSYCHIATRY DISCHARGE NOTE - ATTENDING DISCHARGE PHYSICAL EXAMINATION:
I have personally seen and evaluated patient prior to discharge. Chart reviewed and discharge plan discussed with the NP as follows. Pt prior to discharge was calm, cooperative, friendly and smiling. Patient has been attending groups with active participation.  Pt interacting well with others. No recent behavioral problems and no episodes of aggressive or dangerous behavior. No problems with sleep, appetite or energy level. Denied any active and current manic, hypomanic, depressive, psychotic or anxiety symptoms. Denied any current SI/HI/AH/VH/PI. No overt delusions.  Patient is organized and commits to safety and to ongoing outpatient treatment.   Pt asks relevant questions about his discharge plan and about the medication regimen. Pt articulate a plan for living life after discharge. Medication risks/benefits/side effects were discussed with the patient.  Patient expressed understanding and agreed with the treatment plan. Further, instructed the patient to seek help immediately by dialing "911" or by going to the nearest ER if symptoms worsen.   Pt does not pose an acute elevated risk of imminent danger to harm to self or others. Does not require further inpatient psychiatric admission at this time. Psychiatrically cleared for discharge.   Pt urged to avoid using any alcohol or street drugs, particularly marijuana & K2, cocaine and methamphetamine (crystal meth) once discharged.  Safety plan filled out by patient and submitted into chart.

## 2024-12-14 NOTE — ED BEHAVIORAL HEALTH ASSESSMENT NOTE - NSBHMSEKNOWHOW_PSY_ALL_CORE
Problem: Potential for Falls  Goal: Patient will remain free of falls  Description: INTERVENTIONS:  - Educate patient/family on patient safety including physical limitations  - Instruct patient to call for assistance with activity   - Consult OT/PT to assist with strengthening/mobility   - Keep Call bell within reach  - Keep bed low and locked with side rails adjusted as appropriate  - Keep care items and personal belongings within reach  - Initiate and maintain comfort rounds  - Make Fall Risk Sign visible to staff  - Offer Toileting every 2 Hours, in advance of need  - Initiate/Maintain bed alarm  -   - Apply yellow socks and bracelet for high fall risk patients  - Consider moving patient to room near nurses station  Outcome: Adequate for Discharge     Problem: RESPIRATORY - ADULT  Goal: Achieves optimal ventilation and oxygenation  Description: INTERVENTIONS:  - Assess for changes in respiratory status  - Assess for changes in mentation and behavior  - Position to facilitate oxygenation and minimize respiratory effort  - Oxygen administered by appropriate delivery if ordered  - Initiate smoking cessation education as indicated  - Encourage broncho-pulmonary hygiene including cough, deep breathe, Incentive Spirometry  - Assess the need for suctioning and aspirate as needed  - Assess and instruct to report SOB or any respiratory difficulty  - Respiratory Therapy support as indicated  Outcome: Adequate for Discharge     Problem: PAIN - ADULT  Goal: Verbalizes/displays adequate comfort level or baseline comfort level  Description: Interventions:  - Encourage patient to monitor pain and request assistance  - Assess pain using appropriate pain scale  - Administer analgesics based on type and severity of pain and evaluate response  - Implement non-pharmacological measures as appropriate and evaluate response  - Consider cultural and social influences on pain and pain management  - Notify physician/advanced  practitioner if interventions unsuccessful or patient reports new pain  Outcome: Adequate for Discharge     Problem: INFECTION - ADULT  Goal: Absence or prevention of progression during hospitalization  Description: INTERVENTIONS:  - Assess and monitor for signs and symptoms of infection  - Monitor lab/diagnostic results  - Monitor all insertion sites, i.e. indwelling lines, tubes, and drains  - Monitor endotracheal if appropriate and nasal secretions for changes in amount and color  - [place] appropriate cooling/warming therapies per order  - Administer medications as ordered  - Instruct and encourage patient and family to use good hand hygiene technique  - Identify and instruct in appropriate isolation precautions for identified infection/condition  Outcome: Adequate for Discharge  Goal: Absence of fever/infection during neutropenic period  Description: INTERVENTIONS:  - Monitor WBC    Outcome: Adequate for Discharge     Problem: SAFETY ADULT  Goal: Patient will remain free of falls  Description: INTERVENTIONS:  - Educate patient/family on patient safety including physical limitations  - Instruct patient to call for assistance with activity   - Consult OT/PT to assist with strengthening/mobility   - Keep Call bell within reach  - Keep bed low and locked with side rails adjusted as appropriate  - Keep care items and personal belongings within reach  - Initiate and maintain comfort rounds  - Make Fall Risk Sign visible to staff  - Offer Toileting every *** Hours, in advance of need  - Initiate/Maintain ***alarm  - Obtain necessary fall risk management equipment: ***  - Apply yellow socks and bracelet for high fall risk patients  - Consider moving patient to room near nurses station  Outcome: Adequate for Discharge  Goal: Maintain or return to baseline ADL function  Description: INTERVENTIONS:  -  Assess patient's ability to carry out ADLs; assess patient's baseline for ADL function and identify physical deficits  which impact ability to perform ADLs (bathing, care of mouth/teeth, toileting, grooming, dressing, etc.)  - Assess/evaluate cause of self-care deficits   - Assess range of motion  - Assess patient's mobility; develop plan if impaired  - Assess patient's need for assistive devices and provide as appropriate  - Encourage maximum independence but intervene and supervise when necessary  - Involve family in performance of ADLs  - Assess for home care needs following discharge   - Consider OT consult to assist with ADL evaluation and planning for discharge  - Provide patient education as appropriate  Outcome: Adequate for Discharge  Goal: Maintains/Returns to pre admission functional level  Description: INTERVENTIONS:  - Perform AM-PAC 6 Click Basic Mobility/ Daily Activity assessment daily.  - Set and communicate daily mobility goal to care team and patient/family/caregiver.   - Collaborate with rehabilitation services on mobility goals if consulted  - Perform Range of Motion *** times a day.  - Reposition patient every *** hours.  - Dangle patient *** times a day  - Stand patient *** times a day  - Ambulate patient *** times a day  - Out of bed to chair *** times a day   - Out of bed for meals *** times a day  - Out of bed for toileting  - Record patient progress and toleration of activity level   Outcome: Adequate for Discharge     Problem: DISCHARGE PLANNING  Goal: Discharge to home or other facility with appropriate resources  Description: INTERVENTIONS:  - Identify barriers to discharge w/patient and caregiver  - Arrange for needed discharge resources and transportation as appropriate  - Identify discharge learning needs (meds, wound care, etc.)  - Arrange for interpretive services to assist at discharge as needed  - Refer to Case Management Department for coordinating discharge planning if the patient needs post-hospital services based on physician/advanced practitioner order or complex needs related to functional  status, cognitive ability, or social support system  Outcome: Adequate for Discharge     Problem: Knowledge Deficit  Goal: Patient/family/caregiver demonstrates understanding of disease process, treatment plan, medications, and discharge instructions  Description: Complete learning assessment and assess knowledge base.  Interventions:  - Provide teaching at level of understanding  - Provide teaching via preferred learning methods  Outcome: Adequate for Discharge     Problem: Prexisting or High Potential for Compromised Skin Integrity  Goal: Skin integrity is maintained or improved  Description: INTERVENTIONS:  - Identify patients at risk for skin breakdown  - Assess and monitor skin integrity  - Assess and monitor nutrition and hydration status  - Monitor labs   - Assess for incontinence   - Turn and reposition patient  - Assist with mobility/ambulation  - Relieve pressure over bony prominences  - Avoid friction and shearing  - Provide appropriate hygiene as needed including keeping skin clean and dry  - Evaluate need for skin moisturizer/barrier cream  - Collaborate with interdisciplinary team   - Patient/family teaching  - Consider wound care consult   Outcome: Adequate for Discharge     Problem: SAFETY,RESTRAINT: NV/NON-SELF DESTRUCTIVE BEHAVIOR  Goal: Remains free of harm/injury (restraint for non violent/non self-detsructive behavior)  Description: INTERVENTIONS:  - Instruct patient/family regarding restraint use   - Assess and monitor physiologic and psychological status   - Provide interventions and comfort measures to meet assessed patient needs   - Identify and implement measures to help patient regain control  - Assess readiness for release of restraint   Outcome: Adequate for Discharge  Goal: Returns to optimal restraint-free functioning  Description: INTERVENTIONS:  - Assess the patient's behavior and symptoms that indicate continued need for restraint  - Identify and implement measures to help patient  regain control  - Assess readiness for release of restraint   Outcome: Adequate for Discharge      Vocabulary

## 2024-12-26 ENCOUNTER — INPATIENT (INPATIENT)
Facility: HOSPITAL | Age: 59
LOS: 0 days | Discharge: HOME | DRG: 313 | End: 2024-12-27
Attending: STUDENT IN AN ORGANIZED HEALTH CARE EDUCATION/TRAINING PROGRAM | Admitting: STUDENT IN AN ORGANIZED HEALTH CARE EDUCATION/TRAINING PROGRAM
Payer: COMMERCIAL

## 2024-12-26 VITALS
OXYGEN SATURATION: 95 % | WEIGHT: 286.6 LBS | SYSTOLIC BLOOD PRESSURE: 136 MMHG | TEMPERATURE: 98 F | HEIGHT: 74 IN | HEART RATE: 113 BPM | RESPIRATION RATE: 18 BRPM | DIASTOLIC BLOOD PRESSURE: 70 MMHG

## 2024-12-26 DIAGNOSIS — D64.9 ANEMIA, UNSPECIFIED: ICD-10-CM

## 2024-12-26 DIAGNOSIS — F99 MENTAL DISORDER, NOT OTHERWISE SPECIFIED: ICD-10-CM

## 2024-12-26 DIAGNOSIS — R07.9 CHEST PAIN, UNSPECIFIED: ICD-10-CM

## 2024-12-26 DIAGNOSIS — F10.10 ALCOHOL ABUSE, UNCOMPLICATED: ICD-10-CM

## 2024-12-26 LAB
ALBUMIN SERPL ELPH-MCNC: 4.1 G/DL — SIGNIFICANT CHANGE UP (ref 3.4–5)
ALBUMIN SERPL ELPH-MCNC: 4.4 G/DL — SIGNIFICANT CHANGE UP (ref 3.3–5)
ALP SERPL-CCNC: 60 U/L — SIGNIFICANT CHANGE UP (ref 40–120)
ALP SERPL-CCNC: 66 U/L — SIGNIFICANT CHANGE UP (ref 40–120)
ALT FLD-CCNC: 39 U/L — SIGNIFICANT CHANGE UP (ref 10–45)
ALT FLD-CCNC: 53 U/L — HIGH (ref 12–42)
ANION GAP SERPL CALC-SCNC: 14 MMOL/L — SIGNIFICANT CHANGE UP (ref 9–16)
ANION GAP SERPL CALC-SCNC: 9 MMOL/L — SIGNIFICANT CHANGE UP (ref 5–17)
APTT BLD: 30.3 SEC — SIGNIFICANT CHANGE UP (ref 24.5–35.6)
AST SERPL-CCNC: 49 U/L — HIGH (ref 10–40)
AST SERPL-CCNC: 49 U/L — HIGH (ref 15–37)
BASOPHILS # BLD AUTO: 0.03 K/UL — SIGNIFICANT CHANGE UP (ref 0–0.2)
BASOPHILS NFR BLD AUTO: 0.4 % — SIGNIFICANT CHANGE UP (ref 0–2)
BILIRUB DIRECT SERPL-MCNC: 0.2 MG/DL — SIGNIFICANT CHANGE UP (ref 0–0.3)
BILIRUB INDIRECT FLD-MCNC: 0.5 MG/DL — SIGNIFICANT CHANGE UP (ref 0.2–1)
BILIRUB SERPL-MCNC: 0.8 MG/DL — SIGNIFICANT CHANGE UP (ref 0.2–1.2)
BILIRUB SERPL-MCNC: 0.9 MG/DL — SIGNIFICANT CHANGE UP (ref 0.2–1.2)
BLD GP AB SCN SERPL QL: NEGATIVE — SIGNIFICANT CHANGE UP
BUN SERPL-MCNC: 13 MG/DL — SIGNIFICANT CHANGE UP (ref 7–23)
BUN SERPL-MCNC: 14 MG/DL — SIGNIFICANT CHANGE UP (ref 7–23)
CALCIUM SERPL-MCNC: 9 MG/DL — SIGNIFICANT CHANGE UP (ref 8.4–10.5)
CALCIUM SERPL-MCNC: 9.1 MG/DL — SIGNIFICANT CHANGE UP (ref 8.5–10.5)
CHLORIDE SERPL-SCNC: 102 MMOL/L — SIGNIFICANT CHANGE UP (ref 96–108)
CHLORIDE SERPL-SCNC: 103 MMOL/L — SIGNIFICANT CHANGE UP (ref 96–108)
CO2 SERPL-SCNC: 22 MMOL/L — SIGNIFICANT CHANGE UP (ref 22–31)
CO2 SERPL-SCNC: 26 MMOL/L — SIGNIFICANT CHANGE UP (ref 22–31)
CREAT SERPL-MCNC: 1.08 MG/DL — SIGNIFICANT CHANGE UP (ref 0.5–1.3)
CREAT SERPL-MCNC: 1.22 MG/DL — SIGNIFICANT CHANGE UP (ref 0.5–1.3)
EGFR: 69 ML/MIN/1.73M2 — SIGNIFICANT CHANGE UP
EGFR: 80 ML/MIN/1.73M2 — SIGNIFICANT CHANGE UP
EOSINOPHIL # BLD AUTO: 0.04 K/UL — SIGNIFICANT CHANGE UP (ref 0–0.5)
EOSINOPHIL NFR BLD AUTO: 0.5 % — SIGNIFICANT CHANGE UP (ref 0–6)
ETHANOL SERPL-MCNC: 55 MG/DL — HIGH
GLUCOSE SERPL-MCNC: 67 MG/DL — LOW (ref 70–99)
GLUCOSE SERPL-MCNC: 94 MG/DL — SIGNIFICANT CHANGE UP (ref 70–99)
HCT VFR BLD CALC: 38.5 % — LOW (ref 39–50)
HGB BLD-MCNC: 12.6 G/DL — LOW (ref 13–17)
IMM GRANULOCYTES NFR BLD AUTO: 0.4 % — SIGNIFICANT CHANGE UP (ref 0–0.9)
INR BLD: 1.23 — HIGH (ref 0.85–1.16)
LYMPHOCYTES # BLD AUTO: 2.07 K/UL — SIGNIFICANT CHANGE UP (ref 1–3.3)
LYMPHOCYTES # BLD AUTO: 24.2 % — SIGNIFICANT CHANGE UP (ref 13–44)
MAGNESIUM SERPL-MCNC: 1.9 MG/DL — SIGNIFICANT CHANGE UP (ref 1.6–2.6)
MCHC RBC-ENTMCNC: 30 PG — SIGNIFICANT CHANGE UP (ref 27–34)
MCHC RBC-ENTMCNC: 32.7 G/DL — SIGNIFICANT CHANGE UP (ref 32–36)
MCV RBC AUTO: 91.7 FL — SIGNIFICANT CHANGE UP (ref 80–100)
MONOCYTES # BLD AUTO: 0.9 K/UL — SIGNIFICANT CHANGE UP (ref 0–0.9)
MONOCYTES NFR BLD AUTO: 10.5 % — SIGNIFICANT CHANGE UP (ref 2–14)
NEUTROPHILS # BLD AUTO: 5.49 K/UL — SIGNIFICANT CHANGE UP (ref 1.8–7.4)
NEUTROPHILS NFR BLD AUTO: 64 % — SIGNIFICANT CHANGE UP (ref 43–77)
NRBC # BLD: 0 /100 WBCS — SIGNIFICANT CHANGE UP (ref 0–0)
NT-PROBNP SERPL-SCNC: 255 PG/ML — SIGNIFICANT CHANGE UP
PHOSPHATE SERPL-MCNC: 2.9 MG/DL — SIGNIFICANT CHANGE UP (ref 2.5–4.5)
PLATELET # BLD AUTO: 304 K/UL — SIGNIFICANT CHANGE UP (ref 150–400)
POTASSIUM SERPL-MCNC: 3.7 MMOL/L — SIGNIFICANT CHANGE UP (ref 3.5–5.3)
POTASSIUM SERPL-MCNC: 3.8 MMOL/L — SIGNIFICANT CHANGE UP (ref 3.5–5.3)
POTASSIUM SERPL-SCNC: 3.7 MMOL/L — SIGNIFICANT CHANGE UP (ref 3.5–5.3)
POTASSIUM SERPL-SCNC: 3.8 MMOL/L — SIGNIFICANT CHANGE UP (ref 3.5–5.3)
PROT SERPL-MCNC: 7.6 G/DL — SIGNIFICANT CHANGE UP (ref 6–8.3)
PROT SERPL-MCNC: 8.2 G/DL — SIGNIFICANT CHANGE UP (ref 6.4–8.2)
PROTHROM AB SERPL-ACNC: 14.2 SEC — HIGH (ref 9.9–13.4)
RBC # BLD: 4.2 M/UL — SIGNIFICANT CHANGE UP (ref 4.2–5.8)
RBC # FLD: 14.6 % — HIGH (ref 10.3–14.5)
RH IG SCN BLD-IMP: POSITIVE — SIGNIFICANT CHANGE UP
SODIUM SERPL-SCNC: 137 MMOL/L — SIGNIFICANT CHANGE UP (ref 135–145)
SODIUM SERPL-SCNC: 139 MMOL/L — SIGNIFICANT CHANGE UP (ref 132–145)
TROPONIN I, HIGH SENSITIVITY RESULT: 18.9 NG/L — SIGNIFICANT CHANGE UP
TROPONIN I, HIGH SENSITIVITY RESULT: 22.7 NG/L — SIGNIFICANT CHANGE UP
WBC # BLD: 8.56 K/UL — SIGNIFICANT CHANGE UP (ref 3.8–10.5)
WBC # FLD AUTO: 8.56 K/UL — SIGNIFICANT CHANGE UP (ref 3.8–10.5)

## 2024-12-26 PROCEDURE — 99222 1ST HOSP IP/OBS MODERATE 55: CPT

## 2024-12-26 PROCEDURE — 71045 X-RAY EXAM CHEST 1 VIEW: CPT | Mod: 26

## 2024-12-26 PROCEDURE — 93010 ELECTROCARDIOGRAM REPORT: CPT

## 2024-12-26 PROCEDURE — 99285 EMERGENCY DEPT VISIT HI MDM: CPT

## 2024-12-26 PROCEDURE — 93306 TTE W/DOPPLER COMPLETE: CPT | Mod: 26

## 2024-12-26 RX ORDER — ASPIRIN 81 MG
243 TABLET, DELAYED RELEASE (ENTERIC COATED) ORAL ONCE
Refills: 0 | Status: COMPLETED | OUTPATIENT
Start: 2024-12-26 | End: 2024-12-26

## 2024-12-26 RX ORDER — POTASSIUM CHLORIDE 600 MG/1
40 TABLET, FILM COATED, EXTENDED RELEASE ORAL ONCE
Refills: 0 | Status: COMPLETED | OUTPATIENT
Start: 2024-12-26 | End: 2024-12-26

## 2024-12-26 RX ORDER — MORPHINE SULFATE 15 MG
4 TABLET, EXTENDED RELEASE ORAL ONCE
Refills: 0 | Status: DISCONTINUED | OUTPATIENT
Start: 2024-12-26 | End: 2024-12-26

## 2024-12-26 RX ORDER — SODIUM CHLORIDE 9 MG/ML
1000 INJECTION, SOLUTION INTRAMUSCULAR; INTRAVENOUS; SUBCUTANEOUS
Refills: 0 | Status: DISCONTINUED | OUTPATIENT
Start: 2024-12-26 | End: 2024-12-27

## 2024-12-26 RX ORDER — NITROGLYCERIN 20.8 MG/1
0.4 FILM, EXTENDED RELEASE TRANSDERMAL
Refills: 0 | Status: COMPLETED | OUTPATIENT
Start: 2024-12-26 | End: 2024-12-26

## 2024-12-26 RX ORDER — ACETAMINOPHEN 80 MG/.8ML
650 SOLUTION/ DROPS ORAL EVERY 6 HOURS
Refills: 0 | Status: DISCONTINUED | OUTPATIENT
Start: 2024-12-26 | End: 2024-12-27

## 2024-12-26 RX ORDER — ASPIRIN 81 MG
81 TABLET, DELAYED RELEASE (ENTERIC COATED) ORAL ONCE
Refills: 0 | Status: COMPLETED | OUTPATIENT
Start: 2024-12-26 | End: 2024-12-26

## 2024-12-26 RX ORDER — GINKGO BILOBA 40 MG
5 CAPSULE ORAL AT BEDTIME
Refills: 0 | Status: DISCONTINUED | OUTPATIENT
Start: 2024-12-26 | End: 2024-12-27

## 2024-12-26 RX ORDER — THIAMINE HYDROCHLORIDE 100 MG/ML
100 INJECTION, SOLUTION INTRAMUSCULAR; INTRAVENOUS EVERY 24 HOURS
Refills: 0 | Status: DISCONTINUED | OUTPATIENT
Start: 2024-12-26 | End: 2024-12-27

## 2024-12-26 RX ORDER — LIDOCAINE 50 MG/G
5 OINTMENT TOPICAL ONCE
Refills: 0 | Status: COMPLETED | OUTPATIENT
Start: 2024-12-26 | End: 2024-12-26

## 2024-12-26 RX ORDER — MAG HYDROX/ALUMINUM HYD/SIMETH 200-200-20
30 SUSPENSION, ORAL (FINAL DOSE FORM) ORAL EVERY 4 HOURS
Refills: 0 | Status: DISCONTINUED | OUTPATIENT
Start: 2024-12-26 | End: 2024-12-27

## 2024-12-26 RX ORDER — B COMPLEX, C NO.20/FOLIC ACID 1 MG
1 CAPSULE ORAL DAILY
Refills: 0 | Status: DISCONTINUED | OUTPATIENT
Start: 2024-12-26 | End: 2024-12-27

## 2024-12-26 RX ORDER — ASPIRIN 81 MG
81 TABLET, DELAYED RELEASE (ENTERIC COATED) ORAL DAILY
Refills: 0 | Status: DISCONTINUED | OUTPATIENT
Start: 2024-12-27 | End: 2024-12-27

## 2024-12-26 RX ORDER — LORAZEPAM 1 MG/1
1 TABLET ORAL
Refills: 0 | Status: DISCONTINUED | OUTPATIENT
Start: 2024-12-26 | End: 2024-12-26

## 2024-12-26 RX ORDER — VITAMIN A 10000 UNIT
1 TABLET ORAL DAILY
Refills: 0 | Status: DISCONTINUED | OUTPATIENT
Start: 2024-12-26 | End: 2024-12-27

## 2024-12-26 RX ADMIN — NITROGLYCERIN 0.4 MILLIGRAM(S): 20.8 FILM, EXTENDED RELEASE TRANSDERMAL at 09:02

## 2024-12-26 RX ADMIN — Medication 243 MILLIGRAM(S): at 14:47

## 2024-12-26 RX ADMIN — Medication 4 MILLIGRAM(S): at 10:20

## 2024-12-26 RX ADMIN — THIAMINE HYDROCHLORIDE 100 MILLIGRAM(S): 100 INJECTION, SOLUTION INTRAMUSCULAR; INTRAVENOUS at 18:00

## 2024-12-26 RX ADMIN — POTASSIUM CHLORIDE 40 MILLIEQUIVALENT(S): 600 TABLET, FILM COATED, EXTENDED RELEASE ORAL at 19:10

## 2024-12-26 RX ADMIN — Medication 30 MILLILITER(S): at 12:49

## 2024-12-26 RX ADMIN — NITROGLYCERIN 0.4 MILLIGRAM(S): 20.8 FILM, EXTENDED RELEASE TRANSDERMAL at 08:48

## 2024-12-26 RX ADMIN — LIDOCAINE 5 MILLILITER(S): 50 OINTMENT TOPICAL at 12:49

## 2024-12-26 RX ADMIN — Medication 4 MILLIGRAM(S): at 13:37

## 2024-12-26 RX ADMIN — NITROGLYCERIN 0.4 MILLIGRAM(S): 20.8 FILM, EXTENDED RELEASE TRANSDERMAL at 08:55

## 2024-12-26 RX ADMIN — SODIUM CHLORIDE 75 MILLILITER(S): 9 INJECTION, SOLUTION INTRAMUSCULAR; INTRAVENOUS; SUBCUTANEOUS at 19:26

## 2024-12-26 RX ADMIN — LORAZEPAM 1 MILLIGRAM(S): 1 TABLET ORAL at 14:48

## 2024-12-26 RX ADMIN — Medication 81 MILLIGRAM(S): at 08:48

## 2024-12-26 RX ADMIN — Medication 1 TABLET(S): at 18:00

## 2024-12-26 RX ADMIN — Medication 1 MILLIGRAM(S): at 18:00

## 2024-12-26 NOTE — ED ADULT NURSE NOTE - IN THE PAST 12 MONTHS HAVE YOU USED DRUGS OTHER THAN THOSE REQUIRED FOR MEDICAL REASON?
Psychiatry Initial Visit (PhD/LCSW)    Date:   05/11/2017    Site: [facility]     CPT Code: 18530    Referred by:  Unruly Leos M.D.    Chief complaint/reason for encounter:  Psychological Evaluation prior to surgical implantation of a spinal cord stimulator to address chronic pain    Clinical status of patient:  Outpatient    Met with:  Patient    History of present illness:  Mr. Royce Francis is a 74-year-old White  male referred for Psychological Evaluation prior to surgical implantation of a spinal cord stimulator to address chronic pain. He reported he has chronic pain in his back and feet (due to neuropathy).  He has had six different cancers and 18 different chemotherapies.  He has tried medications, injections, and physical therapy without receiving sufficient relief.  He reports relief from back pain with pain medications; however, he does not experience relief from neuropathy.  His activities are greatly limited. 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  He considers himself a hard worker and dislikes being idle, but finds that movement irritates his back problem.  He is now interested in a trial of the spinal cord stimulator device.    Mr. Francis denied prior psychiatric illness or current adjustment problems.  He was pleasant and cooperative in interview and appeared to be in good spirits.  Initially Mr. Francis reported some irritability due to an unpleasant experience during testing yesterday; however, he was receptive during this clinical interview and appreciated rapport-building.    Pain scale:  2/10 (back pain)     Symptoms:  Mood:  Denied.  Anxiety:  Denied.  Substance abuse: Denied.  Cognitive functioning: Denied.  Sleep:  No problems reported.    Psychiatric history:  None reported.      Medical history:   SALINAS, Peripheral neuropathy, Lumbar radiculopathy, Hearing loss, Chronic pain, Chronic bronchitis, Cardiomyopathy, Coronary artery disease,  Hypertension, Lymphoma, Hyperlipidemia, DDD, Primary osteoarthritis of both knees    Family history of psychiatric illness:  None reported.    Social history (marriage, employment, etc.):    Mr. Francis was born and raised in [place], [place] by his biological parents along with one sister and two brothers.  He described his childhood as great.  He denied childhood trauma and abuse.  He did fine in school and earned his GED in the  (I did 3 years of school in 5 months).  He is currently retired (since 2008), worked in construction for 25 years, and code enforcement for 20 years.  He is not on disability and finances are not a problem.  He has been  to his wife for 13 years.  He has one daughter and three granddaughters from his first wife.  He currently lives with his wife.  Christian is important to him.    Current psychosocial stressors:  Pain    Report of coping skills:  Do activities, Work in workshop    Substance use:   Alcohol:  Consumes wine occasionally, but has cut back due to medications.   Drugs:  None.   Tobacco:  None.   Caffeine:  Consumes two cups of coffee every morning.    Strengths and Liabilities:   Strength: Patient accepts guidance/feedback, Strength: Patient is expressive/articulate., Strength: Patient is motivated for change., Strength: Patient has positive support network., Strength: Patient has reasonable judgment., Strength: Patient is stable., Liability: Patient has poor health.    Mental Status Exam:  General appearance:  appears stated age, neatly dressed, well groomed  Speech:  normal rate and tone  Level of cooperation:  cooperative  Thought processes:  logical, goal-directed  Mood:  initially irritated  Thought content:  no illusions, no visual hallucinations, no auditory hallucinations, no delusions, no active or passive homicidal thoughts, no active or passive suicidal ideation, no obsessions, no compulsions, no violence  Affect:  appropriate  Orientation:  oriented  to person, place, and date  Memory:  Recent memory:  3 of 3 objects after brief delay.    Remote memory - intact  Attention span and concentration:  spelled HOUSE forward only  Fund of general knowledge: 3 of 3 recent presidents  Abstract reasoning:    Similarities: abstract.    Proverbs: abstract.  Judgment and insight: fair  Language:  intact    Diagnostic impressions:    ICD-10-CM ICD-9-CM   1. Preop examination Z01.818 V72.84   2. Cardiomyopathy due to chemotherapy I42.7 425.9    T45.1X5A E933.1   3. DDD (degenerative disc disease), lumbar M51.36 722.52   4. Coronary artery disease involving native coronary artery of native heart without angina pectoris I25.10 414.01   5. SALINAS on CPAP G47.33 327.23    Z99.89 V46.8   6. Hyperlipidemia, unspecified hyperlipidemia type E78.5 272.4   7. Essential hypertension I10 401.9       Plan:  Pt will complete Psychological testing.  Report of Psychological Evaluation will follow in the Notes folder in the patients chart in the encounter titled Psychological Testing.    Length of time:  55 minutes           No

## 2024-12-26 NOTE — H&P ADULT - HISTORY OF PRESENT ILLNESS
58yoM, undomiciled, non-compliant, polysubstance abuse (alcohol, cocaine) w/ PMHx HTN, self reported CHF, many documented diagoses ranging across mood, psychotic and personality disorders, many prior hospitalizations and ED visits, several prior self-aborted suicide attempts, many prior detox/rehabs, auditory hallucinations,   most recently discharge from [facility] 12/13/24 for depression and suicidal ideation who presented to [facility] 12/26/24 for chest pain. 58yoM, undomiciled, non-compliant, polysubstance abuse (alcohol, cocaine) w/ PMHx HTN, self reported CHF, many documented diagoses ranging across mood, psychotic and personality disorders, many prior hospitalizations and ED visits, several prior self-aborted suicide attempts, many prior detox/rehabs, auditory hallucinations,  most recently discharge from [facility] 12/13/24 for depression and suicidal ideation and malingering who presented to [facility] 12/26/24 for chest pain. 58yoM, undomiciled, poor historian, non-compliant, polysubstance abuse (alcohol, cocaine) w/ PMHx HTN, self reported CHF, many documented diagnoses ranging across mood, psychotic and personality disorders, many prior hospitalizations and ED visits, several prior self-aborted suicide attempts, many prior detox/rehabs, auditory hallucinations,  most recently discharge from [facility] 12/13/24 for depression and suicidal ideation and malingering who presented to [facility] 12/26/24 for chest pain. Patient stated that the chest pain begain early in the AM before he relapsed on alcohol, he is unable to describe how much alcohol and what alcohol he drank. He stated the chest pain has been 10/10, intermittent lasting for ~10 minutes, left-sided with radiation to left arm and left temple. Patient also endorses lightheadedness, shortness of breath, presyncope, cough, muscle aches and fever.  58yoM, undomiciled, poor historian, non-compliant, polysubstance abuse (alcohol, cocaine) w/ PMHx HTN, self reported CHF, many documented diagnoses ranging across mood, psychotic and personality disorders, many prior hospitalizations and ED visits, several prior self-aborted suicide attempts, many prior detox/rehabs, auditory hallucinations,  most recently discharge from [facility] 12/13/24 for depression and suicidal ideation and malingering who presented to [facility] 12/26/24 for chest pain. Patient stated that the chest pain begain early in the AM before he relapsed on alcohol, he is unable to describe how much alcohol and what alcohol he drank. He stated the chest pain has been 10/10, intermittent lasting for ~10 minutes, left-sided with radiation to left arm and left temple. Patient also endorses lightheadedness, shortness of breath, presyncope, cough, muscle aches and fever.     [facility] ED Course:  Vitals: /70, , T 98.1, SpO2 95%, RR 18.  Labs: H/H 12.6/38.5, MCV 91.7, Trop I 22.7->18.9, Glucose 67, ALT/AST 49/53.   Interventions: S/p Aspirin 324 mg PO x 1, Lidocaine patch, 4 mg IV.  EKG: Sinus tachycardia, LVH.    Patient admitted to [facility] Cardiac Telemetry for ischemic work up.

## 2024-12-26 NOTE — PROVIDER CONTACT NOTE (EICU) - ASSESSMENT
Visit performed via Telehealth which precludes Physical examination. Physical examination as per bedside clinical team     131/86, RR 18, HR 90, 96% on room air

## 2024-12-26 NOTE — H&P ADULT - PROBLEM SELECTOR PLAN 3
- last drink this AM 12/26/24 cannot recall type of alcohol  - Blood Alcohol 55 at [facility]    Plan:   o Medicine consult, appreciate recs  o LANDRY protocol   o - last drink this AM 12/26/24 cannot recall type of alcohol  - Blood Alcohol 55 at [facility]    Plan:   o f/u thiamine, b12, folate  o Medicine consult, appreciate recs  o LANDRY protocol   o starting Thiamine 100 mg PO daily + Multivitamin + Folic acid daily - last drink this AM 12/26/24 cannot recall type of alcohol  - [facility]    Plan:   o f/u thiamine, b12, folate  o Medicine consult, appreciate recs  o LANDRY protocol   o starting Thiamine 100 mg PO daily + Multivitamin + Folic acid daily      F: None  E: Replete if K<4 or Mag<2  N: DASH Diet  VTEppx: Heparin SQ  Dispo: back to shelter likely tomorrow   PT: not indicated - last drink this AM 12/26/24 cannot recall type of alcohol  - Blood Alcohol 55 at [facility]  - CK 1028 likely 2/2 alcohol intoxication  o Medicine consult, appreciate recs  o LANDRY protocol   o IV NaCl 75cc/hr x 12 hr  o f/u urinalysis and urine drug screen, thiamine, b12, folate  o starting Thiamine 100 mg PO daily + Multivitamin + Folic acid daily      F: None  E: Replete if K<4 or Mag<2  N: DASH Diet  VTEppx: SCDs given anemia  Dispo: back to shelter likely tomorrow   PT: not indicated

## 2024-12-26 NOTE — CONSULT NOTE ADULT - SUBJECTIVE AND OBJECTIVE BOX
INTERNAL MEDICINE SERVICE INITIAL CONSULT NOTE    HPI:  58yoM, undomiciled, poor historian, non-compliant, polysubstance abuse (alcohol, cocaine) w/ PMHx HTN, self reported CHF, many documented diagnoses ranging across mood, psychotic and personality disorders, many prior hospitalizations and ED visits, several prior self-aborted suicide attempts, many prior detox/rehabs, auditory hallucinations,  most recently discharge from [facility] 12/13/24 for depression and suicidal ideation and malingering who presented to [facility] 12/26/24 for chest pain. Patient stated that the chest pain begain early in the AM before he relapsed on alcohol, he is unable to describe how much alcohol and what alcohol he drank. He stated the chest pain has been 10/10, intermittent lasting for ~10 minutes, left-sided with radiation to left arm and left temple. Patient also endorses lightheadedness, shortness of breath, presyncope, cough, muscle aches and fever.     [facility] ED Course:  Vitals: /70, , T 98.1, SpO2 95%, RR 18.  Labs: H/H 12.6/38.5, MCV 91.7, Trop I 22.7->18.9, Glucose 67, ALT/AST 49/53.   Interventions: S/p Aspirin 324 mg PO x 1, Lidocaine patch, 4 mg IV.  EKG: Sinus tachycardia, LVH.    Patient admitted to [facility] Cardiac Telemetry for ischemic work up.    (26 Dec 2024 15:51)      ADDITIONAL MEDICINE HPI:    REVIEW OF SYSTEMS:   Otherwise negative except as specified in HPI    PAST MEDICAL HISTORY:     PAST SURGICAL HISTORY:    FAMILY HISTORY:    SOCIAL HISTORY:  Tobacco use:  EtOH use:  Illicit drug use:    MEDICATIONS:  MEDICATIONS  (STANDING):  folic acid 1 milliGRAM(s) Oral daily  hydrochlorothiazide 12.5 milliGRAM(s) Oral daily  melatonin 5 milliGRAM(s) Oral at bedtime  multivitamin 1 Tablet(s) Oral daily  sodium chloride 0.9%. 1000 milliLiter(s) (75 mL/Hr) IV Continuous <Continuous>  thiamine 100 milliGRAM(s) Oral every 24 hours    MEDICATIONS  (PRN):  acetaminophen     Tablet .. 650 milliGRAM(s) Oral every 6 hours PRN Moderate Pain (4 - 6)  aluminum hydroxide/magnesium hydroxide/simethicone Suspension 30 milliLiter(s) Oral every 4 hours PRN Dyspepsia      ALLERGIES:  Allergies    No Known Allergies    Intolerances        VITAL SIGNS:  Vital Signs Last 24 Hrs  T(C): 36.9 (26 Dec 2024 19:56), Max: 36.9 (26 Dec 2024 19:56)  T(F): 98.5 (26 Dec 2024 19:56), Max: 98.5 (26 Dec 2024 19:56)  HR: 90 (26 Dec 2024 19:06) (87 - 113)  BP: 118/5 (26 Dec 2024 19:06) (106/65 - 137/73)  BP(mean): 80 (26 Dec 2024 19:06) (80 - 97)  RR: 16 (26 Dec 2024 19:06) (16 - 18)  SpO2: 96% (26 Dec 2024 19:06) (95% - 96%)    Parameters below as of 26 Dec 2024 19:06  Patient On (Oxygen Delivery Method): room air        12-26-24 @ 07:01  -  12-26-24 @ 20:07  --------------------------------------------------------  IN:    Oral Fluid: 300 mL  Total IN: 300 mL    OUT:  Total OUT: 0 mL    Total NET: 300 mL          PHYSICAL EXAM:  Constitutional: WDWN resting comfortably in bed; NAD  Head: NC/AT  Eyes: PERRL, EOMI, anicteric sclera  ENT: no nasal discharge; uvula midline, no oropharyngeal erythema or exudates; MMM  Neck: supple; no JVD or thyromegaly  Respiratory: CTA B/L; no W/R/R, no retractions  Cardiac: +S1/S2; RRR; no M/R/G; PMI non-displaced  Gastrointestinal: abdomen soft, NT/ND; no rebound or guarding; +BSx4  Genitourinary: normal external genitalia  Back: spine midline, no bony tenderness or step-offs; no CVAT B/L  Extremities: WWP, no clubbing or cyanosis; no peripheral edema  Musculoskeletal: NROM x4; no joint swelling, tenderness or erythema  Vascular: 2+ radial, femoral, DP/PT pulses B/L  Dermatologic: skin warm, dry and intact; no rashes, wounds, or scars  Lymphatic: no submandibular or cervical LAD  Neurologic: AAOx3; CNII-XII grossly intact; no focal deficits  Psychiatric: affect and characteristics of appearance, verbalizations, behaviors are appropriate    LABS:                        11.7   6.97  )-----------( 265      ( 26 Dec 2024 17:00 )             35.1     12-26    137  |  101  |  14  ----------------------------<  86  3.8   |  24  |  0.99    Ca    8.8      26 Dec 2024 17:00  Phos  2.9     12-26  Mg     1.9     12-26    TPro  7.6  /  Alb  4.4  /  TBili  0.8  /  DBili  0.2  /  AST  49[H]  /  ALT  39  /  AlkPhos  66  12-26    PT/INR - ( 26 Dec 2024 17:00 )   PT: 12.5 sec;   INR: 1.07          PTT - ( 26 Dec 2024 17:00 )  PTT:28.9 sec  Urinalysis Basic - ( 26 Dec 2024 17:00 )    Color: x / Appearance: x / SG: x / pH: x  Gluc: 86 mg/dL / Ketone: x  / Bili: x / Urobili: x   Blood: x / Protein: x / Nitrite: x   Leuk Esterase: x / RBC: x / WBC x   Sq Epi: x / Non Sq Epi: x / Bacteria: x      CARDIAC MARKERS ( 26 Dec 2024 17:00 )  x     / x     / x     / x     / 21.9 ng/mL      CAPILLARY BLOOD GLUCOSE              RADIOLOGY & ADDITIONAL TESTS: Reviewed. INTERNAL MEDICINE SERVICE INITIAL CONSULT NOTE    HPI:  58yoM, undomiciled, poor historian, non-compliant, polysubstance abuse (alcohol, cocaine) w/ PMHx HTN, self reported CHF, many documented diagnoses ranging across mood, psychotic and personality disorders, many prior hospitalizations and ED visits, several prior self-aborted suicide attempts, many prior detox/rehabs, auditory hallucinations, most recently discharge from [facility] 12/13/24 for depression and suicidal ideation and malingering who presented to [facility] 12/26/24 for chest pain. Patient stated that the chest pain begain early in the AM before he relapsed on alcohol, he is unable to describe how much alcohol and what alcohol he drank. He stated the chest pain has been 10/10, intermittent lasting for ~10 minutes, left-sided with radiation to left arm and left temple. Patient also endorses lightheadedness, shortness of breath, presyncope, cough, muscle aches and fever.     [facility] ED Course:  Vitals: /70, , T 98.1, SpO2 95%, RR 18.  Labs: H/H 12.6/38.5, MCV 91.7, Trop I 22.7->18.9, Glucose 67, ALT/AST 49/53.   Interventions: S/p Aspirin 324 mg PO x 1, Lidocaine patch, 4 mg IV.  EKG: Sinus tachycardia, LVH.    Patient admitted to [facility] Telemetry for ischemic work up.    (26 Dec 2024 15:51)      ADDITIONAL MEDICINE HPI: Patient seen and examined at bedside. Sleepy, but arousable and alert and oriented x3. When asked what type of alcohol patient drinks and how often, he does not know. Also, when asked if he had recently discontinued alcohol use, he agrees however he is unable to say when and for how long. States he self-detoxed. Denies history of intubations for withdrawal or seizures associated with withdrawal. States he last drank on 12/26 AM and reports mild HA and nausea. States he has mild left sided intermittent chest pain that has improved since admission. Otherwise patient has no complaints.     REVIEW OF SYSTEMS:   Otherwise negative except as specified in HPI    PAST MEDICAL HISTORY:   Alcohol Use  Psychiatric disorder  Depression  Chronic CHF  Hypertension  Substance use disorder    PAST SURGICAL HISTORY:  No significant surgical history     FAMILY HISTORY:  NA    SOCIAL HISTORY:  Tobacco use: Denies cigarette smoking  EtOH use: Reports recent sobriety for 60 days, now relapsed. Unable to give further information regarding alcohol use.   Illicit drug use: Denies illicit drug use    MEDICATIONS:  MEDICATIONS  (STANDING):  folic acid 1 milliGRAM(s) Oral daily  hydrochlorothiazide 12.5 milliGRAM(s) Oral daily  melatonin 5 milliGRAM(s) Oral at bedtime  multivitamin 1 Tablet(s) Oral daily  sodium chloride 0.9%. 1000 milliLiter(s) (75 mL/Hr) IV Continuous <Continuous>  thiamine 100 milliGRAM(s) Oral every 24 hours    MEDICATIONS  (PRN):  acetaminophen     Tablet .. 650 milliGRAM(s) Oral every 6 hours PRN Moderate Pain (4 - 6)  aluminum hydroxide/magnesium hydroxide/simethicone Suspension 30 milliLiter(s) Oral every 4 hours PRN Dyspepsia      ALLERGIES:  Allergies    No Known Allergies    Intolerances        VITAL SIGNS:  Vital Signs Last 24 Hrs  T(C): 36.9 (26 Dec 2024 19:56), Max: 36.9 (26 Dec 2024 19:56)  T(F): 98.5 (26 Dec 2024 19:56), Max: 98.5 (26 Dec 2024 19:56)  HR: 90 (26 Dec 2024 19:06) (87 - 113)  BP: 118/5 (26 Dec 2024 19:06) (106/65 - 137/73)  BP(mean): 80 (26 Dec 2024 19:06) (80 - 97)  RR: 16 (26 Dec 2024 19:06) (16 - 18)  SpO2: 96% (26 Dec 2024 19:06) (95% - 96%)    Parameters below as of 26 Dec 2024 19:06  Patient On (Oxygen Delivery Method): room air        12-26-24 @ 07:01  -  12-26-24 @ 20:07  --------------------------------------------------------  IN:    Oral Fluid: 300 mL  Total IN: 300 mL    OUT:  Total OUT: 0 mL    Total NET: 300 mL          PHYSICAL EXAM:  Constitutional: Sleepy but arousable  Neck: supple; no JVD or thyromegaly  Respiratory: CTA B/L; no W/R/R, no retractions  Cardiac: +S1/S2; RRR; no M/R/G; PMI non-displaced  Gastrointestinal: abdomen obese, soft, NT/ND; no rebound or guarding; +BSx4  Extremities: WWP, no clubbing or cyanosis; no peripheral edema  Musculoskeletal: NROM x4; no joint swelling, tenderness or erythema  Dermatologic: skin warm, dry and intact; no rashes, wounds, or scars  Neurologic: AAOx3    LABS:                        11.7   6.97  )-----------( 265      ( 26 Dec 2024 17:00 )             35.1     12-26    137  |  101  |  14  ----------------------------<  86  3.8   |  24  |  0.99    Ca    8.8      26 Dec 2024 17:00  Phos  2.9     12-26  Mg     1.9     12-26    TPro  7.6  /  Alb  4.4  /  TBili  0.8  /  DBili  0.2  /  AST  49[H]  /  ALT  39  /  AlkPhos  66  12-26    PT/INR - ( 26 Dec 2024 17:00 )   PT: 12.5 sec;   INR: 1.07          PTT - ( 26 Dec 2024 17:00 )  PTT:28.9 sec  Urinalysis Basic - ( 26 Dec 2024 17:00 )    Color: x / Appearance: x / SG: x / pH: x  Gluc: 86 mg/dL / Ketone: x  / Bili: x / Urobili: x   Blood: x / Protein: x / Nitrite: x   Leuk Esterase: x / RBC: x / WBC x   Sq Epi: x / Non Sq Epi: x / Bacteria: x      CARDIAC MARKERS ( 26 Dec 2024 17:00 )  x     / x     / x     / x     / 21.9 ng/mL      CAPILLARY BLOOD GLUCOSE              RADIOLOGY & ADDITIONAL TESTS: Reviewed. INTERNAL MEDICINE SERVICE INITIAL CONSULT NOTE    HPI:  58yoM, undomiciled, poor historian, non-compliant, polysubstance abuse (alcohol, cocaine) w/ PMHx HTN, self reported CHF, many documented diagnoses ranging across mood, psychotic and personality disorders, many prior hospitalizations and ED visits, several prior self-aborted suicide attempts, many prior detox/rehabs, auditory hallucinations, most recently discharge from [facility] 12/13/24 for depression and suicidal ideation and malingering who presented to [facility] 12/26/24 for chest pain. Patient stated that the chest pain begain early in the AM before he relapsed on alcohol, he is unable to describe how much alcohol and what alcohol he drank. He stated the chest pain has been 10/10, intermittent lasting for ~10 minutes, left-sided with radiation to left arm and left temple. Patient also endorses lightheadedness, shortness of breath, presyncope, cough, muscle aches and fever.     [facility] ED Course:  Vitals: /70, , T 98.1, SpO2 95%, RR 18.  Labs: H/H 12.6/38.5, MCV 91.7, Trop I 22.7->18.9, Glucose 67, ALT/AST 49/53.   Interventions: S/p Aspirin 324 mg PO x 1, Lidocaine patch, 4 mg IV.  EKG: Sinus tachycardia, LVH.    Patient admitted to [facility]iac Telemetry for ischemic work up.    (26 Dec 2024 15:51)      ADDITIONAL MEDICINE HPI: Patient seen and examined at bedside. Sleepy, but arousable and alert and oriented x3. When asked what type of alcohol patient drinks and how often, he does not know. Also, when asked if he had recently discontinued alcohol use, he agrees however he is unable to say when and for how long. States he self-detoxed. Denies history of intubations for withdrawal or seizures associated with withdrawal. States he last drank on 12/26 AM and reports mild HA and nausea. States he has mild left sided intermittent chest pain that has improved since admission. Otherwise patient has no complaints.     REVIEW OF SYSTEMS:   Otherwise negative except as specified in HPI    PAST MEDICAL HISTORY:   Alcohol Use  Psychiatric disorder  Depression  Chronic CHF  Hypertension  Substance use disorder    PAST SURGICAL HISTORY:  No significant surgical history     FAMILY HISTORY:  NA    SOCIAL HISTORY:  Tobacco use: Denies cigarette smoking  EtOH use: Reports recent sobriety for 60 days, now relapsed. Unable to give further information regarding alcohol use.   Illicit drug use: Denies illicit drug use    MEDICATIONS:  MEDICATIONS  (STANDING):  folic acid 1 milliGRAM(s) Oral daily  hydrochlorothiazide 12.5 milliGRAM(s) Oral daily  melatonin 5 milliGRAM(s) Oral at bedtime  multivitamin 1 Tablet(s) Oral daily  sodium chloride 0.9%. 1000 milliLiter(s) (75 mL/Hr) IV Continuous <Continuous>  thiamine 100 milliGRAM(s) Oral every 24 hours    MEDICATIONS  (PRN):  acetaminophen     Tablet .. 650 milliGRAM(s) Oral every 6 hours PRN Moderate Pain (4 - 6)  aluminum hydroxide/magnesium hydroxide/simethicone Suspension 30 milliLiter(s) Oral every 4 hours PRN Dyspepsia      ALLERGIES:  Allergies    No Known Allergies    Intolerances        VITAL SIGNS:  Vital Signs Last 24 Hrs  T(C): 36.9 (26 Dec 2024 19:56), Max: 36.9 (26 Dec 2024 19:56)  T(F): 98.5 (26 Dec 2024 19:56), Max: 98.5 (26 Dec 2024 19:56)  HR: 90 (26 Dec 2024 19:06) (87 - 113)  BP: 118/5 (26 Dec 2024 19:06) (106/65 - 137/73)  BP(mean): 80 (26 Dec 2024 19:06) (80 - 97)  RR: 16 (26 Dec 2024 19:06) (16 - 18)  SpO2: 96% (26 Dec 2024 19:06) (95% - 96%)    Parameters below as of 26 Dec 2024 19:06  Patient On (Oxygen Delivery Method): room air        12-26-24 @ 07:01  -  12-26-24 @ 20:07  --------------------------------------------------------  IN:    Oral Fluid: 300 mL  Total IN: 300 mL    OUT:  Total OUT: 0 mL    Total NET: 300 mL          PHYSICAL EXAM:  Constitutional: Sleepy but arousable  Neck: supple; no JVD or thyromegaly  Respiratory: CTA B/L; no W/R/R, no retractions  Cardiac: +S1/S2; RRR; no M/R/G; PMI non-displaced  Gastrointestinal: abdomen obese, soft, NT/ND; no rebound or guarding; +BSx4  Extremities: WWP, no clubbing or cyanosis; no peripheral edema  Musculoskeletal: NROM x4; no joint swelling, tenderness or erythema  Dermatologic: skin warm, dry and intact; no rashes, wounds, or scars  Neurologic: AAOx3    LABS:                        11.7   6.97  )-----------( 265      ( 26 Dec 2024 17:00 )             35.1     12-26    137  |  101  |  14  ----------------------------<  86  3.8   |  24  |  0.99    Ca    8.8      26 Dec 2024 17:00  Phos  2.9     12-26  Mg     1.9     12-26    TPro  7.6  /  Alb  4.4  /  TBili  0.8  /  DBili  0.2  /  AST  49[H]  /  ALT  39  /  AlkPhos  66  12-26    PT/INR - ( 26 Dec 2024 17:00 )   PT: 12.5 sec;   INR: 1.07          PTT - ( 26 Dec 2024 17:00 )  PTT:28.9 sec  Urinalysis Basic - ( 26 Dec 2024 17:00 )    Color: x / Appearance: x / SG: x / pH: x  Gluc: 86 mg/dL / Ketone: x  / Bili: x / Urobili: x   Blood: x / Protein: x / Nitrite: x   Leuk Esterase: x / RBC: x / WBC x   Sq Epi: x / Non Sq Epi: x / Bacteria: x      CARDIAC MARKERS ( 26 Dec 2024 17:00 )  x     / x     / x     / x     / 21.9 ng/mL      CAPILLARY BLOOD GLUCOSE              RADIOLOGY & ADDITIONAL TESTS: Reviewed.

## 2024-12-26 NOTE — H&P ADULT - PSYCHIATRIC
normal/normal affect/alert and oriented x3/normal behavior alert and oriented x3/depressed/flat affect

## 2024-12-26 NOTE — H&P ADULT - PROBLEM SELECTOR PLAN 2
o Psych consult discharged from [facility] not on any psychiatric medications.       Plan:  o Psych consulted, appreciate recs - discharged from [facility] not on any psychiatric medications.   o Psych consulted, appreciate recs

## 2024-12-26 NOTE — CONSULT NOTE ADULT - PROBLEM SELECTOR RECOMMENDATION 2
Hx of anemia, baseline ~ 12, now 11.7. MCV 92.4 likely due to nutritional deficiency.     - C/w folic acid and multivitamin as stated above   - Can evaluate further with iron studies   - Monitor for signs and symptoms of bleeding

## 2024-12-26 NOTE — ED PROVIDER NOTE - WR ORDER ID 1
[id number] Rifampin Pregnancy And Lactation Text: This medication is Pregnancy Category C and it isn't know if it is safe during pregnancy. It is also excreted in breast milk and should not be used if you are breast feeding.

## 2024-12-26 NOTE — ED PROVIDER NOTE - AXIS
You can access the FollowMyHealth Patient Portal offered by [facility] by registering at the following website: [email or web address]. By joining [employer]’s FollowMyHealth portal, you will also be able to view your health information using other applications (apps) compatible with our system. Normal

## 2024-12-26 NOTE — PROVIDER CONTACT NOTE (EICU) - BACKGROUND
59 year old male w/ PMHx of CHF, depression, GERD, HTN, pericarditis, substance use disorder, p/w chest pain and SOB. States he was binge drinking last night.

## 2024-12-26 NOTE — CONSULT NOTE ADULT - PROBLEM SELECTOR RECOMMENDATION 3
#Hypertension   Admission for chest pain. Trop negative x3 and ECG nonischemic.     - Management per cardiology team

## 2024-12-26 NOTE — CONSULT NOTE ADULT - ATTENDING COMMENTS
58 yo undomiciled M (poor historian) with PMHx polysubstance use, etOH use, ?HTN, recent admission to inpatient psych/[facility] for depression c/b SI (12/3-12/13) px from home 12/26 with acute-onset chest pain, admitted to cardiac telemetry for further ischemic evaluation. Medicine consulted 12/26 for management of suspected etOH withdrawal.     [ ] Serial CIWA assessments (Low-risk CIWA protocol)   - Monitor for signs/sx of withdrawal in next 24-48hr (Last drink 12/26 AM)   - Ativan PRN for CIWA >8   [ ] MV, FA, Thiamine   [ ] Aspiration precautions   [ ] Fall precautions   [ ] SBIRT consult      Agree with remainder of resident plan as above.

## 2024-12-26 NOTE — ED PROVIDER NOTE - QRS
11/27/2023         RE: Ned Moore  [address] [place] [address]        Dear Colleague,    Thank you for referring your patient, Ned Moore, to the [facility]. Please see a copy of my visit note below.    Pulmonary Clinic Follow-up Visit    Assessment and Plan:   65 year old male never smoker with a history of EGPA with pulmonary and cardiac involvement, multifocal strokes, DM2, CKD3, dyslipidemia, presenting for follow-up.     Eosinophilic granulomatosis with polyangiitis: I met Ned for initial consultation in January 2023. At that time he had marked obstructive physiology with frequent exacerbations. 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 He was started on LAMA-LABA. He had prominent upper airway symptoms. He was subsequently hospitalized at [facility] for almost a month from May-June 2023 with myocarditis with heart failure, multifocal ischemic strokes, anemia, found to have severe hypereosinophilia and was diagnosed with EGPA. He was seen by multiple consulting teams and underwent myocardial biopsy showing eosinophilic myocarditis. ANCA negative. He improved markedly with steroids, and was started on mepolizumab, which he continues. He went to rehab, and has improved markedly. Due to strokes had gaze deviation and right hemiparesis on presentation, which have resolved. Unclear etiology, perhaps hypotension-related or vasculitis from EGPA. He is doing well now, dyspnea present with exertion but improved, neuro deficits resolving. Ran out of tiotropium two weeks ago due to pharmacy supply issue; continues on low-dose fluticasone-vilanterol. He is going to [employer] twice weekly, and is interested in pulmonary rehab. He was at [facility] on 11/14/23 with fatigue; the note erroneously states that he had N/V and lightheadedness from mepolizumab;  these symptoms occurred with azathioprine, which he stopped. He is tolerating mepolizumab well. He is transitioning to a rheumatologist at [place] to consolidate his care into one system. In January 2023 he had severe fixed obstructive physiology on pulmonary function testing, with FEV1 1.26 L (36%), air trapping without hyperinflation, and mild DLCO reduction. May be improved on current treatment; will repeat.     Plan:  - continue mepolizumab  - continue fluticasone-vilanterol 100-25 mcg one inhalation daily; rinse/gargle/spit water after use  - continue tiotropium handihaler 18 mcg daily  - repeat PFT  - referral to pulmonary rehabilitation at [facility]  - appointment with [place] rheumatology on December 6  - follow up in 6 months  - encouraged him to contact us with questions or concerning symptoms    Jose Biswas MD  Pulmonary and Critical Care Medicine  [facility]  Office [phone]  Pager [phone]  he/him    CCx: EGPA    HPI: 65 year old male never smoker with a history of EGPA with pulmonary and cardiac involvement, multifocal strokes, DM2, CKD3, dyslipidemia, presenting for follow-up. I met Ned for initial consultation in January 2023. At that time he had marked obstructive physiology with frequent exacerbations. 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 He was started on LAMA-LABA. He had prominent upper airway symptoms. He was subsequently hospitalized at [facility] for almost a month from May-June 2023 with myocarditis with heart failure, multifocal ischemic strokes, anemia, found to have severe hypereosinophilia and was diagnosed with EGPA. He was seen by multiple consulting teams and underwent myocardial biopsy showing eosinophilic myocarditis. ANCA negative. He improved markedly with steroids, and was started on mepolizumab, which he continues. He  went to rehab, and has improved markedly. Due to strokes had gaze deviation and right hemiparesis on presentation, which have resolved. Unclear etiology, perhaps hypotension-related or vasculitis from EGPA. He is doing well now, dyspnea present with exertion but improved, neuro deficits resolving. Ran out of tiotropium two weeks ago due to pharmacy supply issue; continues on low-dose fluticasone-vilanterol. He is going to [employer] twice weekly, and is interested in pulmonary rehab. He was at [facility] on 11/14/23 with fatigue; the note erroneously states that he had N/V and lightheadedness from mepolizumab; these symptoms occurred with azathioprine, which he stopped. He is tolerating mepolizumab well. He is transitioning to a rheumatologist at [place] to consolidate his care into one system. In January 2023 he had severe fixed obstructive physiology on pulmonary function testing, with FEV1 1.26 L (36%), air trapping without hyperinflation, and mild DLCO reduction. May be improved on current treatment; will repeat.    ROS:  A 12-system review was obtained and was negative with the exception of the symptoms endorsed in the history of present illness.    PMH:  EGPA with pulmonary and cardiac involvement, multifocal strokes, DM2, CKD3, dyslipidemia    PSH:  No past surgical history on file.    Allergies:  No Known Allergies    Family HX:  Family History   Problem Relation Age of Onset     Heart Disease Mother      Diabetes Mother      Asthma Mother      Alzheimer Disease Father      No Known Problems Brother        Social Hx:  Social History     Socioeconomic History     Marital status:      Spouse name: Not on file     Number of children: Not on file     Years of education: Not on file     Highest education level: Not on file   Occupational History     Not on file   Tobacco Use     Smoking status: Never     Smokeless tobacco: Never   Vaping Use     Vaping Use: Never used   Substance and Sexual Activity      Alcohol use: Yes     Comment: a beer or wine every few months     Drug use: Never     Sexual activity: Not Currently     Partners: Female   Other Topics Concern     Parent/sibling w/ CABG, MI or angioplasty before 65F 55M? No   Social History Narrative     Not on file     Social Determinants of Health     Financial Resource Strain: Low Risk  (11/14/2023)    Financial Resource Strain      Within the past 12 months, have you or your family members you live with been unable to get utilities (heat, electricity) when it was really needed?: No   Food Insecurity: High Risk (11/14/2023)    Food Insecurity      Within the past 12 months, did you worry that your food would run out before you got money to buy more?: Yes      Within the past 12 months, did the food you bought just not last and you didn t have money to get more?: No   Transportation Needs: Low Risk  (11/14/2023)    Transportation Needs      Within the past 12 months, has lack of transportation kept you from medical appointments, getting your medicines, non-medical meetings or appointments, work, or from getting things that you need?: No   Physical Activity: Not on file   Stress: Not on file   Social Connections: Not on file   Interpersonal Safety: Not on file   Housing Stability: High Risk (11/14/2023)    Housing Stability      Do you have housing? : Yes      Are you worried about losing your housing?: Yes       Current Meds:  Current Outpatient Medications   Medication Sig Dispense Refill     ACCU-CHEK FASTCLIX LANCET DRUM [ACCU-CHEK FASTCLIX LANCET DRUM] TEST BLOOD SUGARS 3 TIMES DAILY 300 each 3     albuterol (PROAIR HFA/PROVENTIL HFA/VENTOLIN HFA) 108 (90 Base) MCG/ACT inhaler INHALE 2 PUFFS INTO THE LUNGS EVERY 6 HOURS 8.5 g 1     ASPIRIN LOW DOSE 81 MG chewable tablet Take 81 mg by mouth daily       atorvastatin (LIPITOR) 20 MG tablet TAKE 1 TABLET BY MOUTH EVERY DAY IN THE EVENING 30 tablet 2     BD ALEXANDRA U/F 32G X 4 MM insulin pen needle        blood  glucose test (ACCU-CHEK SMARTVIEW TEST STRIP) strips [BLOOD GLUCOSE TEST (ACCU-CHEK SMARTVIEW TEST STRIP) STRIPS] USE TO CHECK BLOOD SUGARS TWICE DAILY. 200 strip 3     Continuous Blood Gluc Sensor (FREESTYLE LIVIA 2 SENSOR) MISC 1 each every 14 days 2 each 11     Dulaglutide (TRULICITY) 3 MG/0.5ML SOPN Inject 3 mg Subcutaneous once a week 2 mL 0     fluticasone-vilanterol (BREO ELLIPTA) 100-25 MCG/ACT inhaler Inhale 1 puff into the lungs daily 60 each 6     gabapentin (NEURONTIN) 300 MG capsule Take 300 mg by mouth At Bedtime       insulin glargine (LANTUS PEN) 100 UNIT/ML pen Inject 15 Units Subcutaneous At Bedtime 15 mL 4     melatonin 3 MG tablet Take 9 mg by mouth nightly as needed for sleep       Mepolizumab 100 MG/ML SOSY Inject 3 mLs (300 mg) Subcutaneous every 30 days 3 mL 11     metFORMIN (GLUCOPHAGE XR) 500 MG 24 hr tablet Take 1 tablet (500 mg) by mouth daily (with dinner) 360 tablet 3     metFORMIN (GLUCOPHAGE) 1000 MG tablet Take 1 tablet (1,000 mg) by mouth daily (with breakfast) 90 tablet 6     metoprolol succinate ER (TOPROL XL) 25 MG 24 hr tablet Take 0.5 tablets (12.5 mg) by mouth every morning 45 tablet 6     promethazine (PHENERGAN) 25 MG tablet Take 1 tablet (25 mg) by mouth every 8 hours as needed for nausea 15 tablet 0     tiotropium (SPIRIVA) 18 MCG inhaled capsule Inhale 18 mcg into the lungs daily       traZODone (DESYREL) 50 MG tablet Take 50 mg by mouth daily as needed for sleep         Physical Exam:  /74 (BP Location: Left arm, Patient Position: Chair, Cuff Size: Adult Regular)   Pulse 76   Wt 85.7 kg (189 lb)   SpO2 97%   BMI 24.53 kg/m    Gen: alert, oriented, no distress  HEENT: no cervical or supraclavicular lymphadenopathy  CV: RRR, no M/G/R  Resp: CTAB, no focal crackles or wheezes  Skin: no apparent rashes  Ext: mild pitting edema left leg, markedly improved per patient  Neuro: alert, nonfocal    Labs:  reviewed    Imaging studies:  Chest CTA (May 2023):  - images  directly reviewed, formal interpretation follows:  FINDINGS:   ANGIOGRAM CHEST: Normal caliber thoracic aorta. Conventional arch anatomy. Normal caliber pulmonary artery. No pulmonary artery filling defects. No vessel wall thickening. No findings to suggest acute aortic syndrome.     LUNGS AND PLEURA: There are symmetric patchy peripheral centrilobular distribution nodules with indistinct borders. In the lateral left lower lobe opacities are slightly bandlike and confluent consistent with a mixture of inflammation and atelectasis. Trachea and central airways are patent and normal caliber. The subsegmental airways in the peripheral 3 cm of the lung have wall thickening and/or visible endoluminal material. No pleural effusion or thickening.     MEDIASTINUM: Cardiac chambers are normal in size. Physiologic pericardial fluid. Symmetric mildly enlarged hilar and subcarinal lymph nodes are likely reactive. Prevascular, paratracheal, and paraesophageal nodes are normal in size. Esophagus is decompressed. Imaged thyroid gland is normal.     CORONARY ARTERY CALCIFICATION: Moderate.     HEPATOBILIARY: The liver is normal in size. There are scattered low attenuations randomly distributed in the liver without appreciable enhancement incompletely characterized. Normal contrast opacification of the portal and hepatic veins. Smooth liver capsule. Normal gallbladder and bile ducts.     PANCREAS: Normal.     SPLEEN: Normal.     ADRENAL GLANDS: Normal.     KIDNEYS/BLADDER: Kidneys are normal in size with symmetric enhancement and normal cortex thickness. No nephrolithiasis or hydronephrosis. Urinary bladder is distended but has a smooth wall of uniform thickness.     BOWEL: Normal.     LYMPH NODES: Normal.     VASCULATURE: Minimal mixed attenuation atheroma in the infrarenal abdominal aorta. No aneurysm.     PELVIC ORGANS: Normal.     MUSCULOSKELETAL: Small thoracic and lumbar degenerative osteophytes. No aggressive or destructive  "bone lesions.     IMPRESSION:     1. No acute pulmonary embolism or aortopathy.   2.  Peripheral airway centric lung opacities in both lungs consistent with bronchopneumonia.   3.  Reactive lymph nodes in the angelica and subcarinal mediastinum.   4.  No focal inflammatory process in the abdomen or pelvis.   5.  Scattered hypoattenuating liver lesions, incompletely characterized. These most likely represent benign cysts or small hemangioma.    CPET (April 2023):     A cardiopulmonary stress test was performed following a modified Linda protocol with the patient exercising for 9 minutes and 0 seconds under the supervision of Dr. Radha Oconnor.  Blood pressure and heart rate demonstrated a normal response to exercise.     The stress electrocardiogram is negative for inducible ischemic EKG changes.     There is no prior study for comparison.     The patient's exercise capacity is moderately impaired.     The patient technically gave a \"submaximal effort.\"  There is likely a pulmonary limitation to exercise with the breathing reserve being negative at peak, the lack of doubling ot the tidal volume at max and abnormal baseline spirometry. A cardiac limitation to exercise cannot be ruled out.  The peak VO2 and RPP were lower than expected.  The Ve/VCO2 was also elevated at 39.  The blood pressure at rest revealed an orthostatic response to change in positions (no symptoms).  In recovery the blood pressure remained elevated.    TTE (June 2023):  Summary    1. Normal LV size with normal wall thickness. Calculated bi-planes LVEF   64%.   No regional wall motion abnormalities. (Normal function). Normal diastolic   function.    2. Normal RV size with normal systolic function.     3. Trace to mild valvular regurgitations only.     4. The estimated pulmonary arterial systolic pressure is 23 mmHg,   consistent   with normal pulmonary pressures.     5. Compared to the prior study from 5/27/2023, LV function has improved (was 45-50% " in May 2023)    Pulmonary Function Testing  January 2023:  FVC 3.60 (80%)  FEV1 1.26 (36%)  FEV1/FVC 0.35  No significant bronchodilator response  RV 4.80 (187%)  TLC 8.29 (109%)  DLCOc 70%  Most inspiratory efforts lead to flattening of the inspiratory limb; one does not.    Time spent on chart and image review, meeting with the patient to obtain history, perform physical exam, discuss test results, diagnostic possibilities, further testing options, treatment plan options, and care coordination: 34 minutes      Again, thank you for allowing me to participate in the care of your patient.        Sincerely,        Jose Biswas MD     88

## 2024-12-26 NOTE — H&P ADULT - NSHPSOCIALHISTORY_GEN_ALL_CORE
recent relapse on alcohol, was sober for 60 days, cannot describe what alcohol, denied cigarette smoking, denied drug use.

## 2024-12-26 NOTE — PATIENT PROFILE ADULT - FALL HARM RISK - RISK INTERVENTIONS
Assistance OOB with selected safe patient handling equipment/Assistance with ambulation/Communicate Fall Risk and Risk Factors to all staff, patient, and family/Monitor for mental status changes/Monitor gait and stability/Reinforce activity limits and safety measures with patient and family/Sit up slowly, dangle for a short time, stand at bedside before walking/Use of alarms - bed, chair and/or voice tab/Visual Cue: Yellow wristband/Bed in lowest position, wheels locked, appropriate side rails in place/Call bell, personal items and telephone in reach/Instruct patient to call for assistance before getting out of bed or chair/Non-slip footwear when patient is out of bed/[place] to call system/Physically safe environment - no spills, clutter or unnecessary equipment/Purposeful Proactive Rounding/Room/bathroom lighting operational, light cord in reach

## 2024-12-26 NOTE — ED PROVIDER NOTE - CLINICAL SUMMARY MEDICAL DECISION MAKING FREE TEXT BOX
With history of hypertension, CHF, presents with exertional chest pain, shortness of breath, nausea.  Will get labs, EKG, reassess.

## 2024-12-26 NOTE — ED ADULT NURSE NOTE - NSFALLUNIVINTERV_ED_ALL_ED
Bed/Stretcher in lowest position, wheels locked, appropriate side rails in place/Call bell, personal items and telephone in reach/Instruct patient to call for assistance before getting out of bed/chair/stretcher/Non-slip footwear applied when patient is off stretcher/[place] to call system/Physically safe environment - no spills, clutter or unnecessary equipment/Purposeful proactive rounding/Room/bathroom lighting operational, light cord in reach

## 2024-12-26 NOTE — ED PROVIDER NOTE - OBJECTIVE STATEMENT
Patient reports that he developed chest pain 10 minutes prior to ED arrival while he was walking.  10 out of 10, associated with shortness of breath, nausea.  No fever, cough, diaphoresis, abdominal pain, vomiting, diarrhea.  Patient is also been on a drinking binge since last night last drink was sometime in the middle the night.  Denies SI/HI.  Denies any drug use. Has not been on any of his medications in months and does not know their names or doses.

## 2024-12-26 NOTE — H&P ADULT - NSICDXPASTMEDICALHX_GEN_ALL_CORE_FT
PAST MEDICAL HISTORY:  Alcohol abuse     Bipolar 1 disorder     Borderline personality disorder     Chronic CHF     Depression     GERD (gastroesophageal reflux disease)     Hypertension     Pericarditis     Substance abuse     Substance use disorder

## 2024-12-26 NOTE — CONSULT NOTE ADULT - PROBLEM SELECTOR RECOMMENDATION 9
Hx of alcohol use, recently sober for 60 days but fell off the wagon. Last drink 12/26 AM, but cannot recall what type of drink.  Alcohol level 55 at [facility], now < 10.     - CIWA protocol, ativan 2mg IV for CIWA > 8   - C/w 1mg folic acid daily and multivitamin  - C/w thiamine 500 mg x3 days   - SBIRT Consult Hx of alcohol use, recently sober for 60 days but fell off the wagon. Last drink 12/26 AM, but cannot recall what type of drink. Cannot give further information regarding alcohol history, however, denies intubations or seizures related to withdrawal. Alcohol level 55 at [facility], now < 10.   CIWA 2 @ 8 PM    - CIWA protocol, ativan 2mg IV for CIWA > 8   - C/w 1mg folic acid daily and multivitamin  - C/w thiamine 500 mg x3 days   - SBIRT Consult

## 2024-12-26 NOTE — ED PROVIDER NOTE - PHYSICAL EXAMINATION
Gen: NAD. HEENT: NCAT, mmm   Chest: RRR, nl S1 and S2, no m/r/g. Resp: CTAB, no w/r/r  Abd: nl BS, soft, nt/nd. Ext: Warm, dry  Neuro: CN II-XII intact, normal and equal strength, sensation, and reflexes bilaterally,  speech clear  Psych: AAOx3

## 2024-12-26 NOTE — H&P ADULT - PROBLEM SELECTOR PLAN 1
Trop I Trop negative x 3, EKG non ischemic  - TTE (12/26/24): Normal LVSF   2. Mild symmetric left ventricular hypertrophy.   3. Normal right ventricular size and systolic function.   4. Normal atria.   5. No significant valvular disease.   6. No evidence of pulmonary hypertension.   7. No pericardial effusion.   8. No prior echo is available for comparison.        Plan: - Trop negative x 3, EKG non ischemic  - TTE (12/26/24): Normal LVSF/RVSF, Mild symmetric LVH,  3. Normal right ventricular size and systolic function, No significant valvular disease.  - Aspirin 81 mg daily.      #Hypertension  supposed to be on HCTZ however non-compliant    Plan:  o start HCTZ 12.5 mg daily - Trop negative x 3, EKG non ischemic  - TTE (12/26/24): Normal LVSF/RVSF, Mild symmetric LVH,  3. Normal right ventricular size and systolic function, No significant valvular disease.  o start Aspirin 81 mg daily.      #Hypertension  - supposed to be on HCTZ however non-compliant:  o start HCTZ 12.5 mg daily

## 2024-12-26 NOTE — H&P ADULT - ASSESSMENT
58yoM, undomiciled, poor historian, non-compliant, polysubstance abuse (alcohol, cocaine) w/ PMHx HTN, self reported CHF, many documented diagnoses ranging across mood, psychotic and personality disorders, many prior hospitalizations and ED visits, several prior self-aborted suicide attempts, many prior detox/rehabs, auditory hallucinations,  most recently discharge from [facility] 12/13/24 for depression and suicidal ideation and malingering who presented to [facility] 12/26/24 for chest pain. Patient admitted to [facility] for ischemic work up.

## 2024-12-26 NOTE — H&P ADULT - NSHPLABSRESULTS_GEN_ALL_CORE
12.6   8.56  )-----------( 304      ( 26 Dec 2024 08:29 )             38.5       12-26    x   |  102  |  14  ----------------------------<  94  3.7   |  26  |  1.08    Ca    9.0      26 Dec 2024 16:14  Phos  2.9     12-26  Mg     1.9     12-26    TPro  8.2  /  Alb  4.1  /  TBili  0.9  /  DBili  x   /  AST  49[H]  /  ALT  53[H]  /  AlkPhos  60  12-26      PT/INR - ( 26 Dec 2024 08:48 )   PT: 14.2 sec;   INR: 1.23          PTT - ( 26 Dec 2024 08:48 )  PTT:30.3 sec          Urinalysis Basic - ( 26 Dec 2024 16:14 )    Color: x / Appearance: x / SG: x / pH: x  Gluc: 94 mg/dL / Ketone: x  / Bili: x / Urobili: x   Blood: x / Protein: x / Nitrite: x   Leuk Esterase: x / RBC: x / WBC x   Sq Epi: x / Non Sq Epi: x / Bacteria: x        EKG: sinus tachycardia

## 2024-12-26 NOTE — CONSULT NOTE ADULT - PROBLEM SELECTOR RECOMMENDATION 4
Reported hx of psychiatric disorders with recent admission from [facility]. Not currently on any medication.     - Psych consulted  - Continue management per cardiology

## 2024-12-26 NOTE — PROVIDER CONTACT NOTE (EICU) - ACTION/TREATMENT ORDERED:
Patient is stable for transfer to [facility] tele for further w/up of angina  EMS/ bedside team agreeable

## 2024-12-26 NOTE — ED ADULT NURSE REASSESSMENT NOTE - NS ED NURSE REASSESS COMMENT FT1
Patient glucose 67 MD aware. Patient denies any signs or symptoms of hypoglycemia. Provided patient with food and ginger ale. Will continue to monitor.
Pt with CIWA of 0 at present, reports he drank this AM but is unsure how much. pt admits to relapse, reports he used to drink more regularly but has no hx of withdrawal seizures.
Patient is alert and oriented x 4 continues to complain of chest pain despite PRN Nitroglycerin administered as ordered (x3 sublingual tabs) patient rates chest pain a 10 out of 10 on pain scale provider Made aware. Placed on cardiac monitor 99 HR RATE NSR. Patient is currently stable.

## 2024-12-26 NOTE — PATIENT PROFILE ADULT - FUNCTIONAL SCREEN CURRENT LEVEL: COMMUNICATION, MLM
0 = understands/communicates without difficulty no recurrent cold sores/no throat pain/no nasal discharge/no nose bleeds/no nasal congestion/no nasal obstruction/no sinus symptoms

## 2024-12-26 NOTE — ED PROVIDER NOTE - PROGRESS NOTE DETAILS
No relief with nitroglycerin. Will give morphine Patient resting comfortably, feels moderately improved. Patient reports this feels like the kind of chest pain he gets after drinking heavily. Spoke to the SBIRT counsellor and wants to be sent to an alcohol treatment program. I recommended medical admission first for full cardiac evaluation. Patient refused. Agreed to repeat troponin. I explained that the work-up we have done in the ED is not a full work-up of his heart, and he may still have a serious medical condition that could lead to permanent disability or death. Patient states understanding. Patient still does not want medical admission. Only wants to go direct to rehab. Awake, alert, oriented x3, gait steady, speech clear. Patient now agreeable to further cardiac w/u. Patient resting comfortably, feels moderately improved. Spoke to the SBIRT counsellor and wants to be sent to an alcohol treatment program. I recommended medical admission first for full cardiac evaluation. Patient refused. Agreed to repeat troponin. I explained that the work-up we have done in the ED is not a full work-up of his heart, and he may still have a serious medical condition that could lead to permanent disability or death. Patient states understanding. Patient still does not want medical admission. Only wants to go direct to rehab. Awake, alert, oriented x3, gait steady, speech clear.

## 2024-12-26 NOTE — ED ADULT NURSE NOTE - OBJECTIVE STATEMENT
Received patient alert and oriented x 4 complains of chest rates pain a 10 out of 10 on pain scale Denies SOB, no respiratory distress noted. Patient stated that reason for ER visit is due to he started to experience chest pain while ambulating 20 minutes prior to ED states it pressure on his chest that radiates to his left shoulder and jaw accompanied by nausea. Patient currently stable will continue to monitor.

## 2024-12-26 NOTE — CONSULT NOTE ADULT - ASSESSMENT
58yoM, undomiciled, poor historian, non-compliant, polysubstance abuse (alcohol, cocaine) w/ PMHx HTN, self reported CHF, many documented diagnoses ranging across mood, psychotic and personality disorders, many prior hospitalizations and ED visits, several prior self-aborted suicide attempts, many prior detox/rehabs, auditory hallucinations presented to [facility] 12/26/24 for chest pain. Medicine consulted for management of alcohol withdrawal.    58yoM, undomiciled, poor historian, non-compliant, polysubstance abuse (alcohol, cocaine) w/ PMHx HTN, self reported CHF, many documented diagnoses ranging across mood, psychotic and personality disorders, many prior hospitalizations and ED visits, several prior self-aborted suicide attempts, many prior detox/rehabs, auditory hallucinations presented to [facility] 12/26/24 for chest pain. Medicine consulted for alcohol > 55 in [facility] with concern for alcohol withdrawal.

## 2024-12-27 VITALS
DIASTOLIC BLOOD PRESSURE: 60 MMHG | HEART RATE: 90 BPM | TEMPERATURE: 98 F | OXYGEN SATURATION: 95 % | SYSTOLIC BLOOD PRESSURE: 119 MMHG | RESPIRATION RATE: 18 BRPM

## 2024-12-27 LAB
A1C WITH ESTIMATED AVERAGE GLUCOSE RESULT: 5.4 % — SIGNIFICANT CHANGE UP (ref 4–5.6)
ALBUMIN SERPL ELPH-MCNC: 4.1 G/DL — SIGNIFICANT CHANGE UP (ref 3.3–5)
ALP SERPL-CCNC: 67 U/L — SIGNIFICANT CHANGE UP (ref 40–120)
ALT FLD-CCNC: 35 U/L — SIGNIFICANT CHANGE UP (ref 10–45)
ANION GAP SERPL CALC-SCNC: 8 MMOL/L — SIGNIFICANT CHANGE UP (ref 5–17)
AST SERPL-CCNC: 42 U/L — HIGH (ref 10–40)
BILIRUB SERPL-MCNC: 0.7 MG/DL — SIGNIFICANT CHANGE UP (ref 0.2–1.2)
BUN SERPL-MCNC: 17 MG/DL — SIGNIFICANT CHANGE UP (ref 7–23)
CALCIUM SERPL-MCNC: 9.2 MG/DL — SIGNIFICANT CHANGE UP (ref 8.4–10.5)
CHLORIDE SERPL-SCNC: 104 MMOL/L — SIGNIFICANT CHANGE UP (ref 96–108)
CHOLEST SERPL-MCNC: 145 MG/DL — SIGNIFICANT CHANGE UP
CO2 SERPL-SCNC: 26 MMOL/L — SIGNIFICANT CHANGE UP (ref 22–31)
CREAT SERPL-MCNC: 1.22 MG/DL — SIGNIFICANT CHANGE UP (ref 0.5–1.3)
EGFR: 69 ML/MIN/1.73M2 — SIGNIFICANT CHANGE UP
ESTIMATED AVERAGE GLUCOSE: 108 MG/DL — SIGNIFICANT CHANGE UP (ref 68–114)
GLUCOSE SERPL-MCNC: 92 MG/DL — SIGNIFICANT CHANGE UP (ref 70–99)
HCT VFR BLD CALC: 36.1 % — LOW (ref 39–50)
HDLC SERPL-MCNC: 47 MG/DL — SIGNIFICANT CHANGE UP
HGB BLD-MCNC: 11.9 G/DL — LOW (ref 13–17)
LIPID PNL WITH DIRECT LDL SERPL: 73 MG/DL — SIGNIFICANT CHANGE UP
MAGNESIUM SERPL-MCNC: 1.9 MG/DL — SIGNIFICANT CHANGE UP (ref 1.6–2.6)
MCHC RBC-ENTMCNC: 30.4 PG — SIGNIFICANT CHANGE UP (ref 27–34)
MCHC RBC-ENTMCNC: 33 G/DL — SIGNIFICANT CHANGE UP (ref 32–36)
MCV RBC AUTO: 92.3 FL — SIGNIFICANT CHANGE UP (ref 80–100)
NON HDL CHOLESTEROL: 98 MG/DL — SIGNIFICANT CHANGE UP
NRBC # BLD: 0 /100 WBCS — SIGNIFICANT CHANGE UP (ref 0–0)
PLATELET # BLD AUTO: 272 K/UL — SIGNIFICANT CHANGE UP (ref 150–400)
POTASSIUM SERPL-MCNC: 4.3 MMOL/L — SIGNIFICANT CHANGE UP (ref 3.5–5.3)
POTASSIUM SERPL-SCNC: 4.3 MMOL/L — SIGNIFICANT CHANGE UP (ref 3.5–5.3)
PROT SERPL-MCNC: 7.2 G/DL — SIGNIFICANT CHANGE UP (ref 6–8.3)
RBC # BLD: 3.91 M/UL — LOW (ref 4.2–5.8)
RBC # FLD: 14.6 % — HIGH (ref 10.3–14.5)
SODIUM SERPL-SCNC: 138 MMOL/L — SIGNIFICANT CHANGE UP (ref 135–145)
T3 SERPL-MCNC: 115 NG/DL — SIGNIFICANT CHANGE UP (ref 80–200)
TRIGL SERPL-MCNC: 145 MG/DL — SIGNIFICANT CHANGE UP
TSH SERPL-MCNC: 2.6 UIU/ML — SIGNIFICANT CHANGE UP (ref 0.27–4.2)
WBC # BLD: 5.53 K/UL — SIGNIFICANT CHANGE UP (ref 3.8–10.5)
WBC # FLD AUTO: 5.53 K/UL — SIGNIFICANT CHANGE UP (ref 3.8–10.5)

## 2024-12-27 PROCEDURE — 84484 ASSAY OF TROPONIN QUANT: CPT

## 2024-12-27 PROCEDURE — 85025 COMPLETE CBC W/AUTO DIFF WBC: CPT

## 2024-12-27 PROCEDURE — 93306 TTE W/DOPPLER COMPLETE: CPT

## 2024-12-27 PROCEDURE — 85610 PROTHROMBIN TIME: CPT

## 2024-12-27 PROCEDURE — 36415 COLL VENOUS BLD VENIPUNCTURE: CPT

## 2024-12-27 PROCEDURE — 93005 ELECTROCARDIOGRAM TRACING: CPT

## 2024-12-27 PROCEDURE — 84480 ASSAY TRIIODOTHYRONINE (T3): CPT

## 2024-12-27 PROCEDURE — 83036 HEMOGLOBIN GLYCOSYLATED A1C: CPT

## 2024-12-27 PROCEDURE — 82550 ASSAY OF CK (CPK): CPT

## 2024-12-27 PROCEDURE — 82607 VITAMIN B-12: CPT

## 2024-12-27 PROCEDURE — 83735 ASSAY OF MAGNESIUM: CPT

## 2024-12-27 PROCEDURE — 82746 ASSAY OF FOLIC ACID SERUM: CPT

## 2024-12-27 PROCEDURE — 80076 HEPATIC FUNCTION PANEL: CPT

## 2024-12-27 PROCEDURE — 84100 ASSAY OF PHOSPHORUS: CPT

## 2024-12-27 PROCEDURE — 96374 THER/PROPH/DIAG INJ IV PUSH: CPT

## 2024-12-27 PROCEDURE — 80053 COMPREHEN METABOLIC PANEL: CPT

## 2024-12-27 PROCEDURE — 99223 1ST HOSP IP/OBS HIGH 75: CPT

## 2024-12-27 PROCEDURE — 99239 HOSP IP/OBS DSCHRG MGMT >30: CPT

## 2024-12-27 PROCEDURE — 71045 X-RAY EXAM CHEST 1 VIEW: CPT

## 2024-12-27 PROCEDURE — 86850 RBC ANTIBODY SCREEN: CPT

## 2024-12-27 PROCEDURE — 83550 IRON BINDING TEST: CPT

## 2024-12-27 PROCEDURE — 83880 ASSAY OF NATRIURETIC PEPTIDE: CPT

## 2024-12-27 PROCEDURE — 83540 ASSAY OF IRON: CPT

## 2024-12-27 PROCEDURE — 84443 ASSAY THYROID STIM HORMONE: CPT

## 2024-12-27 PROCEDURE — 99285 EMERGENCY DEPT VISIT HI MDM: CPT | Mod: 25

## 2024-12-27 PROCEDURE — 80061 LIPID PANEL: CPT

## 2024-12-27 PROCEDURE — 86900 BLOOD TYPING SEROLOGIC ABO: CPT

## 2024-12-27 PROCEDURE — 80048 BASIC METABOLIC PNL TOTAL CA: CPT

## 2024-12-27 PROCEDURE — 85730 THROMBOPLASTIN TIME PARTIAL: CPT

## 2024-12-27 PROCEDURE — 85027 COMPLETE CBC AUTOMATED: CPT

## 2024-12-27 PROCEDURE — 82553 CREATINE MB FRACTION: CPT

## 2024-12-27 PROCEDURE — 86901 BLOOD TYPING SEROLOGIC RH(D): CPT

## 2024-12-27 PROCEDURE — 80307 DRUG TEST PRSMV CHEM ANLYZR: CPT

## 2024-12-27 RX ORDER — METOPROLOL TARTRATE 50 MG
1 TABLET ORAL
Qty: 30 | Refills: 0
Start: 2024-12-27 | End: 2025-01-25

## 2024-12-27 RX ORDER — LORAZEPAM 1 MG/1
2 TABLET ORAL
Refills: 0 | Status: DISCONTINUED | OUTPATIENT
Start: 2024-12-27 | End: 2024-12-27

## 2024-12-27 RX ORDER — HYDROCHLOROTHIAZIDE 25 MG/1
1 TABLET ORAL
Qty: 30 | Refills: 0
Start: 2024-12-27 | End: 2025-01-25

## 2024-12-27 RX ORDER — ASPIRIN 81 MG
1 TABLET, DELAYED RELEASE (ENTERIC COATED) ORAL
Qty: 30 | Refills: 0
Start: 2024-12-27 | End: 2025-01-25

## 2024-12-27 RX ORDER — THIAMINE HYDROCHLORIDE 100 MG/ML
500 INJECTION, SOLUTION INTRAMUSCULAR; INTRAVENOUS EVERY 24 HOURS
Refills: 0 | Status: DISCONTINUED | OUTPATIENT
Start: 2024-12-27 | End: 2024-12-27

## 2024-12-27 RX ORDER — METOPROLOL TARTRATE 50 MG
25 TABLET ORAL DAILY
Refills: 0 | Status: DISCONTINUED | OUTPATIENT
Start: 2024-12-27 | End: 2024-12-27

## 2024-12-27 RX ADMIN — Medication 1 MILLIGRAM(S): at 11:26

## 2024-12-27 RX ADMIN — Medication 1 TABLET(S): at 11:26

## 2024-12-27 RX ADMIN — THIAMINE HYDROCHLORIDE 105 MILLIGRAM(S): 100 INJECTION, SOLUTION INTRAMUSCULAR; INTRAVENOUS at 11:26

## 2024-12-27 RX ADMIN — Medication 81 MILLIGRAM(S): at 11:26

## 2024-12-27 NOTE — PROGRESS NOTE ADULT - PROBLEM SELECTOR PLAN 3
- last drink this AM 12/26/24 cannot recall type of alcohol  - Blood Alcohol 55 at [facility]  - CK 1028 likely 2/2 alcohol intoxication  o Medicine consult, appreciate recs  o LANDRY protocol   o IV NaCl 75cc/hr x 12 hr  o f/u urinalysis and urine drug screen, thiamine, b12, folate  o starting Thiamine 100 mg PO daily + Multivitamin + Folic acid daily      F: None  E: Replete if K<4 or Mag<2  N: DASH Diet  VTEppx: SCDs given anemia  Dispo: back to shelter likely tomorrow   PT: not indicated - last drink this AM 12/26/24 cannot recall type of alcohol  - Blood Alcohol 55 at [facility]  - CK 1028 likely 2/2 alcohol intoxication  - Medicine consult, appreciate recs  -[facility] protocol   -[facility] 12/27 0.  -continue starting Thiamine 100 mg PO daily + Multivitamin + Folic acid daily      F: None  E: Replete if K<4 or Mag<2  N: DASH Diet  VTEppx: SCDs given anemia  Dispo: back to shelter likely tomorrow   PT: not indicated

## 2024-12-27 NOTE — DISCHARGE NOTE PROVIDER - NSDCCPCAREPLAN_GEN_ALL_CORE_FT
PRINCIPAL DISCHARGE DIAGNOSIS  Diagnosis: Unstable angina  Assessment and Plan of Treatment: You came in with chest pain, which has resolved. Please follow up with cardiology outpatient.  Please continue taking your aspirin 81 mg one tablet once a day and metoprolol 25mg one tablet once a day.      SECONDARY DISCHARGE DIAGNOSES  Diagnosis: Hypertension  Assessment and Plan of Treatment: Hypertension is the medical term for high blood pressure. Blood pressure refers to the pressure that blood applies to the inner walls of the arteries. Arteries carry blood from the heart to other organs and parts of the body. Untreated high blood pressure increases the strain on the heart and arteries, eventually causing organ damage. High blood pressure increases the risk of heart failure, heart attack (myocardial infarction), stroke, and kidney failure. High blood pressure does not usually cause any symptoms. Treatment of hypertension usually begins with lifestyle changes. Making these lifestyle changes involves little or no risk. Recommended changes often include reducing the amount of salt in your diet, losing weight if you are overweight or obese, avoiding drinking too much alcohol, stopping smoking and exercising at least 30 minutes per day most days of the week. If you are prescribed medication for your hypertension it is important to take these as prescribed to prevent the possible complications of uncontrolled hypertension.  Please continue your home medication Hydrocholorothiazide 12.5 mg one tablet once a day.

## 2024-12-27 NOTE — BH CONSULTATION LIAISON ASSESSMENT NOTE - NSUNABLEASSESSPROTRISKCOMMENT_PSY_ALL_CORE
Pt very concerned for his 15yo [place]-of-state daughter and sending her money, continuing the relationship, also he reports his Sikhism beliefs (Bahai) several times during the interview

## 2024-12-27 NOTE — DISCHARGE NOTE NURSING/CASE MANAGEMENT/SOCIAL WORK - FINANCIAL ASSISTANCE
[facility] provides services at a reduced cost to those who are determined to be eligible through [facility]’s financial assistance program. Information regarding [facility]’s financial assistance program can be found by going to [email or web address] or by calling [phone].

## 2024-12-27 NOTE — DISCHARGE NOTE PROVIDER - CARE PROVIDER_API CALL
Getachew Camacho  [address] [place] [address], [address], [place] [address]  Phone: [phone]  Fax: (   )    -  Follow Up Time: 1 week

## 2024-12-27 NOTE — PROGRESS NOTE ADULT - ASSESSMENT
58yoM, undomiciled, poor historian, non-compliant, polysubstance abuse (alcohol, cocaine) w/ PMHx HTN, self reported CHF, many documented diagnoses ranging across mood, psychotic and personality disorders, many prior hospitalizations and ED visits, several prior self-aborted suicide attempts, many prior detox/rehabs, auditory hallucinations presented to [facility] 12/26/24 for chest pain. Medicine consulted for alcohol > 55 in [facility] with concern for alcohol withdrawal.     #Concern for EtOH withdrawal  polysubstance use, etOH use, recent admission to inpatient psych/[facility] for depression c/b SI (12/3-12/13). Last drink 12/26 AM. No withdrawal seizures. Previous admissions for withdrawal however no ICU admission.   - Serial CIWA assessments (Low-risk CIWA protocol)   - Monitor for signs/sx of withdrawal in next 24-48hr  - Ativan PRN for CIWA >8   - MV, FA, Thiamine   - Aspiration precautions   - Fall precautions   - SBIRT consult      Plan discussed w/ Dr Ovideo    Medicine will cont to follow

## 2024-12-27 NOTE — PROGRESS NOTE ADULT - ATTENDING COMMENTS
60 yo undomiciled M (poor historian) with PMHx polysubstance use, etOH use, ?HTN, recent admission to inpatient psych/[facility] for depression c/b SI (12/3-12/13) px from home 12/26 with acute-onset chest pain, admitted to cardiac telemetry for further ischemic evaluation. Medicine consulted 12/26 for management of suspected etOH withdrawal- admission etoh positive  pt seen and examined with Dr. Arceo    sitting in bed, no tremor   mentioned that his sister 16 yr old live in [place] -did not return his call-thus he drank,   currently no sign of withdrawal   MVI, thiamine, folate -   etoh abstinence counselled.    Thank you for allowing medicine to participate in the care, dw cardiology.

## 2024-12-27 NOTE — DISCHARGE NOTE NURSING/CASE MANAGEMENT/SOCIAL WORK - PATIENT PORTAL LINK FT
You can access the FollowMyHealth Patient Portal offered by [facility] by registering at the following website: [email or web address]. By joining [employer]’s FollowMyHealth portal, you will also be able to view your health information using other applications (apps) compatible with our system.

## 2024-12-27 NOTE — DISCHARGE NOTE PROVIDER - ATTENDING DISCHARGE PHYSICAL EXAMINATION:
Mr. Dickerson is a 58M, undomiciled, psych hx/suicidal ideation, Hx of polysubstance use with cocaine and ETOH reports previously clean but relapsed ETOH, hx of withdrawal presenting for chest pain, trop neg, ekg stable. Atypical pain.     Exam:  NAD  JVP normal  lungs CTA  WWP, no edema    ACS r/o- atypical pain, suspect related to recent ETOH relapse. TTE normal, trop neg. Add BB, HTN management. Refused to send urine for utox. Follow up provided to patient.   ETOH use- reports previously quit but recent relapse. CIWA 0, Medicine consulted.   Psych consulted- hx of suicidal ideation. Follow up.   HTN- Continue thiazide + BB

## 2024-12-27 NOTE — BH CONSULTATION LIAISON ASSESSMENT NOTE - NSBHCHARTREVIEWVS_PSY_A_CORE FT
Vital Signs Last 24 Hrs  T(C): 36.3 (27 Dec 2024 05:48), Max: 36.9 (26 Dec 2024 19:56)  T(F): 97.3 (27 Dec 2024 05:48), Max: 98.5 (26 Dec 2024 19:56)  HR: 90 (27 Dec 2024 05:48) (84 - 95)  BP: 121/75 (27 Dec 2024 05:48) (113/74 - 137/73)  BP(mean): 80 (26 Dec 2024 19:06) (80 - 97)  RR: 17 (27 Dec 2024 05:48) (16 - 18)  SpO2: 93% (27 Dec 2024 00:46) (93% - 96%)    Parameters below as of 27 Dec 2024 05:48  Patient On (Oxygen Delivery Method): room air

## 2024-12-27 NOTE — PROGRESS NOTE ADULT - PROBLEM SELECTOR PLAN 1
- Trop negative x 3, EKG non ischemic  - TTE (12/26/24): Normal LVSF/RVSF, Mild symmetric LVH,  3. Normal right ventricular size and systolic function, No significant valvular disease.  o start Aspirin 81 mg daily.      #Hypertension  - supposed to be on HCTZ however non-compliant:  o start HCTZ 12.5 mg daily - Trop negative x 3, EKG non ischemic  - TTE (12/26/24): Normal LVSF/RVSF, Mild symmetric LVH,  3. Normal right ventricular size and systolic function, No significant valvular disease.  - start Aspirin 81 mg daily.  - start metoprolol 25 daily.      #Hypertension  - supposed to be on HCTZ however non-compliant:  o start HCTZ 12.5 mg daily - Trop negative x 3, EKG non ischemic  - TTE (12/26/24): Normal LVSF/RVSF, Mild symmetric LVH,  3. Normal right ventricular size and systolic function, No significant valvular disease.  - start Aspirin 81 mg daily.  - start metoprolol 25 daily.  - follow up outpatient with cardiology    #Hypertension  - supposed to be on HCTZ however non-compliant:  -continue HCTZ 12.5 mg daily

## 2024-12-27 NOTE — BH CONSULTATION LIAISON ASSESSMENT NOTE - CURRENT MEDICATION
MEDICATIONS  (STANDING):  aspirin  chewable 81 milliGRAM(s) Oral daily  folic acid 1 milliGRAM(s) Oral daily  hydrochlorothiazide 12.5 milliGRAM(s) Oral daily  melatonin 5 milliGRAM(s) Oral at bedtime  multivitamin 1 Tablet(s) Oral daily  sodium chloride 0.9%. 1000 milliLiter(s) (75 mL/Hr) IV Continuous <Continuous>  thiamine IVPB 500 milliGRAM(s) IV Intermittent every 24 hours    MEDICATIONS  (PRN):  acetaminophen     Tablet .. 650 milliGRAM(s) Oral every 6 hours PRN Moderate Pain (4 - 6)  aluminum hydroxide/magnesium hydroxide/simethicone Suspension 30 milliLiter(s) Oral every 4 hours PRN Dyspepsia

## 2024-12-27 NOTE — BH CONSULTATION LIAISON ASSESSMENT NOTE - SUMMARY
58yo M hx PSA (alcohol, cannabis, cocaine), HTN,CHF, GERD, pericarditis, multiple psychiatric admissions and reported aborted SAs, most recently admitted [facility] 12/3-12/13/24, admitted for chest pain and with concern for alcohol withdrawal.  Psychiatry consulted over weekend due to recent admission, though pt currently in good behavioral control and no acute safety concerns.    Pt not in mood episode, no uncontrolled anxiety, no psychosis, pt very future oriented and motivated, seeks rehab and place to stay until he can get in.  No indication for psychiatric admission or psychiatric medications at this time.  Please call if questions.  Cleared for discharge.   services if desired re: housing resources and rehabs.

## 2024-12-27 NOTE — BH CONSULTATION LIAISON ASSESSMENT NOTE - OTHER PAST PSYCHIATRIC HISTORY (INCLUDE DETAILS REGARDING ONSET, COURSE OF ILLNESS, INPATIENT/OUTPATIENT TREATMENT)
Per 12/4 CL record: "many documented diagnoses ranging across mood, psychotic, and personality disorders, hx of many prior hospitalizations and ED visits,  discharged from [facility] on 07/15/24  with no psychotropic medication, chronic nonadherence to meds and outpt treatment, polysubstance use disorder (cannabis, alcohol, prior documented cocaine use disorder), suspicion of malingering on previous visits, hx of several prior self-aborted suicide attempts (planned to jump in front of train 2023, "stopped by an emre"),  hx of many prior detox/rehabs (last at [facility] Dec 2023), hx of multiple ED visits reporting chest pain and/or SI and/or command AH to kill himself, PMHx of  HTN, pericarditis"

## 2024-12-27 NOTE — BH CONSULTATION LIAISON ASSESSMENT NOTE - NSBHREFERLPTEAMNAME_PSY_A_CORE_FT
[facility] team.  Pt had recent psychiatric admission.  No acute concerns.

## 2024-12-27 NOTE — DISCHARGE NOTE NURSING/CASE MANAGEMENT/SOCIAL WORK - NSDCPEFALRISK_GEN_ALL_CORE
For information on Fall & Injury Prevention, visit: [email or web address] OR  [email or web address] OR  https://www.cdc.gov/steadi/patient.html

## 2024-12-27 NOTE — PROGRESS NOTE ADULT - PROBLEM SELECTOR PLAN 2
- discharged from [facility] not on any psychiatric medications.   o Psych consulted, appreciate recs

## 2024-12-27 NOTE — BH CONSULTATION LIAISON ASSESSMENT NOTE - NSBHCONSULTFOLLOWAFTERCARE_PSY_A_CORE FT
Subjective   Patient ID: Cat Stafford is a 41 y.o. who presents via virtual visit for follow-up    HPI  Ankle injury- See last visit for further information. In July sustained a posterior tibial strain and peroneal tendon strain  - Following with ortho  - Still hasn't been able to do PT  - Overall symptoms improving      Alcohol use, anxiety, depression  - Worsened since the injury, related to financial stressors. Starting to work at [employer], which she thinks it will help to have this routine   - Last visit discussed lexapro- she picked it up but didn't start  - Alcohol use continues to remain very high  - Not interested in AA meetings   - Not able to afford counseling     Gender dysphoria  - Taking estradiol valerate 0.1 ml weekly on Wednesdays, progesterone 200 mg qhs. Estradiol elevated on prior labs but was not a trough level. Plan was to continue current dose and repeat labs around this time   - Unable to tolerate spironolactone in the past     Prep  - Prescribed Descovy last month. Wasn't able to  because insurance denial. Unsure if she wants to start this     Review of Systems  10 point review of systems negative except as noted in HPI.      Objective     There were no vitals taken for this visit.   Gen: Well appearing, no acute distress  HEENT: Mucous membranes moist  Pulm: Respirations nonlabored  Psych: Normal mood and affect      Assessment/Plan   40 yo F here for follow-up. Plan as follows:    #Anxiety, depression, alcohol use  - Provided contact information for therapy resources and for LGBT center AA group should she choose to pursue these  - Discussed lexapro- declines at this time  - She is going to see if alcohol use declines as she starts to have more regular schedule at work     #Gender dysphoria  - Continue current medication regimen. Repeat labs    #PrEP  - No recent sexual partners  - Discussed advancing access card through [employer] for Descovy coverage- she will consider. We can also  switch to Truvada  - If she decides to start we will repeat HIV test prior to initiation       RTC 1-2 months with Dr. Posada    Pt restricted to [place] per notes.  Followup within 5 days.  Pt has est care at:  [facility]  [address], [facility], [address], [place] [id number] [phone]

## 2024-12-27 NOTE — BH CONSULTATION LIAISON ASSESSMENT NOTE - RISK ASSESSMENT
Recent inpt discharge, and recent substance abuse, increase acute risk, though currently pt without acute or uncontrolled sx.  Per inpt records it appears pt has been low risk with recent admission thought likely related to secondary gain of place to stay rather than grave need for inpt level of care.  Pt currently is very future oriented, no red flag sx or endorsed/elicited thoughts or behaviors c/w high risk at this time.  Alcohol use he reports was to mask his chest discomfort.  He is interested in rehabs.

## 2024-12-27 NOTE — BH CONSULTATION LIAISON ASSESSMENT NOTE - NSBHCHARTREVIEWLAB_PSY_A_CORE FT
CBC Full  -  ( 27 Dec 2024 05:30 )  WBC Count : 5.53 K/uL  RBC Count : 3.91 M/uL  Hemoglobin : 11.9 g/dL  Hematocrit : 36.1 %  Platelet Count - Automated : 272 K/uL  Mean Cell Volume : 92.3 fl  Mean Cell Hemoglobin : 30.4 pg  Mean Cell Hemoglobin Concentration : 33.0 g/dL  Auto Neutrophil # : x  Auto Lymphocyte # : x  Auto Monocyte # : x  Auto Eosinophil # : x  Auto Basophil # : x  Auto Neutrophil % : x  Auto Lymphocyte % : x  Auto Monocyte % : x  Auto Eosinophil % : x  Auto Basophil % : x  12-27    138  |  104  |  17  ----------------------------<  92  4.3   |  26  |  1.22    Ca    9.2      27 Dec 2024 05:30  Phos  2.9     12-26  Mg     1.9     12-27    TPro  7.2  /  Alb  4.1  /  TBili  0.7  /  DBili  x   /  AST  42[H]  /  ALT  35  /  AlkPhos  67  12-27

## 2024-12-27 NOTE — BH CONSULTATION LIAISON ASSESSMENT NOTE - HPI (INCLUDE ILLNESS QUALITY, SEVERITY, DURATION, TIMING, CONTEXT, MODIFYING FACTORS, ASSOCIATED SIGNS AND SYMPTOMS)
58yo M hx PSA (alcohol, cannabis, cocaine), HTN,CHF, GERD, pericarditis, multiple psychiatric admissions and reported aborted SAs, most recently admitted [facility] 12/3-12/13/24, admitted for chest pain and with concern for alcohol withdrawal.  Psychiatry consulted over weekend due to recent admission, though pt currently in good behavioral control and no acute safety concerns.    Chart reviewed ann [facility] 12/3-12/13/24, VOL admission for SI though repeated concern for malingering, Dx unspecified mood disorder and polysubstance abuse, pt ultimately refusing Rx and denying sx, discharged to shelter, on no meds.    Pt assessed at bedside. He was alert, oriented, attentive, cheerful and joking, calm and cooperative.  Does not report any acute mood sx, no psychosis, no SI/HI.  He is very future oriented to send money to his daughter soon, seeks a place to stay for the next 2 weeks until money comes in and he can go to a rehab.  He reports no substance use recently besides alcohol on 12/25 and 12/26.  Sleep was somewhat disrupted 2/2 hospital logistics, pt describes WNL energy and good motivation.  No TC c/w hopelessness or worthlessness.  Pt asking about location of [facility] he may walk to.

## 2024-12-27 NOTE — DISCHARGE NOTE PROVIDER - HOSPITAL COURSE
58yoM, undomiciled, poor historian, non-compliant, polysubstance abuse (alcohol, cocaine) w/ PMHx HTN, self reported CHF, many documented diagnoses ranging across mood, psychotic and personality disorders, many prior hospitalizations and ED visits, several prior self-aborted suicide attempts, many prior detox/rehabs, auditory hallucinations,  most recently discharge from [facility] 12/13/24 for depression and suicidal ideation and malingering who presented to [facility] 12/26/24 for chest pain.  Problem List/Main Diagnoses (system-based):    #Chest pain. RESOLVED  - Trop negative x 3, EKG non ischemic  - TTE (12/26/24): Normal LVSF/RVSF, Mild symmetric LVH,  3. Normal right ventricular size and systolic function, No significant valvular disease.  Started Aspirin 81 mg daily and metoprolol 25 daily. Outpatient cards follow up    #Hypertension supposed to be on HCTZ however non-compliant. Restarted HCTZ 12.5 mg daily.    #Psychiatric disorder. discharged from [facility] not on any psychiatric medications. Psych consulted, appreciate recs.    #Alcohol abuse. last drink this AM 12/26/24 cannot recall type of alcohol. Blood Alcohol 55 at [facility] CK 1028 likely 2/2 alcohol intoxication  Medicine consulted. CIWA protocol in place. CIWA 0 12/27 AM.      New medications: HCTZ 12.5 mg, aspirin 81mg, and metoprolol 25 daily  Labs to be followed outpatient: CBC, CMP   Exam to be followed outpatient:   General: NAD  HEENT: NC/AT; PERRL, anicteric sclera; MMM  Neck: supple  Cardiovascular: +S1/S2; RRR  Respiratory: CTA B/L; no W/R/R  Gastrointestinal: soft, NT/ND; +BSx4  Extremities: WWP; no edema, clubbing or cyanosis  Vascular: 2+ radial, DP/PT pulses B/L  Neurological: AAOx3; no focal deficits

## 2024-12-27 NOTE — DISCHARGE NOTE PROVIDER - PROVIDER TOKENS
FREE:[LAST:[Janice],FIRST:[Getachew],PHONE:[phone]],FAX:[(   )    -],ADDRESS:[[address] [place] [address], [address], [place] [address]],FOLLOWUP:[1 week]]

## 2024-12-27 NOTE — BH CONSULTATION LIAISON ASSESSMENT NOTE - NSSUICPROTFACT_PSY_ALL_CORE
Responsibility to children, family, or others/Identifies reasons for living/Fear of death or the actual act of killing self/Cultural, spiritual and/or moral attitudes against suicide/Ability to cope with stress/Frustration tolerance/Scientologist beliefs

## 2024-12-27 NOTE — BH CONSULTATION LIAISON ASSESSMENT NOTE - ADDITIONAL DETAILS ALL
reported thoughts to jump in front of train "last summer"; suicidal thoughts noted leading to Dec 2024 [facility] admission though pt does not report such

## 2024-12-27 NOTE — PROGRESS NOTE ADULT - ASSESSMENT
58yoM, undomiciled, poor historian, non-compliant, polysubstance abuse (alcohol, cocaine) w/ PMHx HTN, self reported CHF, many documented diagnoses ranging across mood, psychotic and personality disorders, many prior hospitalizations and ED visits, several prior self-aborted suicide attempts, many prior detox/rehabs, auditory hallucinations,  most recently discharge from [facility] 12/13/24 for depression and suicidal ideation and malingering who presented to [facility] 12/26/24 for chest pain. Patient admitted to [facility] for ischemic work up.  58yoM, undomiciled, poor historian, non-compliant, polysubstance abuse (alcohol, cocaine) w/ PMHx HTN, self reported CHF, many documented diagnoses ranging across mood, psychotic and personality disorders, many prior hospitalizations and ED visits, several prior self-aborted suicide attempts, many prior detox/rehabs, auditory hallucinations,  most recently discharge from [facility] 12/13/24 for depression and suicidal ideation and malingering who presented to [facility] 12/26/24 for chest pain, now resolved, neg trops.

## 2024-12-27 NOTE — PROGRESS NOTE ADULT - SUBJECTIVE AND OBJECTIVE BOX
INTERVAL HPI/OVERNIGHT EVENTS:  Patient was seen and examined at bedside. Patient resting comfortably. No complaints.    VITAL SIGNS:  T(F): 98.2 (12-27-24 @ 11:17)  HR: 90 (12-27-24 @ 11:17)  BP: 119/60 (12-27-24 @ 11:17)  RR: 18 (12-27-24 @ 11:17)  SpO2: 95% (12-27-24 @ 11:17)  Wt(kg): --      12-26-24 @ 07:01  -  12-27-24 @ 07:00  --------------------------------------------------------  IN: 1420 mL / OUT: 0 mL / NET: 1420 mL    12-27-24 @ 07:01  -  12-27-24 @ 16:52  --------------------------------------------------------  IN: 200 mL / OUT: 0 mL / NET: 200 mL        PHYSICAL EXAM:  Constitutional: NAD  Neck: supple; no JVD or thyromegaly  Respiratory: CTA B/L; no W/R/R, no retractions  Cardiac: +S1/S2; RRR; no M/R/G; PMI non-displaced  Gastrointestinal: abdomen obese, soft, NT/ND; no rebound or guarding; +BSx4  Extremities: WWP, no clubbing or cyanosis; no peripheral edema  Musculoskeletal: NROM x4; no joint swelling, tenderness or erythema  Dermatologic: skin warm, dry and intact; no rashes, wounds, or scars  Neurologic: AAOx3, CIWA 0    MEDICATIONS  (STANDING):  aspirin  chewable 81 milliGRAM(s) Oral daily  folic acid 1 milliGRAM(s) Oral daily  hydrochlorothiazide 12.5 milliGRAM(s) Oral daily  melatonin 5 milliGRAM(s) Oral at bedtime  metoprolol succinate ER 25 milliGRAM(s) Oral daily  multivitamin 1 Tablet(s) Oral daily  sodium chloride 0.9%. 1000 milliLiter(s) (75 mL/Hr) IV Continuous <Continuous>  thiamine IVPB 500 milliGRAM(s) IV Intermittent every 24 hours    MEDICATIONS  (PRN):  acetaminophen     Tablet .. 650 milliGRAM(s) Oral every 6 hours PRN Moderate Pain (4 - 6)  aluminum hydroxide/magnesium hydroxide/simethicone Suspension 30 milliLiter(s) Oral every 4 hours PRN Dyspepsia      Allergies    No Known Allergies    Intolerances        LABS:                        11.9   5.53  )-----------( 272      ( 27 Dec 2024 05:30 )             36.1     12-27    138  |  104  |  17  ----------------------------<  92  4.3   |  26  |  1.22    Ca    9.2      27 Dec 2024 05:30  Phos  2.9     12-26  Mg     1.9     12-27    TPro  7.2  /  Alb  4.1  /  TBili  0.7  /  DBili  x   /  AST  42[H]  /  ALT  35  /  AlkPhos  67  12-27    PT/INR - ( 26 Dec 2024 17:00 )   PT: 12.5 sec;   INR: 1.07          PTT - ( 26 Dec 2024 17:00 )  PTT:28.9 sec  Urinalysis Basic - ( 27 Dec 2024 05:30 )    Color: x / Appearance: x / SG: x / pH: x  Gluc: 92 mg/dL / Ketone: x  / Bili: x / Urobili: x   Blood: x / Protein: x / Nitrite: x   Leuk Esterase: x / RBC: x / WBC x   Sq Epi: x / Non Sq Epi: x / Bacteria: x          RADIOLOGY & ADDITIONAL TESTS:  Reviewed

## 2024-12-27 NOTE — PROGRESS NOTE ADULT - SUBJECTIVE AND OBJECTIVE BOX
OVERNIGHT EVENTS:    SUBJECTIVE / INTERVAL HPI: Patient seen and examined at bedside.     VITAL SIGNS:  Vital Signs Last 24 Hrs  T(C): 36.3 (27 Dec 2024 05:48), Max: 36.9 (26 Dec 2024 19:56)  T(F): 97.3 (27 Dec 2024 05:48), Max: 98.5 (26 Dec 2024 19:56)  HR: 90 (27 Dec 2024 05:48) (84 - 99)  BP: 121/75 (27 Dec 2024 05:48) (106/65 - 137/73)  BP(mean): 80 (26 Dec 2024 19:06) (80 - 97)  RR: 17 (27 Dec 2024 05:48) (16 - 18)  SpO2: 93% (27 Dec 2024 00:46) (93% - 96%)    Parameters below as of 27 Dec 2024 05:48  Patient On (Oxygen Delivery Method): room air      I&O's Summary    26 Dec 2024 07:01  -  27 Dec 2024 07:00  --------------------------------------------------------  IN: 1540 mL / OUT: 0 mL / NET: 1540 mL        PHYSICAL EXAM:    General: NAD  HEENT: NC/AT; PERRL, anicteric sclera; MMM  Neck: supple  Cardiovascular: +S1/S2; RRR  Respiratory: CTA B/L; no W/R/R  Gastrointestinal: soft, NT/ND; +BSx4  Extremities: WWP; no edema, clubbing or cyanosis  Vascular: 2+ radial, DP/PT pulses B/L  Neurological: AAOx3; no focal deficits    MEDICATIONS:  MEDICATIONS  (STANDING):  aspirin  chewable 81 milliGRAM(s) Oral daily  folic acid 1 milliGRAM(s) Oral daily  hydrochlorothiazide 12.5 milliGRAM(s) Oral daily  melatonin 5 milliGRAM(s) Oral at bedtime  multivitamin 1 Tablet(s) Oral daily  sodium chloride 0.9%. 1000 milliLiter(s) (75 mL/Hr) IV Continuous <Continuous>  thiamine IVPB 500 milliGRAM(s) IV Intermittent every 24 hours    MEDICATIONS  (PRN):  acetaminophen     Tablet .. 650 milliGRAM(s) Oral every 6 hours PRN Moderate Pain (4 - 6)  aluminum hydroxide/magnesium hydroxide/simethicone Suspension 30 milliLiter(s) Oral every 4 hours PRN Dyspepsia  LORazepam   Injectable 2 milliGRAM(s) IV Push every 1 hour PRN CIWA-Ar score 8 or greater      ALLERGIES:  Allergies    No Known Allergies    Intolerances        LABS:                        11.9   5.53  )-----------( 272      ( 27 Dec 2024 05:30 )             36.1     12-27    138  |  104  |  17  ----------------------------<  92  4.3   |  26  |  1.22    Ca    9.2      27 Dec 2024 05:30  Phos  2.9     12-26  Mg     1.9     12-27    TPro  7.2  /  Alb  4.1  /  TBili  0.7  /  DBili  x   /  AST  42[H]  /  ALT  35  /  AlkPhos  67  12-27    PT/INR - ( 26 Dec 2024 17:00 )   PT: 12.5 sec;   INR: 1.07          PTT - ( 26 Dec 2024 17:00 )  PTT:28.9 sec  Urinalysis Basic - ( 27 Dec 2024 05:30 )    Color: x / Appearance: x / SG: x / pH: x  Gluc: 92 mg/dL / Ketone: x  / Bili: x / Urobili: x   Blood: x / Protein: x / Nitrite: x   Leuk Esterase: x / RBC: x / WBC x   Sq Epi: x / Non Sq Epi: x / Bacteria: x      CAPILLARY BLOOD GLUCOSE          RADIOLOGY & ADDITIONAL TESTS: Reviewed.   OVERNIGHT EVENTS:  No acute events  SUBJECTIVE / INTERVAL HPI: Patient seen and examined at bedside. No acute complaints this morning. Stated chest pain resolved.    VITAL SIGNS:  Vital Signs Last 24 Hrs  T(C): 36.3 (27 Dec 2024 05:48), Max: 36.9 (26 Dec 2024 19:56)  T(F): 97.3 (27 Dec 2024 05:48), Max: 98.5 (26 Dec 2024 19:56)  HR: 90 (27 Dec 2024 05:48) (84 - 99)  BP: 121/75 (27 Dec 2024 05:48) (106/65 - 137/73)  BP(mean): 80 (26 Dec 2024 19:06) (80 - 97)  RR: 17 (27 Dec 2024 05:48) (16 - 18)  SpO2: 93% (27 Dec 2024 00:46) (93% - 96%)    Parameters below as of 27 Dec 2024 05:48  Patient On (Oxygen Delivery Method): room air      I&O's Summary    26 Dec 2024 07:01  -  27 Dec 2024 07:00  --------------------------------------------------------  IN: 1540 mL / OUT: 0 mL / NET: 1540 mL        PHYSICAL EXAM:    General: NAD  HEENT: NC/AT; PERRL, anicteric sclera; MMM  Neck: supple  Cardiovascular: +S1/S2; RRR  Respiratory: CTA B/L; no W/R/R  Gastrointestinal: soft, NT/ND; +BSx4  Extremities: WWP; no edema, clubbing or cyanosis  Vascular: 2+ radial, DP/PT pulses B/L  Neurological: AAOx3; no focal deficits    MEDICATIONS:  MEDICATIONS  (STANDING):  aspirin  chewable 81 milliGRAM(s) Oral daily  folic acid 1 milliGRAM(s) Oral daily  hydrochlorothiazide 12.5 milliGRAM(s) Oral daily  melatonin 5 milliGRAM(s) Oral at bedtime  multivitamin 1 Tablet(s) Oral daily  sodium chloride 0.9%. 1000 milliLiter(s) (75 mL/Hr) IV Continuous <Continuous>  thiamine IVPB 500 milliGRAM(s) IV Intermittent every 24 hours    MEDICATIONS  (PRN):  acetaminophen     Tablet .. 650 milliGRAM(s) Oral every 6 hours PRN Moderate Pain (4 - 6)  aluminum hydroxide/magnesium hydroxide/simethicone Suspension 30 milliLiter(s) Oral every 4 hours PRN Dyspepsia  LORazepam   Injectable 2 milliGRAM(s) IV Push every 1 hour PRN CIWA-Ar score 8 or greater      ALLERGIES:  Allergies    No Known Allergies    Intolerances        LABS:                        11.9   5.53  )-----------( 272      ( 27 Dec 2024 05:30 )             36.1     12-27    138  |  104  |  17  ----------------------------<  92  4.3   |  26  |  1.22    Ca    9.2      27 Dec 2024 05:30  Phos  2.9     12-26  Mg     1.9     12-27    TPro  7.2  /  Alb  4.1  /  TBili  0.7  /  DBili  x   /  AST  42[H]  /  ALT  35  /  AlkPhos  67  12-27    PT/INR - ( 26 Dec 2024 17:00 )   PT: 12.5 sec;   INR: 1.07          PTT - ( 26 Dec 2024 17:00 )  PTT:28.9 sec  Urinalysis Basic - ( 27 Dec 2024 05:30 )    Color: x / Appearance: x / SG: x / pH: x  Gluc: 92 mg/dL / Ketone: x  / Bili: x / Urobili: x   Blood: x / Protein: x / Nitrite: x   Leuk Esterase: x / RBC: x / WBC x   Sq Epi: x / Non Sq Epi: x / Bacteria: x      CAPILLARY BLOOD GLUCOSE          RADIOLOGY & ADDITIONAL TESTS: Reviewed.

## 2024-12-27 NOTE — DISCHARGE NOTE PROVIDER - NSDCCAREPROVSEEN_GEN_ALL_CORE_FT
"Pt is back in room, stated "don't come in, if you are not going to give me any pain medication."    " Sarah Beth Quiroz

## 2025-01-04 DIAGNOSIS — D64.9 ANEMIA, UNSPECIFIED: ICD-10-CM

## 2025-01-04 DIAGNOSIS — F10.10 ALCOHOL ABUSE, UNCOMPLICATED: ICD-10-CM

## 2025-01-04 DIAGNOSIS — I10 ESSENTIAL (PRIMARY) HYPERTENSION: ICD-10-CM

## 2025-01-04 DIAGNOSIS — K21.9 GASTRO-ESOPHAGEAL REFLUX DISEASE WITHOUT ESOPHAGITIS: ICD-10-CM

## 2025-01-04 DIAGNOSIS — R07.9 CHEST PAIN, UNSPECIFIED: ICD-10-CM

## 2025-01-04 DIAGNOSIS — F32.9 MAJOR DEPRESSIVE DISORDER, SINGLE EPISODE, UNSPECIFIED: ICD-10-CM

## 2025-01-13 NOTE — BH INPATIENT PSYCHIATRY DISCHARGE NOTE - HPI (INCLUDE ILLNESS QUALITY, SEVERITY, DURATION, TIMING, CONTEXT, MODIFYING FACTORS, ASSOCIATED SIGNS AND SYMPTOMS)
13-Jan-2025 08:00
As per [facility] ED Assessment:  "60yo single man who is undomiciled (living at shelters, hotels, ERs/hospitals, and rehabs) and unemployed, with PMHx HTN and self-reported CHF, with PPHx of many documented diagnoses ranging across mood, psychotic, and personality disorders, hx of many prior hospitalizations and ED visits, discharged from [facility] on 24 on Risperdal 1 mg, chronic nonadherence to meds and outpt treatment, currently has Intensive Mobile Treatment (IMT) team, hx of several prior self-aborted suicide attempts (planned to jump in front of train , "stopped by an emre"), and significant hx of polysubstance use/abuse (recently EtOH and cocaine), hx of many prior detox/rehabs (last at [facility] Dec 2023), admitted due to report of SI (with several vague plans) and report of AH in the context of substance use, recent discharge from [facility], and inability to reach his 14yo daughter (who lives in [place]). Of note, the patient frequently presents to with nearly identical chief complaints to various EDs, is briefly admitted and his symptoms quickly clear, he requests help connecting with housing or substance rehab, and then leaves on 3-day letters - this has been the pattern from his last 2 admissions at [facility] in 2024 and May 2024, and doesn't f/u with outpatient care. Per Psyckes, he has dozens of ED visits driven by alcohol and cocaine use.     Pt states that he came in today because he has been feeling suicidal with a plan to jump in front of a train or shoot himself for the past two days (same complaint as per documentation in last  ED note). Says this was triggered by his daughter not answering her phone. Pt says he walked to the ledge of "[employer] station today wanting to die, and an "emre" pulled him back (same as in  note). States he has guns but won't disclose where they are. Says that he heard the voices of his  family members yesterday and today telling him to kill himself. Pt says he is hopeless and wants to act on this. Also endorses VH of seeing his dead mother. Reports HI towards daughter's mother of wishing she were dead but denies any plans or hx of HI/violence. Reports drinking multiple pints of alcohol today, but not in the days prior. Denies withdrawal sx other than nausea, VS WNL. He denies hx of alcohol withdrawal seizures. Denies any other substance use.     Pt was discharged from [facility] on 24, has been staying in a hotel, says he didn't follow up with care and could not provide a reason why. Says he has not been taking any of his medications since d/c. Pt requests to be admitted to [facility]. Is unable to safety plan."    On the unit, patient was minimally cooperative, laying down with sheets over his head. Patient endorses +SI with plan to shoot himself or jump in front of a train. Patient denies any intent or plan on the unit. Patient stated that his depressed mood has been worsening, sleep and appetite is poor. Patient also endorses +AH of his  mother's voice telling him to join her, denies any CAH. Patient is in agreement to restart the Risperdal. No CO needed.

## 2025-03-02 ENCOUNTER — INPATIENT (INPATIENT)
Facility: HOSPITAL | Age: 60
LOS: 11 days | Discharge: HOME | End: 2025-03-14
Attending: PSYCHIATRY & NEUROLOGY | Admitting: PSYCHIATRY & NEUROLOGY
Payer: MEDICAID

## 2025-03-02 ENCOUNTER — EMERGENCY (EMERGENCY)
Facility: HOSPITAL | Age: 60
LOS: 1 days | Discharge: HOME | End: 2025-03-02
Attending: STUDENT IN AN ORGANIZED HEALTH CARE EDUCATION/TRAINING PROGRAM | Admitting: STUDENT IN AN ORGANIZED HEALTH CARE EDUCATION/TRAINING PROGRAM
Payer: COMMERCIAL

## 2025-03-02 VITALS
TEMPERATURE: 98 F | SYSTOLIC BLOOD PRESSURE: 151 MMHG | RESPIRATION RATE: 20 BRPM | WEIGHT: 294.1 LBS | OXYGEN SATURATION: 98 % | HEART RATE: 111 BPM | DIASTOLIC BLOOD PRESSURE: 92 MMHG | HEIGHT: 74 IN

## 2025-03-02 VITALS
RESPIRATION RATE: 16 BRPM | HEART RATE: 92 BPM | WEIGHT: 294.1 LBS | HEIGHT: 74 IN | DIASTOLIC BLOOD PRESSURE: 87 MMHG | TEMPERATURE: 98 F | OXYGEN SATURATION: 99 % | SYSTOLIC BLOOD PRESSURE: 129 MMHG

## 2025-03-02 DIAGNOSIS — F19.10 OTHER PSYCHOACTIVE SUBSTANCE ABUSE, UNCOMPLICATED: ICD-10-CM

## 2025-03-02 DIAGNOSIS — R44.0 AUDITORY HALLUCINATIONS: ICD-10-CM

## 2025-03-02 DIAGNOSIS — R45.851 SUICIDAL IDEATIONS: ICD-10-CM

## 2025-03-02 DIAGNOSIS — F32.A DEPRESSION, UNSPECIFIED: ICD-10-CM

## 2025-03-02 PROBLEM — F10.10 ALCOHOL ABUSE, UNCOMPLICATED: Chronic | Status: ACTIVE | Noted: 2024-12-26

## 2025-03-02 PROBLEM — F31.9 BIPOLAR DISORDER, UNSPECIFIED: Chronic | Status: ACTIVE | Noted: 2024-12-26

## 2025-03-02 PROBLEM — F60.3 BORDERLINE PERSONALITY DISORDER: Chronic | Status: ACTIVE | Noted: 2024-12-26

## 2025-03-02 LAB
ALBUMIN SERPL ELPH-MCNC: 4.1 G/DL — SIGNIFICANT CHANGE UP (ref 3.3–5)
ALP SERPL-CCNC: 60 U/L — SIGNIFICANT CHANGE UP (ref 40–120)
ALT FLD-CCNC: 49 U/L — HIGH (ref 4–41)
AMPHET UR-MCNC: NEGATIVE — SIGNIFICANT CHANGE UP
ANION GAP SERPL CALC-SCNC: 15 MMOL/L — HIGH (ref 7–14)
ANION GAP SERPL CALC-SCNC: 20 MMOL/L — HIGH (ref 5–17)
APAP SERPL-MCNC: <10 UG/ML — LOW (ref 15–25)
APPEARANCE UR: CLEAR — SIGNIFICANT CHANGE UP
AST SERPL-CCNC: 80 U/L — HIGH (ref 4–40)
BARBITURATES UR SCN-MCNC: NEGATIVE — SIGNIFICANT CHANGE UP
BASOPHILS # BLD AUTO: 0.02 K/UL — SIGNIFICANT CHANGE UP (ref 0–0.2)
BASOPHILS # BLD AUTO: 0.03 K/UL — SIGNIFICANT CHANGE UP (ref 0–0.2)
BASOPHILS NFR BLD AUTO: 0.2 % — SIGNIFICANT CHANGE UP (ref 0–2)
BASOPHILS NFR BLD AUTO: 0.4 % — SIGNIFICANT CHANGE UP (ref 0–2)
BENZODIAZ UR-MCNC: POSITIVE
BILIRUB SERPL-MCNC: 0.7 MG/DL — SIGNIFICANT CHANGE UP (ref 0.2–1.2)
BILIRUB UR-MCNC: NEGATIVE — SIGNIFICANT CHANGE UP
BUN SERPL-MCNC: 20 MG/DL — SIGNIFICANT CHANGE UP (ref 7–23)
BUN SERPL-MCNC: 20 MG/DL — SIGNIFICANT CHANGE UP (ref 7–23)
CALCIUM SERPL-MCNC: 9.2 MG/DL — SIGNIFICANT CHANGE UP (ref 8.4–10.5)
CALCIUM SERPL-MCNC: 9.2 MG/DL — SIGNIFICANT CHANGE UP (ref 8.4–10.5)
CHLORIDE SERPL-SCNC: 100 MMOL/L — SIGNIFICANT CHANGE UP (ref 96–108)
CHLORIDE SERPL-SCNC: 103 MMOL/L — SIGNIFICANT CHANGE UP (ref 98–107)
CO2 SERPL-SCNC: 18 MMOL/L — LOW (ref 22–31)
CO2 SERPL-SCNC: 19 MMOL/L — LOW (ref 22–31)
COCAINE METAB.OTHER UR-MCNC: POSITIVE
COLOR SPEC: YELLOW — SIGNIFICANT CHANGE UP
CREAT SERPL-MCNC: 1.04 MG/DL — SIGNIFICANT CHANGE UP (ref 0.5–1.3)
CREAT SERPL-MCNC: 1.12 MG/DL — SIGNIFICANT CHANGE UP (ref 0.5–1.3)
CREATININE URINE RESULT, DAU: 218 MG/DL — SIGNIFICANT CHANGE UP
DIFF PNL FLD: NEGATIVE — SIGNIFICANT CHANGE UP
EGFR: 76 ML/MIN/1.73M2 — SIGNIFICANT CHANGE UP
EGFR: 83 ML/MIN/1.73M2 — SIGNIFICANT CHANGE UP
EGFR: 83 ML/MIN/1.73M2 — SIGNIFICANT CHANGE UP
EOSINOPHIL # BLD AUTO: 0 K/UL — SIGNIFICANT CHANGE UP (ref 0–0.5)
EOSINOPHIL # BLD AUTO: 0.02 K/UL — SIGNIFICANT CHANGE UP (ref 0–0.5)
EOSINOPHIL NFR BLD AUTO: 0 % — SIGNIFICANT CHANGE UP (ref 0–6)
EOSINOPHIL NFR BLD AUTO: 0.3 % — SIGNIFICANT CHANGE UP (ref 0–6)
ETHANOL SERPL-MCNC: <10 MG/DL — SIGNIFICANT CHANGE UP
FENTANYL UR QL SCN: NEGATIVE — SIGNIFICANT CHANGE UP
FLUAV AG NPH QL: SIGNIFICANT CHANGE UP
FLUBV AG NPH QL: SIGNIFICANT CHANGE UP
GLUCOSE SERPL-MCNC: 111 MG/DL — HIGH (ref 70–99)
GLUCOSE SERPL-MCNC: 85 MG/DL — SIGNIFICANT CHANGE UP (ref 70–99)
GLUCOSE UR QL: NEGATIVE MG/DL — SIGNIFICANT CHANGE UP
HCT VFR BLD CALC: 35 % — LOW (ref 39–50)
HCT VFR BLD CALC: 37 % — LOW (ref 39–50)
HGB BLD-MCNC: 11.8 G/DL — LOW (ref 13–17)
HGB BLD-MCNC: 12 G/DL — LOW (ref 13–17)
IANC: 4.43 K/UL — SIGNIFICANT CHANGE UP (ref 1.8–7.4)
IMM GRANULOCYTES NFR BLD AUTO: 0.3 % — SIGNIFICANT CHANGE UP (ref 0–0.9)
IMM GRANULOCYTES NFR BLD AUTO: 0.3 % — SIGNIFICANT CHANGE UP (ref 0–0.9)
KETONES UR-MCNC: 15 MG/DL
LEUKOCYTE ESTERASE UR-ACNC: NEGATIVE — SIGNIFICANT CHANGE UP
LYMPHOCYTES # BLD AUTO: 1.02 K/UL — SIGNIFICANT CHANGE UP (ref 1–3.3)
LYMPHOCYTES # BLD AUTO: 11.5 % — LOW (ref 13–44)
LYMPHOCYTES # BLD AUTO: 2.41 K/UL — SIGNIFICANT CHANGE UP (ref 1–3.3)
LYMPHOCYTES # BLD AUTO: 30.5 % — SIGNIFICANT CHANGE UP (ref 13–44)
MCHC RBC-ENTMCNC: 30 PG — SIGNIFICANT CHANGE UP (ref 27–34)
MCHC RBC-ENTMCNC: 30.3 PG — SIGNIFICANT CHANGE UP (ref 27–34)
MCHC RBC-ENTMCNC: 32.4 G/DL — SIGNIFICANT CHANGE UP (ref 32–36)
MCHC RBC-ENTMCNC: 33.7 G/DL — SIGNIFICANT CHANGE UP (ref 32–36)
MCV RBC AUTO: 89.1 FL — SIGNIFICANT CHANGE UP (ref 80–100)
MCV RBC AUTO: 93.4 FL — SIGNIFICANT CHANGE UP (ref 80–100)
METHADONE UR-MCNC: NEGATIVE — SIGNIFICANT CHANGE UP
MONOCYTES # BLD AUTO: 0.36 K/UL — SIGNIFICANT CHANGE UP (ref 0–0.9)
MONOCYTES # BLD AUTO: 0.99 K/UL — HIGH (ref 0–0.9)
MONOCYTES NFR BLD AUTO: 12.5 % — SIGNIFICANT CHANGE UP (ref 2–14)
MONOCYTES NFR BLD AUTO: 4.1 % — SIGNIFICANT CHANGE UP (ref 2–14)
NEUTROPHILS # BLD AUTO: 4.43 K/UL — SIGNIFICANT CHANGE UP (ref 1.8–7.4)
NEUTROPHILS # BLD AUTO: 7.42 K/UL — HIGH (ref 1.8–7.4)
NEUTROPHILS NFR BLD AUTO: 56 % — SIGNIFICANT CHANGE UP (ref 43–77)
NEUTROPHILS NFR BLD AUTO: 83.9 % — HIGH (ref 43–77)
NITRITE UR-MCNC: NEGATIVE — SIGNIFICANT CHANGE UP
NRBC # BLD AUTO: 0 K/UL — SIGNIFICANT CHANGE UP (ref 0–0)
NRBC # FLD: 0 K/UL — SIGNIFICANT CHANGE UP (ref 0–0)
NRBC BLD AUTO-RTO: 0 /100 WBCS — SIGNIFICANT CHANGE UP (ref 0–0)
NRBC BLD AUTO-RTO: 0 /100 WBCS — SIGNIFICANT CHANGE UP (ref 0–0)
OPIATES UR-MCNC: NEGATIVE — SIGNIFICANT CHANGE UP
OXYCODONE UR-MCNC: NEGATIVE — SIGNIFICANT CHANGE UP
PCP SPEC-MCNC: SIGNIFICANT CHANGE UP
PCP UR-MCNC: NEGATIVE — SIGNIFICANT CHANGE UP
PH UR: 5.5 — SIGNIFICANT CHANGE UP (ref 5–8)
PLATELET # BLD AUTO: 262 K/UL — SIGNIFICANT CHANGE UP (ref 150–400)
PLATELET # BLD AUTO: 271 K/UL — SIGNIFICANT CHANGE UP (ref 150–400)
POTASSIUM SERPL-MCNC: 4 MMOL/L — SIGNIFICANT CHANGE UP (ref 3.5–5.3)
POTASSIUM SERPL-MCNC: 4.1 MMOL/L — SIGNIFICANT CHANGE UP (ref 3.5–5.3)
POTASSIUM SERPL-SCNC: 4 MMOL/L — SIGNIFICANT CHANGE UP (ref 3.5–5.3)
POTASSIUM SERPL-SCNC: 4.1 MMOL/L — SIGNIFICANT CHANGE UP (ref 3.5–5.3)
PROT SERPL-MCNC: 7.4 G/DL — SIGNIFICANT CHANGE UP (ref 6–8.3)
PROT UR-MCNC: SIGNIFICANT CHANGE UP MG/DL
RBC # BLD: 3.93 M/UL — LOW (ref 4.2–5.8)
RBC # BLD: 3.96 M/UL — LOW (ref 4.2–5.8)
RBC # FLD: 14.7 % — HIGH (ref 10.3–14.5)
RBC # FLD: 15.1 % — HIGH (ref 10.3–14.5)
RSV RNA NPH QL NAA+NON-PROBE: SIGNIFICANT CHANGE UP
SALICYLATES SERPL-MCNC: <0.3 MG/DL — LOW (ref 15–30)
SARS-COV-2 RNA SPEC QL NAA+PROBE: SIGNIFICANT CHANGE UP
SARS-COV-2 RNA SPEC QL NAA+PROBE: SIGNIFICANT CHANGE UP
SODIUM SERPL-SCNC: 137 MMOL/L — SIGNIFICANT CHANGE UP (ref 135–145)
SODIUM SERPL-SCNC: 138 MMOL/L — SIGNIFICANT CHANGE UP (ref 135–145)
SP GR SPEC: 1.03 — HIGH (ref 1–1.03)
THC UR QL: NEGATIVE — SIGNIFICANT CHANGE UP
TROPONIN T, HIGH SENSITIVITY RESULT: 9 NG/L — SIGNIFICANT CHANGE UP (ref 0–51)
TSH SERPL-MCNC: 0.82 UIU/ML — SIGNIFICANT CHANGE UP (ref 0.27–4.2)
UROBILINOGEN FLD QL: 0.2 MG/DL — SIGNIFICANT CHANGE UP (ref 0.2–1)
WBC # BLD: 7.9 K/UL — SIGNIFICANT CHANGE UP (ref 3.8–10.5)
WBC # BLD: 8.85 K/UL — SIGNIFICANT CHANGE UP (ref 3.8–10.5)
WBC # FLD AUTO: 7.9 K/UL — SIGNIFICANT CHANGE UP (ref 3.8–10.5)
WBC # FLD AUTO: 8.85 K/UL — SIGNIFICANT CHANGE UP (ref 3.8–10.5)

## 2025-03-02 PROCEDURE — 99284 EMERGENCY DEPT VISIT MOD MDM: CPT | Mod: 25

## 2025-03-02 PROCEDURE — 36415 COLL VENOUS BLD VENIPUNCTURE: CPT

## 2025-03-02 PROCEDURE — 85025 COMPLETE CBC W/AUTO DIFF WBC: CPT

## 2025-03-02 PROCEDURE — 87637 SARSCOV2&INF A&B&RSV AMP PRB: CPT

## 2025-03-02 PROCEDURE — 71046 X-RAY EXAM CHEST 2 VIEWS: CPT

## 2025-03-02 PROCEDURE — 99285 EMERGENCY DEPT VISIT HI MDM: CPT

## 2025-03-02 PROCEDURE — 96374 THER/PROPH/DIAG INJ IV PUSH: CPT

## 2025-03-02 PROCEDURE — 96375 TX/PRO/DX INJ NEW DRUG ADDON: CPT

## 2025-03-02 PROCEDURE — 71046 X-RAY EXAM CHEST 2 VIEWS: CPT | Mod: 26

## 2025-03-02 PROCEDURE — 80048 BASIC METABOLIC PNL TOTAL CA: CPT

## 2025-03-02 PROCEDURE — 84484 ASSAY OF TROPONIN QUANT: CPT

## 2025-03-02 RX ORDER — OLANZAPINE 10 MG/1
5 TABLET ORAL EVERY 6 HOURS
Refills: 0 | Status: DISCONTINUED | OUTPATIENT
Start: 2025-03-02 | End: 2025-03-14

## 2025-03-02 RX ORDER — OLANZAPINE 10 MG/1
5 TABLET ORAL ONCE
Refills: 0 | Status: DISCONTINUED | OUTPATIENT
Start: 2025-03-02 | End: 2025-03-14

## 2025-03-02 RX ORDER — KETOROLAC TROMETHAMINE 30 MG/ML
15 INJECTION, SOLUTION INTRAMUSCULAR; INTRAVENOUS ONCE
Refills: 0 | Status: DISCONTINUED | OUTPATIENT
Start: 2025-03-02 | End: 2025-03-02

## 2025-03-02 RX ORDER — B1/B2/B3/B5/B6/B12/VIT C/FOLIC 500-0.5 MG
1 TABLET ORAL DAILY
Refills: 0 | Status: COMPLETED | OUTPATIENT
Start: 2025-03-02 | End: 2025-03-09

## 2025-03-02 RX ORDER — ACETAMINOPHEN 500 MG/5ML
975 LIQUID (ML) ORAL ONCE
Refills: 0 | Status: COMPLETED | OUTPATIENT
Start: 2025-03-02 | End: 2025-03-02

## 2025-03-02 RX ORDER — ACETAMINOPHEN 500 MG/5ML
650 LIQUID (ML) ORAL EVERY 6 HOURS
Refills: 0 | Status: DISCONTINUED | OUTPATIENT
Start: 2025-03-02 | End: 2025-03-14

## 2025-03-02 RX ORDER — ACETAMINOPHEN 500 MG/5ML
1000 LIQUID (ML) ORAL ONCE
Refills: 0 | Status: COMPLETED | OUTPATIENT
Start: 2025-03-02 | End: 2025-03-02

## 2025-03-02 RX ORDER — LORAZEPAM 4 MG/ML
2 VIAL (ML) INJECTION
Refills: 0 | Status: DISCONTINUED | OUTPATIENT
Start: 2025-03-02 | End: 2025-03-03

## 2025-03-02 RX ORDER — MAGNESIUM, ALUMINUM HYDROXIDE 200-200 MG
30 TABLET,CHEWABLE ORAL EVERY 4 HOURS
Refills: 0 | Status: DISCONTINUED | OUTPATIENT
Start: 2025-03-02 | End: 2025-03-14

## 2025-03-02 RX ORDER — NICOTINE POLACRILEX 4 MG/1
2 GUM, CHEWING ORAL
Refills: 0 | Status: DISCONTINUED | OUTPATIENT
Start: 2025-03-02 | End: 2025-03-14

## 2025-03-02 RX ORDER — FOLIC ACID 1 MG/1
1 TABLET ORAL DAILY
Refills: 0 | Status: COMPLETED | OUTPATIENT
Start: 2025-03-02 | End: 2025-03-09

## 2025-03-02 RX ORDER — ASPIRIN 325 MG
324 TABLET ORAL ONCE
Refills: 0 | Status: COMPLETED | OUTPATIENT
Start: 2025-03-02 | End: 2025-03-02

## 2025-03-02 RX ADMIN — KETOROLAC TROMETHAMINE 15 MILLIGRAM(S): 30 INJECTION, SOLUTION INTRAMUSCULAR; INTRAVENOUS at 06:48

## 2025-03-02 RX ADMIN — Medication 1000 MILLIGRAM(S): at 06:46

## 2025-03-02 RX ADMIN — Medication 975 MILLIGRAM(S): at 11:45

## 2025-03-02 RX ADMIN — Medication 400 MILLIGRAM(S): at 05:56

## 2025-03-02 RX ADMIN — Medication 650 MILLIGRAM(S): at 17:00

## 2025-03-02 RX ADMIN — Medication 324 MILLIGRAM(S): at 05:55

## 2025-03-02 RX ADMIN — Medication 2 MILLIGRAM(S): at 23:41

## 2025-03-02 RX ADMIN — Medication 30 MILLILITER(S): at 16:59

## 2025-03-02 NOTE — BH PATIENT PROFILE - FUNCTIONAL ASSESSMENT - BASIC MOBILITY 1.
Carac Counseling:  I discussed with the patient the risks of Carac including but not limited to erythema, scaling, itching, weeping, crusting, and pain. 4 = No assist / stand by assistance

## 2025-03-02 NOTE — ED BEHAVIORAL HEALTH ASSESSMENT NOTE - DIFFERENTIAL
MDD with psychosis vs SAD, depressed   substance-induced mood disorder/ substance induced psychosis  polysubstance  abuse  malingering

## 2025-03-02 NOTE — ED BEHAVIORAL HEALTH ASSESSMENT NOTE - HPI (INCLUDE ILLNESS QUALITY, SEVERITY, DURATION, TIMING, CONTEXT, MODIFYING FACTORS, ASSOCIATED SIGNS AND SYMPTOMS)
59 yr old male, single, undomiciled and unemployed.  self reported hx of bipolar disorder and SCZ;  as per PSYCKES, has the following Behavioral Health Diagnoses to include Unspecified/Other Bipolar, substance-Induced Depressive Disorder, Major Depressive Disorder, Unspecified/Other Depressive Disorder,  Borderline Personality Disorder, Antisocial Personality Disorder, Unspecified/Other Anxiety Disorder, Bipolar I, Bipolar II, Schizoaffective Disorder, Substance-Induced Sleep Disorder, Adjustment Disorder, Schizophrenia, Substance-Induced Psychotic Disorder, Other Mental Disorders Unspecified/Other Psychotic Disorders,  Attention Deficit Hyperactivity Disorder, Insomnia Disorder  and Substance-Induced Anxiety Disorder.. Pt is high utilizer of mental health facilities + is chronically non adherent to meds and psych appts; has multiple ED visits and psych admissions including last [facility] admission back in 12/2024 for depression + SI.  past documented hx for suspicion of malingering on pior visits including this latest [facility] admission, 12/2024 of which, writer had chance to evaluate Pt,   as per chart review, there is documented hx of "several prior self-aborted suicide attempts with plan to jump in front of train in 2023 but was, "stopped by an emre",  Pt has hx of polysubstance use (as per PSYCKES: with hx of Cannabis related disorders/ Alcohol related disorders/ Cocaine related disorders/ Other psychoactive substance related disorders/ Opioid related disorders/ Tobacco related disorder/  Other stimulant related disorders and Sedative, hypnotic, or anxiolytic related disorders).  Pt has had hx of numerous prior detox/rehabs (previous [facility] admission in 12/2023).  He has hx of multiple ED visits reporting chest pain and/or SI and/or command AH to kill himself,  Pt was just discharged this morning from [facility] ED after self presenting with complaint of chest pain, non productive cough and headache.  pertinent medical issues include: HTN, pericarditis, Chronic CHF and GERD.  Pt admits he is not on any maintenance meds.  today, self presented to the ED (once again), complaining of depression, anxiety, "AH" and SI with plan.      he is seen bedside.  calm and cooperative. DOES NOT APPEAR ACTIVELY INTERNALLY STIMULATED NOR EXHIBITING ANY SIGNS/ SYMPTOMS OF ACUTE FLORID PSYCHOSIS NOR IS HE ACUTELY MANIC.  claims that after he was discharged from [facility] (Dec 2024), "felt less depressed and less anxious".  "I hang out with the wrong crowd. that's why I relapsed." he tells writer.  Pt admitted not able to follow up with scheduled appointments and not taking meds.  however, for the last few  weeks, reported feeling progressively depressed and anxious; been hearing voices and seeing his dead mother's face.  described AH as voices telling him to "do something really tragic to yourself"; "hurt the mother of your daughter because she (the mother) is turning your daughter against you", is how Pt described the perceptual disturbances that he has been experiencing recently.  he was unable to discern which, symptoms came first - depression/ anxiety or psychosis.. also unable to determine whether said perceptual disturbances were in the context of illicit substance use or not.      along with the psychotic symptoms, described depressive symptoms as experiencing sad mood associated with poor sleep, impaired concentration, low energy level and over eating.  he also claimed feeling hopeless/ helpless/ worthless.  last night (and then this morning) whilst riding a train, had thought about jumping in front of the E train.  offered an alternative means to consummate on the suicidal thoughts via going to [place] and procuring a gun thru the help of a friend so that he can shoot self in the head --- SIMILAR IDEATIONS AND PLANS FROM THE  ED ENCOUNTER LAST 12/3/2024    currently, continues to harbor SI and plans but without any intentions. "I couldn't even tell you why (in reference to the no intentions upon writer's exploration)".  regarding anxiety symptoms, Pt described symptoms experienced as "always feeling nervous".  with regards to past diagnosis of bipolar disorder, he claims past episode of irritability (I just explode. I can't control myself) associated with increased in goal directed activities (I do a lot of things at the same time).      "I have a connection with the psychiatrists and therapists here (at [facility])", as writer attempted to explore and clarify why he had preferred to come all the way to [facility]  (from the [facility]).  he also alleges that nobody spoke to him/ cared or discuss with him re: his psych symptoms whilst at [facility] ED early this morning. "they didn't care", he tells writer     cites the following stressors as perpetuating ongoing symptoms: homelessness, not being able to talk to his daughter for the past 60 days now, "hanging out with the wrong crowd"     Reports use of alcohol, "laced" cannabis and cocaine - ALL TAKEN 30 MINUTES PRIOR TO HIS J ED presentation.

## 2025-03-02 NOTE — ED PROVIDER NOTE - OBJECTIVE STATEMENT
59 year old M with ho HTN presenting with left sided cp X 1 day. Also complaining of dry cough and ha. No numbness, weakness, tingling. CP nonpleuritic, nonexertional. No abd pain, no n/v. No recent travel, no history of dvt or pe, no leg pain or leg swelling. ROS as above.

## 2025-03-02 NOTE — ED PROVIDER NOTE - PHYSICAL EXAMINATION
general: Well appearing, in no acute distress  HEENT: Normocephalic, atraumatic, extraocular movements intact  CV: Regular rate  Pulm: No respiratory distress, no tachypnea, ctab, no wrr  Abd: Flat, no gross distension  Ext: warm and well perfused, no le edema  Skin: No gross rashes or lesions  Neuro: Alert and oriented, moving all extremities

## 2025-03-02 NOTE — ED PROVIDER NOTE - PATIENT PORTAL LINK FT
You can access the FollowMyHealth Patient Portal offered by [facility] by registering at the following website: [email or web address]. By joining [facility]’s FollowMyHealth portal, you will also be able to view your health information using other applications (apps) compatible with our system.

## 2025-03-02 NOTE — ED BEHAVIORAL HEALTH ASSESSMENT NOTE - LEGAL HISTORY
denies. no listed pending legal issues as per [facility] UNIFIED COURT SYSTEM/ WEB[employer]IMS SITE

## 2025-03-02 NOTE — ED BEHAVIORAL HEALTH ASSESSMENT NOTE - OTHER PAST PSYCHIATRIC HISTORY (INCLUDE DETAILS REGARDING ONSET, COURSE OF ILLNESS, INPATIENT/OUTPATIENT TREATMENT)
self reported hx of depression + anxiety    high mental health utilizer including numerous Psych ED/ CPEP visits for SI, alcohol intoxication    chronically nonadherent with meds/ admitted to being inconsistent on his out-patient psychiatric care.    stated past hx of allegedly self aborting jumping in front of a 7 train over the summer ("stopped by an emre" (2023), also reported past hx of self aborting jumping in front of a train in 7/ 2024    multiple psych admissions including recent discharge from [place] [id number], 12/3 - 12/13/2024 for mgt of depression and SI in the context of psychosocial stressors (a diagnoses of Unspecified mood [affective] disorder; and Polysubstance abuse were made as well as entertaining Malingering as part of the diagnosis.  whilst Pt currently claimed that [facility] "helped" towards affording mood stability, of note during that 4 admission, the Pt later on refused standing [facility]; he was  discharged to shelter

## 2025-03-02 NOTE — ED BEHAVIORAL HEALTH ASSESSMENT NOTE - DESCRIPTION
In the ED, patient in fair behavorial control. Did not require PRNs, emergent IM medication, or use of 4 point restraints.  Pt is not delirious; not catatonic.  did not appear acutely intoxicated; not in withdrawal.  no current active verbalization of SI nor HI     Vital Signs Last 24 Hrs  T(C): 36.4 (02 Mar 2025 09:47), Max: 36.4 (02 Mar 2025 05:22)  T(F): 97.6 (02 Mar 2025 09:47), Max: 97.6 (02 Mar 2025 09:47)  HR: 92 (02 Mar 2025 09:47) (92 - 111)  BP: 129/87 (02 Mar 2025 09:47) (129/87 - 151/92)  BP(mean): --  RR: 16 (02 Mar 2025 09:47) (16 - 20)  SpO2: 99% (02 Mar 2025 09:47) (98% - 99%)    Parameters below as of 02 Mar 2025 09:47  Patient On (Oxygen Delivery Method): room air Per chart review: HTN, pericarditis, self reports hx of CHF (claims EF around ? 45%), CKD, GERD. as per PSYCKES, past meds to include: HCTZ 25mg, lipitor 40mg, ASA 81mg, entresto 24-26mg, folic acid 1mg, thiamine 100mg single, has a 16 yr old daughter domiciled with mother in [place].  unemployed.

## 2025-03-02 NOTE — ED BEHAVIORAL HEALTH ASSESSMENT NOTE - SUMMARY
59/M with documented pandiagnoses ranging across affective disorders, psychotic disorders, and personality disorders; historically, a high utilizer of mental health facilities including multiple psych admissions + ED visits; chronically nonadherent to psych care. with hx of polysubstance use.  Past hx of suspicion of malingering from previous visits (centered on procuring housing - would present to the ED as experiencing worsening depression, harboring SI and experiencing AH.  he claimed to have had multiple prior self-aborted suicide attempts  with plans of either jumping in front of a train or shooting self with a gun to be procured in [place].  pertinent medical issues as documented during past ED encounters include:  HTN, pericarditis, self reported hx of CHF (claims EF around ? 45%), CKD, and GERD.   Pt was seen early this AM at [facility] ED for chest pain, headache and a nonproductive cough.    today, self presented to the ED claiming worsening symptoms of depression, anxiety, "AH" and SI with plan.      at this time, endorses worsening symptoms of depression + anxiety + command AH to hurt self and others + SI with plans but no intentions.  current presentation is similar as with past presentations including last  ED encounter with this writer.  the Pt reports feeling increasingly depressed and anxious as precipitated and perpetuated by multiple chronic psychosocial stressors, namely:  homelessness, financial constraints, not being able to talk to his daughter for the past 60 days now, and "hanging out with the wrong crowd"     whilst he endorses experiencing "cAH", the Pt is NOT manifesting any signs/ symptoms suggestive of acute florid psychosis (is not actively stimulated; not disorganized in his TP nor in his speech; no stereotypical behaviors manifested; no negative symptoms elicited).  Pt is NOT acutely manic.  he subjectively reports worsening depressive symptoms, endorses recent active SI with plan (but no intentions) + HI.      as with past presentations, there is once again high suspicion for malingering but given acute risk factors + gravity of Pt's complaints + inability to partake towards safety planning, he cannot be discharged back to the community.  rather, he once again actively seeks 9.13 psych admission as "I have a connection with the psychiatrists and therapists here (at [facility])",      RECOMMENDATIONS:  1. Admit to [facility] on 9.13  2. Routine Obs: denies current active SIIP/HIIP, no need for CO 1:1  3. PRNs: Zyprexa 5mg PO/SL/ IM q6hrs for agitation in the context of acute psychiatric illness   4. Psychiatric: DEFER STANDING PSYCHOTROPIC TO PRIMARY TREATMENT TEAM  5. Medical: Per chart review: HTN, pericarditis, self reports hx of CHF (claims EF around ? 45%), CKD, GERD. non-compliant with past meds of HCTZ 25mg, lipitor 40mg, ASA 81mg, entresto 24-26mg  - Recommend consult (once again) with [facility] Hospitalist to determine reinitiating of maintenance meds for his cardiovascular issues as deemed appropriate; in the mean time, will hold meds for now   6. symptom triggered CIWA  7. PO folic acid 1mg, thiamine 100mg, and multivitamins   8. nicotine cessation PRN  9. tylenol PRN   - Recommend consult w/ Hospitalist to determine need to restart home medical medication, will hold for now   10.  Individual, group, milieu therapy  11.  once medically cleared, facilitate transfer to [facility]

## 2025-03-02 NOTE — ED BEHAVIORAL HEALTH ASSESSMENT NOTE - NSBHSATHC_PSY_A_CORE FT
Prior chart indicates hx of use; reports last use was 30 mins prior to [facility] ED presentation

## 2025-03-02 NOTE — ED ADULT NURSE NOTE - OBJECTIVE STATEMENT
pt received to , aox4.  pt coming to ED c/o SI/HI.  pt states he's feeling depressed because his daughter does not want to talk to him and she lives in [place].  pt states he wants to jump in front of the "E train" or shoot himself.  pt also states he wants to hang his daughter and ex wife from a tree.  pt is calm and cooperative.  belongings secured.  awaiting MD dixon and orders

## 2025-03-02 NOTE — ED BEHAVIORAL HEALTH ASSESSMENT NOTE - NSBHROSSYSTEMS_PSY_ALL_CORE
otherwise, he currently denies experiencing any dizziness; no sore throat; no fever (but "feeling hot and cold"). not complaining of any current chest pains, no SOB, no palpitations; no abdominal/ flank pains, no nausea/ vomiting or diarrhea/ constipation; no dysuria, no hesitancy, no polyuria, no hematuria; no pruritus/ no rashes nor any edema. denied any muscle/ joint pains/Constitutional Symptoms.../Respiratory...

## 2025-03-02 NOTE — ED BEHAVIORAL HEALTH ASSESSMENT NOTE - NSBHSAALC_PSY_A_CORE FT
Per chart review: "Pt reports daily ~3 pints of vodka for 'a long time'; reports last use was 30 mins prior to [facility] ED presentation

## 2025-03-02 NOTE — ED BEHAVIORAL HEALTH ASSESSMENT NOTE - PAST PSYCHOTROPIC MEDICATION
per PSYCKES: Sy, Abilify ("was not helping me"), VPA,  Zoloft, Risperdal, gabapentin, librium, zyprexa, trazodone, atarax, haldol, ativan, versed

## 2025-03-02 NOTE — ED BEHAVIORAL HEALTH ASSESSMENT NOTE - DETAILS
Deferred unclear documented hx of suicide attempts; multiple ED visits with theme of feeling depressed, anxious, having AH and endorsing SI with plan to jump in front of a train or get a gun to shoot self per chart review: reported experiencing diarrhea with Zoloft headache in custody of Pt's mother (based in [place], [place]) Reports access to firearms via "people in [place]" and his brother located in [place] transfer protocol discussed with  ED team see HPI for details cough, "mild cold"

## 2025-03-02 NOTE — ED BEHAVIORAL HEALTH ASSESSMENT NOTE - NSPRESENTSXS_PSY_ALL_CORE
Depressed mood/Anhedonia/Psychosis/Impulsivity/Hopelessness or despair/Global insomnia/Command hallucinations to hurt self/Severe anxiety, agitation or panic/Refusal or inability to complete safety plan

## 2025-03-02 NOTE — ED BEHAVIORAL HEALTH ASSESSMENT NOTE - LEVEL OF CONSCIOUSNESS
Pt with sbp 160s on admission. Head CT noncon negative.   - c/w home coreg 12.5 bid, titrate appropriately Alert

## 2025-03-02 NOTE — ED BEHAVIORAL HEALTH ASSESSMENT NOTE - ADDITIONAL DETAILS ALL
per chart review, Pt had previously reported that "he was stopped from jumping in front of a train ~ 2 yrs ago" (this cannot be substantiated by a behavioral health provider or a family member/ friend who knows Pt apart from what was documented in past charts)

## 2025-03-02 NOTE — ED BEHAVIORAL HEALTH ASSESSMENT NOTE - RISK ASSESSMENT
Acute risk factors - housing instability, ongoing substance use, depression/ anxiety, ongoing passive SI and HI (without any intentions), AH  Chronic risk factors - extensive psychiatric and substance hx, hx outpatient treatment nonadherence,  Cluster B pathology, male, hx of being impulsive, multiple psych admission  Protective factors -   pt is treatment-seeking and often self-presents to the ED for voluntary hospitalization, no clear hx of suicide attempts, no known hx of violence, help seeking, sense of commitment to daughter, does not appear manic, no floridly psychotic      At this time given all risks taken into consideration, the Pt is moderate risk for self harm as well as remaining at chronically elevated risk of harming self.  ongoing presentation not deemed dischargeable back to the community.  current increased in risks can be mitigated by a psychiatric admission with supervision, continuing psych meds with titration towards efficacious dosing range

## 2025-03-02 NOTE — ED BEHAVIORAL HEALTH ASSESSMENT NOTE - VIOLENCE RISK FACTORS:
Substance abuse/Noncompliance with treatment/Community stressors that increase the risk of destabilization Protopic Counseling: Patient may experience a mild burning sensation during topical application. Protopic is not approved in children less than 2 years of age. There have been case reports of hematologic and skin malignancies in patients using topical calcineurin inhibitors although causality is questionable.

## 2025-03-02 NOTE — ED PROVIDER NOTE - NS ED ROS FT
Constitutional: No fever or chills  Eyes: No discharge or drainage  Ears, Nose, Mouth, Throat: No nasal discharge, no sore throat  Cardiovascular: +chest pain, no palpitations  Respiratory: No shortness of breath, no cough  Gastrointestinal: No nausea or vomiting, no abdominal pain, no diarrhea or constipation  Musculoskeletal: No joint pain, no swelling  Skin: No rashes or lesions  Neurological: No numbness, weakness, tingling, no headache  Psychiatric: No depression

## 2025-03-02 NOTE — ED BEHAVIORAL HEALTH ASSESSMENT NOTE - NSBHSACONSEQUENCE_PSY_A_CORE FT
Per chart review: "Pt reports many past detox and rehab admissions, including [facility], [facility], others.".. with continued use of said drugs, will cause worsening psychosis, depressive and anxiety symptoms including clinical picture of miquel

## 2025-03-02 NOTE — ED BEHAVIORAL HEALTH ASSESSMENT NOTE - NSSUICPROTFACT_PSY_ALL_CORE
with unclear hx of alleged suicide attempts/Responsibility to children, family, or others/Identifies reasons for living

## 2025-03-02 NOTE — ED PROVIDER NOTE - CLINICAL SUMMARY MEDICAL DECISION MAKING FREE TEXT BOX
60 yo M hx alcohol use disorder, borderline personality disorder, depression, HTN, GERD, pw c/o suicidal ideation with plan. States that he has had SI with plan/attempt in the past. Pt is tearful, states that his daughter blocked him x 2 months and he wants to go out and drink and jump in front of traffic or train. Denies any other complaints at this time. Pt placed in , psych clearance labs ordered, psych consulted.

## 2025-03-02 NOTE — ED BEHAVIORAL HEALTH ASSESSMENT NOTE - SECONDARY DX3
· Evaluated by Surgery, apprecaite recommendations  · Outpatient follow up with possible elective intervention Polysubstance abuse

## 2025-03-02 NOTE — ED PROVIDER NOTE - NSFOLLOWUPINSTRUCTIONS_ED_ALL_ED_FT
Please take tylenol or motrin as needed. Stay well hydrated. Return for worsening symptoms, worsening pain, shortness of breath, lightheadedness, dizziness.

## 2025-03-02 NOTE — ED ADULT TRIAGE NOTE - CHIEF COMPLAINT QUOTE
pt c/o suicidal ideations with plan, pt state increased etoh use last night. past med hx htn, pericarditis, SI, depression, bipolar. + AVH, denies HI  pt c/o total body pain, left arm pain 10/10

## 2025-03-02 NOTE — BH PATIENT PROFILE - FUNCTIONAL ASSESSMENT - BASIC MOBILITY ASSESSMENT TYPE
Community Health Worker case created to assist with SCM Equipment Delivery.     Initial Call ATTEMPTED on 4/4/2023.     CHW will make next attempt Call on DATE 4/7/23.     During home visit, the following screenings were completed:    SDOH Screening: No  Home safety screen: No      CHW provided patient with the following education/resources:     CHW did not assist with scheduling follow-up appointment.   
Admission

## 2025-03-02 NOTE — ED PROVIDER NOTE - PHYSICAL EXAMINATION
VITALS: reviewed  GEN: NAD, A & O x 4  HEAD/EYES: NCAT, EOMI, anicteric sclerae,   ENT: mucus membranes moist, oropharynx WNL, trachea midline,   RESP: unlabored breathing  CV: distal pulses intact and symmetric bilaterally  MSK: extremities atraumatic and nontender, no edema, no asymmetry.   SKIN: warm, dry, no rash, no bruising, no cyanosis. color appropriate for ethnicity  NEURO: alert, mentating appropriately, no facial asymmetry.  PSYCH: tearful

## 2025-03-02 NOTE — ED BEHAVIORAL HEALTH ASSESSMENT NOTE - OTHER
[facility]  Reference #: [id number] - no controlled substances prescribed chart review no documented known hx of violence per chart chronically impaired distracted

## 2025-03-02 NOTE — BH PATIENT PROFILE - NSTOBACCONEVERSMOKERY/N_GEN_A
[Alert] : alert [Playful] : playful [Normocephalic] : normocephalic [Flat Open Anterior Lena] : flat open anterior fontanelle [EOMI Bilateral] : EOMI bilateral [Red Reflex] : red reflex bilateral [Nares Patent] : nares patent [Supple, full passive range of motion] : supple, full passive range of motion [Clear to Auscultation Bilaterally] : clear to auscultation bilaterally [Regular Rate and Rhythm] : regular rate and rhythm [S1, S2 present] : S1, S2 present [Soft] : soft [Bowel Sounds] : bowel sounds present [Normal External Genitalia] : normal external genitalia [Patent] : patent [No Abnormal Lymph Nodes Palpated] : no abnormal lymph nodes palpated [Cranial Nerves Grossly Intact] : cranial nerves grossly intact [Acute Distress] : no acute distress [Discharge] : no discharge [Murmurs] : no murmurs [Tender] : nontender [Distended] : nondistended [Rash or Lesions] : no rash/lesions [de-identified] : Flat, light brown birth raz along R shoulder; flat, circular, dark brown birth raz along R gluteal crease No

## 2025-03-02 NOTE — ED BEHAVIORAL HEALTH ASSESSMENT NOTE - NSBHSACOC_PSY_A_CORE FT
Prior chart indicates hx of use; reports last use was 30 mins prior to [facility] ED presentation

## 2025-03-02 NOTE — ED BEHAVIORAL HEALTH ASSESSMENT NOTE - NSBHMSEBEHAV_PSY_A_CORE
manipulative; not suspicious; evasive on his drug use/Cooperative
[FreeTextEntry1] : 50-year-old female with anal fissure   I spoke to the patient regarding her diagnosis. I recommend that a high-fiber diet, fiber supplementation, increased water intake, laxatives and topical nifedipine cream. We will see her back in two months 
[FreeTextEntry1] : 50-year-old female with anal fissure   I spoke to the patient regarding her diagnosis. I recommend that a high-fiber diet, fiber supplementation, increased water intake, laxatives and topical nifedipine cream. We will see her back in two months

## 2025-03-03 LAB
FLUAV AG NPH QL: SIGNIFICANT CHANGE UP
FLUBV AG NPH QL: SIGNIFICANT CHANGE UP
RSV RNA NPH QL NAA+NON-PROBE: SIGNIFICANT CHANGE UP
SARS-COV-2 RNA SPEC QL NAA+PROBE: SIGNIFICANT CHANGE UP
SOURCE CONJUNCTIVA: SIGNIFICANT CHANGE UP

## 2025-03-03 PROCEDURE — 99223 1ST HOSP IP/OBS HIGH 75: CPT

## 2025-03-03 PROCEDURE — 99222 1ST HOSP IP/OBS MODERATE 55: CPT

## 2025-03-03 RX ORDER — ASPIRIN 325 MG
81 TABLET ORAL DAILY
Refills: 0 | Status: DISCONTINUED | OUTPATIENT
Start: 2025-03-03 | End: 2025-03-10

## 2025-03-03 RX ORDER — HYDROXYZINE HYDROCHLORIDE 25 MG/1
50 TABLET, FILM COATED ORAL EVERY 6 HOURS
Refills: 0 | Status: DISCONTINUED | OUTPATIENT
Start: 2025-03-03 | End: 2025-03-14

## 2025-03-03 RX ORDER — METOPROLOL SUCCINATE 50 MG/1
25 TABLET, EXTENDED RELEASE ORAL DAILY
Refills: 0 | Status: DISCONTINUED | OUTPATIENT
Start: 2025-03-03 | End: 2025-03-10

## 2025-03-03 RX ORDER — LURASIDONE HYDROCHLORIDE 120 MG/1
20 TABLET, FILM COATED ORAL
Refills: 0 | Status: DISCONTINUED | OUTPATIENT
Start: 2025-03-03 | End: 2025-03-10

## 2025-03-03 RX ADMIN — Medication 100 MILLIGRAM(S): at 08:59

## 2025-03-03 RX ADMIN — OLANZAPINE 5 MILLIGRAM(S): 10 TABLET ORAL at 08:59

## 2025-03-03 RX ADMIN — FOLIC ACID 1 MILLIGRAM(S): 1 TABLET ORAL at 08:59

## 2025-03-03 RX ADMIN — Medication 650 MILLIGRAM(S): at 09:20

## 2025-03-03 RX ADMIN — Medication 650 MILLIGRAM(S): at 08:58

## 2025-03-03 RX ADMIN — Medication 1 TABLET(S): at 08:59

## 2025-03-03 NOTE — BH SOCIAL WORK INITIAL PSYCHOSOCIAL EVALUATION - OTHER PAST PSYCHIATRIC HISTORY (INCLUDE DETAILS REGARDING ONSET, COURSE OF ILLNESS, INPATIENT/OUTPATIENT TREATMENT)
The patient is a 59 year old single  male, unemployed, undomiciled and has been in the shelter system since 2008, has a 16 year old daughter  in custody of patient's mother (based in [place], [place]), with self reported hx of depression + anxiety, high mental health utilizer including numerous psych ED/ CPEP visits for SI, alcohol intoxication, chronically nonadherent with meds and admitted to being inconsistent on his out-patient psychiatric care. Patient reported   past hx of allegedly self aborting jumping in front of a 7 train over the summer.  Patient with multiple psych admissions including recent discharge from [place] [address], 12/3 - 12/13/2024 for management of depression and SI in the context of psychosocial stressors (a diagnoses of Unspecified mood [affective] disorder; and Polysubstance abuse were made as well as entertaining Malingering as part of the diagnosis.  whilst patient currently claimed that Latuda "helped" towards affording mood stability, of note during that 4 admission, the patient later on refused standing Latuda; he was discharged to shelter.  The patient is a 59 year old single  male, unemployed, undomiciled and has been in the shelter system since 2008, has a 16 year old daughter  in custody of patient's mother (based in [place], [place]), with self reported hx of depression + anxiety, high mental health utilizer including numerous psych ED/ CPEP visits for SI, alcohol intoxication, chronically nonadherent with meds and admitted to being inconsistent on his out-patient psychiatric care. Patient reported   past hx of allegedly self aborting jumping in front of a 7 train over the summer.  Patient with multiple psych admissions including recent discharge from [place] [id number], 12/3 - 12/13/2024 for management of depression and SI in the context of psychosocial stressors (a diagnoses of Unspecified mood [affective] disorder; and Polysubstance abuse were made as well as entertaining Malingering as part of the diagnosis.  whilst patient currently claimed that [place] "helped" towards affording mood stability, of note during that 4 admission, the patient later on refused standing Latuda; he was discharged to shelter.  He brought self to the ER due to feeling depressed, anxious, and with SI and plan to jump in front of the E train or via going to [place] and procuring a gun through the help of a friend so that he can shoot self in the head.  He cites the following stressors as perpetuating ongoing symptoms: homelessness, not being able to talk to his daughter for the past 60 days, and "hanging out with the wrong crowd".   The patient is a 59 year old single  male, unemployed, undomiciled and has been in the shelter system since 2008, has a 16 year old daughter  in custody of patient's mother (based in [place], [place]), with self reported hx of depression + anxiety, high mental health utilizer including numerous psych ED/ CPEP visits for SI, alcohol intoxication, chronically nonadherent with meds and admitted to being inconsistent on his out-patient psychiatric care. Patient reported   past hx of allegedly self aborting jumping in front of a 7 train over the summer.  Patient with multiple psych admissions including recent discharge from [place] [address], 12/3 - 12/13/2024 for management of depression and SI in the context of psychosocial stressors (a diagnoses of Unspecified mood [affective] disorder; and Polysubstance abuse were made as well as entertaining Malingering as part of the diagnosis.  The patient brought self to the ER due to feeling depressed, anxious, and with SI and plan to jump in front of the E train or via going to [place] and procuring a gun through the help of a friend so that he can shoot self in the head.  He cites the following stressors as perpetuating ongoing symptoms: homelessness, not being able to talk to his daughter for the past 60 days, and "hanging out with the wrong crowd".

## 2025-03-03 NOTE — BH INPATIENT PSYCHIATRY ASSESSMENT NOTE - NSBHMSETHTCONTENT_PSY_A_CORE
PASS  LOV 7/3/24  FU 6 WEEKS    GABAPENTIN 100 MG Oral Cap 6/6/24 270 CAPSULE 0 REFILL    6. Post herpetic neuralgia  Note: we discussed about weaning off gabapentin, but patient declined. Unlikely the low dose is causing memory issues     Future Appointments   Date Time Provider Department Center   8/28/2024  8:15 AM WDR MRI RM1 (1.5T WIDE) WDR MRI EDW [facility]       
Hopelessness/Suicidality

## 2025-03-03 NOTE — BH INPATIENT PSYCHIATRY ASSESSMENT NOTE - HPI (INCLUDE ILLNESS QUALITY, SEVERITY, DURATION, TIMING, CONTEXT, MODIFYING FACTORS, ASSOCIATED SIGNS AND SYMPTOMS)
Per [facility] ED assessment, "59 yr old male, single, undomiciled and unemployed.  self reported hx of bipolar disorder and SCZ;  as per PSYCKES, has the following Behavioral Health Diagnoses to include Unspecified/Other Bipolar, substance-Induced Depressive Disorder, Major Depressive Disorder, Unspecified/Other Depressive Disorder,  Borderline Personality Disorder, Antisocial Personality Disorder, Unspecified/Other Anxiety Disorder, Bipolar I, Bipolar II, Schizoaffective Disorder, Substance-Induced Sleep Disorder, Adjustment Disorder, Schizophrenia, Substance-Induced Psychotic Disorder, Other Mental Disorders Unspecified/Other Psychotic Disorders,  Attention Deficit Hyperactivity Disorder, Insomnia Disorder  and Substance-Induced Anxiety Disorder.. Pt is high utilizer of mental health facilities + is chronically non adherent to meds and psych appts; has multiple ED visits and psych admissions including last [facility] admission back in 12/2024 for depression + SI.  past documented hx for suspicion of malingering on pior visits including this latest [facility] admission, 12/2024 of which, writer had chance to evaluate Pt,   as per chart review, there is documented hx of "several prior self-aborted suicide attempts with plan to jump in front of train in 2023 but was, "stopped by an emre",  Pt has hx of polysubstance use (as per PSYCKES: with hx of Cannabis related disorders/ Alcohol related disorders/ Cocaine related disorders/ Other psychoactive substance related disorders/ Opioid related disorders/ Tobacco related disorder/  Other stimulant related disorders and Sedative, hypnotic, or anxiolytic related disorders).  Pt has had hx of numerous prior detox/rehabs (previous [facility] admission in 12/2023).  He has hx of multiple ED visits reporting chest pain and/or SI and/or command AH to kill himself,  Pt was just discharged this morning from [facility] ED after self presenting with complaint of chest pain, non productive cough and headache.  pertinent medical issues include: HTN, pericarditis, Chronic CHF and GERD.  Pt admits he is not on any maintenance meds.  today, self presented to the ED (once again), complaining of depression, anxiety, "AH" and SI with plan.      he is seen bedside.  calm and cooperative. DOES NOT APPEAR ACTIVELY INTERNALLY STIMULATED NOR EXHIBITING ANY SIGNS/ SYMPTOMS OF ACUTE FLORID PSYCHOSIS NOR IS HE ACUTELY MANIC.  claims that after he was discharged from [facility] (Dec 2024), "felt less depressed and less anxious".  "I hang out with the wrong crowd. that's why I relapsed." he tells writer.  Pt admitted not able to follow up with scheduled appointments and not taking meds.  however, for the last few  weeks, reported feeling progressively depressed and anxious; been hearing voices and seeing his dead mother's face.  described AH as voices telling him to "do something really tragic to yourself"; "hurt the mother of your daughter because she (the mother) is turning your daughter against you", is how Pt described the perceptual disturbances that he has been experiencing recently.  he was unable to discern which, symptoms came first - depression/ anxiety or psychosis.. also unable to determine whether said perceptual disturbances were in the context of illicit substance use or not.      along with the psychotic symptoms, described depressive symptoms as experiencing sad mood associated with poor sleep, impaired concentration, low energy level and over eating.  he also claimed feeling hopeless/ helpless/ worthless.  last night (and then this morning) whilst riding a train, had thought about jumping in front of the E train.  offered an alternative means to consummate on the suicidal thoughts via going to [place] and procuring a gun thru the help of a friend so that he can shoot self in the head --- SIMILAR IDEATIONS AND PLANS FROM THE  ED ENCOUNTER LAST 12/3/2024    currently, continues to harbor SI and plans but without any intentions. "I couldn't even tell you why (in reference to the no intentions upon writer's exploration)".  regarding anxiety symptoms, Pt described symptoms experienced as "always feeling nervous".  with regards to past diagnosis of bipolar disorder, he claims past episode of irritability (I just explode. I can't control myself) associated with increased in goal directed activities (I do a lot of things at the same time).      "I have a connection with the psychiatrists and therapists here (at [facility])", as writer attempted to explore and clarify why he had preferred to come all the way to [facility]  (from the [facility]).  he also alleges that nobody spoke to him/ cared or discuss with him re: his psych symptoms whilst at [facility] ED early this morning. "they didn't care", he tells writer     cites the following stressors as perpetuating ongoing symptoms: homelessness, not being able to talk to his daughter for the past 60 days now, "hanging out with the wrong crowd"     Reports use of alcohol, "laced" cannabis and cocaine - ALL TAKEN 30 MINUTES PRIOR TO HIS J ED presentation."    On the unit, of note, BAL <10 in ED and UTox + for cocaine and benzos.  Pt on interview, lethargic due to PO PRN medication given this AM and overnight and interview limited.  Pt reports SI with plan to shoot himself or jump in front of a train.  Pt reports depressed mood with poor appetite and sleep.  Pt reports CAH telling him to harm himself and others, but he does not appear internally preoccupied.  Pt denies HI/I/P or VH or paranoia.  Pt initially denies substance use then admits to occasional ETOH use and he does not remember if he used cocaine.  Pt denies marijuana use.  Pt reports he is not connected to outpatient psychiatric treatment and is not on current psychotropic medication.  Pt reports he is not connected to a shelter and has been staying between a friend and his sister's residence.  Pt denies ETOH withdrawal sx.  Pt asked about his hx of cardiac issues and he reports he was diagnosed in 2014 and has not been compliant with treatment in a long time.  Pt reports Latuda is the only medication that helps him and Abilify is too strong.

## 2025-03-03 NOTE — BH INPATIENT PSYCHIATRY ASSESSMENT NOTE - NSBHSATHC_PSY_A_CORE FT
Prior chart indicates hx of use; reports last use was 30 mins prior to [facility] ED presentation, but now denies use and UTox negative

## 2025-03-03 NOTE — SAFE ACT REPORTING PROGRESS NOTE - COMMENTS
Per [facility] ED assessment on 3/2/25, "had thought about jumping in front of the E train.  offered an alternative means to consummate on the suicidal thoughts via going to [place] and procuring a gun thru the help of a friend so that he can shoot self in the head"  also today, reported ongoing SI with plan to shoot himself.    Submitted On: 3/3/2025 2:38:54 PM  Reference Number: [id number]  By: Faith Mo  For: rDe Dickerson

## 2025-03-03 NOTE — BH INPATIENT PSYCHIATRY ASSESSMENT NOTE - LEGAL HISTORY
denies. Per chart, no listed pending legal issues as per [facility] UN[facility]IED [facility] SYSTEM/ WEBCRIMS SITE

## 2025-03-03 NOTE — BH INPATIENT PSYCHIATRY ASSESSMENT NOTE - CURRENT MEDICATION
MEDICATIONS  (STANDING):  folic acid 1 milliGRAM(s) Oral daily  lurasidone 20 milliGRAM(s) Oral <User Schedule>  multivitamin 1 Tablet(s) Oral daily  thiamine 100 milliGRAM(s) Oral daily    MEDICATIONS  (PRN):  acetaminophen     Tablet .. 650 milliGRAM(s) Oral every 6 hours PRN Mild Pain (1 - 3), Moderate Pain (4 - 6), Severe Pain (7 - 10)  aluminum hydroxide/magnesium hydroxide/simethicone Suspension 30 milliLiter(s) Oral every 4 hours PRN Dyspepsia  hydrOXYzine hydrochloride 50 milliGRAM(s) Oral every 6 hours PRN anxiety  nicotine  Polacrilex Gum 2 milliGRAM(s) Oral every 2 hours PRN Smoking Cessation  OLANZapine 5 milliGRAM(s) Oral every 6 hours PRN psychotic agitation  OLANZapine Injectable 5 milliGRAM(s) IntraMuscular Once PRN severe psychotic agitation

## 2025-03-03 NOTE — BH INPATIENT PSYCHIATRY ASSESSMENT NOTE - ATTENDING COMMENTS
Patient seen by me, chart reviewed, and case discussed with treatment team.  Reviewed and agree with above assessment and plan; corrections and modification made where appropriate.  The patient presents with worsening depression, with CAH, SI.  He admits to recent alcohol use, and tox was also positive for cocaine.  The patient has been noncompliant with medications, and has no follow-up.  Will restart Latuda.

## 2025-03-03 NOTE — BH INPATIENT PSYCHIATRY ASSESSMENT NOTE - NSBHSACONSEQUENCE_PSY_A_CORE FT
Per chart review: "Pt reports many past detox and rehab admissions, including [facility], [facility], others.".. with continued use of said drugs, will cause worsening psychosis, depressive and anxiety symptoms including clinical picture of miquel

## 2025-03-03 NOTE — BH SOCIAL WORK INITIAL PSYCHOSOCIAL EVALUATION - LEGAL HELP
[facility]  Hospitalist Discharge Summary      Date of Admission:  6/24/2021  Date of Discharge:  6/26/2021  Discharging Provider: Ziggy Shin MD      Discharge Diagnoses   C. difficile infection  Sepsis secondary to C. difficile as above, resolved  Bilateral lower lobe infiltrate on imaging, no respiratory symptoms  Hypokalemia    Stage 4 chronic kidney disease   Metabolic acidosis due to chronic kidney disease  Hyperuricemia  Coronary artery disease   Aortic stenosis   Hypertension   BPH  Lower urinary tract symptoms  GERD      Follow-ups Needed After Discharge   Follow-up Appointments     Follow-up and recommended labs and tests       Follow up with primary care provider, Kian Johns, within 7 days for   hospital follow- up.  The following labs/tests are recommended: Basic   metabolic panel.             Unresulted Labs Ordered in the Past 30 Days of this Admission     Date and Time Order Name Status Description    6/24/2021 1212 Blood culture Preliminary     6/24/2021 1212 Blood culture Preliminary           Discharge Disposition   Discharged to home  Condition at discharge: Stable  Patient ready to discharge to a skilled nursing facility as soon as possible in order to create capacity for patients related to the COVID-19 pandemic.    Hospital Course            Rose Duong is a 83 year old male who is on chemotherapy for light chain amyloidosis.  He has multiple myeloma and an early myelodysplastic syndrome.  He has stage IV chronic kidney disease amyloidosis.  He also has coronary artery disease, aortic stenosis and hypertension.  He is being treated for amyloidosis with dexamethasone, cyclophosphamide and Velcade.  His last treatment was this past Monday.  On Wednesday he developed frequent nonbloody stools and abdominal cramps.  This persisted throughout the evening and continued on today.  He had 100  F temperature yesterday and 100.8  F temperature today in the emergency  room.  He has no respiratory symptoms but surprisingly chest x-ray and CT of the abdomen showed bilateral lower lobe infiltrates and his procalcitonin is 3.  He is being admitted for further management.     C. difficile infection  Sepsis secondary to C. difficile as above, resolved  Present with abdominal cramp and multiple loose stools. Temp after admission 100.8 and WBC 12.  Procalcitonin 3.11.  CT abdomen and pelvis showed multiple minimally dilated fluid-filled small bowel loops with no clear transition point, nonspecific, can be seen with gastroenteritis.  Cholelithiasis without CT evidence of acute cholecystitis.  C diff PCR positive on 6/24.  Enteric bacterial and viral panel negative.  Ova and parasite also negative  -Started on oral vancomycin 125 mg 4 times daily with improving symptoms  -No longer having diarrhea, WBC has normalized, he is afebrile and tolerating diet.  -Continue vancomycin for 9 more days to complete treatment course at discharge    Bilateral lower lobe infiltrates on imaging  CT imaging showing bibasilar small nodule and patchy pulmonary opacities most prominent in the lingula.  He has no respiratory symptoms. Presented with diarrhea which is secondary to C.dif.  Given lack of respiratory symptoms, will hold off additional antibiotics and treat only C.diff at this time. Azithromycin and Ceftriaxone are discontinued.  Blood culture remain no growth to date.  Without any specific respiratory symptoms right now to warrant treatment at this point.  Recommend patient to seek medical attention if he develops any new cough, fever or shortness of breath.    Hypokalemia  - this has corrected after replacement.      Multiple myeloma   Early myelodysplastic syndrome  Anemia, thought mostly due to chronic kidney disease  Light chain amyloidosis   Currently being treated with dexamethasone, cyclophosphamide and Velcade.  He is also on prednisone 5 mg every other day. Baseline hemoglobin ranges from  8-9s  - Continue prednisone  - hemoglobin 8.5     Stage 4 chronic kidney disease   Metabolic acidosis due to chronic kidney disease  Hyperuricemia  - baseline creatinine appears to be 1.8-2s  - Continue allopurinol and sodium bicarbonate  - creatinine is stable or better than his baseline at 1.4.     Coronary artery disease   Aortic stenosis   Hypertension   Stable and asymptomatic. Follows at [facility].   - Continue amlodipine, aspirin, statin  -Resume furosemide at discharge as diarrhea has improved and oral intake also stable     Benign prostatic hypertrophy   Lower urinary tract symptoms   Currently asymptomatic.  - Continue tamsulosin and finasteride     Gastroesophageal reflux disease   - Continue PPI      Consultations This Hospital Stay   None    Code Status   Full Code    Time Spent on this Encounter   I, Ziggy Shin MD, personally saw the patient today and spent 35 minutes discharging this patient.       Ziggy Shin MD  [place] [id number] [facility]  [address] [place] [address], [address]  [facility] [address]  Phone: [phone]  ______________________________________________________________________    Physical Exam   Vital Signs: Temp: 97.1  F (36.2  C) Temp src: Oral BP: (!) 143/63 Pulse: 82   Resp: 18 SpO2: 96 % O2 Device: None (Room air)    Weight: 136 lbs 0 oz  General Appearance: Alert, awake, no apparent distress  Respiratory: Clear clear to auscultation bilaterally, no wheezing  Cardiovascular: Regular rate and rhythm, positive for systolic murmur  GI: Soft, nontender and nondistended  Skin: Warm and dry         Primary Care Physician   Kian Johns    Discharge Orders      Reason for your hospital stay    You are admitted to the hospital for diarrhea related to C. difficile infection.  Will be going home on oral antibiotics.     Follow-up and recommended labs and tests     Follow up with primary care provider, Kian Johns, within 7 days for hospital follow- up.  The following  labs/tests are recommended: Basic metabolic panel.     Activity    Your activity upon discharge: activity as tolerated     Full Code     Diet    Follow this diet upon discharge: Orders Placed This Encounter      Regular Diet Adult       Significant Results and Procedures   Most Recent 3 CBC's:  Recent Labs   Lab Test 06/26/21  0851 06/25/21  0723 06/24/21  1124   WBC 10.1 12.1* 7.6   HGB 8.4* 8.5* 9.7*   MCV 76* 75* 75*    165 175     Most Recent 3 BMP's:  Recent Labs   Lab Test 06/26/21  1411 06/26/21  0851 06/25/21  0723 06/24/21  1124   NA  --  141 144 136   POTASSIUM 3.7 3.3* 3.6 4.2   CHLORIDE  --  116* 119* 109   CO2  --  19* 19* 20   BUN  --  22 31* 40*   CR  --  1.44* 1.54* 1.68*   ANIONGAP  --  6 6 7   CONNIE  --  8.7 8.6 8.5   GLC  --  157* 107* 96   ,   Results for orders placed or performed during the hospital encounter of 06/24/21   CT Abdomen Pelvis w Contrast    Narrative    CT ABDOMEN PELVIS W CONTRAST   6/24/2021 12:17 PM     HISTORY: Abdominal pain (left lower quadrant), diarrhea and low-grade  fever. On chemotherapy.    TECHNIQUE:  CT abdomen and pelvis with 70mL Isovue-370 IV. Radiation  dose for this scan was reduced using automated exposure control,  adjustment of the mA and/or kV according to patient size, or iterative  reconstruction technique.    COMPARISON: Renal ultrasound on 3/15/2021    FINDINGS:  Lower chest: Lingular and to lesser extent left lower and medial right  middle lobes pulmonary opacities, likely infectious. Small hiatal  hernia.    Abdomen/pelvis:    Hepatobiliary: Cholelithiasis and mild gallbladder wall thickening. No  suspicious focal hepatic lesion.    Pancreas: No main pancreatic ductal dilatation or definite solid  pancreatic mass.    Spleen: The spleen is enlarged measuring 14 cm in craniocaudal  dimension.    Adrenal glands: Nodular thickening of discrete nodularity of the left  adrenal gland. No right adrenal nodule.    Kidneys: No radiodense kidney/ureteral  stones or hydronephrosis.    Bowel: Multiple minimally dilated fluid-filled small bowel loops, with  no clear transition point.    Peritoneum: Trace amount of free fluid in the pelvis (series 4 image  175). No free peritoneal portal venous gas.    Pelvic organs: Unremarkable    Vascular: Moderate atherosclerotic vascular calcification of the  abdominal aorta and iliac vessels.    Lymph nodes: No significant abdominal or pelvic lymphadenopathy.    Bones and soft tissue: There is bilateral inguinal hernias with the  right containing a nondilated small bowel loop and small amount of  fluid (series 4 image 206) and the left is fat containing. Mild  degenerative changes of the spine most prominent at L4-5 with disc  height loss and endplate changes.      Impression    IMPRESSION:   1. Multiple minimally dilated fluid-filled small bowel loops with no  clear transition point, nonspecific, can be seen with gastroenteritis.  2. Small right inguinal hernia containing small amount of fluid and  nondilated small bowel loop.  3. Cholelithiasis without CT evidence of acute cholecystitis.  4. Splenomegaly.  5. Bibasilar small nodular and patchy pulmonary opacities most  prominent in the lingula, worrisome for infection.    VIKY ADAMS MD   US Abdomen Limited    Narrative    US ABDOMEN LIMITED   6/24/2021 2:00 PM     HISTORY:  Pain.  Evaluate gallstones, cholecystitis, common bile duct  dilatation, cholelithiasis and gallbladder wall thickening, low-grade  fever, on chemotherapy.    COMPARISON: CT abdomen pelvis on 6/24/2021    FINDINGS:    Gallbladder: Cholelithiasis and borderline gallbladder wall thickening  measuring 3 mm and trace pericholecystic fluid. Sonographic Mathur's  sign is negative. Common tail artifact noted along the gallbladder  wall, likely represent gallbladder adenomyomatosis.      Bile ducts:  CHD is normal diameter.  No intrahepatic biliary  dilatation. The distal aspect of the common bile duct is  obscured by  overlying bowel gas.    Liver: It demonstrates coarsened hepatic echotexture. No focal hepatic  mass is visualized.    Pancreas:  Partially obscured by overlying bowel gas,  but grossly  unremarkable.     Right kidney:  No hydronephrosis or shadowing calculi.    Aorta and IVC:  Not specifically assessed.       Impression    IMPRESSION:    1. Cholelithiasis, borderline gallbladder wall thickening and trace  pericholecystic fluid. These findings are nonspecific, could be  related to underlying liver dysfunction versus less likely acute  cholecystitis. If clinical suspicion of acute cholecystitis, remains  high, HIDA scan can help further characterization.  2. Coarsened hepatic echotexture, likely due to underlying parenchymal  liver disease including hepatic steatosis with or without fibrosis.    VIKY ADAMS MD   XR Chest 2 Views    Narrative    CHEST TWO VIEWS June 24, 2021 2:17 PM     HISTORY: Fever, chemo.    COMPARISON: Chest x-ray on 9/23/2020.      Impression    IMPRESSION: PA and lateral views of the chest were obtained. Mild  enlargement of the cardiac silhouette. Left basilar patchy pulmonary  opacity, worrisome for infection. No significant pleural effusion or  pneumothorax.    VIKY ADAMS MD       Discharge Medications   Current Discharge Medication List      START taking these medications    Details   vancomycin (VANCOCIN) 125 MG capsule Take 1 capsule (125 mg) by mouth 4 times daily for 9 days  Qty: 36 capsule, Refills: 0    Associated Diagnoses: C. difficile colitis         CONTINUE these medications which have NOT CHANGED    Details   acetaminophen (TYLENOL) 500 MG tablet Take 500 mg by mouth every 8 hours as needed for mild pain      acyclovir (ZOVIRAX) 400 MG tablet Take 400 mg by mouth 2 times daily      allopurinol (ZYLOPRIM) 300 MG tablet Take 300 mg by mouth daily.      amLODIPine (NORVASC) 10 MG tablet Take 10 mg by mouth daily      aspirin (ASPIRIN) 81 MG EC tablet  Take 81 mg by mouth daily      CHOLECALCIFEROL PO Take by mouth daily      cyclophosphamide (CYTOXAN) 50 MG capsule Take 500 mg by mouth once a week      dexamethasone (DECADRON) 4 MG tablet Take 20 mg by mouth Weekly Day of and day after chemo      finasteride (PROSCAR) 5 MG tablet Take 5 mg by mouth daily      furosemide (LASIX) 20 MG tablet Take 20 mg by mouth daily      latanoprost (XALATAN) 0.005 % ophthalmic solution Place 1 drop into both eyes At Bedtime       pantoprazole (PROTONIX) 20 MG EC tablet Take 20 mg by mouth daily      predniSONE (DELTASONE) 5 MG tablet Take 5 mg by mouth every other day       prochlorperazine (COMPAZINE) 10 MG tablet Take 10 mg by mouth every 6 hours as needed for nausea or vomiting      rosuvastatin (CRESTOR) 20 MG tablet Take 20 mg by mouth daily      sodium bicarbonate 650 MG tablet Take 650 mg by mouth 2 times daily      triamcinolone (KENALOG) 0.1 % external cream Apply topically daily as needed for irritation      tamsulosin (FLOMAX) 0.4 MG 24 hr capsule Take 0.4 mg by mouth every evening            Allergies   No Known Allergies   no

## 2025-03-03 NOTE — BH INPATIENT PSYCHIATRY ASSESSMENT NOTE - NSBHCHARTREVIEWVS_PSY_A_CORE FT
Vital Signs Last 24 Hrs  T(C): 36.8 (03-03-25 @ 08:29), Max: 36.8 (03-02-25 @ 14:15)  T(F): 98.2 (03-03-25 @ 08:29), Max: 98.3 (03-02-25 @ 14:15)  HR: --  BP: --  BP(mean): --  RR: 16 (03-02-25 @ 14:15) (16 - 16)  SpO2: --    Orthostatic VS  03-03-25 @ 08:29  Lying BP: --/-- HR: --  Sitting BP: 110/85 HR: 87  Standing BP: --/-- HR: --  Site: upper left arm  Mode: electronic  Orthostatic VS  03-02-25 @ 20:53  Lying BP: --/-- HR: --  Sitting BP: 97/60 HR: 83  Standing BP: --/-- HR: --  Site: --  Mode: --  Orthostatic VS  03-02-25 @ 14:15  Lying BP: --/-- HR: --  Sitting BP: 129/89 HR: 95  Standing BP: 113/85 HR: 101  Site: --  Mode: --   Vital Signs Last 24 Hrs  T(C): 36.8 (03-03-25 @ 08:29), Max: 36.8 (03-03-25 @ 08:29)  T(F): 98.2 (03-03-25 @ 08:29), Max: 98.2 (03-03-25 @ 08:29)  HR: --  BP: --  BP(mean): --  RR: --  SpO2: --    Orthostatic VS  03-03-25 @ 08:29  Lying BP: --/-- HR: --  Sitting BP: 110/85 HR: 87  Standing BP: --/-- HR: --  Site: upper left arm  Mode: electronic  Orthostatic VS  03-02-25 @ 20:53  Lying BP: --/-- HR: --  Sitting BP: 97/60 HR: 83  Standing BP: --/-- HR: --  Site: --  Mode: --  Orthostatic VS  03-02-25 @ 14:15  Lying BP: --/-- HR: --  Sitting BP: 129/89 HR: 95  Standing BP: 113/85 HR: 101  Site: --  Mode: --

## 2025-03-03 NOTE — BH INPATIENT PSYCHIATRY ASSESSMENT NOTE - DETAILS
Per chart, unclear documented hx of suicide attempts; multiple ED visits with theme of feeling depressed, anxious, having AH and endorsing SI with plan to jump in front of a train or get a gun to shoot self Reports access to firearms via "people in [place]" and his brother located in [place] per chart review: reported experiencing diarrhea with Zoloft

## 2025-03-03 NOTE — BH INPATIENT PSYCHIATRY ASSESSMENT NOTE - SUICIDE ATTEMPT:
Yes > 3 months ago Valtrex Pregnancy And Lactation Text: this medication is Pregnancy Category B and is considered safe during pregnancy. This medication is not directly found in breast milk but it's metabolite acyclovir is present.

## 2025-03-03 NOTE — BH SOCIAL WORK INITIAL PSYCHOSOCIAL EVALUATION - NSBHSATHC_PSY_A_CORE FT
Prior chart indicates hx of use; reports last use was 30 minutes prior to [facility] ED presentation.

## 2025-03-03 NOTE — CONSULT NOTE ADULT - TIME BILLING
Review of laboratory data, radiology results, consultants' recommendations, documentation in [place], discussion with patient/house staff/ACP and interdisciplinary staff (such as , social workers, etc). Interventions were performed as documented above.

## 2025-03-03 NOTE — BH INPATIENT PSYCHIATRY ASSESSMENT NOTE - OTHER
no documented known hx of violence per chart does not appear internally preoccupied chronically impaired minimally due to sedation

## 2025-03-03 NOTE — BH INPATIENT PSYCHIATRY ASSESSMENT NOTE - NSBHMETABOLIC_PSY_ALL_CORE_FT
BMI: BMI (kg/m2): 37.3 (03-02-25 @ 14:15)  HbA1c: A1C with Estimated Average Glucose Result: 5.4 % (12-27-24 @ 05:30)    Glucose: POCT Blood Glucose.: 104 mg/dL (04-04-24 @ 06:04)    BP: 110/69 (03-02-25 @ 12:30) (110/69 - 129/87)Vital Signs Last 24 Hrs  T(C): 36.8 (03-03-25 @ 08:29), Max: 36.8 (03-02-25 @ 14:15)  T(F): 98.2 (03-03-25 @ 08:29), Max: 98.3 (03-02-25 @ 14:15)  HR: --  BP: --  BP(mean): --  RR: 16 (03-02-25 @ 14:15) (16 - 16)  SpO2: --    Orthostatic VS  03-03-25 @ 08:29  Lying BP: --/-- HR: --  Sitting BP: 110/85 HR: 87  Standing BP: --/-- HR: --  Site: upper left arm  Mode: electronic  Orthostatic VS  03-02-25 @ 20:53  Lying BP: --/-- HR: --  Sitting BP: 97/60 HR: 83  Standing BP: --/-- HR: --  Site: --  Mode: --  Orthostatic VS  03-02-25 @ 14:15  Lying BP: --/-- HR: --  Sitting BP: 129/89 HR: 95  Standing BP: 113/85 HR: 101  Site: --  Mode: --    Lipid Panel: Date/Time: 12-27-24 @ 05:30  Cholesterol, Serum: 145  LDL Cholesterol Calculated: 73  HDL Cholesterol, Serum: 47  Total Cholesterol/HDL Ration Measurement: --  Triglycerides, Serum: 145   BMI: BMI (kg/m2): 37.3 (03-02-25 @ 14:15)  HbA1c: A1C with Estimated Average Glucose Result: 5.4 % (12-27-24 @ 05:30)    Glucose: POCT Blood Glucose.: 104 mg/dL (04-04-24 @ 06:04)    BP: 110/69 (03-02-25 @ 12:30) (110/69 - 129/87)Vital Signs Last 24 Hrs  T(C): 36.8 (03-03-25 @ 08:29), Max: 36.8 (03-03-25 @ 08:29)  T(F): 98.2 (03-03-25 @ 08:29), Max: 98.2 (03-03-25 @ 08:29)  HR: --  BP: --  BP(mean): --  RR: --  SpO2: --    Orthostatic VS  03-03-25 @ 08:29  Lying BP: --/-- HR: --  Sitting BP: 110/85 HR: 87  Standing BP: --/-- HR: --  Site: upper left arm  Mode: electronic  Orthostatic VS  03-02-25 @ 20:53  Lying BP: --/-- HR: --  Sitting BP: 97/60 HR: 83  Standing BP: --/-- HR: --  Site: --  Mode: --  Orthostatic VS  03-02-25 @ 14:15  Lying BP: --/-- HR: --  Sitting BP: 129/89 HR: 95  Standing BP: 113/85 HR: 101  Site: --  Mode: --    Lipid Panel: Date/Time: 12-27-24 @ 05:30  Cholesterol, Serum: 145  LDL Cholesterol Calculated: 73  HDL Cholesterol, Serum: 47  Total Cholesterol/HDL Ration Measurement: --  Triglycerides, Serum: 145

## 2025-03-03 NOTE — BH SOCIAL WORK INITIAL PSYCHOSOCIAL EVALUATION - NSPROPTRIGHTCAREGIVER_GEN_A_NUR
Physical Therapy Daily Treatment Note  [address], [address], [place] [address]      Patient: Bebe Steward   : 1977  Diagnosis/ICD-10 Code:  Sprain of medial collateral ligament of right knee, subsequent encounter [S83.411D]  Referring practitioner: Uriel Sears*  Date of Initial Visit: Type: THERAPY  Noted: 2023  Today's Date: 2023  Patient seen for 4 sessions             Subjective   Bebe Steward reports: 6/10 R knee pain at beginning of session today.    Objective   See Exercise, Manual, and Modality Logs for complete treatment.       Assessment/Plan  Patient tolerated all exercise progressions and manual therapy well today but continues to demonstrate R knee strength deficits limiting function. Continue to progress per patient tolerance.  Progress per Plan of Care           Timed:  Manual Therapy:    0     mins  25710;  Therapeutic Exercise:    22     mins  11831;     Neuromuscular Kaiden:    0    mins  03364;    Therapeutic Activity:     8     mins  89152;     Gait Trainin     mins  81407;     Ultrasound:     0     mins  84276;    Electrical Stimulation:    0     mins  48456;  Iontophoresis     0     mins  10766    Untimed:  Electrical Stimulation:    0     mins  73062 ( );  Mechanical Traction:    0     mins  90065;   Fluidotherapy     0     mins  24720  Hot pack     0     Mins    Cold pack                       0     Mins     Timed Treatment:   30   mins   Total Treatment:     30   mins        Dinorah Henderson PT    Electronically signed [unfilled]  [place] License: [id number]  NPI number: [id number]  
declines

## 2025-03-03 NOTE — CONSULT NOTE ADULT - ASSESSMENT
59 yoM, undomiciled, poor historian, non-compliant, polysubstance abuse (alcohol, cocaine) w/ PMHx HTN, self reported CHF, many documented diagnoses ranging across mood, psychotic and personality disorders, many prior hospitalizations and ED visits, several prior self-aborted suicide attempts, many prior detox/rehabs, auditory hallucinations,  most recently discharge from [facility] 12/13/24 for depression and suicidal ideation and malingering who presented to [facility] 12/26/24 for chest pain, now resolved, neg trops.      Reported Hypertension:  supposed to be on HCTZ however non-compliant:  Patient normotensive now   - Continue to monitor     # Psychiatric disorder.    - per primary psych team   #Alcohol abuse.   -continue  Thiamine 100 mg PO daily + Multivitamin + Folic acid daily   59 yoM, undomiciled, poor historian, non-compliant, polysubstance abuse (alcohol, cocaine) w/ PMHx HTN, self reported CHF, many documented diagnoses ranging across mood, psychotic and personality disorders, many prior hospitalizations and ED visits, several prior self-aborted suicide attempts, many prior detox/rehabs, auditory hallucinations,  most recently discharge from [facility] 12/13/24 for depression and suicidal ideation and malingering who presenting to [facility] for SI. Medicine consulted for co-management.     Reported Hypertension:  supposed to be on HCTZ however non-compliant:  Patient normotensive now   - Continue to monitor. If elevated, can restart HCTZ   - DASh diet     # Psychiatric disorder.    - per primary psych team   #Alcohol abuse.   -continue  Thiamine 100 mg PO daily + Multivitamin + Folic acid daily    #Self reported HF -  TTE (12/26/24): Normal LVSF/RVSF, Mild symmetric LVH,  3. Normal right ventricular size and systolic function, No significant valvular disease.  - Patient was started on aspirin 81mg and metoprolol ER 25mg on prior hospitalization with plan for cardiology follow up  - Will restart aspirin 81mg and metoprolol ER 25mg   - Monitor BP for tolerance   - Outpatient cardiology follow up  59 yoM, undomiciled, poor historian, non-compliant, polysubstance abuse (alcohol, cocaine) w/ PMHx HTN, self reported CHF, many documented diagnoses ranging across mood, psychotic and personality disorders, many prior hospitalizations and ED visits, several prior self-aborted suicide attempts, many prior detox/rehabs, auditory hallucinations,  most recently discharge from [facility] 12/13/24 for depression and suicidal ideation and malingering who presenting to [facility] for SI. Medicine consulted for co-management.     Reported Hypertension:  supposed to be on HCTZ however non-compliant:  Patient normotensive now   - Continue to monitor. If elevated, can restart HCTZ   - DASh diet     # Psychiatric disorder.    - per primary psych team   #Alcohol abuse.   -continue  Thiamine 100 mg PO daily + Multivitamin + Folic acid daily    #Anemia -   Continue  Multivitamin + Folic acid daily  - No acute blood loss   - CTM   #Transamintitis i/s/o ETOH abuse    #Self reported HF -  TTE (12/26/24): Normal LVSF/RVSF, Mild symmetric LVH,  3. Normal right ventricular size and systolic function, No significant valvular disease.  - Patient was started on aspirin 81mg and metoprolol ER 25mg on prior hospitalization with plan for cardiology follow up  - Will restart aspirin 81mg and metoprolol ER 25mg   - Monitor BP for tolerance   - Outpatient cardiology follow up

## 2025-03-03 NOTE — BH INPATIENT PSYCHIATRY ASSESSMENT NOTE - NSBHSACOC_PSY_A_CORE FT
Prior chart indicates hx of use; reports last use was 30 mins prior to [facility] ED presentation, but now reports he does not remember using but UTox +

## 2025-03-03 NOTE — CONSULT NOTE ADULT - SUBJECTIVE AND OBJECTIVE BOX
HPI:     PAST MEDICAL & SURGICAL HISTORY:  Hypertension      Pericarditis      Chronic CHF      GERD (gastroesophageal reflux disease)      Depression      Substance use disorder      Bipolar 1 disorder      Borderline personality disorder      Substance abuse      Alcohol abuse      No significant past surgical history          Review of Systems:   CONSTITUTIONAL: No fever, weight loss, or fatigue  EYES: No eye pain, visual disturbances, or discharge  ENMT:  No difficulty hearing, tinnitus, vertigo; No sinus or throat pain  NECK: No pain or stiffness  RESPIRATORY: No cough, wheezing, chills or hemoptysis; No shortness of breath  CARDIOVASCULAR: No chest pain, palpitations, dizziness, or leg swelling  GASTROINTESTINAL: No abdominal or epigastric pain. No nausea, vomiting, or hematemesis; No diarrhea or constipation. No melena or hematochezia.  GENITOURINARY: No dysuria, frequency, hematuria, or incontinence  NEUROLOGICAL: No headaches, memory loss, loss of strength, numbness, or tremors  SKIN: No itching, burning, rashes, or lesions   LYMPH NODES: No enlarged glands  ENDOCRINE: No heat or cold intolerance; No hair loss  MUSCULOSKELETAL: No joint pain or swelling; No muscle, back, or extremity pain  HEME/LYMPH: No easy bruising, or bleeding gums  ALLERY AND IMMUNOLOGIC: No hives or eczema    Allergies    No Known Allergies    Intolerances        Social History:     FAMILY HISTORY:      MEDICATIONS  (STANDING):  folic acid 1 milliGRAM(s) Oral daily  lurasidone 20 milliGRAM(s) Oral <User Schedule>  multivitamin 1 Tablet(s) Oral daily  thiamine 100 milliGRAM(s) Oral daily    MEDICATIONS  (PRN):  acetaminophen     Tablet .. 650 milliGRAM(s) Oral every 6 hours PRN Mild Pain (1 - 3), Moderate Pain (4 - 6), Severe Pain (7 - 10)  aluminum hydroxide/magnesium hydroxide/simethicone Suspension 30 milliLiter(s) Oral every 4 hours PRN Dyspepsia  hydrOXYzine hydrochloride 50 milliGRAM(s) Oral every 6 hours PRN anxiety  nicotine  Polacrilex Gum 2 milliGRAM(s) Oral every 2 hours PRN Smoking Cessation  OLANZapine 5 milliGRAM(s) Oral every 6 hours PRN psychotic agitation  OLANZapine Injectable 5 milliGRAM(s) IntraMuscular Once PRN severe psychotic agitation      Vital Signs Last 24 Hrs  T(C): 36.8 (03 Mar 2025 08:29), Max: 36.8 (03 Mar 2025 08:29)  T(F): 98.2 (03 Mar 2025 08:29), Max: 98.2 (03 Mar 2025 08:29)  HR: --  BP: --  BP(mean): --  RR: --  SpO2: --      CAPILLARY BLOOD GLUCOSE            PHYSICAL EXAM:  GENERAL: NAD, well-developed  HEAD:  Atraumatic, Normocephalic  EYES: EOMI, conjunctiva and sclera clear  NECK: Supple, No JVD  CHEST/LUNG: Clear to auscultation bilaterally; No wheeze  HEART: Regular rate and rhythm; No murmurs, rubs, or gallops  ABDOMEN: Soft, Nontender, Nondistended; Bowel sounds present  EXTREMITIES:  2+ Peripheral Pulses, No clubbing, cyanosis, or edema  NEUROLOGY: non-focal  SKIN: No rashes or lesions    LABS:                        11.8   7.90  )-----------( 271      ( 02 Mar 2025 11:15 )             35.0     03-02    137  |  103  |  20  ----------------------------<  85  4.1   |  19[L]  |  1.04    Ca    9.2      02 Mar 2025 11:15    TPro  7.4  /  Alb  4.1  /  TBili  0.7  /  DBili  x   /  AST  80[H]  /  ALT  49[H]  /  AlkPhos  60  03-02          Urinalysis Basic - ( 02 Mar 2025 11:15 )    Color: Yellow / Appearance: Clear / S.033 / pH: x  Gluc: 85 mg/dL / Ketone: 15 mg/dL  / Bili: Negative / Urobili: 0.2 mg/dL   Blood: x / Protein: Trace mg/dL / Nitrite: Negative   Leuk Esterase: Negative / RBC: x / WBC x   Sq Epi: x / Non Sq Epi: x / Bacteria: x        EKG(personally reviewed):    RADIOLOGY & ADDITIONAL TESTS:    Imaging Personally Reviewed:    Consultant(s) Notes Reviewed:      Care Discussed with Consultants/Other Providers:   HPI: 58 yo M hx alcohol use disorder, borderline personality disorder, depression, HTN, GERD, presenting  c/o suicidal ideation with plan    PAST MEDICAL & SURGICAL HISTORY:  Hypertension      Pericarditis      Chronic CHF      GERD (gastroesophageal reflux disease)      Depression      Substance use disorder      Bipolar 1 disorder      Borderline personality disorder      Substance abuse      Alcohol abuse      No significant past surgical history          Review of Systems:   CONSTITUTIONAL: No fever, weight loss, or fatigue  EYES: No eye pain, visual disturbances, or discharge  ENMT:  No difficulty hearing, tinnitus, vertigo; No sinus or throat pain  NECK: No pain or stiffness  RESPIRATORY: No cough, wheezing, chills or hemoptysis; No shortness of breath  CARDIOVASCULAR: No chest pain, palpitations, dizziness, or leg swelling  GASTROINTESTINAL: No abdominal or epigastric pain. No nausea, vomiting, or hematemesis; No diarrhea or constipation. No melena or hematochezia.  GENITOURINARY: No dysuria, frequency, hematuria, or incontinence  NEUROLOGICAL: No headaches, memory loss, loss of strength, numbness, or tremors  SKIN: No itching, burning, rashes, or lesions   LYMPH NODES: No enlarged glands  ENDOCRINE: No heat or cold intolerance; No hair loss  MUSCULOSKELETAL: No joint pain or swelling; No muscle, back, or extremity pain  HEME/LYMPH: No easy bruising, or bleeding gums  ALLERY AND IMMUNOLOGIC: No hives or eczema    Allergies    No Known Allergies    Intolerances        Social History:     FAMILY HISTORY:      MEDICATIONS  (STANDING):  folic acid 1 milliGRAM(s) Oral daily  lurasidone 20 milliGRAM(s) Oral <User Schedule>  multivitamin 1 Tablet(s) Oral daily  thiamine 100 milliGRAM(s) Oral daily    MEDICATIONS  (PRN):  acetaminophen     Tablet .. 650 milliGRAM(s) Oral every 6 hours PRN Mild Pain (1 - 3), Moderate Pain (4 - 6), Severe Pain (7 - 10)  aluminum hydroxide/magnesium hydroxide/simethicone Suspension 30 milliLiter(s) Oral every 4 hours PRN Dyspepsia  hydrOXYzine hydrochloride 50 milliGRAM(s) Oral every 6 hours PRN anxiety  nicotine  Polacrilex Gum 2 milliGRAM(s) Oral every 2 hours PRN Smoking Cessation  OLANZapine 5 milliGRAM(s) Oral every 6 hours PRN psychotic agitation  OLANZapine Injectable 5 milliGRAM(s) IntraMuscular Once PRN severe psychotic agitation      Vital Signs Last 24 Hrs  T(C): 36.8 (03 Mar 2025 08:29), Max: 36.8 (03 Mar 2025 08:29)  T(F): 98.2 (03 Mar 2025 08:29), Max: 98.2 (03 Mar 2025 08:29)  HR: --  BP: --  BP(mean): --  RR: --  SpO2: --      CAPILLARY BLOOD GLUCOSE            PHYSICAL EXAM:  GENERAL: NAD, well-developed  HEAD:  Atraumatic, Normocephalic  EYES: EOMI, conjunctiva and sclera clear  NECK: Supple, No JVD  CHEST/LUNG: Clear to auscultation bilaterally; No wheeze  HEART: Regular rate and rhythm; No murmurs, rubs, or gallops  ABDOMEN: Soft, Nontender, Nondistended; Bowel sounds present  EXTREMITIES:  2+ Peripheral Pulses, No clubbing, cyanosis, or edema  NEUROLOGY: non-focal  SKIN: No rashes or lesions    LABS:                        11.8   7.90  )-----------( 271      ( 02 Mar 2025 11:15 )             35.0     03-02    137  |  103  |  20  ----------------------------<  85  4.1   |  19[L]  |  1.04    Ca    9.2      02 Mar 2025 11:15    TPro  7.4  /  Alb  4.1  /  TBili  0.7  /  DBili  x   /  AST  80[H]  /  ALT  49[H]  /  AlkPhos  60  03-02          Urinalysis Basic - ( 02 Mar 2025 11:15 )    Color: Yellow / Appearance: Clear / S.033 / pH: x  Gluc: 85 mg/dL / Ketone: 15 mg/dL  / Bili: Negative / Urobili: 0.2 mg/dL   Blood: x / Protein: Trace mg/dL / Nitrite: Negative   Leuk Esterase: Negative / RBC: x / WBC x   Sq Epi: x / Non Sq Epi: x / Bacteria: x        EKG(personally reviewed):    RADIOLOGY & ADDITIONAL TESTS:    Imaging Personally Reviewed:    Consultant(s) Notes Reviewed:      Care Discussed with Consultants/Other Providers:   HPI: 60 yo M hx alcohol use disorder, borderline personality disorder, depression, HTN, GERD, presenting  c/o suicidal ideation with plan. Patient with reported CHF. Medicine consulted for comangement. History limited as patient reports he was sleepy. Patient denies chest pain or SOB. Cannot go into cardiac history specifics. Discussed prior cardiology consulted and plan to restart medication. No chest pain or SOB     PAST MEDICAL & SURGICAL HISTORY:  Hypertension      Pericarditis      Chronic CHF      GERD (gastroesophageal reflux disease)      Depression      Substance use disorder      Bipolar 1 disorder      Borderline personality disorder      Substance abuse      Alcohol abuse      No significant past surgical history          Review of Systems:   CONSTITUTIONAL: No fever, weight loss, or fatigue  EYES: No eye pain, visual disturbances, or discharge  ENMT:  No difficulty hearing, tinnitus, vertigo; No sinus or throat pain  NECK: No pain or stiffness  RESPIRATORY: No cough, wheezing, chills or hemoptysis; No shortness of breath  CARDIOVASCULAR: No chest pain, palpitations, dizziness, or leg swelling  GASTROINTESTINAL: No abdominal or epigastric pain. No nausea, vomiting, or hematemesis; No diarrhea or constipation. No melena or hematochezia.  GENITOURINARY: No dysuria, frequency, hematuria, or incontinence  NEUROLOGICAL: No headaches, memory loss, loss of strength, numbness, or tremors  SKIN: No itching, burning, rashes, or lesions   LYMPH NODES: No enlarged glands  ENDOCRINE: No heat or cold intolerance; No hair loss  MUSCULOSKELETAL: No joint pain or swelling; No muscle, back, or extremity pain  HEME/LYMPH: No easy bruising, or bleeding gums  ALLERY AND IMMUNOLOGIC: No hives or eczema    Allergies    No Known Allergies    Intolerances        Social History: Undomiciled     FAMILY HISTORY: Non contributory       MEDICATIONS  (STANDING):  folic acid 1 milliGRAM(s) Oral daily  lurasidone 20 milliGRAM(s) Oral <User Schedule>  multivitamin 1 Tablet(s) Oral daily  thiamine 100 milliGRAM(s) Oral daily    MEDICATIONS  (PRN):  acetaminophen     Tablet .. 650 milliGRAM(s) Oral every 6 hours PRN Mild Pain (1 - 3), Moderate Pain (4 - 6), Severe Pain (7 - 10)  aluminum hydroxide/magnesium hydroxide/simethicone Suspension 30 milliLiter(s) Oral every 4 hours PRN Dyspepsia  hydrOXYzine hydrochloride 50 milliGRAM(s) Oral every 6 hours PRN anxiety  nicotine  Polacrilex Gum 2 milliGRAM(s) Oral every 2 hours PRN Smoking Cessation  OLANZapine 5 milliGRAM(s) Oral every 6 hours PRN psychotic agitation  OLANZapine Injectable 5 milliGRAM(s) IntraMuscular Once PRN severe psychotic agitation      Vital Signs Last 24 Hrs  T(C): 36.8 (03 Mar 2025 08:29), Max: 36.8 (03 Mar 2025 08:29)  T(F): 98.2 (03 Mar 2025 08:29), Max: 98.2 (03 Mar 2025 08:29)  HR: --  BP: --  BP(mean): --  RR: --  SpO2: --      CAPILLARY BLOOD GLUCOSE            PHYSICAL EXAM:  GENERAL: Middle age man in NAD, well-developed  HEAD:  Atraumatic, Normocephalic  EYES: EOMI, conjunctiva and sclera clear  NECK: Supple, No JVD  CHEST/LUNG: Clear to auscultation bilaterally; No wheeze  HEART: Regular rate and rhythm; No murmurs, rubs, or gallops  ABDOMEN: Soft, Nontender, Nondistended; Bowel sounds present  EXTREMITIES:  2+ Peripheral Pulses, No clubbing, cyanosis, or edema  NEUROLOGY: non-focal  SKIN: No rashes or lesions    LABS:                        11.8   7.90  )-----------( 271      ( 02 Mar 2025 11:15 )             35.0     03-02    137  |  103  |  20  ----------------------------<  85  4.1   |  19[L]  |  1.04    Ca    9.2      02 Mar 2025 11:15    TPro  7.4  /  Alb  4.1  /  TBili  0.7  /  DBili  x   /  AST  80[H]  /  ALT  49[H]  /  AlkPhos  60  03-02          Urinalysis Basic - ( 02 Mar 2025 11:15 )    Color: Yellow / Appearance: Clear / S.033 / pH: x  Gluc: 85 mg/dL / Ketone: 15 mg/dL  / Bili: Negative / Urobili: 0.2 mg/dL   Blood: x / Protein: Trace mg/dL / Nitrite: Negative   Leuk Esterase: Negative / RBC: x / WBC x   Sq Epi: x / Non Sq Epi: x / Bacteria: x        EKG(personally reviewed):    RADIOLOGY & ADDITIONAL TESTS:    Imaging Personally Reviewed:    Consultant(s) Notes Reviewed:      Care Discussed with Consultants/Other Providers:

## 2025-03-03 NOTE — BH SOCIAL WORK INITIAL PSYCHOSOCIAL EVALUATION - NSBHSAALC_PSY_A_CORE FT
Per chart review: "Pt reports daily ~3 pints of vodka for 'a long time'; reports last use was 30 minutes prior to [facility] ED presentation

## 2025-03-03 NOTE — BH SOCIAL WORK INITIAL PSYCHOSOCIAL EVALUATION - NSBHSACOC_PSY_A_CORE FT
Prior chart indicates hx of use; reports last use was 30 minutes prior to [facility] ED presentation.

## 2025-03-03 NOTE — PSYCHIATRIC REHAB INITIAL EVALUATION - NSBHPRRECOMMEND_PSY_ALL_CORE
Schuylerr met with patient to orient to unit and to introduce self and psychiatric rehabilitation staff and functions.  tried to meet with patient multiple times, however patient was asleep, therefore this note will be done per chart and observation. Patient is a 59- year-old male with self-reported history of bipolar, SCZ, and multiple psych hospitalizations. Patient admitted himself to the ER for feeling depressed, anxious, and with SI and plan to jump in front of the E train or via going to [place] and procuring a gun through the help of a friend so that he can shoot self in the head.  Upon first observation and initial introduction to writer, Patient seems depressed and down. Schuylerr created psych rehab goal for patient and will continue further assessment. Over the next few days Psych rehab staff will continue to engage and support patient throughout.

## 2025-03-03 NOTE — BH INPATIENT PSYCHIATRY ASSESSMENT NOTE - NSBHATTESTAPPBILLTIME_PSY_A_CORE
I attest my time as EMMIE is greater than 50% of the total combined time spent on qualifying patient care activities. I have reviewed and verified the documentation.

## 2025-03-03 NOTE — BH INPATIENT PSYCHIATRY ASSESSMENT NOTE - NSBHSAALC_PSY_A_CORE FT
Per chart review: "Pt reports daily ~3 pints of vodka for 'a long time'; reports last use was 30 mins prior to [facility] ED presentation, but not reports only occasional use

## 2025-03-03 NOTE — BH INPATIENT PSYCHIATRY ASSESSMENT NOTE - NSBHASSESSSUMMFT_PSY_ALL_CORE
59 yr old male, single, undomiciled and unemployed.  self reported hx of bipolar disorder and SCZ;  as per PSYCKES, has the following Behavioral Health Diagnoses to include Unspecified/Other Bipolar, substance-Induced Depressive Disorder, Major Depressive Disorder, Unspecified/Other Depressive Disorder,  Borderline Personality Disorder, Antisocial Personality Disorder, Unspecified/Other Anxiety Disorder, Bipolar I, Bipolar II, Schizoaffective Disorder, Substance-Induced Sleep Disorder, Adjustment Disorder, Schizophrenia, Substance-Induced Psychotic Disorder, Other Mental Disorders Unspecified/Other Psychotic Disorders,  Attention Deficit Hyperactivity Disorder, Insomnia Disorder  and Substance-Induced Anxiety Disorder.. Pt is high utilizer of mental health facilities + is chronically non adherent to meds and psych appts; has multiple ED visits and psych admissions including last [facility] admission back in 12/2024 for depression + SI.  past documented hx for suspicion of malingering on pior visits including this latest 4 admission, 12/2024 of which, writer had chance to evaluate Pt,   as per chart review, there is documented hx of "several prior self-aborted suicide attempts with plan to jump in front of train in 2023 but was, "stopped by an emre",  Pt has hx of polysubstance use (as per PSYCKES: with hx of Cannabis related disorders/ Alcohol related disorders/ Cocaine related disorders/ Other psychoactive substance related disorders/ Opioid related disorders/ Tobacco related disorder/  Other stimulant related disorders and Sedative, hypnotic, or anxiolytic related disorders).  Pt has had hx of numerous prior detox/rehabs (previous [facility] admission in 12/2023).  He has hx of multiple ED visits reporting chest pain and/or SI and/or command AH to kill himself,  Pt was just discharged this morning from [facility] ED after self presenting with complaint of chest pain, non productive cough and headache.  pertinent medical issues include: HTN, pericarditis, Chronic CHF and GERD.  Pt admits he is not on any maintenance meds.  today, self presented to the ED (once again), complaining of depression, anxiety, "AH" and SI with plan.      >Legal: 9.13  >Obs: Routine, no need for CO, patient not expected to pose risk to self or others in controlled inpatient setting  >Psychiatric Meds: Restart Latuda 20mg PO at 1700 with food (mood)   >Labs: Admission labs reviewed, no acute findings.   >Medical:  No acute concerns. Per chart review of medical issues in ED: HTN, pericarditis, self reports hx of CHF (claims EF around ? 45%), CKD, GERD. non-compliant with past meds of HCTZ 25mg, lipitor 40mg, ASA 81mg, entresto 24-26mg-- [facility] Hospitalist to determine reinitiating of maintenance meds for his cardiovascular issues as deemed appropriate; in the mean time, will hold meds for now.  Pt reports being diagnosed with heart issues in 2014 and has been noncompliant with medications for a long time.  Of note TTE in 12/2024 shows EF of 60-65%  --No indication for CIWA, will discontinue. Patient with consistently stable VS, no visible physical symptoms of ETOH withdrawal and BAL <10 in ED.  >Social: milieu/structured therapy  >Treatment Interventions: Groups and Individual Therapy/CBT, Motivational counseling for substance abuse related issues.   >Dispo: pending remission of sx

## 2025-03-03 NOTE — BH INPATIENT PSYCHIATRY ASSESSMENT NOTE - VIOLENCE RISK FACTORS:
Antisocial behavior/cognition (past or present)/Substance abuse/Affective dysregulation/Impulsivity/Command hallucinations for violent behavior/Lack of insight into violence risk/need for treatment/Noncompliance with treatment/Community stressors that increase the risk of destabilization

## 2025-03-03 NOTE — BH INPATIENT PSYCHIATRY ASSESSMENT NOTE - ADDITIONAL DETAILS ALL
per chart review, Pt had previously reported that "he was stopped from jumping in front of a train ~ 2 yrs ago" (this cannot be substantiated by a behavioral health provider or a family member/ friend who knows Pt apart from what was documented in past charts); pt reports current SI with plan no intent

## 2025-03-03 NOTE — SAFE ACT REPORTING PROGRESS NOTE - NSBHDATEREPORT_PSY_ALL_CORE
03-Mar-2025 14:38 Double O-Z Flap Text: The defect edges were debeveled with a #15 scalpel blade.  Given the location of the defect, shape of the defect and the proximity to free margins a Double O-Z flap was deemed most appropriate.  Using a sterile surgical marker, an appropriate transposition flap was drawn incorporating the defect and placing the expected incisions within the relaxed skin tension lines where possible. The area thus outlined was incised deep to adipose tissue with a #15 scalpel blade.  The skin margins were undermined to an appropriate distance in all directions utilizing iris scissors.

## 2025-03-03 NOTE — BH SOCIAL WORK INITIAL PSYCHOSOCIAL EVALUATION - NSBHSACONSEQUENCE_PSY_A_CORE FT
Per chart review: "Pt reports many past detox and rehab admissions, including [place], [facility], others.".. with continued use of said drugs, will cause worsening psychosis, depressive and anxiety symptoms including clinical picture of miquel Per chart review: "Pt reports many past detox and rehab admissions, including [place], [facility], others.".. with continued use of said drugs, will cause worsening psychosis, depressive and anxiety symptoms including clinical picture of miquel.

## 2025-03-04 DIAGNOSIS — R07.89 OTHER CHEST PAIN: ICD-10-CM

## 2025-03-04 DIAGNOSIS — R05.8 OTHER SPECIFIED COUGH: ICD-10-CM

## 2025-03-04 DIAGNOSIS — I10 ESSENTIAL (PRIMARY) HYPERTENSION: ICD-10-CM

## 2025-03-04 DIAGNOSIS — R00.0 TACHYCARDIA, UNSPECIFIED: ICD-10-CM

## 2025-03-04 PROCEDURE — 99232 SBSQ HOSP IP/OBS MODERATE 35: CPT

## 2025-03-04 RX ORDER — LORAZEPAM 4 MG/ML
1 VIAL (ML) INJECTION ONCE
Refills: 0 | Status: DISCONTINUED | OUTPATIENT
Start: 2025-03-04 | End: 2025-03-04

## 2025-03-04 RX ADMIN — Medication 650 MILLIGRAM(S): at 19:39

## 2025-03-04 RX ADMIN — FOLIC ACID 1 MILLIGRAM(S): 1 TABLET ORAL at 10:59

## 2025-03-04 RX ADMIN — Medication 1 MILLIGRAM(S): at 19:39

## 2025-03-04 RX ADMIN — Medication 650 MILLIGRAM(S): at 20:10

## 2025-03-04 RX ADMIN — Medication 100 MILLIGRAM(S): at 11:00

## 2025-03-04 RX ADMIN — Medication 1 TABLET(S): at 10:59

## 2025-03-05 PROCEDURE — 99232 SBSQ HOSP IP/OBS MODERATE 35: CPT

## 2025-03-05 RX ORDER — DIPHENHYDRAMINE HCL 12.5MG/5ML
25 ELIXIR ORAL ONCE
Refills: 0 | Status: COMPLETED | OUTPATIENT
Start: 2025-03-05 | End: 2025-03-05

## 2025-03-05 RX ADMIN — Medication 25 MILLIGRAM(S): at 20:55

## 2025-03-05 RX ADMIN — Medication 100 MILLIGRAM(S): at 09:00

## 2025-03-05 RX ADMIN — LURASIDONE HYDROCHLORIDE 20 MILLIGRAM(S): 120 TABLET, FILM COATED ORAL at 17:05

## 2025-03-05 RX ADMIN — FOLIC ACID 1 MILLIGRAM(S): 1 TABLET ORAL at 09:00

## 2025-03-05 RX ADMIN — Medication 1 TABLET(S): at 09:00

## 2025-03-06 PROCEDURE — 99232 SBSQ HOSP IP/OBS MODERATE 35: CPT

## 2025-03-06 RX ORDER — MELATONIN 5 MG
3 TABLET ORAL AT BEDTIME
Refills: 0 | Status: DISCONTINUED | OUTPATIENT
Start: 2025-03-06 | End: 2025-03-14

## 2025-03-06 RX ADMIN — Medication 1 TABLET(S): at 08:03

## 2025-03-06 RX ADMIN — Medication 650 MILLIGRAM(S): at 21:10

## 2025-03-06 RX ADMIN — Medication 81 MILLIGRAM(S): at 08:03

## 2025-03-06 RX ADMIN — FOLIC ACID 1 MILLIGRAM(S): 1 TABLET ORAL at 08:03

## 2025-03-06 RX ADMIN — LURASIDONE HYDROCHLORIDE 20 MILLIGRAM(S): 120 TABLET, FILM COATED ORAL at 16:57

## 2025-03-06 RX ADMIN — METOPROLOL SUCCINATE 25 MILLIGRAM(S): 50 TABLET, EXTENDED RELEASE ORAL at 08:03

## 2025-03-06 RX ADMIN — Medication 650 MILLIGRAM(S): at 08:03

## 2025-03-06 RX ADMIN — Medication 650 MILLIGRAM(S): at 20:03

## 2025-03-07 PROCEDURE — 99231 SBSQ HOSP IP/OBS SF/LOW 25: CPT

## 2025-03-07 RX ADMIN — METOPROLOL SUCCINATE 25 MILLIGRAM(S): 50 TABLET, EXTENDED RELEASE ORAL at 08:35

## 2025-03-07 RX ADMIN — LURASIDONE HYDROCHLORIDE 20 MILLIGRAM(S): 120 TABLET, FILM COATED ORAL at 16:29

## 2025-03-07 RX ADMIN — Medication 81 MILLIGRAM(S): at 08:35

## 2025-03-07 RX ADMIN — Medication 1 TABLET(S): at 08:35

## 2025-03-07 RX ADMIN — FOLIC ACID 1 MILLIGRAM(S): 1 TABLET ORAL at 08:35

## 2025-03-08 RX ORDER — DIPHENHYDRAMINE HCL 12.5MG/5ML
50 ELIXIR ORAL ONCE
Refills: 0 | Status: COMPLETED | OUTPATIENT
Start: 2025-03-08 | End: 2025-03-08

## 2025-03-08 RX ADMIN — Medication 1 TABLET(S): at 08:14

## 2025-03-08 RX ADMIN — Medication 81 MILLIGRAM(S): at 08:14

## 2025-03-08 RX ADMIN — FOLIC ACID 1 MILLIGRAM(S): 1 TABLET ORAL at 08:14

## 2025-03-08 RX ADMIN — Medication 50 MILLIGRAM(S): at 20:55

## 2025-03-08 RX ADMIN — METOPROLOL SUCCINATE 25 MILLIGRAM(S): 50 TABLET, EXTENDED RELEASE ORAL at 08:14

## 2025-03-09 RX ADMIN — FOLIC ACID 1 MILLIGRAM(S): 1 TABLET ORAL at 08:53

## 2025-03-09 RX ADMIN — METOPROLOL SUCCINATE 25 MILLIGRAM(S): 50 TABLET, EXTENDED RELEASE ORAL at 08:53

## 2025-03-09 RX ADMIN — Medication 81 MILLIGRAM(S): at 08:53

## 2025-03-09 RX ADMIN — Medication 1 TABLET(S): at 08:53

## 2025-03-10 PROCEDURE — 99232 SBSQ HOSP IP/OBS MODERATE 35: CPT

## 2025-03-10 RX ORDER — LURASIDONE HYDROCHLORIDE 120 MG/1
20 TABLET, FILM COATED ORAL AT BEDTIME
Refills: 0 | Status: DISCONTINUED | OUTPATIENT
Start: 2025-03-10 | End: 2025-03-14

## 2025-03-10 RX ORDER — METOPROLOL SUCCINATE 50 MG/1
25 TABLET, EXTENDED RELEASE ORAL AT BEDTIME
Refills: 0 | Status: DISCONTINUED | OUTPATIENT
Start: 2025-03-10 | End: 2025-03-14

## 2025-03-10 RX ORDER — ASPIRIN 325 MG
81 TABLET ORAL AT BEDTIME
Refills: 0 | Status: DISCONTINUED | OUTPATIENT
Start: 2025-03-10 | End: 2025-03-14

## 2025-03-10 RX ADMIN — Medication 81 MILLIGRAM(S): at 20:00

## 2025-03-10 RX ADMIN — LURASIDONE HYDROCHLORIDE 20 MILLIGRAM(S): 120 TABLET, FILM COATED ORAL at 20:00

## 2025-03-10 RX ADMIN — METOPROLOL SUCCINATE 25 MILLIGRAM(S): 50 TABLET, EXTENDED RELEASE ORAL at 20:00

## 2025-03-11 PROCEDURE — 99232 SBSQ HOSP IP/OBS MODERATE 35: CPT

## 2025-03-11 RX ORDER — B1/B2/B3/B5/B6/B12/VIT C/FOLIC 500-0.5 MG
1 TABLET ORAL AT BEDTIME
Refills: 0 | Status: DISCONTINUED | OUTPATIENT
Start: 2025-03-11 | End: 2025-03-14

## 2025-03-11 RX ORDER — HYDROCORTISONE 10 MG/G
1 CREAM TOPICAL
Refills: 0 | Status: DISCONTINUED | OUTPATIENT
Start: 2025-03-11 | End: 2025-03-14

## 2025-03-11 RX ADMIN — Medication 81 MILLIGRAM(S): at 20:23

## 2025-03-11 RX ADMIN — METOPROLOL SUCCINATE 25 MILLIGRAM(S): 50 TABLET, EXTENDED RELEASE ORAL at 20:22

## 2025-03-11 RX ADMIN — LURASIDONE HYDROCHLORIDE 20 MILLIGRAM(S): 120 TABLET, FILM COATED ORAL at 20:22

## 2025-03-11 RX ADMIN — Medication 650 MILLIGRAM(S): at 20:21

## 2025-03-11 RX ADMIN — Medication 1 TABLET(S): at 20:22

## 2025-03-12 PROCEDURE — 99232 SBSQ HOSP IP/OBS MODERATE 35: CPT

## 2025-03-12 RX ADMIN — LURASIDONE HYDROCHLORIDE 20 MILLIGRAM(S): 120 TABLET, FILM COATED ORAL at 20:39

## 2025-03-12 RX ADMIN — Medication 650 MILLIGRAM(S): at 23:12

## 2025-03-12 RX ADMIN — Medication 30 MILLILITER(S): at 22:17

## 2025-03-12 RX ADMIN — Medication 650 MILLIGRAM(S): at 22:17

## 2025-03-12 RX ADMIN — Medication 81 MILLIGRAM(S): at 20:39

## 2025-03-12 RX ADMIN — Medication 1 TABLET(S): at 20:39

## 2025-03-13 PROCEDURE — 99232 SBSQ HOSP IP/OBS MODERATE 35: CPT

## 2025-03-13 RX ORDER — METOPROLOL SUCCINATE 50 MG/1
1 TABLET, EXTENDED RELEASE ORAL
Qty: 14 | Refills: 0
Start: 2025-03-13 | End: 2025-03-26

## 2025-03-13 RX ORDER — LURASIDONE HYDROCHLORIDE 120 MG/1
1 TABLET, FILM COATED ORAL
Qty: 14 | Refills: 0
Start: 2025-03-13 | End: 2025-03-26

## 2025-03-13 RX ORDER — ASPIRIN 325 MG
1 TABLET ORAL
Qty: 14 | Refills: 0
Start: 2025-03-13 | End: 2025-03-26

## 2025-03-13 RX ORDER — B1/B2/B3/B5/B6/B12/VIT C/FOLIC 500-0.5 MG
1 TABLET ORAL
Qty: 14 | Refills: 0
Start: 2025-03-13 | End: 2025-03-26

## 2025-03-13 RX ADMIN — Medication 81 MILLIGRAM(S): at 20:33

## 2025-03-13 RX ADMIN — Medication 1 TABLET(S): at 20:33

## 2025-03-13 NOTE — BH INPATIENT PSYCHIATRY DISCHARGE NOTE - NSBHSAALC_PSY_A_CORE FT
Per chart review: "Pt reports daily ~3 pints of vodka for 'a long time'; reports last use was 30 mins prior to [facility] ED presentation, but not reports only occasional use

## 2025-03-13 NOTE — BH INPATIENT PSYCHIATRY PROGRESS NOTE - NSBHCHARTREVIEWVS_PSY_A_CORE FT
Vital Signs Last 24 Hrs  T(C): 36.5 (03-05-25 @ 08:24), Max: 36.9 (03-04-25 @ 20:29)  T(F): 97.7 (03-05-25 @ 08:24), Max: 98.4 (03-04-25 @ 20:29)  HR: --  BP: --  BP(mean): --  RR: --  SpO2: --    Orthostatic VS  03-05-25 @ 08:24  Lying BP: --/-- HR: --  Sitting BP: 104/89 HR: 86  Standing BP: --/-- HR: --  Site: --  Mode: electronic  Orthostatic VS  03-04-25 @ 20:29  Lying BP: --/-- HR: --  Sitting BP: 134/98 HR: 90  Standing BP: 121/92 HR: 106  Site: upper left arm  Mode: --  
Vital Signs Last 24 Hrs  T(C): 36.7 (03-11-25 @ 08:20), Max: 36.7 (03-10-25 @ 19:44)  T(F): 98.1 (03-11-25 @ 08:20), Max: 98.1 (03-10-25 @ 19:44)  HR: 77 (03-11-25 @ 08:20) (77 - 77)  BP: 122/77 (03-11-25 @ 08:20) (122/77 - 122/77)  BP(mean): --  RR: --  SpO2: --    Orthostatic VS  03-10-25 @ 19:44  Lying BP: --/-- HR: --  Sitting BP: 127/87 HR: 90  Standing BP: 126/90 HR: 116  Site: upper left arm  Mode: --  Orthostatic VS  03-10-25 @ 07:35  Lying BP: --/-- HR: --  Sitting BP: 128/87 HR: 104  Standing BP: 137/72 HR: 107  Site: --  Mode: --  
Vital Signs Last 24 Hrs  T(C): 36.7 (03-12-25 @ 08:02), Max: 36.7 (03-12-25 @ 08:02)  T(F): 98.1 (03-12-25 @ 08:02), Max: 98.1 (03-12-25 @ 08:02)  HR: 81 (03-12-25 @ 08:02) (81 - 81)  BP: 122/80 (03-12-25 @ 08:02) (122/80 - 122/80)  BP(mean): --  RR: --  SpO2: --    Orthostatic VS  03-10-25 @ 19:44  Lying BP: --/-- HR: --  Sitting BP: 127/87 HR: 90  Standing BP: 126/90 HR: 116  Site: upper left arm  Mode: --  
Vital Signs Last 24 Hrs  T(C): 36.8 (03-13-25 @ 07:42), Max: 36.8 (03-12-25 @ 20:44)  T(F): 98.3 (03-13-25 @ 07:42), Max: 98.3 (03-13-25 @ 07:42)  HR: 78 (03-12-25 @ 20:44) (78 - 78)  BP: 131/84 (03-12-25 @ 20:44) (131/84 - 131/84)  BP(mean): --  RR: 18 (03-13-25 @ 07:42) (18 - 18)  SpO2: --    Orthostatic VS  03-13-25 @ 07:42  Lying BP: --/-- HR: --  Sitting BP: 124/88 HR: 71  Standing BP: --/-- HR: --  Site: --  Mode: --  
Vital Signs Last 24 Hrs  T(C): 36.9 (03-06-25 @ 07:47), Max: 37 (03-05-25 @ 20:38)  T(F): 98.4 (03-06-25 @ 07:47), Max: 98.6 (03-05-25 @ 20:38)  HR: --  BP: --  BP(mean): --  RR: --  SpO2: --    Orthostatic VS  03-06-25 @ 07:47  Lying BP: --/-- HR: --  Sitting BP: 125/91 HR: 95  Standing BP: --/-- HR: --  Site: upper left arm  Mode: electronic  Orthostatic VS  03-05-25 @ 20:38  Lying BP: --/-- HR: --  Sitting BP: 119/78 HR: 88  Standing BP: 118/90 HR: 115  Site: upper left arm  Mode: --  Orthostatic VS  03-05-25 @ 08:24  Lying BP: --/-- HR: --  Sitting BP: 104/89 HR: 86  Standing BP: --/-- HR: --  Site: --  Mode: electronic  Orthostatic VS  03-04-25 @ 20:29  Lying BP: --/-- HR: --  Sitting BP: 134/98 HR: 90  Standing BP: 121/92 HR: 106  Site: upper left arm  Mode: --  
Vital Signs Last 24 Hrs  T(C): 36.2 (03-07-25 @ 07:35), Max: 36.5 (03-06-25 @ 20:54)  T(F): 97.2 (03-07-25 @ 07:35), Max: 97.7 (03-06-25 @ 20:54)  HR: 90 (03-07-25 @ 07:35) (90 - 90)  BP: 139/87 (03-07-25 @ 07:35) (139/87 - 139/87)  BP(mean): --  RR: --  SpO2: --    Orthostatic VS  03-06-25 @ 20:54  Lying BP: --/-- HR: --  Sitting BP: 129/92 HR: 70  Standing BP: 119/90 HR: 91  Site: upper left arm  Mode: --  Orthostatic VS  03-06-25 @ 07:47  Lying BP: --/-- HR: --  Sitting BP: 125/91 HR: 95  Standing BP: --/-- HR: --  Site: upper left arm  Mode: electronic  Orthostatic VS  03-05-25 @ 20:38  Lying BP: --/-- HR: --  Sitting BP: 119/78 HR: 88  Standing BP: 118/90 HR: 115  Site: upper left arm  Mode: --  
Vital Signs Last 24 Hrs  T(C): --  T(F): --  HR: --  BP: --  BP(mean): --  RR: --  SpO2: --    Orthostatic VS  03-03-25 @ 08:29  Lying BP: --/-- HR: --  Sitting BP: 110/85 HR: 87  Standing BP: --/-- HR: --  Site: upper left arm  Mode: electronic  Orthostatic VS  03-02-25 @ 20:53  Lying BP: --/-- HR: --  Sitting BP: 97/60 HR: 83  Standing BP: --/-- HR: --  Site: --  Mode: --  Orthostatic VS  03-02-25 @ 14:15  Lying BP: --/-- HR: --  Sitting BP: 129/89 HR: 95  Standing BP: 113/85 HR: 101  Site: --  Mode: --  
Vital Signs Last 24 Hrs  T(C): 36.4 (03-10-25 @ 07:35), Max: 36.4 (03-10-25 @ 07:35)  T(F): 97.5 (03-10-25 @ 07:35), Max: 97.5 (03-10-25 @ 07:35)  HR: --  BP: --  BP(mean): --  RR: --  SpO2: 99% (03-10-25 @ 07:35) (99% - 99%)    Orthostatic VS  03-10-25 @ 07:35  Lying BP: --/-- HR: --  Sitting BP: 128/87 HR: 104  Standing BP: 137/72 HR: 107  Site: --  Mode: --  Orthostatic VS  03-09-25 @ 08:06  Lying BP: --/-- HR: --  Sitting BP: 111/84 HR: 94  Standing BP: 124/75 HR: 92  Site: --  Mode: --  Orthostatic VS  03-08-25 @ 20:55  Lying BP: --/-- HR: --  Sitting BP: 104/73 HR: 96  Standing BP: 119/75 HR: 76  Site: --  Mode: --

## 2025-03-13 NOTE — BH INPATIENT PSYCHIATRY DISCHARGE NOTE - OTHER PAST PSYCHIATRIC HISTORY (INCLUDE DETAILS REGARDING ONSET, COURSE OF ILLNESS, INPATIENT/OUTPATIENT TREATMENT)
self reported hx of depression + anxiety    high mental health utilizer including numerous Psych ED/ CPEP visits for SI, alcohol intoxication    chronically nonadherent with meds/ admitted to being inconsistent on his out-patient psychiatric care.    stated past hx of allegedly self aborting jumping in front of a 7 train over the summer ("stopped by an emre" (2023), also reported past hx of self aborting jumping in front of a train in 7/ 2024    multiple psych admissions including recent discharge from [place] [id number], 12/3 - 12/13/2024 for mgt of depression and SI in the context of psychosocial stressors (a diagnoses of Unspecified mood [affective] disorder; and Polysubstance abuse were made as well as entertaining Malingering as part of the diagnosis.  whilst Pt currently claimed that Latuda "helped" towards affording mood stability, of note during that 4 admission, the Pt later on refused standing Latuda; he was  discharged to shelter

## 2025-03-13 NOTE — BH INPATIENT PSYCHIATRY PROGRESS NOTE - NSBHMSEIMPULSE_PSY_A_CORE
by hx: impaired/Normal

## 2025-03-13 NOTE — BH TREATMENT PLAN - NSCMSPTSTRENGTHS_PSY_ALL_CORE
Expressive of emotions/Resourceful

## 2025-03-13 NOTE — BH INPATIENT PSYCHIATRY PROGRESS NOTE - NSBHFUPINTERVALHXFT_PSY_A_CORE
Pt refused medication last night and this AM.  Chart reviewed and case discussed with treatment team.  No events reported overnight.  Pt calm and cooperative today.  Pt reports he is doing well today because he got a hold of his daughter who was looking for him.  Pt reports the previous days he spoke to writer he was out of it due to coming off of substances.  Pt reports he called his daughter and she answered, but he cannot keep a phone to call her often because he is always high.  Pt reports he wants to go to inpatient rehab to help him abstain from substances.  Pt denies SI/HI/I/P or AH/VH or paranoia.  Pt eating and sleeping well.  Pt reports his depression is better, but still there.  Pt reports he is upset with himself because he is in the hospital again.  Pt reports he wants to stop this cycle and stay sober.  Pt reports he went to rehab last month and completed the program but then relapsed shortly after due to hanging around the wrong people and not hearing from his daughter.  Pt reports he knows not hearing from his daughter is a poor excuse for him to use.  Pt reports his drugs of choice are ETOH and cocaine.  Pt reports he wants to abstain and try to go to meetings and they gym after rehab and not hang around the wrong crowd.  Writer discussed his medical medications and Latuda and he agrees to be compliant.
Pt refused Latuda yesterday and cardiac medications this AM.  He is only compliant with vitamins.  Chart reviewed and case discussed with treatment team.  No events reported overnight.  Pt reports he is angry due to receiving Zyprexa PRN yesterday AM due to being "hyperactive and yelling" about the temperature on the unit.  Pt reports it made him feel tired.  Pt reports he is agitated everyday because his daughter is not answering the phone.  [facility].  Pt reports he wants to go to [place] and get out of the [facility] because [place] is better.  Pt reports if he cannot go to [facility], he will go to respite in the [place] where he has been before.  Pt reports SI with plan to shoot himself, but reports not in here.  Pt then reports he is not suicidal and asks to shave.  Pt reports AH of his mother saying good things sometimes.  Pt then asks to transfer to [facility] because he does not like staff here.  Pt advised transfers are not granted for that reason.  Pt encouraged to be compliant with Latuda and he reports, "only if I can transfer to [facility]."  Pt denies HI/I/P or VH or paranoia.  Pt eating and sleeping well.
Pt compliant with medication and tolerating it well.  Chart reviewed and case discussed with treatment team.  No events reported overnight.  Pt calm and cooperative with interview.  Pt reports he slept intermittently last night and agrees to try Melatonin PRN.  Pt reports otherwise he is doing well.  Pt reports he hopes to get into inpatient rehab and he is motivated to stay sober and get his life together.  Pt reports his daughter did not answer the phone this AM, but he is not letting that trigger him.  Pt denies SI/HI/I/P or AH/VH or paranoia.  Pt reports his depression is okay today.  Pt eating well.
Pt compliant with medication and tolerating it well.  Chart reviewed and case discussed with treatment team.  No events reported overnight.  Pt denies SI/HI/I/P or AH/VH or paranoia.  Pt eating and sleeping fairly.  Pt denies feeling depressed.  Pt denies urges to use substances.  Pt reports he has been talking to his daughter and she is his coping skill.  Pt reports he will be in contact with his care coordinator upon discharge to follow-up with the housing application.  Pt wishes to be discharged on his 72 hour letter tomorrow.
Pt compliant with medication and tolerating it well.  Chart reviewed and case discussed with treatment team.  No events reported overnight.  Pt denies SI/HI/I/P or AH/VH or paranoia.  Pt eating and sleeping well.  Pt reports he put in a 72 hour letter due to issues with heating up his food for Ramadan.  Pt reports he continues to be motivated to abstain from substances.  Pt reports he can go to the shelter and go to [facility] where they will get him to [facility] rehab.  Pt declines for SW to contact [facility] or [facility].  Pt agrees for SW to put in housing application.
Pt compliant with medication and tolerating it well.  Chart reviewed and case discussed with treatment team.  No events reported overnight.  Pt denies SI/HI/I/P or AH/VH or paranoia.  Pt eating and sleeping well.  Pt reports he eats early in the AM and at night for Ramadan.  Pt reports his depression is okay today and he has been utilizing coping skills.  Pt reports he continues to want to go to rehab and he prefers [place], so he does not leave.  Pt declining Naltrexone at this time.  Pt reports future goals of getting a job and taking care of his daughter.  Pt reports he is trying to learn to be self discipline.
VSS, accepting medications, no PRNs for agitation, no acute events. Patient seen in common area. He seeks out this covering provider for conversation. He is focused on someone calling his , and asks that the number be communicated to the team SW. He otherwise reports stable mood, denies AH, SI. Denies physical complaints. States that he really wants to be patient this time and stay for rehab. Reports using alcohol and cocaine. Discuss naltrexone, and patient states he will consider.
Pt compliant with medication, but requests for his medication be given all at night time due to fasting for Ramadan.  Chart reviewed and case discussed with treatment team.  No events reported overnight.  Pt reports he refused Latuda on Saturday, but will take it moving forward.  Pt denies SI/HI/I/P or AH/VH or paranoia.  Pt sleeping well.  Pt reports his depression is better.  Pt continues to be motivated to abstain from substances.

## 2025-03-13 NOTE — BH TREATMENT PLAN - NSTXPATIENTPARTICIPATE_PSY_ALL_CORE
Patient participated in defining interventions
Patient participated in identification of needs/problems/goals for treatment/Patient participated in defining interventions/Patient participated in development of after care plan
Patient participated in identification of needs/problems/goals for treatment/Patient participated in defining interventions/Patient participated in development of after care plan

## 2025-03-13 NOTE — BH INPATIENT PSYCHIATRY DISCHARGE NOTE - NSBHASSESSSUMMFT_PSY_ALL_CORE
59 yr old male, single, undomiciled and unemployed.  self reported hx of bipolar disorder and SCZ;  as per PSYCKES, has the following Behavioral Health Diagnoses to include Unspecified/Other Bipolar, substance-Induced Depressive Disorder, Major Depressive Disorder, Unspecified/Other Depressive Disorder,  Borderline Personality Disorder, Antisocial Personality Disorder, Unspecified/Other Anxiety Disorder, Bipolar I, Bipolar II, Schizoaffective Disorder, Substance-Induced Sleep Disorder, Adjustment Disorder, Schizophrenia, Substance-Induced Psychotic Disorder, Other Mental Disorders Unspecified/Other Psychotic Disorders,  Attention Deficit Hyperactivity Disorder, Insomnia Disorder  and Substance-Induced Anxiety Disorder.. Pt is high utilizer of mental health facilities + is chronically non adherent to meds and psych appts; has multiple ED visits and psych admissions including last [facility] admission back in 12/2024 for depression + SI.  past documented hx for suspicion of malingering on pior visits including this latest 4 admission, 12/2024 of which, writer had chance to evaluate Pt,   as per chart review, there is documented hx of "several prior self-aborted suicide attempts with plan to jump in front of train in 2023 but was, "stopped by an emre",  Pt has hx of polysubstance use (as per PSYCKES: with hx of Cannabis related disorders/ Alcohol related disorders/ Cocaine related disorders/ Other psychoactive substance related disorders/ Opioid related disorders/ Tobacco related disorder/  Other stimulant related disorders and Sedative, hypnotic, or anxiolytic related disorders).  Pt has had hx of numerous prior detox/rehabs (previous [facility] admission in 12/2023).  He has hx of multiple ED visits reporting chest pain and/or SI and/or command AH to kill himself,  Pt was just discharged this morning from [facility] ED after self presenting with complaint of chest pain, non productive cough and headache.  pertinent medical issues include: HTN, pericarditis, Chronic CHF and GERD.  Pt admits he is not on any maintenance meds.  today, self presented to the ED (once again), complaining of depression, anxiety, "AH" and SI with plan.        PLAN:  >Psychiatric Meds: Latuda 20mg PO at bedtime with food (mood)     >Medical:  No acute concerns. Hospitalist consulted on admission for hx of being on cardiac medications.  Toprol XL and Aspirin ordered     >Dispo: pt put in 72 hour letter requesting discharge and will discharge on this letter to [facility].  Pt declined for referrals for outpatient care to be done by [facility]

## 2025-03-13 NOTE — BH INPATIENT PSYCHIATRY DISCHARGE NOTE - NSBHDCMEDICALFT_PSY_A_CORE
ARMOND MEHTA
Hospitalist contacted regarding his hx of reduced ejection fraction and patient started on Aspirin and Toprol XL.

## 2025-03-13 NOTE — BH INPATIENT PSYCHIATRY PROGRESS NOTE - NSTXPSYCHOGOAL_PSY_ALL_CORE
Declines
Will be able to report experiencing hallucinations to staff

## 2025-03-13 NOTE — BH INPATIENT PSYCHIATRY PROGRESS NOTE - PRN MEDS
MEDICATIONS  (PRN):  acetaminophen     Tablet .. 650 milliGRAM(s) Oral every 6 hours PRN Mild Pain (1 - 3), Moderate Pain (4 - 6), Severe Pain (7 - 10)  aluminum hydroxide/magnesium hydroxide/simethicone Suspension 30 milliLiter(s) Oral every 4 hours PRN Dyspepsia  hydrOXYzine hydrochloride 50 milliGRAM(s) Oral every 6 hours PRN anxiety  melatonin. 3 milliGRAM(s) Oral at bedtime PRN Insomnia  nicotine  Polacrilex Gum 2 milliGRAM(s) Oral every 2 hours PRN Smoking Cessation  OLANZapine 5 milliGRAM(s) Oral every 6 hours PRN psychotic agitation  OLANZapine Injectable 5 milliGRAM(s) IntraMuscular Once PRN severe psychotic agitation  
MEDICATIONS  (PRN):  acetaminophen     Tablet .. 650 milliGRAM(s) Oral every 6 hours PRN Mild Pain (1 - 3), Moderate Pain (4 - 6), Severe Pain (7 - 10)  aluminum hydroxide/magnesium hydroxide/simethicone Suspension 30 milliLiter(s) Oral every 4 hours PRN Dyspepsia  hydrocortisone 1% Cream 1 Application(s) Topical two times a day PRN rash  hydrOXYzine hydrochloride 50 milliGRAM(s) Oral every 6 hours PRN anxiety  melatonin. 3 milliGRAM(s) Oral at bedtime PRN Insomnia  nicotine  Polacrilex Gum 2 milliGRAM(s) Oral every 2 hours PRN Smoking Cessation  OLANZapine 5 milliGRAM(s) Oral every 6 hours PRN psychotic agitation  OLANZapine Injectable 5 milliGRAM(s) IntraMuscular Once PRN severe psychotic agitation  
MEDICATIONS  (PRN):  acetaminophen     Tablet .. 650 milliGRAM(s) Oral every 6 hours PRN Mild Pain (1 - 3), Moderate Pain (4 - 6), Severe Pain (7 - 10)  aluminum hydroxide/magnesium hydroxide/simethicone Suspension 30 milliLiter(s) Oral every 4 hours PRN Dyspepsia  hydrOXYzine hydrochloride 50 milliGRAM(s) Oral every 6 hours PRN anxiety  melatonin. 3 milliGRAM(s) Oral at bedtime PRN Insomnia  nicotine  Polacrilex Gum 2 milliGRAM(s) Oral every 2 hours PRN Smoking Cessation  OLANZapine 5 milliGRAM(s) Oral every 6 hours PRN psychotic agitation  OLANZapine Injectable 5 milliGRAM(s) IntraMuscular Once PRN severe psychotic agitation  
MEDICATIONS  (PRN):  acetaminophen     Tablet .. 650 milliGRAM(s) Oral every 6 hours PRN Mild Pain (1 - 3), Moderate Pain (4 - 6), Severe Pain (7 - 10)  aluminum hydroxide/magnesium hydroxide/simethicone Suspension 30 milliLiter(s) Oral every 4 hours PRN Dyspepsia  hydrocortisone 1% Cream 1 Application(s) Topical two times a day PRN rash  hydrOXYzine hydrochloride 50 milliGRAM(s) Oral every 6 hours PRN anxiety  melatonin. 3 milliGRAM(s) Oral at bedtime PRN Insomnia  nicotine  Polacrilex Gum 2 milliGRAM(s) Oral every 2 hours PRN Smoking Cessation  OLANZapine 5 milliGRAM(s) Oral every 6 hours PRN psychotic agitation  OLANZapine Injectable 5 milliGRAM(s) IntraMuscular Once PRN severe psychotic agitation  
MEDICATIONS  (PRN):  acetaminophen     Tablet .. 650 milliGRAM(s) Oral every 6 hours PRN Mild Pain (1 - 3), Moderate Pain (4 - 6), Severe Pain (7 - 10)  aluminum hydroxide/magnesium hydroxide/simethicone Suspension 30 milliLiter(s) Oral every 4 hours PRN Dyspepsia  hydrOXYzine hydrochloride 50 milliGRAM(s) Oral every 6 hours PRN anxiety  melatonin. 3 milliGRAM(s) Oral at bedtime PRN Insomnia  nicotine  Polacrilex Gum 2 milliGRAM(s) Oral every 2 hours PRN Smoking Cessation  OLANZapine 5 milliGRAM(s) Oral every 6 hours PRN psychotic agitation  OLANZapine Injectable 5 milliGRAM(s) IntraMuscular Once PRN severe psychotic agitation  
MEDICATIONS  (PRN):  acetaminophen     Tablet .. 650 milliGRAM(s) Oral every 6 hours PRN Mild Pain (1 - 3), Moderate Pain (4 - 6), Severe Pain (7 - 10)  aluminum hydroxide/magnesium hydroxide/simethicone Suspension 30 milliLiter(s) Oral every 4 hours PRN Dyspepsia  hydrOXYzine hydrochloride 50 milliGRAM(s) Oral every 6 hours PRN anxiety  nicotine  Polacrilex Gum 2 milliGRAM(s) Oral every 2 hours PRN Smoking Cessation  OLANZapine 5 milliGRAM(s) Oral every 6 hours PRN psychotic agitation  OLANZapine Injectable 5 milliGRAM(s) IntraMuscular Once PRN severe psychotic agitation  
MEDICATIONS  (PRN):  acetaminophen     Tablet .. 650 milliGRAM(s) Oral every 6 hours PRN Mild Pain (1 - 3), Moderate Pain (4 - 6), Severe Pain (7 - 10)  aluminum hydroxide/magnesium hydroxide/simethicone Suspension 30 milliLiter(s) Oral every 4 hours PRN Dyspepsia  hydrOXYzine hydrochloride 50 milliGRAM(s) Oral every 6 hours PRN anxiety  nicotine  Polacrilex Gum 2 milliGRAM(s) Oral every 2 hours PRN Smoking Cessation  OLANZapine 5 milliGRAM(s) Oral every 6 hours PRN psychotic agitation  OLANZapine Injectable 5 milliGRAM(s) IntraMuscular Once PRN severe psychotic agitation  
MEDICATIONS  (PRN):  acetaminophen     Tablet .. 650 milliGRAM(s) Oral every 6 hours PRN Mild Pain (1 - 3), Moderate Pain (4 - 6), Severe Pain (7 - 10)  aluminum hydroxide/magnesium hydroxide/simethicone Suspension 30 milliLiter(s) Oral every 4 hours PRN Dyspepsia  hydrOXYzine hydrochloride 50 milliGRAM(s) Oral every 6 hours PRN anxiety  melatonin. 3 milliGRAM(s) Oral at bedtime PRN Insomnia  nicotine  Polacrilex Gum 2 milliGRAM(s) Oral every 2 hours PRN Smoking Cessation  OLANZapine 5 milliGRAM(s) Oral every 6 hours PRN psychotic agitation  OLANZapine Injectable 5 milliGRAM(s) IntraMuscular Once PRN severe psychotic agitation

## 2025-03-13 NOTE — BH INPATIENT PSYCHIATRY DISCHARGE NOTE - HPI (INCLUDE ILLNESS QUALITY, SEVERITY, DURATION, TIMING, CONTEXT, MODIFYING FACTORS, ASSOCIATED SIGNS AND SYMPTOMS)
Per [facility] ED assessment, "59 yr old male, single, undomiciled and unemployed.  self reported hx of bipolar disorder and SCZ;  as per PSYCKES, has the following Behavioral Health Diagnoses to include Unspecified/Other Bipolar, substance-Induced Depressive Disorder, Major Depressive Disorder, Unspecified/Other Depressive Disorder,  Borderline Personality Disorder, Antisocial Personality Disorder, Unspecified/Other Anxiety Disorder, Bipolar I, Bipolar II, Schizoaffective Disorder, Substance-Induced Sleep Disorder, Adjustment Disorder, Schizophrenia, Substance-Induced Psychotic Disorder, Other Mental Disorders Unspecified/Other Psychotic Disorders,  Attention Deficit Hyperactivity Disorder, Insomnia Disorder  and Substance-Induced Anxiety Disorder.. Pt is high utilizer of mental health facilities + is chronically non adherent to meds and psych appts; has multiple ED visits and psych admissions including last [facility] admission back in 12/2024 for depression + SI.  past documented hx for suspicion of malingering on pior visits including this latest [facility] admission, 12/2024 of which, writer had chance to evaluate Pt,   as per chart review, there is documented hx of "several prior self-aborted suicide attempts with plan to jump in front of train in 2023 but was, "stopped by an emre",  Pt has hx of polysubstance use (as per PSYCKES: with hx of Cannabis related disorders/ Alcohol related disorders/ Cocaine related disorders/ Other psychoactive substance related disorders/ Opioid related disorders/ Tobacco related disorder/  Other stimulant related disorders and Sedative, hypnotic, or anxiolytic related disorders).  Pt has had hx of numerous prior detox/rehabs (previous [facility] admission in 12/2023).  He has hx of multiple ED visits reporting chest pain and/or SI and/or command AH to kill himself,  Pt was just discharged this morning from [facility] ED after self presenting with complaint of chest pain, non productive cough and headache.  pertinent medical issues include: HTN, pericarditis, Chronic CHF and GERD.  Pt admits he is not on any maintenance meds.  today, self presented to the ED (once again), complaining of depression, anxiety, "AH" and SI with plan.      he is seen bedside.  calm and cooperative. DOES NOT APPEAR ACTIVELY INTERNALLY STIMULATED NOR EXHIBITING ANY SIGNS/ SYMPTOMS OF ACUTE FLORID PSYCHOSIS NOR IS HE ACUTELY MANIC.  claims that after he was discharged from [facility] (Dec 2024), "felt less depressed and less anxious".  "I hang out with the wrong crowd. that's why I relapsed." he tells writer.  Pt admitted not able to follow up with scheduled appointments and not taking meds.  however, for the last few  weeks, reported feeling progressively depressed and anxious; been hearing voices and seeing his dead mother's face.  described AH as voices telling him to "do something really tragic to yourself"; "hurt the mother of your daughter because she (the mother) is turning your daughter against you", is how Pt described the perceptual disturbances that he has been experiencing recently.  he was unable to discern which, symptoms came first - depression/ anxiety or psychosis.. also unable to determine whether said perceptual disturbances were in the context of illicit substance use or not.      along with the psychotic symptoms, described depressive symptoms as experiencing sad mood associated with poor sleep, impaired concentration, low energy level and over eating.  he also claimed feeling hopeless/ helpless/ worthless.  last night (and then this morning) whilst riding a train, had thought about jumping in front of the E train.  offered an alternative means to consummate on the suicidal thoughts via going to [place] and procuring a gun thru the help of a friend so that he can shoot self in the head --- SIMILAR IDEATIONS AND PLANS FROM THE  ED ENCOUNTER LAST 12/3/2024    currently, continues to harbor SI and plans but without any intentions. "I couldn't even tell you why (in reference to the no intentions upon writer's exploration)".  regarding anxiety symptoms, Pt described symptoms experienced as "always feeling nervous".  with regards to past diagnosis of bipolar disorder, he claims past episode of irritability (I just explode. I can't control myself) associated with increased in goal directed activities (I do a lot of things at the same time).      "I have a connection with the psychiatrists and therapists here (at [facility])", as writer attempted to explore and clarify why he had preferred to come all the way to [facility]  (from the [facility]).  he also alleges that nobody spoke to him/ cared or discuss with him re: his psych symptoms whilst at [facility] ED early this morning. "they didn't care", he tells writer     cites the following stressors as perpetuating ongoing symptoms: homelessness, not being able to talk to his daughter for the past 60 days now, "hanging out with the wrong crowd"     Reports use of alcohol, "laced" cannabis and cocaine - ALL TAKEN 30 MINUTES PRIOR TO HIS J ED presentation."    On the unit, of note, BAL <10 in ED and UTox + for cocaine and benzos.  Pt on interview, lethargic due to PO PRN medication given this AM and overnight and interview limited.  Pt reports SI with plan to shoot himself or jump in front of a train.  Pt reports depressed mood with poor appetite and sleep.  Pt reports CAH telling him to harm himself and others, but he does not appear internally preoccupied.  Pt denies HI/I/P or VH or paranoia.  Pt initially denies substance use then admits to occasional ETOH use and he does not remember if he used cocaine.  Pt denies marijuana use.  Pt reports he is not connected to outpatient psychiatric treatment and is not on current psychotropic medication.  Pt reports he is not connected to a shelter and has been staying between a friend and his sister's residence.  Pt denies ETOH withdrawal sx.  Pt asked about his hx of cardiac issues and he reports he was diagnosed in 2014 and has not been compliant with treatment in a long time.  Pt reports Latuda is the only medication that helps him and Abilify is too strong.

## 2025-03-13 NOTE — BH INPATIENT PSYCHIATRY DISCHARGE NOTE - NSBHFUPINTERVALHXFT_PSY_A_CORE
Pt refused medication last night.  Chart reviewed and case discussed with treatment team.  No events reported overnight.  Pt denies SI/HI/I/P or AH/VH or paranoia.  Pt eating and sleeping well.  Pt continues to deny feeling depressed.  Pt denies urges to use substances.  Pt continues to report he will keep in contact with his care coordinator to follow-up on housing.  Pt would like to discharge on his 72 hour letter today.  Pt reports he plans to go to the shelter.

## 2025-03-13 NOTE — BH INPATIENT PSYCHIATRY PROGRESS NOTE - NSTXDEPRESGOAL_PSY_ALL_CORE
Will identify 2 coping skills that assist in improving mood
Will identify 2 coping skills that assist in improving mood
Exhibit improvements in self-grooming, hygiene, sleep and appetite
Will identify 2 coping skills that assist in improving mood
Exhibit improvements in self-grooming, hygiene, sleep and appetite
Will identify 2 coping skills that assist in improving mood

## 2025-03-13 NOTE — BH INPATIENT PSYCHIATRY PROGRESS NOTE - NSBHASSESSSUMMFT_PSY_ALL_CORE
59 yr old male, single, undomiciled and unemployed.  self reported hx of bipolar disorder and SCZ;  as per PSYCKES, has the following Behavioral Health Diagnoses to include Unspecified/Other Bipolar, substance-Induced Depressive Disorder, Major Depressive Disorder, Unspecified/Other Depressive Disorder,  Borderline Personality Disorder, Antisocial Personality Disorder, Unspecified/Other Anxiety Disorder, Bipolar I, Bipolar II, Schizoaffective Disorder, Substance-Induced Sleep Disorder, Adjustment Disorder, Schizophrenia, Substance-Induced Psychotic Disorder, Other Mental Disorders Unspecified/Other Psychotic Disorders,  Attention Deficit Hyperactivity Disorder, Insomnia Disorder  and Substance-Induced Anxiety Disorder.. Pt is high utilizer of mental health facilities + is chronically non adherent to meds and psych appts; has multiple ED visits and psych admissions including last [facility] admission back in 12/2024 for depression + SI.  past documented hx for suspicion of malingering on pior visits including this latest 4 admission, 12/2024 of which, writer had chance to evaluate Pt,   as per chart review, there is documented hx of "several prior self-aborted suicide attempts with plan to jump in front of train in 2023 but was, "stopped by an emre",  Pt has hx of polysubstance use (as per PSYCKES: with hx of Cannabis related disorders/ Alcohol related disorders/ Cocaine related disorders/ Other psychoactive substance related disorders/ Opioid related disorders/ Tobacco related disorder/  Other stimulant related disorders and Sedative, hypnotic, or anxiolytic related disorders).  Pt has had hx of numerous prior detox/rehabs (previous [facility] admission in 12/2023).  He has hx of multiple ED visits reporting chest pain and/or SI and/or command AH to kill himself,  Pt was just discharged this morning from [facility] ED after self presenting with complaint of chest pain, non productive cough and headache.  pertinent medical issues include: HTN, pericarditis, Chronic CHF and GERD.  Pt admits he is not on any maintenance meds.  today, self presented to the ED (once again), complaining of depression, anxiety, "AH" and SI with plan.      >Legal: 9.13  >Obs: Routine, no need for CO, patient not expected to pose risk to self or others in controlled inpatient setting  >Psychiatric Meds: Restart Latuda 20mg PO at 1700 with food (mood)   >Labs: Admission labs reviewed, no acute findings.   >Medical:  No acute concerns. Hospitalist consulted on admission for hx of being on cardiac medications.  Cardiac medications ordered by hospitalist  --No indication for CIWA, will discontinue. Patient with consistently stable VS, no visible physical symptoms of ETOH withdrawal and BAL <10 in ED.  >Social: milieu/structured therapy  >Treatment Interventions: Groups and Individual Therapy/CBT, Motivational counseling for substance abuse related issues.   >Dispo: pending rehab placement
59 yr old male, single, undomiciled and unemployed.  self reported hx of bipolar disorder and SCZ;  as per PSYCKES, has the following Behavioral Health Diagnoses to include Unspecified/Other Bipolar, substance-Induced Depressive Disorder, Major Depressive Disorder, Unspecified/Other Depressive Disorder,  Borderline Personality Disorder, Antisocial Personality Disorder, Unspecified/Other Anxiety Disorder, Bipolar I, Bipolar II, Schizoaffective Disorder, Substance-Induced Sleep Disorder, Adjustment Disorder, Schizophrenia, Substance-Induced Psychotic Disorder, Other Mental Disorders Unspecified/Other Psychotic Disorders,  Attention Deficit Hyperactivity Disorder, Insomnia Disorder  and Substance-Induced Anxiety Disorder.. Pt is high utilizer of mental health facilities + is chronically non adherent to meds and psych appts; has multiple ED visits and psych admissions including last [facility] admission back in 12/2024 for depression + SI.  past documented hx for suspicion of malingering on pior visits including this latest 4 admission, 12/2024 of which, writer had chance to evaluate Pt,   as per chart review, there is documented hx of "several prior self-aborted suicide attempts with plan to jump in front of train in 2023 but was, "stopped by an emre",  Pt has hx of polysubstance use (as per PSYCKES: with hx of Cannabis related disorders/ Alcohol related disorders/ Cocaine related disorders/ Other psychoactive substance related disorders/ Opioid related disorders/ Tobacco related disorder/  Other stimulant related disorders and Sedative, hypnotic, or anxiolytic related disorders).  Pt has had hx of numerous prior detox/rehabs (previous [facility] admission in 12/2023).  He has hx of multiple ED visits reporting chest pain and/or SI and/or command AH to kill himself,  Pt was just discharged this morning from [facility] ED after self presenting with complaint of chest pain, non productive cough and headache.  pertinent medical issues include: HTN, pericarditis, Chronic CHF and GERD.  Pt admits he is not on any maintenance meds.  today, self presented to the ED (once again), complaining of depression, anxiety, "AH" and SI with plan.        >Legal: 9.13  >Obs: Routine, no need for CO, patient not expected to pose risk to self or others in controlled inpatient setting  >Psychiatric Meds: Restart Latuda 20mg PO at bedtime with food (mood)   >Labs: Admission labs reviewed, no acute findings.   >Medical:  No acute concerns. Hospitalist consulted on admission for hx of being on cardiac medications.  Cardiac medications ordered by hospitalist  --No indication for CIWA, will discontinue. Patient with consistently stable VS, no visible physical symptoms of ETOH withdrawal and BAL <10 in ED.  >Social: milieu/structured therapy  >Treatment Interventions: Groups and Individual Therapy/CBT, Motivational counseling for substance abuse related issues.   >Dispo: pending rehab placement; pt put in a 72 hour letter on 3/12
59 yr old male, single, undomiciled and unemployed.  self reported hx of bipolar disorder and SCZ;  as per PSYCKES, has the following Behavioral Health Diagnoses to include Unspecified/Other Bipolar, substance-Induced Depressive Disorder, Major Depressive Disorder, Unspecified/Other Depressive Disorder,  Borderline Personality Disorder, Antisocial Personality Disorder, Unspecified/Other Anxiety Disorder, Bipolar I, Bipolar II, Schizoaffective Disorder, Substance-Induced Sleep Disorder, Adjustment Disorder, Schizophrenia, Substance-Induced Psychotic Disorder, Other Mental Disorders Unspecified/Other Psychotic Disorders,  Attention Deficit Hyperactivity Disorder, Insomnia Disorder  and Substance-Induced Anxiety Disorder.. Pt is high utilizer of mental health facilities + is chronically non adherent to meds and psych appts; has multiple ED visits and psych admissions including last [facility]4 admission back in 12/2024 for depression + SI.  past documented hx for suspicion of malingering on pior visits including this latest 4 admission, 12/2024 of which, writer had chance to evaluate Pt,   as per chart review, there is documented hx of "several prior self-aborted suicide attempts with plan to jump in front of train in 2023 but was, "stopped by an emre",  Pt has hx of polysubstance use (as per PSYCKES: with hx of Cannabis related disorders/ Alcohol related disorders/ Cocaine related disorders/ Other psychoactive substance related disorders/ Opioid related disorders/ Tobacco related disorder/  Other stimulant related disorders and Sedative, hypnotic, or anxiolytic related disorders).  Pt has had hx of numerous prior detox/rehabs (previous [facility] admission in 12/2023).  He has hx of multiple ED visits reporting chest pain and/or SI and/or command AH to kill himself,  Pt was just discharged this morning from [facility] ED after self presenting with complaint of chest pain, non productive cough and headache.  pertinent medical issues include: HTN, pericarditis, Chronic CHF and GERD.  Pt admits he is not on any maintenance meds.  today, self presented to the ED (once again), complaining of depression, anxiety, "AH" and SI with plan.      3/7: Pt presents as euthymic, no SI or AH elicited. Motivated to wait for rehab placement. Discuss naltrexone, which pt would like to consider.    >Legal: 9.13  >Obs: Routine, no need for CO, patient not expected to pose risk to self or others in controlled inpatient setting  >Psychiatric Meds: Restart Latuda 20mg PO at 1700 with food (mood)   >Labs: Admission labs reviewed, no acute findings.   >Medical:  No acute concerns. Hospitalist consulted on admission for hx of being on cardiac medications.  Cardiac medications ordered by hospitalist  --No indication for CIWA, will discontinue. Patient with consistently stable VS, no visible physical symptoms of ETOH withdrawal and BAL <10 in ED.  >Social: milieu/structured therapy  >Treatment Interventions: Groups and Individual Therapy/CBT, Motivational counseling for substance abuse related issues.   >Dispo: pending rehab placement
59 yr old male, single, undomiciled and unemployed.  self reported hx of bipolar disorder and SCZ;  as per PSYCKES, has the following Behavioral Health Diagnoses to include Unspecified/Other Bipolar, substance-Induced Depressive Disorder, Major Depressive Disorder, Unspecified/Other Depressive Disorder,  Borderline Personality Disorder, Antisocial Personality Disorder, Unspecified/Other Anxiety Disorder, Bipolar I, Bipolar II, Schizoaffective Disorder, Substance-Induced Sleep Disorder, Adjustment Disorder, Schizophrenia, Substance-Induced Psychotic Disorder, Other Mental Disorders Unspecified/Other Psychotic Disorders,  Attention Deficit Hyperactivity Disorder, Insomnia Disorder  and Substance-Induced Anxiety Disorder.. Pt is high utilizer of mental health facilities + is chronically non adherent to meds and psych appts; has multiple ED visits and psych admissions including last [facility] admission back in 12/2024 for depression + SI.  past documented hx for suspicion of malingering on pior visits including this latest 4 admission, 12/2024 of which, writer had chance to evaluate Pt,   as per chart review, there is documented hx of "several prior self-aborted suicide attempts with plan to jump in front of train in 2023 but was, "stopped by an emre",  Pt has hx of polysubstance use (as per PSYCKES: with hx of Cannabis related disorders/ Alcohol related disorders/ Cocaine related disorders/ Other psychoactive substance related disorders/ Opioid related disorders/ Tobacco related disorder/  Other stimulant related disorders and Sedative, hypnotic, or anxiolytic related disorders).  Pt has had hx of numerous prior detox/rehabs (previous [facility] admission in 12/2023).  He has hx of multiple ED visits reporting chest pain and/or SI and/or command AH to kill himself,  Pt was just discharged this morning from [facility] ED after self presenting with complaint of chest pain, non productive cough and headache.  pertinent medical issues include: HTN, pericarditis, Chronic CHF and GERD.  Pt admits he is not on any maintenance meds.  today, self presented to the ED (once again), complaining of depression, anxiety, "AH" and SI with plan.        >Legal: 9.13  >Obs: Routine, no need for CO, patient not expected to pose risk to self or others in controlled inpatient setting  >Psychiatric Meds: Restart Latuda 20mg PO at bedtime with food (mood)   >Labs: Admission labs reviewed, no acute findings.   >Medical:  No acute concerns. Hospitalist consulted on admission for hx of being on cardiac medications.  Cardiac medications ordered by hospitalist  --No indication for CIWA, will discontinue. Patient with consistently stable VS, no visible physical symptoms of ETOH withdrawal and BAL <10 in ED.  >Social: milieu/structured therapy  >Treatment Interventions: Groups and Individual Therapy/CBT, Motivational counseling for substance abuse related issues.   >Dispo: pending rehab placement
59 yr old male, single, undomiciled and unemployed.  self reported hx of bipolar disorder and SCZ;  as per PSYCKES, has the following Behavioral Health Diagnoses to include Unspecified/Other Bipolar, substance-Induced Depressive Disorder, Major Depressive Disorder, Unspecified/Other Depressive Disorder,  Borderline Personality Disorder, Antisocial Personality Disorder, Unspecified/Other Anxiety Disorder, Bipolar I, Bipolar II, Schizoaffective Disorder, Substance-Induced Sleep Disorder, Adjustment Disorder, Schizophrenia, Substance-Induced Psychotic Disorder, Other Mental Disorders Unspecified/Other Psychotic Disorders,  Attention Deficit Hyperactivity Disorder, Insomnia Disorder  and Substance-Induced Anxiety Disorder.. Pt is high utilizer of mental health facilities + is chronically non adherent to meds and psych appts; has multiple ED visits and psych admissions including last [facility] admission back in 12/2024 for depression + SI.  past documented hx for suspicion of malingering on pior visits including this latest 4 admission, 12/2024 of which, writer had chance to evaluate Pt,   as per chart review, there is documented hx of "several prior self-aborted suicide attempts with plan to jump in front of train in 2023 but was, "stopped by an emre",  Pt has hx of polysubstance use (as per PSYCKES: with hx of Cannabis related disorders/ Alcohol related disorders/ Cocaine related disorders/ Other psychoactive substance related disorders/ Opioid related disorders/ Tobacco related disorder/  Other stimulant related disorders and Sedative, hypnotic, or anxiolytic related disorders).  Pt has had hx of numerous prior detox/rehabs (previous [facility] admission in 12/2023).  He has hx of multiple ED visits reporting chest pain and/or SI and/or command AH to kill himself,  Pt was just discharged this morning from [facility] ED after self presenting with complaint of chest pain, non productive cough and headache.  pertinent medical issues include: HTN, pericarditis, Chronic CHF and GERD.  Pt admits he is not on any maintenance meds.  today, self presented to the ED (once again), complaining of depression, anxiety, "AH" and SI with plan.        >Legal: 9.13  >Obs: Routine, no need for CO, patient not expected to pose risk to self or others in controlled inpatient setting  >Psychiatric Meds: Restart Latuda 20mg PO at bedtime with food (mood)   >Labs: Admission labs reviewed, no acute findings.   >Medical:  No acute concerns. Hospitalist consulted on admission for hx of being on cardiac medications.  Cardiac medications ordered by hospitalist  --No indication for CIWA, will discontinue. Patient with consistently stable VS, no visible physical symptoms of ETOH withdrawal and BAL <10 in ED.  >Social: milieu/structured therapy  >Treatment Interventions: Groups and Individual Therapy/CBT, Motivational counseling for substance abuse related issues.   >Dispo: pending rehab placement; pt put in a 72 hour letter on 3/12
59 yr old male, single, undomiciled and unemployed.  self reported hx of bipolar disorder and SCZ;  as per PSYCKES, has the following Behavioral Health Diagnoses to include Unspecified/Other Bipolar, substance-Induced Depressive Disorder, Major Depressive Disorder, Unspecified/Other Depressive Disorder,  Borderline Personality Disorder, Antisocial Personality Disorder, Unspecified/Other Anxiety Disorder, Bipolar I, Bipolar II, Schizoaffective Disorder, Substance-Induced Sleep Disorder, Adjustment Disorder, Schizophrenia, Substance-Induced Psychotic Disorder, Other Mental Disorders Unspecified/Other Psychotic Disorders,  Attention Deficit Hyperactivity Disorder, Insomnia Disorder  and Substance-Induced Anxiety Disorder.. Pt is high utilizer of mental health facilities + is chronically non adherent to meds and psych appts; has multiple ED visits and psych admissions including last [facility]4 admission back in 12/2024 for depression + SI.  past documented hx for suspicion of malingering on pior visits including this latest 4 admission, 12/2024 of which, writer had chance to evaluate Pt,   as per chart review, there is documented hx of "several prior self-aborted suicide attempts with plan to jump in front of train in 2023 but was, "stopped by an emre",  Pt has hx of polysubstance use (as per PSYCKES: with hx of Cannabis related disorders/ Alcohol related disorders/ Cocaine related disorders/ Other psychoactive substance related disorders/ Opioid related disorders/ Tobacco related disorder/  Other stimulant related disorders and Sedative, hypnotic, or anxiolytic related disorders).  Pt has had hx of numerous prior detox/rehabs (previous [facility] admission in 12/2023).  He has hx of multiple ED visits reporting chest pain and/or SI and/or command AH to kill himself,  Pt was just discharged this morning from [facility] ED after self presenting with complaint of chest pain, non productive cough and headache.  pertinent medical issues include: HTN, pericarditis, Chronic CHF and GERD.  Pt admits he is not on any maintenance meds.  today, self presented to the ED (once again), complaining of depression, anxiety, "AH" and SI with plan.        >Legal: 9.13  >Obs: Routine, no need for CO, patient not expected to pose risk to self or others in controlled inpatient setting  >Psychiatric Meds: Restart Latuda 20mg PO at bedtime with food (mood)   >Labs: Admission labs reviewed, no acute findings.   >Medical:  No acute concerns. Hospitalist consulted on admission for hx of being on cardiac medications.  Cardiac medications ordered by hospitalist  --No indication for CIWA, will discontinue. Patient with consistently stable VS, no visible physical symptoms of ETOH withdrawal and BAL <10 in ED.  >Social: milieu/structured therapy  >Treatment Interventions: Groups and Individual Therapy/CBT, Motivational counseling for substance abuse related issues.   >Dispo: pending rehab placement
59 yr old male, single, undomiciled and unemployed.  self reported hx of bipolar disorder and SCZ;  as per PSYCKES, has the following Behavioral Health Diagnoses to include Unspecified/Other Bipolar, substance-Induced Depressive Disorder, Major Depressive Disorder, Unspecified/Other Depressive Disorder,  Borderline Personality Disorder, Antisocial Personality Disorder, Unspecified/Other Anxiety Disorder, Bipolar I, Bipolar II, Schizoaffective Disorder, Substance-Induced Sleep Disorder, Adjustment Disorder, Schizophrenia, Substance-Induced Psychotic Disorder, Other Mental Disorders Unspecified/Other Psychotic Disorders,  Attention Deficit Hyperactivity Disorder, Insomnia Disorder  and Substance-Induced Anxiety Disorder.. Pt is high utilizer of mental health facilities + is chronically non adherent to meds and psych appts; has multiple ED visits and psych admissions including last [facility]4 admission back in 12/2024 for depression + SI.  past documented hx for suspicion of malingering on pior visits including this latest 4 admission, 12/2024 of which, writer had chance to evaluate Pt,   as per chart review, there is documented hx of "several prior self-aborted suicide attempts with plan to jump in front of train in 2023 but was, "stopped by an emre",  Pt has hx of polysubstance use (as per PSYCKES: with hx of Cannabis related disorders/ Alcohol related disorders/ Cocaine related disorders/ Other psychoactive substance related disorders/ Opioid related disorders/ Tobacco related disorder/  Other stimulant related disorders and Sedative, hypnotic, or anxiolytic related disorders).  Pt has had hx of numerous prior detox/rehabs (previous [facility] admission in 12/2023).  He has hx of multiple ED visits reporting chest pain and/or SI and/or command AH to kill himself,  Pt was just discharged this morning from [facility] ED after self presenting with complaint of chest pain, non productive cough and headache.  pertinent medical issues include: HTN, pericarditis, Chronic CHF and GERD.  Pt admits he is not on any maintenance meds.  today, self presented to the ED (once again), complaining of depression, anxiety, "AH" and SI with plan.      >Legal: 9.13  >Obs: Routine, no need for CO, patient not expected to pose risk to self or others in controlled inpatient setting  >Psychiatric Meds: Restart Latuda 20mg PO at 1700 with food (mood)   >Labs: Admission labs reviewed, no acute findings.   >Medical:  No acute concerns. Hospitalist consulted on admission for hx of being on cardiac medications.  Cardiac medications ordered by hospitalist  --No indication for CIWA, will discontinue. Patient with consistently stable VS, no visible physical symptoms of ETOH withdrawal and BAL <10 in ED.  >Social: milieu/structured therapy  >Treatment Interventions: Groups and Individual Therapy/CBT, Motivational counseling for substance abuse related issues.   >Dispo: pending remission of sx
59 yr old male, single, undomiciled and unemployed.  self reported hx of bipolar disorder and SCZ;  as per PSYCKES, has the following Behavioral Health Diagnoses to include Unspecified/Other Bipolar, substance-Induced Depressive Disorder, Major Depressive Disorder, Unspecified/Other Depressive Disorder,  Borderline Personality Disorder, Antisocial Personality Disorder, Unspecified/Other Anxiety Disorder, Bipolar I, Bipolar II, Schizoaffective Disorder, Substance-Induced Sleep Disorder, Adjustment Disorder, Schizophrenia, Substance-Induced Psychotic Disorder, Other Mental Disorders Unspecified/Other Psychotic Disorders,  Attention Deficit Hyperactivity Disorder, Insomnia Disorder  and Substance-Induced Anxiety Disorder.. Pt is high utilizer of mental health facilities + is chronically non adherent to meds and psych appts; has multiple ED visits and psych admissions including last [facility] admission back in 12/2024 for depression + SI.  past documented hx for suspicion of malingering on pior visits including this latest 4 admission, 12/2024 of which, writer had chance to evaluate Pt,   as per chart review, there is documented hx of "several prior self-aborted suicide attempts with plan to jump in front of train in 2023 but was, "stopped by an emre",  Pt has hx of polysubstance use (as per PSYCKES: with hx of Cannabis related disorders/ Alcohol related disorders/ Cocaine related disorders/ Other psychoactive substance related disorders/ Opioid related disorders/ Tobacco related disorder/  Other stimulant related disorders and Sedative, hypnotic, or anxiolytic related disorders).  Pt has had hx of numerous prior detox/rehabs (previous [facility] admission in 12/2023).  He has hx of multiple ED visits reporting chest pain and/or SI and/or command AH to kill himself,  Pt was just discharged this morning from [facility] ED after self presenting with complaint of chest pain, non productive cough and headache.  pertinent medical issues include: HTN, pericarditis, Chronic CHF and GERD.  Pt admits he is not on any maintenance meds.  today, self presented to the ED (once again), complaining of depression, anxiety, "AH" and SI with plan.      >Legal: 9.13  >Obs: Routine, no need for CO, patient not expected to pose risk to self or others in controlled inpatient setting  >Psychiatric Meds: Restart Latuda 20mg PO at 1700 with food (mood)   >Labs: Admission labs reviewed, no acute findings.   >Medical:  No acute concerns. Per chart review of medical issues in ED: HTN, pericarditis, self reports hx of CHF (claims EF around ? 45%), CKD, GERD. non-compliant with past meds of HCTZ 25mg, lipitor 40mg, ASA 81mg, entresto 24-26mg-- [facility] Hospitalist to determine reinitiating of maintenance meds for his cardiovascular issues as deemed appropriate; in the mean time, will hold meds for now.  Pt reports being diagnosed with heart issues in 2014 and has been noncompliant with medications for a long time.  Of note TTE in 12/2024 shows EF of 60-65%  --No indication for CIWA, will discontinue. Patient with consistently stable VS, no visible physical symptoms of ETOH withdrawal and BAL <10 in ED.  >Social: milieu/structured therapy  >Treatment Interventions: Groups and Individual Therapy/CBT, Motivational counseling for substance abuse related issues.   >Dispo: pending remission of sx

## 2025-03-13 NOTE — BH INPATIENT PSYCHIATRY PROGRESS NOTE - NSTXALCDRGGOAL_PSY_ALL_CORE
Will not display signs of withdrawal

## 2025-03-13 NOTE — BH INPATIENT PSYCHIATRY PROGRESS NOTE - NSTXDEPRESPROGRES_PSY_ALL_CORE
Met - goal discontinued
Improving
Met - goal discontinued
Improving
Improving
Met - goal discontinued
Improving
Met - goal discontinued

## 2025-03-13 NOTE — BH INPATIENT PSYCHIATRY PROGRESS NOTE - NSBHMSESPEECH_PSY_A_CORE
no disorganization in speech noted/Normal volume, rate, productivity, spontaneity and articulation

## 2025-03-13 NOTE — BH INPATIENT PSYCHIATRY PROGRESS NOTE - NSBHMETABOLIC_PSY_ALL_CORE_FT
BMI: BMI (kg/m2): 37.3 (03-02-25 @ 14:15)  HbA1c: A1C with Estimated Average Glucose Result: 5.4 % (12-27-24 @ 05:30)    Glucose: POCT Blood Glucose.: 104 mg/dL (04-04-24 @ 06:04)    BP: 122/77 (03-11-25 @ 08:20) (122/77 - 122/77)Vital Signs Last 24 Hrs  T(C): 36.7 (03-11-25 @ 08:20), Max: 36.7 (03-10-25 @ 19:44)  T(F): 98.1 (03-11-25 @ 08:20), Max: 98.1 (03-10-25 @ 19:44)  HR: 77 (03-11-25 @ 08:20) (77 - 77)  BP: 122/77 (03-11-25 @ 08:20) (122/77 - 122/77)  BP(mean): --  RR: --  SpO2: --    Orthostatic VS  03-10-25 @ 19:44  Lying BP: --/-- HR: --  Sitting BP: 127/87 HR: 90  Standing BP: 126/90 HR: 116  Site: upper left arm  Mode: --  Orthostatic VS  03-10-25 @ 07:35  Lying BP: --/-- HR: --  Sitting BP: 128/87 HR: 104  Standing BP: 137/72 HR: 107  Site: --  Mode: --    Lipid Panel: Date/Time: 12-27-24 @ 05:30  Cholesterol, Serum: 145  LDL Cholesterol Calculated: 73  HDL Cholesterol, Serum: 47  Total Cholesterol/HDL Ration Measurement: --  Triglycerides, Serum: 145  
BMI: BMI (kg/m2): 37.3 (03-02-25 @ 14:15)  HbA1c: A1C with Estimated Average Glucose Result: 5.4 % (12-27-24 @ 05:30)    Glucose: POCT Blood Glucose.: 104 mg/dL (04-04-24 @ 06:04)    BP: 131/84 (03-12-25 @ 20:44) (122/77 - 131/84)Vital Signs Last 24 Hrs  T(C): 36.8 (03-13-25 @ 07:42), Max: 36.8 (03-12-25 @ 20:44)  T(F): 98.3 (03-13-25 @ 07:42), Max: 98.3 (03-13-25 @ 07:42)  HR: 78 (03-12-25 @ 20:44) (78 - 78)  BP: 131/84 (03-12-25 @ 20:44) (131/84 - 131/84)  BP(mean): --  RR: 18 (03-13-25 @ 07:42) (18 - 18)  SpO2: --    Orthostatic VS  03-13-25 @ 07:42  Lying BP: --/-- HR: --  Sitting BP: 124/88 HR: 71  Standing BP: --/-- HR: --  Site: --  Mode: --    Lipid Panel: Date/Time: 12-27-24 @ 05:30  Cholesterol, Serum: 145  LDL Cholesterol Calculated: 73  HDL Cholesterol, Serum: 47  Total Cholesterol/HDL Ration Measurement: --  Triglycerides, Serum: 145  
BMI: BMI (kg/m2): 37.3 (03-02-25 @ 14:15)  HbA1c: A1C with Estimated Average Glucose Result: 5.4 % (12-27-24 @ 05:30)    Glucose: POCT Blood Glucose.: 104 mg/dL (04-04-24 @ 06:04)    BP: --Vital Signs Last 24 Hrs  T(C): 36.4 (03-10-25 @ 07:35), Max: 36.4 (03-10-25 @ 07:35)  T(F): 97.5 (03-10-25 @ 07:35), Max: 97.5 (03-10-25 @ 07:35)  HR: --  BP: --  BP(mean): --  RR: --  SpO2: 99% (03-10-25 @ 07:35) (99% - 99%)    Orthostatic VS  03-10-25 @ 07:35  Lying BP: --/-- HR: --  Sitting BP: 128/87 HR: 104  Standing BP: 137/72 HR: 107  Site: --  Mode: --  Orthostatic VS  03-09-25 @ 08:06  Lying BP: --/-- HR: --  Sitting BP: 111/84 HR: 94  Standing BP: 124/75 HR: 92  Site: --  Mode: --  Orthostatic VS  03-08-25 @ 20:55  Lying BP: --/-- HR: --  Sitting BP: 104/73 HR: 96  Standing BP: 119/75 HR: 76  Site: --  Mode: --    Lipid Panel: Date/Time: 12-27-24 @ 05:30  Cholesterol, Serum: 145  LDL Cholesterol Calculated: 73  HDL Cholesterol, Serum: 47  Total Cholesterol/HDL Ration Measurement: --  Triglycerides, Serum: 145  
BMI: BMI (kg/m2): 37.3 (03-02-25 @ 14:15)  HbA1c: A1C with Estimated Average Glucose Result: 5.4 % (12-27-24 @ 05:30)    Glucose: POCT Blood Glucose.: 104 mg/dL (04-04-24 @ 06:04)    BP: --Vital Signs Last 24 Hrs  T(C): 36.5 (03-05-25 @ 08:24), Max: 36.9 (03-04-25 @ 20:29)  T(F): 97.7 (03-05-25 @ 08:24), Max: 98.4 (03-04-25 @ 20:29)  HR: --  BP: --  BP(mean): --  RR: --  SpO2: --    Orthostatic VS  03-05-25 @ 08:24  Lying BP: --/-- HR: --  Sitting BP: 104/89 HR: 86  Standing BP: --/-- HR: --  Site: --  Mode: electronic  Orthostatic VS  03-04-25 @ 20:29  Lying BP: --/-- HR: --  Sitting BP: 134/98 HR: 90  Standing BP: 121/92 HR: 106  Site: upper left arm  Mode: --    Lipid Panel: Date/Time: 12-27-24 @ 05:30  Cholesterol, Serum: 145  LDL Cholesterol Calculated: 73  HDL Cholesterol, Serum: 47  Total Cholesterol/HDL Ration Measurement: --  Triglycerides, Serum: 145  
BMI: BMI (kg/m2): 37.3 (03-02-25 @ 14:15)  HbA1c: A1C with Estimated Average Glucose Result: 5.4 % (12-27-24 @ 05:30)    Glucose: POCT Blood Glucose.: 104 mg/dL (04-04-24 @ 06:04)    BP: 122/80 (03-12-25 @ 08:02) (122/77 - 122/80)Vital Signs Last 24 Hrs  T(C): 36.7 (03-12-25 @ 08:02), Max: 36.7 (03-12-25 @ 08:02)  T(F): 98.1 (03-12-25 @ 08:02), Max: 98.1 (03-12-25 @ 08:02)  HR: 81 (03-12-25 @ 08:02) (81 - 81)  BP: 122/80 (03-12-25 @ 08:02) (122/80 - 122/80)  BP(mean): --  RR: --  SpO2: --    Orthostatic VS  03-10-25 @ 19:44  Lying BP: --/-- HR: --  Sitting BP: 127/87 HR: 90  Standing BP: 126/90 HR: 116  Site: upper left arm  Mode: --    Lipid Panel: Date/Time: 12-27-24 @ 05:30  Cholesterol, Serum: 145  LDL Cholesterol Calculated: 73  HDL Cholesterol, Serum: 47  Total Cholesterol/HDL Ration Measurement: --  Triglycerides, Serum: 145  
BMI: BMI (kg/m2): 37.3 (03-02-25 @ 14:15)  HbA1c: A1C with Estimated Average Glucose Result: 5.4 % (12-27-24 @ 05:30)    Glucose: POCT Blood Glucose.: 104 mg/dL (04-04-24 @ 06:04)    BP: 139/87 (03-07-25 @ 07:35) (139/87 - 139/87)Vital Signs Last 24 Hrs  T(C): 36.2 (03-07-25 @ 07:35), Max: 36.5 (03-06-25 @ 20:54)  T(F): 97.2 (03-07-25 @ 07:35), Max: 97.7 (03-06-25 @ 20:54)  HR: 90 (03-07-25 @ 07:35) (90 - 90)  BP: 139/87 (03-07-25 @ 07:35) (139/87 - 139/87)  BP(mean): --  RR: --  SpO2: --    Orthostatic VS  03-06-25 @ 20:54  Lying BP: --/-- HR: --  Sitting BP: 129/92 HR: 70  Standing BP: 119/90 HR: 91  Site: upper left arm  Mode: --  Orthostatic VS  03-06-25 @ 07:47  Lying BP: --/-- HR: --  Sitting BP: 125/91 HR: 95  Standing BP: --/-- HR: --  Site: upper left arm  Mode: electronic  Orthostatic VS  03-05-25 @ 20:38  Lying BP: --/-- HR: --  Sitting BP: 119/78 HR: 88  Standing BP: 118/90 HR: 115  Site: upper left arm  Mode: --    Lipid Panel: Date/Time: 12-27-24 @ 05:30  Cholesterol, Serum: 145  LDL Cholesterol Calculated: 73  HDL Cholesterol, Serum: 47  Total Cholesterol/HDL Ration Measurement: --  Triglycerides, Serum: 145  
BMI: BMI (kg/m2): 37.3 (03-02-25 @ 14:15)  HbA1c: A1C with Estimated Average Glucose Result: 5.4 % (12-27-24 @ 05:30)    Glucose: POCT Blood Glucose.: 104 mg/dL (04-04-24 @ 06:04)    BP: 110/69 (03-02-25 @ 12:30) (110/69 - 129/87)Vital Signs Last 24 Hrs  T(C): --  T(F): --  HR: --  BP: --  BP(mean): --  RR: --  SpO2: --    Orthostatic VS  03-03-25 @ 08:29  Lying BP: --/-- HR: --  Sitting BP: 110/85 HR: 87  Standing BP: --/-- HR: --  Site: upper left arm  Mode: electronic  Orthostatic VS  03-02-25 @ 20:53  Lying BP: --/-- HR: --  Sitting BP: 97/60 HR: 83  Standing BP: --/-- HR: --  Site: --  Mode: --  Orthostatic VS  03-02-25 @ 14:15  Lying BP: --/-- HR: --  Sitting BP: 129/89 HR: 95  Standing BP: 113/85 HR: 101  Site: --  Mode: --    Lipid Panel: Date/Time: 12-27-24 @ 05:30  Cholesterol, Serum: 145  LDL Cholesterol Calculated: 73  HDL Cholesterol, Serum: 47  Total Cholesterol/HDL Ration Measurement: --  Triglycerides, Serum: 145  
BMI: BMI (kg/m2): 37.3 (03-02-25 @ 14:15)  HbA1c: A1C with Estimated Average Glucose Result: 5.4 % (12-27-24 @ 05:30)    Glucose: POCT Blood Glucose.: 104 mg/dL (04-04-24 @ 06:04)    BP: --Vital Signs Last 24 Hrs  T(C): 36.9 (03-06-25 @ 07:47), Max: 37 (03-05-25 @ 20:38)  T(F): 98.4 (03-06-25 @ 07:47), Max: 98.6 (03-05-25 @ 20:38)  HR: --  BP: --  BP(mean): --  RR: --  SpO2: --    Orthostatic VS  03-06-25 @ 07:47  Lying BP: --/-- HR: --  Sitting BP: 125/91 HR: 95  Standing BP: --/-- HR: --  Site: upper left arm  Mode: electronic  Orthostatic VS  03-05-25 @ 20:38  Lying BP: --/-- HR: --  Sitting BP: 119/78 HR: 88  Standing BP: 118/90 HR: 115  Site: upper left arm  Mode: --  Orthostatic VS  03-05-25 @ 08:24  Lying BP: --/-- HR: --  Sitting BP: 104/89 HR: 86  Standing BP: --/-- HR: --  Site: --  Mode: electronic  Orthostatic VS  03-04-25 @ 20:29  Lying BP: --/-- HR: --  Sitting BP: 134/98 HR: 90  Standing BP: 121/92 HR: 106  Site: upper left arm  Mode: --    Lipid Panel: Date/Time: 12-27-24 @ 05:30  Cholesterol, Serum: 145  LDL Cholesterol Calculated: 73  HDL Cholesterol, Serum: 47  Total Cholesterol/HDL Ration Measurement: --  Triglycerides, Serum: 145

## 2025-03-13 NOTE — BH INPATIENT PSYCHIATRY PROGRESS NOTE - NSTXMEDICPROGRES_PSY_ALL_CORE
Met - goal discontinued
No Change
Improving
Met - goal discontinued
Improving

## 2025-03-13 NOTE — BH INPATIENT PSYCHIATRY PROGRESS NOTE - NSDCCRITERIA_PSY_ALL_CORE
remission of sx

## 2025-03-13 NOTE — BH INPATIENT PSYCHIATRY PROGRESS NOTE - NSICDXBHPRIMARYDX_PSY_ALL_CORE
Depression   F32.A  

## 2025-03-13 NOTE — BH INPATIENT PSYCHIATRY PROGRESS NOTE - NSBHATTESTBILLING_PSY_A_CORE
17459-Ufxsssekiy OBS or IP - moderate complexity OR 35-49 mins
48971-Utbnnyiqiw OBS or IP - moderate complexity OR 35-49 mins
70241-Jbtabqtkes OBS or IP - moderate complexity OR 35-49 mins
70464-Fvoejqriot OBS or IP - moderate complexity OR 35-49 mins
35905-Gopchsynxp OBS or IP - low complexity OR 25-34 mins
95151-Qrerpuvzee OBS or IP - moderate complexity OR 35-49 mins
19033-Dnxllqlvqk OBS or IP - moderate complexity OR 35-49 mins
07473-Exmmibfvdu OBS or IP - moderate complexity OR 35-49 mins

## 2025-03-13 NOTE — BH INPATIENT PSYCHIATRY PROGRESS NOTE - NSTXSUBMISGOAL_PSY_ALL_CORE
Be able to acknowledge that substance abuse is a problem

## 2025-03-13 NOTE — BH INPATIENT PSYCHIATRY PROGRESS NOTE - NSTXSUBMISPROGRES_PSY_ALL_CORE
Met - goal discontinued
Improving
Met - goal discontinued

## 2025-03-13 NOTE — BH INPATIENT PSYCHIATRY PROGRESS NOTE - NSBHATTESTTYPEVISIT_PSY_A_CORE
On-site Attending supervising EMMIE (99XXX codes)
Attending Only
On-site Attending supervising EMMIE (99XXX codes)

## 2025-03-13 NOTE — BH INPATIENT PSYCHIATRY DISCHARGE NOTE - NSBHMETABOLIC_PSY_ALL_CORE_FT
BMI: BMI (kg/m2): 37.3 (03-02-25 @ 14:15)  HbA1c: A1C with Estimated Average Glucose Result: 5.4 % (12-27-24 @ 05:30)    Glucose: POCT Blood Glucose.: 104 mg/dL (04-04-24 @ 06:04)    BP: 131/84 (03-12-25 @ 20:44) (122/77 - 131/84)Vital Signs Last 24 Hrs  T(C): 36.8 (03-13-25 @ 07:42), Max: 36.8 (03-12-25 @ 20:44)  T(F): 98.3 (03-13-25 @ 07:42), Max: 98.3 (03-13-25 @ 07:42)  HR: 78 (03-12-25 @ 20:44) (78 - 78)  BP: 131/84 (03-12-25 @ 20:44) (131/84 - 131/84)  BP(mean): --  RR: 18 (03-13-25 @ 07:42) (18 - 18)  SpO2: --    Orthostatic VS  03-13-25 @ 07:42  Lying BP: --/-- HR: --  Sitting BP: 124/88 HR: 71  Standing BP: --/-- HR: --  Site: --  Mode: --    Lipid Panel: Date/Time: 12-27-24 @ 05:30  Cholesterol, Serum: 145  LDL Cholesterol Calculated: 73  HDL Cholesterol, Serum: 47  Total Cholesterol/HDL Ration Measurement: --  Triglycerides, Serum: 145

## 2025-03-13 NOTE — BH DISCHARGE NOTE NURSING/SOCIAL WORK/PSYCH REHAB - PATIENT PORTAL LINK FT
You can access the FollowMyHealth Patient Portal offered by [facility] by registering at the following website: [email or web address]. By joining [employer]’s FollowMyHealth portal, you will also be able to view your health information using other applications (apps) compatible with our system.

## 2025-03-13 NOTE — BH DISCHARGE NOTE NURSING/SOCIAL WORK/PSYCH REHAB - NSCDUDCCRISIS_PSY_A_CORE
[facility] Well  [phone] [facility]-WELL ([phone])  Text "WELL" to [phone]  Website: www.[employer]/.[employer] 1 (954) 621-HOPE (0695) Website: www.safehorizon.org/.National Suicide Prevention Lifeline 0 (548) 080-1094/.  [employer]  1 (895) LIFEN[employer] (744-9595)/.  [facility]  [phone]/.  [facility] Behavioral Health Helpline / [facility] Mobile Crisis  (554) 227-TALK (4458)/.  [facility] Response Crisis Hotline  (892) 275-2434  24 hour telephone crisis intervention and suicide prevention hotline concerned with all mental health issues/.  [facility]  [address] [place] [address] [phone]   Hours:  Monday through Friday from 9 AM to 3 PM/988 Suicide and Crisis Lifeline

## 2025-03-13 NOTE — BH DISCHARGE NOTE NURSING/SOCIAL WORK/PSYCH REHAB - DISCHARGE INSTRUCTIONS AFTERCARE APPOINTMENTS
In order to check the location, date, or time of your aftercare appointment, please refer to your Discharge Instructions Document given to you upon leaving the hospital.  If you have lost the instructions please call [phone]

## 2025-03-13 NOTE — BH INPATIENT PSYCHIATRY PROGRESS NOTE - NSBHMSEPERCEPT_PSY_A_CORE
No abnormalities
Auditory hallucinations
No abnormalities

## 2025-03-13 NOTE — BH INPATIENT PSYCHIATRY DISCHARGE NOTE - NSDCCPCAREPLAN_GEN_ALL_CORE_FT
PRINCIPAL DISCHARGE DIAGNOSIS  Diagnosis: Depression, recurrent  Assessment and Plan of Treatment:       SECONDARY DISCHARGE DIAGNOSES  Diagnosis: Polysubstance abuse  Assessment and Plan of Treatment:

## 2025-03-13 NOTE — BH TREATMENT PLAN - NSPTSTATEDGOAL_PSY_ALL_CORE
Help with depression, anxiety, and SI. 

## 2025-03-13 NOTE — BH INPATIENT PSYCHIATRY PROGRESS NOTE - CURRENT MEDICATION
MEDICATIONS  (STANDING):  aspirin enteric coated 81 milliGRAM(s) Oral daily  folic acid 1 milliGRAM(s) Oral daily  lurasidone 20 milliGRAM(s) Oral <User Schedule>  metoprolol succinate ER 25 milliGRAM(s) Oral daily  multivitamin 1 Tablet(s) Oral daily  thiamine 100 milliGRAM(s) Oral daily    MEDICATIONS  (PRN):  acetaminophen     Tablet .. 650 milliGRAM(s) Oral every 6 hours PRN Mild Pain (1 - 3), Moderate Pain (4 - 6), Severe Pain (7 - 10)  aluminum hydroxide/magnesium hydroxide/simethicone Suspension 30 milliLiter(s) Oral every 4 hours PRN Dyspepsia  hydrOXYzine hydrochloride 50 milliGRAM(s) Oral every 6 hours PRN anxiety  nicotine  Polacrilex Gum 2 milliGRAM(s) Oral every 2 hours PRN Smoking Cessation  OLANZapine 5 milliGRAM(s) Oral every 6 hours PRN psychotic agitation  OLANZapine Injectable 5 milliGRAM(s) IntraMuscular Once PRN severe psychotic agitation  
MEDICATIONS  (STANDING):  aspirin enteric coated 81 milliGRAM(s) Oral daily  folic acid 1 milliGRAM(s) Oral daily  lurasidone 20 milliGRAM(s) Oral <User Schedule>  metoprolol succinate ER 25 milliGRAM(s) Oral daily  multivitamin 1 Tablet(s) Oral daily    MEDICATIONS  (PRN):  acetaminophen     Tablet .. 650 milliGRAM(s) Oral every 6 hours PRN Mild Pain (1 - 3), Moderate Pain (4 - 6), Severe Pain (7 - 10)  aluminum hydroxide/magnesium hydroxide/simethicone Suspension 30 milliLiter(s) Oral every 4 hours PRN Dyspepsia  hydrOXYzine hydrochloride 50 milliGRAM(s) Oral every 6 hours PRN anxiety  melatonin. 3 milliGRAM(s) Oral at bedtime PRN Insomnia  nicotine  Polacrilex Gum 2 milliGRAM(s) Oral every 2 hours PRN Smoking Cessation  OLANZapine 5 milliGRAM(s) Oral every 6 hours PRN psychotic agitation  OLANZapine Injectable 5 milliGRAM(s) IntraMuscular Once PRN severe psychotic agitation  
MEDICATIONS  (STANDING):  aspirin enteric coated 81 milliGRAM(s) Oral daily  folic acid 1 milliGRAM(s) Oral daily  lurasidone 20 milliGRAM(s) Oral <User Schedule>  metoprolol succinate ER 25 milliGRAM(s) Oral daily  multivitamin 1 Tablet(s) Oral daily    MEDICATIONS  (PRN):  acetaminophen     Tablet .. 650 milliGRAM(s) Oral every 6 hours PRN Mild Pain (1 - 3), Moderate Pain (4 - 6), Severe Pain (7 - 10)  aluminum hydroxide/magnesium hydroxide/simethicone Suspension 30 milliLiter(s) Oral every 4 hours PRN Dyspepsia  hydrOXYzine hydrochloride 50 milliGRAM(s) Oral every 6 hours PRN anxiety  melatonin. 3 milliGRAM(s) Oral at bedtime PRN Insomnia  nicotine  Polacrilex Gum 2 milliGRAM(s) Oral every 2 hours PRN Smoking Cessation  OLANZapine 5 milliGRAM(s) Oral every 6 hours PRN psychotic agitation  OLANZapine Injectable 5 milliGRAM(s) IntraMuscular Once PRN severe psychotic agitation  
MEDICATIONS  (STANDING):  aspirin enteric coated 81 milliGRAM(s) Oral at bedtime  lurasidone 20 milliGRAM(s) Oral at bedtime  metoprolol succinate ER 25 milliGRAM(s) Oral at bedtime  multivitamin 1 Tablet(s) Oral at bedtime    MEDICATIONS  (PRN):  acetaminophen     Tablet .. 650 milliGRAM(s) Oral every 6 hours PRN Mild Pain (1 - 3), Moderate Pain (4 - 6), Severe Pain (7 - 10)  aluminum hydroxide/magnesium hydroxide/simethicone Suspension 30 milliLiter(s) Oral every 4 hours PRN Dyspepsia  hydrOXYzine hydrochloride 50 milliGRAM(s) Oral every 6 hours PRN anxiety  melatonin. 3 milliGRAM(s) Oral at bedtime PRN Insomnia  nicotine  Polacrilex Gum 2 milliGRAM(s) Oral every 2 hours PRN Smoking Cessation  OLANZapine 5 milliGRAM(s) Oral every 6 hours PRN psychotic agitation  OLANZapine Injectable 5 milliGRAM(s) IntraMuscular Once PRN severe psychotic agitation  
MEDICATIONS  (STANDING):  aspirin enteric coated 81 milliGRAM(s) Oral at bedtime  lurasidone 20 milliGRAM(s) Oral at bedtime  metoprolol succinate ER 25 milliGRAM(s) Oral at bedtime    MEDICATIONS  (PRN):  acetaminophen     Tablet .. 650 milliGRAM(s) Oral every 6 hours PRN Mild Pain (1 - 3), Moderate Pain (4 - 6), Severe Pain (7 - 10)  aluminum hydroxide/magnesium hydroxide/simethicone Suspension 30 milliLiter(s) Oral every 4 hours PRN Dyspepsia  hydrOXYzine hydrochloride 50 milliGRAM(s) Oral every 6 hours PRN anxiety  melatonin. 3 milliGRAM(s) Oral at bedtime PRN Insomnia  nicotine  Polacrilex Gum 2 milliGRAM(s) Oral every 2 hours PRN Smoking Cessation  OLANZapine 5 milliGRAM(s) Oral every 6 hours PRN psychotic agitation  OLANZapine Injectable 5 milliGRAM(s) IntraMuscular Once PRN severe psychotic agitation  
MEDICATIONS  (STANDING):  aspirin enteric coated 81 milliGRAM(s) Oral at bedtime  lurasidone 20 milliGRAM(s) Oral at bedtime  metoprolol succinate ER 25 milliGRAM(s) Oral at bedtime  multivitamin 1 Tablet(s) Oral at bedtime    MEDICATIONS  (PRN):  acetaminophen     Tablet .. 650 milliGRAM(s) Oral every 6 hours PRN Mild Pain (1 - 3), Moderate Pain (4 - 6), Severe Pain (7 - 10)  aluminum hydroxide/magnesium hydroxide/simethicone Suspension 30 milliLiter(s) Oral every 4 hours PRN Dyspepsia  hydrocortisone 1% Cream 1 Application(s) Topical two times a day PRN rash  hydrOXYzine hydrochloride 50 milliGRAM(s) Oral every 6 hours PRN anxiety  melatonin. 3 milliGRAM(s) Oral at bedtime PRN Insomnia  nicotine  Polacrilex Gum 2 milliGRAM(s) Oral every 2 hours PRN Smoking Cessation  OLANZapine 5 milliGRAM(s) Oral every 6 hours PRN psychotic agitation  OLANZapine Injectable 5 milliGRAM(s) IntraMuscular Once PRN severe psychotic agitation  
MEDICATIONS  (STANDING):  aspirin enteric coated 81 milliGRAM(s) Oral at bedtime  lurasidone 20 milliGRAM(s) Oral at bedtime  metoprolol succinate ER 25 milliGRAM(s) Oral at bedtime  multivitamin 1 Tablet(s) Oral at bedtime    MEDICATIONS  (PRN):  acetaminophen     Tablet .. 650 milliGRAM(s) Oral every 6 hours PRN Mild Pain (1 - 3), Moderate Pain (4 - 6), Severe Pain (7 - 10)  aluminum hydroxide/magnesium hydroxide/simethicone Suspension 30 milliLiter(s) Oral every 4 hours PRN Dyspepsia  hydrocortisone 1% Cream 1 Application(s) Topical two times a day PRN rash  hydrOXYzine hydrochloride 50 milliGRAM(s) Oral every 6 hours PRN anxiety  melatonin. 3 milliGRAM(s) Oral at bedtime PRN Insomnia  nicotine  Polacrilex Gum 2 milliGRAM(s) Oral every 2 hours PRN Smoking Cessation  OLANZapine 5 milliGRAM(s) Oral every 6 hours PRN psychotic agitation  OLANZapine Injectable 5 milliGRAM(s) IntraMuscular Once PRN severe psychotic agitation  
MEDICATIONS  (STANDING):  aspirin enteric coated 81 milliGRAM(s) Oral daily  folic acid 1 milliGRAM(s) Oral daily  lurasidone 20 milliGRAM(s) Oral <User Schedule>  metoprolol succinate ER 25 milliGRAM(s) Oral daily  multivitamin 1 Tablet(s) Oral daily  thiamine 100 milliGRAM(s) Oral daily    MEDICATIONS  (PRN):  acetaminophen     Tablet .. 650 milliGRAM(s) Oral every 6 hours PRN Mild Pain (1 - 3), Moderate Pain (4 - 6), Severe Pain (7 - 10)  aluminum hydroxide/magnesium hydroxide/simethicone Suspension 30 milliLiter(s) Oral every 4 hours PRN Dyspepsia  hydrOXYzine hydrochloride 50 milliGRAM(s) Oral every 6 hours PRN anxiety  nicotine  Polacrilex Gum 2 milliGRAM(s) Oral every 2 hours PRN Smoking Cessation  OLANZapine 5 milliGRAM(s) Oral every 6 hours PRN psychotic agitation  OLANZapine Injectable 5 milliGRAM(s) IntraMuscular Once PRN severe psychotic agitation

## 2025-03-13 NOTE — BH TREATMENT PLAN - NSTXMEDICINTERRN_PSY_ALL_CORE
Educate patient on the details of his medication regimen with each medication pass.

## 2025-03-13 NOTE — BH INPATIENT PSYCHIATRY DISCHARGE NOTE - LEGAL HISTORY
denies. Per chart, no listed pending legal issues as per [facility] [employer] [facility]/ [employer] SITE

## 2025-03-13 NOTE — BH DISCHARGE NOTE NURSING/SOCIAL WORK/PSYCH REHAB - NSDCPRRECOMMEND_PSY_ALL_CORE
Patient is recommended to continue utilizing coping skills to help improve with their mood and being compliant with their aftercare treatment plan.

## 2025-03-13 NOTE — BH INPATIENT PSYCHIATRY DISCHARGE NOTE - NSBHSATHC_PSY_A_CORE FT
Prior chart indicates hx of use; reports last use was 30 mins prior to [facility] ED presentation, but now denies use and UTox negative

## 2025-03-13 NOTE — BH TREATMENT PLAN - NSTXALCDRGGOAL_PSY_ALL_CORE
Will not display signs of withdrawal

## 2025-03-13 NOTE — BH INPATIENT PSYCHIATRY PROGRESS NOTE - NSBHFUPINTERVALCCFT_PSY_A_CORE
Pt seen f/u for SI, depression, psychosis and substance use

## 2025-03-13 NOTE — BH INPATIENT PSYCHIATRY PROGRESS NOTE - GENERAL APPEARANCE
does not appear actively internally stimulated/No deformities present

## 2025-03-13 NOTE — BH INPATIENT PSYCHIATRY DISCHARGE NOTE - NSBHSACOC_PSY_A_CORE FT
Prior chart indicates hx of use; reports last use was 30 mins prior to [facility] ED presentation, but now reports he does not remember using but UTox +

## 2025-03-13 NOTE — BH TREATMENT PLAN - NSTXDEPRESINTERPR_PSY_ALL_CORE
Writer created psych rehab goal for patient to identify 2 coping skills that assist in approving mood.  Over the next few days Psych rehab staff will continue to engage and support patient throughout.
Writer created psych rehab goal for patient to identify 2 coping skills that assist in approving mood.  Over the next few days Psych rehab staff will continue to engage and support patient throughout.

## 2025-03-13 NOTE — BH INPATIENT PSYCHIATRY DISCHARGE NOTE - HOSPITAL COURSE
On the unit, patient presented as depressed with CAH to harm himself and others and SI with plan to shoot himself with a gun or jump in front of a train.  [place].  Pt reported using ETOH and crack cocaine prior to admission.  He was isolative in his room.  Pt was started on Latuda 20mg PO at bedtime.  On the medication, patient showed improvement in depressive sx, SI and psychosis.  Pt reported he felt better after he was done withdrawing from substances.  Pt reported he was motivated to abstain from substances and be there for his daughter and he was interested in inpatient rehab, but only specific ones such as [facility] and a few others that did not accept him.  Pt eventually put in a 72 hour letter after food was not able to be reheated while he was fasting for Ramadan.  Pt reported he planned to go to [facility] and contact [facility] to get into [facility] which he had done in the past.  Pt decline for SW to contact those places on his behalf.  Pt denied urges to use substances and he agreed to be in contact with his care coordinator.  SW was in the process of putting in an HRA for housing during his admission which will be submitted.  Pt wanted to discharge on his 72 hour letter and he was discharged as he no longer presented as an acute danger to himself or others. On the unit, patient presented as depressed with CAH to harm himself and others and SI with plan to shoot himself with a gun or jump in front of a train.  [place].  Pt reported using ETOH and crack cocaine prior to admission.  He was isolative in his room.  Pt was started on Latuda 20mg PO at bedtime.  On the medication, patient showed improvement in depressive sx, SI and psychosis.  Pt reported he felt better after he was done withdrawing from substances.  Pt reported he was motivated to abstain from substances and be there for his daughter and he was interested in inpatient rehab, but only specific ones such as [facility] and a few others that did not accept him.  Pt eventually put in a 72 hour letter after food was not able to be reheated while he was fasting for Ramadan.  Pt reported he planned to go to [facility] and contact [facility] to get into [facility] rehab which he had done in the past.  Pt declined for SW to contact those places on his behalf.  Pt denied urges to use substances and he agreed to be in contact with his care coordinator.  He reported his daughter is his coping skill and he wanted to do well for her.  SW was in the process of putting in an HRA for housing during his admission which will be submitted.  Pt wanted to discharge on his 72 hour letter and he was discharged as he no longer presented as an acute danger to himself or others.    Although patient was admitted due to risk factors for violence/suicide including psychotic and depressive sx, SI and substance use, the patient’s risk for suicide/violence was mitigated during his hospitalization and his protective factors outweigh his risk factors at this time.  Pt is currently at low acute risk for suicide/violence.  Patient’s protective factors include:  no current SI/HI/I/P, willingness to engage in treatment, responsibility to family, motivation to abstain from substances, future orientation, care coordination in the community, no hx of aggression, no known legal hx, and no current acute depressive, psychotic or manic sx.  Although the patient is at chronic risk for violence/suicide given his hx of psychiatric illness, current homelessness, hx of suicide attempts, hx of psychiatric hospitalizations, chronic medical issues, male gender, and hx of substance abuse, this risk cannot be ameliorated by continued inpatient treatment.  The patient no longer met the criteria for psychiatric hospitalization at discharge.    On the unit, patient presented as depressed with CAH to harm himself and others and SI with plan to shoot himself with a gun or jump in front of a train.  [place].  Pt did not appear internally preoccupied during his hospitalization.  Pt reported using ETOH and crack cocaine prior to admission.  He was isolative in his room.  Pt was started on Latuda 20mg PO at bedtime.  Pt reported he felt better after he was done withdrawing from substances and he showed improvement in depressive sx, SI and psychosis.  Similar to previous hospitalizations, patient started selectively refusing medication once he felt better.  Pt reported he was motivated to abstain from substances and be there for his daughter and he was interested in inpatient rehab, but only specific ones such as [facility] and a few others that did not accept him.  Pt eventually put in a 72 hour letter after food was not able to be reheated while he was fasting for Ramadan.  Pt reported he planned to go to [facility] and contact [facility] to get into [facility] rehab which he had done in the past.  Pt declined for SW to contact those places on his behalf.  Pt denied urges to use substances and he agreed to be in contact with his care coordinator.  He reported his daughter is his coping skill and he wanted to do well for her.  SW was in the process of putting in an HRA for housing during his admission which will be submitted.  Pt wanted to discharge on his 72 hour letter and he was discharged as he no longer presented as an acute danger to himself or others.    Although patient was admitted due to risk factors for violence/suicide including psychotic and depressive sx, SI and substance use, the patient’s risk for suicide/violence was mitigated during his hospitalization and his protective factors outweigh his risk factors at this time.  Pt is currently at low acute risk for suicide/violence.  Patient’s protective factors include:  no current SI/HI/I/P, willingness to engage in treatment, responsibility to family, motivation to abstain from substances, future orientation, care coordination in the community, no hx of aggression, no known legal hx, and no current acute depressive, psychotic or manic sx.  Although the patient is at chronic risk for violence/suicide given his hx of psychiatric illness, current homelessness, hx of suicide attempts, hx of psychiatric hospitalizations, chronic medical issues, male gender, and hx of substance abuse, this risk cannot be ameliorated by continued inpatient treatment.  The patient no longer met the criteria for psychiatric hospitalization at discharge.

## 2025-03-13 NOTE — BH INPATIENT PSYCHIATRY DISCHARGE NOTE - NSDCMRMEDTOKEN_GEN_ALL_CORE_FT
aspirin 81 mg oral delayed release tablet: 1 tab(s) orally once a day (at bedtime)  lurasidone 20 mg oral tablet: 1 tab(s) orally once a day (at bedtime)  metoprolol succinate 25 mg oral tablet, extended release: 1 tab(s) orally once a day (at bedtime)  Multiple Vitamins oral tablet: 1 tab(s) orally once a day (at bedtime)

## 2025-03-13 NOTE — BH TREATMENT PLAN - NSTXDCHOUSINTERSW_PSY_ALL_CORE
SW completed the initial  admission assessment.
SW will f/u with referrals made for rehab.
SW completed the initial  admission assessment.

## 2025-03-13 NOTE — BH INPATIENT PSYCHIATRY DISCHARGE NOTE - ATTENDING DISCHARGE PHYSICAL EXAMINATION:
The patient has continued to show improvement in symptoms.  He states his depression is much improved, and he denies any significant anxiety.  He denies any SI, and his behavior has been well controlled.  The patient has been sleeping well.  The patient has been engaged in treatment on the unit, is compliant with medications.  He does not want to wait for a rehab program, and plans on going to a shelter.  He identifies family as protective factors for him.  He was able to safety plan appropriately.  The patient is no longer an acute danger to himself or others, and will therefore be discharged on his 3 day letter.  The patient was offered a Narcan kit prior to discharge.

## 2025-03-13 NOTE — BH DISCHARGE NOTE NURSING/SOCIAL WORK/PSYCH REHAB - NSDCPRGOAL_PSY_ALL_CORE
Writer met with patient to discuss their overall progress during their hospital stay. During the meeting patient was calm, attentive and dressed appropriately. Patient reported he is feeling better and ready to continue his fast for Ramadan. On the unit patient was visible, maintained behavior control and selectively social with their peers. Patient has shown fair progress towards their goal, by utilizing talking to their peers and family as a coping skill to help with their mood. Patient attended a minimal number of Psychiatric Rehabilitation groups. Writer has seen some improvements in patient. Patient completed a safety plan before discharge.

## 2025-03-13 NOTE — BH INPATIENT PSYCHIATRY DISCHARGE NOTE - NSBHFUPINTERVALCCFT_PSY_A_CORE
Discharge Progress Note Date and Time: 03-14-25 @ 10:12  Pt seen f/u for SI, depression, psychosis and substance use

## 2025-03-14 VITALS — TEMPERATURE: 97 F

## 2025-03-16 ENCOUNTER — HOSPITAL ENCOUNTER (INPATIENT)
Dept: HOSPITAL 74 - YASAS | Age: 60
LOS: 1 days | Discharge: LEFT BEFORE DISCHARGE | DRG: 770 | End: 2025-03-17
Attending: ALLERGY & IMMUNOLOGY | Admitting: ALLERGY & IMMUNOLOGY
Payer: COMMERCIAL

## 2025-03-16 VITALS — BODY MASS INDEX: 37.7 KG/M2

## 2025-03-16 DIAGNOSIS — F14.20: ICD-10-CM

## 2025-03-16 DIAGNOSIS — F19.282: ICD-10-CM

## 2025-03-16 DIAGNOSIS — F10.230: Primary | ICD-10-CM

## 2025-03-16 DIAGNOSIS — K21.9: ICD-10-CM

## 2025-03-16 DIAGNOSIS — G89.29: ICD-10-CM

## 2025-03-16 DIAGNOSIS — I10: ICD-10-CM

## 2025-03-16 DIAGNOSIS — E78.5: ICD-10-CM

## 2025-03-16 DIAGNOSIS — M25.561: ICD-10-CM

## 2025-03-16 DIAGNOSIS — F39: ICD-10-CM

## 2025-03-16 DIAGNOSIS — M25.562: ICD-10-CM

## 2025-03-16 DIAGNOSIS — M54.50: ICD-10-CM

## 2025-03-16 PROCEDURE — HZ2ZZZZ DETOXIFICATION SERVICES FOR SUBSTANCE ABUSE TREATMENT: ICD-10-PCS | Performed by: ALLERGY & IMMUNOLOGY

## 2025-03-16 RX ADMIN — Medication SCH MG: at 22:54

## 2025-03-16 RX ADMIN — IBUPROFEN PRN MG: 600 TABLET, FILM COATED ORAL at 17:38

## 2025-03-16 RX ADMIN — METOPROLOL TARTRATE ONE MG: 25 TABLET, FILM COATED ORAL at 16:37

## 2025-03-17 VITALS — SYSTOLIC BLOOD PRESSURE: 118 MMHG | HEART RATE: 94 BPM | TEMPERATURE: 97.3 F | DIASTOLIC BLOOD PRESSURE: 69 MMHG

## 2025-03-17 VITALS — RESPIRATION RATE: 18 BRPM

## 2025-03-17 LAB
ALBUMIN SERPL-MCNC: 3.4 G/DL (ref 3.4–5)
ALP SERPL-CCNC: 87 U/L (ref 45–117)
ALT SERPL-CCNC: 47 U/L (ref 13–61)
ANION GAP SERPL CALC-SCNC: 6 MMOL/L (ref 4–13)
AST SERPL-CCNC: 55 U/L (ref 15–37)
BILIRUB SERPL-MCNC: 0.9 MG/DL (ref 0.2–1)
BUN SERPL-MCNC: 16.3 MG/DL (ref 7–18)
CALCIUM SERPL-MCNC: 8.9 MG/DL (ref 8.5–10.1)
CHLORIDE SERPL-SCNC: 109 MMOL/L (ref 98–107)
CO2 SERPL-SCNC: 27 MMOL/L (ref 21–32)
CREAT SERPL-MCNC: 1.2 MG/DL (ref 0.55–1.3)
DEPRECATED RDW RBC AUTO: 15.6 % (ref 11.9–15.9)
GLUCOSE SERPL-MCNC: 118 MG/DL (ref 74–106)
HCT VFR BLD CALC: 34.8 % (ref 35.4–49)
HGB BLD-MCNC: 11.7 GM/DL (ref 11.7–16.9)
MCH RBC QN AUTO: 30.5 PG (ref 25.7–33.7)
MCHC RBC AUTO-ENTMCNC: 33.6 G/DL (ref 32–35.9)
MCV RBC: 90.7 FL (ref 80–96)
PLATELET # BLD AUTO: 266 10^3/UL (ref 134–434)
PMV BLD: 8 FL (ref 7.5–11.1)
POTASSIUM SERPLBLD-SCNC: 3.9 MMOL/L (ref 3.5–5.1)
PROT SERPL-MCNC: 6.6 G/DL (ref 6.4–8.2)
RBC # BLD AUTO: 3.83 M/MM3 (ref 4–5.6)
SODIUM SERPL-SCNC: 142 MMOL/L (ref 136–145)
WBC # BLD AUTO: 4.5 K/MM3 (ref 4–10)

## 2025-03-17 RX ADMIN — Medication SCH: at 10:15

## 2025-03-17 RX ADMIN — HYDROCHLOROTHIAZIDE SCH MG: 25 TABLET ORAL at 15:14

## 2025-03-19 ENCOUNTER — EMERGENCY (EMERGENCY)
Facility: HOSPITAL | Age: 60
LOS: 1 days | Discharge: HOME | End: 2025-03-19
Attending: EMERGENCY MEDICINE | Admitting: EMERGENCY MEDICINE
Payer: MEDICAID

## 2025-03-19 VITALS
HEIGHT: 74 IN | TEMPERATURE: 98 F | HEART RATE: 116 BPM | SYSTOLIC BLOOD PRESSURE: 133 MMHG | RESPIRATION RATE: 16 BRPM | DIASTOLIC BLOOD PRESSURE: 86 MMHG | WEIGHT: 294.1 LBS | OXYGEN SATURATION: 98 %

## 2025-03-19 VITALS
DIASTOLIC BLOOD PRESSURE: 78 MMHG | TEMPERATURE: 97 F | SYSTOLIC BLOOD PRESSURE: 124 MMHG | RESPIRATION RATE: 18 BRPM | HEART RATE: 77 BPM | OXYGEN SATURATION: 97 %

## 2025-03-19 LAB
ALBUMIN SERPL ELPH-MCNC: 3.6 G/DL — SIGNIFICANT CHANGE UP (ref 3.4–5)
ALP SERPL-CCNC: 66 U/L — SIGNIFICANT CHANGE UP (ref 40–120)
ALT FLD-CCNC: 61 U/L — HIGH (ref 12–42)
ANION GAP SERPL CALC-SCNC: 12 MMOL/L — SIGNIFICANT CHANGE UP (ref 9–16)
AST SERPL-CCNC: 66 U/L — HIGH (ref 15–37)
BASOPHILS # BLD AUTO: 0.05 K/UL — SIGNIFICANT CHANGE UP (ref 0–0.2)
BASOPHILS NFR BLD AUTO: 0.7 % — SIGNIFICANT CHANGE UP (ref 0–2)
BILIRUB SERPL-MCNC: 0.8 MG/DL — SIGNIFICANT CHANGE UP (ref 0.2–1.2)
BUN SERPL-MCNC: 15 MG/DL — SIGNIFICANT CHANGE UP (ref 7–23)
CALCIUM SERPL-MCNC: 8.8 MG/DL — SIGNIFICANT CHANGE UP (ref 8.5–10.5)
CHLORIDE SERPL-SCNC: 106 MMOL/L — SIGNIFICANT CHANGE UP (ref 96–108)
CO2 SERPL-SCNC: 24 MMOL/L — SIGNIFICANT CHANGE UP (ref 22–31)
CREAT SERPL-MCNC: 1.25 MG/DL — SIGNIFICANT CHANGE UP (ref 0.5–1.3)
EGFR: 67 ML/MIN/1.73M2 — SIGNIFICANT CHANGE UP
EGFR: 67 ML/MIN/1.73M2 — SIGNIFICANT CHANGE UP
EOSINOPHIL # BLD AUTO: 0.07 K/UL — SIGNIFICANT CHANGE UP (ref 0–0.5)
EOSINOPHIL NFR BLD AUTO: 1 % — SIGNIFICANT CHANGE UP (ref 0–6)
GLUCOSE SERPL-MCNC: 86 MG/DL — SIGNIFICANT CHANGE UP (ref 70–99)
HCT VFR BLD CALC: 33.4 % — LOW (ref 39–50)
HGB BLD-MCNC: 11.1 G/DL — LOW (ref 13–17)
IMM GRANULOCYTES # BLD AUTO: 0.02 K/UL — SIGNIFICANT CHANGE UP (ref 0–0.07)
IMM GRANULOCYTES NFR BLD AUTO: 0.3 % — SIGNIFICANT CHANGE UP (ref 0–0.9)
LYMPHOCYTES # BLD AUTO: 2.44 K/UL — SIGNIFICANT CHANGE UP (ref 1–3.3)
LYMPHOCYTES NFR BLD AUTO: 33.8 % — SIGNIFICANT CHANGE UP (ref 13–44)
MCHC RBC-ENTMCNC: 29.8 PG — SIGNIFICANT CHANGE UP (ref 27–34)
MCHC RBC-ENTMCNC: 33.2 G/DL — SIGNIFICANT CHANGE UP (ref 32–36)
MCV RBC AUTO: 89.8 FL — SIGNIFICANT CHANGE UP (ref 80–100)
MONOCYTES # BLD AUTO: 0.7 K/UL — SIGNIFICANT CHANGE UP (ref 0–0.9)
MONOCYTES NFR BLD AUTO: 9.7 % — SIGNIFICANT CHANGE UP (ref 2–14)
NEUTROPHILS # BLD AUTO: 3.93 K/UL — SIGNIFICANT CHANGE UP (ref 1.8–7.4)
NEUTROPHILS NFR BLD AUTO: 54.5 % — SIGNIFICANT CHANGE UP (ref 43–77)
NRBC # BLD AUTO: 0 K/UL — SIGNIFICANT CHANGE UP (ref 0–0)
NRBC # FLD: 0 K/UL — SIGNIFICANT CHANGE UP (ref 0–0)
NRBC BLD AUTO-RTO: 0 /100 WBCS — SIGNIFICANT CHANGE UP (ref 0–0)
PLATELET # BLD AUTO: 283 K/UL — SIGNIFICANT CHANGE UP (ref 150–400)
PMV BLD: 9.3 FL — SIGNIFICANT CHANGE UP (ref 7–13)
POTASSIUM SERPL-MCNC: 3.7 MMOL/L — SIGNIFICANT CHANGE UP (ref 3.5–5.3)
POTASSIUM SERPL-SCNC: 3.7 MMOL/L — SIGNIFICANT CHANGE UP (ref 3.5–5.3)
PROT SERPL-MCNC: 7.3 G/DL — SIGNIFICANT CHANGE UP (ref 6.4–8.2)
RBC # BLD: 3.72 M/UL — LOW (ref 4.2–5.8)
RBC # FLD: 15 % — HIGH (ref 10.3–14.5)
SODIUM SERPL-SCNC: 142 MMOL/L — SIGNIFICANT CHANGE UP (ref 132–145)
TROPONIN I, HIGH SENSITIVITY RESULT: 10 NG/L — SIGNIFICANT CHANGE UP
TROPONIN I, HIGH SENSITIVITY RESULT: 12.6 NG/L — SIGNIFICANT CHANGE UP
WBC # BLD: 7.21 K/UL — SIGNIFICANT CHANGE UP (ref 3.8–10.5)
WBC # FLD AUTO: 7.21 K/UL — SIGNIFICANT CHANGE UP (ref 3.8–10.5)

## 2025-03-19 PROCEDURE — 99285 EMERGENCY DEPT VISIT HI MDM: CPT

## 2025-03-19 PROCEDURE — 71045 X-RAY EXAM CHEST 1 VIEW: CPT | Mod: 26

## 2025-03-19 RX ORDER — LORAZEPAM 4 MG/ML
1 VIAL (ML) INJECTION ONCE
Refills: 0 | Status: DISCONTINUED | OUTPATIENT
Start: 2025-03-19 | End: 2025-03-19

## 2025-03-19 RX ORDER — CHLORDIAZEPOXIDE HCL 10 MG
1 CAPSULE ORAL
Qty: 20 | Refills: 0
Start: 2025-03-19 | End: 2025-03-23

## 2025-03-19 RX ORDER — KETOROLAC TROMETHAMINE 30 MG/ML
15 INJECTION, SOLUTION INTRAMUSCULAR; INTRAVENOUS ONCE
Refills: 0 | Status: DISCONTINUED | OUTPATIENT
Start: 2025-03-19 | End: 2025-03-19

## 2025-03-19 RX ORDER — CHLORDIAZEPOXIDE HCL 10 MG
50 CAPSULE ORAL ONCE
Refills: 0 | Status: DISCONTINUED | OUTPATIENT
Start: 2025-03-19 | End: 2025-03-19

## 2025-03-19 RX ADMIN — KETOROLAC TROMETHAMINE 15 MILLIGRAM(S): 30 INJECTION, SOLUTION INTRAMUSCULAR; INTRAVENOUS at 06:25

## 2025-03-19 RX ADMIN — Medication 50 MILLIGRAM(S): at 09:44

## 2025-03-19 RX ADMIN — Medication 1 MILLIGRAM(S): at 09:44

## 2025-03-19 NOTE — ED PROVIDER NOTE - OBJECTIVE STATEMENT
60 yo m here with chest pain L sided non exertional non positional ongoing for 30 min after cocaine use, the cp has no provoking or modifying symptoms not associated with any n/v or diaphoresis. Admits to remote h/o pericarditis. No unilateral swelling, hemoptysis, estogen supplementation, malignancy, recent immobilization or surgery, or prior DVT/PE.    I have reviewed available current nursing and previous documentation of past medical, surgical, family, and/or social history.

## 2025-03-19 NOTE — ED ADULT NURSE REASSESSMENT NOTE - NS ED NURSE REASSESS COMMENT FT1
Received handoff from Xi LOYD. Patient in no acute distress. patient placed on continuous cardiac monitor and pulse ox. All needs met at this time. Care continues.

## 2025-03-19 NOTE — ED ADULT NURSE NOTE - NSFALLASSESSNEED_ED_ALL_ED
Serosanguineous color drainage noted to old dressing to Rt chest . Penrose dressing noted. Clean around drainage insertion sited and tiffany area with NS pat dry and place 4x4 to covered placed a ABD dressing secure with paper tapers. Rt arm with old dry pinkish area left open to air. Lt arm drainage also coming ffrom that site same color as Rt chest. Clean with NS pat dry and place 4x4 secured with paper tape   no

## 2025-03-19 NOTE — ED ADULT NURSE NOTE - NSFALLUNIVINTERV_ED_ALL_ED
Bed/Stretcher in lowest position, wheels locked, appropriate side rails in place/Call bell, personal items and telephone in reach/Instruct patient to call for assistance before getting out of bed/chair/stretcher/Non-slip footwear applied when patient is off stretcher/[place] to call system/Physically safe environment - no spills, clutter or unnecessary equipment/Purposeful proactive rounding/Room/bathroom lighting operational, light cord in reach 12

## 2025-03-19 NOTE — ED PROVIDER NOTE - CLINICAL SUMMARY MEDICAL DECISION MAKING FREE TEXT BOX
60 yo m here with chest pain L sided non exertional non positional ongoing for 30 min after cocaine use, the cp has no provoking or modifying symptoms not associated with any n/v or diaphoresis. Admits to remote h/o pericarditis. No unilateral swelling, hemoptysis, estogen supplementation, malignancy, recent immobilization or surgery, or prior DVT/PE. Normal cardiovascular exam will obtain trop x2 to r/o acs, more likely cocaine associated cp, pe less likely since no risk factors and pt has no sob or hypoxia, plan: cbc, cmp, trop, ecg

## 2025-03-19 NOTE — ED PROVIDER NOTE - PATIENT PORTAL LINK FT
You can access the FollowMyHealth Patient Portal offered by [facility] by registering at the following website: [email or web address]. By joining [facility]’s FollowMyHealth portal, you will also be able to view your health information using other applications (apps) compatible with our system.

## 2025-03-19 NOTE — ED ADULT NURSE NOTE - PAIN: BODY LOCATION
I see they have an appointment 6/5, that's great.  I sent a refill prescription to the pharmacy on file.  
LWC 6/22  Med ck 12/22  Scheduled WC for 6/23  Needs Dexmethylphenidate ER 15mg  #30  1 cap in am daily renewed  [facility] .. in chart  
chest, precordial

## 2025-03-19 NOTE — ED PROVIDER NOTE - NS ED ROS FT
Review of Systems    Constitutional: (-) fever (-) weakness (-) diaphoresis   Eyes: (-) change in vision (-) phtophobia (-) eye pain  ENT: (-) sore throat (-) ear ache (-) nasal discharge  Cardiovascular: (-) palpitations  Respiratory: (-) SOB (-) cough   GI: (-) abdominal pain (-) N/V (-) diarrhea  Integumentary: (-) rash (-) redness   Neurological:  (-) focal deficit (-) altered mental status

## 2025-03-19 NOTE — ED PROVIDER NOTE - PROGRESS NOTE DETAILS
Signed out to me at 8am to follow up 2nd trop. 58 yo male with chest pain in setting of cocaine use. patient sleeping during ED stay during my shift. 2nd trop neg. patient chest pain free currently. advised to stop using cocaine, advised f/u cards. patient agrees with plan. Sweetie - Signed out to me at 8 AM, second troponin negative.  No chest pain in the ED.  Patient with mild symptoms of alcohol withdrawal, states that he is seeking outpatient detox, given Ativan and Librium in the ED with complete resolution of symptoms, CIWA 2.  He is going to follow-up on his own for detox.  Patient appears reliable, will give a prescription for Librium, verbalized understanding and agreed to return to the ER for worsening symptoms or inability to find a detox program.

## 2025-03-19 NOTE — ED PROVIDER NOTE - ATTENDING APP SHARED VISIT CONTRIBUTION OF CARE
59-year-old male who presents with left-sided chest pain, non-exertional and non-positional, lasting for 30 minutes following cocaine use. He reports no associated nausea, vomiting, or diaphoresis, and has a remote history of pericarditis. He denies any unilateral swelling, hemoptysis, estrogen supplementation, malignancy, recent immobilization or surgery, or prior deep vein thrombosis/pulmonary embolism. Upon physical examination, he is well-appearing, with normal cardiovascular and respiratory function, no acute distress, and a regular heart rate and rhythm. Diagnostic evaluations, including a chest X-ray and ECG, reveal clear lungs without infiltrates, pleural effusion, or pneumothorax, and sinus tachycardia without acute myocardial infarction signs. Blood work shows stable electrolytes, normal kidney function, and slightly elevated liver enzymes. The clinical impression suggests cocaine-associated chest pain, with a comprehensive plan to rule out acute coronary syndrome through serial troponin measurements, while pulmonary embolism is considered less likely given the lack of risk factors for thromboembolism.    I saw and evaluated the patient. I discussed the case with the EMMIE and agree with the findings and helped develop the plan of care as documented in the EMMIE's note. I agree with the findings and plan of care as documented in the EMMIE's note.

## 2025-03-19 NOTE — ED PROVIDER NOTE - NSFOLLOWUPINSTRUCTIONS_ED_ALL_ED_FT
Stop using cocaine, it is dangerous to your health    Chest pain can be caused by many different conditions which may or may not be dangerous. Causes include heartburn, lung infections, heart attack, blood clot in lungs, skin infections, strain or damage to muscle, cartilage, or bones, etc. In addition to a history and physical examination, an electrocardiogram (ECG) or other lab tests may have been performed to determine the cause of your chest pain. Follow up with your primary care provider or with a cardiologist as instructed.     SEEK IMMEDIATE MEDICAL CARE IF YOU HAVE ANY OF THE FOLLOWING SYMPTOMS: worsening chest pain, coughing up blood, unexplained back/neck/jaw pain, severe abdominal pain, dizziness or lightheadedness, fainting, shortness of breath, sweaty or clammy skin, vomiting, or racing heart beat. These symptoms may represent a serious problem that is an emergency. Do not wait to see if the symptoms will go away. Get medical help right away. Call 911 and do not drive yourself to the hospital.

## 2025-03-19 NOTE — ED ADULT TRIAGE NOTE - CHIEF COMPLAINT QUOTE
Pt walked in c/o chest pain x30 minutes. States has hx of endocarditis. Pt notes feeling shortness of breath, admits to cocaine use tonight.

## 2025-03-19 NOTE — ED PROVIDER NOTE - CARE PROVIDER_API CALL
Getachew Guardado  Cardiovascular Disease  [address] [place] [address], [address]  [place] [address]  Phone: [phone]  Fax: [phone]  Follow Up Time:

## 2025-03-21 DIAGNOSIS — F10.239 ALCOHOL DEPENDENCE WITH WITHDRAWAL, UNSPECIFIED: ICD-10-CM

## 2025-03-21 DIAGNOSIS — R00.0 TACHYCARDIA, UNSPECIFIED: ICD-10-CM

## 2025-03-21 DIAGNOSIS — R07.89 OTHER CHEST PAIN: ICD-10-CM

## 2025-03-21 DIAGNOSIS — R07.9 CHEST PAIN, UNSPECIFIED: ICD-10-CM

## 2025-04-08 NOTE — ED ADULT NURSE NOTE - SUICIDE SCREENING QUESTION 1
\"Have you been to the ER, urgent care clinic since your last visit?  Hospitalized since your last visit?\"    YES - When: approximately 7 days ago.  Where and Why: Heart cath placed at [facility].    “Have you seen or consulted any other health care providers outside our system since your last visit?”    NO           No

## 2025-04-10 ENCOUNTER — EMERGENCY (EMERGENCY)
Facility: HOSPITAL | Age: 60
LOS: 1 days | End: 2025-04-10
Attending: EMERGENCY MEDICINE | Admitting: EMERGENCY MEDICINE
Payer: MEDICAID

## 2025-04-10 VITALS
RESPIRATION RATE: 16 BRPM | HEART RATE: 93 BPM | OXYGEN SATURATION: 98 % | SYSTOLIC BLOOD PRESSURE: 117 MMHG | HEIGHT: 74 IN | DIASTOLIC BLOOD PRESSURE: 83 MMHG | TEMPERATURE: 97 F

## 2025-04-10 VITALS
HEART RATE: 95 BPM | OXYGEN SATURATION: 95 % | TEMPERATURE: 98 F | RESPIRATION RATE: 16 BRPM | DIASTOLIC BLOOD PRESSURE: 78 MMHG | SYSTOLIC BLOOD PRESSURE: 125 MMHG

## 2025-04-10 DIAGNOSIS — R42 DIZZINESS AND GIDDINESS: ICD-10-CM

## 2025-04-10 DIAGNOSIS — R07.9 CHEST PAIN, UNSPECIFIED: ICD-10-CM

## 2025-04-10 LAB
ALBUMIN SERPL ELPH-MCNC: 4.2 G/DL — SIGNIFICANT CHANGE UP (ref 3.4–5)
ALP SERPL-CCNC: 70 U/L — SIGNIFICANT CHANGE UP (ref 40–120)
ALT FLD-CCNC: 65 U/L — HIGH (ref 12–42)
ANION GAP SERPL CALC-SCNC: 13 MMOL/L — SIGNIFICANT CHANGE UP (ref 9–16)
AST SERPL-CCNC: 67 U/L — HIGH (ref 15–37)
BASOPHILS # BLD AUTO: 0.03 K/UL — SIGNIFICANT CHANGE UP (ref 0–0.2)
BASOPHILS NFR BLD AUTO: 0.3 % — SIGNIFICANT CHANGE UP (ref 0–2)
BILIRUB SERPL-MCNC: 0.8 MG/DL — SIGNIFICANT CHANGE UP (ref 0.2–1.2)
BUN SERPL-MCNC: 23 MG/DL — SIGNIFICANT CHANGE UP (ref 7–23)
CALCIUM SERPL-MCNC: 9.4 MG/DL — SIGNIFICANT CHANGE UP (ref 8.5–10.5)
CHLORIDE SERPL-SCNC: 101 MMOL/L — SIGNIFICANT CHANGE UP (ref 96–108)
CO2 SERPL-SCNC: 24 MMOL/L — SIGNIFICANT CHANGE UP (ref 22–31)
CREAT SERPL-MCNC: 1.58 MG/DL — HIGH (ref 0.5–1.3)
EGFR: 50 ML/MIN/1.73M2 — LOW
EGFR: 50 ML/MIN/1.73M2 — LOW
EOSINOPHIL # BLD AUTO: 0.02 K/UL — SIGNIFICANT CHANGE UP (ref 0–0.5)
EOSINOPHIL NFR BLD AUTO: 0.2 % — SIGNIFICANT CHANGE UP (ref 0–6)
ETHANOL SERPL-MCNC: 30 MG/DL — HIGH
GLUCOSE SERPL-MCNC: 80 MG/DL — SIGNIFICANT CHANGE UP (ref 70–99)
HCT VFR BLD CALC: 37.8 % — LOW (ref 39–50)
HGB BLD-MCNC: 12.5 G/DL — LOW (ref 13–17)
IMM GRANULOCYTES # BLD AUTO: 0.06 K/UL — SIGNIFICANT CHANGE UP (ref 0–0.07)
IMM GRANULOCYTES NFR BLD AUTO: 0.6 % — SIGNIFICANT CHANGE UP (ref 0–0.9)
LIDOCAIN IGE QN: 15 U/L — LOW (ref 16–77)
LYMPHOCYTES # BLD AUTO: 1.89 K/UL — SIGNIFICANT CHANGE UP (ref 1–3.3)
LYMPHOCYTES NFR BLD AUTO: 19.2 % — SIGNIFICANT CHANGE UP (ref 13–44)
MAGNESIUM SERPL-MCNC: 1.7 MG/DL — SIGNIFICANT CHANGE UP (ref 1.6–2.6)
MCHC RBC-ENTMCNC: 30.3 PG — SIGNIFICANT CHANGE UP (ref 27–34)
MCHC RBC-ENTMCNC: 33.1 G/DL — SIGNIFICANT CHANGE UP (ref 32–36)
MCV RBC AUTO: 91.7 FL — SIGNIFICANT CHANGE UP (ref 80–100)
MONOCYTES # BLD AUTO: 0.7 K/UL — SIGNIFICANT CHANGE UP (ref 0–0.9)
MONOCYTES NFR BLD AUTO: 7.1 % — SIGNIFICANT CHANGE UP (ref 2–14)
NEUTROPHILS # BLD AUTO: 7.16 K/UL — SIGNIFICANT CHANGE UP (ref 1.8–7.4)
NEUTROPHILS NFR BLD AUTO: 72.6 % — SIGNIFICANT CHANGE UP (ref 43–77)
NRBC # BLD AUTO: 0 K/UL — SIGNIFICANT CHANGE UP (ref 0–0)
NRBC # FLD: 0 K/UL — SIGNIFICANT CHANGE UP (ref 0–0)
NRBC BLD AUTO-RTO: 0 /100 WBCS — SIGNIFICANT CHANGE UP (ref 0–0)
NT-PROBNP SERPL-SCNC: 57 PG/ML — SIGNIFICANT CHANGE UP
PLATELET # BLD AUTO: 343 K/UL — SIGNIFICANT CHANGE UP (ref 150–400)
PMV BLD: 9.9 FL — SIGNIFICANT CHANGE UP (ref 7–13)
POTASSIUM SERPL-MCNC: 4.6 MMOL/L — SIGNIFICANT CHANGE UP (ref 3.5–5.3)
POTASSIUM SERPL-SCNC: 4.6 MMOL/L — SIGNIFICANT CHANGE UP (ref 3.5–5.3)
PROT SERPL-MCNC: 8.7 G/DL — HIGH (ref 6.4–8.2)
RBC # BLD: 4.12 M/UL — LOW (ref 4.2–5.8)
RBC # FLD: 15.7 % — HIGH (ref 10.3–14.5)
SODIUM SERPL-SCNC: 138 MMOL/L — SIGNIFICANT CHANGE UP (ref 132–145)
TROPONIN I, HIGH SENSITIVITY RESULT: 10.9 NG/L — SIGNIFICANT CHANGE UP
WBC # BLD: 9.86 K/UL — SIGNIFICANT CHANGE UP (ref 3.8–10.5)
WBC # FLD AUTO: 9.86 K/UL — SIGNIFICANT CHANGE UP (ref 3.8–10.5)

## 2025-04-10 PROCEDURE — 71045 X-RAY EXAM CHEST 1 VIEW: CPT | Mod: 26

## 2025-04-10 PROCEDURE — 99285 EMERGENCY DEPT VISIT HI MDM: CPT

## 2025-04-10 RX ORDER — KETOROLAC TROMETHAMINE 30 MG/ML
15 INJECTION, SOLUTION INTRAMUSCULAR; INTRAVENOUS ONCE
Refills: 0 | Status: DISCONTINUED | OUTPATIENT
Start: 2025-04-10 | End: 2025-04-10

## 2025-04-10 RX ORDER — MECLIZINE HCL 12.5 MG
1 TABLET ORAL
Qty: 10 | Refills: 0
Start: 2025-04-10 | End: 2025-04-14

## 2025-04-10 RX ORDER — MECLIZINE HCL 12.5 MG
25 TABLET ORAL ONCE
Refills: 0 | Status: COMPLETED | OUTPATIENT
Start: 2025-04-10 | End: 2025-04-10

## 2025-04-10 RX ADMIN — KETOROLAC TROMETHAMINE 15 MILLIGRAM(S): 30 INJECTION, SOLUTION INTRAMUSCULAR; INTRAVENOUS at 21:19

## 2025-04-10 RX ADMIN — Medication 25 MILLIGRAM(S): at 22:29

## 2025-04-10 RX ADMIN — Medication 4 MILLIGRAM(S): at 21:19

## 2025-04-10 NOTE — ED PROVIDER NOTE - PATIENT PORTAL LINK FT
You can access the FollowMyHealth Patient Portal offered by [facility] by registering at the following website: [email or web address]. By joining [employer]’s FollowMyHealth portal, you will also be able to view your health information using other applications (apps) compatible with our system.

## 2025-04-10 NOTE — ED ADULT NURSE NOTE - NSFALLUNIVINTERV_ED_ALL_ED
Bed/Stretcher in lowest position, wheels locked, appropriate side rails in place/Call bell, personal items and telephone in reach/Instruct patient to call for assistance before getting out of bed/chair/stretcher/Non-slip footwear applied when patient is off stretcher/[place] to call system/Physically safe environment - no spills, clutter or unnecessary equipment/Purposeful proactive rounding/Room/bathroom lighting operational, light cord in reach

## 2025-04-10 NOTE — ED PROVIDER NOTE - CLINICAL SUMMARY MEDICAL DECISION MAKING FREE TEXT BOX
Dre Dickerson presents with acute onset of severe chest pain and dizziness, concerning for a cardiac event. Given his history of pericarditis and the nature of his symptoms, the differential diagnosis includes:    1. Acute pericarditis exacerbation  2. Acute coronary syndrome  3. Aortic dissection- Less likely given lack of a tearing pain  4. Pulmonary embolism-  less likely given lack of risk factors for PE  5. Vertigo (central or peripheral)    The patient's low blood sugar may be contributing to his symptoms, particularly the dizziness.    Plan:  1. Administer pain medication for symptom relief  2. Provide juice to address low blood sugar  3. Obtain blood tests, including cardiac enzymes, complete blood count, and basic metabolic panel  4. Perform chest x-ray to evaluate for cardiomegaly, pulmonary congestion, or other acute processes  5. Consider ECG to evaluate for acute coronary syndrome or pericarditis changes  6. Monitor vital signs closely  7. Reassess after initial interventions and test results     patient reports improvement in his chest pain.  He still complains of a room spinning feeling however this appears to be fatigable and less likely central.  He was also observed ambulating around the department without difficulty making a central cause of vertigo unlikely.  He was then found smoking in the bathroom.  At this time, given his normal laboratory workup, I feel he is safe for discharge home.

## 2025-04-10 NOTE — ED ADULT TRIAGE NOTE - CHIEF COMPLAINT QUOTE
Patient walked in complaining of dizziness starting about a half hour ago while he was walking. "it feels like the room is spinning". He is also complaining of chest pain, shortness of breath and numbness on the left side of his body starting yesterday morning. Pt endorses a hx of pericarditis and htn. He reports that he is an alcohol and relapsed last night. He estimates he's had 10 drinks today, last drink 3 hours ago. Pt is refusing to complete BFAST exam in triage including refusing to smile and raise arms.

## 2025-04-10 NOTE — ED PROVIDER NOTE - DATE/TIME 4
Spoke with patient to notify of appt on 04/24/25 @ 10:15 with pulmonology. Pt confirmed.  
03-Apr-2024 00:24

## 2025-04-10 NOTE — ED ADULT NURSE NOTE - OBJECTIVE STATEMENT
Received patient alert and oriented x 4 complains  of  chest pain and SOB, no cardiac or respiratory distress noted. Patient is speaking in full sentences and o2 sat is 100% on room air. Patient  complaining dizziness and numbness on the left side of his body starting yesterday morning. Refusing to conclude BEFAST exam. Patient is currently stable.

## 2025-04-10 NOTE — ED PROVIDER NOTE - PRO INTERPRETER NEED 2
English Patient is fully dilated pushing for 2 hours with good effort. Caput noted. Vacuum assisted delivery discussed with patient with risks and benefits. Patient would like to proceed with vacuum, possible CD if failed vacuum discussed. RML performed. With fetal vtx at +2 station in KARY position vacuum applied and pressure rased to level of 450 mm and with maternal pushing during contractions with 2 pulls and no pop offs, fetal head delivered successfully and turtle sign noted, patient advised to push, shoulder dystocia diagnosed and called. In patient  in Mc Aiden's position (see details above) anterior right shoulder was delivered by rotating it forward. Cord clamped, cut, baby handed to peds. APGARS 8/9. Placenta delivered spontaneously and intact with 3 vessel cord and sent to pathology. uterine bleeding noted, resolved with uterine massage and Pitocin IV. RML repaired with good reapproximation and hemostasis. Baby and mother in good condition. Baby to NICU due to Covid + mom.  Soft, instrument and sharp count was correct at the end of the procedure.   MD cindy

## 2025-04-10 NOTE — ED PROVIDER NOTE - OBJECTIVE STATEMENT
Dre Dickerson presents to the Emergency Room with severe chest pain and dizziness. The patient reports that his 'chest is killing' him and that the 'room is spinning.' These symptoms began abruptly about half an hour ago while he was walking [place] [address] stairs. The chest pain is described as worse than his previous episodes of pericarditis. Associated symptoms include dizziness, which started simultaneously with the chest pain. The patient denies any drug use but reports alcohol consumption, though the frequency is not specified. He has not consumed alcohol today.

## 2025-04-11 ENCOUNTER — HOSPITAL ENCOUNTER (INPATIENT)
Dept: HOSPITAL 74 - YASAS | Age: 60
LOS: 1 days | Discharge: LEFT BEFORE DISCHARGE | DRG: 770 | End: 2025-04-12
Attending: ALLERGY & IMMUNOLOGY | Admitting: ALLERGY & IMMUNOLOGY
Payer: COMMERCIAL

## 2025-04-11 VITALS — BODY MASS INDEX: 35.9 KG/M2

## 2025-04-11 DIAGNOSIS — F10.230: Primary | ICD-10-CM

## 2025-04-11 DIAGNOSIS — M25.561: ICD-10-CM

## 2025-04-11 DIAGNOSIS — F39: ICD-10-CM

## 2025-04-11 DIAGNOSIS — Z59.01: ICD-10-CM

## 2025-04-11 DIAGNOSIS — F14.20: ICD-10-CM

## 2025-04-11 DIAGNOSIS — M25.562: ICD-10-CM

## 2025-04-11 DIAGNOSIS — K21.9: ICD-10-CM

## 2025-04-11 DIAGNOSIS — F19.282: ICD-10-CM

## 2025-04-11 DIAGNOSIS — D41.9: ICD-10-CM

## 2025-04-11 DIAGNOSIS — I10: ICD-10-CM

## 2025-04-11 DIAGNOSIS — E78.5: ICD-10-CM

## 2025-04-11 DIAGNOSIS — G89.29: ICD-10-CM

## 2025-04-11 DIAGNOSIS — M54.50: ICD-10-CM

## 2025-04-11 PROCEDURE — HZ2ZZZZ DETOXIFICATION SERVICES FOR SUBSTANCE ABUSE TREATMENT: ICD-10-PCS | Performed by: ALLERGY & IMMUNOLOGY

## 2025-04-11 RX ADMIN — TUBERCULIN PURIFIED PROTEIN DERIVATIVE ONE: 5 INJECTION, SOLUTION INTRADERMAL at 12:20

## 2025-04-11 RX ADMIN — FAMOTIDINE SCH: 20 TABLET ORAL at 22:54

## 2025-04-11 RX ADMIN — GUAIFENESIN PRN MG: 600 TABLET, EXTENDED RELEASE ORAL at 17:36

## 2025-04-11 RX ADMIN — ATORVASTATIN CALCIUM SCH: 80 TABLET, FILM COATED ORAL at 22:53

## 2025-04-11 RX ADMIN — Medication SCH: at 22:54

## 2025-04-11 SDOH — ECONOMIC STABILITY - HOUSING INSECURITY: SHELTERED HOMELESSNESS: Z59.01

## 2025-04-12 VITALS — HEART RATE: 70 BPM | SYSTOLIC BLOOD PRESSURE: 124 MMHG | TEMPERATURE: 98.7 F | DIASTOLIC BLOOD PRESSURE: 72 MMHG

## 2025-04-12 VITALS — RESPIRATION RATE: 18 BRPM

## 2025-04-12 LAB
ALBUMIN SERPL-MCNC: 3.5 G/DL (ref 3.4–5)
ALP SERPL-CCNC: 70 U/L (ref 45–117)
ALT SERPL-CCNC: 58 U/L (ref 13–61)
ANION GAP SERPL CALC-SCNC: 7 MMOL/L (ref 4–13)
AST SERPL-CCNC: 77 U/L (ref 15–37)
BILIRUB SERPL-MCNC: 0.6 MG/DL (ref 0.2–1)
BUN SERPL-MCNC: 13.4 MG/DL (ref 7–18)
CALCIUM SERPL-MCNC: 8.9 MG/DL (ref 8.5–10.1)
CHLORIDE SERPL-SCNC: 108 MMOL/L (ref 98–107)
CO2 SERPL-SCNC: 25 MMOL/L (ref 21–32)
CREAT SERPL-MCNC: 1.1 MG/DL (ref 0.55–1.3)
GLUCOSE SERPL-MCNC: 134 MG/DL (ref 74–106)
HGB BLD-MCNC: 11.6 G/DL (ref 13.7–17.5)
MCHC RBC-ENTMCNC: 32.2 G/DL (ref 32.3–36.5)
MCV RBC: 92.8 FL (ref 79–92.2)
PLATELET # BLD AUTO: 293 X10^3/UL (ref 163–337)
PMV BLD: 10.1 FL (ref 9.4–12.4)
POTASSIUM SERPLBLD-SCNC: 3.8 MMOL/L (ref 3.5–5.1)
SODIUM SERPL-SCNC: 140 MMOL/L (ref 136–145)

## 2025-04-12 RX ADMIN — Medication SCH TAB: at 09:39

## 2025-04-12 RX ADMIN — HYDROCHLOROTHIAZIDE SCH MG: 25 TABLET ORAL at 09:38

## 2025-04-17 ENCOUNTER — INPATIENT (INPATIENT)
Facility: HOSPITAL | Age: 60
LOS: 7 days | Discharge: HOME | End: 2025-04-25
Attending: PSYCHIATRY & NEUROLOGY | Admitting: PSYCHIATRY & NEUROLOGY
Payer: MEDICAID

## 2025-04-17 VITALS
HEART RATE: 75 BPM | SYSTOLIC BLOOD PRESSURE: 112 MMHG | TEMPERATURE: 97 F | DIASTOLIC BLOOD PRESSURE: 74 MMHG | OXYGEN SATURATION: 100 % | RESPIRATION RATE: 18 BRPM | WEIGHT: 291.89 LBS | HEIGHT: 74 IN

## 2025-04-17 LAB
ALBUMIN SERPL ELPH-MCNC: 3.7 G/DL — SIGNIFICANT CHANGE UP (ref 3.3–5)
ALP SERPL-CCNC: 66 U/L — SIGNIFICANT CHANGE UP (ref 40–120)
ALT FLD-CCNC: 46 U/L — HIGH (ref 4–41)
AMPHET UR-MCNC: NEGATIVE — SIGNIFICANT CHANGE UP
ANION GAP SERPL CALC-SCNC: 12 MMOL/L — SIGNIFICANT CHANGE UP (ref 7–14)
APAP SERPL-MCNC: <10 UG/ML — LOW (ref 15–25)
APPEARANCE UR: CLEAR — SIGNIFICANT CHANGE UP
AST SERPL-CCNC: 48 U/L — HIGH (ref 4–40)
BARBITURATES UR SCN-MCNC: NEGATIVE — SIGNIFICANT CHANGE UP
BASOPHILS # BLD AUTO: 0.03 K/UL — SIGNIFICANT CHANGE UP (ref 0–0.2)
BASOPHILS NFR BLD AUTO: 0.6 % — SIGNIFICANT CHANGE UP (ref 0–2)
BENZODIAZ UR-MCNC: POSITIVE
BILIRUB SERPL-MCNC: 0.5 MG/DL — SIGNIFICANT CHANGE UP (ref 0.2–1.2)
BILIRUB UR-MCNC: NEGATIVE — SIGNIFICANT CHANGE UP
BUN SERPL-MCNC: 26 MG/DL — HIGH (ref 7–23)
CALCIUM SERPL-MCNC: 8.5 MG/DL — SIGNIFICANT CHANGE UP (ref 8.4–10.5)
CHLORIDE SERPL-SCNC: 107 MMOL/L — SIGNIFICANT CHANGE UP (ref 98–107)
CO2 SERPL-SCNC: 23 MMOL/L — SIGNIFICANT CHANGE UP (ref 22–31)
COCAINE METAB.OTHER UR-MCNC: POSITIVE
COLOR SPEC: YELLOW — SIGNIFICANT CHANGE UP
CREAT SERPL-MCNC: 1.16 MG/DL — SIGNIFICANT CHANGE UP (ref 0.5–1.3)
CREATININE URINE RESULT, DAU: 223 MG/DL — SIGNIFICANT CHANGE UP
DIFF PNL FLD: NEGATIVE — SIGNIFICANT CHANGE UP
EGFR: 73 ML/MIN/1.73M2 — SIGNIFICANT CHANGE UP
EGFR: 73 ML/MIN/1.73M2 — SIGNIFICANT CHANGE UP
EOSINOPHIL # BLD AUTO: 0.15 K/UL — SIGNIFICANT CHANGE UP (ref 0–0.5)
EOSINOPHIL NFR BLD AUTO: 2.9 % — SIGNIFICANT CHANGE UP (ref 0–6)
ETHANOL SERPL-MCNC: <10 MG/DL — SIGNIFICANT CHANGE UP
FENTANYL UR QL SCN: NEGATIVE — SIGNIFICANT CHANGE UP
GLUCOSE SERPL-MCNC: 109 MG/DL — HIGH (ref 70–99)
GLUCOSE UR QL: NEGATIVE MG/DL — SIGNIFICANT CHANGE UP
HCT VFR BLD CALC: 34.1 % — LOW (ref 39–50)
HGB BLD-MCNC: 11.1 G/DL — LOW (ref 13–17)
IANC: 2.59 K/UL — SIGNIFICANT CHANGE UP (ref 1.8–7.4)
IMM GRANULOCYTES NFR BLD AUTO: 0.2 % — SIGNIFICANT CHANGE UP (ref 0–0.9)
KETONES UR-MCNC: ABNORMAL MG/DL
LEUKOCYTE ESTERASE UR-ACNC: NEGATIVE — SIGNIFICANT CHANGE UP
LYMPHOCYTES # BLD AUTO: 1.89 K/UL — SIGNIFICANT CHANGE UP (ref 1–3.3)
LYMPHOCYTES # BLD AUTO: 36.3 % — SIGNIFICANT CHANGE UP (ref 13–44)
MCHC RBC-ENTMCNC: 30.2 PG — SIGNIFICANT CHANGE UP (ref 27–34)
MCHC RBC-ENTMCNC: 32.6 G/DL — SIGNIFICANT CHANGE UP (ref 32–36)
MCV RBC AUTO: 92.7 FL — SIGNIFICANT CHANGE UP (ref 80–100)
METHADONE UR-MCNC: NEGATIVE — SIGNIFICANT CHANGE UP
MONOCYTES # BLD AUTO: 0.53 K/UL — SIGNIFICANT CHANGE UP (ref 0–0.9)
MONOCYTES NFR BLD AUTO: 10.2 % — SIGNIFICANT CHANGE UP (ref 2–14)
NEUTROPHILS # BLD AUTO: 2.59 K/UL — SIGNIFICANT CHANGE UP (ref 1.8–7.4)
NEUTROPHILS NFR BLD AUTO: 49.8 % — SIGNIFICANT CHANGE UP (ref 43–77)
NITRITE UR-MCNC: NEGATIVE — SIGNIFICANT CHANGE UP
NRBC # BLD AUTO: 0 K/UL — SIGNIFICANT CHANGE UP (ref 0–0)
NRBC # FLD: 0 K/UL — SIGNIFICANT CHANGE UP (ref 0–0)
NRBC BLD AUTO-RTO: 0 /100 WBCS — SIGNIFICANT CHANGE UP (ref 0–0)
OPIATES UR-MCNC: NEGATIVE — SIGNIFICANT CHANGE UP
OXYCODONE UR-MCNC: NEGATIVE — SIGNIFICANT CHANGE UP
PCP SPEC-MCNC: SIGNIFICANT CHANGE UP
PCP UR-MCNC: NEGATIVE — SIGNIFICANT CHANGE UP
PH UR: 5.5 — SIGNIFICANT CHANGE UP (ref 5–8)
PLATELET # BLD AUTO: 282 K/UL — SIGNIFICANT CHANGE UP (ref 150–400)
POTASSIUM SERPL-MCNC: 4.1 MMOL/L — SIGNIFICANT CHANGE UP (ref 3.5–5.3)
POTASSIUM SERPL-SCNC: 4.1 MMOL/L — SIGNIFICANT CHANGE UP (ref 3.5–5.3)
PROT SERPL-MCNC: 6.7 G/DL — SIGNIFICANT CHANGE UP (ref 6–8.3)
PROT UR-MCNC: NEGATIVE MG/DL — SIGNIFICANT CHANGE UP
RBC # BLD: 3.68 M/UL — LOW (ref 4.2–5.8)
RBC # FLD: 15.3 % — HIGH (ref 10.3–14.5)
SALICYLATES SERPL-MCNC: <0.3 MG/DL — LOW (ref 15–30)
SODIUM SERPL-SCNC: 142 MMOL/L — SIGNIFICANT CHANGE UP (ref 135–145)
SP GR SPEC: 1.03 — HIGH (ref 1–1.03)
THC UR QL: NEGATIVE — SIGNIFICANT CHANGE UP
TOXICOLOGY SCREEN, DRUGS OF ABUSE, SERUM RESULT: SIGNIFICANT CHANGE UP
TSH SERPL-MCNC: 1.81 UIU/ML — SIGNIFICANT CHANGE UP (ref 0.27–4.2)
UROBILINOGEN FLD QL: 1 MG/DL — SIGNIFICANT CHANGE UP (ref 0.2–1)
WBC # BLD: 5.2 K/UL — SIGNIFICANT CHANGE UP (ref 3.8–10.5)
WBC # FLD AUTO: 5.2 K/UL — SIGNIFICANT CHANGE UP (ref 3.8–10.5)

## 2025-04-17 PROCEDURE — 99285 EMERGENCY DEPT VISIT HI MDM: CPT

## 2025-04-17 RX ORDER — HALOPERIDOL 10 MG/1
5 TABLET ORAL ONCE
Refills: 0 | Status: COMPLETED | OUTPATIENT
Start: 2025-04-17 | End: 2025-04-17

## 2025-04-17 RX ORDER — LORAZEPAM 4 MG/ML
2 VIAL (ML) INJECTION ONCE
Refills: 0 | Status: DISCONTINUED | OUTPATIENT
Start: 2025-04-17 | End: 2025-04-17

## 2025-04-17 RX ADMIN — HALOPERIDOL 5 MILLIGRAM(S): 10 TABLET ORAL at 18:15

## 2025-04-17 RX ADMIN — Medication 2 MILLIGRAM(S): at 18:15

## 2025-04-17 NOTE — ED PROVIDER NOTE - PSYCHIATRIC [-], MLM
[facility]    Oral Oncology Patient Follow-Up      Consult Details  Consulting Provider:   Klever Ferreira MD ([facility])   Medication and Regimen:  Gleevec 300 mg PO daily     Prescription Review  Dosing & Interactions:   Reviewed, appropriate and no significant interaction noted at this time..   Current Specialty Pharmacy [facility] - [place], [place] - [address] [facility] - [phone]  - [phone] FX        Intervention(s):                  Spoke with Susana (patient's spouse) for patient's monthly routine telephone consultation follow-up regarding the oral oncology medication. She stated that he has been tolerating the Gllevec really well and has not had any obvious side effects from the medication. He has not missed any doses of the medication in the last month. The patient still receives refills through [facility], and has not had any delays in getting those refills. Also, no new refills are needed at this time according to the patient. The patient has no changes to his maintenance medications and no new allergies that she is aware of.   Finally, the patient has not had any recent stays in the hospital and has not visited the ER in the last month due to the Gleevec. I encouraged the patient to reach out anytime with any questions or concerns they might have regarding their oral chemotherapy.     Summary:    No needs expressed by the patient or otherwise identified. Will follow-up next month regarding the patient’s oral chemotherapy with a routine telephone consultation. The next routine follow-up will be scheduled approximately 28-30 days from today.       Electronically signed 10/11/2022 08:25 CDT by:  Kirsten Campos PharmD  Specialty Pharmacist - Hematology & Oncology  [facility]  [phone]    
no insomnia

## 2025-04-17 NOTE — ED ADULT NURSE NOTE - CHIEF COMPLAINT QUOTE
Patient c/o SI with plan to jump off roof or shoot himself. Patient endorses increase stressors at home with frequent fights, auditory hallucination, and HI.  Denies VH, drug use.

## 2025-04-17 NOTE — ED BEHAVIORAL HEALTH ASSESSMENT NOTE - NSBHSAALC_PSY_A_CORE FT
States, since discharge from [facility] 3/19/25 has been drinking several days a week.

## 2025-04-17 NOTE — ED BEHAVIORAL HEALTH ASSESSMENT NOTE - DIFFERENTIAL
malingering  MDD with psychosis vs SAD, depressed   substance-induced mood disorder/ substance induced psychosis  polysubstance  abuse

## 2025-04-17 NOTE — ED BEHAVIORAL HEALTH ASSESSMENT NOTE - LEGAL HISTORY
denies. no listed pending legal issues as per [facility] SYSTEM/ WEBCRIMS SITE

## 2025-04-17 NOTE — ED ADULT TRIAGE NOTE - CHIEF COMPLAINT QUOTE
Patient c/o SI with plan to jump roof or shoot himself. Patient endorses increase stressors at home with frequent fights and auditory hallucination and HI.  Denies VH, drug use. Patient c/o SI with plan to jump off roof or shoot himself. Patient endorses increase stressors at home with frequent fights, auditory hallucination, and HI.  Denies VH, drug use.

## 2025-04-17 NOTE — ED BEHAVIORAL HEALTH ASSESSMENT NOTE - PAST PSYCHOTROPIC MEDICATION
Latuda, Abilify ("was not helping me"), VPA,  Zoloft, Risperdal, gabapentin, librium, zyprexa, trazodone, atarax, haldol, ativan, versed

## 2025-04-17 NOTE — ED BEHAVIORAL HEALTH ASSESSMENT NOTE - SUICIDAL IDEATION DETAILS
Reports SI w/ plan to "jump off in front of a train or shoot self with a jump obtained in [place]"

## 2025-04-17 NOTE — ED BEHAVIORAL HEALTH ASSESSMENT NOTE - OTHER PAST PSYCHIATRIC HISTORY (INCLUDE DETAILS REGARDING ONSET, COURSE OF ILLNESS, INPATIENT/OUTPATIENT TREATMENT)
self reported hx of depression + anxiety    high mental health utilizer including numerous Psych ED/ CPEP visits for SI, alcohol intoxication    chronically nonadherent with meds/ admitted to being inconsistent on his out-patient psychiatric care.    stated past hx of allegedly self aborting jumping in front of a 7 train over the summer ("stopped by an emre" (2023), also reported past hx of self aborting jumping in front of a train in 7/ 2024    multiple psych admissions including recent discharge [facility] ( 3/2-3/19/2025) and  [facility], 12/3 - 12/13/2024 for mgt of depression and SI in the context of psychosocial stressors (a diagnoses of Unspecified mood [affective] disorder; and Polysubstance abuse were made as well as entertaining Malingering as part of the diagnosis.  whilst Pt currently claimed that Latuda "helped" towards affording mood stability, of note during that [facility]4 admission, the Pt later on refused standing Latuda; he was  discharged to shelter

## 2025-04-17 NOTE — ED BEHAVIORAL HEALTH ASSESSMENT NOTE - DESCRIPTION
Per chart review: HTN, pericarditis, self reports hx of CHF (claims EF around ? 45%), CKD, GERD. as per PSYCKES, past meds to include: metoprolol ER 25 mg daily, HCTZ 25mg, lipitor 40mg, ASA 81mg, entresto 24-26mg, folic acid 1mg, thiamine 100mg In the ED, patient in good behavorial control. Did not require PRNs, emergent IM medication, or use of 4 point restraints.  Pt is not delirious; not catatonic.  did not appear acutely intoxicated; not in withdrawal. ICU Vital Signs Last 24 Hrs  T(C): 37 (17 Apr 2025 20:06), Max: 37 (17 Apr 2025 20:06)  T(F): 98.6 (17 Apr 2025 20:06), Max: 98.6 (17 Apr 2025 20:06)  HR: 90 (17 Apr 2025 20:06) (75 - 90)  BP: 126/87 (17 Apr 2025 20:06) (112/74 - 126/87)  BP(mean): --  ABP: --  ABP(mean): --  RR: 16 (17 Apr 2025 20:06) (16 - 18)  SpO2: 96% (17 Apr 2025 20:06) (96% - 100%)    O2 Parameters below as of 17 Apr 2025 20:06  Patient On (Oxygen Delivery Method): room air single, has a 16 yr old daughter domiciled with mother in [place].  unemployed.

## 2025-04-17 NOTE — ED BEHAVIORAL HEALTH ASSESSMENT NOTE - RISK ASSESSMENT
Acute risk factors - housing instability, ongoing substance use, depression/ anxiety,  SI and, AH  Chronic risk factors - extensive psychiatric and substance hx, hx outpatient treatment nonadherence,  Cluster B pathology, male, hx of being impulsive, multiple psych admission  Protective factors -   pt is treatment-seeking and often self-presents to the ED for voluntary hospitalization, no clear hx of suicide attempts, no known hx of violence, help seeking, sense of commitment to daughter, does not appear manic, no floridly psychotic      At this time given all risks taken into consideration, the Pt is moderate risk for self harm as well as remaining at chronically elevated risk of harming self.  ongoing presentation not deemed dischargeable back to the community.  current increased in risks can be mitigated by a psychiatric admission with supervision, continuing psych meds with titration towards efficacious dosing range

## 2025-04-17 NOTE — ED BEHAVIORAL HEALTH ASSESSMENT NOTE - DETAILS
Reports friend has firearms headache transfer protocol see HPI for details in custody of Pt's mother (based in [place], [place]) Deferred cough, "mild cold" reports past aborted suicide attempt- was going to jump in front of a train and an emre saved him. per chart review: reported experiencing diarrhea with Zoloft self referred.

## 2025-04-17 NOTE — ED BEHAVIORAL HEALTH ASSESSMENT NOTE - HPI (INCLUDE ILLNESS QUALITY, SEVERITY, DURATION, TIMING, CONTEXT, MODIFYING FACTORS, ASSOCIATED SIGNS AND SYMPTOMS)
59 yr old male, single, undomiciled and unemployed.  self reported hx of bipolar disorder and SCZ;  as per PSYCKES, has the following Behavioral Health Diagnoses to include Unspecified/Other Bipolar, substance-Induced Depressive Disorder, Major Depressive Disorder, Unspecified/Other Depressive Disorder,  Borderline Personality Disorder, Antisocial Personality Disorder, Unspecified/Other Anxiety Disorder, Bipolar I, Bipolar II, Schizoaffective Disorder, Substance-Induced Sleep Disorder, Adjustment Disorder, Schizophrenia, Substance-Induced Psychotic Disorder, Other Mental Disorders Unspecified/Other Psychotic Disorders,  Attention Deficit Hyperactivity Disorder, Insomnia Disorder  and Substance-Induced Anxiety Disorder.. Pt is high utilizer of mental health facilities + is chronically non adherent to meds and psych appts; has multiple ED visits and psych admissions ( last [facility] 3/2-3/13/2025) for depression + SI.  past documented hx for suspicion of malingering on pior visits including this latest to [facility]  admission,  as per chart review, there is documented hx of "several prior self-aborted suicide attempts with plan to jump in front of train in 2023 but was, "stopped by an emre",  Pt has hx of polysubstance use (as per PSYCKES: with hx of Cannabis related disorders/ Alcohol related disorders/ Cocaine related disorders/ Other psychoactive substance related disorders/ Opioid related disorders/ Tobacco related disorder/  Other stimulant related disorders and Sedative, hypnotic, or anxiolytic related disorders).  Pt has had hx of numerous prior detox/rehabs (previous [facility] admission in 12/2023).  He has hx of multiple ED visits reporting chest pain and/or SI and/or command AH to kill himself,. pertinent medical issues include: HTN, pericarditis, Chronic CHF and GERD.  Pt admits he is not on any maintenance meds.  today, self presented to the ED (once again), complaining of depression, anxiety, "AH" and SI with plan to jump off a roof top.     Patient seen in private room.  He was calm and cooperative throughout interview. Patient reports he was drinking with a female friend on the roof top of a building in [place] last night with intentions of jumping off once his friends left. Patient reports he was intoxicated and fell asleep before his friend left and woke up this morning. Patient again wanted to jump off the roof and took a bus to [facility] seeking admission. Patient reports since his discharge from [facility] he has not taken any medical or psychiatric medications. He has been living in hotels and smoking crack and drinking several days a week. Patient reports feeling hopeless and helpless and fears reports he will attempt suicide, by jumping or shooting self, if he is not admitted to the hospital. Patient reports he is not sleeping, has poor appetite, low energy and guilt. He also endorses intermittently hearing the voice of his mother when he is alone and not intoxicated.

## 2025-04-17 NOTE — ED BEHAVIORAL HEALTH NOTE - BEHAVIORAL HEALTH NOTE
as per luiza ":  1965 (59 Yrs)   Address:  [address] [place] [address], [place], [place], [address]   Phone (Source: [facility]):  [phone]   Medicaid ID:  [id number] Medicare:  No   Managed Care Plan:  [employer] (HARP)   MC Plan Assigned PCP :  N/A   HARP Status:  HARP Enrolled (H1)   HARP HCBS Assessment Status:  Never Assessed   Medicaid Eligibility Expires on:    Current Care Coordination  Health Home (Enrolled)[facility] (Begin Date: 01-MAR-25) • Status : Active  Main Contact Referral : Madison Ac: [phone], [email or web address]  Member Referral Number: [phone]; [email or web address]  Care Management (Enrolled): [facility]  Intensive Mobile Treatment (IMT)[facility] ([facility]) [facility] (Admission Date: 22-AUG-22, Discharge Date: 16-MAY-24, Reason for Discharge: Refused) • Main Contact: Kim Rangel, [phone], [email or web address]  Notifications  Complex Needs due to3+ Inpt MH < 13 months , 3+ inpatient medical visits in past 13 months and have schizophrenia or bipolar , 4+ ER MH < 13 months , HH+ Eligibility , HH+ service in the past year with MH diagnosis , Homeless in past 6 months + SMI , Homicidal ideation in past year and 1+ MH ED/CPEP/IP in past year , Intensive Mobile Treatment (IMT) in past year with MH diagnosis  POP High UserThis client is enrolled in an episode of intensive care transition services. To coordinate, please contact the client's managed care plan : [employer] • [facility] [phone]; [phone] [email or web address]  POP Potential Clozapine CandidateEvaluate for potential clozapine initiation/referral due to schizophrenia, high psychiatric Inpatient/ER use, and no recent clozapine use. Identify a community-based clozapine prescriber and other supports for clozapine treatment by contacting the client's managed care plan : [employer] • [facility] [phone]; [phone] [email or web address]  Health Home Plus EligibilityThis client is eligible for Health Home Plus due to: 3+ Inpt MH < 13 months, 3+ Inpt Med & Schiz/Bipolar Dx < 13 months, 4+ ER MH < 13 months  High Mental Health Need due to3+ Inpt MH < 13 months ; 3+ inpatient medical visits in past 13 months and have schizophrenia or bipolar past year ; 4+ ER MH < 13 months ; HH+ Eligibility ; HH+ service in the past year with MH diagnosis ; Intensive Mobile Treatment (IMT) in the past year with MH diagnosis  Mental Health Placement Consideration due to1 or more ER visits or inpatient stays in the past year with a suicide attempt/ suicide ideation/ self-harm code; 1 or more inpatient MH stays in past 5 years; Any history of forensic psych inpatient setting or forensic status in any [facility] inpatient setting; Any history of [facility] MH outpatient services; Four or more emergency MH visits in past 13 months; Intensive Mobile Treatment (IMT) in past 5 years; Three or more inpatient medical visits in past 1 year and have schizophrenia or bipolar past year  CORE EligibilityThis client is eligible for Community Oriented Recovery and Empowerment (CORE) services. For more information on CORE, visit:[email or web address]  Alerts • all availableMost Recent   4Homelessness - [facility]  2023[facility] SUITES ISOLATION (Single Adult)   147Treatment for Suicidal Ideation (38 Inpatient, 113 ER, 16 Other)  3/14/2025[facility] (Inpatient - MH)   1Homelessness - reported in billing (1 Sheltered)  [facility] (ER - Medical; Homelessness - Sheltered)   2Treatment for Self inflicted Poisoning (2 Inpatient)  2023[facility] (Inpatient - Medical)  Social Determinants of Health (SDOH) Past Year - reported in billing  Problems related to employment and unemployment  Unemployment, unspecified  Problems related to housing and economic circumstances  Sheltered homelessness • Homelessness unspecified • Housing instability, housed unspecified • Unsheltered homelessness  Problems related to other psychosocial circumstances  Other specified problems related to psychosocial circumstances  Active Quality Flags • as of monthly QI report 3/1/2025   QARR - Improvement Measure  Adherence - Antipsychotic (Schiz)  General Medical Health  No Outpatient Medical Visit > 1Yr  Health Home Care Management - Adult  Eligible for Health Home Plus - No Health Home Plus Service Past 3 Months • Eligible for Health Home Plus - Not Health Home Enrolled • HARP-Enrolled - No Assessment for HCBS  High Mental Health Need  3+ Inpt MH < 13 months • 3+ inpatient medical visits in past 13 months and have schizophrenia or bipolar past year • 4+ ER MH < 13 months • HH+ Eligibility • HH+ service in the past year with MH diagnosis • Intensive Mobile Treatment (IMT) in the past year with MH diagnosis  High Utilization - Inpt/ER  10+ ER - [facility] • 10+ ER - MH • 2+ ER - BH • 2+ ER - MH • 2+ ER - Medical • 2+ Inpatient - BH • 2+ Inpatient - MH • 2+ Inpatient - Medical • 4+ Inpatient/ER - BH • 4+ Inpatient/ER - MH • 4+ Inpatient/ER - Med • Clozapine Candidate with 4+ Inpatient/ER - MH • POP : High User • POP : Potential Clozapine Candidate  Hospital Outcome Measure Set  No Intensive Care Management after  Inpatient discharge from this Hospital   Performance Tracking Measure (as of 2024)  Antidepressant Medication Discontinued - Acute Phase • Antidepressant Medication Discontinued - Recovery Phase • Low Antipsychotic Medication Adherence - Schizophrenia • Low Mood Stabilizer Medication Adherence - Bipolar • No Intensive Care Management after  Inpatient  Mental Health Placement Consideration  1 or more ER visits or inpatient stays in the past year with a suicide attempt/ suicide ideation/ self-harm code • 1 or more inpatient  stays in past 5 years • Any history of forensic psych inpatient setting or forensic status in any [facility] inpatient setting • Any history of FDC  outpatient services • Four or more emergency MH visits in past 13 months • Intensive Mobile Treatment (IMT) in past 5 years • Three or more inpatient medical visits in past 1 year and have schizophrenia or bipolar past year  Readmission Post-Discharge from any Hospital(Episode Based)  Readmission (30d) from any Hosp:  to [facility] • Readmission (30d) from any Hosp: BH to  • Readmission (30d) from any Hosp: MH to All [facility] • Readmission (30d) from any Hosp: MH to  • Readmission (30d) from any Hosp: Medical to [facility] • Readmission (30d) from any Hosp: Medical to Medical  Readmission Post-Discharge from this Hospital(Episode Based)  Readmission (30d) from this Hosp:  to [facility] • Readmission (30d) from this Hosp:  to  • Readmission (30d) from this Hosp:  to [facility] • Readmission (30d) from this Hosp:  to  • Readmission (30d) from this Hosp: Medical to [facility] • Readmission (30d) from this Hosp: Medical to Medical  PHIL Performance Tracking Measure (as of 2024)  No Continuity of Care after Detox to Lower Level of Care • No Follow Up After High-Intensity Care for PHIL (30 days) • No Follow Up After High-Intensity Care for PHIL (7 days) • No Utilization of Medication Assisted Treatment (MAT) for Opioid Use Disorder (OUD) • No Utilization of Pharmacotherapy for Alcohol Abuse or Dependence  Treatment Engagement  Adherence - Antipsychotic (Schiz) • Adherence - Mood Stabilizer (Bipolar)  Vital Signs Dashboard - Adult (as of 2024)  Antidepressant Medication Discontinued - Acute Phase • Antidepressant Medication Discontinued - Recovery Phase • Clozapine Candidate with 4+ Inpatient/ER -  • Low Antipsychotic Medication Adherence - Schizophrenia • Overdue for Colorectal Cancer Screening • Readmission (30d) from any Hosp:  to   Diagnoses Past Year  Behavioral Health (22)  5 Most Recent:Alcohol related disorders • Schizophrenia • Adjustment Disorder • Unspecified/Other Bipolar • Substance-Induced Sleep Disorder ...  5 Most Frequent (# of services):Alcohol related disorders(95) • Cocaine related disorders(54) • Unspecified/Other Depressive Disorder(42) • Unspecified/Other Bipolar(41) • Other psychoactive substance related disorders(26) ...  Medical (78)  5 Most Recent:Symptoms and signs involving emotional state • Other disorders of muscle • Pain in throat and chest • Abnormalities of breathing • Abnormal serum enzyme levels ...  5 Most Frequent (# of services):Essential (primary) hypertension(63) • Pain in throat and chest(40) • Symptoms and signs involving emotional state(32) • Disorders of lipoprotein metabolism and other lipidemias(23) • Chronic ischemic heart disease(20) ...  Medications Past YearLast   Famotidine (Famotidine) • H-2 Antagonists2025Dose: 20 MG, 2/day • Quantity: 60  Hydrochlorothiazide (Hydrochlorothiazide) • Thiazides and Thiazide-Like Diuretics2/4/2025Dose: 25 MG, 1/day • Quantity: 30  Naloxone Hcl (Naloxone Hcl) • Narcotic Antagonists2025Dose: 4 MG/0.1ML, .4/day • Quantity: 2  Azithromycin (Azithromycin) • Azithromycin2025Dose: 250 MG, 1.2/day • Quantity: 6  Morphine Sulfate, Injection (Morphine Sulfate, Injection) • Ysylbg27ose: UP TO 10 MG • Quantity: null  Amoxicillin- Pot Clavulanate (Amoxicillin-Pot Clavulanate) • Penicillin Fpqdivgotwnz30/4/2024Dose: 875-125 MG/day • Quantity: 20  Ibuprofen (Ibuprofen) • Nonsteroidal Anti-inflammatory Agents (NSAIDs)ose: 600 MG, 3.75/day • Quantity: 30  Metoprolol Tartrate (Metoprolol Tartrate) • Beta Blockers Cardio-Selectiveose: 25 MG, 1/day • Quantity: 30  Colchicine (Colchicine) • Gout Agentsose: 0.6 MG, 1/day • Quantity: 30  Aspirin (Aspirin) • Salicylatesose: 81 MG, 1/day • Quantity: 30  Lurasidone Hcl (Lurasidone Hcl) • Antipsychoticose: 40 MG, 1/day • Quantity: 30  Aspirin (Aspirin Low Dose) • Salicylateose: 81 MG, 1/day • Quantity: 30  Atorvastatin Calcium (Atorvastatin Calcium) • HMG CoA Reductase Inhibitorsose: 40 MG, 1/day • Quantity: 30  Loratadine (Loratadine) • Antihistamines - Non-Sedatingose: 10 MG, 1/day • Quantity: 30  Risperidone (Risperidone) • Antipsychoticose: 1 MG, 1/day • Quantity: 30  Nitroglycerin (Nitroglycerin) • Nitrateose: 0.3 MG, 3.33/day • Quantity: 100  Multiple Vitamin (Multivitamin) • Multivitaminsose: - -/day • Quantity: 14  Gabapentin (Gabapentin) • Mood Stabilizerose: 100 MG, 3/day • Quantity: 90  Naloxone Hcl (Narcan) • Narcotic Antagonistsose: 4 MG/0.1ML, 1/day • Quantity: 2  Outpatient Providers Past YearLast Service Date & Type  [facility]4Clinic - [facility] - Opioid Treatment Program  [facility]8/3/2024Clinic - Medical Specialty  [facility]icians Group - Internal Medicine  [facility]4Clinic -  Specialty  [facility]5/3/2024Clinic - LEE Specialty  All Hospital and Crisis Utilization • 5 Years  ER Visits# ProvidersLast ER Visit  91Mental [id number] at [facility]  [id number]/2/2025 at [facility]  36Substance [id number] at [facility]  Inpatient Admissions# ProvidersLast Inpatient Admission  41Mental [id number] at [facility]  142Substance [id number] at [facility]  [id number] at [facility]  Crisis Services# ProvidersLast Crisis Service  24Crisis Zqhrssugled727/25/2022 at [facility]"

## 2025-04-17 NOTE — ED BEHAVIORAL HEALTH ASSESSMENT NOTE - ABUSE / TRAUMA HISTORY
[facility]   Statement to Patients  April 1, 2014      In an effort to ensure the large volume of patient prescription refills is processed in the most efficient and expeditious manner, we are asking our patients to assist us by calling your Pharmacy for all prescription refills, this will include also your  Mail Order Pharmacy. The pharmacy will contact our office electronically to continue the refill process. Please do not wait until the last minute to call your pharmacy. We need at least 48 hours (2days) to fill prescriptions. We also encourage you to call your pharmacy before going to  your prescription to make sure it is ready. With regard to controlled substance prescription refill requests (narcotic refills) that need to be picked up at our office, we ask your cooperation by providing us with at least 72 hours (3days) notice that you will need a refill. We will not refill narcotic prescription refill requests after 4:00pm on any weekday, Monday through Thursday, or after 2:00pm on Fridays, or on the weekends. We encourage everyone to explore another way of getting your prescription refill request processed using [employer], our patient web portal through our electronic medical record system. [employer] is an efficient and effective way to communicate your medication request directly to the office and  downloadable as an kevin on your smart phone . [employer] also features a review functionality that allows you to view your medication list as well as leave messages for your physician. Are you ready to get connected? If so please review the attatched instructions or speak to any of our staff to get you set up right away! Thank you so much for your cooperation. Should you have any questions please contact our Practice Administrator.     The Physicians and Staff,  [facility]

## 2025-04-17 NOTE — ED BEHAVIORAL HEALTH ASSESSMENT NOTE - NSBHSACONSEQUENCE_PSY_A_CORE FT
Per chart review: "Pt reports many past detox and rehab admissions, including [place], [facility], others.".. with continued use of said drugs, will cause worsening psychosis, depressive and anxiety symptoms including clinical picture of miquel

## 2025-04-17 NOTE — ED ADULT TRIAGE NOTE - NS_BHTRGCALCULATEDSCORE_ED_A_ED_FT
Pt Cardizem drip stopped. HR on monitor 70s to 80s a-fib. Pt BP 96/51. Pt denies dizziness, SOB, CP. Denies needs at this time. RN explained that we will cont to monitor HR closely and if need arises may restart drip. Pt verbalized understanding..    1

## 2025-04-17 NOTE — ED BEHAVIORAL HEALTH ASSESSMENT NOTE - NSBHATTESTCOMMENTATTENDFT_PSY_A_CORE
59 yr old male, single, undomiciled and unemployed.  self reported hx of bipolar disorder and SCZ;  as per PSYCKES, has the following Behavioral Health Diagnoses to include Unspecified/Other Bipolar, substance-Induced Depressive Disorder, Major Depressive Disorder, Unspecified/Other Depressive Disorder,  Borderline Personality Disorder, Antisocial Personality Disorder, Unspecified/Other Anxiety Disorder, Bipolar I, Bipolar II, Schizoaffective Disorder, Substance-Induced Sleep Disorder, Adjustment Disorder, Schizophrenia, Substance-Induced Psychotic Disorder, Other Mental Disorders Unspecified/Other Psychotic Disorders,  Attention Deficit Hyperactivity Disorder, Insomnia Disorder  and Substance-Induced Anxiety Disorder.. Pt is high utilizer of mental health facilities + is chronically non adherent to meds and psych appts; has multiple ED visits and psych admissions ( last [facility] low 6 3/2-3/13/2025 discharged on 72 hour letter) for depression + SI.  past documented hx for suspicion of malingering on pior visits including this latest to [facility]  admission,  as per chart review, there is documented hx of "several prior self-aborted suicide attempts with plan to jump in front of train in 2023 but was, "stopped by an emre",  Pt has hx of polysubstance use (as per PSYCKES: with hx of Cannabis related disorders/ Alcohol related disorders/ Cocaine related disorders/ Other psychoactive substance related disorders/ Opioid related disorders/ Tobacco related disorder/  Other stimulant related disorders and Sedative, hypnotic, or anxiolytic related disorders).  Pt has had hx of numerous prior detox/rehabs (previous [facility] admission in 12/2023).  He has hx of multiple ED visits reporting chest pain and/or SI and/or command AH to kill himself,. pertinent medical issues include: HTN, pericarditis, Chronic CHF and GERD.  Pt admits he is not on any maintenance meds.  today, self presented to the ED (once again), complaining of depression, anxiety, "AH" and SI with plan to jump off a roof top.    Patient presents with complaints of worsening of depression and having +si , with the plan to either shoot himself or jump from the rooftop , likely in the setting of substance use (tested +cocaine ) as well as non compliance with treatment. Patient is not able to engage in safety planing at this time and is requesting voluntary admission for safety and stabilization.

## 2025-04-17 NOTE — ED PROVIDER NOTE - CARDIAC, MLM
Normal rate, regular rhythm.  Heart sounds S1, S2. [facility]:  I discussed with the patient the risks of golimumab including but not limited to myelosuppression, immunosuppression, autoimmune hepatitis, demyelinating diseases, lymphoma, and serious infections.  The patient understands that monitoring is required including a PPD at baseline and must alert us or the primary physician if symptoms of infection or other concerning signs are noted.

## 2025-04-17 NOTE — ED ADULT TRIAGE NOTE - NS_BH TRG QUESTION1_ED_ALL_ED
----- Message from Zay Harding sent at 5/4/2020  1:28 PM CDT -----  Contact: Pt    The Pt would like a call back to set up a virtual appt please.    Phone # [phone]  
Spoke with  , scheduled an Audio Appointment with . Attempted to schedule 3 month f/u with  , Pt stated she did not want to be seen in clinic, she doesn't see the purpose of being seen in 3 months. I explained to Pt she can discuss medication refills and any concerns further with  on tomorrow 5/5/20.   
No

## 2025-04-17 NOTE — ED BEHAVIORAL HEALTH ASSESSMENT NOTE - NSPRESENTSXS_PSY_ALL_CORE
Depressed mood/Anhedonia/Psychosis/Impulsivity/Hopelessness or despair/Global insomnia/Severe anxiety, agitation or panic/Refusal or inability to complete safety plan

## 2025-04-17 NOTE — ED BEHAVIORAL HEALTH ASSESSMENT NOTE - SUMMARY
59 yr old male, single, undomiciled and unemployed.  self reported hx of bipolar disorder and SCZ;  as per PSYCKES, has the following Behavioral Health Diagnoses to include Unspecified/Other Bipolar, substance-Induced Depressive Disorder, Major Depressive Disorder, Unspecified/Other Depressive Disorder,  Borderline Personality Disorder, Antisocial Personality Disorder, Unspecified/Other Anxiety Disorder, Bipolar I, Bipolar II, Schizoaffective Disorder, Substance-Induced Sleep Disorder, Adjustment Disorder, Schizophrenia, Substance-Induced Psychotic Disorder, Other Mental Disorders Unspecified/Other Psychotic Disorders,  Attention Deficit Hyperactivity Disorder, Insomnia Disorder  and Substance-Induced Anxiety Disorder.. Pt is high utilizer of mental health facilities + is chronically non adherent to meds and psych appts; has multiple ED visits and psych admissions ( last [facility] low 6 3/2-3/13/2025 discharged on 72 hour letter) for depression + SI.  past documented hx for suspicion of malingering on pior visits including this latest to [facility]  admission,  as per chart review, there is documented hx of "several prior self-aborted suicide attempts with plan to jump in front of train in 2023 but was, "stopped by an emre",  Pt has hx of polysubstance use (as per PSYCKES: with hx of Cannabis related disorders/ Alcohol related disorders/ Cocaine related disorders/ Other psychoactive substance related disorders/ Opioid related disorders/ Tobacco related disorder/  Other stimulant related disorders and Sedative, hypnotic, or anxiolytic related disorders).  Pt has had hx of numerous prior detox/rehabs (previous [facility] admission in 12/2023).  He has hx of multiple ED visits reporting chest pain and/or SI and/or command AH to kill himself,. pertinent medical issues include: HTN, pericarditis, Chronic CHF and GERD.  Pt admits he is not on any maintenance meds.  today, self presented to the ED (once again), complaining of depression, anxiety, "AH" and SI with plan to jump off a roof top.     Patient seen and evaluated. He presents depressed with suicidal ideation and a plan to jump off a roof top vs  shoot self with his friends firearms, in the context of polysubstance abuse and noncompliance with medications. Although patient endorses intermittent A/H he does not appear  psychotic at this time. Patient was cooperative throughout interview and was able to engage in conversation. Patient received Ativan 2 mg and Haldol 5 mg po earlier for severe anxiety with fair results. Patient repeatedly stated he would attempt suicide if he is discharged home and is seeking admission for safety and stabilization. Patient will be admitted on a voluntary status to low 6.     RECOMMENDATIONS:  1. Admit to [facility] 6.  2. Does not require constant observation   3. PRNs: Zyprexa 5mg PO/SL/ IM q6hrs for agitation in the context of acute psychiatric illness   4. Psychiatric: DEFER STANDING PSYCHOTROPIC TO PRIMARY TREATMENT TEAM  5. Medical: Per chart review: HTN, pericarditis, self reports hx of CHF (claims EF around ? 45%), CKD,     GERD. non-compliant with past meds of Metoprolol 25 mg daily,  ASA 81mg ( previously taking entresto    24-26mg)  will continue asa 81 mg daily, monitor b/p and restart Metoprolol if hypertensive   6. symptom triggered CIWA        11.  once medically cleared, facilitate transfer to [facility]

## 2025-04-17 NOTE — ED ADULT NURSE NOTE - OBJECTIVE STATEMENT
A&Ox4. Past medical history ETOH and substance abuse, CHF, GERD, Bipolar, HTN, and borderline personality disorder. Presents to the ED with c/o SI with plan to jump off roof or shoot himself. Patient endorses increase stressors at home with frequent fights, auditory hallucination, and HI. Pt states he also has rectal bleeding. Denies CP, SOB, or N/V/D. Respirations even and unlabored. Surroundings checked for safety. Belongings in [place]. Safety precautions in place.

## 2025-04-18 DIAGNOSIS — F19.10 OTHER PSYCHOACTIVE SUBSTANCE ABUSE, UNCOMPLICATED: ICD-10-CM

## 2025-04-18 DIAGNOSIS — F32.A DEPRESSION, UNSPECIFIED: ICD-10-CM

## 2025-04-18 LAB — SARS-COV-2 RNA SPEC QL NAA+PROBE: SIGNIFICANT CHANGE UP

## 2025-04-18 PROCEDURE — 99222 1ST HOSP IP/OBS MODERATE 55: CPT

## 2025-04-18 RX ORDER — DIPHENHYDRAMINE HCL 12.5MG/5ML
25 ELIXIR ORAL ONCE
Refills: 0 | Status: COMPLETED | OUTPATIENT
Start: 2025-04-18 | End: 2025-04-18

## 2025-04-18 RX ORDER — IBUPROFEN 200 MG
400 TABLET ORAL EVERY 6 HOURS
Refills: 0 | Status: DISCONTINUED | OUTPATIENT
Start: 2025-04-18 | End: 2025-04-25

## 2025-04-18 RX ORDER — ASPIRIN 325 MG
81 TABLET ORAL DAILY
Refills: 0 | Status: DISCONTINUED | OUTPATIENT
Start: 2025-04-18 | End: 2025-04-25

## 2025-04-18 RX ORDER — INFLUENZA A VIRUS A/IDAHO/07/2018 (H1N1) ANTIGEN (MDCK CELL DERIVED, PROPIOLACTONE INACTIVATED, INFLUENZA A VIRUS A/INDIANA/08/2018 (H3N2) ANTIGEN (MDCK CELL DERIVED, PROPIOLACTONE INACTIVATED), INFLUENZA B VIRUS B/SINGAPORE/INFTT-16-0610/2016 ANTIGEN (MDCK CELL DERIVED, PROPIOLACTONE INACTIVATED), INFLUENZA B VIRUS B/IOWA/06/2017 ANTIGEN (MDCK CELL DERIVED, PROPIOLACTONE INACTIVATED) 15; 15; 15; 15 UG/.5ML; UG/.5ML; UG/.5ML; UG/.5ML
0.5 INJECTION, SUSPENSION INTRAMUSCULAR ONCE
Refills: 0 | Status: COMPLETED | OUTPATIENT
Start: 2025-04-18 | End: 2025-04-18

## 2025-04-18 RX ORDER — IBUPROFEN 200 MG
400 TABLET ORAL EVERY 6 HOURS
Refills: 0 | Status: DISCONTINUED | OUTPATIENT
Start: 2025-04-18 | End: 2025-04-18

## 2025-04-18 RX ORDER — RISPERIDONE 4 MG
1 TABLET ORAL AT BEDTIME
Refills: 0 | Status: DISCONTINUED | OUTPATIENT
Start: 2025-04-18 | End: 2025-04-21

## 2025-04-18 RX ORDER — ACETAMINOPHEN 500 MG/5ML
650 LIQUID (ML) ORAL EVERY 6 HOURS
Refills: 0 | Status: DISCONTINUED | OUTPATIENT
Start: 2025-04-18 | End: 2025-04-18

## 2025-04-18 RX ORDER — OLANZAPINE 10 MG/1
5 TABLET ORAL ONCE
Refills: 0 | Status: DISCONTINUED | OUTPATIENT
Start: 2025-04-18 | End: 2025-04-25

## 2025-04-18 RX ORDER — OLANZAPINE 10 MG/1
5 TABLET ORAL EVERY 6 HOURS
Refills: 0 | Status: DISCONTINUED | OUTPATIENT
Start: 2025-04-18 | End: 2025-04-25

## 2025-04-18 RX ORDER — LORAZEPAM 4 MG/ML
2 VIAL (ML) INJECTION
Refills: 0 | Status: DISCONTINUED | OUTPATIENT
Start: 2025-04-18 | End: 2025-04-21

## 2025-04-18 RX ORDER — FOLIC ACID 1 MG/1
1 TABLET ORAL DAILY
Refills: 0 | Status: COMPLETED | OUTPATIENT
Start: 2025-04-18 | End: 2025-04-25

## 2025-04-18 RX ORDER — MAGNESIUM, ALUMINUM HYDROXIDE 200-200 MG
30 TABLET,CHEWABLE ORAL EVERY 6 HOURS
Refills: 0 | Status: DISCONTINUED | OUTPATIENT
Start: 2025-04-18 | End: 2025-04-25

## 2025-04-18 RX ORDER — B1/B2/B3/B5/B6/B12/VIT C/FOLIC 500-0.5 MG
1 TABLET ORAL DAILY
Refills: 0 | Status: COMPLETED | OUTPATIENT
Start: 2025-04-18 | End: 2025-04-25

## 2025-04-18 RX ADMIN — Medication 25 MILLIGRAM(S): at 18:07

## 2025-04-18 RX ADMIN — Medication 400 MILLIGRAM(S): at 12:31

## 2025-04-18 RX ADMIN — Medication 400 MILLIGRAM(S): at 11:24

## 2025-04-18 NOTE — ED ADULT NURSE REASSESSMENT NOTE - NS ED NURSE REASSESS COMMENT FT1
Patient is being discharged today to [facility], Education provided via teachback method and written materials to patient. Patient verbalize understanding. No complaints/signs/symptoms of pain, distress, or discomfort at this time.

## 2025-04-18 NOTE — BH INPATIENT PSYCHIATRY ASSESSMENT NOTE - NSBHCHARTREVIEWVS_PSY_A_CORE FT
Vital Signs Last 24 Hrs  T(C): 36.9 (04-18-25 @ 00:45), Max: 37 (04-17-25 @ 20:06)  T(F): 98.4 (04-18-25 @ 00:45), Max: 98.6 (04-17-25 @ 20:06)  HR: 76 (04-18-25 @ 00:45) (75 - 90)  BP: 114/68 (04-18-25 @ 00:45) (112/74 - 126/87)  BP(mean): --  RR: 18 (04-18-25 @ 00:45) (16 - 18)  SpO2: 97% (04-18-25 @ 00:45) (96% - 100%)     Vital Signs Last 24 Hrs  T(C): 36.8 (04-20-25 @ 07:51), Max: 36.8 (04-20-25 @ 07:51)  T(F): 98.2 (04-20-25 @ 07:51), Max: 98.2 (04-20-25 @ 07:51)  HR: --  BP: --  BP(mean): --  RR: --  SpO2: --    Orthostatic VS  04-20-25 @ 20:18  Lying BP: --/-- HR: --  Sitting BP: 110/88 HR: 95  Standing BP: 119/63 HR: 70  Site: --  Mode: --  Orthostatic VS  04-20-25 @ 08:30  Lying BP: --/-- HR: --  Sitting BP: 130/80 HR: 70  Standing BP: --/-- HR: --  Site: --  Mode: --  Orthostatic VS  04-20-25 @ 07:51  Lying BP: --/-- HR: --  Sitting BP: 135/115 HR: 69  Standing BP: --/-- HR: --  Site: --  Mode: --  Orthostatic VS  04-19-25 @ 08:35  Lying BP: --/-- HR: --  Sitting BP: 119/87 HR: 73  Standing BP: --/-- HR: --  Site: --  Mode: --

## 2025-04-18 NOTE — BH PATIENT PROFILE - FALL HARM RISK - UNIVERSAL INTERVENTIONS
Bed in lowest position, wheels locked, appropriate side rails in place/Call bell, personal items and telephone in reach/Instruct patient to call for assistance before getting out of bed or chair/Non-slip footwear when patient is out of bed/Fe [employer] to call system/Physically safe environment - no spills, clutter or unnecessary equipment/Purposeful Proactive Rounding/Room/bathroom lighting operational, light cord in reach

## 2025-04-18 NOTE — BH INPATIENT PSYCHIATRY ASSESSMENT NOTE - NSBHMETABOLIC_PSY_ALL_CORE_FT
BMI: BMI (kg/m2): 37.3 (04-18-25 @ 02:53)  HbA1c: A1C with Estimated Average Glucose Result: 5.4 % (12-27-24 @ 05:30)    Glucose: POCT Blood Glucose.: 109 mg/dL (04-10-25 @ 20:22)    BP: 114/68 (04-18-25 @ 00:45) (112/74 - 126/87)Vital Signs Last 24 Hrs  T(C): 36.9 (04-18-25 @ 00:45), Max: 37 (04-17-25 @ 20:06)  T(F): 98.4 (04-18-25 @ 00:45), Max: 98.6 (04-17-25 @ 20:06)  HR: 76 (04-18-25 @ 00:45) (75 - 90)  BP: 114/68 (04-18-25 @ 00:45) (112/74 - 126/87)  BP(mean): --  RR: 18 (04-18-25 @ 00:45) (16 - 18)  SpO2: 97% (04-18-25 @ 00:45) (96% - 100%)      Lipid Panel: Date/Time: 12-27-24 @ 05:30  Cholesterol, Serum: 145  LDL Cholesterol Calculated: 73  HDL Cholesterol, Serum: 47  Total Cholesterol/HDL Ration Measurement: --  Triglycerides, Serum: 145   BMI: BMI (kg/m2): 37.3 (04-18-25 @ 02:53)  HbA1c: A1C with Estimated Average Glucose Result: 5.4 % (12-27-24 @ 05:30)    Glucose: POCT Blood Glucose.: 109 mg/dL (04-10-25 @ 20:22)    BP: 114/68 (04-18-25 @ 00:45) (114/68 - 114/68)Vital Signs Last 24 Hrs  T(C): 36.8 (04-20-25 @ 07:51), Max: 36.8 (04-20-25 @ 07:51)  T(F): 98.2 (04-20-25 @ 07:51), Max: 98.2 (04-20-25 @ 07:51)  HR: --  BP: --  BP(mean): --  RR: --  SpO2: --    Orthostatic VS  04-20-25 @ 20:18  Lying BP: --/-- HR: --  Sitting BP: 110/88 HR: 95  Standing BP: 119/63 HR: 70  Site: --  Mode: --  Orthostatic VS  04-20-25 @ 08:30  Lying BP: --/-- HR: --  Sitting BP: 130/80 HR: 70  Standing BP: --/-- HR: --  Site: --  Mode: --  Orthostatic VS  04-20-25 @ 07:51  Lying BP: --/-- HR: --  Sitting BP: 135/115 HR: 69  Standing BP: --/-- HR: --  Site: --  Mode: --  Orthostatic VS  04-19-25 @ 08:35  Lying BP: --/-- HR: --  Sitting BP: 119/87 HR: 73  Standing BP: --/-- HR: --  Site: --  Mode: --    Lipid Panel: Date/Time: 04-19-25 @ 11:41  Cholesterol, Serum: 143  LDL Cholesterol Calculated: 68  HDL Cholesterol, Serum: 54  Total Cholesterol/HDL Ration Measurement: --  Triglycerides, Serum: 116

## 2025-04-18 NOTE — BH SOCIAL WORK INITIAL PSYCHOSOCIAL EVALUATION - NSBHFINANCE_PSY_ALL_CORE
Patient is unemployed and homeless.  SW will continue to assess financial history./Unable to answer (specify)

## 2025-04-18 NOTE — BH INPATIENT PSYCHIATRY ASSESSMENT NOTE - HPI (INCLUDE ILLNESS QUALITY, SEVERITY, DURATION, TIMING, CONTEXT, MODIFYING FACTORS, ASSOCIATED SIGNS AND SYMPTOMS)
Per [facility] ED assessment, "59 yr old male, single, undomiciled and unemployed.  self reported hx of bipolar disorder and SCZ;  as per PSYCKES, has the following Behavioral Health Diagnoses to include Unspecified/Other Bipolar, substance-Induced Depressive Disorder, Major Depressive Disorder, Unspecified/Other Depressive Disorder,  Borderline Personality Disorder, Antisocial Personality Disorder, Unspecified/Other Anxiety Disorder, Bipolar I, Bipolar II, Schizoaffective Disorder, Substance-Induced Sleep Disorder, Adjustment Disorder, Schizophrenia, Substance-Induced Psychotic Disorder, Other Mental Disorders Unspecified/Other Psychotic Disorders,  Attention Deficit Hyperactivity Disorder, Insomnia Disorder  and Substance-Induced Anxiety Disorder.. Pt is high utilizer of mental health facilities + is chronically non adherent to meds and psych appts; has multiple ED visits and psych admissions ( last [facility] 3/2-3/13/2025) for depression + SI.  past documented hx for suspicion of malingering on pior visits including this latest to [facility]  admission,  as per chart review, there is documented hx of "several prior self-aborted suicide attempts with plan to jump in front of train in 2023 but was, "stopped by an emre",  Pt has hx of polysubstance use (as per PSYCKES: with hx of Cannabis related disorders/ Alcohol related disorders/ Cocaine related disorders/ Other psychoactive substance related disorders/ Opioid related disorders/ Tobacco related disorder/  Other stimulant related disorders and Sedative, hypnotic, or anxiolytic related disorders).  Pt has had hx of numerous prior detox/rehabs (previous [facility] admission in 12/2023).  He has hx of multiple ED visits reporting chest pain and/or SI and/or command AH to kill himself,. pertinent medical issues include: HTN, pericarditis, Chronic CHF and GERD.  Pt admits he is not on any maintenance meds.  today, self presented to the ED (once again), complaining of depression, anxiety, "AH" and SI with plan to jump off a roof top.     Patient seen in private room.  He was calm and cooperative throughout interview. Patient reports he was drinking with a female friend on the roof top of a building in [place] last night with intentions of jumping off once his friends left. Patient reports he was intoxicated and fell asleep before his friend left and woke up this morning. Patient again wanted to jump off the roof and took a bus to [facility] seeking admission. Patient reports since his discharge from [facility] he has not taken any medical or psychiatric medications. He has been living in hotels and smoking crack and drinking several days a week. Patient reports feeling hopeless and helpless and fears reports he will attempt suicide, by jumping or shooting self, if he is not admitted to the hospital. Patient reports he is not sleeping, has poor appetite, low energy and guilt. He also endorses intermittently hearing the voice of his mother when he is alone and not intoxicated."    On the unit, patient reports he started drinking ETOH again the day he was released from the hospital due to urges and stress.  Pt reports he has been drinking a couple pints of Bryon daily.  Pt reports his last drink was Wednesday.  Pt reports also using cocaine occasionally 1-2x/month.  Pt reports he was staying in a hotel since discharge.  Pt denies psychiatric hospitalizations since being discharged from [facility] Low 6.  Pt reports [facility] telling him to kill himself and others.  Pt reports SI with no plan in the hospital, but reports he had thoughts to jump the roof of where he was staying prior to admission.  Pt denies HI/I/P or VH or paranoia.  Pt reports poor sleep the past 2 days.  Pt denies changes in appetite.  Pt reports depressed mood.  Pt reports his daughter answered the phone on her birthday, but has not picked up for him since then.  Pt reports he wants to stop drinking and get his life together for his daughter.  Pt reports he will think about taking Naltrexone, but declining at this time.  Pt agreeable to start Risperdal with goal of MCKEON.

## 2025-04-18 NOTE — BH SOCIAL WORK INITIAL PSYCHOSOCIAL EVALUATION - NSBHSACONSEQUENCE_PSY_A_CORE FT
Per chart review: "Pt reports many past detox and rehab admissions, including [place], [facility], others.".. with continued use of said drugs, will cause worsening psychosis, depressive and anxiety symptoms including clinical picture of miquel

## 2025-04-18 NOTE — BH INPATIENT PSYCHIATRY ASSESSMENT NOTE - NSTXRELFACTOR_PSY_ALL_CORE
used
Non-compliant or not receiving treatment
O-T Plasty Text: The defect edges were debeveled with a #15 scalpel blade.  Given the location of the defect, shape of the defect and the proximity to free margins an O-T plasty was deemed most appropriate.  Using a sterile surgical marker, an appropriate O-T plasty was drawn incorporating the defect and placing the expected incisions within the relaxed skin tension lines where possible.    The area thus outlined was incised deep to adipose tissue with a #15 scalpel blade.  The skin margins were undermined to an appropriate distance in all directions utilizing iris scissors.

## 2025-04-18 NOTE — BH INPATIENT PSYCHIATRY ASSESSMENT NOTE - NSCURPASTPSYDX_PSY_ALL_CORE
Contacted patient to confirm she wants these prescriptions to be sent to new pharmacy    Mood disorder/Psychotic disorder/Alcohol/Substance Use disorders/Cluster B Personality disorder/traits

## 2025-04-18 NOTE — BH INPATIENT PSYCHIATRY ASSESSMENT NOTE - LEVEL OF CONSCIOUSNESS
Continue amiodarone, Eliquis  Monitor   Patient called, she went to urgent care [facility] yesterday and last Tuesday, they gave her steroid and it is not helping  It is in her ear, corner of eye, under breast   She feels it is getting worse  She is very itchy, please advise what to do  They told her to take Benadryl, she has been taking for two weeks  She understands you are not in until the morning  Sent text to Dr Todd Wilkins also to see if ok to wait until tomorrow since near eye  Per Dr Todd Wilkins, get re-evaluated from [facility] since near eye, or see Dr Garcia Renae in morning, I left detailed message on [employer]  Alert

## 2025-04-18 NOTE — BH SOCIAL WORK INITIAL PSYCHOSOCIAL EVALUATION - OTHER PAST PSYCHIATRIC HISTORY (INCLUDE DETAILS REGARDING ONSET, COURSE OF ILLNESS, INPATIENT/OUTPATIENT TREATMENT)
59 yr old male, single, undomiciled and unemployed but reportedly stays in hotels.  Self reported hx of bipolar disorder and SCZ;  Pt is high utilizer of mental health facilities + is chronically non adherent to meds and psych appts; has multiple ED visits and psych admissions ( last [facility] 3/2-3/13/2025) for depression + SI.  past documented hx for suspicion of malingering on pior visits including this latest to [facility]  admission,  as per chart review, there is documented hx of "several prior self-aborted suicide attempts with plan to jump in front of train in 2023 but was, "stopped by an emre",  Pt has hx of polysubstance use (as per PSYCKES: with hx of Cannabis related disorders/ Alcohol related disorders/ Cocaine related disorders/ Other psychoactive substance related disorders/ Opioid related disorders/ Tobacco related disorder/  Other stimulant related disorders and Sedative, hypnotic, or anxiolytic related disorders).  Pt has had hx of numerous prior detox/rehabs (previous [facility] admission in 12/2023).  He has hx of multiple ED visits reporting chest pain and/or SI and/or command AH to kill himself,. pertinent medical issues include: HTN, pericarditis, Chronic CHF and GERD.  Pt admits he is not on any maintenance meds.  today, self presented to the ED (once again), complaining of depression, anxiety, "AH" and SI with plan to jump off a roof top.     In ED, pt presented calm and cooperative throughout interview. Patient reports he was drinking with a female friend on the roof top of a building in [place] last night with intentions of jumping off once his friends left. Patient reports he was intoxicated and fell asleep before his friend left and woke up this morning. Patient again wanted to jump off the roof and took a bus to [facility] seeking admission. Patient reports since his discharge from [facility] he has not taken any medical or psychiatric medications. He has been living in hotels and smoking crack and drinking several days a week. Patient reports feeling hopeless and helpless and fears reports he will attempt suicide, by jumping or shooting self, if he is not admitted to the hospital. Patient reports he is not sleeping, has poor appetite, low energy and guilt. He also endorses intermittently hearing the voice of his mother when he is alone and not intoxicated.    At last d/c from [facility], pt left on 3DL and refused referral.  He has a care coordinator [facility] ([email or web address] or [phone])   59 yr old male, single, undomiciled and unemployed but reportedly stays in hotels.  Self reported hx of bipolar disorder and SCZ;  Pt is high utilizer of mental health facilities + is chronically non adherent to meds and psych appts; has multiple ED visits and psych admissions ( last [facility] 3/2-3/13/2025) for depression + SI.  past documented hx for suspicion of malingering on pior visits including this latest to [facility]  admission,  as per chart review, there is documented hx of "several prior self-aborted suicide attempts with plan to jump in front of train in 2023 but was, "stopped by an emre",  Pt has hx of polysubstance use (as per PSYCKES: with hx of Cannabis related disorders/ Alcohol related disorders/ Cocaine related disorders/ Other psychoactive substance related disorders/ Opioid related disorders/ Tobacco related disorder/  Other stimulant related disorders and Sedative, hypnotic, or anxiolytic related disorders).  Pt has had hx of numerous prior detox/rehabs (previous [facility] admission in 12/2023).  He has hx of multiple ED visits reporting chest pain and/or SI and/or command AH to kill himself,. pertinent medical issues include: HTN, pericarditis, Chronic CHF and GERD.  Pt admits he is not on any maintenance meds.  today, self presented to the ED (once again), complaining of depression, anxiety, "AH" and SI with plan to jump off a roof top.     In ED, pt presented calm and cooperative throughout interview. Patient reports he was drinking with a female friend on the roof top of a building in [place] last night with intentions of jumping off once his friends left. Patient reports he was intoxicated and fell asleep before his friend left and woke up this morning. Patient again wanted to jump off the roof and took a bus to [facility] seeking admission. Patient reports since his discharge from [facility] he has not taken any medical or psychiatric medications. He has been living in hotels and smoking crack and drinking several days a week. Patient reports feeling hopeless and helpless and fears reports he will attempt suicide, by jumping or shooting self, if he is not admitted to the hospital. Patient reports he is not sleeping, has poor appetite, low energy and guilt. He also endorses intermittently hearing the voice of his mother when he is alone and not intoxicated.    At last d/c from [facility], pt left on 3DL and refused referral.  He has a care coordinator through [facility] Link ([email or web address] or [phone]) and also has a [facility] CM  Ms. Tang [phone]

## 2025-04-18 NOTE — BH PATIENT PROFILE - HOME MEDICATIONS
meclizine 25 mg oral tablet , 1 tab(s) orally 2 times a day  chlordiazePOXIDE 25 mg oral capsule , 1 cap(s) orally every 6 hours MDD: 4  Multiple Vitamins oral tablet , 1 tab(s) orally once a day (at bedtime)  metoprolol succinate 25 mg oral tablet, extended release , 1 tab(s) orally once a day (at bedtime)  lurasidone 20 mg oral tablet , 1 tab(s) orally once a day (at bedtime)  aspirin 81 mg oral delayed release tablet , 1 tab(s) orally once a day (at bedtime)

## 2025-04-18 NOTE — BH INPATIENT PSYCHIATRY ASSESSMENT NOTE - OTHER PAST PSYCHIATRIC HISTORY (INCLUDE DETAILS REGARDING ONSET, COURSE OF ILLNESS, INPATIENT/OUTPATIENT TREATMENT)
self reported hx of depression + anxiety    high mental health utilizer including numerous Psych ED/ CPEP visits for SI, alcohol intoxication    chronically nonadherent with meds/ admitted to being inconsistent on his out-patient psychiatric care.    stated past hx of allegedly self aborting jumping in front of a 7 train over the summer ("stopped by an emre" (2023), also reported past hx of self aborting jumping in front of a train in 7/ 2024    multiple psych admissions including recent discharge [facility] ( 3/2-3/19/2025) and  [facility], 12/3 - 12/13/2024 for mgt of depression and SI in the context of psychosocial stressors (a diagnoses of Unspecified mood [affective] disorder; and Polysubstance abuse were made as well as entertaining Malingering as part of the diagnosis.  whilst Pt currently claimed that Latuda "helped" towards affording mood stability, of note during that [facility] admission, the Pt later on refused standing Latuda; he was  discharged to shelter

## 2025-04-18 NOTE — BH INPATIENT PSYCHIATRY ASSESSMENT NOTE - NSBHASSESSSUMMFT_PSY_ALL_CORE
59 yr old male, single, undomiciled and unemployed.  self reported hx of bipolar disorder and SCZ;  as per PSYCKES, has the following Behavioral Health Diagnoses to include Unspecified/Other Bipolar, substance-Induced Depressive Disorder, Major Depressive Disorder, Unspecified/Other Depressive Disorder,  Borderline Personality Disorder, Antisocial Personality Disorder, Unspecified/Other Anxiety Disorder, Bipolar I, Bipolar II, Schizoaffective Disorder, Substance-Induced Sleep Disorder, Adjustment Disorder, Schizophrenia, Substance-Induced Psychotic Disorder, Other Mental Disorders Unspecified/Other Psychotic Disorders,  Attention Deficit Hyperactivity Disorder, Insomnia Disorder  and Substance-Induced Anxiety Disorder.. Pt is high utilizer of mental health facilities + is chronically non adherent to meds and psych appts; has multiple ED visits and psych admissions ( last [facility] 3/2-3/13/2025) for depression + SI.  past documented hx for suspicion of malingering on pior visits including this latest to [facility]  admission,  as per chart review, there is documented hx of "several prior self-aborted suicide attempts with plan to jump in front of train in 2023 but was, "stopped by an emre",  Pt has hx of polysubstance use (as per PSYCKES: with hx of Cannabis related disorders/ Alcohol related disorders/ Cocaine related disorders/ Other psychoactive substance related disorders/ Opioid related disorders/ Tobacco related disorder/  Other stimulant related disorders and Sedative, hypnotic, or anxiolytic related disorders).  Pt has had hx of numerous prior detox/rehabs (previous [facility] admission in 12/2023).  He has hx of multiple ED visits reporting chest pain and/or SI and/or command AH to kill himself,. pertinent medical issues include: HTN, pericarditis, Chronic CHF and GERD.  Pt admits he is not on any maintenance meds.  today, self presented to the ED (once again), complaining of depression, anxiety, "AH" and SI with plan to jump off a roof top.     >Legal: 9.13  >Obs: Routine, no current SI with plan. no need for CO, patient not expected to pose risk to self or others in controlled inpatient setting  >Psychiatric Meds: Start Risperdal 1mg PO at bedtime (psychosis/mood) with goal of MCKEON  >Labs: Admission labs reviewed, no acute findings.   >Medical:  No acute concerns. No consultations needed at this time. Symptom triggered CIWA for ETOH withdrawal and UTox + for benzos, but patient denies use  >Social: milieu/structured therapy  >Treatment Interventions: Groups and Individual Therapy/CBT, Motivational counseling for substance abuse related issues.   >Dispo: pending remission of sx

## 2025-04-18 NOTE — BH INPATIENT PSYCHIATRY ASSESSMENT NOTE - CURRENT MEDICATION
MEDICATIONS  (STANDING):  aspirin  chewable 81 milliGRAM(s) Oral daily  folic acid 1 milliGRAM(s) Oral daily  influenza   Vaccine 0.5 milliLiter(s) IntraMuscular once  multivitamin 1 Tablet(s) Oral daily  risperiDONE   Tablet 1 milliGRAM(s) Oral at bedtime  thiamine 100 milliGRAM(s) Oral daily    MEDICATIONS  (PRN):  aluminum hydroxide/magnesium hydroxide/simethicone Suspension 30 milliLiter(s) Oral every 6 hours PRN Dyspepsia  ibuprofen  Tablet. 400 milliGRAM(s) Oral every 6 hours PRN Mild Pain (1 - 3), Moderate Pain (4 - 6)  LORazepam     Tablet 2 milliGRAM(s) Oral every 2 hours PRN CIWA score increase by 2 points and current CIWA score GREATER THAN 9  OLANZapine 5 milliGRAM(s) Oral every 6 hours PRN agitation  OLANZapine Injectable 5 milliGRAM(s) IntraMuscular once PRN agitation   MEDICATIONS  (STANDING):  aspirin  chewable 81 milliGRAM(s) Oral daily  folic acid 1 milliGRAM(s) Oral daily  guaiFENesin Oral Liquid (Sugar-Free) 100 milliGRAM(s) Oral once  multivitamin 1 Tablet(s) Oral daily  risperiDONE   Tablet 1 milliGRAM(s) Oral at bedtime  thiamine 100 milliGRAM(s) Oral daily    MEDICATIONS  (PRN):  aluminum hydroxide/magnesium hydroxide/simethicone Suspension 30 milliLiter(s) Oral every 6 hours PRN Dyspepsia  ibuprofen  Tablet. 400 milliGRAM(s) Oral every 6 hours PRN Mild Pain (1 - 3), Moderate Pain (4 - 6)  LORazepam     Tablet 2 milliGRAM(s) Oral every 2 hours PRN CIWA score increase by 2 points and current CIWA score GREATER THAN 9  OLANZapine 5 milliGRAM(s) Oral every 6 hours PRN agitation  OLANZapine Injectable 5 milliGRAM(s) IntraMuscular once PRN agitation

## 2025-04-18 NOTE — BH INPATIENT PSYCHIATRY ASSESSMENT NOTE - NSBHSAALC_PSY_A_CORE FT
States, since discharge from [facility] 3/19/25 has been drinking a few pints of [place] daily

## 2025-04-18 NOTE — ED ADULT NURSE REASSESSMENT NOTE - NS ED NURSE REASSESS COMMENT FT1
Break covering RN: patient received, appears to be resting comfortably in bed, no complaints noted at this time. Breathing even and unlabored, pallor/diaphoresis not noted. to be transported with security. will continue to monitor.

## 2025-04-18 NOTE — BH INPATIENT PSYCHIATRY ASSESSMENT NOTE - NSBHMSEIMPULSE_PSY_A_CORE
Spoke with [employer] appeals, and Dr. Sherman request for urgent peer to peer review is denied.    [employer] advised us that the case has been completed with an external reviewer, and returned to [facility] for final review on the case.     We are to contact [employer] to inquire as to the outcome tomorrow or Monday at [phone], Option #1 then Option #5.     Patient ID [id number].   Normal

## 2025-04-18 NOTE — BH INPATIENT PSYCHIATRY ASSESSMENT NOTE - NSEVALUATIONDATE_PSY_ALL_CORE
18-Apr-2025 13:42 Anesthesia Start and Stop Event Times     Date Time Event    10/9/2019 0727 Ready for Procedure     0739 Anesthesia Start     0923 Anesthesia Stop        Responsible Staff  10/09/19    Name Role Begin End    Gareth Galdamez M.D. 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

## 2025-04-18 NOTE — PSYCHIATRIC REHAB INITIAL EVALUATION - NSBHPRRECOMMEND_PSY_ALL_CORE
Writer met with patient in order to introduce patient to Psychiatric Rehabilitation Staff, department functions, orient patient to unit programming and to establish a Psychiatric Rehabilitation goal. Patient is a 59-year-old male who was admitted for worsening symptoms of depression with SI plan. During the meeting patient was dressed appropriately, attentive and calm. Patient reported he is okay since admission, however is depressed due to housing situation and his relationship with his daughter. On the unit patient is visible, selectively social and has maintained behavioral control. Patient was able to identify an appropriate goal with writer, located in their  plan of care. Psychiatric Rehabilitation Staff will continue to engage patient daily in order to develop rapport, provide support and to assist patient in demonstrating progress towards Psychiatric Rehabilitation.

## 2025-04-18 NOTE — BH INPATIENT PSYCHIATRY ASSESSMENT NOTE - NSBHSACONSEQUENCE_PSY_A_CORE FT
Per chart review: "Pt reports many past detox and rehab admissions, including [facility], [facility], others.".. with continued use of said drugs, will cause worsening psychosis, depressive and anxiety symptoms including clinical picture of miquel

## 2025-04-18 NOTE — BH INPATIENT PSYCHIATRY ASSESSMENT NOTE - DETAILS
Reports friend has firearms reports past aborted suicide attempt- was going to jump in front of a train and an emre saved him. per chart review: reported experiencing diarrhea with Zoloft Deferred

## 2025-04-18 NOTE — BH INPATIENT PSYCHIATRY ASSESSMENT NOTE - LEGAL HISTORY
denies. no listed pending legal issues as per [facility] UN[facility]IED [facility] SYSTEM/ WEBCRIMS SITE

## 2025-04-19 LAB
CHOLEST SERPL-MCNC: 143 MG/DL — SIGNIFICANT CHANGE UP
HDLC SERPL-MCNC: 54 MG/DL — SIGNIFICANT CHANGE UP
LDLC SERPL-MCNC: 68 MG/DL — SIGNIFICANT CHANGE UP
LIPID PNL WITH DIRECT LDL SERPL: 68 MG/DL — SIGNIFICANT CHANGE UP
NONHDLC SERPL-MCNC: 89 MG/DL — SIGNIFICANT CHANGE UP
TRIGL SERPL-MCNC: 116 MG/DL — SIGNIFICANT CHANGE UP

## 2025-04-19 PROCEDURE — 99232 SBSQ HOSP IP/OBS MODERATE 35: CPT

## 2025-04-19 RX ORDER — DEXTROMETHORPHAN HBR, GUAIFENESIN 200 MG/10ML
100 LIQUID ORAL ONCE
Refills: 0 | Status: DISCONTINUED | OUTPATIENT
Start: 2025-04-19 | End: 2025-04-25

## 2025-04-19 RX ADMIN — Medication 400 MILLIGRAM(S): at 05:23

## 2025-04-19 RX ADMIN — Medication 1 TABLET(S): at 10:09

## 2025-04-19 RX ADMIN — Medication 1 MILLIGRAM(S): at 20:39

## 2025-04-19 RX ADMIN — Medication 100 MILLIGRAM(S): at 10:09

## 2025-04-19 RX ADMIN — Medication 81 MILLIGRAM(S): at 10:09

## 2025-04-19 RX ADMIN — FOLIC ACID 1 MILLIGRAM(S): 1 TABLET ORAL at 10:09

## 2025-04-20 PROCEDURE — 99232 SBSQ HOSP IP/OBS MODERATE 35: CPT

## 2025-04-20 RX ADMIN — Medication 400 MILLIGRAM(S): at 22:28

## 2025-04-20 RX ADMIN — Medication 81 MILLIGRAM(S): at 08:31

## 2025-04-20 RX ADMIN — Medication 100 MILLIGRAM(S): at 08:45

## 2025-04-20 RX ADMIN — Medication 1 TABLET(S): at 08:33

## 2025-04-20 RX ADMIN — FOLIC ACID 1 MILLIGRAM(S): 1 TABLET ORAL at 08:31

## 2025-04-21 PROCEDURE — 99232 SBSQ HOSP IP/OBS MODERATE 35: CPT

## 2025-04-21 RX ORDER — LURASIDONE HYDROCHLORIDE 120 MG/1
20 TABLET, FILM COATED ORAL AT BEDTIME
Refills: 0 | Status: DISCONTINUED | OUTPATIENT
Start: 2025-04-21 | End: 2025-04-25

## 2025-04-21 RX ADMIN — FOLIC ACID 1 MILLIGRAM(S): 1 TABLET ORAL at 08:42

## 2025-04-21 RX ADMIN — Medication 400 MILLIGRAM(S): at 22:08

## 2025-04-21 RX ADMIN — Medication 81 MILLIGRAM(S): at 08:42

## 2025-04-21 RX ADMIN — Medication 400 MILLIGRAM(S): at 22:51

## 2025-04-21 RX ADMIN — Medication 1 TABLET(S): at 08:42

## 2025-04-21 RX ADMIN — LURASIDONE HYDROCHLORIDE 20 MILLIGRAM(S): 120 TABLET, FILM COATED ORAL at 20:28

## 2025-04-22 PROCEDURE — 99232 SBSQ HOSP IP/OBS MODERATE 35: CPT

## 2025-04-22 RX ORDER — DIPHENHYDRAMINE HCL 12.5MG/5ML
25 ELIXIR ORAL AT BEDTIME
Refills: 0 | Status: DISCONTINUED | OUTPATIENT
Start: 2025-04-22 | End: 2025-04-25

## 2025-04-22 RX ADMIN — Medication 400 MILLIGRAM(S): at 20:07

## 2025-04-22 RX ADMIN — FOLIC ACID 1 MILLIGRAM(S): 1 TABLET ORAL at 08:21

## 2025-04-22 RX ADMIN — LURASIDONE HYDROCHLORIDE 20 MILLIGRAM(S): 120 TABLET, FILM COATED ORAL at 20:07

## 2025-04-22 RX ADMIN — Medication 1 TABLET(S): at 08:21

## 2025-04-22 RX ADMIN — Medication 81 MILLIGRAM(S): at 08:21

## 2025-04-22 NOTE — ED PROVIDER NOTE - CPE EDP CARDIAC NORM
In an effort to ensure that our patients LiveWell, a Team Member has reviewed your chart and identified an opportunity to provide the best care possible. An attempt was made to discuss or schedule due or overdue Preventive or Chronic Condition care.      The Outcome was Contact was not made, no answer/busy. We are attempting to schedule a nurse visit. If you have any questions or need help with scheduling, contact our Health Outreach Team at [phone].   Type of Appointment needed: Comprehensive Annual Visit  Name: Blade Billings    ### Patient Details  YOB: 1950  MRN: [id number]    ### Encounter Details  Arrival Date: N/A  Discharge Date: N/A  Encounter ID: [id number]    ### Related interaction  [facility]  [place] Comprehensive Annual Visit ([place] CAV Outreach) ([email or web address])   normal...

## 2025-04-23 PROCEDURE — 99232 SBSQ HOSP IP/OBS MODERATE 35: CPT

## 2025-04-23 RX ADMIN — Medication 400 MILLIGRAM(S): at 20:19

## 2025-04-23 RX ADMIN — Medication 1 TABLET(S): at 08:11

## 2025-04-23 RX ADMIN — Medication 81 MILLIGRAM(S): at 08:10

## 2025-04-23 RX ADMIN — FOLIC ACID 1 MILLIGRAM(S): 1 TABLET ORAL at 08:10

## 2025-04-23 RX ADMIN — Medication 400 MILLIGRAM(S): at 21:02

## 2025-04-24 PROCEDURE — 99232 SBSQ HOSP IP/OBS MODERATE 35: CPT

## 2025-04-24 RX ORDER — LURASIDONE HYDROCHLORIDE 120 MG/1
1 TABLET, FILM COATED ORAL
Qty: 30 | Refills: 0
Start: 2025-04-24 | End: 2025-05-23

## 2025-04-24 RX ORDER — ASPIRIN 325 MG
1 TABLET ORAL
Qty: 30 | Refills: 0
Start: 2025-04-24 | End: 2025-05-23

## 2025-04-24 RX ORDER — B1/B2/B3/B5/B6/B12/VIT C/FOLIC 500-0.5 MG
1 TABLET ORAL
Qty: 30 | Refills: 0
Start: 2025-04-24 | End: 2025-05-23

## 2025-04-24 RX ADMIN — Medication 400 MILLIGRAM(S): at 08:50

## 2025-04-24 RX ADMIN — Medication 400 MILLIGRAM(S): at 21:00

## 2025-04-24 RX ADMIN — Medication 400 MILLIGRAM(S): at 20:26

## 2025-04-24 RX ADMIN — FOLIC ACID 1 MILLIGRAM(S): 1 TABLET ORAL at 08:48

## 2025-04-24 RX ADMIN — Medication 1 TABLET(S): at 08:48

## 2025-04-24 NOTE — BH DISCHARGE NOTE NURSING/SOCIAL WORK/PSYCH REHAB - PATIENT PORTAL LINK FT
You can access the FollowMyHealth Patient Portal offered by [facility] by registering at the following website: [email or web address]. By joining [employer]’s FollowMyHealth portal, you will also be able to view your health information using other applications (apps) compatible with our system.

## 2025-04-24 NOTE — BH INPATIENT PSYCHIATRY PROGRESS NOTE - NSBHASSESSSUMMFT_PSY_ALL_CORE
59 yr old male, single, undomiciled and unemployed.  self reported hx of bipolar disorder and SCZ;  as per PSYCKES, has the following Behavioral Health Diagnoses to include Unspecified/Other Bipolar, substance-Induced Depressive Disorder, Major Depressive Disorder, Unspecified/Other Depressive Disorder,  Borderline Personality Disorder, Antisocial Personality Disorder, Unspecified/Other Anxiety Disorder, Bipolar I, Bipolar II, Schizoaffective Disorder, Substance-Induced Sleep Disorder, Adjustment Disorder, Schizophrenia, Substance-Induced Psychotic Disorder, Other Mental Disorders Unspecified/Other Psychotic Disorders,  Attention Deficit Hyperactivity Disorder, Insomnia Disorder  and Substance-Induced Anxiety Disorder.. Pt is high utilizer of mental health facilities + is chronically non adherent to meds and psych appts; has multiple ED visits and psych admissions ( last [facility] 3/2-3/13/2025) for depression + SI.  past documented hx for suspicion of malingering on pior visits including this latest to [facility]  admission,  as per chart review, there is documented hx of "several prior self-aborted suicide attempts with plan to jump in front of train in 2023 but was, "stopped by an emre",  Pt has hx of polysubstance use (as per PSYCKES: with hx of Cannabis related disorders/ Alcohol related disorders/ Cocaine related disorders/ Other psychoactive substance related disorders/ Opioid related disorders/ Tobacco related disorder/  Other stimulant related disorders and Sedative, hypnotic, or anxiolytic related disorders).  Pt has had hx of numerous prior detox/rehabs (previous [facility] admission in 12/2023).  He has hx of multiple ED visits reporting chest pain and/or SI and/or command AH to kill himself,. pertinent medical issues include: HTN, pericarditis, Chronic CHF and GERD.  Pt admits he is not on any maintenance meds.  today, self presented to the ED (once again), complaining of depression, anxiety, "AH" and SI with plan to jump off a roof top.     On assessment, patient denies any current SI/CAH and reports improved mood.    >Legal: 9.13  >Obs: Routine, no current SI with plan. no need for CO, patient not expected to pose risk to self or others in controlled inpatient setting  >Psychiatric Meds: Start Risperdal 1mg PO at bedtime (psychosis/mood) with goal of MCKEON  >Labs: Admission labs reviewed, no acute findings.   >Medical:  No acute concerns. No consultations needed at this time. Symptom triggered CIWA for ETOH withdrawal and UTox + for benzos, but patient denies use  >Social: milieu/structured therapy  >Treatment Interventions: Groups and Individual Therapy/CBT, Motivational counseling for substance abuse related issues.   >Dispo: pending remission of sx  
59 yr old male, single, undomiciled and unemployed.  self reported hx of bipolar disorder and SCZ;  as per PSYCKES, has the following Behavioral Health Diagnoses to include Unspecified/Other Bipolar, substance-Induced Depressive Disorder, Major Depressive Disorder, Unspecified/Other Depressive Disorder,  Borderline Personality Disorder, Antisocial Personality Disorder, Unspecified/Other Anxiety Disorder, Bipolar I, Bipolar II, Schizoaffective Disorder, Substance-Induced Sleep Disorder, Adjustment Disorder, Schizophrenia, Substance-Induced Psychotic Disorder, Other Mental Disorders Unspecified/Other Psychotic Disorders,  Attention Deficit Hyperactivity Disorder, Insomnia Disorder  and Substance-Induced Anxiety Disorder.. Pt is high utilizer of mental health facilities + is chronically non adherent to meds and psych appts; has multiple ED visits and psych admissions ( last [facility] 3[facility]3/13/2025) for depression + SI.  past documented hx for suspicion of malingering on pior visits including this latest to [facility]  admission,  as per chart review, there is documented hx of "several prior self-aborted suicide attempts with plan to jump in front of train in 2023 but was, "stopped by an emre",  Pt has hx of polysubstance use (as per PSYCKES: with hx of Cannabis related disorders/ Alcohol related disorders/ Cocaine related disorders/ Other psychoactive substance related disorders/ Opioid related disorders/ Tobacco related disorder/  Other stimulant related disorders and Sedative, hypnotic, or anxiolytic related disorders).  Pt has had hx of numerous prior detox/rehabs (previous [facility] admission in 12/2023).  He has hx of multiple ED visits reporting chest pain and/or SI and/or command AH to kill himself,. pertinent medical issues include: HTN, pericarditis, Chronic CHF and GERD.  Pt admits he is not on any maintenance meds.  today, self presented to the ED (once again), complaining of depression, anxiety, "AH" and SI with plan to jump off a roof top.     On assessment, patient denies any current SI/CAH and reports improved mood.    >Legal: 9.13  >Obs: Routine, no current SI with plan. no need for CO, patient not expected to pose risk to self or others in controlled inpatient setting  >Psychiatric Meds: Discontinue Risperdal for patient c/o daytime sedation and restart Latuda 20mg PO at bedtime (psychosis/mood)  >Labs: Admission labs reviewed, no acute findings.   >Medical:  No acute concerns. No consultations needed at this time. DC CIWA  >Social: milieu/structured therapy  >Treatment Interventions: Groups and Individual Therapy/CBT, Motivational counseling for substance abuse related issues.   >Dispo: pending coordination of care  
59 yr old male, single, undomiciled and unemployed.  self reported hx of bipolar disorder and SCZ;  as per PSYCKES, has the following Behavioral Health Diagnoses to include Unspecified/Other Bipolar, substance-Induced Depressive Disorder, Major Depressive Disorder, Unspecified/Other Depressive Disorder,  Borderline Personality Disorder, Antisocial Personality Disorder, Unspecified/Other Anxiety Disorder, Bipolar I, Bipolar II, Schizoaffective Disorder, Substance-Induced Sleep Disorder, Adjustment Disorder, Schizophrenia, Substance-Induced Psychotic Disorder, Other Mental Disorders Unspecified/Other Psychotic Disorders,  Attention Deficit Hyperactivity Disorder, Insomnia Disorder  and Substance-Induced Anxiety Disorder.. Pt is high utilizer of mental health facilities + is chronically non adherent to meds and psych appts; has multiple ED visits and psych admissions ( last [facility] 3/2-3/13/2025) for depression + SI.  past documented hx for suspicion of malingering on pior visits including this latest to [facility]  admission,  as per chart review, there is documented hx of "several prior self-aborted suicide attempts with plan to jump in front of train in 2023 but was, "stopped by an emre",  Pt has hx of polysubstance use (as per PSYCKES: with hx of Cannabis related disorders/ Alcohol related disorders/ Cocaine related disorders/ Other psychoactive substance related disorders/ Opioid related disorders/ Tobacco related disorder/  Other stimulant related disorders and Sedative, hypnotic, or anxiolytic related disorders).  Pt has had hx of numerous prior detox/rehabs (previous [facility] admission in 12/2023).  He has hx of multiple ED visits reporting chest pain and/or SI and/or command AH to kill himself,. pertinent medical issues include: HTN, pericarditis, Chronic CHF and GERD.  Pt admits he is not on any maintenance meds.  today, self presented to the ED (once again), complaining of depression, anxiety, "AH" and SI with plan to jump off a roof top.     On assessment, patient denies any current SI/CAH and reports improved mood.    >Legal: 9.13  >Obs: Routine, no current SI with plan. no need for CO, patient not expected to pose risk to self or others in controlled inpatient setting  >Psychiatric Meds: Discontinue Risperdal for patient c/o daytime sedation and restart Latuda 20mg PO at bedtime (psychosis/mood)  >Labs: Admission labs reviewed, no acute findings.   >Medical:  No acute concerns. No consultations needed at this time. DC CIWA  >Social: milieu/structured therapy  >Treatment Interventions: Groups and Individual Therapy/CBT, Motivational counseling for substance abuse related issues.   >Dispo: pending remission of sx and coordination of care  
59 yr old male, single, undomiciled and unemployed.  self reported hx of bipolar disorder and SCZ;  as per PSYCKES, has the following Behavioral Health Diagnoses to include Unspecified/Other Bipolar, substance-Induced Depressive Disorder, Major Depressive Disorder, Unspecified/Other Depressive Disorder,  Borderline Personality Disorder, Antisocial Personality Disorder, Unspecified/Other Anxiety Disorder, Bipolar I, Bipolar II, Schizoaffective Disorder, Substance-Induced Sleep Disorder, Adjustment Disorder, Schizophrenia, Substance-Induced Psychotic Disorder, Other Mental Disorders Unspecified/Other Psychotic Disorders,  Attention Deficit Hyperactivity Disorder, Insomnia Disorder  and Substance-Induced Anxiety Disorder.. Pt is high utilizer of mental health facilities + is chronically non adherent to meds and psych appts; has multiple ED visits and psych admissions ( last [facility] 3/2-3/13/2025) for depression + SI.  past documented hx for suspicion of malingering on pior visits including this latest to [facility]  admission,  as per chart review, there is documented hx of "several prior self-aborted suicide attempts with plan to jump in front of train in 2023 but was, "stopped by an emre",  Pt has hx of polysubstance use (as per PSYCKES: with hx of Cannabis related disorders/ Alcohol related disorders/ Cocaine related disorders/ Other psychoactive substance related disorders/ Opioid related disorders/ Tobacco related disorder/  Other stimulant related disorders and Sedative, hypnotic, or anxiolytic related disorders).  Pt has had hx of numerous prior detox/rehabs (previous [facility] admission in 12/2023).  He has hx of multiple ED visits reporting chest pain and/or SI and/or command AH to kill himself,. pertinent medical issues include: HTN, pericarditis, Chronic CHF and GERD.  Pt admits he is not on any maintenance meds.  today, self presented to the ED (once again), complaining of depression, anxiety, "AH" and SI with plan to jump off a roof top.     On assessment, patient denies any current SI/CAH and reports improved mood.    >Legal: 9.13  >Obs: Routine, no current SI with plan. no need for CO, patient not expected to pose risk to self or others in controlled inpatient setting  >Psychiatric Meds: Discontinue Risperdal for patient c/o daytime sedation and restart Latuda 20mg PO at bedtime (psychosis/mood)  >Labs: Admission labs reviewed, no acute findings.   >Medical:  No acute concerns. No consultations needed at this time. DC CIWA  >Social: milieu/structured therapy  >Treatment Interventions: Groups and Individual Therapy/CBT, Motivational counseling for substance abuse related issues.   >Dispo: pending coordination of care  
59 yr old male, single, undomiciled and unemployed.  self reported hx of bipolar disorder and SCZ;  as per PSYCKES, has the following Behavioral Health Diagnoses to include Unspecified/Other Bipolar, substance-Induced Depressive Disorder, Major Depressive Disorder, Unspecified/Other Depressive Disorder,  Borderline Personality Disorder, Antisocial Personality Disorder, Unspecified/Other Anxiety Disorder, Bipolar I, Bipolar II, Schizoaffective Disorder, Substance-Induced Sleep Disorder, Adjustment Disorder, Schizophrenia, Substance-Induced Psychotic Disorder, Other Mental Disorders Unspecified/Other Psychotic Disorders,  Attention Deficit Hyperactivity Disorder, Insomnia Disorder  and Substance-Induced Anxiety Disorder.. Pt is high utilizer of mental health facilities + is chronically non adherent to meds and psych appts; has multiple ED visits and psych admissions ( last [facility] 3/2-3/13/2025) for depression + SI.  past documented hx for suspicion of malingering on pior visits including this latest to [facility]  admission,  as per chart review, there is documented hx of "several prior self-aborted suicide attempts with plan to jump in front of train in 2023 but was, "stopped by an emre",  Pt has hx of polysubstance use (as per PSYCKES: with hx of Cannabis related disorders/ Alcohol related disorders/ Cocaine related disorders/ Other psychoactive substance related disorders/ Opioid related disorders/ Tobacco related disorder/  Other stimulant related disorders and Sedative, hypnotic, or anxiolytic related disorders).  Pt has had hx of numerous prior detox/rehabs (previous [facility] admission in 12/2023).  He has hx of multiple ED visits reporting chest pain and/or SI and/or command AH to kill himself,. pertinent medical issues include: HTN, pericarditis, Chronic CHF and GERD.  Pt admits he is not on any maintenance meds.  today, self presented to the ED (once again), complaining of depression, anxiety, "AH" and SI with plan to jump off a roof top.     On assessment, patient denies any current SI/CAH. He refused Risperdal.    >Legal: 9.13  >Obs: Routine, no current SI with plan. no need for CO, patient not expected to pose risk to self or others in controlled inpatient setting  >Psychiatric Meds: Start Risperdal 1mg PO at bedtime (psychosis/mood) with goal of MCKEON  >Labs: Admission labs reviewed, no acute findings.   >Medical:  No acute concerns. No consultations needed at this time. Symptom triggered CIWA for ETOH withdrawal and UTox + for benzos, but patient denies use  >Social: milieu/structured therapy  >Treatment Interventions: Groups and Individual Therapy/CBT, Motivational counseling for substance abuse related issues.   >Dispo: pending remission of sx  
59 yr old male, single, undomiciled and unemployed.  self reported hx of bipolar disorder and SCZ;  as per PSYCKES, has the following Behavioral Health Diagnoses to include Unspecified/Other Bipolar, substance-Induced Depressive Disorder, Major Depressive Disorder, Unspecified/Other Depressive Disorder,  Borderline Personality Disorder, Antisocial Personality Disorder, Unspecified/Other Anxiety Disorder, Bipolar I, Bipolar II, Schizoaffective Disorder, Substance-Induced Sleep Disorder, Adjustment Disorder, Schizophrenia, Substance-Induced Psychotic Disorder, Other Mental Disorders Unspecified/Other Psychotic Disorders,  Attention Deficit Hyperactivity Disorder, Insomnia Disorder  and Substance-Induced Anxiety Disorder.. Pt is high utilizer of mental health facilities + is chronically non adherent to meds and psych appts; has multiple ED visits and psych admissions ( last [facility] 3/2-3/13/2025) for depression + SI.  past documented hx for suspicion of malingering on pior visits including this latest to [facility]  admission,  as per chart review, there is documented hx of "several prior self-aborted suicide attempts with plan to jump in front of train in 2023 but was, "stopped by an emre",  Pt has hx of polysubstance use (as per PSYCKES: with hx of Cannabis related disorders/ Alcohol related disorders/ Cocaine related disorders/ Other psychoactive substance related disorders/ Opioid related disorders/ Tobacco related disorder/  Other stimulant related disorders and Sedative, hypnotic, or anxiolytic related disorders).  Pt has had hx of numerous prior detox/rehabs (previous [facility] admission in 12/2023).  He has hx of multiple ED visits reporting chest pain and/or SI and/or command AH to kill himself,. pertinent medical issues include: HTN, pericarditis, Chronic CHF and GERD.  Pt admits he is not on any maintenance meds.  today, self presented to the ED (once again), complaining of depression, anxiety, "AH" and SI with plan to jump off a roof top.     On assessment, patient denies any current SI/CAH and reports improved mood.    >Legal: 9.13  >Obs: Routine, no current SI with plan. no need for CO, patient not expected to pose risk to self or others in controlled inpatient setting  >Psychiatric Meds: Discontinue Risperdal for patient c/o daytime sedation and restart Latuda 20mg PO at bedtime (psychosis/mood)  >Labs: Admission labs reviewed, no acute findings.   >Medical:  No acute concerns. No consultations needed at this time. DC CIWA  >Social: milieu/structured therapy  >Treatment Interventions: Groups and Individual Therapy/CBT, Motivational counseling for substance abuse related issues.   >Dispo: pt put in 72 hour letter on 4/24 and will be discharged to inpatient rehab tomorrow on his letter

## 2025-04-24 NOTE — BH TREATMENT PLAN - NSTXSUICIDGOAL_PSY_ALL_CORE
Talk to staff and ask for assistance when having suicidal wishes instead of acting out
Will identify and utilize 2 coping skills

## 2025-04-24 NOTE — BH INPATIENT PSYCHIATRY PROGRESS NOTE - PRN MEDS
MEDICATIONS  (PRN):  aluminum hydroxide/magnesium hydroxide/simethicone Suspension 30 milliLiter(s) Oral every 6 hours PRN Dyspepsia  diphenhydrAMINE 25 milliGRAM(s) Oral at bedtime PRN insomnia  ibuprofen  Tablet. 400 milliGRAM(s) Oral every 6 hours PRN Mild Pain (1 - 3), Moderate Pain (4 - 6)  OLANZapine 5 milliGRAM(s) Oral every 6 hours PRN agitation  OLANZapine Injectable 5 milliGRAM(s) IntraMuscular once PRN agitation  
MEDICATIONS  (PRN):  aluminum hydroxide/magnesium hydroxide/simethicone Suspension 30 milliLiter(s) Oral every 6 hours PRN Dyspepsia  ibuprofen  Tablet. 400 milliGRAM(s) Oral every 6 hours PRN Mild Pain (1 - 3), Moderate Pain (4 - 6)  OLANZapine 5 milliGRAM(s) Oral every 6 hours PRN agitation  OLANZapine Injectable 5 milliGRAM(s) IntraMuscular once PRN agitation  
MEDICATIONS  (PRN):  aluminum hydroxide/magnesium hydroxide/simethicone Suspension 30 milliLiter(s) Oral every 6 hours PRN Dyspepsia  ibuprofen  Tablet. 400 milliGRAM(s) Oral every 6 hours PRN Mild Pain (1 - 3), Moderate Pain (4 - 6)  LORazepam     Tablet 2 milliGRAM(s) Oral every 2 hours PRN CIWA score increase by 2 points and current CIWA score GREATER THAN 9  OLANZapine 5 milliGRAM(s) Oral every 6 hours PRN agitation  OLANZapine Injectable 5 milliGRAM(s) IntraMuscular once PRN agitation  
MEDICATIONS  (PRN):  aluminum hydroxide/magnesium hydroxide/simethicone Suspension 30 milliLiter(s) Oral every 6 hours PRN Dyspepsia  diphenhydrAMINE 25 milliGRAM(s) Oral at bedtime PRN insomnia  ibuprofen  Tablet. 400 milliGRAM(s) Oral every 6 hours PRN Mild Pain (1 - 3), Moderate Pain (4 - 6)  OLANZapine 5 milliGRAM(s) Oral every 6 hours PRN agitation  OLANZapine Injectable 5 milliGRAM(s) IntraMuscular once PRN agitation  
MEDICATIONS  (PRN):  aluminum hydroxide/magnesium hydroxide/simethicone Suspension 30 milliLiter(s) Oral every 6 hours PRN Dyspepsia  ibuprofen  Tablet. 400 milliGRAM(s) Oral every 6 hours PRN Mild Pain (1 - 3), Moderate Pain (4 - 6)  LORazepam     Tablet 2 milliGRAM(s) Oral every 2 hours PRN CIWA score increase by 2 points and current CIWA score GREATER THAN 9  OLANZapine 5 milliGRAM(s) Oral every 6 hours PRN agitation  OLANZapine Injectable 5 milliGRAM(s) IntraMuscular once PRN agitation  
MEDICATIONS  (PRN):  aluminum hydroxide/magnesium hydroxide/simethicone Suspension 30 milliLiter(s) Oral every 6 hours PRN Dyspepsia  diphenhydrAMINE 25 milliGRAM(s) Oral at bedtime PRN insomnia  ibuprofen  Tablet. 400 milliGRAM(s) Oral every 6 hours PRN Mild Pain (1 - 3), Moderate Pain (4 - 6)  OLANZapine 5 milliGRAM(s) Oral every 6 hours PRN agitation  OLANZapine Injectable 5 milliGRAM(s) IntraMuscular once PRN agitation

## 2025-04-24 NOTE — BH INPATIENT PSYCHIATRY PROGRESS NOTE - NSICDXBHPRIMARYDX_PSY_ALL_CORE
Severe recurrent depression with psychosis   F33.3  

## 2025-04-24 NOTE — BH INPATIENT PSYCHIATRY DISCHARGE NOTE - LEGAL HISTORY
denies. no listed pending legal issues as per [facility] UNIFIED COURT SYSTEM/ WEBCRIMS SITE

## 2025-04-24 NOTE — BH INPATIENT PSYCHIATRY PROGRESS NOTE - CURRENT MEDICATION
MEDICATIONS  (STANDING):  aspirin  chewable 81 milliGRAM(s) Oral daily  folic acid 1 milliGRAM(s) Oral daily  influenza   Vaccine 0.5 milliLiter(s) IntraMuscular once  multivitamin 1 Tablet(s) Oral daily  risperiDONE   Tablet 1 milliGRAM(s) Oral at bedtime  thiamine 100 milliGRAM(s) Oral daily    MEDICATIONS  (PRN):  aluminum hydroxide/magnesium hydroxide/simethicone Suspension 30 milliLiter(s) Oral every 6 hours PRN Dyspepsia  ibuprofen  Tablet. 400 milliGRAM(s) Oral every 6 hours PRN Mild Pain (1 - 3), Moderate Pain (4 - 6)  LORazepam     Tablet 2 milliGRAM(s) Oral every 2 hours PRN CIWA score increase by 2 points and current CIWA score GREATER THAN 9  OLANZapine 5 milliGRAM(s) Oral every 6 hours PRN agitation  OLANZapine Injectable 5 milliGRAM(s) IntraMuscular once PRN agitation  
MEDICATIONS  (STANDING):  aspirin  chewable 81 milliGRAM(s) Oral daily  folic acid 1 milliGRAM(s) Oral daily  guaiFENesin Oral Liquid (Sugar-Free) 100 milliGRAM(s) Oral once  lurasidone 20 milliGRAM(s) Oral at bedtime  multivitamin 1 Tablet(s) Oral daily  thiamine 100 milliGRAM(s) Oral daily    MEDICATIONS  (PRN):  aluminum hydroxide/magnesium hydroxide/simethicone Suspension 30 milliLiter(s) Oral every 6 hours PRN Dyspepsia  ibuprofen  Tablet. 400 milliGRAM(s) Oral every 6 hours PRN Mild Pain (1 - 3), Moderate Pain (4 - 6)  OLANZapine 5 milliGRAM(s) Oral every 6 hours PRN agitation  OLANZapine Injectable 5 milliGRAM(s) IntraMuscular once PRN agitation  
MEDICATIONS  (STANDING):  aspirin  chewable 81 milliGRAM(s) Oral daily  folic acid 1 milliGRAM(s) Oral daily  guaiFENesin Oral Liquid (Sugar-Free) 100 milliGRAM(s) Oral once  lurasidone 20 milliGRAM(s) Oral at bedtime  multivitamin 1 Tablet(s) Oral daily  thiamine 100 milliGRAM(s) Oral daily    MEDICATIONS  (PRN):  aluminum hydroxide/magnesium hydroxide/simethicone Suspension 30 milliLiter(s) Oral every 6 hours PRN Dyspepsia  diphenhydrAMINE 25 milliGRAM(s) Oral at bedtime PRN insomnia  ibuprofen  Tablet. 400 milliGRAM(s) Oral every 6 hours PRN Mild Pain (1 - 3), Moderate Pain (4 - 6)  OLANZapine 5 milliGRAM(s) Oral every 6 hours PRN agitation  OLANZapine Injectable 5 milliGRAM(s) IntraMuscular once PRN agitation  
MEDICATIONS  (STANDING):  aspirin  chewable 81 milliGRAM(s) Oral daily  folic acid 1 milliGRAM(s) Oral daily  guaiFENesin Oral Liquid (Sugar-Free) 100 milliGRAM(s) Oral once  multivitamin 1 Tablet(s) Oral daily  risperiDONE   Tablet 1 milliGRAM(s) Oral at bedtime  thiamine 100 milliGRAM(s) Oral daily    MEDICATIONS  (PRN):  aluminum hydroxide/magnesium hydroxide/simethicone Suspension 30 milliLiter(s) Oral every 6 hours PRN Dyspepsia  ibuprofen  Tablet. 400 milliGRAM(s) Oral every 6 hours PRN Mild Pain (1 - 3), Moderate Pain (4 - 6)  LORazepam     Tablet 2 milliGRAM(s) Oral every 2 hours PRN CIWA score increase by 2 points and current CIWA score GREATER THAN 9  OLANZapine 5 milliGRAM(s) Oral every 6 hours PRN agitation  OLANZapine Injectable 5 milliGRAM(s) IntraMuscular once PRN agitation  
MEDICATIONS  (STANDING):  aspirin  chewable 81 milliGRAM(s) Oral daily  folic acid 1 milliGRAM(s) Oral daily  guaiFENesin Oral Liquid (Sugar-Free) 100 milliGRAM(s) Oral once  lurasidone 20 milliGRAM(s) Oral at bedtime  multivitamin 1 Tablet(s) Oral daily  thiamine 100 milliGRAM(s) Oral daily    MEDICATIONS  (PRN):  aluminum hydroxide/magnesium hydroxide/simethicone Suspension 30 milliLiter(s) Oral every 6 hours PRN Dyspepsia  diphenhydrAMINE 25 milliGRAM(s) Oral at bedtime PRN insomnia  ibuprofen  Tablet. 400 milliGRAM(s) Oral every 6 hours PRN Mild Pain (1 - 3), Moderate Pain (4 - 6)  OLANZapine 5 milliGRAM(s) Oral every 6 hours PRN agitation  OLANZapine Injectable 5 milliGRAM(s) IntraMuscular once PRN agitation  
MEDICATIONS  (STANDING):  aspirin  chewable 81 milliGRAM(s) Oral daily  folic acid 1 milliGRAM(s) Oral daily  guaiFENesin Oral Liquid (Sugar-Free) 100 milliGRAM(s) Oral once  lurasidone 20 milliGRAM(s) Oral at bedtime  multivitamin 1 Tablet(s) Oral daily  thiamine 100 milliGRAM(s) Oral daily    MEDICATIONS  (PRN):  aluminum hydroxide/magnesium hydroxide/simethicone Suspension 30 milliLiter(s) Oral every 6 hours PRN Dyspepsia  diphenhydrAMINE 25 milliGRAM(s) Oral at bedtime PRN insomnia  ibuprofen  Tablet. 400 milliGRAM(s) Oral every 6 hours PRN Mild Pain (1 - 3), Moderate Pain (4 - 6)  OLANZapine 5 milliGRAM(s) Oral every 6 hours PRN agitation  OLANZapine Injectable 5 milliGRAM(s) IntraMuscular once PRN agitation

## 2025-04-24 NOTE — BH INPATIENT PSYCHIATRY DISCHARGE NOTE - NSBHDCHANDOFFNOFT_PSY_A_CORE
Left message for call back from provider at [facility] ([phone]).  SW to fax discharge summary and can call Faith Mo NP at [phone] for handoff

## 2025-04-24 NOTE — BH INPATIENT PSYCHIATRY PROGRESS NOTE - NSICDXBHSECONDARYDX_PSY_ALL_CORE
Polysubstance abuse   F19.10  

## 2025-04-24 NOTE — BH INPATIENT PSYCHIATRY PROGRESS NOTE - NSBHATTESTBILLING_PSY_A_CORE
91081-Xhrfcrbcmh OBS or IP - moderate complexity OR 35-49 mins
59833-Zccxjpvohx OBS or IP - moderate complexity OR 35-49 mins
37211-Ceakvwpsbg OBS or IP - moderate complexity OR 35-49 mins
55060-Gzbjplinzx OBS or IP - moderate complexity OR 35-49 mins
67199-Lwjigczdqb OBS or IP - moderate complexity OR 35-49 mins
10120-Sgchcsdrzn OBS or IP - moderate complexity OR 35-49 mins

## 2025-04-24 NOTE — BH DISCHARGE NOTE NURSING/SOCIAL WORK/PSYCH REHAB - NSDCPRGOAL_PSY_ALL_CORE
Writer met with patient to discuss their overall progress during their hospital stay. During the meeting patient was dressed appropriately, engaged and pleasant. Patient reported he is feeling much better, no longer has SI and was excited because he heard from his daughter this morning. On the unit patient was visible in the milieu, selectively social with their peers and maintained their behavioral control. During patients stay on the unit, they have shown fair progress towards their goal. Patient was able to identify talking to their daughter and working out as two coping skills to help with his SI. Throughout patients stay on the unit he attended a minimal number of groups. Patient completed a safety plan before discharge. Overall writer has seen improvements in patient.

## 2025-04-24 NOTE — BH INPATIENT PSYCHIATRY DISCHARGE NOTE - HOSPITAL COURSE
On the unit, patient presented similar to previous hospitalization due to relapsing on ETOH and crack cocaine.  He presented as depressed with CAH to harm himself and others and SI with plan to jump off a building.  Pt did not appear internally preoccupied.  He was started on Risperdal with goal of MCKEON, but he refused it most days due to it making him feel tired.  Pt agreed to restart Latuda 20mg PO at bedtime and was intermittently compliant, but his symptoms resolved after a few days even when he did not take medication.  Pt reported he no longer wanted to use ETOH or other substance and he wanted to go to inpatient rehab.  Pt put in a 72 hour letter requesting discharge and was accepted to inpatient rehab and discharged there on his letter.    Although patient was admitted due to risk factors for violence/suicide including SI, and psychotic and mood sx, the patient’s risk for suicide/violence was mitigated during his hospitalization and his protective factors outweigh his risk factors at this time.  Pt is currently at low acute risk for suicide/violence.  Patient’s protective factors include:  no current SI/HI/I/P, willingness to engage in treatment at inpatient rehab, motivation to abstain from substances, responsibility to family, care coordination in the community, no hx of aggression, and no current acute depressive, psychotic or manic sx.  Although the patient is at chronic risk for violence/suicide given his hx of self aborted suicide attempts, hx of psychiatric hospitalizations, chronic medical hx, male gender, hx of psychiatric illness and hx of substance abuse, this risk cannot be ameliorated by continued inpatient treatment.  The patient no longer met the criteria for psychiatric hospitalization at discharge.

## 2025-04-24 NOTE — BH INPATIENT PSYCHIATRY DISCHARGE NOTE - NSBHFUPINTERVALHXFT_PSY_A_CORE
Pt refused medication last night.  Chart reviewed and case discussed with treatment team.  No events reported overnight.  Pt denies SI/HI/I/P or AH/VH or paranoia.  Pt denies feeling depressed.  Pt slept intermittently and is eating well.  Pt continues to agrees to go to inpatient rehab and he hopes he does well this time.  Pt reports he spoke to his daughter this AM and it went well.

## 2025-04-24 NOTE — BH DISCHARGE NOTE NURSING/SOCIAL WORK/PSYCH REHAB - DISCHARGE INSTRUCTIONS AFTERCARE APPOINTMENTS
In order to check the location, date, or time of your aftercare appointment, please refer to your Discharge Instructions Document given to you upon leaving the hospital.  If you have lost the instructions please call [phone]

## 2025-04-24 NOTE — BH INPATIENT PSYCHIATRY DISCHARGE NOTE - NSBHMSEAFFCONG_PSY_A_CORE
SUSIE Reid: I spoke with Pt health proxy, son Zac Joshi, discussed full plan and work up that we wanted to proceed with in the ED. Son agrees with entire plan, if Ct + SBO, Zac would like call back to discuss treatment plan before any procedures are performed.
Congruent

## 2025-04-24 NOTE — BH INPATIENT PSYCHIATRY PROGRESS NOTE - NSBHFUPINTERVALCCFT_PSY_A_CORE
Patient seen for follow up for mood and AH
Patient seen for follow up for mood and AH.
Patient seen for follow up for mood and AH
Patient seen for follow up for mood and AH
Patient seen for follow up for mood and AH.
Patient seen for follow up for mood and AH

## 2025-04-24 NOTE — BH INPATIENT PSYCHIATRY PROGRESS NOTE - NSBHCHARTREVIEWVS_PSY_A_CORE FT
Vital Signs Last 24 Hrs  T(C): 36.8 (04-22-25 @ 08:08), Max: 36.8 (04-22-25 @ 08:08)  T(F): 98.3 (04-22-25 @ 08:08), Max: 98.3 (04-22-25 @ 08:08)  HR: --  BP: --  BP(mean): --  RR: --  SpO2: --    Orthostatic VS  04-22-25 @ 08:08  Lying BP: --/-- HR: --  Sitting BP: 136/88 HR: 92  Standing BP: --/-- HR: --  Site: --  Mode: --  Orthostatic VS  04-20-25 @ 20:18  Lying BP: --/-- HR: --  Sitting BP: 110/88 HR: 95  Standing BP: 119/63 HR: 70  Site: --  Mode: --  
Vital Signs Last 24 Hrs  T(C): 36.3 (04-21-25 @ 07:29), Max: 36.3 (04-21-25 @ 07:29)  T(F): 97.3 (04-21-25 @ 07:29), Max: 97.3 (04-21-25 @ 07:29)  HR: --  BP: 100/66 (04-21-25 @ 07:29) (100/66 - 100/66)  BP(mean): 61 (04-21-25 @ 07:29) (61 - 61)  RR: --  SpO2: --    Orthostatic VS  04-20-25 @ 20:18  Lying BP: --/-- HR: --  Sitting BP: 110/88 HR: 95  Standing BP: 119/63 HR: 70  Site: --  Mode: --  Orthostatic VS  04-20-25 @ 08:30  Lying BP: --/-- HR: --  Sitting BP: 130/80 HR: 70  Standing BP: --/-- HR: --  Site: --  Mode: --  Orthostatic VS  04-20-25 @ 07:51  Lying BP: --/-- HR: --  Sitting BP: 135/115 HR: 69  Standing BP: --/-- HR: --  Site: --  Mode: --  
Vital Signs Last 24 Hrs  T(C): 36.7 (04-24-25 @ 07:42), Max: 36.7 (04-24-25 @ 07:42)  T(F): 98.1 (04-24-25 @ 07:42), Max: 98.1 (04-24-25 @ 07:42)  HR: --  BP: --  BP(mean): --  RR: --  SpO2: --    Orthostatic VS  04-24-25 @ 07:42  Lying BP: --/-- HR: --  Sitting BP: 120/83 HR: 91  Standing BP: --/-- HR: --  Site: --  Mode: --  Orthostatic VS  04-23-25 @ 07:39  Lying BP: --/-- HR: --  Sitting BP: 111/83 HR: 92  Standing BP: --/-- HR: --  Site: --  Mode: --  
Vital Signs Last 24 Hrs  T(C): 36.4 (04-23-25 @ 07:39), Max: 36.4 (04-23-25 @ 07:39)  T(F): 97.6 (04-23-25 @ 07:39), Max: 97.6 (04-23-25 @ 07:39)  HR: --  BP: --  BP(mean): --  RR: --  SpO2: --    Orthostatic VS  04-23-25 @ 07:39  Lying BP: --/-- HR: --  Sitting BP: 111/83 HR: 92  Standing BP: --/-- HR: --  Site: --  Mode: --  Orthostatic VS  04-22-25 @ 08:08  Lying BP: --/-- HR: --  Sitting BP: 136/88 HR: 92  Standing BP: --/-- HR: --  Site: --  Mode: --  
Vital Signs Last 24 Hrs  T(C): 36.7 (04-19-25 @ 08:35), Max: 36.7 (04-19-25 @ 08:35)  T(F): 98.1 (04-19-25 @ 08:35), Max: 98.1 (04-19-25 @ 08:35)  HR: --  BP: --  BP(mean): --  RR: --  SpO2: --    Orthostatic VS  04-19-25 @ 08:35  Lying BP: --/-- HR: --  Sitting BP: 119/87 HR: 73  Standing BP: --/-- HR: --  Site: --  Mode: --  Orthostatic VS  04-18-25 @ 20:06  Lying BP: --/-- HR: --  Sitting BP: 138/88 HR: 62  Standing BP: 129/95 HR: 78  Site: upper left arm  Mode: --  
Vital Signs Last 24 Hrs  T(C): 36.8 (04-20-25 @ 07:51), Max: 36.8 (04-20-25 @ 07:51)  T(F): 98.2 (04-20-25 @ 07:51), Max: 98.2 (04-20-25 @ 07:51)  HR: --  BP: --  BP(mean): --  RR: --  SpO2: --    Orthostatic VS  04-20-25 @ 20:18  Lying BP: --/-- HR: --  Sitting BP: 110/88 HR: 95  Standing BP: 119/63 HR: 70  Site: --  Mode: --  Orthostatic VS  04-20-25 @ 08:30  Lying BP: --/-- HR: --  Sitting BP: 130/80 HR: 70  Standing BP: --/-- HR: --  Site: --  Mode: --  Orthostatic VS  04-20-25 @ 07:51  Lying BP: --/-- HR: --  Sitting BP: 135/115 HR: 69  Standing BP: --/-- HR: --  Site: --  Mode: --  Orthostatic VS  04-19-25 @ 08:35  Lying BP: --/-- HR: --  Sitting BP: 119/87 HR: 73  Standing BP: --/-- HR: --  Site: --  Mode: --

## 2025-04-24 NOTE — BH INPATIENT PSYCHIATRY PROGRESS NOTE - NSBHMSEPERCEPT_PSY_A_CORE
No abnormalities
No abnormalities/Other
No abnormalities
No abnormalities/Other
No abnormalities
No abnormalities

## 2025-04-24 NOTE — BH INPATIENT PSYCHIATRY DISCHARGE NOTE - ATTENDING DISCHARGE PHYSICAL EXAMINATION:
Patient is seen and evaluated, case is discussed with treatment team, agree with assessment and plan as stated.  Pt denies SI, no intent or plan, denies HI, no AH/VH, no acute risk to self or others at this time.

## 2025-04-24 NOTE — BH DISCHARGE NOTE NURSING/SOCIAL WORK/PSYCH REHAB - FACILITY ADDRESS
[address] [facility] [address] [place], [place]  [phone]

## 2025-04-24 NOTE — BH DISCHARGE NOTE NURSING/SOCIAL WORK/PSYCH REHAB - NSCDUDCCRISIS_PSY_A_CORE
[facility]  1 [phone]) [facility] ([phone])  Text "WELL" to 07127  Website: www.[employer]/.National Suicide Prevention Lifeline 0 (798) 180-6197/.  [facility]  [phone] [place], [place] [address] [phone]   Hours:  Monday through Friday from 9 AM to 3 PM/988 Suicide and Crisis Lifeline

## 2025-04-24 NOTE — BH INPATIENT PSYCHIATRY PROGRESS NOTE - NSTXDEPRESPROGRES_PSY_ALL_CORE
Met - goal discontinued
No Change
Improving
Met - goal discontinued
Met - goal discontinued
No Change

## 2025-04-24 NOTE — BH INPATIENT PSYCHIATRY DISCHARGE NOTE - NSBHMETABOLIC_PSY_ALL_CORE_FT
BMI: BMI (kg/m2): 37.3 (04-18-25 @ 02:53)  HbA1c: A1C with Estimated Average Glucose Result: 5.4 % (12-27-24 @ 05:30)    Glucose: POCT Blood Glucose.: 109 mg/dL (04-10-25 @ 20:22)    BP: --Vital Signs Last 24 Hrs  T(C): 36.7 (04-24-25 @ 07:42), Max: 36.7 (04-24-25 @ 07:42)  T(F): 98.1 (04-24-25 @ 07:42), Max: 98.1 (04-24-25 @ 07:42)  HR: --  BP: --  BP(mean): --  RR: --  SpO2: --    Orthostatic VS  04-24-25 @ 07:42  Lying BP: --/-- HR: --  Sitting BP: 120/83 HR: 91  Standing BP: --/-- HR: --  Site: --  Mode: --  Orthostatic VS  04-23-25 @ 07:39  Lying BP: --/-- HR: --  Sitting BP: 111/83 HR: 92  Standing BP: --/-- HR: --  Site: --  Mode: --    Lipid Panel: Date/Time: 04-19-25 @ 11:41  Cholesterol, Serum: 143  LDL Cholesterol Calculated: 68  HDL Cholesterol, Serum: 54  Total Cholesterol/HDL Ration Measurement: --  Triglycerides, Serum: 116

## 2025-04-24 NOTE — BH TREATMENT PLAN - NSTXSUICIDINTERPR_PSY_ALL_CORE
Psychiatric Rehabilitation staff met with patient to help them establish a goal. Patients goal is to identify and utilize 2 coping skills.

## 2025-04-24 NOTE — BH INPATIENT PSYCHIATRY DISCHARGE NOTE - HPI (INCLUDE ILLNESS QUALITY, SEVERITY, DURATION, TIMING, CONTEXT, MODIFYING FACTORS, ASSOCIATED SIGNS AND SYMPTOMS)
Per [facility] ED assessment, "59 yr old male, single, undomiciled and unemployed.  self reported hx of bipolar disorder and SCZ;  as per PSYCKES, has the following Behavioral Health Diagnoses to include Unspecified/Other Bipolar, substance-Induced Depressive Disorder, Major Depressive Disorder, Unspecified/Other Depressive Disorder,  Borderline Personality Disorder, Antisocial Personality Disorder, Unspecified/Other Anxiety Disorder, Bipolar I, Bipolar II, Schizoaffective Disorder, Substance-Induced Sleep Disorder, Adjustment Disorder, Schizophrenia, Substance-Induced Psychotic Disorder, Other Mental Disorders Unspecified/Other Psychotic Disorders,  Attention Deficit Hyperactivity Disorder, Insomnia Disorder  and Substance-Induced Anxiety Disorder.. Pt is high utilizer of mental health facilities + is chronically non adherent to meds and psych appts; has multiple ED visits and psych admissions ( last [facility] 3/2-3/13/2025) for depression + SI.  past documented hx for suspicion of malingering on pior visits including this latest to [facility]  admission,  as per chart review, there is documented hx of "several prior self-aborted suicide attempts with plan to jump in front of train in 2023 but was, "stopped by an emre",  Pt has hx of polysubstance use (as per PSYCKES: with hx of Cannabis related disorders/ Alcohol related disorders/ Cocaine related disorders/ Other psychoactive substance related disorders/ Opioid related disorders/ Tobacco related disorder/  Other stimulant related disorders and Sedative, hypnotic, or anxiolytic related disorders).  Pt has had hx of numerous prior detox/rehabs (previous [facility] admission in 12/2023).  He has hx of multiple ED visits reporting chest pain and/or SI and/or command AH to kill himself,. pertinent medical issues include: HTN, pericarditis, Chronic CHF and GERD.  Pt admits he is not on any maintenance meds.  today, self presented to the ED (once again), complaining of depression, anxiety, "AH" and SI with plan to jump off a roof top.     Patient seen in private room.  He was calm and cooperative throughout interview. Patient reports he was drinking with a female friend on the roof top of a building in [place] last night with intentions of jumping off once his friends left. Patient reports he was intoxicated and fell asleep before his friend left and woke up this morning. Patient again wanted to jump off the roof and took a bus to [facility] seeking admission. Patient reports since his discharge from [facility] he has not taken any medical or psychiatric medications. He has been living in hotels and smoking crack and drinking several days a week. Patient reports feeling hopeless and helpless and fears reports he will attempt suicide, by jumping or shooting self, if he is not admitted to the hospital. Patient reports he is not sleeping, has poor appetite, low energy and guilt. He also endorses intermittently hearing the voice of his mother when he is alone and not intoxicated."    On the unit, patient reports he started drinking ETOH again the day he was released from the hospital due to urges and stress.  Pt reports he has been drinking a couple pints of Bryon daily.  Pt reports his last drink was Wednesday.  Pt reports also using cocaine occasionally 1-2x/month.  Pt reports he was staying in a hotel since discharge.  Pt denies psychiatric hospitalizations since being discharged from [facility].  Pt reports CAH telling him to kill himself and others.  Pt reports SI with no plan in the hospital, but reports he had thoughts to jump the roof of where he was staying prior to admission.  Pt denies HI/I/P or VH or paranoia.  Pt reports poor sleep the past 2 days.  Pt denies changes in appetite.  Pt reports depressed mood.  Pt reports his daughter answered the phone on her birthday, but has not picked up for him since then.  Pt reports he wants to stop drinking and get his life together for his daughter.  Pt reports he will think about taking Naltrexone, but declining at this time.  Pt agreeable to start Risperdal with goal of MCKEON.

## 2025-04-24 NOTE — BH INPATIENT PSYCHIATRY PROGRESS NOTE - NSBHFUPINTERVALHXFT_PSY_A_CORE
Pt refused Risperdal last night.  Chart reviewed and case discussed with treatment team.  No events reported overnight.  Pt reports he is no longer having SI/I/P and that he does not want to shoot himself.  Pt reports he was intoxicated when he said that.  Pt reports he spoke to his daughter yesterday and they had a good conversation.  Pt denies HI/I/P or AH/VH or paranoia.  Pt eating and sleeping well.  Pt reports when he tried the Risperdal this past weekend, he slept all day.  Pt agreeable to restart Latuda.  Pt reports he does not want to get high anymore as it does nothing for him.  Pt reports he wants to go to rehab or respite in the [place].  Pt reports when he was in respite last, he did not drink or use substances.  Pt declining Naltrexone at this time.    
Chart reviewed and case discussed with treatment team. No events reported overnight. Sleep and appetite is fair.  Patient refusing Risperdal. Patient stated that he is looking for respite and denies any AVH or SI/HI.   
Chart reviewed and case discussed with treatment team. No events reported overnight. Sleep and appetite is fair. Patient endorses improved mood after speaking with his daughter, denies any SI/HI or AH.  Patient is compliant with medications, no adverse effects reported.    
Pt compliant with medication and tolerating it well.  Chart reviewed and case discussed with treatment team.  No events reported overnight.  Pt denies feeling depressed.  Pt denies SI/HI/I/P or AH/VH or paranoia.  Pt reports poor sleep and requests Benadryl PO PRN.  Pt reports he feels he would be better off [place] of [place]  [place]  Pt continues to decline Naltrexone.  
Pt compliant with medication and tolerating it well.  Chart reviewed and case discussed with treatment team.  No events reported overnight.  Pt denies SI/HI/I/P or AH/VH or paranoia.  Pt eating well and reports he slept better last night.  Pt denies feeling depressed.  Pt continues to want inpatient rehab.
Pt refused medication last night.  Chart reviewed and case discussed with treatment team.  No events reported overnight.  Pt denies SI/HI/I/P or AH/VH or paranoia.  Pt denies feeling depressed.  Pt sleeping intermittently and eating well.  Pt put in 72 hour letter requesting discharge.  Pt agrees to go to inpatient rehab tomorrow.

## 2025-04-24 NOTE — BH INPATIENT PSYCHIATRY DISCHARGE NOTE - NSBHASSESSSUMMFT_PSY_ALL_CORE
59 yr old male, single, undomiciled and unemployed.  self reported hx of bipolar disorder and SCZ;  as per PSYCKES, has the following Behavioral Health Diagnoses to include Unspecified/Other Bipolar, substance-Induced Depressive Disorder, Major Depressive Disorder, Unspecified/Other Depressive Disorder,  Borderline Personality Disorder, Antisocial Personality Disorder, Unspecified/Other Anxiety Disorder, Bipolar I, Bipolar II, Schizoaffective Disorder, Substance-Induced Sleep Disorder, Adjustment Disorder, Schizophrenia, Substance-Induced Psychotic Disorder, Other Mental Disorders Unspecified/Other Psychotic Disorders,  Attention Deficit Hyperactivity Disorder, Insomnia Disorder  and Substance-Induced Anxiety Disorder.. Pt is high utilizer of mental health facilities + is chronically non adherent to meds and psych appts; has multiple ED visits and psych admissions ( last [facility] 3/2-3/13/2025) for depression + SI.  past documented hx for suspicion of malingering on pior visits including this latest to [facility]  admission,  as per chart review, there is documented hx of "several prior self-aborted suicide attempts with plan to jump in front of train in 2023 but was, "stopped by an emre",  Pt has hx of polysubstance use (as per PSYCKES: with hx of Cannabis related disorders/ Alcohol related disorders/ Cocaine related disorders/ Other psychoactive substance related disorders/ Opioid related disorders/ Tobacco related disorder/  Other stimulant related disorders and Sedative, hypnotic, or anxiolytic related disorders).  Pt has had hx of numerous prior detox/rehabs (previous [facility] admission in 12/2023).  He has hx of multiple ED visits reporting chest pain and/or SI and/or command AH to kill himself,. pertinent medical issues include: HTN, pericarditis, Chronic CHF and GERD.  Pt admits he is not on any maintenance meds.  today, self presented to the ED (once again), complaining of depression, anxiety, "AH" and SI with plan to jump off a roof top.       PLAN:  >Psychiatric Meds:  Latuda 20mg PO at bedtime (psychosis/mood)    >Dispo: pt put in 72 hour letter on 4/24 and will be discharged to inpatient rehab as he no longer presents as a danger to himself or others

## 2025-04-24 NOTE — BH SAFETY PLAN - LOCAL URGENT CARE NAME
Discharge Summary  [facility]    Patient Name Lopez Adame   MRN: [id number]   YOB: 1958       Admit date: 10/13/2024   Discharge date:  10/14/2024       Disposition:                   Home    Admitting Physician: Hector Jimenez DO   Primary care provider: Geoffrey Sanchez MD  Discharge Physician: David Odell MD    This is a tele hospitalist visit using zoom technology and electronic stethoscope with the help of staff at the bedside    This visit is being performed virtually via Non-integrated Video Visit. Consent to treat includes permission to submit charges to the patient's insurance. It was shared that without being seen and evaluated in person, there is a risk that the information and/or assessment may be incomplete or inaccurate. This video visit may be discontinued by patient or clinician, if it is felt that the videoconferencing connections are not adequate for his situation.   Clinical Location: [facility]  Lopez's location [facility] and is physically present in   the [facility] at the time of this visit.      Primary Discharge Diagnoses:  Community-acquired pneumonia  Present on admission  COPD exacerbation  Acute hypoxemic respiratory failure    Principal Problem:    Sepsis, due to unspecified organism, unspecified whether acute organ dysfunction present  (CMD)  Resolved Problems:    * No resolved hospital problems. *      Past Medical History:    Allergy                                                         Comment: seasonal    Arthritis                                                       Comment: Right shoulder    Colon tumor                                     2/19/2018     Bilateral inguinal hernia                       2/19/2018       Comment: CT 2-19-18    Diverticulosis of large intestine               2/20/2018     Anxiety reaction                                2/22/2018     C.  difficile colitis                            06/14/2019      Comment: + pcr and + toxin eia + pcr 6/4/19, - toxin eia               4/25/19    Secondary malignant neoplasm of liver  (CMD)    2/19/2018     Left cataract                                   06/13/2017    SOB (shortness of breath)                       02/19/2018    Pneumonia                                       02/19/2018    Rectal carcinoma  (CMD)                         02/27/2018    Colorectal cancer, stage IV  (CMD)              03/01/2018    Port-A-Cath in place                            03/06/2018      Comment: left chest    CHF (congestive heart failure)  (CMD)           03/07/2018    Antineoplastic chemotherapy induced anemia      03/27/2018    Recurrent Clostridium difficile diarrhea        11/13/2018    Influenza A                                     04/06/2019    Recurrent colitis due to Clostridium difficile  04/25/2019    Consultations:  IP CONSULT TO SMOKING CESSATION PROGRAM  IP CONSULT TO SOCIAL WORK    Hospital Course:  Patient came to emergency department with a complaint of shortness of breath associated with cough which was productive of whitish sputum and prior history of COPD of 4 days duration.  He was diagnosed with pneumonia and sepsis.  He received treatment with ceftriaxone and azithromycin.  Patient reported feeling significantly well after being admitted.  Overnight he did well.  This morning patient has been ambulating in the hallway without difficulty.  He reports his cough is improving.  He denied fever.  He is tolerating oral intake.  He denies shortness of breath.  Denies chest pain.  He wishes to discharge.    Discussed discharge plan with patient and his significant other who was in the room.  Patient will be treated with Ceftin.  Advised him to follow-up with his primary care physician.  Prednisone ordered.  His leukocytosis is reactive steroid use.    I have advised patient to follow-up with his primary care  physician and seek immediate medical attention with any concerning symptoms    Code status: Selective Treatment/DNR    Discharge Labs:  Recent Labs   Lab 10/14/24  1020 10/13/24  0842 10/13/24  0325   SODIUM  --  138 136   POTASSIUM  --  3.9 3.8   CHLORIDE  --  103 99   CO2  --  23 23   BUN  --  6 6   CREATININE  --  0.79 0.83   GLUCOSE  --  182* 124*   WBC 22.6* 18.3* 20.0*   HGB 13.8 14.3 14.5   HCT 40.5 41.5 42.4    216 271   PT  --   --  10.9   INR  --   --  1.0   PTT  --   --  28       Microbiology Results       None            Significant Diagnostic Studies and Procedures:  XR CHEST PA OR AP 1 VIEW    Result Date: 10/13/2024  Narrative: EXAM:  XR CHEST AP OR PA CLINICAL HISTORY: Fever COMPARISON: CT chest dated July 9, 2024. FINDINGS:  Mild left basilar hazy/streaky opacities. No sizable pleural effusions or demonstrable pneumothorax. Cardiac size is normal. No acute osseous findings.     Impression: IMPRESSION: 1.  Mild left basilar hazy opacities may represent atelectasis and/or pneumonitis. Follow-up to resolution is advised. Electronically Signed by: Shai Murguia DO Signed on: 10/13/2024 3:36 AM Created on Workstation ID: [id number] Signed on Workstation ID: [id number]      Pending  Results:          Discharge Exam:    Blood pressure 120/73, pulse 69, temperature 96.6 °F (35.9 °C), temperature source Temporal, resp. rate 17, height 5' 10\" (1.778 m), weight 89 kg (196 lb 3.4 oz), SpO2 95%.    General - Patient is alert, oriented and in no acute distress.     Coronary - Regular rate and rhythm without murmurs, rubs or gallops.   Pulmonary - Normal respiratory effort.  Lungs are clear to auscultation bilaterally without wheezes rubs or rhonchi.   Neurologic - Alert and oriented to person, place and time.  No facial droop, no speech arthralgia  noted.  Sensory and motor seems intact.      Patient Discharge Instructions:   1. Activity: activity as tolerated  2. Diet: Regular Diet  3. Wound Care:   Wound  Anus Incision (Active)   Date First Assessed/Time First Assessed: 05/19/20 1304   Present on Hospital Admission: No  Location: Anus  Primary Wound Type: Incision      Assessments 5/19/2020  1:35 AM 5/19/2020  2:20 PM   Dressing Assessment Clean;Dry;Intact Clean;Dry;Intact   Wound Exudate None None   Wound Dressing Preethi pad Preethi pad   Wound Protection Mesh underwear Mesh underwear       No associated orders.       Wound Chest Left Anterior Incision (Active)   Date First Assessed/Time First Assessed: 06/24/20 1308   Present on Hospital Admission: No  Location: Chest  Laterality: Left  Modifier: Anterior  Primary Wound Type: Incision      Assessments 6/24/2020  1:19 PM 6/24/2020  1:35 PM   Dressing Assessment Intact;Dry;Clean Clean;Dry;Intact   Dressing Activity Applied --   Wound Edge Glued;Steri-strips;Sutured;Approximated Glued;Steri-strips;Sutured;Approximated   Wound Dressing Gauze;Transparent dressing Gauze;Transparent dressing       No associated orders.       Wound Shoulder Left Incision (Active)   Date First Assessed: 09/25/20   Present on Hospital Admission: No  Location: Shoulder  Laterality: Left  Primary Wound Type: Incision      Assessments 9/25/2020 11:42 AM 9/25/2020  2:10 PM   Dressing Assessment -- Clean;Dry;Intact   Dressing Activity Applied --   Wound Exudate -- None   Wound Edge Approximated;Sutured --   Wound Dressing Gauze;ABD dressing;Adaptic;Tape-fabric ABD dressing;Tape-fabric       No associated orders.        4. Discharge Medications:    ALLERGIES:  Hydrocodone, Adhesive   (environmental), and Strawberry   (food or med)     Follow-up:  Geoffrey Sanchez MD  [address] [place] [address] [address] [place] [address]  [phone]    Go on 10/28/2024  Appointment with Dr. Geoffrey Sanchez ([phone]) Monday, October 28  @ 2:30 p.m. for [facility] follow up.    Future Appointments   Date Time Provider Department Center   10/17/2024 11:00 AM SBC CT SBCCT [facility]   10/28/2024  2:30 PM Geoffrey Sanchez MD  [facility]   11/19/2024  1:00 PM Zo Olmos PA-C ABCCESUR1 ABCCE   4/3/2025  9:30 AM SB ACL LAB ACLSHE [facility]   4/10/2025  2:30 PM Geoffrey Sanchez MD [facility]   9/15/2025  1:00 PM AHCS ECHO 1 AHCSCARD [facility]     Time spent on discharge was >30 minutes.  Discharge discussed with  SO    Signed:  David Odell MD  10/14/2024  1:42 PM     [facility]

## 2025-04-24 NOTE — BH TREATMENT PLAN - NSTXDCOPLKINTERSW_PSY_ALL_CORE
SW will make appropriate OP MIN[facility] referrals to increase likelihood of psychiatric stability in the community.

## 2025-04-24 NOTE — BH INPATIENT PSYCHIATRY DISCHARGE NOTE - NSBHFUPINTERVALCCFT_PSY_A_CORE
Discharge Progress Note Date and Time: 04-25-25 @ 09:14  Patient seen for follow up for mood and AH

## 2025-04-24 NOTE — BH INPATIENT PSYCHIATRY DISCHARGE NOTE - OTHER PAST PSYCHIATRIC HISTORY (INCLUDE DETAILS REGARDING ONSET, COURSE OF ILLNESS, INPATIENT/OUTPATIENT TREATMENT)
self reported hx of depression + anxiety    high mental health utilizer including numerous Psych ED/ CPEP visits for SI, alcohol intoxication    chronically nonadherent with meds/ admitted to being inconsistent on his out-patient psychiatric care.    stated past hx of allegedly self aborting jumping in front of a 7 train over the summer ("stopped by an emre" (2023), also reported past hx of self aborting jumping in front of a train in 7/ 2024    multiple psych admissions including recent discharge [facility] ( 3/2-3/19/2025) and  [facility], 12/3 - 12/13/2024 for mgt of depression and SI in the context of psychosocial stressors (a diagnoses of Unspecified mood [affective] disorder; and Polysubstance abuse were made as well as entertaining Malingering as part of the diagnosis.  whilst Pt currently claimed that Latuda "helped" towards affording mood stability, of note during that [facility] admission, the Pt later on refused standing Latuda; he was  discharged to shelter

## 2025-04-24 NOTE — BH INPATIENT PSYCHIATRY DISCHARGE NOTE - NSDCMRMEDTOKEN_GEN_ALL_CORE_FT
aspirin 81 mg oral tablet, chewable: 1 tab(s) orally once a day  lurasidone 20 mg oral tablet: 1 tab(s) orally once a day (at bedtime)  Multiple Vitamins oral tablet: 1 tab(s) orally once a day

## 2025-04-24 NOTE — BH INPATIENT PSYCHIATRY PROGRESS NOTE - NSBHATTESTTYPEVISIT_PSY_A_CORE
On-site Attending supervising EMMIE (99XXX codes)
EMMIE without on-site Attending supervision
On-site Attending supervising EMMIE (99XXX codes)
On-site Attending supervising EMMIE (99XXX codes)
EMMIE without on-site Attending supervision
On-site Attending supervising EMMIE (99XXX codes)
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
## 2025-04-24 NOTE — BH INPATIENT PSYCHIATRY DISCHARGE NOTE - NSDCCPCAREPLAN_GEN_ALL_CORE_FT
PRINCIPAL DISCHARGE DIAGNOSIS  Diagnosis: Depression  Assessment and Plan of Treatment:       SECONDARY DISCHARGE DIAGNOSES  Diagnosis: Anxiety  Assessment and Plan of Treatment:     Diagnosis: Psychosis  Assessment and Plan of Treatment:     Diagnosis: Polysubstance abuse  Assessment and Plan of Treatment:

## 2025-04-24 NOTE — BH DISCHARGE NOTE NURSING/SOCIAL WORK/PSYCH REHAB - FINANCIAL ASSISTANCE
[facility] provides services at a reduced cost to those who are determined to be eligible through [facility]’s financial assistance program. Information regarding [facility]’s financial assistance program can be found by going to [email or web address] or by calling [phone].

## 2025-04-24 NOTE — BH INPATIENT PSYCHIATRY PROGRESS NOTE - NSBHMETABOLIC_PSY_ALL_CORE_FT
BMI: BMI (kg/m2): 37.3 (04-18-25 @ 02:53)  HbA1c: A1C with Estimated Average Glucose Result: 5.4 % (12-27-24 @ 05:30)    Glucose: POCT Blood Glucose.: 109 mg/dL (04-10-25 @ 20:22)    BP: 100/66 (04-21-25 @ 07:29) (100/66 - 100/66)Vital Signs Last 24 Hrs  T(C): 36.3 (04-21-25 @ 07:29), Max: 36.3 (04-21-25 @ 07:29)  T(F): 97.3 (04-21-25 @ 07:29), Max: 97.3 (04-21-25 @ 07:29)  HR: --  BP: 100/66 (04-21-25 @ 07:29) (100/66 - 100/66)  BP(mean): 61 (04-21-25 @ 07:29) (61 - 61)  RR: --  SpO2: --    Orthostatic VS  04-20-25 @ 20:18  Lying BP: --/-- HR: --  Sitting BP: 110/88 HR: 95  Standing BP: 119/63 HR: 70  Site: --  Mode: --  Orthostatic VS  04-20-25 @ 08:30  Lying BP: --/-- HR: --  Sitting BP: 130/80 HR: 70  Standing BP: --/-- HR: --  Site: --  Mode: --  Orthostatic VS  04-20-25 @ 07:51  Lying BP: --/-- HR: --  Sitting BP: 135/115 HR: 69  Standing BP: --/-- HR: --  Site: --  Mode: --    Lipid Panel: Date/Time: 04-19-25 @ 11:41  Cholesterol, Serum: 143  LDL Cholesterol Calculated: 68  HDL Cholesterol, Serum: 54  Total Cholesterol/HDL Ration Measurement: --  Triglycerides, Serum: 116  
BMI: BMI (kg/m2): 37.3 (04-18-25 @ 02:53)  HbA1c: A1C with Estimated Average Glucose Result: 5.4 % (12-27-24 @ 05:30)    Glucose: POCT Blood Glucose.: 109 mg/dL (04-10-25 @ 20:22)    BP: --Vital Signs Last 24 Hrs  T(C): 36.7 (04-24-25 @ 07:42), Max: 36.7 (04-24-25 @ 07:42)  T(F): 98.1 (04-24-25 @ 07:42), Max: 98.1 (04-24-25 @ 07:42)  HR: --  BP: --  BP(mean): --  RR: --  SpO2: --    Orthostatic VS  04-24-25 @ 07:42  Lying BP: --/-- HR: --  Sitting BP: 120/83 HR: 91  Standing BP: --/-- HR: --  Site: --  Mode: --  Orthostatic VS  04-23-25 @ 07:39  Lying BP: --/-- HR: --  Sitting BP: 111/83 HR: 92  Standing BP: --/-- HR: --  Site: --  Mode: --    Lipid Panel: Date/Time: 04-19-25 @ 11:41  Cholesterol, Serum: 143  LDL Cholesterol Calculated: 68  HDL Cholesterol, Serum: 54  Total Cholesterol/HDL Ration Measurement: --  Triglycerides, Serum: 116  
BMI: BMI (kg/m2): 37.3 (04-18-25 @ 02:53)  HbA1c: A1C with Estimated Average Glucose Result: 5.4 % (12-27-24 @ 05:30)    Glucose: POCT Blood Glucose.: 109 mg/dL (04-10-25 @ 20:22)    BP: 114/68 (04-18-25 @ 00:45) (112/74 - 126/87)Vital Signs Last 24 Hrs  T(C): 36.7 (04-19-25 @ 08:35), Max: 36.7 (04-19-25 @ 08:35)  T(F): 98.1 (04-19-25 @ 08:35), Max: 98.1 (04-19-25 @ 08:35)  HR: --  BP: --  BP(mean): --  RR: --  SpO2: --    Orthostatic VS  04-19-25 @ 08:35  Lying BP: --/-- HR: --  Sitting BP: 119/87 HR: 73  Standing BP: --/-- HR: --  Site: --  Mode: --  Orthostatic VS  04-18-25 @ 20:06  Lying BP: --/-- HR: --  Sitting BP: 138/88 HR: 62  Standing BP: 129/95 HR: 78  Site: upper left arm  Mode: --    Lipid Panel: Date/Time: 04-19-25 @ 11:41  Cholesterol, Serum: 143  LDL Cholesterol Calculated: 68  HDL Cholesterol, Serum: 54  Total Cholesterol/HDL Ration Measurement: --  Triglycerides, Serum: 116  
BMI: BMI (kg/m2): 37.3 (04-18-25 @ 02:53)  HbA1c: A1C with Estimated Average Glucose Result: 5.4 % (12-27-24 @ 05:30)    Glucose: POCT Blood Glucose.: 109 mg/dL (04-10-25 @ 20:22)    BP: 114/68 (04-18-25 @ 00:45) (114/68 - 114/68)Vital Signs Last 24 Hrs  T(C): 36.8 (04-20-25 @ 07:51), Max: 36.8 (04-20-25 @ 07:51)  T(F): 98.2 (04-20-25 @ 07:51), Max: 98.2 (04-20-25 @ 07:51)  HR: --  BP: --  BP(mean): --  RR: --  SpO2: --    Orthostatic VS  04-20-25 @ 20:18  Lying BP: --/-- HR: --  Sitting BP: 110/88 HR: 95  Standing BP: 119/63 HR: 70  Site: --  Mode: --  Orthostatic VS  04-20-25 @ 08:30  Lying BP: --/-- HR: --  Sitting BP: 130/80 HR: 70  Standing BP: --/-- HR: --  Site: --  Mode: --  Orthostatic VS  04-20-25 @ 07:51  Lying BP: --/-- HR: --  Sitting BP: 135/115 HR: 69  Standing BP: --/-- HR: --  Site: --  Mode: --  Orthostatic VS  04-19-25 @ 08:35  Lying BP: --/-- HR: --  Sitting BP: 119/87 HR: 73  Standing BP: --/-- HR: --  Site: --  Mode: --    Lipid Panel: Date/Time: 04-19-25 @ 11:41  Cholesterol, Serum: 143  LDL Cholesterol Calculated: 68  HDL Cholesterol, Serum: 54  Total Cholesterol/HDL Ration Measurement: --  Triglycerides, Serum: 116  
BMI: BMI (kg/m2): 37.3 (04-18-25 @ 02:53)  HbA1c: A1C with Estimated Average Glucose Result: 5.4 % (12-27-24 @ 05:30)    Glucose: POCT Blood Glucose.: 109 mg/dL (04-10-25 @ 20:22)    BP: 100/66 (04-21-25 @ 07:29) (100/66 - 100/66)Vital Signs Last 24 Hrs  T(C): 36.8 (04-22-25 @ 08:08), Max: 36.8 (04-22-25 @ 08:08)  T(F): 98.3 (04-22-25 @ 08:08), Max: 98.3 (04-22-25 @ 08:08)  HR: --  BP: --  BP(mean): --  RR: --  SpO2: --    Orthostatic VS  04-22-25 @ 08:08  Lying BP: --/-- HR: --  Sitting BP: 136/88 HR: 92  Standing BP: --/-- HR: --  Site: --  Mode: --  Orthostatic VS  04-20-25 @ 20:18  Lying BP: --/-- HR: --  Sitting BP: 110/88 HR: 95  Standing BP: 119/63 HR: 70  Site: --  Mode: --    Lipid Panel: Date/Time: 04-19-25 @ 11:41  Cholesterol, Serum: 143  LDL Cholesterol Calculated: 68  HDL Cholesterol, Serum: 54  Total Cholesterol/HDL Ration Measurement: --  Triglycerides, Serum: 116  
BMI: BMI (kg/m2): 37.3 (04-18-25 @ 02:53)  HbA1c: A1C with Estimated Average Glucose Result: 5.4 % (12-27-24 @ 05:30)    Glucose: POCT Blood Glucose.: 109 mg/dL (04-10-25 @ 20:22)    BP: 100/66 (04-21-25 @ 07:29) (100/66 - 100/66)Vital Signs Last 24 Hrs  T(C): 36.4 (04-23-25 @ 07:39), Max: 36.4 (04-23-25 @ 07:39)  T(F): 97.6 (04-23-25 @ 07:39), Max: 97.6 (04-23-25 @ 07:39)  HR: --  BP: --  BP(mean): --  RR: --  SpO2: --    Orthostatic VS  04-23-25 @ 07:39  Lying BP: --/-- HR: --  Sitting BP: 111/83 HR: 92  Standing BP: --/-- HR: --  Site: --  Mode: --  Orthostatic VS  04-22-25 @ 08:08  Lying BP: --/-- HR: --  Sitting BP: 136/88 HR: 92  Standing BP: --/-- HR: --  Site: --  Mode: --    Lipid Panel: Date/Time: 04-19-25 @ 11:41  Cholesterol, Serum: 143  LDL Cholesterol Calculated: 68  HDL Cholesterol, Serum: 54  Total Cholesterol/HDL Ration Measurement: --  Triglycerides, Serum: 116

## 2025-04-24 NOTE — BH TREATMENT PLAN - NSTXPLANTHERAPYSESSIONSFT_PSY_ALL_CORE
04-23-25  Type of therapy: Coping skills, Medication management, Psychoeducation, Relapse prevention  --  --  --  --  --

## 2025-04-24 NOTE — BH INPATIENT PSYCHIATRY DISCHARGE NOTE - NSBHSAALC_PSY_A_CORE FT
States, since discharge from [facility] 3/19/25 has been drinking a few pints of [place] daily

## 2025-04-25 VITALS — TEMPERATURE: 98 F

## 2025-04-25 RX ADMIN — Medication 81 MILLIGRAM(S): at 08:10

## 2025-04-25 RX ADMIN — Medication 1 TABLET(S): at 08:10

## 2025-05-04 ENCOUNTER — EMERGENCY (EMERGENCY)
Facility: HOSPITAL | Age: 60
LOS: 1 days | End: 2025-05-04
Admitting: EMERGENCY MEDICINE
Payer: MEDICAID

## 2025-05-04 DIAGNOSIS — R07.9 CHEST PAIN, UNSPECIFIED: ICD-10-CM

## 2025-05-04 DIAGNOSIS — Z53.21 PROCEDURE AND TREATMENT NOT CARRIED OUT DUE TO PATIENT LEAVING PRIOR TO BEING SEEN BY HEALTH CARE PROVIDER: ICD-10-CM

## 2025-05-04 PROCEDURE — L9991: CPT

## 2025-05-05 VITALS
WEIGHT: 291.89 LBS | TEMPERATURE: 99 F | HEART RATE: 104 BPM | OXYGEN SATURATION: 95 % | RESPIRATION RATE: 17 BRPM | HEIGHT: 74 IN | DIASTOLIC BLOOD PRESSURE: 78 MMHG | SYSTOLIC BLOOD PRESSURE: 122 MMHG

## 2025-05-19 ENCOUNTER — INPATIENT (INPATIENT)
Facility: HOSPITAL | Age: 60
LOS: 2 days | Discharge: LEFT BEFORE DISCHARGE | End: 2025-05-22
Attending: PSYCHIATRY & NEUROLOGY | Admitting: PSYCHIATRY & NEUROLOGY
Payer: MEDICAID

## 2025-05-19 VITALS
RESPIRATION RATE: 15 BRPM | HEART RATE: 99 BPM | OXYGEN SATURATION: 100 % | DIASTOLIC BLOOD PRESSURE: 82 MMHG | TEMPERATURE: 98 F | HEIGHT: 74 IN | SYSTOLIC BLOOD PRESSURE: 136 MMHG

## 2025-05-19 PROCEDURE — 99285 EMERGENCY DEPT VISIT HI MDM: CPT
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
## 2025-05-19 NOTE — ED PROVIDER NOTE - PROGRESS NOTE DETAILS
Sudeep: Pt received as sign out at change of shift. Reassessed in . Calm and cooperative. He is reporting penile pain both at urination and at rest. Also noted some blood in urine. Had unprotected sex last night. Uncertain if had pain yesterday as he "was drunk" and doesn't remember. Pt refused genital exam. UA pending. Offered and accepted STI testing so ordered for GC/Chlamydia (urine since refusing genital exam), HIV, syphillis and hepatitis panel. Tylenol provided for symptom relief.   Pt also seen by psych, now awaiting admission. Pt is chronic ETOH user, ordered for CIWA monitoring while awaiting bed at [facility]. DO Nate, EM Attending: Patient signed out to me at change of shift, reassess in , eating breakfast.  Reporting HI to staff currently.  Not directed in anybody here.  Will continue to monitor.  Pending a bed.

## 2025-05-19 NOTE — ED BEHAVIORAL HEALTH ASSESSMENT NOTE - NSBHATTESTCOMMENTATTENDFT_PSY_A_CORE
59M, single, undomiciled and unemployed, lives in hotels or on streets and works part-time as , self reported hx of bipolar disorder and SCZ; as per PSYCKES has the following Behavioral Health Diagnoses to include  Unspecified/Other Depressive Disorder, Unspecified/Other Psychotic Disorders, Unspecified/Other Anxiety Disorder, Other psychoactive substance related disorders, Adjustment Disorder, Alcohol related disorders, Cocaine related disorders, Unspecified/Other Depressive Disorder, Unspecified/Other Bipolar, and Other psychoactive substance related disorders. Patient is high utilizer of mental health facilities and is chronically non adherent to meds and psych appts; has multiple ED visits and psych admissions (last [facility] -2025) for suicidality.  past documented hx for suspicion of malingering on pior visits including this latest to [facility]  admission,  as per chart review, there is documented hx of "several prior self-aborted suicide attempts with plan to jump in front of train in  but was, "stopped by an emre",  Pt has hx of polysubstance use (as per PSYCKES: with hx of Cannabis related disorders/ Alcohol related disorders/ Cocaine related disorders/ Other psychoactive substance related disorders/ Opioid related disorders/ Tobacco related disorder/  Other stimulant related disorders and Sedative, hypnotic, or anxiolytic related disorders).  Pt has had hx of numerous prior detox/rehabs (previous [facility] admission in 2023).  He has hx of multiple ED visits reporting chest pain and/or SI and/or command AH to kill himself,. pertinent medical issues include: HTN, pericarditis, Chronic CHF and GERD.  Pt admits he is not on any maintenance meds.  today, self presented to the ED (once again), complaining of depression, anxiety, "AH" and SI with plan to shoot himself with his brother's gun.    On assessment, pt is lethargic, but opens his eye and is able to engage briefly with the provider. He reports he is here because he is suicidal and  was experiencing ah of  family members . Patient  reports he does not feels safe and will try to shoot himself. Patient is not able to to engage in safe discharge. Pt also admits to using drugs, including cocaine and thc which complicate likely the presentation. Pt at this time requires hospitalization for safety.

## 2025-05-19 NOTE — ED BEHAVIORAL HEALTH ASSESSMENT NOTE - PRIMARY DX
Assessment:    Healthy 2 y o  female child  1  Health check for child over 34 days old     2  Encounter for immunization  HEPATITIS A VACCINE PEDIATRIC / ADOLESCENT 2 DOSE IM   3  Body mass index, pediatric, 5th percentile to less than 85th percentile for age     3  Exercise counseling     5  Nutritional counseling     6  Iron deficiency anemia secondary to inadequate dietary iron intake  CBC and differential    Iron Panel (Includes Ferritin, Iron Sat%, Iron, and TIBC)   7  Need for lead screening  Lead, Pediatric Blood   8  Non-recurrent acute serous otitis media of left ear  amoxicillin (AMOXIL) 400 MG/5ML suspension    DISCONTINUED: amoxicillin (AMOXIL) 400 MG/5ML suspension         Plan:          1  Anticipatory guidance discussed  Gave handout on well-child issues at this age  2  Development: appropriate for age    1  Immunizations today: per orders  Discussed with: mother    4  Follow-up visit in 6 months for next well child visit, or sooner as needed  5    Amoxicillin prescribed for left otitis media     6   labs ordered for anemia to screen for lead  Subjective:     Lucina Arceo is a 2 y o  female who is brought in for this well child visit  Current Issues:  Current concerns include left ear pain  For last 2 days  She has had cold sytmpoms  No fevers  she does have a history of iron deficiency anemia  Mom states that she also has a history of this  She has not had her levels checked in about 2 years  Well Child Assessment:  History was provided by the mother  Nutrition  Food source: all, mom states she is a good eater  Dental  The patient has a dental home  Elimination  Elimination problems do not include constipation, gas or urinary symptoms  Toilet training is complete  Sleep  The patient sleeps in her own bed  The patient does not snore  There are no sleep problems  Safety  Home is child-proofed? yes  There is an appropriate car seat in use  Screening  Immunizations are not up-to-date  There are no risk factors for hearing loss  There are risk factors for anemia  There are no risk factors for tuberculosis  There are no risk factors for lead toxicity  Social  The caregiver enjoys the child  Childcare is provided at   The childcare provider is a  provider  The following portions of the patient's history were reviewed and updated as appropriate:   She  has no past medical history on file  She   Patient Active Problem List    Diagnosis Date Noted    Term birth of female  2015     She  has a past surgical history that includes No past surgeries  Her family history includes Substance Abuse in her family  She  reports that she has never smoked  She has never used smokeless tobacco  Her alcohol and drug histories are not on file  Current Outpatient Medications   Medication Sig Dispense Refill    acetaminophen (TYLENOL) 160 mg/5 mL solution Take 7 8 mL (249 6 mg total) by mouth every 6 (six) hours as needed for fever 118 mL 0    ibuprofen (MOTRIN) 100 mg/5 mL suspension Take 8 3 mL (166 mg total) by mouth every 6 (six) hours as needed for fever 118 mL 0    amoxicillin (AMOXIL) 400 MG/5ML suspension Take 10mL twice a day for 7 days 140 mL 0    Guaifenesin 50 MG/5ML SOLN Take 5 mL (50 mg total) by mouth every 6 (six) hours as needed (Cough, congestion) (Patient not taking: Reported on 2020) 118 mL 0     No current facility-administered medications for this visit        Current Outpatient Medications on File Prior to Visit   Medication Sig    acetaminophen (TYLENOL) 160 mg/5 mL solution Take 7 8 mL (249 6 mg total) by mouth every 6 (six) hours as needed for fever    ibuprofen (MOTRIN) 100 mg/5 mL suspension Take 8 3 mL (166 mg total) by mouth every 6 (six) hours as needed for fever    Guaifenesin 50 MG/5ML SOLN Take 5 mL (50 mg total) by mouth every 6 (six) hours as needed (Cough, congestion) (Patient not taking: Reported on 2/24/2020)     No current facility-administered medications on file prior to visit  She has No Known Allergies       Developmental 3 Years Appropriate     Question Response Comments    Child can stack 4 small (< 2") blocks without them falling Yes Yes on 2/24/2020 (Age - 4yrs)    Speaks in 2-word sentences Yes Yes on 2/24/2020 (Age - 4yrs)    Can identify at least 2 of pictures of cat, bird, horse, dog, person Yes Yes on 2/24/2020 (Age - 4yrs)    Throws ball overhand, straight, toward parent's stomach or chest from a distance of 5 feet Yes Yes on 2/24/2020 (Age - 4yrs)    Adequately follows instructions: 'put the paper on the floor; put the paper on the chair; give the paper to me' Yes Yes on 2/24/2020 (Age - 4yrs)    Copies a drawing of a straight vertical line Yes Yes on 2/24/2020 (Age - 4yrs)    Can jump over paper placed on floor (no running jump) Yes Yes on 2/24/2020 (Age - 4yrs)    Can put on own shoes Yes Yes on 2/24/2020 (Age - 4yrs)    Can pedal a tricycle at least 10 feet Yes Yes on 2/24/2020 (Age - 4yrs)                Objective:      Growth parameters are noted and are appropriate for age  Wt Readings from Last 1 Encounters:   02/24/20 17 6 kg (38 lb 11 2 oz) (79 %, Z= 0 81)*     * Growth percentiles are based on CDC (Girls, 2-20 Years) data  Ht Readings from Last 1 Encounters:   02/24/20 3' 5" (1 041 m) (80 %, Z= 0 86)*     * Growth percentiles are based on CDC (Girls, 2-20 Years) data  Body mass index is 16 19 kg/m²  Vitals:    02/24/20 0855   BP: (!) 78/52   BP Location: Left arm   Patient Position: Sitting   Cuff Size: Child   Pulse: 99   Resp: 20   Temp: (!) 97 3 °F (36 3 °C)   TempSrc: Temporal   SpO2: 99%   Weight: 17 6 kg (38 lb 11 2 oz)   Height: 3' 5" (1 041 m)       Physical Exam   Constitutional: She appears well-developed and well-nourished  She is active  HENT:   Head: Atraumatic  No signs of injury     Right Ear: Tympanic membrane normal    Left Ear: Tympanic membrane normal    Nose: Nose normal    Mouth/Throat: Mucous membranes are moist  Oropharynx is clear  Left TM red, decreased light reflex, retracted   Eyes: Pupils are equal, round, and reactive to light  Conjunctivae and EOM are normal    Neck: Normal range of motion  Neck supple  No neck adenopathy  Cardiovascular: Normal rate and regular rhythm  Pulses are palpable  No murmur heard  Pulmonary/Chest: Effort normal and breath sounds normal    Abdominal: Soft  Bowel sounds are normal  She exhibits no distension and no mass  There is no tenderness  There is no guarding  No hernia  Musculoskeletal: Normal range of motion  Neurological: She is alert  Skin: Skin is warm  Nursing note and vitals reviewed  Depression

## 2025-05-19 NOTE — ED BEHAVIORAL HEALTH ASSESSMENT NOTE - NSPRESENTSXS_PSY_ALL_CORE
Depressed mood/Anhedonia/Psychosis/Hopelessness or despair/Refusal or inability to complete safety plan

## 2025-05-19 NOTE — ED BEHAVIORAL HEALTH ASSESSMENT NOTE - PSYCHIATRIC ISSUES AND PLAN (INCLUDE STANDING AND PRN MEDICATION)
Start Latuda 20mg Start Latuda 20mg as per previous inpatient medication regimen, Zyprexa 5mg PO/IM for agitation/severe agitation

## 2025-05-19 NOTE — ED BEHAVIORAL HEALTH ASSESSMENT NOTE - NSBHSACONSEQUENCE_PSY_A_CORE FT
Per chart review: "Pt reports many past detox and rehab admissions, including [place], [facility], others.".. with continued use of said drugs, will cause worsening psychosis, depressive and anxiety symptoms including clinical picture of miquel

## 2025-05-19 NOTE — ED BEHAVIORAL HEALTH ASSESSMENT NOTE - DETAILS
TBD [facility] admission 4/17-4/24/2025 for suicidality in custody of Pt's mother (based in [place], [place]) Deferred Reports diarrhea Itchiness in lower legs reports past aborted suicide attempt- was going to jump in front of a train and an emre saved him. Self-referred Reports penile pain Reports having access to a 357 revolver through his brother who he calls 'Greer" per chart review: reported experiencing diarrhea with Zoloft Reports having access to a 357 revolver through his brother who he calls by vanessa Pennington" boarding

## 2025-05-19 NOTE — ED BEHAVIORAL HEALTH ASSESSMENT NOTE - NSBHMSEPERCOMMAND_PSY_A_CORE
VTAMA Counseling: I discussed with the patient that VTAMA is not for use in the eyes, mouth or mouth. They should call the office if they develop any signs of allergic reactions to VTAMA. The patient verbalized understanding of the proper use and possible adverse effects of VTAMA.  All of the patient's questions and concerns were addressed. No

## 2025-05-19 NOTE — ED BEHAVIORAL HEALTH ASSESSMENT NOTE - AXIS III
Per chart review: HTN, pericarditis, hx of CHF (claims EF around ? 45%), CKD, GERD Per chart review: HTN, pericarditis, hx of CHF (claims EF around ? 45%), CKD, GERD  Reported recent unprotected sex and subsequent penile pain Reported recent unprotected sex and subsequent penile pain  Per chart review: HTN (currently normotensive off meds), pericarditis, hx of CHF (claims EF around ? 45%), CKD (serum creatinine 1.19 on 5/20), GERD

## 2025-05-19 NOTE — ED BEHAVIORAL HEALTH ASSESSMENT NOTE - DESCRIPTION
Per chart review: HTN, pericarditis, self reports hx of CHF (claims EF around ? 45%), CKD, GERD. as per PSYCKES, past meds to include: metoprolol ER 25 mg daily, HCTZ 25mg, lipitor 40mg, ASA 81mg, entresto 24-26mg, folic acid 1mg, thiamine 100mg single, has a 17 yr old daughter domiciled with mother in [place].  unemployed. undomiciled. lives on streets or in hotel and provides for self through part time work as a  In the ED, patient in good behavorial control. Did not require PRNs, emergent IM medication, or use of 4 point restraints.  Pt is not delirious; not catatonic.  did not appear acutely intoxicated; not in withdrawal.    ICU Vital Signs Last 24 Hrs  T(C): 36.7 (19 May 2025 22:48), Max: 36.7 (19 May 2025 22:48)  T(F): 98 (19 May 2025 22:48), Max: 98 (19 May 2025 22:48)  HR: 99 (19 May 2025 22:48) (99 - 99)  BP: 136/82 (19 May 2025 22:48) (136/82 - 136/82)  RR: 15 (19 May 2025 22:48) (15 - 15)  SpO2: 100% (19 May 2025 22:48) (100% - 100%) on Room air    Labs (5/20/2025):  CBC: mild normocytic anemia (RBC 3.76; Hb 11.4) otherwise unremarkable   CMP: unremarkable  LFT: mildly elevated AST (45) and ALT (43)  UDS: ** In the ED, patient in good behavorial control. Did not require PRNs, emergent IM medication, or use of 4 point restraints.  Pt is not delirious; not catatonic.  did not appear acutely intoxicated; not in withdrawal.    ICU Vital Signs Last 24 Hrs  T(C): 36.7 (19 May 2025 22:48), Max: 36.7 (19 May 2025 22:48)  T(F): 98 (19 May 2025 22:48), Max: 98 (19 May 2025 22:48)  HR: 99 (19 May 2025 22:48) (99 - 99)  BP: 136/82 (19 May 2025 22:48) (136/82 - 136/82)  RR: 15 (19 May 2025 22:48) (15 - 15)  SpO2: 100% (19 May 2025 22:48) (100% - 100%) on Room air    Labs (5/20/2025):  CBC: mild normocytic anemia (RBC 3.76; Hb 11.4) otherwise unremarkable   CMP: unremarkable  LFT: mildly elevated AST (45) and ALT (43)  UDS: Negative  TSH: 1.72 In the ED, patient in good behavorial control. Did not require PRNs, emergent IM medication, or use of 4 point restraints.  Pt is not delirious; not catatonic.  did not appear acutely intoxicated; not in withdrawal.    ICU Vital Signs Last 24 Hrs  T(C): 36.7 (19 May 2025 22:48), Max: 36.7 (19 May 2025 22:48)  T(F): 98 (19 May 2025 22:48), Max: 98 (19 May 2025 22:48)  HR: 99 (19 May 2025 22:48) (99 - 99)  BP: 136/82 (19 May 2025 22:48) (136/82 - 136/82)  RR: 15 (19 May 2025 22:48) (15 - 15)  SpO2: 100% (19 May 2025 22:48) (100% - 100%) on Room air    Labs (5/20/2025):  CBC: mild normocytic anemia (RBC 3.76; Hb 11.4) otherwise unremarkable   CMP: unremarkable  LFT: mildly elevated AST (45) and ALT (43)  UDS: Positive for cocaine, otherwise unremarkable   TSH: 1.72

## 2025-05-19 NOTE — ED BEHAVIORAL HEALTH NOTE - BEHAVIORAL HEALTH NOTE
As per request of provider, Writer contacted pt’s friend Myesha [phone] for collateral information. writer left a vm requesting a return call. (x2)

## 2025-05-19 NOTE — ED ADULT TRIAGE NOTE - BH ALERT MESSAGE
Chief Complaint   Patient presents with   • Follow-up         The patient is here for follow-up of her u/s.  U/s was good.  Pain is better today.  She was concerned about the side effects of omeprazole and took it intermittently.  She also started meloxicam that she had been given in the past.  She did feel constipated over the weekend.  She states 3 weeks ago they were turning a house and did do a lot of heavy work.    Past Medical History:   Diagnosis Date   • Endometriosis    • Post menopausal syndrome    • RLS (restless legs syndrome)     helped by magnesium      Past Surgical History:   Procedure Laterality Date   • Appendectomy     • Arthroscopic excision of an acromion bone spur     • Breast reduction 3 hrs     • Freeing bowel adhesion,enterolysis     • Hb delivery c section     • Right oophorectomy        Social History     Socioeconomic History   • Marital status: /Civil Union     Spouse name: Not on file   • Number of children: Not on file   • Years of education: Not on file   • Highest education level: Not on file   Occupational History   • Not on file   Tobacco Use   • Smoking status: Never Smoker   • Smokeless tobacco: Never Used   Substance and Sexual Activity   • Alcohol use: Not on file   • Drug use: Not on file   • Sexual activity: Not on file   Other Topics Concern   • Not on file   Social History Narrative    The patient does not smoke.  No alcohol.   2 children, 2 boys.  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       Social Determinants of Health     Financial Resource Strain:    • Social Determinants: Financial Resource Strain:    Food Insecurity:    • Social Determinants: Food Insecurity:    Transportation Needs:    • Lack of Transportation (Medical):    • Lack of Transportation (Non-Medical):    Physical Activity:    • Days of Exercise per Week:    • Minutes of Exercise per Session:    Stress:    • Social Determinants: Stress:     Social Connections:    • Social Determinants: Social Connections:    Intimate Partner Violence:    • Social Determinants: Intimate Partner Violence Past Fear:    • Social Determinants: Intimate Partner Violence Current Fear:       Family History   Problem Relation Age of Onset   • Thyroid Mother         gi bleed   • Gastrointestinal Mother    • Myocardial Infarction Father    • Diabetes Father    • Kidney disease Father    • Neurologic Disorder Father         restless legs   • Neurologic Disorder Sister         restless legs   • Thyroid Sister         goiter   • ADHD/ADD Son    • Obesity Son       Current Outpatient Medications   Medication Sig Dispense Refill   • ZINC SULFATE PO      • B Complex Vitamins (B COMPLEX 1 PO)      • omeprazole (PriLOSEC) 20 MG capsule Take 1 capsule by mouth 2 times daily. 180 capsule 3   • meloxicam (MOBIC) 7.5 MG tablet Take 1-2 tablets by mouth daily as needed for Pain. 60 tablet 1   • Multiple Vitamins-Minerals (MULTIVITAMIN ADULT) Tab Take 1 tablet by mouth daily.     • Cholecalciferol (VITAMIN D3 PO) Take 1 tablet by mouth daily.     • Magnesium 500 MG Cap Take 1 mg by mouth daily.     • Biotin w/ Vitamins C & E (HAIR/SKIN/NAILS PO)        No current facility-administered medications for this visit.            Physical Exam    Vital Signs:    Vitals:    08/30/21 0831   BP: 122/76   BP Location: LUE - Left upper extremity   Patient Position: Sitting   Cuff Size: Regular   Pulse: 73   Resp: 16   Temp: 97.3 °F (36.3 °C)   TempSrc: Tympanic   SpO2: 97%   Weight: 80.3 kg   Height: 5' 5\" (1.651 m)     General:  Well developed, well nourished. In no apparent distress.    Eyes:   EOMI (extraocular movements intact). Conjunctivae pink. Sclerae anicteric.    Msk:  No point tenderness lumbar spine and muscles surrounding.    Abdomen:  Soft, tenderness ruq without guarding or rebound.  Improved from last week., ND (nondistended), +BS (positive bowel sounds), no hepatosplenomegaly or  “Initiate CONSTANT OBSERVATION and Contact Provider” masses.  Neurologic:  Alert and oriented x 3.   Integumentary:  Warm. Dry. Pink. No rashes or lesions. No wounds.    Psychiatric: Cooperative. Appropriate mood and affect. Normal judgment.        Orders Placed This Encounter   • ZINC SULFATE PO   • B Complex Vitamins (B COMPLEX 1 PO)         ASSESSMENT AND PLAN:  RUQ pain  (primary encounter diagnosis)  Plan: with back pain.  It is improved.  Patient and  have been very concerned about unnecessary costs of work-up.  She has not gotten back or abdominal x-ray.  Explained that more tests may be recommended but for now since she is improving we can watch.  I did encourage her to restart ppi.  We will call her in 2 weeks for an update.

## 2025-05-19 NOTE — ED PROVIDER NOTE - CLINICAL SUMMARY MEDICAL DECISION MAKING FREE TEXT BOX
This 59-year-old male patient, with a past medical history of GERD, hypertension, pericarditis, and CHF, and a past psychiatric history of depression and substance use disorder, presents with increased depression and suicidal and homicidal ideation. He has a plan to shoot himself and reports access, means to a firearm, although does not give details about gun whereabouts. His homicidal ideation is directed towards his 16-year-old daughter's mother, whom he believes is turning his daughter against him.    Chart review indicates the patient is a known high utilizer of the ER and experiences frequent housing instability. He became defensive when questioned about his last psychiatric hospitalization two weeks prior.    Patient also reports drinking one pint of Yessi daily since Friday.    Psych consult requested.

## 2025-05-19 NOTE — ED BEHAVIORAL HEALTH ASSESSMENT NOTE - LEGAL HISTORY
denies. no listed pending legal issues as per [facility]/ [employer] SITE

## 2025-05-19 NOTE — ED BEHAVIORAL HEALTH ASSESSMENT NOTE - RISK ASSESSMENT
Acute risk factors - housing instability, ongoing substance use, depression/ anxiety,  SI and, AH  Chronic risk factors - extensive psychiatric and substance hx, hx outpatient treatment nonadherence,  Cluster B pathology, male, hx of being impulsive, multiple psych admission  Protective factors -   pt is treatment-seeking and often self-presents to the ED for voluntary hospitalization, no clear hx of suicide attempts, no known hx of violence, help seeking, sense of commitment to daughter, does not appear manic, no floridly psychotic  At this time given all risks taken into consideration, the Pt is moderate risk for self harm as well as remaining at chronically elevated risk of harming self.  ongoing presentation not deemed dischargeable back to the community.  current increased in risks can be mitigated by a psychiatric admission with supervision, continuing psych meds with titration towards efficacious dosing range Acute risk factors - poor social support, loss of contact with daughter, housing instability, ongoing substance use, depression/ anxiety,  SI and, AH  Chronic risk factors - extensive psychiatric and substance hx, hx outpatient treatment nonadherence,  Cluster B pathology, male, hx of being impulsive, multiple psych admission  Protective factors -   pt is treatment-seeking and often self-presents to the ED for voluntary hospitalization, no clear hx of suicide attempts, no known hx of violence, help seeking, sense of commitment to daughter, does not appear manic, no floridly psychotic  At this time given all risks taken into consideration, the Pt is moderate risk for self harm as well as remaining at chronically elevated risk of harming self.  ongoing presentation not deemed dischargeable back to the community.  current increased in risks can be mitigated by a psychiatric admission with supervision, continuing psych meds with titration towards efficacious dosing range

## 2025-05-19 NOTE — ED PROVIDER NOTE - CPE EDP PSYCH NORM
Injectable Influenza Immunization Documentation    1.  Is the person to be vaccinated sick today?   No    2. Does the person to be vaccinated have an allergy to a component   of the vaccine?   No  Egg Allergy Algorithm Link    3. Has the person to be vaccinated ever had a serious reaction   to influenza vaccine in the past?   No    4. Has the person to be vaccinated ever had Guillain-Barré syndrome?   No    Prior to injection verified patient identity using patient's name and date of birth.    Per orders of Dr. Starr, injection of Flu vaccine given by Diogenes Hopkins. Patient instructed to remain in clinic for 15 minutes afterwards, and to report any adverse reaction to me immediately.    Form completed by Diogenes Hopkins CMA           - - -

## 2025-05-19 NOTE — ED BEHAVIORAL HEALTH ASSESSMENT NOTE - NSACTIVEVENT_PSY_ALL_CORE
Lost contact with daughter in the past month/Triggering events leading to humiliation, shame, and/or despair (e.g., Loss of relationship, financial or health status) (real or anticipated)/Current or pending social isolation/Substance intoxication or withdrawal/Pending incarceration or homelessness

## 2025-05-19 NOTE — ED BEHAVIORAL HEALTH ASSESSMENT NOTE - VIOLENCE RISK FACTORS:
Violent ideation/threat/speech/Substance abuse/Affective dysregulation/Impulsivity/Irritability/Firearm/weapon access

## 2025-05-19 NOTE — ED BEHAVIORAL HEALTH ASSESSMENT NOTE - HPI (INCLUDE ILLNESS QUALITY, SEVERITY, DURATION, TIMING, CONTEXT, MODIFYING FACTORS, ASSOCIATED SIGNS AND SYMPTOMS)
59M, single, undomiciled and unemployed, lives in hotels or on streets and works part-time as ,  self reported hx of bipolar disorder and SCZ;  as per PSYCKES has the following Behavioral Health Diagnoses to include Unspecified/Other Bipolar, substance-Induced Depressive Disorder, Major Depressive Disorder, Unspecified/Other Depressive Disorder,  Borderline Personality Disorder, Antisocial Personality Disorder, Unspecified/Other Anxiety Disorder, Bipolar I, Bipolar II, Schizoaffective Disorder, Substance-Induced Sleep Disorder, Adjustment Disorder, Schizophrenia, Substance-Induced Psychotic Disorder, Other Mental Disorders Unspecified/Other Psychotic Disorders,  Attention Deficit Hyperactivity Disorder, Insomnia Disorder  and Substance-Induced Anxiety Disorder.. Pt is high utilizer of mental health facilities + is chronically non adherent to meds and psych appts; has multiple ED visits and psych admissions ( last [facility] 4/17-4/24/2025) for suicidality.  past documented hx for suspicion of malingering on pior visits including this latest to [facility]  admission,  as per chart review, there is documented hx of "several prior self-aborted suicide attempts with plan to jump in front of train in 2023 but was, "stopped by an emre",  Pt has hx of polysubstance use (as per PSYCKES: with hx of Cannabis related disorders/ Alcohol related disorders/ Cocaine related disorders/ Other psychoactive substance related disorders/ Opioid related disorders/ Tobacco related disorder/  Other stimulant related disorders and Sedative, hypnotic, or anxiolytic related disorders).  Pt has had hx of numerous prior detox/rehabs (previous [facility] admission in 12/2023).  He has hx of multiple ED visits reporting chest pain and/or SI and/or command AH to kill himself,. pertinent medical issues include: HTN, pericarditis, Chronic CHF and GERD.  Pt admits he is not on any maintenance meds.  today, self presented to the ED (once again), complaining of depression, anxiety, "AH" and SI with plan to shoot himself with his brother's gun.     Patient seen in private room. He was calm and cooperative throughout interview. Patient reports that after discharge from [facility] in April he stayed in a rehab facility for several days but had to leave after a verbal altercation with staff about A/C. Since, he has been living on streets and occasionally in hotel rooms. States that provides hotel cost via working as a . States that he talked last with his daughter in April while hospitalized at [facility] and she did not returned his calls since. Describes this as being "unbearable". 59M, single, undomiciled and unemployed, lives in hotels or on streets and works part-time as ,  self reported hx of bipolar disorder and SCZ;  as per PSYCKES has the following Behavioral Health Diagnoses to include Unspecified/Other Bipolar, substance-Induced Depressive Disorder, Major Depressive Disorder, Unspecified/Other Depressive Disorder,  Borderline Personality Disorder, Antisocial Personality Disorder, Unspecified/Other Anxiety Disorder, Bipolar I, Bipolar II, Schizoaffective Disorder, Substance-Induced Sleep Disorder, Adjustment Disorder, Schizophrenia, Substance-Induced Psychotic Disorder, Other Mental Disorders Unspecified/Other Psychotic Disorders,  Attention Deficit Hyperactivity Disorder, Insomnia Disorder  and Substance-Induced Anxiety Disorder.. Pt is high utilizer of mental health facilities + is chronically non adherent to meds and psych appts; has multiple ED visits and psych admissions ( last [facility] 4/17-4/24/2025) for suicidality.  past documented hx for suspicion of malingering on pior visits including this latest to [facility]  admission,  as per chart review, there is documented hx of "several prior self-aborted suicide attempts with plan to jump in front of train in 2023 but was, "stopped by an emre",  Pt has hx of polysubstance use (as per PSYCKES: with hx of Cannabis related disorders/ Alcohol related disorders/ Cocaine related disorders/ Other psychoactive substance related disorders/ Opioid related disorders/ Tobacco related disorder/  Other stimulant related disorders and Sedative, hypnotic, or anxiolytic related disorders).  Pt has had hx of numerous prior detox/rehabs (previous [facility] admission in 12/2023).  He has hx of multiple ED visits reporting chest pain and/or SI and/or command AH to kill himself,. pertinent medical issues include: HTN, pericarditis, Chronic CHF and GERD.  Pt admits he is not on any maintenance meds.  today, self presented to the ED (once again), complaining of depression, anxiety, "AH" and SI with plan to shoot himself with his brother's gun.    Patient seen in private room. He was calm and cooperative throughout interview. Patient reports that after discharge from [facility] in April he stayed in a rehab facility for several days but had to leave after a verbal altercation with staff about A/C. Since, he has been living on streets and occasionally in hotel rooms. States that provides hotel cost via working as a . States that he talked last with his daughter in April while hospitalized at [facility] and she did not returned his calls since. Describes this as being "unbearable". As a result, started to drink alcohol since Friday. Reports drinking up to seven drinks of different kind (mentions beer and liquor) daily, last drank on Sunday, cannot clarify the time. Reports smoking marijuana twice since, and using cocaine once on Friday. Denies other substance use. Reports that on the bus to the hospital saw his mother's and grandparents' faces, and heard them telling him 'come to us'. Reports sad and anxious mood in the past week, Reports poor sleep with 3h sleep in 24h, morning tiredness, poor energy, and anhedonia. Reports that today he wanted to go to his brother's place '[facility]' who has a "357" and shoot himself with it, but refuses to share his brother's contact info, States that he also has access to firearms through other resources but refuses to share more details. Reports irritability and being on the edge in the past few days, states that had the feeling to 'beat someone' but does not identify a specific target. Denies other symptoms of miquel including euphoria, increased energy, and talkativeness. 59M, single, undomiciled and unemployed, lives in hotels or on streets and works part-time as , self reported hx of bipolar disorder and SCZ; as per PSYCKES has the following Behavioral Health Diagnoses to include  Unspecified/Other Depressive Disorder, Unspecified/Other Psychotic Disorders, Unspecified/Other Anxiety Disorder, Other psychoactive substance related disorders, Adjustment Disorder, Alcohol related disorders, Cocaine related disorders, Unspecified/Other Depressive Disorder, Unspecified/Other Bipolar, and Other psychoactive substance related disorders. Patient is high utilizer of mental health facilities and is chronically non adherent to meds and psych appts; has multiple ED visits and psych admissions (last [facility] 4/17-4/24/2025) for suicidality.  past documented hx for suspicion of malingering on pior visits including this latest to [facility]  admission,  as per chart review, there is documented hx of "several prior self-aborted suicide attempts with plan to jump in front of train in 2023 but was, "stopped by an emre",  Pt has hx of polysubstance use (as per PSYCKES: with hx of Cannabis related disorders/ Alcohol related disorders/ Cocaine related disorders/ Other psychoactive substance related disorders/ Opioid related disorders/ Tobacco related disorder/  Other stimulant related disorders and Sedative, hypnotic, or anxiolytic related disorders).  Pt has had hx of numerous prior detox/rehabs (previous [facility] admission in 12/2023).  He has hx of multiple ED visits reporting chest pain and/or SI and/or command AH to kill himself,. pertinent medical issues include: HTN, pericarditis, Chronic CHF and GERD.  Pt admits he is not on any maintenance meds.  today, self presented to the ED (once again), complaining of depression, anxiety, "AH" and SI with plan to shoot himself with his brother's gun.    Patient seen in private room. He was calm and cooperative throughout interview. Patient reports that after discharge from [facility] in April he stayed in a rehab facility for several days but had to leave after a verbal altercation with staff about A/C. Since, he has been living on streets and occasionally in hotel rooms. States that provides hotel cost via working as a . States that he talked last with his daughter in April while hospitalized at [facility] and she did not returned his calls since. Describes this as being "unbearable". As a result, started to drink alcohol since Friday. Reports drinking up to seven drinks of different kind (mentions beer and liquor) daily, last drank on Sunday, cannot clarify the time. Reports smoking marijuana twice since, and using cocaine once on Friday. Denies other substance use. Reports that on the bus to the hospital saw his mother's and grandparents' faces, and heard them telling him 'come to us'. Reports sad and anxious mood in the past week, Reports poor sleep with 3h sleep in 24h, morning tiredness, poor energy, and anhedonia. Reports that today he wanted to go to his brother's place '[facility]' who has a "357" and shoot himself with it, but refuses to share his brother's contact info, States that he also has access to firearms through other resources but refuses to share more details. Reports irritability and being on the edge in the past few days, states that had the feeling to 'beat someone' but does not identify a specific target. Denies other symptoms of miquel including euphoria, increased energy, and talkativeness.  Of note, patient reports that she had an oral sex with a female sex partner yesterday during which 'she spitted blood'. Denies having blood in urine since, however, endorsed penile pain. 59M, single, undomiciled and unemployed, lives in hotels or on streets and works part-time as , self reported hx of bipolar disorder and SCZ; as per PSYCKES has the following Behavioral Health Diagnoses to include  Unspecified/Other Depressive Disorder, Unspecified/Other Psychotic Disorders, Unspecified/Other Anxiety Disorder, Other psychoactive substance related disorders, Adjustment Disorder, Alcohol related disorders, Cocaine related disorders, Unspecified/Other Depressive Disorder, Unspecified/Other Bipolar, and Other psychoactive substance related disorders. Patient is high utilizer of mental health facilities and is chronically non adherent to meds and psych appts; has multiple ED visits and psych admissions (last [facility] 4/17-4/24/2025) for suicidality.  past documented hx for suspicion of malingering on pior visits including this latest to [facility]  admission,  as per chart review, there is documented hx of "several prior self-aborted suicide attempts with plan to jump in front of train in 2023 but was, "stopped by an emre",  Pt has hx of polysubstance use (as per PSYCKES: with hx of Cannabis related disorders/ Alcohol related disorders/ Cocaine related disorders/ Other psychoactive substance related disorders/ Opioid related disorders/ Tobacco related disorder/  Other stimulant related disorders and Sedative, hypnotic, or anxiolytic related disorders).  Pt has had hx of numerous prior detox/rehabs (previous [facility] admission in 12/2023).  He has hx of multiple ED visits reporting chest pain and/or SI and/or command AH to kill himself,. pertinent medical issues include: HTN, pericarditis, Chronic CHF and GERD.  Pt admits he is not on any maintenance meds.  today, self presented to the ED (once again), complaining of depression, anxiety, "AH" and SI with plan to shoot himself with his brother's gun.    Patient seen in private room. He was calm and cooperative throughout interview. Patient reports that after discharge from [facility] in April he stayed in a rehab facility for several days but had to leave after a verbal altercation with staff about A/C. Since, he has been living on streets and occasionally in hotel rooms. States that provides hotel cost via working as a . States that he talked last with his daughter in April while hospitalized at [facility] and she did not returned his calls since. Describes this as being "unbearable". As a result, started to drink alcohol since Friday. Reports drinking up to seven drinks of different kind (mentions beer and liquor) daily, last drank on Sunday, cannot clarify the time. Reports smoking marijuana twice since, and using cocaine once on Friday. Denies other substance use. Reports that on the bus to the hospital saw his mother's and grandparents' faces, and heard them telling him 'come to us'. Reports sad and anxious mood in the past week, Reports poor sleep with 3h sleep in 24h, morning tiredness, poor energy, and anhedonia. Reports that today he wanted to go to his brother's place '[facility]' who has a "357" and shoot himself with it, but refuses to share his brother's contact info, States that he also has access to firearms through other resources but refuses to share more details. Reports irritability and being on the edge in the past few days, states that had the feeling to 'beat someone' but does not identify a specific target. Denies other symptoms of miquel including euphoria, increased energy, and talkativeness.  Of note, patient reports that she had an oral sex with a female sex partner yesterday during which 'she spat blood'. Denies having blood in urine since, however, endorsed penile pain. 59M, single, undomiciled and unemployed, lives in hotels or on streets and works part-time as , self reported hx of bipolar disorder and SCZ; as per PSYCKES has the following Behavioral Health Diagnoses to include  Unspecified/Other Depressive Disorder, Unspecified/Other Psychotic Disorders, Unspecified/Other Anxiety Disorder, Other psychoactive substance related disorders, Adjustment Disorder, Alcohol related disorders, Cocaine related disorders, Unspecified/Other Depressive Disorder, Unspecified/Other Bipolar, and Other psychoactive substance related disorders. Patient is high utilizer of mental health facilities and is chronically non adherent to meds and psych appts; has multiple ED visits and psych admissions (last [facility] -2025) for suicidality.  past documented hx for suspicion of malingering on pior visits including this latest to [facility]  admission,  as per chart review, there is documented hx of "several prior self-aborted suicide attempts with plan to jump in front of train in  but was, "stopped by an emre",  Pt has hx of polysubstance use (as per PSYCKES: with hx of Cannabis related disorders/ Alcohol related disorders/ Cocaine related disorders/ Other psychoactive substance related disorders/ Opioid related disorders/ Tobacco related disorder/  Other stimulant related disorders and Sedative, hypnotic, or anxiolytic related disorders).  Pt has had hx of numerous prior detox/rehabs (previous [facility] admission in 2023).  He has hx of multiple ED visits reporting chest pain and/or SI and/or command AH to kill himself,. pertinent medical issues include: HTN, pericarditis, Chronic CHF and GERD.  Pt admits he is not on any maintenance meds.  today, self presented to the ED (once again), complaining of depression, anxiety, "AH" and SI with plan to shoot himself with his brother's gun.    Patient seen in private room. He was calm and cooperative throughout interview. Patient reports that after discharge from [facility] in April he stayed in a rehab facility for several days but had to leave after a verbal altercation with staff about A/C. Since, he has been living on streets and occasionally in hotel rooms. States that provides hotel cost via working as a . States that he talked last with his daughter in April while hospitalized at [facility] and she did not returned his calls since. Describes this as being "unbearable". As a result, started to drink alcohol since Friday (). Reports drinking up to seven drinks of different kind (mentions beer and liquor) daily, last drank on  (). Reports smoking marijuana twice since, and using cocaine once on Friday. Denies other substance use. Reports that on the bus to the hospital saw his  mother's and grandparents' faces, and heard them telling him 'come to us'. Reports sad and anxious mood in the past week, Reports poor sleep with 3h sleep in 24h, morning tiredness, poor energy, and anhedonia. Reports that today he wanted to go to his brother's place (refers to him by the nickname 'Greer') who has a "357 revolver" and shoot himself with it, but refuses to share his brother's contact info, States that he also has access to firearms through other resources but refuses to share more details. Reports irritability and being on the edge in the past few days, states that had the feeling to 'beat someone' but does not identify a specific target. Denies other symptoms of miquel including euphoria, increased energy, and talkativeness.  Of note, patient reports that she had an oral sex with a female sex partner yesterday during which 'she spat blood'. Denies having blood in urine since, however, endorsed penile pain.

## 2025-05-19 NOTE — ED BEHAVIORAL HEALTH ASSESSMENT NOTE - DIFFERENTIAL
Malingering   MDD with Psychosis MDD with Psychosis  Cocaine-induced mood/psychotic disorder  Malingering

## 2025-05-19 NOTE — ED BEHAVIORAL HEALTH ASSESSMENT NOTE - OTHER PAST PSYCHIATRIC HISTORY (INCLUDE DETAILS REGARDING ONSET, COURSE OF ILLNESS, INPATIENT/OUTPATIENT TREATMENT)
self reported hx of depression + anxiety    high mental health utilizer including numerous Psych ED/ CPEP visits for SI, alcohol intoxication    chronically nonadherent with meds/ admitted to being inconsistent on his out-patient psychiatric care.    stated past hx of allegedly self aborting jumping in front of a 7 train over the summer ("stopped by an emre" (2023), also reported past hx of self aborting jumping in front of a train in 7/ 2024    multiple psych admissions including recent discharge [facility] (4/17-4/24/2025),  [facility] ( 3/2-3/19/2025) and  [facility], 12/3 - 12/13/2024 for mgt of depression and SI in the context of psychosocial stressors (a diagnoses of Unspecified mood [affective] disorder; and Polysubstance abuse were made as well as entertaining Malingering as part of the diagnosis.  whilst Pt currently claimed that Latuda "helped" towards affording mood stability, of note during that 4 admission, the Pt later on refused standing Latuda; he was  discharged to shelter multiple psych admissions including recent discharge [facility] (4/17-4/24/2025),  [facility] ( 3/2-3/19/2025) and  [facility], 12/3 - 12/13/2024 for mgt of depression and SI in the context of psychosocial stressors and polysubstance use, could not rule out malingering as part of the diagnosis. Was on Latuda trial at 20mg per recent discharge note, but did not took meds after discharge (states that did not  any medication from pharmacy and lost meds provided to him on discharge). Brief rehab stay after discharge, reportedly left after an altercation with staff.   self reported hx of depression. anxiety. bipolar disorder, and schizophrenia  high mental health utilizer including numerous Psych ED/ CPEP visits for SI, alcohol intoxication  chronically nonadherent with meds/ admitted to being inconsistent on his out-patient psychiatric care.  stated past hx of allegedly self aborting jumping in front of a 7 train over the summer ("stopped by an emre" (2023), also reported past hx of self aborting jumping in front of a train in 7/ 2024

## 2025-05-19 NOTE — ED BEHAVIORAL HEALTH ASSESSMENT NOTE - MEDICAL ISSUES AND PLAN (INCLUDE STANDING AND PRN MEDICATION)
ASA 81 mg daily, monitor b/p and start Metoprolol if HTN STD panel given penile pain iso unprotected oral sex, Tylenol 650 PRN for pain STI testing given penile pain iso unprotected oral sex (pt consented for testing), Tylenol 650 PRN for pain

## 2025-05-19 NOTE — ED BEHAVIORAL HEALTH ASSESSMENT NOTE - NSBHSAALC_PSY_A_CORE FT
Reports drinking 7 beers/shots of liquor since Friday (5/17), last drank on Sunday Reports drinking 7 beers/shots of liquor since Friday (5/16), last drank on Sunday (5/18)

## 2025-05-19 NOTE — ED ADULT TRIAGE NOTE - CHIEF COMPLAINT QUOTE
c/o SI with plan, " I want to shoot myself." pt states has means to do so. states "hearing voices telling me to kill myself." denies HI. past med hx HTN, pericarditis. pt calm cooperative in triage. pt to be seen in . c/o SI with plan, " I want to shoot myself." pt states has means to do so. states "hearing voices telling me to kill myself." denies HI. past med hx HTN, pericarditis, depression, BPD , bipolar. pt calm cooperative in triage. pt to be seen in .

## 2025-05-20 DIAGNOSIS — F32.A DEPRESSION, UNSPECIFIED: ICD-10-CM

## 2025-05-20 DIAGNOSIS — R44.1 VISUAL HALLUCINATIONS: ICD-10-CM

## 2025-05-20 DIAGNOSIS — F19.10 OTHER PSYCHOACTIVE SUBSTANCE ABUSE, UNCOMPLICATED: ICD-10-CM

## 2025-05-20 DIAGNOSIS — Z76.5 MALINGERER [CONSCIOUS SIMULATION]: ICD-10-CM

## 2025-05-20 DIAGNOSIS — R44.0 AUDITORY HALLUCINATIONS: ICD-10-CM

## 2025-05-20 DIAGNOSIS — F41.9 ANXIETY DISORDER, UNSPECIFIED: ICD-10-CM

## 2025-05-20 LAB
ALBUMIN SERPL ELPH-MCNC: 4.2 G/DL — SIGNIFICANT CHANGE UP (ref 3.3–5)
ALP SERPL-CCNC: 65 U/L — SIGNIFICANT CHANGE UP (ref 40–120)
ALT FLD-CCNC: 43 U/L — HIGH (ref 4–41)
AMPHET UR-MCNC: NEGATIVE — SIGNIFICANT CHANGE UP
ANION GAP SERPL CALC-SCNC: 12 MMOL/L — SIGNIFICANT CHANGE UP (ref 7–14)
APAP SERPL-MCNC: <10 UG/ML — LOW (ref 15–25)
AST SERPL-CCNC: 45 U/L — HIGH (ref 4–40)
BARBITURATES UR SCN-MCNC: NEGATIVE — SIGNIFICANT CHANGE UP
BASOPHILS # BLD AUTO: 0.03 K/UL — SIGNIFICANT CHANGE UP (ref 0–0.2)
BASOPHILS NFR BLD AUTO: 0.5 % — SIGNIFICANT CHANGE UP (ref 0–2)
BENZODIAZ UR-MCNC: NEGATIVE — SIGNIFICANT CHANGE UP
BILIRUB SERPL-MCNC: 0.6 MG/DL — SIGNIFICANT CHANGE UP (ref 0.2–1.2)
BUN SERPL-MCNC: 17 MG/DL — SIGNIFICANT CHANGE UP (ref 7–23)
C TRACH RRNA SPEC QL NAA+PROBE: SIGNIFICANT CHANGE UP
CALCIUM SERPL-MCNC: 9 MG/DL — SIGNIFICANT CHANGE UP (ref 8.4–10.5)
CHLORIDE SERPL-SCNC: 103 MMOL/L — SIGNIFICANT CHANGE UP (ref 98–107)
CO2 SERPL-SCNC: 21 MMOL/L — LOW (ref 22–31)
COCAINE METAB.OTHER UR-MCNC: POSITIVE
CREAT SERPL-MCNC: 1.19 MG/DL — SIGNIFICANT CHANGE UP (ref 0.5–1.3)
CREATININE URINE RESULT, DAU: 176 MG/DL — SIGNIFICANT CHANGE UP
EGFR: 71 ML/MIN/1.73M2 — SIGNIFICANT CHANGE UP
EGFR: 71 ML/MIN/1.73M2 — SIGNIFICANT CHANGE UP
EOSINOPHIL # BLD AUTO: 0.1 K/UL — SIGNIFICANT CHANGE UP (ref 0–0.5)
EOSINOPHIL NFR BLD AUTO: 1.6 % — SIGNIFICANT CHANGE UP (ref 0–6)
ETHANOL SERPL-MCNC: <10 MG/DL — SIGNIFICANT CHANGE UP
FENTANYL UR QL SCN: NEGATIVE — SIGNIFICANT CHANGE UP
GLUCOSE SERPL-MCNC: 88 MG/DL — SIGNIFICANT CHANGE UP (ref 70–99)
HCT VFR BLD CALC: 33.1 % — LOW (ref 39–50)
HGB BLD-MCNC: 11.4 G/DL — LOW (ref 13–17)
IANC: 3.34 K/UL — SIGNIFICANT CHANGE UP (ref 1.8–7.4)
IMM GRANULOCYTES NFR BLD AUTO: 0.2 % — SIGNIFICANT CHANGE UP (ref 0–0.9)
LYMPHOCYTES # BLD AUTO: 2.2 K/UL — SIGNIFICANT CHANGE UP (ref 1–3.3)
LYMPHOCYTES # BLD AUTO: 34.7 % — SIGNIFICANT CHANGE UP (ref 13–44)
MCHC RBC-ENTMCNC: 30.3 PG — SIGNIFICANT CHANGE UP (ref 27–34)
MCHC RBC-ENTMCNC: 34.4 G/DL — SIGNIFICANT CHANGE UP (ref 32–36)
MCV RBC AUTO: 88 FL — SIGNIFICANT CHANGE UP (ref 80–100)
METHADONE UR-MCNC: NEGATIVE — SIGNIFICANT CHANGE UP
MONOCYTES # BLD AUTO: 0.66 K/UL — SIGNIFICANT CHANGE UP (ref 0–0.9)
MONOCYTES NFR BLD AUTO: 10.4 % — SIGNIFICANT CHANGE UP (ref 2–14)
N GONORRHOEA RRNA SPEC QL NAA+PROBE: SIGNIFICANT CHANGE UP
NEUTROPHILS # BLD AUTO: 3.34 K/UL — SIGNIFICANT CHANGE UP (ref 1.8–7.4)
NEUTROPHILS NFR BLD AUTO: 52.6 % — SIGNIFICANT CHANGE UP (ref 43–77)
NRBC # BLD AUTO: 0 K/UL — SIGNIFICANT CHANGE UP (ref 0–0)
NRBC # FLD: 0 K/UL — SIGNIFICANT CHANGE UP (ref 0–0)
NRBC BLD AUTO-RTO: 0 /100 WBCS — SIGNIFICANT CHANGE UP (ref 0–0)
OPIATES UR-MCNC: NEGATIVE — SIGNIFICANT CHANGE UP
OXYCODONE UR-MCNC: NEGATIVE — SIGNIFICANT CHANGE UP
PCP SPEC-MCNC: SIGNIFICANT CHANGE UP
PCP UR-MCNC: NEGATIVE — SIGNIFICANT CHANGE UP
PLATELET # BLD AUTO: 290 K/UL — SIGNIFICANT CHANGE UP (ref 150–400)
POTASSIUM SERPL-MCNC: 3.7 MMOL/L — SIGNIFICANT CHANGE UP (ref 3.5–5.3)
POTASSIUM SERPL-SCNC: 3.7 MMOL/L — SIGNIFICANT CHANGE UP (ref 3.5–5.3)
PROT SERPL-MCNC: 7.4 G/DL — SIGNIFICANT CHANGE UP (ref 6–8.3)
RBC # BLD: 3.76 M/UL — LOW (ref 4.2–5.8)
RBC # FLD: 14 % — SIGNIFICANT CHANGE UP (ref 10.3–14.5)
SALICYLATES SERPL-MCNC: <0.3 MG/DL — LOW (ref 15–30)
SARS-COV-2 RNA SPEC QL NAA+PROBE: SIGNIFICANT CHANGE UP
SODIUM SERPL-SCNC: 136 MMOL/L — SIGNIFICANT CHANGE UP (ref 135–145)
SPECIMEN SOURCE: SIGNIFICANT CHANGE UP
THC UR QL: NEGATIVE — SIGNIFICANT CHANGE UP
TOXICOLOGY SCREEN, DRUGS OF ABUSE, SERUM RESULT: SIGNIFICANT CHANGE UP
TSH SERPL-MCNC: 1.72 UIU/ML — SIGNIFICANT CHANGE UP (ref 0.27–4.2)
WBC # BLD: 6.34 K/UL — SIGNIFICANT CHANGE UP (ref 3.8–10.5)
WBC # FLD AUTO: 6.34 K/UL — SIGNIFICANT CHANGE UP (ref 3.8–10.5)

## 2025-05-20 PROCEDURE — 99282 EMERGENCY DEPT VISIT SF MDM: CPT

## 2025-05-20 RX ORDER — DIPHENHYDRAMINE HCL 12.5MG/5ML
50 ELIXIR ORAL AT BEDTIME
Refills: 0 | Status: DISCONTINUED | OUTPATIENT
Start: 2025-05-20 | End: 2025-05-22

## 2025-05-20 RX ORDER — B1/B2/B3/B5/B6/B12/VIT C/FOLIC 500-0.5 MG
1 TABLET ORAL DAILY
Refills: 0 | Status: DISCONTINUED | OUTPATIENT
Start: 2025-05-20 | End: 2025-05-22

## 2025-05-20 RX ORDER — DIPHENHYDRAMINE HCL 12.5MG/5ML
50 ELIXIR ORAL ONCE
Refills: 0 | Status: DISCONTINUED | OUTPATIENT
Start: 2025-05-20 | End: 2025-05-22

## 2025-05-20 RX ORDER — DIPHENHYDRAMINE HCL 12.5MG/5ML
50 ELIXIR ORAL THREE TIMES A DAY
Refills: 0 | Status: DISCONTINUED | OUTPATIENT
Start: 2025-05-20 | End: 2025-05-22

## 2025-05-20 RX ORDER — LORAZEPAM 4 MG/ML
4 VIAL (ML) INJECTION
Refills: 0 | Status: DISCONTINUED | OUTPATIENT
Start: 2025-05-20 | End: 2025-05-20

## 2025-05-20 RX ORDER — HYDROXYZINE HYDROCHLORIDE 25 MG/1
50 TABLET, FILM COATED ORAL ONCE
Refills: 0 | Status: DISCONTINUED | OUTPATIENT
Start: 2025-05-20 | End: 2025-05-20

## 2025-05-20 RX ORDER — HYDROXYZINE HYDROCHLORIDE 25 MG/1
50 TABLET, FILM COATED ORAL EVERY 8 HOURS
Refills: 0 | Status: DISCONTINUED | OUTPATIENT
Start: 2025-05-20 | End: 2025-05-20

## 2025-05-20 RX ORDER — HALOPERIDOL 10 MG/1
5 TABLET ORAL ONCE
Refills: 0 | Status: DISCONTINUED | OUTPATIENT
Start: 2025-05-20 | End: 2025-05-22

## 2025-05-20 RX ORDER — LORAZEPAM 4 MG/ML
2 VIAL (ML) INJECTION
Refills: 0 | Status: DISCONTINUED | OUTPATIENT
Start: 2025-05-20 | End: 2025-05-20

## 2025-05-20 RX ORDER — LORAZEPAM 4 MG/ML
2 VIAL (ML) INJECTION ONCE
Refills: 0 | Status: DISCONTINUED | OUTPATIENT
Start: 2025-05-20 | End: 2025-05-20

## 2025-05-20 RX ORDER — HALOPERIDOL 10 MG/1
5 TABLET ORAL EVERY 6 HOURS
Refills: 0 | Status: DISCONTINUED | OUTPATIENT
Start: 2025-05-20 | End: 2025-05-22

## 2025-05-20 RX ORDER — ASPIRIN 325 MG
81 TABLET ORAL DAILY
Refills: 0 | Status: DISCONTINUED | OUTPATIENT
Start: 2025-05-20 | End: 2025-05-22

## 2025-05-20 RX ORDER — FOLIC ACID 1 MG/1
1 TABLET ORAL DAILY
Refills: 0 | Status: DISCONTINUED | OUTPATIENT
Start: 2025-05-20 | End: 2025-05-22

## 2025-05-20 RX ORDER — ACETAMINOPHEN 500 MG/5ML
650 LIQUID (ML) ORAL EVERY 6 HOURS
Refills: 0 | Status: DISCONTINUED | OUTPATIENT
Start: 2025-05-20 | End: 2025-05-22

## 2025-05-20 RX ORDER — LURASIDONE HYDROCHLORIDE 120 MG/1
20 TABLET, FILM COATED ORAL ONCE
Refills: 0 | Status: COMPLETED | OUTPATIENT
Start: 2025-05-20 | End: 2025-05-20

## 2025-05-20 RX ORDER — ACETAMINOPHEN 500 MG/5ML
650 LIQUID (ML) ORAL ONCE
Refills: 0 | Status: COMPLETED | OUTPATIENT
Start: 2025-05-20 | End: 2025-05-20

## 2025-05-20 RX ORDER — LORAZEPAM 4 MG/ML
2 VIAL (ML) INJECTION
Refills: 0 | Status: DISCONTINUED | OUTPATIENT
Start: 2025-05-20 | End: 2025-05-22

## 2025-05-20 RX ORDER — LORAZEPAM 4 MG/ML
2 VIAL (ML) INJECTION ONCE
Refills: 0 | Status: DISCONTINUED | OUTPATIENT
Start: 2025-05-20 | End: 2025-05-22

## 2025-05-20 RX ADMIN — Medication 50 MILLIGRAM(S): at 21:27

## 2025-05-20 RX ADMIN — LURASIDONE HYDROCHLORIDE 20 MILLIGRAM(S): 120 TABLET, FILM COATED ORAL at 01:58

## 2025-05-20 RX ADMIN — Medication 650 MILLIGRAM(S): at 01:58

## 2025-05-20 RX ADMIN — Medication 650 MILLIGRAM(S): at 16:31
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
## 2025-05-20 NOTE — BH INPATIENT PSYCHIATRY ASSESSMENT NOTE - NSBHMETABOLIC_PSY_ALL_CORE_FT
BMI: BMI (kg/m2): 36.4 (05-20-25 @ 14:49)  HbA1c: A1C with Estimated Average Glucose Result: 5.4 % (12-27-24 @ 05:30)    Glucose: POCT Blood Glucose.: 109 mg/dL (04-10-25 @ 20:22)    BP: 116/75 (05-20-25 @ 12:20) (116/75 - 136/82)Vital Signs Last 24 Hrs  T(C): 36.8 (05-20-25 @ 14:49), Max: 36.8 (05-20-25 @ 14:49)  T(F): 98.2 (05-20-25 @ 14:49), Max: 98.2 (05-20-25 @ 14:49)  HR: 61 (05-20-25 @ 12:20) (61 - 99)  BP: 116/75 (05-20-25 @ 12:20) (116/75 - 136/82)  BP(mean): --  RR: 18 (05-20-25 @ 12:20) (15 - 18)  SpO2: 96% (05-20-25 @ 12:20) (96% - 100%)    Orthostatic VS  05-20-25 @ 14:49  Lying BP: --/-- HR: --  Sitting BP: 124/71 HR: 86  Standing BP: 127/93 HR: 90  Site: --  Mode: --    Lipid Panel: Date/Time: 04-19-25 @ 11:41  Cholesterol, Serum: 143  LDL Cholesterol Calculated: 68  HDL Cholesterol, Serum: 54  Total Cholesterol/HDL Ration Measurement: --  Triglycerides, Serum: 116

## 2025-05-20 NOTE — ED ADULT NURSE NOTE - CHIEF COMPLAINT QUOTE
c/o SI with plan, " I want to shoot myself." pt states has means to do so. states "hearing voices telling me to kill myself." denies HI. past med hx HTN, pericarditis, depression, BPD , bipolar. pt calm cooperative in triage. pt to be seen in .
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
## 2025-05-20 NOTE — BH INPATIENT PSYCHIATRY ASSESSMENT NOTE - OTHER PAST PSYCHIATRIC HISTORY (INCLUDE DETAILS REGARDING ONSET, COURSE OF ILLNESS, INPATIENT/OUTPATIENT TREATMENT)
multiple psych admissions including recent discharge [facility] (4/17-4/24/2025),  [facility] ( 3/2-3/19/2025) and  [facility] ML4, 12/3 - 12/13/2024 for mgt of depression and SI in the context of psychosocial stressors and polysubstance use, could not rule out malingering as part of the diagnosis. Was on Latuda trial at 20mg per recent discharge note, but did not took meds after discharge (states that did not  any medication from pharmacy and lost meds provided to him on discharge). Brief rehab stay after discharge, reportedly left after an altercation with staff.   self reported hx of depression. anxiety. bipolar disorder, and schizophrenia  high mental health utilizer including numerous Psych ED/ CPEP visits for SI, alcohol intoxication  chronically nonadherent with meds/ admitted to being inconsistent on his out-patient psychiatric care.  stated past hx of allegedly self aborting jumping in front of a 7 train over the summer ("stopped by an emre" (2023), also reported past hx of self aborting jumping in front of a train in 7/ 2024

## 2025-05-20 NOTE — ED BEHAVIORAL HEALTH PROGRESS NOTE - SUMMARY
Patient seen and evaluated. Presents depressed with suicidal ideation and a plan to shoot himself with a 357 revolver available to him through his brother. Reports having other means of acquiring firearm but refuses to elaborate. SI is in the context of recent polysubstance use and losing contact with his daughter for a month. Reports auditory and visual hallucination last experienced en route to hospital, however, does not appear as psychotic given linear thought process, no internal preoccupation, and no disorganized speech or behavior. UDS was positive for cocaine but negative for alcohol and THC to which patient reports recent exposure. Overall, presentation is consistent with a depressive episode accompanied with suicidality and subjective psychotic features. Differential diagnoses include cocaine induced mood/psychotic disorder and malingering.          MDD with Psychosis  Cocaine-induced mood/psychotic disorder  Malingering  Acute risk factors - poor social support, loss of contact with daughter, housing instability, ongoing substance use, depression/ anxiety,  SI and, AH  Chronic risk factors - extensive psychiatric and substance hx, hx outpatient treatment nonadherence,  Cluster B pathology, male, hx of being impulsive, multiple psych admission  Protective factors -   pt is treatment-seeking and often self-presents to the ED for voluntary hospitalization, no clear hx of suicide attempts, no known hx of violence, help seeking, sense of commitment to daughter, does not appear manic, no floridly psychotic  At this time given all risks taken into consideration, the Pt is moderate risk for self harm as well as remaining at chronically elevated risk of harming self.  ongoing presentation not deemed dischargeable back to the community.  current increased in risks can be mitigated by a psychiatric admission with supervision, continuing psych meds with titration towards efficacious dosing range

## 2025-05-20 NOTE — BH INPATIENT PSYCHIATRY ASSESSMENT NOTE - NSBHSACONSEQUENCE_PSY_A_CORE FT
Per chart review: "Pt reports many past detox and rehab admissions, including [facility], others.".. with continued use of said drugs, will cause worsening psychosis, depressive and anxiety symptoms including clinical picture of miquel

## 2025-05-20 NOTE — BH INPATIENT PSYCHIATRY ASSESSMENT NOTE - DESCRIPTION
single, has a 17 yr old daughter domiciled with mother in [place].  unemployed. undomiciled. lives on streets or in hotel and provides for self through part time work as a

## 2025-05-20 NOTE — BH INPATIENT PSYCHIATRY ASSESSMENT NOTE - CURRENT MEDICATION
MEDICATIONS  (STANDING):  aspirin  chewable 81 milliGRAM(s) Oral daily  diphenhydrAMINE 50 milliGRAM(s) Oral at bedtime  folic acid 1 milliGRAM(s) Oral daily  multivitamin 1 Tablet(s) Oral daily  thiamine 100 milliGRAM(s) Oral daily    MEDICATIONS  (PRN):  acetaminophen     Tablet .. 650 milliGRAM(s) Oral every 6 hours PRN Moderate Pain (4 - 6)  diphenhydrAMINE 50 milliGRAM(s) Oral three times a day PRN anxieety  diphenhydrAMINE Injectable 50 milliGRAM(s) IntraMuscular once PRN severe agitation  haloperidol     Tablet 5 milliGRAM(s) Oral every 6 hours PRN agitation  haloperidol    Injectable 5 milliGRAM(s) IntraMuscular once PRN combative behavior  LORazepam     Tablet 2 milliGRAM(s) Oral every 2 hours PRN CIWA score increase by 2 points and current CIWA score GREATER THAN 9  LORazepam   Injectable 2 milliGRAM(s) IntraMuscular once PRN severe anxiety

## 2025-05-20 NOTE — ED BEHAVIORAL HEALTH NOTE - BEHAVIORAL HEALTH NOTE
as per psyckes:    Current Care Coordination  Health Home (Enrolled)[facility] (Begin Date: 01-MAR-25) • Status : Active  Main Contact Referral : Madison Ac: [phone], [email or web address]  Member Referral Number: [phone]; [email or web address]  Care Management (Enrolled): [facility]  Intensive Mobile Treatment (IMT)[facility] ([facility]) [facility] (Admission Date: 22-AUG-22, Discharge Date: 16-MAY-24, Reason for Discharge: Refused) • Main Contact: Kim Rangel, [phone], [email or web address]  Notifications  Complex Needs due to3+ Inpt MH < 13 months , 3+ inpatient medical visits in past 13 months and have schizophrenia or bipolar , 4+ ER MH < 13 months , HH+ Eligibility , HH+ service in the past year with MH diagnosis , Homeless in past 6 months + SMI , Homicidal ideation in past year and 1+ MH ED/CPEP/IP in past year , Intensive Mobile Treatment (IMT) in past year with MH diagnosis  Health Home PlusServices last received March 2025 from [facility]  POP High UserThis client is enrolled in an episode of intensive care transition services. To coordinate, please contact the client's managed care plan : [employer] • [employer] [facility] [phone]; [phone] [email or web address]  POP Potential Clozapine CandidateEvaluate for potential clozapine initiation/referral due to schizophrenia, high psychiatric Inpatient/ER use, and no recent clozapine use. Identify a community-based clozapine prescriber and other supports for clozapine treatment by contacting the client's managed care plan : [employer] • [employer] [facility] [phone]; [phone] [email or web address]  Health Home Plus EligibilityThis client is eligible for Health Home Plus due to: 3+ Inpt MH < 13 months, 3+ Inpt Med & Schiz/Bipolar Dx < 13 months, 4+ ER MH < 13 months  High Mental Health Need due to3+ Inpt MH < 13 months ; 3+ inpatient medical visits in past 13 months and have schizophrenia or bipolar past year ; 4+ ER MH < 13 months ; HH+ Eligibility ; HH+ service in the past year with MH diagnosis ; Intensive Mobile Treatment (IMT) in the past year with MH diagnosis  Mental Health Placement Consideration due to1 or more ER visits or inpatient stays in the past year with a suicide attempt/ suicide ideation/ self-harm code; 1 or more inpatient MH stays in past 5 years; Any history of forensic psych inpatient setting or forensic status in any [facility] inpatient setting; Any history of senior care  outpatient services; Four or more emergency MH visits in past 13 months; Intensive Mobile Treatment (IMT) in past 5 years; Three or more inpatient medical visits in past 1 year and have schizophrenia or bipolar past year      Alerts • all availableMost Recent   4Homelessness - [facility]  1/21/2023[facility] (Single Adult)   156Treatment for Suicidal Ideation (43 Inpatient, 117 ER, 16 Other)  4/25/2025[facility] (Inpatient - MH)   2Homelessness - reported in billing (1 Unspecified, 1 Sheltered)  3/22/2025[facility] (ER - MH; Homelessness - Unspecified)   2Treatment for Self inflicted Poisoning (2 Inpatient)  5/2/2023[facility] (Inpatient - Medical)  Social Determinants of Health (SDOH) Past Year - reported in billing  Other problems related to primary support group, including family circumstances  Other specified problems related to primary support group  Problems related to employment and unemployment  Unemployment, unspecified  Problems related to housing and economic circumstances  Sheltered homelessness • Homelessness unspecified • Unsheltered homelessness • Housing instability, housed unspecified • Low income  Problems related to other psychosocial circumstances  Other specified problems related to psychosocial circumstances    Diagnoses Past Year  Behavioral Health (23)  5 Most Recent:Unspecified/Other Psychotic Disorders • Unspecified/Other Anxiety Disorder • Unspecified/Other Depressive Disorder • Other psychoactive substance related disorders • Adjustment Disorder ...  5 Most Frequent (# of services):Alcohol related disorders(96) • Cocaine related disorders(52) • Unspecified/Other Depressive Disorder(45) • Unspecified/Other Bipolar(44) • Other psychoactive substance related disorders(31) ...  Medical (78)  5 Most Recent:Symptoms and signs involving emotional state • Other diseases of pericardium • Hypertensive heart disease • Gastro-esophageal reflux disease • Heart failure ...  5 Most Frequent (# of services):Essential (primary) hypertension(64) • Pain in throat and chest(51) • Symptoms and signs involving emotional state(40) • Chronic ischemic heart disease(24) • Disorders of lipoprotein metabolism and other lipidemias(20) ...    High Utilization - Inpt/ER  10+ ER - [facility] • 10+ ER - MH • 2+ ER - BH • 2+ ER - MH • 2+ ER - Medical • 2+ Inpatient - BH • 2+ Inpatient - MH • 2+ Inpatient - Medical • 4+ Inpatient/ER - BH • 4+ Inpatient/ER - MH • 4+ Inpatient/ER - Med • Clozapine Candidate with 4+ Inpatient/ER - MH • POP : High User • POP : Potential Clozapine Candidate    Medications Past YearLast   Lorazepam, Injection (Lorazepam, Injection) • Anxiolytic/Hypnotic3/19/2025Dose: 2 MG • Quantity: null  Famotidine (Famotidine) • H-2 Antagonists2/4/2025Dose: 20 MG, 2/day • Quantity: 60  Hydrochlorothiazide (Hydrochlorothiazide) • Thiazides and Thiazide-Like Diuretics2/4/2025Dose: 25 MG, 1/day • Quantity: 30  Naloxone Hcl (Naloxone Hcl) • Narcotic Antagonists1/8/2025Dose: 4 MG/0.1ML, .4/day • Quantity: 2  Azithromycin (Azithromycin) • Azithromycin1/5/2025Dose: 250 MG, 1.2/day • Quantity: 6  Morphine Sulfate, Injection (Morphine Sulfate, Injection) • Xpssqp8711/18/2024Dose: UP TO 10 MG • Quantity: null  Amoxicillin- Pot Clavulanate (Amoxicillin-Pot Clavulanate) • Penicillin Gfrpiavihvur59/4/2024Dose: 875-125 MG/day • Quantity: 20  Ibuprofen (Ibuprofen) • Nonsteroidal Anti-inflammatory Agents (NSAIDs)11/4/2024Dose: 600 MG, 3.75/day • Quantity: 30  Metoprolol Tartrate (Metoprolol Tartrate) • Beta Blockers Cardio-Selective9/30/2024Dose: 25 MG, 1/day • Quantity: 30  Colchicine (Colchicine) • Gout Agents8/17/2024Dose: 0.6 MG, 1/day • Quantity: 30  Aspirin (Aspirin) • Salicylates7/5/2024Dose: 81 MG, 1/day • Quantity: 30  Lurasidone Hcl (Lurasidone Hcl) • Antipsychotic7/5/2024Dose: 40 MG, 1/day • Quantity: 30  Aspirin (Aspirin Low Dose) • Salicylates6/13/2024Dose: 81 MG, 1/day • Quantity: 30  Atorvastatin Calcium (Atorvastatin Calcium) • HMG CoA Reductase Inhibitors6/13/2024Dose: 40 MG, 1/day • Quantity: 30  Loratadine (Loratadine) • Antihistamines - Non-Sedating6/13/2024Dose: 10 MG, 1/day • Quantity: 30      Outpatient Providers Past YearLast Service Date & Type  [facility]8/13/2024Clinic - LEE - Opioid Treatment Program  [facility]8/3/2024Clinic - Medical Specialty  [facility]ICAL SERVICES 6/24/2024Physicians Group - Internal Medicine  [facility]6/1/2024Clinic -  Specialty    All Hospital and Crisis Utilization • 5 Years  ER Visits# ProvidersLast ER Visit  312Dewpbrc713/25/2025 at [facility]  95Mental Sufrbr433173/25/2025 at [facility]  37Substance [id number]/22/2025 at [facility]  Inpatient Admissions# ProvidersLast Inpatient Admission  42Mental Qfurve874124/25/2025 at [facility]  82Lmrdqcy987/5/2025 at [facility]  145Substance Idm336223/26/2025 at [facility]  Crisis Services# ProvidersLast Crisis Service

## 2025-05-20 NOTE — ED BEHAVIORAL HEALTH PROGRESS NOTE - RISK ASSESSMENT
Acute risk factors - poor social support, loss of contact with daughter, housing instability, ongoing substance use, depression/ anxiety,  SI and, AH  Chronic risk factors - extensive psychiatric and substance hx, hx outpatient treatment nonadherence,  Cluster B pathology, male, hx of being impulsive, multiple psych admission  Protective factors -   pt is treatment-seeking and often self-presents to the ED for voluntary hospitalization, no clear hx of suicide attempts, no known hx of violence, help seeking, sense of commitment to daughter, does not appear manic, no floridly psychotic  At this time given all risks taken into consideration, the Pt is moderate risk for self harm as well as remaining at chronically elevated risk of harming self.  ongoing presentation not deemed dischargeable back to the community.  current increased in risks can be mitigated by a psychiatric admission with supervision, continuing psych meds with titration towards efficacious dosing range

## 2025-05-20 NOTE — BH INPATIENT PSYCHIATRY ASSESSMENT NOTE - NSBHCHARTREVIEWVS_PSY_A_CORE FT
Vital Signs Last 24 Hrs  T(C): 36.8 (05-20-25 @ 14:49), Max: 36.8 (05-20-25 @ 14:49)  T(F): 98.2 (05-20-25 @ 14:49), Max: 98.2 (05-20-25 @ 14:49)  HR: 61 (05-20-25 @ 12:20) (61 - 99)  BP: 116/75 (05-20-25 @ 12:20) (116/75 - 136/82)  BP(mean): --  RR: 18 (05-20-25 @ 12:20) (15 - 18)  SpO2: 96% (05-20-25 @ 12:20) (96% - 100%)    Orthostatic VS  05-20-25 @ 14:49  Lying BP: --/-- HR: --  Sitting BP: 124/71 HR: 86  Standing BP: 127/93 HR: 90  Site: --  Mode: --

## 2025-05-20 NOTE — ED BEHAVIORAL HEALTH PROGRESS NOTE - DETAILS:
Pt seen for assessment this am, chart reviewed. Pt is known to service. He reports that whenever he does not speak with his daughter, he gets upset "and does crazy things". She is 16yo and resides in [place]. Pt reports "I feel the same" reporting depressed mood with suicidality. He was using "a bunch" of drugs, though only cocaine was positive in [place]. Pt continues to request hospitalization.
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
## 2025-05-20 NOTE — ED BEHAVIORAL HEALTH PROGRESS NOTE - CASE SUMMARY/FORMULATION (CLEARLY DOCUMENT RATIONALE FOR DISPOSITION CHANGE)
59M, single, undomiciled and unemployed, lives in hotels or on streets and works part-time as , self reported hx of bipolar disorder and SCZ; as per PSYCKES has the following Behavioral Health Diagnoses to include  Unspecified/Other Depressive Disorder, Unspecified/Other Psychotic Disorders, Unspecified/Other Anxiety Disorder, Other psychoactive substance related disorders, Adjustment Disorder, Alcohol related disorders, Cocaine related disorders, Unspecified/Other Depressive Disorder, Unspecified/Other Bipolar, and Other psychoactive substance related disorders. Patient is high utilizer of mental health facilities and is chronically non adherent to meds and psych appts; has multiple ED visits and psych admissions (last [facility] 4/17-4/24/2025) for suicidality.  past documented hx for suspicion of malingering on pior visits including this latest to [facility]  admission,  as per chart review, there is documented hx of "several prior self-aborted suicide attempts with plan to jump in front of train in 2023 but was, "stopped by an emre",  Pt has hx of polysubstance use (as per PSYCKES: with hx of Cannabis related disorders/ Alcohol related disorders/ Cocaine related disorders/ Other psychoactive substance related disorders/ Opioid related disorders/ Tobacco related disorder/  Other stimulant related disorders and Sedative, hypnotic, or anxiolytic related disorders).  Pt has had hx of numerous prior detox/rehabs (previous [facility] admission in 12/2023).  He has hx of multiple ED visits reporting chest pain and/or SI and/or command AH to kill himself,. pertinent medical issues include: HTN, pericarditis, Chronic CHF and GERD.  Pt admits he is not on any maintenance meds.  today, self presented to the ED, complaining of depression, anxiety, "AH" and SI with plan to shoot himself with his brother's gun.  Pt remains depressed with SI; unable to safety plan, requires inpt admission.   cannot r/o malingering.

## 2025-05-20 NOTE — ED BEHAVIORAL HEALTH PROGRESS NOTE - PSYCHIATRIC ISSUES AND PLAN (INCLUDE STANDING AND PRN MEDICATION)
Haldol prn; hydroxyzine prn Haldol prn; hydroxyzine prn  Will defer standing meds to primary team; pt is chronically non-adherent and refuses meds

## 2025-05-20 NOTE — BH INPATIENT PSYCHIATRY ASSESSMENT NOTE - NSBHASSESSSUMMFT_PSY_ALL_CORE
Homeless man, multiple charted psychaitric diagnoses across multiple categories, history of substance use disorder, concerns for malingering in past self presented to the ED complaining of depression, anxiety, "AH" and SI with plan to shoot himself with his brother's gun. similar to other presentations, 3 day letters, trips to rehab and signs self out, et cetera.  Reprots recent relapse.       1. continue 913 hospitalization for safety and assessment.  although suspicious of malingering, recent susbtance use is risk factor.  2. routine checks appropriate can make needs known, future oriented on unit.  3. haldol, benadryl prns, ciwa ativan prns for withdrawal.  no history of complicated withdrawal.   4. reports he does not want medications, has refused in past, will defer standing meds for now other than asa.

## 2025-05-20 NOTE — BH INPATIENT PSYCHIATRY ASSESSMENT NOTE - DETAILS
Reports having access to a 357 revolver through his brother who he calls by vanessa Pennington" Deferred reports past aborted suicide attempt- was going to jump in front of a train and an emre saved him. per chart review: reported experiencing diarrhea with Zoloft

## 2025-05-20 NOTE — BH INPATIENT PSYCHIATRY ASSESSMENT NOTE - NSREFERRALSOURCE_PSY_ALL_CORE
[facility] Call Center    Phone Message    May a detailed message be left on voicemail: yes    Reason for Call: Other: Pt would like to be seen by Dr Moeller for Ulcerative Colitis stage 4     Action Taken: Message routed to:  [facility] ([facility]): I explained the process, records are being faxed to the clinic from [facility] in [place]. I said the clinic will want to see the Pt within one Mo. so set up a temporary appt. for Dec. and said she will get a call, Mother is willing to have the Pt sign a release.   ED Admission

## 2025-05-20 NOTE — BH INPATIENT PSYCHIATRY ASSESSMENT NOTE - LEGAL HISTORY
denies. no listed pending legal issues as per [facility] UNIFIED COURT SYSTEM/ WEBCRIMS SITE

## 2025-05-20 NOTE — ED ADULT NURSE NOTE - CHIEF COMPLAINT
Patient conceived with natural/TIC, LMP 12/04/23, called yesterday with +UPT. Reviewed result of hCG level: 74,873, given elevated level at 3w5d there is possible concern for molar pregnancy. Recommend repeat hCG level and early OB scan on Tuesday, 1/2/24. Called patient to discuss plan but went direct to . Left detailed message with patient that we would schedule her for Tuesday and to call with any concerns. Sent request to  to schedule with patient.   Discussed with Dr. Mejia  Reviewed and approved by LORI COLMENARES on 12/30/23 at 10:31 AM.   
The patient is a 59y Male complaining of psychiatric evaluation.

## 2025-05-21 PROCEDURE — 99232 SBSQ HOSP IP/OBS MODERATE 35: CPT

## 2025-05-21 RX ADMIN — Medication 100 MILLIGRAM(S): at 08:19

## 2025-05-21 RX ADMIN — Medication 650 MILLIGRAM(S): at 12:38

## 2025-05-21 RX ADMIN — Medication 81 MILLIGRAM(S): at 08:19

## 2025-05-21 RX ADMIN — FOLIC ACID 1 MILLIGRAM(S): 1 TABLET ORAL at 08:19

## 2025-05-21 RX ADMIN — Medication 1 TABLET(S): at 08:19

## 2025-05-21 RX ADMIN — Medication 650 MILLIGRAM(S): at 18:06

## 2025-05-21 NOTE — BH SOCIAL WORK INITIAL PSYCHOSOCIAL EVALUATION - NSBHSACONSEQUENCE_PSY_A_CORE FT
Per chart review: "Pt reports many past detox and rehab admissions, including [facility], [facility], others.".. with continued use of said drugs, will cause worsening psychosis, depressive and anxiety symptoms including clinical picture of miquel

## 2025-05-21 NOTE — BH INPATIENT PSYCHIATRY PROGRESS NOTE - NSBHASSESSSUMMFT_PSY_ALL_CORE
Homeless man, multiple charted psychiatric diagnoses across multiple categories, history of substance use disorder.  He has a pattern of substance use/relapse and decompensation with relatively rapid improvement in symptoms with or without medications and either placement in substance treatment and/or submits three day letter who presented with a similar presentation to the ED, was admitted on a 913 legal status and is improving and interested in inpatient substance abuse treatment after relapsing on cocaine.      1. continue 913 hospitalization for safety and assessment  2. routine checks appropriate can make needs known, future oriented on unit.  3. haldol, benadryl prns, ciwa ativan prns for withdrawal.  no history of complicated withdrawal.   4. reports he does not want medications, has refused in past, will defer standing meds for now other than asa and benadryl 50mg at bedtime.  5. disposition planning

## 2025-05-21 NOTE — BH INPATIENT PSYCHIATRY PROGRESS NOTE - NSBHMETABOLIC_PSY_ALL_CORE_FT
BMI: BMI (kg/m2): 36.4 (05-20-25 @ 14:49)  HbA1c: A1C with Estimated Average Glucose Result: 5.4 % (12-27-24 @ 05:30)    Glucose: POCT Blood Glucose.: 109 mg/dL (04-10-25 @ 20:22)    BP: 116/75 (05-20-25 @ 12:20) (116/75 - 136/82)Vital Signs Last 24 Hrs  T(C): 36.7 (05-21-25 @ 06:32), Max: 36.8 (05-20-25 @ 14:49)  T(F): 98.1 (05-21-25 @ 06:32), Max: 98.2 (05-20-25 @ 14:49)  HR: --  BP: --  BP(mean): --  RR: --  SpO2: --    Orthostatic VS  05-21-25 @ 06:32  Lying BP: --/-- HR: --  Sitting BP: 126/89 HR: 77  Standing BP: --/-- HR: --  Site: --  Mode: --  Orthostatic VS  05-20-25 @ 14:49  Lying BP: --/-- HR: --  Sitting BP: 124/71 HR: 86  Standing BP: 127/93 HR: 90  Site: --  Mode: --    Lipid Panel: Date/Time: 04-19-25 @ 11:41  Cholesterol, Serum: 143  LDL Cholesterol Calculated: 68  HDL Cholesterol, Serum: 54  Total Cholesterol/HDL Ration Measurement: --  Triglycerides, Serum: 116

## 2025-05-21 NOTE — BH INPATIENT PSYCHIATRY PROGRESS NOTE - NSBHFUPINTERVALCCFT_PSY_A_CORE
Patient seen, chart reviewed, case discussed with team.  patient seen for follow up of psychaitric symptoms in the setting of substance relapse.

## 2025-05-21 NOTE — BH SOCIAL WORK INITIAL PSYCHOSOCIAL EVALUATION - NSBHPROFILEREVIEW_PSY_ALL_CORE
[Follow Up] : a follow up visit [FreeTextEntry1] : 6 months s/p right ACL reconstruction with quad autograft. DOS 8/11/23 [Patient] : patient [Mother] : mother Yes

## 2025-05-21 NOTE — BH SOCIAL WORK INITIAL PSYCHOSOCIAL EVALUATION - NSBHDISABILITY_PSY_ALL_CORE
Kavin Montero is a 80year old male that presents with Patient presents with:  Lesion: Mole to forehead  .     REFERRED BY:  Floresita Sanchez    Pacemaker: No  Latex Allergy: no  Coumadin: No  Plavix: No  Other anticoagulants: No  Cardiac stents: No    HAND breakfast. Disp: 90 capsule Rfl: 1   Budesonide-Formoterol Fumarate (SYMBICORT) 160-4.5 MCG/ACT Inhalation Aerosol Inhale 2 puffs into the lungs 2 (two) times daily.  Disp: 1 Inhaler Rfl: 1       Allergies:   No Known Allergies    Past Medical History:   Anu Thompson PHYSICAL EXAM:   CONSTITUTIONAL: Overall appearance - Normal  HEENT: Normocephalic  EYES: Conjunctiva - Right: Normal, Left: Normal; EOMI  EARS: Inspection - Right: Normal, Left: Normal  NECK/THYROID: Inspection - Normal, Palpation - Normal, Thyroid No

## 2025-05-21 NOTE — BH INPATIENT PSYCHIATRY PROGRESS NOTE - NSBHCHARTREVIEWVS_PSY_A_CORE FT
Vital Signs Last 24 Hrs  T(C): 36.7 (05-21-25 @ 06:32), Max: 36.8 (05-20-25 @ 14:49)  T(F): 98.1 (05-21-25 @ 06:32), Max: 98.2 (05-20-25 @ 14:49)  HR: --  BP: --  BP(mean): --  RR: --  SpO2: --    Orthostatic VS  05-21-25 @ 06:32  Lying BP: --/-- HR: --  Sitting BP: 126/89 HR: 77  Standing BP: --/-- HR: --  Site: --  Mode: --  Orthostatic VS  05-20-25 @ 14:49  Lying BP: --/-- HR: --  Sitting BP: 124/71 HR: 86  Standing BP: 127/93 HR: 90  Site: --  Mode: --

## 2025-05-21 NOTE — BH INPATIENT PSYCHIATRY PROGRESS NOTE - NSBHFUPINTERVALHXFT_PSY_A_CORE
No interval events, compliant with meds, slept until 0540.  Used benadryl at bedtime, no other prns, no complaints.  Some staff reported guarded tense.  Others report calm and easy going.  He is pleasant but superficial with me.  he is a bit entitled with multiple requests but of doubtful clinical significance.  We discuss his pattern of admissions in the setting of substance use with psychiatric symptoms such as SI, auditory hallucinations and so forth.  He reports he is feeling much better since admission and attributes his earlier statements to substance intoxication. He relates he would like help getting into inpatient substance treatment.  He endorses bad decisions in his life and   some depressed mood about this but denies si/hi intents or plans and is future oriented.  THere is no evidence of psychosis, miquel or withdrawal.

## 2025-05-21 NOTE — PSYCHIATRIC REHAB INITIAL EVALUATION - NSBHPRRECOMMEND_PSY_ALL_CORE
Writer met with patient in order to orient patient to the unit, introduce patient to Psychiatric Rehabilitation Staff, department functions and to establish a collaborative Psychiatric Rehabilitation goal. Patient was verbal, polite and cooperative throughout the duration of the interview. Patient was able to be fully oriented to group programming and was encouraged to attend. Questions and concerns surrounding treatment and Psychiatric Rehabilitation services were addressed. Patient was admitted to [place] [id number] on 5/20/25 in the context of SI with a plan. Patient's insight into symptoms is fair. Patient was able to develop a rapport and expresses interest in receiving treatment. Patient was able to establish a collaborative Psychiatric Rehabilitation goal. Psychiatric Rehabilitation Staff will continue to engage patient daily in order to develop rapport, provide support and to assist patient in demonstrating progress towards Psychiatric Rehabilitation goals.

## 2025-05-21 NOTE — BH SOCIAL WORK INITIAL PSYCHOSOCIAL EVALUATION - OTHER PAST PSYCHIATRIC HISTORY (INCLUDE DETAILS REGARDING ONSET, COURSE OF ILLNESS, INPATIENT/OUTPATIENT TREATMENT)
Patient was discharged from [facility] on 4/24/25 and went to rehab. He left after, reportedly, getting into an altercation with staff over the . He has been living in hotels or on the streets since. He works as a  at times in order to pay for the hotels. The patient stated he was feeling depressed and suicidal with a plan to go to his brother's and shoot himself with his brother's gun. He also stated he had other places from which he could get a gun. Of note, the patient has a long history of malingering per his chart. He has been using alcohol, cannabis and cocaine since leaving the rehab facility. He is requesting discharge to another rehab facility when he is stable. Please see below for additional history.    Previous Note:  59 yr old male, single, undomiciled and unemployed but reportedly stays in hotels.  Self reported hx of bipolar disorder and SCZ;  Pt is high utilizer of mental health facilities + is chronically non adherent to meds and psych appts; has multiple ED visits and psych admissions ( last [facility] 3/2-3/13/2025) for depression + SI.  past documented hx for suspicion of malingering on pior visits including this latest to [facility]  admission,  as per chart review, there is documented hx of "several prior self-aborted suicide attempts with plan to jump in front of train in 2023 but was, "stopped by an emre",  Pt has hx of polysubstance use (as per PSYCKES: with hx of Cannabis related disorders/ Alcohol related disorders/ Cocaine related disorders/ Other psychoactive substance related disorders/ Opioid related disorders/ Tobacco related disorder/  Other stimulant related disorders and Sedative, hypnotic, or anxiolytic related disorders).  Pt has had hx of numerous prior detox/rehabs (previous [facility] admission in 12/2023).  He has hx of multiple ED visits reporting chest pain and/or SI and/or command AH to kill himself,. pertinent medical issues include: HTN, pericarditis, Chronic CHF and GERD.  Pt admits he is not on any maintenance meds.  today, self presented to the ED (once again), complaining of depression, anxiety, "AH" and SI with plan to jump off a roof top.     In ED, pt presented calm and cooperative throughout interview. Patient reports he was drinking with a female friend on the roof top of a building in [place] last night with intentions of jumping off once his friends left. Patient reports he was intoxicated and fell asleep before his friend left and woke up this morning. Patient again wanted to jump off the roof and took a bus to [facility] seeking admission. Patient reports since his discharge from [facility] he has not taken any medical or psychiatric medications. He has been living in hotels and smoking crack and drinking several days a week. Patient reports feeling hopeless and helpless and fears reports he will attempt suicide, by jumping or shooting self, if he is not admitted to the hospital. Patient reports he is not sleeping, has poor appetite, low energy and guilt. He also endorses intermittently hearing the voice of his mother when he is alone and not intoxicated.    At last d/c from [facility], pt left on 3DL and refused referral.  He has a care coordinator through [facility] ([email or web address] or [phone]) and also has a housing CM  Ms. Tang [phone]

## 2025-05-21 NOTE — BH INPATIENT PSYCHIATRY PROGRESS NOTE - PRN MEDS
MEDICATIONS  (PRN):  acetaminophen     Tablet .. 650 milliGRAM(s) Oral every 6 hours PRN Moderate Pain (4 - 6)  diphenhydrAMINE 50 milliGRAM(s) Oral three times a day PRN anxieety  diphenhydrAMINE Injectable 50 milliGRAM(s) IntraMuscular once PRN severe agitation  haloperidol     Tablet 5 milliGRAM(s) Oral every 6 hours PRN agitation  haloperidol    Injectable 5 milliGRAM(s) IntraMuscular once PRN combative behavior  LORazepam     Tablet 2 milliGRAM(s) Oral every 2 hours PRN CIWA score increase by 2 points and current CIWA score GREATER THAN 9  LORazepam   Injectable 2 milliGRAM(s) IntraMuscular once PRN severe anxiety

## 2025-05-22 VITALS — TEMPERATURE: 98 F

## 2025-05-22 RX ORDER — DIPHENHYDRAMINE HCL 12.5MG/5ML
1 ELIXIR ORAL
Qty: 30 | Refills: 0
Start: 2025-05-22 | End: 2025-06-20

## 2025-05-22 RX ORDER — ASPIRIN 325 MG
1 TABLET ORAL
Qty: 30 | Refills: 0
Start: 2025-05-22 | End: 2025-06-20

## 2025-05-22 RX ORDER — FOLIC ACID 1 MG/1
1 TABLET ORAL
Qty: 30 | Refills: 0
Start: 2025-05-22 | End: 2025-06-20

## 2025-05-22 RX ORDER — B1/B2/B3/B5/B6/B12/VIT C/FOLIC 500-0.5 MG
1 TABLET ORAL
Qty: 30 | Refills: 0
Start: 2025-05-22 | End: 2025-06-20

## 2025-05-22 RX ADMIN — Medication 81 MILLIGRAM(S): at 08:15

## 2025-05-22 RX ADMIN — FOLIC ACID 1 MILLIGRAM(S): 1 TABLET ORAL at 08:14

## 2025-05-22 RX ADMIN — Medication 650 MILLIGRAM(S): at 07:12

## 2025-05-22 RX ADMIN — Medication 100 MILLIGRAM(S): at 08:14

## 2025-05-22 RX ADMIN — Medication 1 TABLET(S): at 08:15

## 2025-05-22 NOTE — BH DISCHARGE NOTE NURSING/SOCIAL WORK/PSYCH REHAB - NSDCPRGOAL_PSY_ALL_CORE
Writer met with patient in order to review progress toward rehabilitation goals and assess current functioning. Patient was hospitalized due to feeling depressed after a relapse. Patient denied AH/VH/SI/HI, and denied all sx. Patient was able to accomplish his rehabilitation goals of establishing a safety plan. Patient and writer reviewed his recent safety plan, and patient reported that it is still accurate. Patient submitted a 3 day later and will be discharged AMA.

## 2025-05-22 NOTE — BH INPATIENT PSYCHIATRY DISCHARGE NOTE - HOSPITAL COURSE
uneventful Homeless man, multiple charted psychiatric diagnoses across multiple categories, history of substance use disorder.  He has a pattern of substance use/relapse and decompensation with relatively rapid improvement in symptoms with or without medications and either placement in substance treatment and/or submits three day letter who presented with a similar presentation to the ED, was admitted on a 913 legal status and iimproved greatly and placed a 72 hour letter on day two of discharge and is discharged in accordance with his wishes as he mann snot meet criteria for involuntary hospitalization.  Discharge on thee day letter with list of resources, 30 days of vitamins and a beenadryl 50mg po prn insomnia, which he did not use last night. Also 30 day supply of asa which he receives at home. Homeless man, multiple charted psychiatric diagnoses across multiple categories, history of substance use disorder.  He has a pattern of substance use/relapse and decompensation with relatively rapid improvement in symptoms with or without medications and either placement in substance treatment and/or submits three day letter who presented with a similar presentation to the ED, was admitted on a 913 legal status and iimproved greatly and placed a 72 hour letter on day two of discharge and is discharged in accordance with his wishes as he mann snot meet criteria for involuntary hospitalization.  Discharge on thee day letter with list of resources, 30 days of vitamins and a beenadryl 50mg po prn insomnia, which he did not use last night. Also 30 day supply of asa which he receives at home.        risk assessment  Compared to admission, patient is greatly improved and is not an acute danger to self or others.  However, given certain historical facts, personal attributes and experiences, patient is still at chronic risk for harm to self and others.  Where possible, risk factors present at/during  hospitalization have been addressed as follows:  Patient is at an unmodifiable chronically elevated risk of danger to himself because of reported history of multiple previous psychiatric diagnosis, history fo substance use disorder, history of cluster B personality traits and history of conduct problems, reported history of suicide attempts, advancing age, male gender and homelessness.  He reportedly has a history of poor impulse control and affective dystregulation that place him at unmodifiable increased chronic risk.  His reported history of pervious suicide attempts also places him at increased chronic risk.    At the time of admission, he reported certain symptoms that constituted acute modifiable increased risk for danger to self.  These included depressed mood, hopelessness/despair, severe anxiety, psychosis including command auditory hallucinations ot harm self.  He quickly and consistently came to deny depressed mood, hopelessness/despair, sever anxiety, psychosis and CAH.  He showed good impulse contro.  With the resolution of all of these symptoms, his acute modifiable risk decreased greatly.    Symptoms appear to have been triggered by his substance use and he attribtues them to this.  He reported he uses susbtances in part because of  strained family relationships.  We were unable to address these relationships.  Patient however was motivated for substance use treatment and we attempted to refer him but he submitted a 72 hour letter.   He is not intoxicated or in withdrawal at the time of discharge.    At admission he reported access to firearms, at discharge he denied access to firearms.  He is not an entirely reliable historian.  His history of multiple admissions and rapid improvements with or without medicationshave lead some to speculate some sort of malingering behavior.  I cannot definitively answer this.     Patient is at a chronically elevated unmodifiable risk of danger to others because of history of antisocial behvior; history of violent ideation, history of susbtasnce abuse, history of affective dysregulation, history of impulsivity, history of self reported CAH for violent behavior; history of noncompliance with treatment, history of irritability, historiy of violation of a legal mandate, history of reported firearm access.    At the time of discharge he has not engaged in antisocial behavior on the uni; has not engaged in violent ideation of statements on the unit; reports increased insight into substance use; mnaifests a stable affect and good impulse control and does not manifest irritability.  Denies CAH and denies firearm access.    Pt was provided with pharmacotherapy and psychotherapy throughout this hospitalization and upon discharge was instructed to practice the provided safe coping mechanisms and to take prescribed medication only as directed.   Pt was informed of the benefits and risks of current psychiatric medication including but not limited to EPS, TD, NMS, and metabolic syndrome. Pt advised to call 911 if sx worsen, the discussed life-threatening side effects occur, SI/HI develop, or pt becomes acutely dangerous to self or others. Pt voiced understanding and agreement.

## 2025-05-22 NOTE — BH DISCHARGE NOTE NURSING/SOCIAL WORK/PSYCH REHAB - NSDCPRRECOMMEND_PSY_ALL_CORE
Patient stated that he will be residing at the [facility]. Patient is encouraged to maintain sobriety and utilize effective coping skills to manage sx after discharge.

## 2025-05-22 NOTE — BH INPATIENT PSYCHIATRY DISCHARGE NOTE - OTHER PAST PSYCHIATRIC HISTORY (INCLUDE DETAILS REGARDING ONSET, COURSE OF ILLNESS, INPATIENT/OUTPATIENT TREATMENT)
Patient was discharged from [facility] on 4/24/25 and went to rehab. He left after, reportedly, getting into an altercation with staff over the . He has been living in hotels or on the streets since. He works as a  at times in order to pay for the hotels. The patient stated he was feeling depressed and suicidal with a plan to go to his brother's and shoot himself with his brother's gun. He also stated he had other places from which he could get a gun. Of note, the patient has a long history of malingering per his chart. He has been using alcohol, cannabis and cocaine since leaving the rehab facility. He is requesting discharge to another rehab facility when he is stable. Please see below for additional history.    Previous Note:  59 yr old male, single, undomiciled and unemployed but reportedly stays in hotels.  Self reported hx of bipolar disorder and SCZ;  Pt is high utilizer of mental health facilities + is chronically non adherent to meds and psych appts; has multiple ED visits and psych admissions ( last [facility] 3/2-3/13/2025) for depression + SI.  past documented hx for suspicion of malingering on pior visits including this latest to [facility]  admission,  as per chart review, there is documented hx of "several prior self-aborted suicide attempts with plan to jump in front of train in 2023 but was, "stopped by an emre",  Pt has hx of polysubstance use (as per PSYCKES: with hx of Cannabis related disorders/ Alcohol related disorders/ Cocaine related disorders/ Other psychoactive substance related disorders/ Opioid related disorders/ Tobacco related disorder/  Other stimulant related disorders and Sedative, hypnotic, or anxiolytic related disorders).  Pt has had hx of numerous prior detox/rehabs (previous [facility] admission in 12/2023).  He has hx of multiple ED visits reporting chest pain and/or SI and/or command AH to kill himself,. pertinent medical issues include: HTN, pericarditis, Chronic CHF and GERD.  Pt admits he is not on any maintenance meds.  today, self presented to the ED (once again), complaining of depression, anxiety, "AH" and SI with plan to jump off a roof top.     In ED, pt presented calm and cooperative throughout interview. Patient reports he was drinking with a female friend on the roof top of a building in [place] last night with intentions of jumping off once his friends left. Patient reports he was intoxicated and fell asleep before his friend left and woke up this morning. Patient again wanted to jump off the roof and took a bus to [facility] seeking admission. Patient reports since his discharge from [facility] he has not taken any medical or psychiatric medications. He has been living in hotels and smoking crack and drinking several days a week. Patient reports feeling hopeless and helpless and fears reports he will attempt suicide, by jumping or shooting self, if he is not admitted to the hospital. Patient reports he is not sleeping, has poor appetite, low energy and guilt. He also endorses intermittently hearing the voice of his mother when he is alone and not intoxicated.    At last d/c from [facility], pt left on 3DL and refused referral.  He has a care coordinator through [facility] ([email or web address] or [phone]) and also has a housing CM  Ms. Tang [phone]

## 2025-05-22 NOTE — BH INPATIENT PSYCHIATRY DISCHARGE NOTE - NSBHASSESSSUMMFT_PSY_ALL_CORE
Homeless man, multiple charted psychiatric diagnoses across multiple categories, history of substance use disorder.  He has a pattern of substance use/relapse and decompensation with relatively rapid improvement in symptoms with or without medications and either placement in substance treatment and/or submits three day letter who presented with a similar presentation to the ED, was admitted on a 913 legal status and is improving and interested in inpatient substance abuse treatment after relapsing on cocaine.      1. continue 913 hospitalization for safety and assessment  2. routine checks appropriate can make needs known, future oriented on unit.  3. haldol, benadryl prns, ciwa ativan prns for withdrawal.  no history of complicated withdrawal.   4. reports he does not want medications, has refused in past, will defer standing meds for now other than asa and benadryl 50mg at bedtime.  5. disposition planning Homeless man, multiple charted psychiatric diagnoses across multiple categories, history of substance use disorder.  He has a pattern of substance use/relapse and decompensation with relatively rapid improvement in symptoms with or without medications and either placement in substance treatment and/or submits three day letter who presented with a similar presentation to the ED, was admitted on a 913 legal status and iimproved greatly and placed a 72 hour letter on day two of discharge and is discharged in accordance with his wishes as he mann snot meet criteria for involuntary hospitalization.  Discharge on thee day letter with list of resources, 30 days of vitamins and a beenadryl 50mg po prn insomnia, which he did not use last night. Also 30 day supply of asa which he receives at home.

## 2025-05-22 NOTE — BH INPATIENT PSYCHIATRY DISCHARGE NOTE - NSBHMSETHTASSOC_PSY_A_CORE
REVIEW OF SYSTEMS: GENERAL:  Patient denies fevers, chills, anorexia, weight loss, but complains of: night sweats and excessive fatigue  Appetite good  EYES:  Patient denies  significant visual difficulties, diplopia  ENT/MOUTH:  Patient denies problems with hearing, sore throat, sinus drainage, mouth sores, but complains of:  Sores on tongue  ENDOCRINE:  Patient denies diabetes, thyroid disease, hormone replacement, hot flashes or night sweats  HEMATOLOGIC/LYMPHATIC: Patient denies easy bruising or bleeding, tender or palpable lymph nodes  RESPIRATORY:  Patient denies chest pain, cough, hemoptysis, but complains of: dyspnea  CARDIOVASCULAR:  Patient denies palpitations, orthopnea, peripheral edema , but complains of: anginal chest pain  GASTROINTESTINAL: Patient denies nausea, vomiting, diarrhea, GI bleeding, change in bowel habits, early satiety, but complains of: constipation and heartburn   (MALE): Patient denies hematuria, dysuria, increased frequency, urgency, hesitancy, incontinence  MUSCULOSKELETAL:  Patient denies swelling or redness, decreased range of motion, leg or ankle swelling, but complains of:  joint pain  SKIN:  Patient denies chronic rashes, inflammation, ulcerations or skin changes  NEUROLOGIC:  Patient denies blurred vision, areas of focal weakness or numbness, abnormal gait, sensory problems, but complains of: headache  PSYCHIATRIC: Patient denies arian or mood swings, psychotropic drugs, but complains of: Anxiety, insomnia and depression  Patient states \"I feel confused.\"   Normal

## 2025-05-22 NOTE — BH INPATIENT PSYCHIATRY DISCHARGE NOTE - LEGAL HISTORY
denies. no listed pending legal issues as per [facility] UNIFIED COURT SYSTEM/ WEBCRIMS SITE

## 2025-05-22 NOTE — BH DISCHARGE NOTE NURSING/SOCIAL WORK/PSYCH REHAB - FINANCIAL ASSISTANCE
[facility] provides services at a reduced cost to those who are determined to be eligible through [facility]’s financial assistance program. Information regarding [facility]’s financial assistance program can be found by going to [email or web address] or by calling [phone].

## 2025-05-22 NOTE — BH INPATIENT PSYCHIATRY DISCHARGE NOTE - NSBHFUPINTERVALHXFT_PSY_A_CORE
No interval events, compliant with meds, slept until 0540.  Used benadryl at bedtime, no other prns, no complaints.  Some staff reported guarded tense.  Others report calm and easy going.  He is pleasant but superficial with me.  he is a bit entitled with multiple requests but of doubtful clinical significance.  We discuss his pattern of admissions in the setting of substance use with psychiatric symptoms such as SI, auditory hallucinations and so forth.  He reports he is feeling much better since admission and attributes his earlier statements to substance intoxication. He relates he would like help getting into inpatient substance treatment.  He endorses bad decisions in his life and   some depressed mood about this but denies si/hi intents or plans and is future oriented.  THere is no evidence of psychosis, miquel or withdrawal.   No interval events, slept, socializes with peers and staff, did not use benadryl for sleep last night, receives only vitamins, folate.  Good control denies si/hi intents or plans, avt hallucinations, delusions, depressed symptoms and attributes them to substance use.  He was interested in rehab but he has not yet been admitted.  He submitted a 72 hour letter and we discussed increased risk of relapse but he reports he desires to make his own arrangements.  His sensorium is clear, [facility] and reports he will return to hospital if he feels unsafe.  There is no evidence of si/hi intents or plans, avt hallucinations, delusions, miquel or depression nor is he in withdrawal.  He is not an acute danger to himself or others, would like to leave the hospital and is therefore discharged on his 72 hour letter.

## 2025-05-22 NOTE — BH INPATIENT PSYCHIATRY DISCHARGE NOTE - HPI (INCLUDE ILLNESS QUALITY, SEVERITY, DURATION, TIMING, CONTEXT, MODIFYING FACTORS, ASSOCIATED SIGNS AND SYMPTOMS)
Patient seen, chart reviewed, case dsicused with team.  I reviewed the ED Chart includcing ED Provider note, labs, ros, ed bha and bj notes.  I also reviweed most recent discharge summary from [facility] in later april.    met with patient discussed with team.    Patient confirms story as related in ed.  he denies si/hi itnents or plans.  He relates, "I want to get my self back together this time," and denies si/hi intents or plans on the unit.  He says he is intersted in rehab.  He is brushing his teeth.  He has no requests other than benadryl at bedtime for sleep.  He states that otherwise "I dint relaly like medication,"  Given his history of noncompliance, rapid improvements, I will defer starting standing medication.        ==========================================  AS PER THE [facility] ED BHA DATED 2025  59M, single, undomiciled and unemployed, lives in hotels or on streets and works part-time as , self reported hx of bipolar disorder and SCZ; as per PSYCKES has the following Behavioral Health Diagnoses to include  Unspecified/Other Depressive Disorder, Unspecified/Other Psychotic Disorders, Unspecified/Other Anxiety Disorder, Other psychoactive substance related disorders, Adjustment Disorder, Alcohol related disorders, Cocaine related disorders, Unspecified/Other Depressive Disorder, Unspecified/Other Bipolar, and Other psychoactive substance related disorders. Patient is high utilizer of mental health facilities and is chronically non adherent to meds and psych appts; has multiple ED visits and psych admissions (last [facility] -2025) for suicidality.  past documented hx for suspicion of malingering on pior visits including this latest to [facility]  admission,  as per chart review, there is documented hx of "several prior self-aborted suicide attempts with plan to jump in front of train in  but was, "stopped by an emre",  Pt has hx of polysubstance use (as per PSYCKES: with hx of Cannabis related disorders/ Alcohol related disorders/ Cocaine related disorders/ Other psychoactive substance related disorders/ Opioid related disorders/ Tobacco related disorder/  Other stimulant related disorders and Sedative, hypnotic, or anxiolytic related disorders).  Pt has had hx of numerous prior detox/rehabs (previous [facility] admission in 2023).  He has hx of multiple ED visits reporting chest pain and/or SI and/or command AH to kill himself,. pertinent medical issues include: HTN, pericarditis, Chronic CHF and GERD.  Pt admits he is not on any maintenance meds.  today, self presented to the ED (once again), complaining of depression, anxiety, "AH" and SI with plan to shoot himself with his brother's gun.    Patient seen in private room. He was calm and cooperative throughout interview. Patient reports that after discharge from [facility] in April he stayed in a rehab facility for several days but had to leave after a verbal altercation with staff about A/C. Since, he has been living on streets and occasionally in hotel rooms. States that provides hotel cost via working as a . States that he talked last with his daughter in April while hospitalized at [facility] and she did not returned his calls since. Describes this as being "unbearable". As a result, started to drink alcohol since Friday (). Reports drinking up to seven drinks of different kind (mentions beer and liquor) daily, last drank on  (). Reports smoking marijuana twice since, and using cocaine once on Friday. Denies other substance use. Reports that on the bus to the hospital saw his  mother's and grandparents' faces, and heard them telling him 'come to us'. Reports sad and anxious mood in the past week, Reports poor sleep with 3h sleep in 24h, morning tiredness, poor energy, and anhedonia. Reports that today he wanted to go to his brother's place (refers to him by the nickname 'Greer') who has a "357 revolver" and shoot himself with it, but refuses to share his brother's contact info, States that he also has access to firearms through other resources but refuses to share more details. Reports irritability and being on the edge in the past few days, states that had the feeling to 'beat someone' but does not identify a specific target. Denies other symptoms of miquel including euphoria, increased energy, and talkativeness.  Of note, patient reports that she had an oral sex with a female sex partner yesterday during which 'she spat blood'. Denies having blood in urine since, however, endorsed penile pain.

## 2025-05-22 NOTE — BH INPATIENT PSYCHIATRY DISCHARGE NOTE - MSE OPTIONS
Patient is a 89yo M who reports PMHx only significant for OA who was BIBEMS after having a witnessed fall in the street. Patient cannot remember the incident, but sites "memory problems". Per ED and EMS report, patient had witnessed fall with no head trauma or LOC. Structured MSE

## 2025-05-22 NOTE — BH INPATIENT PSYCHIATRY DISCHARGE NOTE - NSBHMSEINSIGHT_PSY_A_CORE
[Transvaginal Ultrasound] : transvaginal ultrasound [No Fibroid(s)] : no fibroid(s) [L: ___ cm] : L: [unfilled] cm [W: ___cm] : W: [unfilled] cm [H: ___ cm] : H: [unfilled] cm [FreeTextEntry5] : EE=5.96 mm [FreeTextEntry7] : 2.12 x1.84 x 1.47 cm [FreeTextEntry8] : 2.29 x2.61 x 1.94 cm  [FreeTextEntry4] : Normal transvaginal ultrasound. No ovarian cysts bilaterally. Fair

## 2025-05-22 NOTE — BH INPATIENT PSYCHIATRY DISCHARGE NOTE - NSBHFUPINTERVALCCFT_PSY_A_CORE
Patient seen, chart reviewed, case discussed with team.  patient seen for follow up of psychaitric symptoms in the setting of substance relapse.   Patient seen, chart reviewed, case discussed with team.  patient seen for discharge day management of substance induced mood disorder.

## 2025-05-22 NOTE — BH INPATIENT PSYCHIATRY DISCHARGE NOTE - NSBHMSEMOVE_PSY_A_CORE
Continue home regimen of depakote and lamotrigine    1/10- restarting vimpat; getting EEG;   No abnormal movements

## 2025-05-22 NOTE — BH INPATIENT PSYCHIATRY DISCHARGE NOTE - NSBHMETABOLIC_PSY_ALL_CORE_FT
BMI: BMI (kg/m2): 36.4 (05-20-25 @ 14:49)  HbA1c: A1C with Estimated Average Glucose Result: 5.4 % (12-27-24 @ 05:30)    Glucose: POCT Blood Glucose.: 109 mg/dL (04-10-25 @ 20:22)    BP: 116/75 (05-20-25 @ 12:20) (116/75 - 136/82)Vital Signs Last 24 Hrs  T(C): 36.4 (05-22-25 @ 07:57), Max: 36.4 (05-22-25 @ 07:57)  T(F): 97.6 (05-22-25 @ 07:57), Max: 97.6 (05-22-25 @ 07:57)  HR: --  BP: --  BP(mean): --  RR: --  SpO2: --    Orthostatic VS  05-22-25 @ 07:57  Lying BP: --/-- HR: --  Sitting BP: 118/86 HR: 71  Standing BP: --/-- HR: --  Site: --  Mode: --  Orthostatic VS  05-21-25 @ 06:32  Lying BP: --/-- HR: --  Sitting BP: 126/89 HR: 77  Standing BP: --/-- HR: --  Site: --  Mode: --  Orthostatic VS  05-20-25 @ 14:49  Lying BP: --/-- HR: --  Sitting BP: 124/71 HR: 86  Standing BP: 127/93 HR: 90  Site: --  Mode: --    Lipid Panel: Date/Time: 04-19-25 @ 11:41  Cholesterol, Serum: 143  LDL Cholesterol Calculated: 68  HDL Cholesterol, Serum: 54  Total Cholesterol/HDL Ration Measurement: --  Triglycerides, Serum: 116

## 2025-05-22 NOTE — BH DISCHARGE NOTE NURSING/SOCIAL WORK/PSYCH REHAB - DISCHARGE INSTRUCTIONS AFTERCARE APPOINTMENTS
In order to check the location, date, or time of your aftercare appointment, please refer to your Discharge Instructions Document given to you upon leaving the hospital.  If you have lost the instructions please call [phone]

## 2025-05-22 NOTE — BH INPATIENT PSYCHIATRY DISCHARGE NOTE - NSDCMRMEDTOKEN_GEN_ALL_CORE_FT
aspirin 81 mg oral tablet, chewable: 1 tab(s) orally once a day  diphenhydrAMINE 50 mg oral capsule: 1 cap(s) orally once a day (at bedtime)  folic acid 1 mg oral tablet: 1 tab(s) orally once a day  Multiple Vitamins oral tablet: 1 tab(s) orally once a day  Vitamin B1 100 mg oral tablet: 1 tab(s) orally once a day

## 2025-05-22 NOTE — BH DISCHARGE NOTE NURSING/SOCIAL WORK/PSYCH REHAB - NSCDUDCCRISIS_PSY_A_CORE
[facility]  1 [phone]) [facility]LL ([phone])  Text "WELL" to 37150  Website: www.[employer]/.National Suicide Prevention Lifeline 2 (289) 616-9918/.  [employer]  1 (550) LIFENET (120-2729)/.  [facility]  [address] [address] [phone]   Hours:  Monday through Friday from 9 AM to 3 PM/988 Suicide and Crisis Lifeline

## 2025-05-22 NOTE — BH INPATIENT PSYCHIATRY DISCHARGE NOTE - DESCRIPTION
single, has a 17 yr old daughter domiciled with mother in [place].  unemployed. undomiciled. lives on streets or in hotel and provides for self through part time work as a

## 2025-05-22 NOTE — BH DISCHARGE NOTE NURSING/SOCIAL WORK/PSYCH REHAB - PATIENT PORTAL LINK FT
You can access the FollowMyHealth Patient Portal offered by [facility] by registering at the following website: [email or web address]. By joining [employer]’s FollowMyHealth portal, you will also be able to view your health information using other applications (apps) compatible with our system.

## 2025-05-23 NOTE — ED ADULT TRIAGE NOTE - HEART RATE (BEATS/MIN)
81 [No Acute Distress] : no acute distress [Normal Oropharynx] : normal oropharynx [Normal Appearance] : normal appearance [No Neck Mass] : no neck mass [Normal Rate/Rhythm] : normal rate/rhythm [Normal S1, S2] : normal s1, s2 [No Murmurs] : no murmurs [No Resp Distress] : no resp distress [Clear to Auscultation Bilaterally] : clear to auscultation bilaterally [No Abnormalities] : no abnormalities [Benign] : benign [Normal Gait] : normal gait [No Clubbing] : no clubbing [No Cyanosis] : no cyanosis [No Edema] : no edema [FROM] : FROM [Normal Color/ Pigmentation] : normal color/ pigmentation [No Focal Deficits] : no focal deficits [Oriented x3] : oriented x3 [Normal Affect] : normal affect [TextBox_68] : no wheezing

## 2025-05-26 ENCOUNTER — INPATIENT (INPATIENT)
Facility: HOSPITAL | Age: 60
LOS: 6 days | Discharge: NURSING, REHAB OR LONG-TERM CARE | End: 2025-06-02
Attending: PSYCHIATRY & NEUROLOGY | Admitting: PSYCHIATRY & NEUROLOGY
Payer: COMMERCIAL

## 2025-05-26 VITALS
SYSTOLIC BLOOD PRESSURE: 110 MMHG | HEART RATE: 97 BPM | DIASTOLIC BLOOD PRESSURE: 79 MMHG | WEIGHT: 291.89 LBS | HEIGHT: 74 IN | TEMPERATURE: 99 F | RESPIRATION RATE: 16 BRPM | OXYGEN SATURATION: 98 %

## 2025-05-26 DIAGNOSIS — F19.94 OTHER PSYCHOACTIVE SUBSTANCE USE, UNSPECIFIED WITH PSYCHOACTIVE SUBSTANCE-INDUCED MOOD DISORDER: ICD-10-CM

## 2025-05-26 DIAGNOSIS — F60.9 PERSONALITY DISORDER, UNSPECIFIED: ICD-10-CM

## 2025-05-26 LAB
ANION GAP SERPL CALC-SCNC: 11 MMOL/L — SIGNIFICANT CHANGE UP (ref 5–17)
APAP SERPL-MCNC: <5 UG/ML — LOW (ref 10–30)
APPEARANCE UR: CLEAR — SIGNIFICANT CHANGE UP
BASOPHILS # BLD AUTO: 0.05 K/UL — SIGNIFICANT CHANGE UP (ref 0–0.2)
BASOPHILS NFR BLD AUTO: 0.7 % — SIGNIFICANT CHANGE UP (ref 0–2)
BILIRUB UR-MCNC: NEGATIVE — SIGNIFICANT CHANGE UP
BUN SERPL-MCNC: 14 MG/DL — SIGNIFICANT CHANGE UP (ref 7–23)
CALCIUM SERPL-MCNC: 9.1 MG/DL — SIGNIFICANT CHANGE UP (ref 8.4–10.5)
CHLORIDE SERPL-SCNC: 104 MMOL/L — SIGNIFICANT CHANGE UP (ref 96–108)
CO2 SERPL-SCNC: 24 MMOL/L — SIGNIFICANT CHANGE UP (ref 22–31)
COLOR SPEC: YELLOW — SIGNIFICANT CHANGE UP
CREAT SERPL-MCNC: 1.11 MG/DL — SIGNIFICANT CHANGE UP (ref 0.5–1.3)
DIFF PNL FLD: NEGATIVE — SIGNIFICANT CHANGE UP
EGFR: 77 ML/MIN/1.73M2 — SIGNIFICANT CHANGE UP
EGFR: 77 ML/MIN/1.73M2 — SIGNIFICANT CHANGE UP
EOSINOPHIL # BLD AUTO: 0.04 K/UL — SIGNIFICANT CHANGE UP (ref 0–0.5)
EOSINOPHIL NFR BLD AUTO: 0.6 % — SIGNIFICANT CHANGE UP (ref 0–6)
ETHANOL SERPL-MCNC: 69 MG/DL — HIGH (ref 0–10)
GLUCOSE SERPL-MCNC: 81 MG/DL — SIGNIFICANT CHANGE UP (ref 70–99)
GLUCOSE UR QL: NEGATIVE MG/DL — SIGNIFICANT CHANGE UP
HCT VFR BLD CALC: 34.5 % — LOW (ref 39–50)
HGB BLD-MCNC: 11.6 G/DL — LOW (ref 13–17)
IMM GRANULOCYTES NFR BLD AUTO: 0.1 % — SIGNIFICANT CHANGE UP (ref 0–0.9)
KETONES UR QL: ABNORMAL MG/DL
LEUKOCYTE ESTERASE UR-ACNC: ABNORMAL
LYMPHOCYTES # BLD AUTO: 1.81 K/UL — SIGNIFICANT CHANGE UP (ref 1–3.3)
LYMPHOCYTES # BLD AUTO: 27 % — SIGNIFICANT CHANGE UP (ref 13–44)
MCHC RBC-ENTMCNC: 30.6 PG — SIGNIFICANT CHANGE UP (ref 27–34)
MCHC RBC-ENTMCNC: 33.6 G/DL — SIGNIFICANT CHANGE UP (ref 32–36)
MCV RBC AUTO: 91 FL — SIGNIFICANT CHANGE UP (ref 80–100)
MONOCYTES # BLD AUTO: 0.52 K/UL — SIGNIFICANT CHANGE UP (ref 0–0.9)
MONOCYTES NFR BLD AUTO: 7.7 % — SIGNIFICANT CHANGE UP (ref 2–14)
NEUTROPHILS # BLD AUTO: 4.28 K/UL — SIGNIFICANT CHANGE UP (ref 1.8–7.4)
NEUTROPHILS NFR BLD AUTO: 63.9 % — SIGNIFICANT CHANGE UP (ref 43–77)
NITRITE UR-MCNC: NEGATIVE — SIGNIFICANT CHANGE UP
NRBC BLD AUTO-RTO: 0 /100 WBCS — SIGNIFICANT CHANGE UP (ref 0–0)
PCP SPEC-MCNC: SIGNIFICANT CHANGE UP
PH UR: 6 — SIGNIFICANT CHANGE UP (ref 5–8)
PLATELET # BLD AUTO: 276 K/UL — SIGNIFICANT CHANGE UP (ref 150–400)
POTASSIUM SERPL-MCNC: 4.3 MMOL/L — SIGNIFICANT CHANGE UP (ref 3.5–5.3)
POTASSIUM SERPL-SCNC: 4.3 MMOL/L — SIGNIFICANT CHANGE UP (ref 3.5–5.3)
PROT UR-MCNC: NEGATIVE MG/DL — SIGNIFICANT CHANGE UP
RBC # BLD: 3.79 M/UL — LOW (ref 4.2–5.8)
RBC # FLD: 14.9 % — HIGH (ref 10.3–14.5)
SALICYLATES SERPL-MCNC: <0.3 MG/DL — LOW (ref 2.8–20)
SARS-COV-2 RNA SPEC QL NAA+PROBE: SIGNIFICANT CHANGE UP
SODIUM SERPL-SCNC: 139 MMOL/L — SIGNIFICANT CHANGE UP (ref 135–145)
SP GR SPEC: 1.02 — SIGNIFICANT CHANGE UP (ref 1–1.03)
UROBILINOGEN FLD QL: 1 MG/DL — SIGNIFICANT CHANGE UP (ref 0.2–1)
WBC # BLD: 6.71 K/UL — SIGNIFICANT CHANGE UP (ref 3.8–10.5)
WBC # FLD AUTO: 6.71 K/UL — SIGNIFICANT CHANGE UP (ref 3.8–10.5)

## 2025-05-26 PROCEDURE — 99285 EMERGENCY DEPT VISIT HI MDM: CPT

## 2025-05-26 PROCEDURE — 70450 CT HEAD/BRAIN W/O DYE: CPT | Mod: 26

## 2025-05-26 PROCEDURE — 93010 ELECTROCARDIOGRAM REPORT: CPT

## 2025-05-26 RX ORDER — ACETAMINOPHEN 500 MG/5ML
650 LIQUID (ML) ORAL EVERY 6 HOURS
Refills: 0 | Status: DISCONTINUED | OUTPATIENT
Start: 2025-05-26 | End: 2025-06-02

## 2025-05-26 RX ORDER — ASPIRIN 325 MG
81 TABLET ORAL DAILY
Refills: 0 | Status: DISCONTINUED | OUTPATIENT
Start: 2025-05-26 | End: 2025-06-02

## 2025-05-26 RX ORDER — ATORVASTATIN CALCIUM 80 MG/1
40 TABLET, FILM COATED ORAL AT BEDTIME
Refills: 0 | Status: DISCONTINUED | OUTPATIENT
Start: 2025-05-26 | End: 2025-06-02

## 2025-05-26 RX ORDER — HYDROXYZINE HYDROCHLORIDE 25 MG/1
25 TABLET, FILM COATED ORAL EVERY 6 HOURS
Refills: 0 | Status: DISCONTINUED | OUTPATIENT
Start: 2025-05-26 | End: 2025-06-02

## 2025-05-26 RX ORDER — ACETAMINOPHEN 500 MG/5ML
650 LIQUID (ML) ORAL EVERY 6 HOURS
Refills: 0 | Status: DISCONTINUED | OUTPATIENT
Start: 2025-05-26 | End: 2025-05-26

## 2025-05-26 RX ORDER — IBUPROFEN 200 MG
600 TABLET ORAL EVERY 6 HOURS
Refills: 0 | Status: DISCONTINUED | OUTPATIENT
Start: 2025-05-26 | End: 2025-06-02

## 2025-05-26 RX ORDER — LORAZEPAM 4 MG/ML
2 VIAL (ML) INJECTION
Refills: 0 | Status: DISCONTINUED | OUTPATIENT
Start: 2025-05-26 | End: 2025-06-02

## 2025-05-26 RX ORDER — MAGNESIUM, ALUMINUM HYDROXIDE 200-200 MG
30 TABLET,CHEWABLE ORAL EVERY 6 HOURS
Refills: 0 | Status: DISCONTINUED | OUTPATIENT
Start: 2025-05-26 | End: 2025-06-02

## 2025-05-26 RX ORDER — ACETAMINOPHEN 500 MG/5ML
650 LIQUID (ML) ORAL ONCE
Refills: 0 | Status: COMPLETED | OUTPATIENT
Start: 2025-05-26 | End: 2025-05-26

## 2025-05-26 RX ORDER — LORAZEPAM 4 MG/ML
4 VIAL (ML) INJECTION
Refills: 0 | Status: DISCONTINUED | OUTPATIENT
Start: 2025-05-26 | End: 2025-06-02

## 2025-05-26 RX ORDER — DIPHENHYDRAMINE HCL 12.5MG/5ML
25 ELIXIR ORAL ONCE
Refills: 0 | Status: COMPLETED | OUTPATIENT
Start: 2025-05-26 | End: 2025-05-26

## 2025-05-26 RX ADMIN — Medication 650 MILLIGRAM(S): at 10:18

## 2025-05-26 RX ADMIN — Medication 25 MILLIGRAM(S): at 21:25

## 2025-05-26 RX ADMIN — Medication 650 MILLIGRAM(S): at 17:06

## 2025-05-26 RX ADMIN — ATORVASTATIN CALCIUM 40 MILLIGRAM(S): 80 TABLET, FILM COATED ORAL at 21:25

## 2025-05-26 RX ADMIN — Medication 30 MILLILITER(S): at 16:09

## 2025-05-26 RX ADMIN — Medication 600 MILLIGRAM(S): at 15:29

## 2025-05-26 NOTE — ED ADULT TRIAGE NOTE - [place] COMA SCALE: [employer] RESPONSE, MLM
consulted regarding trach and PEG tube in which they are agreeable. Cardiology is on board regarding LifeVest versus AICD. 11/5 - patient HERIBERTO improving, continue to be followed by nephro. Repeat cxr shows improving of bilateral pna. Pending pneumonia panel results. Trach and peg set for Monday. 11/6 - patient HERIBERTO improving and seen by nephro. Resume bumex. Patient otherwise stable on vent pending trach Monday. 11/7 - no acute events overnight. Pt trach planned for tomorrow. Added pepcid for GI prophylaxis      11/8 - trach scheduled today. Pt bp soft with map 63. D/t patient HFrEF, hold fluids for now. nephro on board. 11/9 - eventual trach. Requiring 3 levo over night, now currently on 1 levo. Episode of afib without rvr over night, currently nsr with ventricular bigeminy. Night team started on heparin drip for anticoagulation. Continue to monitor rate / rhythm. 11/10 - bronchoscopy and percutaneous trach placement today. Patient entered Afib with RVR. Given amio bolus. After bolus, patient sustained rate of 150s. Patient underwent synchronized cardioversion at 200J in which patient returned to NSR rate 100. Continue on amio drip, heparin drip. Continue to monitor. Patient developing fever. Hypotensive needing levo. No leukocytosis WBC 5.8. PNA panel ordered today negative. Pending resp cultures. 11/11 - removal of subclavian line, PICC line placed, resp culture and pna panel negative, gram stain shows moderate seg neutrophils with moderate GNB. Continue on amio drip. Zosyn day 3.     11/12 - Hgb noted to be 6.9 2u Ordered for transfusion. Urine culture shows mixed growth of at least 3 different enteric gram-negative bacilli including swarming Proteus species. Continue Zosyn day 3. Respiratory culture was positive for Candida albicans. Patient has history of QT prolongation. Will hold on Diflucan.    11/13 -Hgb post transfusion of 2 units PRBCs is 8.7.   Heparin was hold last night in the
setting of this anemia requiring transfusion. FOBT ordered. No obvious signs of acute bleeding. Potassium was 3.2 11/12. 5.9 this morning. Recheck K levels if still elevated will order insulin with D5 amp.  2 g of mag ordered as patient has history of prolonged QT with magnesium level 1.9. Goal >2. Reinsert NG tube as it was removed post trach. Restart tube feeds. Transition from amiodarone drip to p.o. Patient noted to have increasing agitation today. Gave dose of 2 mg Ativan and patient noticeably more comfortable. Ativan 1 mg 3 times daily and OxyContin 10 mg twice daily. Is currently on hold due to anemia that required 2 units PRBCs transfusion. FOBT ordered and is pending. Globin stable      Past Medical History: HPI  Family History:  See EMR  Social History:   Former smoker since 1977. 15 pack year hx prior to cessation    ROS   Cannot obtain d/t patient sedated and on mechanical ventilation    Scheduled Meds:   insulin regular  10 Units IntraVENous Once    And    dextrose  25 g IntraVENous Once    magnesium sulfate  2,000 mg IntraVENous Once    calcium replacement protocol   Other RX Placeholder    lidocaine-EPINEPHrine  20 mL IntraDERmal Once    piperacillin-tazobactam  3,375 mg IntraVENous Q8H    lidocaine 1 % injection  5 mL IntraDERmal Once    sodium chloride flush  5-40 mL IntraVENous 2 times per day    famotidine (PEPCID) injection  20 mg IntraVENous Daily    bumetanide  1 mg IntraVENous Daily    lactobacillus  1 capsule Per NG tube Daily with breakfast    potassium (CARDIAC) replacement protocol   Other RX Placeholder    senna  10 mL Per NG tube Nightly    [Held by provider] lactulose  20 g Oral TID    [Held by provider] metoprolol tartrate  25 mg Oral BID    [Held by provider] apixaban  2.5 mg Oral BID     Continuous Infusions:   dextrose      sodium chloride      amiodarone 0.5 mg/min (11/13/21 0831)    [Held by provider] heparin (PORCINE) Infusion Stopped (11/13/21
5185)    sodium chloride 10 mL/hr at 11/12/21 0400    midazolam Stopped (11/10/21 1146)    dextrose 100 mL/hr (10/26/21 1253)    dexmedetomidine 1 mcg/kg/hr (11/13/21 0831)    norepinephrine Stopped (11/11/21 0246)    sodium chloride Stopped (10/19/21 1900)       PHYSICAL EXAMINATION:  T:  99.5 F. P:  74  RR:  21 B/P:  119/80 FiO2:  30. O2 Sat:  99  I/O: 500/1.6/-1.1L past 24H  Body mass index is 27.51 kg/m². GCS:   9  PCV+: Rate 20, PIP 26, PEEP 6    Sedation: precedex 0.6 mcg  Drips:  amiodarone 30mg/hr. Hep gtt held in the setting of anemia requiring 2u PRBC. General: Sedated on vent  HEENT:  normocephalic and atraumatic. No scleral icterus. PERR  Neck: supple. No Thyromegaly. Lungs: diminished breath sounds bilaterally on auscultation. No retractions  Cardiac: RRR. No JVD. Abdomen: soft. Nontender. Extremities:  No clubbing, cyanosis, mild pitting edema x 4, more prominent in bilateral lower extremities    Vasculature: capillary refill < 3 seconds. Palpable dorsalis pedis pulses. Skin:  warm and dry. Sacral decubitus ulcer  Psych:  Patient sedated  Lymph:  No supraclavicular adenopathy. Neurologic: No seizures. Data: (All radiographs, tracings, PFTs, and imaging are personally viewed and interpreted unless otherwise noted).  Sodium 141, potassium 4.2, chloride 104, CO2 26, BUN 36, creatinine 1.4, calcium 7.3 (ionized calcium 1.00), magnesium 1.9   WBC 6.4, Hgb 8.7, HCT 29.3,  149   Telemetry shows sinus rhythm with PVC     CXR on 11/11:     Impression   1. The left PICC line terminates in the superior vena cava. 2. Otherwise, there has been no other significant interval change in the radiographic appearance of the chest  from 11/10/2021.      Meets Continued ICU Level Care Criteria:    [x] Yes   [] No - Transfer Planned to listed location:  [] HOSPITALIST CONTACTED-      Case and plan discussed with Dr. Majo Brian    Electronically signed by DO MILLIE Mac
CARE SPECIALIST
bilateral upper extremity ROM was WFL (within functional limits)/bilateral lower extremity ROM was WFL (within functional limits)
(M6) obeys commands

## 2025-05-26 NOTE — ED PROVIDER NOTE - TOBACCO USE
"Subjective:   Patient ID:  Geeta Li is a 25 y.o. female who presents for follow-up of PFO/ASD (Annual visit)      HPI DOING WELL. NO RECURRENT MIGRAINES  NO FOCAL CNS SYMPTOMS OR SIGNS TO SUGGEST TIA OR STROKE  NO RECURRENT PALPITATIONS  NO HX OF DVT OR PE  NO NEAR SYNCOPE OR SYNCOPE  NO EDEMA. NO CALVE TENDERNESS  NO INTERMITTENT CLAUDICATION  NO UNUSUAL BURRELL. NO ORTHOPNEA OR PND  NO ABNORMAL BLEEDING- ON DAPT-ASA AND PLAVIX    Review of Systems   Constitution: Negative for chills, fever, night sweats, weight gain and weight loss.   HENT: Negative for nosebleeds.    Eyes: Negative for blurred vision, double vision and visual disturbance.   Cardiovascular: Negative for chest pain, dyspnea on exertion, irregular heartbeat, leg swelling, orthopnea, palpitations, paroxysmal nocturnal dyspnea and syncope.   Respiratory: Negative for cough, hemoptysis and wheezing.    Endocrine: Negative for polydipsia and polyuria.   Hematologic/Lymphatic: Does not bruise/bleed easily.   Skin: Negative for rash.   Musculoskeletal: Negative for joint pain, joint swelling, muscle weakness and myalgias.   Gastrointestinal: Negative for abdominal pain, hematemesis, jaundice and melena.   Genitourinary: Negative for dysuria, hematuria and nocturia.   Neurological: Negative for dizziness, focal weakness, headaches, sensory change and weakness.   Psychiatric/Behavioral: Negative for depression. The patient does not have insomnia and is not nervous/anxious.      Family History   Problem Relation Age of Onset    Mental illness Mother         bipolar    Depression Mother     Hypertension Mother     Thrombophilia Mother         DVTs "due to medicine"    Thyroid disease Paternal Uncle     Thyroid disease Maternal Grandmother     Breast cancer Neg Hx     Colon cancer Neg Hx     Ovarian cancer Neg Hx      Past Medical History:   Diagnosis Date    ADD (attention deficit disorder)     Goiter     Migraine with aura     Stroke  "     Social History     Socioeconomic History    Marital status: Single     Spouse name: Not on file    Number of children: 0    Years of education: Not on file    Highest education level: Not on file   Occupational History    Occupation: [employer]   Social Needs    Financial resource strain: Not on file    Food insecurity:     Worry: Not on file     Inability: Not on file    Transportation needs:     Medical: Not on file     Non-medical: Not on file   Tobacco Use    Smoking status: Never Smoker    Smokeless tobacco: Never Used   Substance and Sexual Activity    Alcohol use: Yes     Comment: occasionally    Drug use: No    Sexual activity: Never     Birth control/protection: None   Lifestyle    Physical activity:     Days per week: Not on file     Minutes per session: Not on file    Stress: Not on file   Relationships    Social connections:     Talks on phone: Not on file     Gets together: Not on file     Attends Nondenominational service: Not on file     Active member of club or organization: Not on file     Attends meetings of clubs or organizations: Not on file     Relationship status: Not on file    Intimate partner violence:     Fear of current or ex partner: Not on file     Emotionally abused: Not on file     Physically abused: Not on file     Forced sexual activity: Not on file   Other Topics Concern    Not on file   Social History Narrative    Unemployed currently, quit from [employer]      Current Outpatient Medications on File Prior to Visit   Medication Sig Dispense Refill    aspirin 325 MG tablet Take 325 mg by mouth once daily.      clopidogrel (PLAVIX) 75 mg tablet TAKE 1 TABLET (75 MG TOTAL) BY MOUTH ONCE DAILY. 30 tablet 10    dextroamphetamine-amphetamine (ADDERALL XR) 15 MG 24 hr capsule Take 1 capsule (15 mg total) by mouth every morning. 30 capsule 0    [DISCONTINUED] terbinafine HCl (LAMISIL) 1 % cream Apply topically 2 (two) times daily.       No current facility-administered  medications on file prior to visit.      Review of patient's allergies indicates:   Allergen Reactions    Penicillins Rash       Objective:     Physical Exam   Constitutional: She is oriented to person, place, and time. She appears well-developed. No distress.   HENT:   Head: Normocephalic.   Eyes: Pupils are equal, round, and reactive to light. Conjunctivae are normal. No scleral icterus.   Neck: Normal range of motion. Neck supple. Normal carotid pulses, no hepatojugular reflux and no JVD present. Carotid bruit is not present. No edema present. No thyroid mass and no thyromegaly present.   Cardiovascular: Normal rate, regular rhythm, S1 normal, S2 normal, normal heart sounds and intact distal pulses. PMI is not displaced. Exam reveals no gallop and no friction rub.   No murmur heard.  Pulses:       Carotid pulses are 2+ on the right side, and 2+ on the left side.       Radial pulses are 2+ on the right side, and 2+ on the left side.        Femoral pulses are 2+ on the right side, and 2+ on the left side.       Popliteal pulses are 2+ on the right side, and 2+ on the left side.        Dorsalis pedis pulses are 2+ on the right side, and 2+ on the left side.        Posterior tibial pulses are 2+ on the right side, and 2+ on the left side.   Pulmonary/Chest: Effort normal and breath sounds normal. She has no wheezes. She has no rales. She exhibits no tenderness.   Abdominal: Soft. Bowel sounds are normal. She exhibits no pulsatile midline mass and no mass. There is no hepatosplenomegaly. There is no tenderness.   Musculoskeletal: Normal range of motion. She exhibits no edema or tenderness.        Cervical back: Normal.        Thoracic back: Normal.        Lumbar back: Normal.   Lymphadenopathy:     She has no cervical adenopathy.     She has no axillary adenopathy.        Right: No supraclavicular adenopathy present.        Left: No supraclavicular adenopathy present.   Neurological: She is alert and oriented to  person, place, and time. She has normal strength and normal reflexes. No sensory deficit. Gait normal.   Skin: Skin is warm. No rash noted. No cyanosis. No pallor. Nails show no clubbing.   Psychiatric: She has a normal mood and affect. Her speech is normal and behavior is normal. Cognition and memory are normal.       Assessment:     1. PFO (patent foramen ovale)    2. History of cardioembolic cerebrovascular accident (CVA)        Plan:     PFO (patent foramen ovale)  ASYMPTOMATIC  NO ARRHYTHMIAS  NO ADHF  NO ACTIVE MYOCARDIAL ISCHEMIA  CNS STATUS STABLE    History of cardioembolic cerebrovascular accident (CVA)    NO RECURRENT TIA OR STROKE  DAPT WELL TOLERATED    CONTINUE PRESENT CARD MANAGEMENT  RETURN IN ONE YEAR     Unknown if ever smoked

## 2025-05-26 NOTE — ED PROVIDER NOTE - OBJECTIVE STATEMENT
60 y/o m hx bipolar, polysubstance abuse presents c/o feeling depressed and suicidal over the past few days.  Pt stating he has plans to "shoot myself with a gun" which he doesn't own but has access to.  Of note pt was admitted to [facility] last week for similar complaint, stating "I signed myself out but I shouldn't have left."  Pt also reports he tripped and fell on the way into the ED today and has small bump on left forehead with mild pain to the area.  Denies LOC, vomiting, visual changes, dizziness, n/v, neck/back pain, AH/VH, all other ROS negative.

## 2025-05-26 NOTE — ED ADULT NURSE REASSESSMENT NOTE - NS ED NURSE REASSESS COMMENT FT1
Security called for standby assist as patient attempted to leave ER. Pt endorsed SI stating "If I do not get a bed I am leaving". Pt proceeded to be physically aggressive towards staff and pulled belongings out of the hand of 1:1 and locked himself in the bathroom. After leaving restroom pt reoriented on importance of staying for safety to self. Pt given room and lying in bed with 1:1 observation maintained. Pending blood draw and CT imaging.

## 2025-05-26 NOTE — ED ADULT NURSE NOTE - CHIEF COMPLAINT QUOTE
Patient to the ED c/o suicidal ideation with plan to shoot self, endorses AVH. Requesting transfer to [facility]. AAOx4, NAD.  1:1 constant observation initiated in triage, patient changed into gown and belongings secured. Security called to triage for wanding.

## 2025-05-26 NOTE — BH PATIENT PROFILE - HOME MEDICATIONS
diphenhydrAMINE 50 mg oral capsule , 1 cap(s) orally once a day (at bedtime)  Vitamin B1 100 mg oral tablet , 1 tab(s) orally once a day  Multiple Vitamins oral tablet , 1 tab(s) orally once a day  folic acid 1 mg oral tablet , 1 tab(s) orally once a day  aspirin 81 mg oral tablet, chewable , 1 tab(s) orally once a day

## 2025-05-26 NOTE — ED BEHAVIORAL HEALTH ASSESSMENT NOTE - PAST PSYCHOTROPIC MEDICATION
MULTIPLE MEDS TRIAL: ativan, diazepam, librium, trazodone, atarax, haldol, VPA, zyprexa, abilify, lurasidone, sertraline

## 2025-05-26 NOTE — ED ADULT NURSE NOTE - NSFALLASSISTNEEDED_ED_ALL_ED
Detail Level: Zone
Patient Specific Otc Recommendations (Will Not Stick From Patient To Patient): Cera Ve anti itch cream QID\\nCera ve cream or lotion BID\\nOlay body wash in shower (continue)
Samples Given: cerevae anti itch cream
Walking

## 2025-05-26 NOTE — ED ADULT TRIAGE NOTE - CHIEF COMPLAINT QUOTE
Patient to the ED c/o suicidal ideation with plan to shoot self, endorses AVH. Requesting transfer to [facility]. AAOx4, NAD.  1:1 constant observation initiated in triage, patient changed into gown and belongings secured. Security called to triage for wanding.

## 2025-05-26 NOTE — ED BEHAVIORAL HEALTH ASSESSMENT NOTE - ADDITIONAL DETAILS ALL
one self reported aborted SA: via jumping in front of a 7 train ( summer 2024). no other reported hx of SA

## 2025-05-26 NOTE — ED BEHAVIORAL HEALTH ASSESSMENT NOTE - OTHER PAST PSYCHIATRIC HISTORY (INCLUDE DETAILS REGARDING ONSET, COURSE OF ILLNESS, INPATIENT/OUTPATIENT TREATMENT)
self reports hx of various mood, psychotic, substance use disorderes  high mental health utilizer including numerous Psych ED/ CPEP visits for SI, alcohol intoxication  high number of prior psych admissions  chronically nonadherent with meds   claimed previously prescribed Zoloft at [facility]   - self-discontinued because developed diarrhea  stated past hx of allegedly self aborting jumping in front of a 7 train over the summer (2023)  denied past suicide attempts.   Denies ever being in consistent outpatient psychiatric care.   currently with a IMT ([facility] ([facility] [place] [facility], 8/22/2022 -  Current) -LEFT VOICEMAIL

## 2025-05-26 NOTE — ED BEHAVIORAL HEALTH ASSESSMENT NOTE - SUMMARY
58 yr old male, single, undomiciled (lives in hotels) and unemployed, past psychiatric history of personality disorder (antisocial, borderline), substance use (primarily alcohol, cocaine, MJ), unspecified mood and psychotic disorders, previous suicide attempts (last 1 year ago jumping in front of a 7 train) and chronic  high ED and inpatient psychiatric hospital utilization (last admission [facility] discharged 5/22/25) without adherence to follow up psychiatric and medical outpatient appointments and suspicion for secondary gain, presenting 4 days after discharge from [facility] with similar complaint of SI with plans to shoot himself or jump in front of a train.    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 Would also highly consider malingering as possible etiology considered repeated, back to back hospitalizations and ED presentations even after reported SI, as well as pt's frequent mentioning of requests to go to certain hospitals and need for food and shelter during interview.    In terms of risk, pt's report of SI with plan is above his baseline mental status and does have acute moderate risk of harming himself, warranting voluntary hospitalization for the short term until he returns to his baseline (no longer reporting constant thoughts of suicide, not reporting plan). In prior hospitalizations, pt improved quickly within a few days with sobriety, did not take any medications. He has multiple risk factors including reported access to a firearm, previous attempts, nonadherence or connection to treatment. That being said, given pt's frequent ED utilization, protective factors include help-seeking behavior and pt's recurrent returns to EDs seeking help. He would benefit from alternative outpatient treatment/crisis support to reduce ED and inpatient utilization in the future.    - admit 9.13 to [facility]  - no psychiatric medications at this time  - symptom induced CIWA with Ativan  - restart ASA 81, atorvastatin 40mg    Discussed case with attending psychiatrist on call, Dr. Brandin Lee 58 yr old male, single, undomiciled (lives in hotels) and unemployed, past psychiatric history of personality disorder (antisocial, borderline), substance use (primarily alcohol, cocaine, MJ), unspecified mood and psychotic disorders, previous suicide attempts (last 1 year ago jumping in front of a 7 train) and chronic  high ED and inpatient psychiatric hospital utilization (last admission [facility] discharged 5/22/25) without adherence to follow up psychiatric and medical outpatient appointments and suspicion for secondary gain, presenting 4 days after discharge from [facility] with similar complaint of SI with plans to shoot himself or jump in front of a train.    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 Would also highly consider malingering as possible etiology considered repeated, back to back hospitalizations and ED presentations even after reported SI, as well as pt's frequent mentioning of requests to go to certain hospitals and need for food and shelter during interview.    In terms of risk, pt's report of SI with plan is above his baseline mental status and does have acute moderate risk of harming himself, warranting voluntary hospitalization for the short term until he returns to his baseline (no longer reporting constant thoughts of suicide, not reporting plan). In prior hospitalizations, pt improved quickly within a few days with sobriety, did not take any medications. He has multiple risk factors including reported access to a firearm, previous attempts, nonadherence or connection to treatment. That being said, given pt's frequent ED utilization, protective factors include help-seeking behavior and pt's recurrent returns to EDs seeking help. He would benefit from alternative outpatient treatment/crisis support to reduce ED and inpatient utilization in the future.    - admit 9.13 to [facility]  - no psychiatric medications at this time  - symptom induced CIWA with Ativan  - restart ASA 81, atorvastatin 40mg  - 1:1 in ED, routine obs when admitted to [facility]    Discussed case with attending psychiatrist on call, Dr. Brandin Lee

## 2025-05-26 NOTE — ED ADULT NURSE NOTE - OBJECTIVE STATEMENT
Pt A&Ox3 and able to speak in complete sentences. Pt breathing even and unlabored with equal chest rise and fall. Pt arrived to front triage c/o SI with plan to shoot himself. +AH. Denies visual hallucinations, HI, cp, sob, fevers, chills, n, v. Pt endorsed headache endorsing "I fell on the steps". Pt 1:1 initiated in triage. Security metal detected belongings. Endorsed drinking a bottle of Yessi this AM.

## 2025-05-26 NOTE — ED PROVIDER NOTE - ATTENDING SHARED VISIT SELECTOR YES
Called to room to assist with latch. Upon entering room baby is very sleepy and is fully dressed. Encouraged mom to completely undress baby for feedings to practice waking techniques. After being stimulated baby sucks well and roots on a gloved finger. Mom can not hand express bilaterally and an extra small nipple shield is being used to attempt to latch baby. After 30 minutes of attempting, baby suckled on each breasts for 5 minutes with extra small nipple shield. Mom has pump at bedside and encouraged her to pump after feedings. 1 ml of formula was put in the shield to assist baby in suckling at the breast but very few intermittent suckles were noted on each breast. No swallows heard and no visible milk in shield after. Encouraged mom to pump after every feeding. AC blood glucose of baby was 60 so educated mom there is no need to supplement at this point. Just strongly encouraged her to pump after feedings.   Yes

## 2025-05-26 NOTE — ED ADULT NURSE REASSESSMENT NOTE - NS ED NURSE REASSESS COMMENT FT1
Patient remains in ED on 1:1 constant observation, tech at bedside. Patient given sandwich and juice.
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
## 2025-05-26 NOTE — ED PROVIDER NOTE - CLINICAL SUMMARY MEDICAL DECISION MAKING FREE TEXT BOX
58 y/o m hx bipolar d/o, polysubstance abuse presents c/o feeling suicidal, depressed, also fell and hit his head this morning en route to ED.  No neuro deficits on exam, CT head neg.  Medical clearance labs notable for EtOH 69.  Psych consulted, will f/u recs and dispo.

## 2025-05-26 NOTE — ED ADULT NURSE NOTE - BIRTH SEX
----- Message from Aylin Chavez sent at 2021  3:32 PM CDT -----  Regarding: [facility] Request  Outside Film Request    Patient Name:  Lula Villalta  MRN:  [id number]    Type of Images/Reports requested:  MRI and Xray of Cspine for comp  Approximate date of prior imagin for both exams  Ordering provider:      Prior Imaging Facility:  [facility]    Name:   Address:  City/State:     Phone:   Fax:            Male

## 2025-05-26 NOTE — BH PATIENT PROFILE - FALL HARM RISK - UNIVERSAL INTERVENTIONS
Bed in lowest position, wheels locked, appropriate side rails in place/Call bell, personal items and telephone in reach/Instruct patient to call for assistance before getting out of bed or chair/Non-slip footwear when patient is out of bed/[place] to call system/Physically safe environment - no spills, clutter or unnecessary equipment/Purposeful Proactive Rounding/Room/bathroom lighting operational, light cord in reach

## 2025-05-26 NOTE — ED PROVIDER NOTE - ATTENDING APP SHARED VISIT CONTRIBUTION OF CARE
Pt is a 60yo m, self reported h/o bpd, schizophrenia, who p/w c/o feeling depressed and suicidal, with thoughts about shooting himself with a gun. No hi, hallucinations. Admits to using cocaine, k2, drinking etoh. No cp, sob. Pt tripped and fell on his way into the ED, hit forehead, but no loc. No ha, dizziness, n/v, n/t/w, neck pain... Not on blood thinners. Afebrile. HDS. Pt is well appearing. small hematoma to left forehead, no bony depressions. Neg raccoon eyes/ alexander sign. Can get agitated at times but redirectable. Neuro exam non-focal. CT head neg for acute injury. + etoh 69. Plan for psych consult, dispo pending consult recs. Will continue on 1:1.

## 2025-05-26 NOTE — ED ADULT NURSE NOTE - NS ED BHA NUR THOUGHT PROCESS
"  Pre Op Diagnosis: Dislodged nephrosotmy tube  Post Op Diagnosis: Same    Procedure: Left nephrosotomy tube placement    Procedure performed by: Silva    Written Informed Consent Obtained: Yes  Specimen Removed: NO  Estimated Blood Loss: Minimal    Findings:   Successful replacement of of 12 Fr left sided nephrostomy tube.     Patient tolerated procedure well.    Refugio Ascencio MD ([place])  Interventional Radiology  [phone]      " Normal/Making sense
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
## 2025-05-26 NOTE — ED BEHAVIORAL HEALTH ASSESSMENT NOTE - DESCRIPTION
in behavioral control, VSS. BAL 69. HTN, pericarditis, self reports hx of CHF (claims EF around ? 45%), CKD, GERD. per psyckes, past meds to include: HCTZ 25mg, lipitor 40mg, ASA 81mg, entresto 24-26mg, folic acid 1mg, thiamine 100mg single, unemployed, on the street. has a 15 yr old daughter (living in [place], [place] - currently in custody of bio mother, speaks on the phone regularly). no reported access to guns in behavioral control, VSS. BAL 69. CT head non con negative for acute intracranial pathology.

## 2025-05-26 NOTE — ED PROVIDER NOTE - MUSCULOSKELETAL, MLM
Spine appears normal,  no midline spine tenderness, range of motion is not limited, no muscle or joint tenderness

## 2025-05-26 NOTE — ED ADULT TRIAGE NOTE - NS_BH TRG QUESTION8_ED_ALL_ED
Depression (without Suicidality or Psychosis)/Margarette (includes Bipolar Disorder)/Anxiety (includes Panic, OCD)/Behavioral Problems (includes ADHD, Oppositionality)
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
## 2025-05-26 NOTE — BH PATIENT PROFILE - FALL HARM RISK - CONCLUSION
Completed iron infusion. No rectal bleeding recently. EGD and Colonoscopy were ok. Universal Safety Interventions

## 2025-05-27 PROCEDURE — 99222 1ST HOSP IP/OBS MODERATE 55: CPT

## 2025-05-27 RX ADMIN — Medication 81 MILLIGRAM(S): at 09:09

## 2025-05-27 RX ADMIN — ATORVASTATIN CALCIUM 40 MILLIGRAM(S): 80 TABLET, FILM COATED ORAL at 21:54

## 2025-05-27 RX ADMIN — Medication 600 MILLIGRAM(S): at 21:54

## 2025-05-27 RX ADMIN — Medication 600 MILLIGRAM(S): at 09:09

## 2025-05-27 NOTE — BH INPATIENT PSYCHIATRY ASSESSMENT NOTE - NSBHATTESTBILLING_PSY_A_CORE
99223-Initial OBS or IP - high complexity OR  mins 99222-Initial OBS or IP - moderate complexity OR 55-74 mins

## 2025-05-27 NOTE — BH INPATIENT PSYCHIATRY ASSESSMENT NOTE - DESCRIPTION
per previous: "single, unemployed, on the street. has a 15 yr old daughter (living in [place], [place] - currently in custody of bio mother, speaks on the phone regularly). no reported access to guns"

## 2025-05-27 NOTE — BH INPATIENT PSYCHIATRY ASSESSMENT NOTE - NSBHCHARTREVIEWVS_PSY_A_CORE FT
Vital Signs Last 24 Hrs  T(C): 36.7 (05-27-25 @ 08:49), Max: 36.9 (05-27-25 @ 06:31)  T(F): 98.1 (05-27-25 @ 08:49), Max: 98.4 (05-27-25 @ 06:31)  HR: 71 (05-27-25 @ 08:49) (59 - 93)  BP: 118/69 (05-27-25 @ 08:49) (118/69 - 122/82)  BP(mean): --  RR: 18 (05-27-25 @ 08:49) (18 - 18)  SpO2: 97% (05-27-25 @ 08:49) (95% - 98%)

## 2025-05-27 NOTE — BH SOCIAL WORK INITIAL PSYCHOSOCIAL EVALUATION - OTHER PAST PSYCHIATRIC HISTORY (INCLUDE DETAILS REGARDING ONSET, COURSE OF ILLNESS, INPATIENT/OUTPATIENT TREATMENT)
Pt. is a 59 year old non-caregiver man admitted to [facility] with a chief complaint of suicidal ideation in the context of alcohol and cocaine use. He was discharged from [facility] last week with similar complaints. On interview, pt. was A+Ox4, pleasant and cooperative. Pt. stated he feels better, has no SI/HI now, and expresses wanting to get into rehab to "get his life together."  Pt. is a 59 year old non-caregiver man admitted to [facility] with a chief complaint of suicidal ideation in the context of alcohol and cocaine use. He was discharged from [facility] last week with similar complaints. On interview, pt. was A+Ox4, pleasant and cooperative. Pt. stated he feels better, has no SI/HI now, and expresses wanting to get into rehab to "get his life together." His ultimate goal is to see/talk to his 17 year old daughter more regularly. [place].    Emergency contact (not caregiver, ask pt before speaking with her), Friend, Myesha Patterson, [phone] Pt. is a 59 year old non-caregiver man admitted to [facility] with a chief complaint of suicidal ideation in the context of alcohol and cocaine use. He was discharged from [facility] last week with similar complaints. On interview, pt. was A+Ox4, pleasant and cooperative. Pt. stated he feels better, has no SI/HI now, and expresses wanting to get into rehab to "get his life together." His ultimate goal is to see/talk to his 17 year old daughter more regularly. His daughter lives with her mother in the [facility].    Pt. is interested in substance abuse rehab outside of [place]. He does not want [facility] as he was there at the beginning of May.  sent referrals to:    [facility], [phone] (p), [phone] (f)    [facility] [place], [phone] (p), [phone] (f)    Emergency contact (not caregiver, ask pt before speaking with her), Friend, Myesha Patterson, [phone]

## 2025-05-27 NOTE — BH SOCIAL WORK INITIAL PSYCHOSOCIAL EVALUATION - NSPTSTATEDGOAL_PSY_ALL_CORE
Pt. wants to "get his life together" and see his 17 year old daughter who [place].

## 2025-05-27 NOTE — BH INPATIENT PSYCHIATRY ASSESSMENT NOTE - NSBHSAALC_PSY_A_CORE FT
per previous: "Reports drinking 7 beers/shots of liquor since Friday (5/16), last drank on Sunday (5/18)"

## 2025-05-27 NOTE — BH INPATIENT PSYCHIATRY ASSESSMENT NOTE - CURRENT MEDICATION
MEDICATIONS  (STANDING):  aspirin enteric coated 81 milliGRAM(s) Oral daily  atorvastatin 40 milliGRAM(s) Oral at bedtime    MEDICATIONS  (PRN):  acetaminophen     Tablet .. 650 milliGRAM(s) Oral every 6 hours PRN Temp greater or equal to 38.5C (101.3F), Mild Pain (1 - 3), Moderate Pain (4 - 6), Severe Pain (7 - 10)  aluminum hydroxide/magnesium hydroxide/simethicone Suspension 30 milliLiter(s) Oral every 6 hours PRN Dyspepsia  hydrOXYzine hydrochloride 25 milliGRAM(s) Oral every 6 hours PRN Anxiety  ibuprofen  Tablet. 600 milliGRAM(s) Oral every 6 hours PRN Temp greater or equal to 38.5C (101.3F), Mild Pain (1 - 3), Moderate Pain (4 - 6), Severe Pain (7 - 10)  LORazepam     Tablet 2 milliGRAM(s) Oral every 1 hour PRN CIWA 8-14  LORazepam     Tablet 4 milliGRAM(s) Oral every 1 hour PRN CIWA 15 or greater, or seizure

## 2025-05-27 NOTE — BH INPATIENT PSYCHIATRY ASSESSMENT NOTE - NSBHMETABOLIC_PSY_ALL_CORE_FT
BMI: BMI (kg/m2): 37.5 (05-26-25 @ 13:29)  HbA1c: A1C with Estimated Average Glucose Result: 5.4 % (12-27-24 @ 05:30)    Glucose: POCT Blood Glucose.: 109 mg/dL (04-10-25 @ 20:22)    BP: 118/69 (05-27-25 @ 08:49) (110/79 - 122/82)Vital Signs Last 24 Hrs  T(C): 36.7 (05-27-25 @ 08:49), Max: 36.9 (05-27-25 @ 06:31)  T(F): 98.1 (05-27-25 @ 08:49), Max: 98.4 (05-27-25 @ 06:31)  HR: 71 (05-27-25 @ 08:49) (59 - 93)  BP: 118/69 (05-27-25 @ 08:49) (118/69 - 122/82)  BP(mean): --  RR: 18 (05-27-25 @ 08:49) (18 - 18)  SpO2: 97% (05-27-25 @ 08:49) (95% - 98%)      Lipid Panel: Date/Time: 04-19-25 @ 11:41  Cholesterol, Serum: 143  LDL Cholesterol Calculated: 68  HDL Cholesterol, Serum: 54  Total Cholesterol/HDL Ration Measurement: --  Triglycerides, Serum: 116

## 2025-05-27 NOTE — BH INPATIENT PSYCHIATRY ASSESSMENT NOTE - RISK ASSESSMENT
Acute risk factors - poor social support, loss of contact with daughter, housing instability, ongoing substance use, depression/ anxiety,  SI and, AH  Chronic risk factors - extensive psychiatric and substance hx, hx outpatient treatment nonadherence,  Cluster B pathology, male, hx of being impulsive, multiple psych admission  Protective factors -   pt is treatment-seeking and often self-presents to the ED for voluntary hospitalization, no clear hx of suicide attempts, no known hx of violence, help seeking, sense of commitment to daughter, does not appear manic, no floridly psychotic  Current Risk: LOW  Current risk being mitigated with psychiatric hospitalization  Static: poor social support, loss of contact with daughter, housing instability, ongoing substance use  Modifiable: substance use, hx outpatient treatment nonadherence  Protective: pt is treatment-seeking and often self-presents to the ED for voluntary hospitalization  Current Risk: LOW  Current risk being mitigated with psychiatric hospitalization

## 2025-05-27 NOTE — BH SOCIAL WORK INITIAL PSYCHOSOCIAL EVALUATION - NSPROGENSOURCEINFOFT_PSY_ALL_CORE
Detail Level: Generalized
Detail Level: Zone
Detail Level: Detailed
Detail Level: Simple
[place]
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
## 2025-05-27 NOTE — BH TREATMENT PLAN - NSTXPATIENTPARTICIPATE_PSY_ALL_CORE
Patient participated in identification of needs/problems/goals for treatment
n/a
Patient participated in identification of needs/problems/goals for treatment
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
## 2025-05-27 NOTE — BH INPATIENT PSYCHIATRY ASSESSMENT NOTE - NSBHASSESSSUMMFT_PSY_ALL_CORE
Homeless man, multiple charted psychaitric diagnoses across multiple categories, history of substance use disorder, concerns for malingering in past self presented to the ED complaining of depression, anxiety, "AH" and SI with plan to shoot himself with his brother's gun. similar to other presentations, 3 day letters, trips to rehab and signs self out, et cetera.  Reprots recent relapse.       1. continue 913 hospitalization for safety and assessment.  although suspicious of malingering, recent susbtance use is risk factor.  2. routine checks appropriate can make needs known, future oriented on unit.  3. haldol, benadryl prns, ciwa ativan prns for withdrawal.  no history of complicated withdrawal.   4. reports he does not want medications, has refused in past, will defer standing meds for now other than asa. 60yo single undomiciled (lives in hotels) unemployed M, past psychiatric history of personality disorder (antisocial, borderline), substance use (primarily alcohol, cocaine, MJ), unspecified mood and psychotic disorders, previous suicide attempts (last 1 year ago jumping in front of a 7 train) and chronic  high ED and inpatient psychiatric hospital utilization (last admission [facility] discharged 5/22/25 - no standing medications on discharge) without adherence to follow up psychiatric and medical outpatient appointments and suspicion for secondary gain, presenting 4 days after discharge from [facility] with similar complaint of SI with plans to shoot himself or jump in front of a train. No clear evidence of sx requiring medication management; psychopathology at this time appears to be most consistent with substance and underlying personality disorder.     1. continue 913 hospitalization for safety and assessment.  although suspicious of malingering, recent susbtance use is risk factor.  2. routine checks appropriate can make needs known, future oriented on unit.  3. haldol, benadryl prns, ciwa ativan prns for withdrawal.  no history of complicated withdrawal.   4. reports he does not want medications, has refused in past, will defer standing meds for now other than asa.

## 2025-05-27 NOTE — BH SOCIAL WORK INITIAL PSYCHOSOCIAL EVALUATION - NSHIGHRISKBEHFT_PSY_ALL_CORE
Pt. has had multiple psychiatric hospitalizations for SI. He had SI with a plan to jump in front of a train last year.     Pt. regularly uses cocaine and alcohol.
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
## 2025-05-27 NOTE — BH TREATMENT PLAN - NSPTSTATEDGOAL_PSY_ALL_CORE
Pt. wants to "get his life together" and see his 17 year old [place] 
Pt. wants to "get his life together" and see his 17 year old daughter [place]

## 2025-05-27 NOTE — BH TREATMENT PLAN - NSTXSUICIDINTERRN_PSY_ALL_CORE
Encourage Pt to seek out staff when feeling suicidal
Encourage Pt to seek out staff when feeling suicidal

## 2025-05-27 NOTE — BH SOCIAL WORK INITIAL PSYCHOSOCIAL EVALUATION - NSBHHOUSECOMMENTFT_PSY_ALL_CORE
Pt. has been staying in different hotels rather than going to a shelter. He is interested in inpatient rehab.

## 2025-05-27 NOTE — BH INPATIENT PSYCHIATRY ASSESSMENT NOTE - NSBHSACONSEQUENCE_PSY_A_CORE FT
per previous: "Pt reports many past detox and rehab admissions, including [facility], [facility], others.".. with continued use of said drugs, will cause worsening psychosis, depressive and anxiety symptoms including clinical picture of miquel"

## 2025-05-28 DIAGNOSIS — F14.10 COCAINE ABUSE, UNCOMPLICATED: ICD-10-CM

## 2025-05-28 DIAGNOSIS — Z87.898 PERSONAL HISTORY OF OTHER SPECIFIED CONDITIONS: ICD-10-CM

## 2025-05-28 PROCEDURE — 99232 SBSQ HOSP IP/OBS MODERATE 35: CPT

## 2025-05-28 RX ADMIN — Medication 600 MILLIGRAM(S): at 12:08

## 2025-05-28 RX ADMIN — Medication 600 MILLIGRAM(S): at 23:19

## 2025-05-28 RX ADMIN — ATORVASTATIN CALCIUM 40 MILLIGRAM(S): 80 TABLET, FILM COATED ORAL at 23:19

## 2025-05-28 RX ADMIN — Medication 600 MILLIGRAM(S): at 13:00

## 2025-05-28 RX ADMIN — Medication 81 MILLIGRAM(S): at 12:06

## 2025-05-29 DIAGNOSIS — Z76.5 MALINGERER [CONSCIOUS SIMULATION]: ICD-10-CM

## 2025-05-29 PROCEDURE — 99232 SBSQ HOSP IP/OBS MODERATE 35: CPT

## 2025-05-29 RX ORDER — B1/B2/B3/B5/B6/B12/VIT C/FOLIC 500-0.5 MG
1 TABLET ORAL DAILY
Refills: 0 | Status: DISCONTINUED | OUTPATIENT
Start: 2025-05-29 | End: 2025-06-02

## 2025-05-29 RX ORDER — DIPHENHYDRAMINE HCL 12.5MG/5ML
50 ELIXIR ORAL ONCE
Refills: 0 | Status: COMPLETED | OUTPATIENT
Start: 2025-05-29 | End: 2025-05-29

## 2025-05-29 RX ADMIN — Medication 1 TABLET(S): at 11:32

## 2025-05-29 RX ADMIN — Medication 600 MILLIGRAM(S): at 10:29

## 2025-05-29 RX ADMIN — Medication 600 MILLIGRAM(S): at 00:19

## 2025-05-29 RX ADMIN — Medication 81 MILLIGRAM(S): at 09:30

## 2025-05-29 RX ADMIN — Medication 50 MILLIGRAM(S): at 21:58

## 2025-05-29 RX ADMIN — ATORVASTATIN CALCIUM 40 MILLIGRAM(S): 80 TABLET, FILM COATED ORAL at 21:58

## 2025-05-29 RX ADMIN — Medication 600 MILLIGRAM(S): at 09:29

## 2025-05-29 NOTE — BH INPATIENT PSYCHIATRY PROGRESS NOTE - NSBHATTESTAPPBILLTIME_PSY_A_CORE
I attest my time as EMMIE is greater than 50% of the total combined time spent on qualifying patient care activities. I have reviewed and verified the documentation.
I attest my time as EMMIE is greater than 50% of the total combined time spent on qualifying patient care activities. I have reviewed and verified the documentation.

## 2025-05-30 PROCEDURE — 99231 SBSQ HOSP IP/OBS SF/LOW 25: CPT

## 2025-05-30 RX ADMIN — Medication 600 MILLIGRAM(S): at 23:18

## 2025-05-30 RX ADMIN — Medication 1 TABLET(S): at 10:49

## 2025-05-30 RX ADMIN — Medication 81 MILLIGRAM(S): at 10:49

## 2025-05-30 RX ADMIN — Medication 600 MILLIGRAM(S): at 22:18

## 2025-05-30 NOTE — CHART NOTE - NSCHARTNOTEFT_GEN_A_CORE
met with pt. on 8 Uris yesterday and today. Pt.     Pt's preferred discharge location is the [facility] in [place].     [facility]  [address], [place]  [phone]    [facility]  [address] [place] [address]  2nd Floor   Phone: [phone] (p), [phone] (f)    [place] [address]    However, pt. wants to self present and does not want  to reach out to [facility] at all.  let pt. know that an alternate appointment would need to be made at [facility] in the event pt. does not make it to [place].  will also provide pt. with a drop-in shelter as a backup.     Emergency contact, Friend, Myesha Leonardo, [phone]  met with pt. on 8 Uris yesterday and today. Pt.     Pt's preferred discharge location is the [facility] in [place].     [facility]  [address], [place]  [phone]    [facility]  [address] [place] [address]  2nd Floor   Phone: [phone] (p), [phone] (f)  6/5 11AM-12PM    [address] [address]    However, pt. wants to self present and does not want  to reach out to [facility] at all.  let pt. know that an alternate appointment would need to be made at [facility] in the event pt. does not make it to [place].  will also provide pt. with a drop-in shelter as a backup.     Emergency contact, Friend, Myesha Patterson, [phone]  met with pt. on 8 Uris yesterday and today. Pt. is now stating he wants to go to the [facility] inpatient rehab in [place] and does not want  to contact the facility.  discussed with team that pt. needs a backup treatment plan in the event he does not make it to [place].  spoke with pt. about this and he was in agreement.  will [facility] pt[facility] with [facility] and [facility], information below. Pt. will likely be discharged on Monday.  to follow.     Pt's preferred discharge location is the [facility] in [place].     [facility]  [address], [place]  [phone]    [place] [address]  2nd Floor   Phone: [phone] (p), [phone] (f)  6/5 11AM-12PM    [facility]  [address] [place] [address]    However, pt. wants to self present and does not want  to reach out to [facility] at all.  let pt. know that an alternate appointment would need to be made at [facility] in the event pt. does not make it to [place].  will also provide pt. with a drop-in shelter as a backup.     Emergency contact, Friend, Myesha Patterson, [phone]  met with pt. on 8 Uris yesterday and today. Pt. is now stating he wants to go to the [facility] inpatient rehab in [place] and does not want  to contact the facility.  discussed with team that pt. needs a backup treatment plan in the event he does not make it to [place].  spoke with pt. about this and he was in agreement.  will provide pt. with information on [facility] and [facility], information below. Pt. will likely be discharged on Monday.  to follow.     Pt's preferred discharge location is the [facility] in [place].     [facility]  [address], [place]  [phone]    [place] [address]  2nd Floor   Phone: [phone] (p), [phone] (f)  6/5 11AM-12PM    [facility]  [address] [place] [address]    However, pt. wants to self present and does not want  to reach out to [facility] at all.  let pt. know that an alternate appointment would need to be made at [facility] in the event pt. does not make it to [place].  will also provide pt. with a drop-in shelter as a backup. Pt. stated he does not know anyone in [place], but has been to this rehab before and found it to be helpful. He expresses that he does not want to use substances anymore.     Emergency contact, Friend, Myesha Patterson, [phone]

## 2025-05-31 RX ORDER — DIPHENHYDRAMINE HCL 12.5MG/5ML
25 ELIXIR ORAL AT BEDTIME
Refills: 0 | Status: DISCONTINUED | OUTPATIENT
Start: 2025-05-31 | End: 2025-06-02

## 2025-05-31 RX ADMIN — Medication 600 MILLIGRAM(S): at 21:52

## 2025-05-31 RX ADMIN — Medication 1 TABLET(S): at 11:01

## 2025-05-31 RX ADMIN — Medication 81 MILLIGRAM(S): at 11:00

## 2025-05-31 RX ADMIN — Medication 600 MILLIGRAM(S): at 22:52

## 2025-05-31 NOTE — BH INPATIENT PSYCHIATRY PROGRESS NOTE - NSICDXBHPRIMARYDX_PSY_ALL_CORE
Substance induced mood disorder   F19.78  
Substance induced mood disorder   F19.52  
Substance induced mood disorder   F19.70  
Substance induced mood disorder   F19.29

## 2025-05-31 NOTE — BH INPATIENT PSYCHIATRY PROGRESS NOTE - GENERAL APPEARANCE
not actively internally stimulated/No deformities present

## 2025-05-31 NOTE — BH INPATIENT PSYCHIATRY PROGRESS NOTE - NSBHATTESTBILLING_PSY_A_CORE
76428-Toxsikqaqq OBS or IP - moderate complexity OR 35-49 mins
44853-Kmgfisiuio OBS or IP - moderate complexity OR 35-49 mins
52549-Oltrrjfljl OBS or IP - moderate complexity OR 35-49 mins
37648-Zowtjipfxb OBS or IP - moderate complexity OR 35-49 mins

## 2025-05-31 NOTE — BH INPATIENT PSYCHIATRY PROGRESS NOTE - NSTXSUBMISGOALOTHER_PSY_ALL_CORE
Pt will attend 12 step groups weekly

## 2025-05-31 NOTE — BH INPATIENT PSYCHIATRY PROGRESS NOTE - NSTXCOPEGOALOTHER_PSY_ALL_CORE
Pt will participate in groups and in the milieu treatment structure of the unit

## 2025-05-31 NOTE — BH INPATIENT PSYCHIATRY PROGRESS NOTE - NSBHATTESTTYPEVISIT_PSY_A_CORE
On-site Attending supervising EMMIE (99XXX codes)
Resident/Fellow with telephonic supervision
On-site Attending supervising EMMIE (99XXX codes)
On-site Attending supervising EMMIE (99XXX codes)

## 2025-05-31 NOTE — BH INPATIENT PSYCHIATRY PROGRESS NOTE - NSBHMETABOLIC_PSY_ALL_CORE_FT
BMI: BMI (kg/m2): 37.5 (05-26-25 @ 13:29)  HbA1c: A1C with Estimated Average Glucose Result: 5.4 % (12-27-24 @ 05:30)    Glucose: POCT Blood Glucose.: 109 mg/dL (04-10-25 @ 20:22)    BP: 111/61 (05-30-25 @ 16:19) (103/77 - 130/80)Vital Signs Last 24 Hrs  T(C): --  T(F): --  HR: --  BP: --  BP(mean): --  RR: --  SpO2: --      Lipid Panel: Date/Time: 04-19-25 @ 11:41  Cholesterol, Serum: 143  LDL Cholesterol Calculated: 68  HDL Cholesterol, Serum: 54  Total Cholesterol/HDL Ration Measurement: --  Triglycerides, Serum: 116  
BMI: BMI (kg/m2): 37.5 (05-26-25 @ 13:29)  HbA1c: A1C with Estimated Average Glucose Result: 5.4 % (12-27-24 @ 05:30)    Glucose: POCT Blood Glucose.: 109 mg/dL (04-10-25 @ 20:22)    BP: 113/80 (05-30-25 @ 09:41) (103/77 - 136/100)Vital Signs Last 24 Hrs  T(C): 36.6 (05-30-25 @ 09:41), Max: 36.8 (05-29-25 @ 16:21)  T(F): 97.9 (05-30-25 @ 09:41), Max: 98.2 (05-29-25 @ 16:21)  HR: 73 (05-30-25 @ 09:41) (64 - 73)  BP: 113/80 (05-30-25 @ 09:41) (113/80 - 130/80)  BP(mean): --  RR: 18 (05-30-25 @ 09:41) (18 - 18)  SpO2: 99% (05-30-25 @ 09:41) (93% - 99%)      Lipid Panel: Date/Time: 04-19-25 @ 11:41  Cholesterol, Serum: 143  LDL Cholesterol Calculated: 68  HDL Cholesterol, Serum: 54  Total Cholesterol/HDL Ration Measurement: --  Triglycerides, Serum: 116  
BMI: BMI (kg/m2): 37.5 (05-26-25 @ 13:29)  HbA1c: A1C with Estimated Average Glucose Result: 5.4 % (12-27-24 @ 05:30)    Glucose: POCT Blood Glucose.: 109 mg/dL (04-10-25 @ 20:22)    BP: 136/100 (05-28-25 @ 09:16) (110/79 - 136/100)Vital Signs Last 24 Hrs  T(C): 36.9 (05-28-25 @ 09:16), Max: 36.9 (05-28-25 @ 09:16)  T(F): 98.4 (05-28-25 @ 09:16), Max: 98.4 (05-28-25 @ 09:16)  HR: 56 (05-28-25 @ 09:16) (56 - 70)  BP: 136/100 (05-28-25 @ 09:16) (118/79 - 136/100)  BP(mean): --  RR: 18 (05-28-25 @ 09:16) (16 - 18)  SpO2: 97% (05-28-25 @ 09:16) (97% - 97%)      Lipid Panel: Date/Time: 04-19-25 @ 11:41  Cholesterol, Serum: 143  LDL Cholesterol Calculated: 68  HDL Cholesterol, Serum: 54  Total Cholesterol/HDL Ration Measurement: --  Triglycerides, Serum: 116  
BMI: BMI (kg/m2): 37.5 (05-26-25 @ 13:29)  HbA1c: A1C with Estimated Average Glucose Result: 5.4 % (12-27-24 @ 05:30)    Glucose: POCT Blood Glucose.: 109 mg/dL (04-10-25 @ 20:22)    BP: 103/77 (05-29-25 @ 07:45) (103/77 - 136/100)Vital Signs Last 24 Hrs  T(C): 37 (05-29-25 @ 07:45), Max: 37 (05-29-25 @ 07:45)  T(F): 98.6 (05-29-25 @ 07:45), Max: 98.6 (05-29-25 @ 07:45)  HR: 55 (05-29-25 @ 07:45) (52 - 97)  BP: 103/77 (05-29-25 @ 07:45) (103/77 - 122/89)  BP(mean): --  RR: 17 (05-29-25 @ 06:59) (17 - 18)  SpO2: 95% (05-29-25 @ 07:45) (90% - 96%)      Lipid Panel: Date/Time: 04-19-25 @ 11:41  Cholesterol, Serum: 143  LDL Cholesterol Calculated: 68  HDL Cholesterol, Serum: 54  Total Cholesterol/HDL Ration Measurement: --  Triglycerides, Serum: 116

## 2025-05-31 NOTE — BH INPATIENT PSYCHIATRY PROGRESS NOTE - NSTXSUICIDINTERMD_PSY_ALL_CORE
diagnostic clarification, motivational interviewing 

## 2025-05-31 NOTE — BH INPATIENT PSYCHIATRY PROGRESS NOTE - PRN MEDS
MEDICATIONS  (PRN):  acetaminophen     Tablet .. 650 milliGRAM(s) Oral every 6 hours PRN Temp greater or equal to 38.5C (101.3F), Mild Pain (1 - 3), Moderate Pain (4 - 6), Severe Pain (7 - 10)  aluminum hydroxide/magnesium hydroxide/simethicone Suspension 30 milliLiter(s) Oral every 6 hours PRN Dyspepsia  hydrOXYzine hydrochloride 25 milliGRAM(s) Oral every 6 hours PRN Anxiety  ibuprofen  Tablet. 600 milliGRAM(s) Oral every 6 hours PRN Temp greater or equal to 38.5C (101.3F), Mild Pain (1 - 3), Moderate Pain (4 - 6), Severe Pain (7 - 10)  LORazepam     Tablet 2 milliGRAM(s) Oral every 1 hour PRN CIWA 8-14  LORazepam     Tablet 4 milliGRAM(s) Oral every 1 hour PRN CIWA 15 or greater, or seizure  

## 2025-05-31 NOTE — BH INPATIENT PSYCHIATRY PROGRESS NOTE - NSBHADMITIPREASONDETAILS_PSY_A_CORE FT
Aftercare planning in effect

## 2025-05-31 NOTE — BH INPATIENT PSYCHIATRY PROGRESS NOTE - NSBHASSESSSUMMFT_PSY_ALL_CORE
60yo single undomiciled (lives in hotels) unemployed M, past psychiatric history of personality disorder (antisocial, borderline), substance use (primarily alcohol, cocaine, MJ), unspecified mood and psychotic disorders, previous suicide attempts (last 1 year ago jumping in front of a 7 train) and chronic  high ED and inpatient psychiatric hospital utilization (last admission [facility] discharged 5/22/25 - no standing medications on discharge) without adherence to follow up psychiatric and medical outpatient appointments and suspicion for secondary gain, presenting 4 days after discharge from [facility] with similar complaint of SI with plans to shoot himself or jump in front of a train. No clear evidence of sx requiring medication management; psychopathology at this time appears to be most consistent with substance and underlying personality disorder.    1. continue 9.13 hospitalization for safety and assessment.  although suspicious of malingering, recent susbtance use is risk factor.  2. routine checks appropriate can make needs known, future oriented on unit.  3. haldol, benadryl prns, ciwa ativan prns for withdrawal.  no history of complicated withdrawal.   4. reports he does not want medications, has refused in past, will defer standing meds for now other than asa.
60yo single undomiciled (lives in hotels) unemployed M, past psychiatric history of personality disorder (antisocial, borderline), substance use (primarily alcohol, cocaine, MJ), unspecified mood and psychotic disorders, previous suicide attempts (last 1 year ago jumping in front of a 7 train) and chronic  high ED and inpatient psychiatric hospital utilization (last admission [facility] discharged 5/22/25 - no standing medications on discharge) without adherence to follow up psychiatric and medical outpatient appointments and suspicion for secondary gain, presenting 4 days after discharge from [facility] with similar complaint of SI with plans to shoot himself or jump in front of a train. No clear evidence of sx requiring medication management; psychopathology at this time appears to be most consistent with substance and underlying personality disorder.     1. continue 913 hospitalization for safety and assessment.  although suspicious of malingering, recent susbtance use is risk factor.  2. routine checks appropriate can make needs known, future oriented on unit.  3. haldol, benadryl prns, ciwa ativan prns for withdrawal.  no history of complicated withdrawal.   4. reports he does not want medications, has refused in past, will defer standing meds for now other than asa.
58yo single undomiciled (lives in hotels) unemployed M, past psychiatric history of personality disorder (antisocial, borderline), substance use (primarily alcohol, cocaine, MJ), unspecified mood and psychotic disorders, previous suicide attempts (last 1 year ago jumping in front of a 7 train) and chronic  high ED and inpatient psychiatric hospital utilization (last admission [facility] discharged 5/22/25 - no standing medications on discharge) without adherence to follow up psychiatric and medical outpatient appointments and suspicion for secondary gain, presenting 4 days after discharge from [facility] with similar complaint of SI with plans to shoot himself or jump in front of a train. No clear evidence of sx requiring medication management; psychopathology at this time appears to be most consistent with substance and underlying personality disorder.     1. continue 913 hospitalization for safety and assessment.  although suspicious of malingering, recent susbtance use is risk factor.  2. routine checks appropriate can make needs known, future oriented on unit.  3. haldol, benadryl prns, ciwa ativan prns for withdrawal.  no history of complicated withdrawal.   4. reports he does not want medications, has refused in past, will defer standing meds for now other than asa.
60yo single undomiciled (lives in hotels) unemployed M, past psychiatric history of personality disorder (antisocial, borderline), substance use (primarily alcohol, cocaine, MJ), unspecified mood and psychotic disorders, previous suicide attempts (last 1 year ago jumping in front of a 7 train) and chronic  high ED and inpatient psychiatric hospital utilization (last admission [facility] discharged 5/22/25 - no standing medications on discharge) without adherence to follow up psychiatric and medical outpatient appointments and suspicion for secondary gain, presenting 4 days after discharge from [facility] with similar complaint of SI with plans to shoot himself or jump in front of a train. No clear evidence of sx requiring medication management; psychopathology at this time appears to be most consistent with substance and underlying personality disorder.    1. continue 9.13 hospitalization for safety and assessment.  although suspicious of malingering, recent susbtance use is risk factor.  2. routine checks appropriate can make needs known, future oriented on unit.  3. haldol, benadryl prns, ciwa ativan prns for withdrawal.  no history of complicated withdrawal.   4. reports he does not want medications, has refused in past, will defer standing meds for now other than asa.

## 2025-05-31 NOTE — BH INPATIENT PSYCHIATRY PROGRESS NOTE - NSBHMSEIMPULSE_PSY_A_CORE
[>50% of Time Spent on Counseling and Coordination of Care for  ___] : Greater than 50% of the encounter time was spent on counseling and coordination of care for [unfilled]
[Time Spent: ___ minutes] : I have spent [unfilled] minutes of face to face time with the patient
Normal

## 2025-05-31 NOTE — BH INPATIENT PSYCHIATRY PROGRESS NOTE - NSBHCHARTREVIEWVS_PSY_A_CORE FT
Vital Signs Last 24 Hrs  T(C): 36.9 (05-28-25 @ 09:16), Max: 36.9 (05-28-25 @ 09:16)  T(F): 98.4 (05-28-25 @ 09:16), Max: 98.4 (05-28-25 @ 09:16)  HR: 56 (05-28-25 @ 09:16) (56 - 70)  BP: 136/100 (05-28-25 @ 09:16) (118/79 - 136/100)  BP(mean): --  RR: 18 (05-28-25 @ 09:16) (16 - 18)  SpO2: 97% (05-28-25 @ 09:16) (97% - 97%)    
Vital Signs Last 24 Hrs  T(C): 36.6 (05-30-25 @ 09:41), Max: 36.8 (05-29-25 @ 16:21)  T(F): 97.9 (05-30-25 @ 09:41), Max: 98.2 (05-29-25 @ 16:21)  HR: 73 (05-30-25 @ 09:41) (64 - 73)  BP: 113/80 (05-30-25 @ 09:41) (113/80 - 130/80)  BP(mean): --  RR: 18 (05-30-25 @ 09:41) (18 - 18)  SpO2: 99% (05-30-25 @ 09:41) (93% - 99%)    
Vital Signs Last 24 Hrs  T(C): --  T(F): --  HR: --  BP: --  BP(mean): --  RR: --  SpO2: --    
Vital Signs Last 24 Hrs  T(C): 37 (05-29-25 @ 07:45), Max: 37 (05-29-25 @ 07:45)  T(F): 98.6 (05-29-25 @ 07:45), Max: 98.6 (05-29-25 @ 07:45)  HR: 55 (05-29-25 @ 07:45) (52 - 97)  BP: 103/77 (05-29-25 @ 07:45) (103/77 - 122/89)  BP(mean): --  RR: 17 (05-29-25 @ 06:59) (17 - 18)  SpO2: 95% (05-29-25 @ 07:45) (90% - 96%)

## 2025-05-31 NOTE — BH INPATIENT PSYCHIATRY PROGRESS NOTE - CURRENT MEDICATION
MEDICATIONS  (STANDING):  aspirin enteric coated 81 milliGRAM(s) Oral daily  atorvastatin 40 milliGRAM(s) Oral at bedtime    MEDICATIONS  (PRN):  acetaminophen     Tablet .. 650 milliGRAM(s) Oral every 6 hours PRN Temp greater or equal to 38.5C (101.3F), Mild Pain (1 - 3), Moderate Pain (4 - 6), Severe Pain (7 - 10)  aluminum hydroxide/magnesium hydroxide/simethicone Suspension 30 milliLiter(s) Oral every 6 hours PRN Dyspepsia  hydrOXYzine hydrochloride 25 milliGRAM(s) Oral every 6 hours PRN Anxiety  ibuprofen  Tablet. 600 milliGRAM(s) Oral every 6 hours PRN Temp greater or equal to 38.5C (101.3F), Mild Pain (1 - 3), Moderate Pain (4 - 6), Severe Pain (7 - 10)  LORazepam     Tablet 2 milliGRAM(s) Oral every 1 hour PRN CIWA 8-14  LORazepam     Tablet 4 milliGRAM(s) Oral every 1 hour PRN CIWA 15 or greater, or seizure  
MEDICATIONS  (STANDING):  aspirin enteric coated 81 milliGRAM(s) Oral daily  atorvastatin 40 milliGRAM(s) Oral at bedtime  multivitamin 1 Tablet(s) Oral daily    MEDICATIONS  (PRN):  acetaminophen     Tablet .. 650 milliGRAM(s) Oral every 6 hours PRN Temp greater or equal to 38.5C (101.3F), Mild Pain (1 - 3), Moderate Pain (4 - 6), Severe Pain (7 - 10)  aluminum hydroxide/magnesium hydroxide/simethicone Suspension 30 milliLiter(s) Oral every 6 hours PRN Dyspepsia  hydrOXYzine hydrochloride 25 milliGRAM(s) Oral every 6 hours PRN Anxiety  ibuprofen  Tablet. 600 milliGRAM(s) Oral every 6 hours PRN Temp greater or equal to 38.5C (101.3F), Mild Pain (1 - 3), Moderate Pain (4 - 6), Severe Pain (7 - 10)  LORazepam     Tablet 2 milliGRAM(s) Oral every 1 hour PRN CIWA 8-14  LORazepam     Tablet 4 milliGRAM(s) Oral every 1 hour PRN CIWA 15 or greater, or seizure  

## 2025-05-31 NOTE — BH INPATIENT PSYCHIATRY PROGRESS NOTE - NSBHFUPINTERVALHXFT_PSY_A_CORE
No acute interval events.    On exam, patient is calm, cooperative, euthymic. Reports feeling "good." Denies SI/HI/AH/VH. Reports some insomnia last night, requests Benadryl PRN for insomnia. Otherwise, he tells me that he is "ready to go," and is discharge focused.
Patient visible in the milieu seen talking to peer on approach. He reiterated a rehab in [place] will accept him on Monday morning, [facility] located [facility]. He again stated that he needs to be there Monday, and that if he hangs around [facility] he will be too tempted to relapse.  Pt stated that he has been helping peers with their problems, laughed easily when I joke that [employer] would not be able to pay him for his work.  Sleep and appetite are overall unchanged. Appears euthymic, in no distress. No SI, HI, psychosis.  Per staff report, quite fixated on leaving specifically Monday morning.
Patient visible in the milieu seen talking to peer on approach. He states that he just got off the phone with a rehab who will accept him on Monday morning, [facility]. He reports having been there before- multiple times and enjoyed their program. He was last there a few months ago, but left after 2 days. This time he states that he will stay as long as he is up to it. He states that he would like to go on [birth year], his [birth year]. He denies si, states that it has resolved and has no psychiatric complaints or concerns. No medical concerns, requests to be given multivitamin daily. Continues to try to reach out to his 16y/o daughter who he identifies as his coping skill. He acknowledges that she tries to set boundaries with him but he disregards her attempts and continues to call her multiple times regularly.   Sleep and appetite are overall unchanged. Appears euthymic, in no distress.   Per staff report, pt with irritable edge and tense with staff
Patient visible in the milieu during the day, is seen watching tv amongst peers and having meals. He is seen today by writer at the bedside. He reports feeling very tired today and endorses poor sleep due to having nightmares and visions of rats and shadows. He states that he must be having DTs (patient does not appear in any psychiatric of medical distress, is not confused, he is calm, VS of 136/100, stable, has not scored on CIWA to receive ativan, no shaking, irritability, fear or agitation). He states that he is 'trying to exercise patience' in the hospital as his 18y/o daughter has not been picking up the phone when he calls. he expresses frustration due to multiple hospitalizations is hoping to get into a rehab longterm. Also states that he is interested in getting a overnight job due to sleeping a lot during the day. He reports passive si without intent or plan. No hi/avh or PI   Per staff report pt has been noted to be food focused, irritable with staff, some splitting noted. Med compliant

## 2025-06-01 RX ADMIN — Medication 81 MILLIGRAM(S): at 11:25

## 2025-06-01 RX ADMIN — Medication 1 TABLET(S): at 14:54

## 2025-06-02 VITALS
DIASTOLIC BLOOD PRESSURE: 81 MMHG | SYSTOLIC BLOOD PRESSURE: 116 MMHG | OXYGEN SATURATION: 98 % | TEMPERATURE: 98 F | HEART RATE: 79 BPM

## 2025-06-02 PROCEDURE — 87635 SARS-COV-2 COVID-19 AMP PRB: CPT

## 2025-06-02 PROCEDURE — 70450 CT HEAD/BRAIN W/O DYE: CPT

## 2025-06-02 PROCEDURE — 81001 URINALYSIS AUTO W/SCOPE: CPT

## 2025-06-02 PROCEDURE — 99285 EMERGENCY DEPT VISIT HI MDM: CPT | Mod: 25

## 2025-06-02 PROCEDURE — 99238 HOSP IP/OBS DSCHRG MGMT 30/<: CPT

## 2025-06-02 PROCEDURE — 36415 COLL VENOUS BLD VENIPUNCTURE: CPT

## 2025-06-02 PROCEDURE — 85025 COMPLETE CBC W/AUTO DIFF WBC: CPT

## 2025-06-02 PROCEDURE — 80048 BASIC METABOLIC PNL TOTAL CA: CPT

## 2025-06-02 PROCEDURE — 80307 DRUG TEST PRSMV CHEM ANLYZR: CPT

## 2025-06-02 PROCEDURE — 93005 ELECTROCARDIOGRAM TRACING: CPT

## 2025-06-02 RX ORDER — ASPIRIN 325 MG
1 TABLET ORAL
Qty: 14 | Refills: 0
Start: 2025-06-02 | End: 2025-06-15

## 2025-06-02 RX ORDER — B1/B2/B3/B5/B6/B12/VIT C/FOLIC 500-0.5 MG
1 TABLET ORAL
Qty: 14 | Refills: 0
Start: 2025-06-02 | End: 2025-06-15

## 2025-06-02 RX ADMIN — Medication 81 MILLIGRAM(S): at 10:51

## 2025-06-02 RX ADMIN — Medication 30 MILLILITER(S): at 01:30

## 2025-06-02 RX ADMIN — Medication 1 TABLET(S): at 10:51

## 2025-06-02 NOTE — BH INPATIENT PSYCHIATRY DISCHARGE NOTE - NSBHFUPINTERVALHXFT_PSY_A_CORE
Patient visible on the unit, seen in the milieu, reports having a good weekend. Discharge focused, states that he is interested in being discharged early so that he can get to [facility] early to catch a $25. Denies si/hi/avh or PI. No acute medical concerns.

## 2025-06-02 NOTE — BH DISCHARGE NOTE NURSING/SOCIAL WORK/PSYCH REHAB - NSDCOTHERTYPOFSERV_GEN_ALL_CORE
[address]  [facility]  [address] [id number]  [phone]    Work/School Note    Date: 1/30/2023    To Whom It May concern:    Yecenia Byrne was seen and treated today in the emergency room by the following provider(s):  Attending Provider: Katina Sagastume MD  Physician Assistant: Gretel Landaverde is excused from work/school on 1/30/2023 through 2/1/2023. He is medically clear to return to work/school on 2/2/2023.          Sincerely,          DAWOOD Mahoney
[address]  [facility]  [address] [id number] [phone]    Work/School Note    Date: 1/30/2023    To Whom It May concern:    Hilton Gilliland was seen and treated today in the emergency room by the following provider(s):  Attending Provider: Hartford Merlin, MD  Physician Assistant: Mikie Saldana is excused from work/school on 1/30/2023 through 2/1/2023. He is medically clear to return to work/school on 2/2/2023.          Sincerely,          DAWOOD El
Inpatient Substance Use Rehab
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
## 2025-06-02 NOTE — BH DISCHARGE NOTE NURSING/SOCIAL WORK/PSYCH REHAB - FINANCIAL ASSISTANCE
[facility] provides services at a reduced cost to those who are determined to be eligible through [facility]’s financial assistance program. Information regarding [facility]’s financial assistance program can be found by going to [email or web address] or by calling [phone].

## 2025-06-02 NOTE — BH DISCHARGE NOTE NURSING/SOCIAL WORK/PSYCH REHAB - PATIENT PORTAL LINK FT
You can access the FollowMyHealth Patient Portal offered by [facility] by registering at the following website: [email or web address]. By joining [employer]’s FollowMyHealth portal, you will also be able to view your health information using other applications (apps) compatible with our system.

## 2025-06-02 NOTE — BH INPATIENT PSYCHIATRY DISCHARGE NOTE - NSICDXPASTMEDICALHX_GEN_ALL_CORE_FT
DISPLAY PLAN FREE TEXT
PAST MEDICAL HISTORY:  Chronic CHF     Depression     GERD (gastroesophageal reflux disease)     Hypertension     Pericarditis     Substance use disorder

## 2025-06-02 NOTE — BH INPATIENT PSYCHIATRY DISCHARGE NOTE - REASON FOR ADMISSION
58 yr old male, single, undomiciled (lives in hotels) and unemployed, past psychiatric history of personality disorder (antisocial, borderline), substance use (primarily alcohol, cocaine, MJ), unspecified mood and psychotic disorders, previous suicide attempts (last 1 year ago jumping in front of a 7 train) and chronic  high ED and inpatient psychiatric hospital utilization (last admission [facility] discharged 5/22/25) without adherence to follow up psychiatric and medical outpatient appointments and suspicion for secondary gain, presenting 4 days after discharge from [facility] with similar complaint of SI with plans to shoot himself or jump in front of a train.

## 2025-06-02 NOTE — BH DISCHARGE NOTE NURSING/SOCIAL WORK/PSYCH REHAB - NSDCADDINFO1FT_PSY_ALL_CORE
You are scheduled to attend an IN-PERSON intake appointment at on THURSDAY, JANUARY 5, 2025 at 11AM. Please bring your ID + health insurance card.  You are scheduled to attend an IN-PERSON intake appointment at on THURSDAY, JUNE 5, 2025 at 11AM. Please bring your ID + health insurance card.

## 2025-06-02 NOTE — BH DISCHARGE NOTE NURSING/SOCIAL WORK/PSYCH REHAB - NSDCPRGOAL_PSY_ALL_CORE
Pt declined groups throughout admission.  Pt demonstrated full range of affect and was pleasant on approach.  Pt was visible on the unit at intervals and social with select peers.  Pt is well-related and has been discharge-focused.  Pt has endorsed improvement in mood.  Pt endorsed readiness for discharge and completed a safety plan.

## 2025-06-02 NOTE — BH INPATIENT PSYCHIATRY DISCHARGE NOTE - NSDCMRMEDTOKEN_GEN_ALL_CORE_FT
aspirin 81 mg oral tablet, chewable: 1 tab(s) orally once a day  Multiple Vitamins oral tablet: 1 tab(s) orally once a day

## 2025-06-02 NOTE — BH INPATIENT PSYCHIATRY DISCHARGE NOTE - DESCRIPTION
per previous: "single, unemployed, on the street. has a 15 yr old daughter (living in [place], [place] - currently in custody of bio mother, speaks on the phone regularly). no reported access to guns"

## 2025-06-02 NOTE — BH INPATIENT PSYCHIATRY DISCHARGE NOTE - NSICDXPASTSURGICALHX_GEN_ALL_CORE_FT
BEHAVIORAL HEALTH DISCHARGE INSTRUCTIONS:  If you start to feel like you cannot keep yourself or others safe:  FOR AN EMERGENCY CALL 911  Go to the nearest Emergency Department  Call [facility] Intake Department: [phone] option 1  Call or TEXT the Suicide Prevention Lifeline: 988  Call the [place] 24/7 Hotline: 921.786.2436  Call the Warmline: [phone] Hours: 6PM-10PM, Not open on Tuesdays   Avoid weapons or other means of harm.    Refrain from alcohol and drug use.  Continue to see your outpatient providers for all mental health and medical needs.    You have been referred to the MH program. An authorization specialist will contact you within  24 business hours to determine a start date for this program or if there is a wait list.   Please call [facility] Department if you have any other questions: [phone].   [facility] OUTPATIENT APPOINTMENTS:  [facility]:  [phone]  Hours:  Monday - Friday 8:00am-5:00pm.  When you call:  Have the name & number of your [place] Primary Care Provider.  Have your insurance card ready.  If traveling to an appointment isn't a problem, let the  know. They will widen the search for someone to see you.     IF YOU ARE UNABLE TO KEEP YOUR APPOINTMENT OR NEED TO CANCEL AN [place] OUTPATIENT APPOINTMENT:  Call: [phone] at least 24hrs before your scheduled appointment.   Failure to call may restrict scheduling future appointments.    [facility]  24-hour Emergency Information  Mental Health Crisis Line: [phone]  Mental Health Emergency Center: [phone]  [facility] Crisis Line: [phone]  Sexual Assault Treatment Center: [phone]  [facility] (Domestic Abuse Shelter): [phone]  [facility]: [phone]  Elder Care Emergency after hours: [phone]  National Human Trafficking Resource Center: [phone]    Information and  Referral  [facility]: [phone]  Community Advocates: [phone]  Community Information Line/IMPACT: 211 or [phone]  Department on [phone]  Hunger Task Force:  [phone]  LifeStance: [phone]  Mental Health [facility]: [phone]  National Arbon on Mental Illness (GABY): 212.987.6701  [place]: 383.846.9927  Parent Helpline: [phone]  [facility] Services: [phone]  [facility]: [place] location: [phone]; [place] location: [phone]  [employer] Administration: [phone]  [facility] Health and Human Services Department: [phone]  [facility] [place]: [phone]; [address] [place] [address]  Hours: 8:30am-4:30pm; Walk-in Hours: 8:30am-4:30pm  [facility]: [phone]; [address] [place] [place] [address]  Hours: 8:30am-4:30pm; Walk-in Hours: 8:30am-4:30pm  [facility]: [phone]; [address] [place] [address]  Hours: 8:30am-4:30pm; Walk-in Hours: 8:30am-4:30pm    Harm Reduction Vending Machines  The Vending Machines are designed to reduce injury and death from overdose. The machines provide free access to harm reduction and prevention supplies, including fentanyl test strips, nasal naloxone, medication deactivation pouches, medication lock bags, and gun locks.   Harm Reduction Vending Machine Locations in [facility] ([facility]) [address] [place] [address]   [facility] [address] [place] [address]   [facility] [address] [place] [address]   [facility] [place] [facility] [address]   Community Advocates [address] [place] [address]   Diverse & Resilient [address] [facility] to Permanent Housing Program [address] [place] [address]   [facility]  [place] [address]   First Step [address]   [facility] [address] [place] [address]   [facility] [facility]        Alcohol +  Drug Abuse  Alcoholics Anonymous: [phone]  Alcoholics Anonymous Family Groups: [phone]  Cocaine Anonymous: [phone]  First Step Detox (Uninsured [facility] Residents): [phone]  Addiction and Recovery Help (For uninsured [facility]idents):   [facility]: [phone]; [address] [place] [address]  First Step to Recovery: [phone]  Narcotics Anonymous:    [phone] (Mexican)  CN8Hyqwyqmd program through [facility] in [place]: [phone]  Must have a history of stimulant and/or opioid use disorders to qualify.  SM5Dascadbf consists of Peer Specialists who are individuals with lived experience in recovery from mental illness and/or substance use disorders and completed a state training. A Peer Specialist's role in UN4Qnrxntwu is to support you in your recovery journey by offering emotional support, encouragement, advocacy, and connection to services and supports.     Remote Recovery Resources  https://www.Cruise Compare.com/meetings/koshdq-xk-cgogqsii  https://aachats.org/cv-bennnqio-fyhzbf  https://www.smartrecovery.org  https://Rebellion Media Group-flikdate.org  https://www.ca-online.org  https://www.HotClickVideo.[facility] [place]  https://heroinanonymous.org    Employment  Division of Vocational Rehabilitation: [phone]  my[employer] Club (Transitional): [phone]    Shelter   IMPACT:  211 or 582-385-2828    Transportation  [place]: [phone], you can use your T19 insurance for rides to medical appointments as long as you call at least 2 business days in advance to reserve the ride.      Crisis Resource Center  The Crisis Resource Center (CRC) is a place that adults who are having a mental health crisis can voluntarily get help. If you are 18 years old  or older, a [facility] resident for at least 6 months, are having mental health symptoms and are not having thoughts to kill yourself or others, then you may benefit from a short-term stay at the [facility].                                   Call to check for open beds: [phone]  [facility]: [phone]  [address] [address]  [facility]: [phone]   [address] [facility] [address]    Child/Adolescent Resources  [facility]'s Mobile Crisis Team: [phone]  [facility] Mental Health Walk-in Clinic: [phone]  Boys and Girls Club: [phone]  [place] Child Welfare: 964- 848-EWAE or [phone]  [facility]: [phone]  [facility] [place]: [phone]    Critical access hospital RESOURCES-[facility]  Health and Human Services   [facility]: [phone]   Count includes the [facility]: [phone] or [phone] ([place])  [facility] [place]: [phone]  [facility]: [phone]  [facility]: [phone]  [facility]: [phone] (Crisis) or [phone] (ADRC)  [facility]: [phone] (Crisis), [phone] (non-emergent) or [phone] (non-emergent)  [facility]: [phone] (Crisis), [phone] (Impact) or [phone] (non-emergent/regular business hours)    ADDITIONAL INFORMATION  Additional resources and support in your area can be found at:    Mental Health [facility]:  https://www.[facility]Department [facility].org/upliftwi    [facility] Find Help:  https://advocateauroracommunity.org    PAST SURGICAL HISTORY:  No significant past surgical history

## 2025-06-02 NOTE — BH INPATIENT PSYCHIATRY DISCHARGE NOTE - NSDCCPCAREPLAN_GEN_ALL_CORE_FT
PRINCIPAL DISCHARGE DIAGNOSIS  Diagnosis: Substance induced mood disorder  Assessment and Plan of Treatment: Continue medication regimen and follow up with aftercare plan      SECONDARY DISCHARGE DIAGNOSES  Diagnosis: Cocaine use disorder  Assessment and Plan of Treatment: Continue medication regimen and follow up with aftercare plan    Diagnosis: Malingering  Assessment and Plan of Treatment: Continue medication regimen and follow up with aftercare plan    Diagnosis: Personality disorder  Assessment and Plan of Treatment: Continue medication regimen and follow up with aftercare plan

## 2025-06-02 NOTE — BH INPATIENT PSYCHIATRY DISCHARGE NOTE - NSBHMETABOLIC_PSY_ALL_CORE_FT
BMI: BMI (kg/m2): 37.5 (05-26-25 @ 13:29)  HbA1c: A1C with Estimated Average Glucose Result: 5.4 % (12-27-24 @ 05:30)    Glucose: POCT Blood Glucose.: 109 mg/dL (04-10-25 @ 20:22)    BP: 116/81 (06-02-25 @ 09:33) (110/76 - 116/81)Vital Signs Last 24 Hrs  T(C): 36.9 (06-02-25 @ 09:33), Max: 36.9 (06-01-25 @ 17:11)  T(F): 98.5 (06-02-25 @ 09:33), Max: 98.5 (06-02-25 @ 09:33)  HR: 79 (06-02-25 @ 09:33) (79 - 80)  BP: 116/81 (06-02-25 @ 09:33) (111/70 - 116/81)  BP(mean): --  RR: 18 (06-01-25 @ 17:11) (18 - 18)  SpO2: 98% (06-02-25 @ 09:33) (96% - 98%)      Lipid Panel: Date/Time: 04-19-25 @ 11:41  Cholesterol, Serum: 143  LDL Cholesterol Calculated: 68  HDL Cholesterol, Serum: 54  Total Cholesterol/HDL Ration Measurement: --  Triglycerides, Serum: 116

## 2025-06-02 NOTE — BH INPATIENT PSYCHIATRY DISCHARGE NOTE - ATTENDING DISCHARGE PHYSICAL EXAMINATION:
Pt alert, attentive, in behavioral control, very cheerful and relaxed-appearing.  He has no c/o, reports good sleep and appetite, feels no uncontrolled sx, and is very future-oriented to visit friends.  He is motivated for sobriety, trying to time the discharge to avoid triggers, and agrees with continued outpt f/u.  No med side effects.  No grounds to hold involuntarily.

## 2025-06-02 NOTE — BH INPATIENT PSYCHIATRY DISCHARGE NOTE - OTHER PAST PSYCHIATRIC HISTORY (INCLUDE DETAILS REGARDING ONSET, COURSE OF ILLNESS, INPATIENT/OUTPATIENT TREATMENT)
per previous: "self reports hx of various mood, psychotic, substance use disorderes  high mental health utilizer including numerous Psych ED/ CPEP visits for SI, alcohol intoxication  high number of prior psych admissions  chronically nonadherent with meds   claimed previously prescribed Zoloft at [facility]   - self-discontinued because developed diarrhea  stated past hx of allegedly self aborting jumping in front of a 7 train over the summer (2023)  denied past suicide attempts.   Denies ever being in consistent outpatient psychiatric care."

## 2025-06-02 NOTE — BH INPATIENT PSYCHIATRY DISCHARGE NOTE - NSBHMSEAFFQUAL_PSY_A_CORE
Euthymic No Residual Tumor Seen Histology Text: There were no malignant cells seen in the sections examined.

## 2025-06-02 NOTE — BH INPATIENT PSYCHIATRY DISCHARGE NOTE - NSBHASSESSSUMMFT_PSY_ALL_CORE
58yo single undomiciled (lives in hotels) unemployed M, past psychiatric history of personality disorder (antisocial, borderline), substance use (primarily alcohol, cocaine, MJ), unspecified mood and psychotic disorders, previous suicide attempts (last 1 year ago jumping in front of a 7 train) and chronic  high ED and inpatient psychiatric hospital utilization (last admission [facility] discharged 5/22/25 - no standing medications on discharge) without adherence to follow up psychiatric and medical outpatient appointments and suspicion for secondary gain, presenting 4 days after discharge from [facility] with similar complaint of SI with plans to shoot himself or jump in front of a train. No clear evidence of sx requiring medication management; psychopathology at this time appears to be most consistent with substance and underlying personality disorder.    1. continue 9.13 hospitalization for safety and assessment.  although suspicious of malingering, recent susbtance use is risk factor.  2. routine checks appropriate can make needs known, future oriented on unit.  3. haldol, benadryl prns, ciwa ativan prns for withdrawal.  no history of complicated withdrawal.   4. reports he does not want medications, has refused in past, will defer standing meds for now other than asa.

## 2025-06-02 NOTE — BH DISCHARGE NOTE NURSING/SOCIAL WORK/PSYCH REHAB - NSCDUDCCRISIS_PSY_A_CORE
[facility] Well  1 (706) [facility]LL ([phone])  Text "WELL" to 77118  Website: www.Nuvotronics.Planet8/.National Suicide Prevention Lifeline 9 (841) 381-0853/.  Lifenet  1 (019) LIFENET (215-2470)/988 Suicide and Crisis Lifeline

## 2025-06-03 NOTE — BH SOCIAL WORK CONFIRMATION FOLLOW UP NOTE - NSCOMMENTS_PSY_ALL_CORE
Caring Call: Chart reviewed on 6/3/25. This SW called the patient ([phone]) on 6/3/25 and left a VM. Callback information provided. SW to remain available.     Linkage Call: To be completed after patient's scheduled appointment:     Eugene Moore  05-Jun-2025 11:00  [address] [place] [address]  [phone]  Mental Health Treatment  Substance Use Disorder Treatment   Caring Call: Chart reviewed on 6/3/25. This SW called the patient ([phone]) on 6/3/25 and left a VM. Callback information provided. SW to remain available.  Caring call: Left message for pt's friend/emergency contact. No answer and voicemail was full.      Linkage Call: To be completed after patient's scheduled appointment:     Eugene Moore  05-Jun-2025 11:00  [address] [place], [place] [address] [phone]  Mental Health Treatment  Substance Use Disorder Treatment   Caring Call: Chart reviewed on 6/3/25. This SW called the patient ([phone]) on 6/3/25 and left a VM. Callback information provided. SW to remain available.  Caring call: Left message for pt's friend/emergency contact. No answer and voicemail was full.      Linkage Call: Pt. did not make his scheduled appointment at [facility]. Case discussed with attending who stated mobile crisis referral is not indicated.     [facility]  05-Jun-2025 11:00  [address] [place] [address] [phone]  Mental Health Treatment  Substance Use Disorder Treatment

## 2025-06-06 DIAGNOSIS — Z53.20 PROCEDURE AND TREATMENT NOT CARRIED OUT BECAUSE OF PATIENT'S DECISION FOR UNSPECIFIED REASONS: ICD-10-CM

## 2025-06-06 DIAGNOSIS — F12.988 CANNABIS USE, UNSPECIFIED WITH OTHER CANNABIS-INDUCED DISORDER: ICD-10-CM

## 2025-06-06 DIAGNOSIS — F60.2 ANTISOCIAL PERSONALITY DISORDER: ICD-10-CM

## 2025-06-06 DIAGNOSIS — E78.5 HYPERLIPIDEMIA, UNSPECIFIED: ICD-10-CM

## 2025-06-06 DIAGNOSIS — G47.00 INSOMNIA, UNSPECIFIED: ICD-10-CM

## 2025-06-06 DIAGNOSIS — F14.94 COCAINE USE, UNSPECIFIED WITH COCAINE-INDUCED MOOD DISORDER: ICD-10-CM

## 2025-06-06 DIAGNOSIS — Z59.01 SHELTERED HOMELESSNESS: ICD-10-CM

## 2025-06-06 DIAGNOSIS — R45.851 SUICIDAL IDEATIONS: ICD-10-CM

## 2025-06-06 DIAGNOSIS — R45.84 ANHEDONIA: ICD-10-CM

## 2025-06-06 DIAGNOSIS — I50.9 HEART FAILURE, UNSPECIFIED: ICD-10-CM

## 2025-06-06 DIAGNOSIS — K21.9 GASTRO-ESOPHAGEAL REFLUX DISEASE WITHOUT ESOPHAGITIS: ICD-10-CM

## 2025-06-06 DIAGNOSIS — F60.3 BORDERLINE PERSONALITY DISORDER: ICD-10-CM

## 2025-06-06 DIAGNOSIS — Z91.199 PATIENT'S NONCOMPLIANCE WITH OTHER MEDICAL TREATMENT AND REGIMEN DUE TO UNSPECIFIED REASON: ICD-10-CM

## 2025-06-06 DIAGNOSIS — Z91.51 PERSONAL HISTORY OF SUICIDAL BEHAVIOR: ICD-10-CM

## 2025-06-06 DIAGNOSIS — R45.850 HOMICIDAL IDEATIONS: ICD-10-CM

## 2025-06-06 SDOH — ECONOMIC STABILITY - HOUSING INSECURITY: SHELTERED HOMELESSNESS: Z59.01

## 2025-06-08 NOTE — ED BEHAVIORAL HEALTH ASSESSMENT NOTE - PATIENT'S CHIEF COMPLAINT
"I'm going to jump off a roof top." Assistance OOB with selected safe patient handling equipment if applicable/Assistance with ambulation/Communicate risk of Fall with Harm to all staff, patient, and family/Monitor gait and stability/Provide patient with walking aids/Provide visual cue: red socks, yellow wristband, yellow gown, etc/Reinforce activity limits and safety measures with patient and family/Bed in lowest position, wheels locked, appropriate side rails in place/Call bell, personal items and telephone in reach/Instruct patient to call for assistance before getting out of bed/chair/stretcher/Non-slip footwear applied when patient is off stretcher/[place] to call system/Physically safe environment - no spills, clutter or unnecessary equipment/Purposeful Proactive Rounding/Room/bathroom lighting operational, light cord in reach

## 2025-06-29 NOTE — BH PATIENT PROFILE - NSDASAPHYSPTS_PSY_ALL_CORE
Gout Flare  If your condition worsens or fails to improve we recommend that you receive another evaluation at the ER immediately or contact your PCP to discuss your concerns or return here. You must understand that you've received an urgent care treatment only and that you may be released before all your medical problems are known or treated. You the patient will arrange for followup care as instructed.   - Please do not drive or make any important decisions for 24 hours if you have received any pain medications, sedatives or mood altering drugs during your visit.      Rest -  Rest the painful joints.    Ice - Apply ice  to affected area for the first 24-48 hours (DO NOT APPLY ICE DIRECTLY TO THE SKIN.  DO NOT LEAVE ON AFFECTED BODY PART FOR MORE THAN 15 MINUTES AT AT TIME TO AVOID INJURY TO SOFT TISSUE)     Evaluate -  Elevate the affected joint higher than your heart to help reduce swelling.    Take Colchicine Rx medication you have been prescribed as directed. Do not take if you have issues with GI bleeds.     You can take over the counter anti inflammatories such as Alleve, Advil, Ibuprofen for pain    You may have received a steroid prescription today -  this can elevate your blood pressure, elevate your blood sugar, water weight gain, nervous energy, redness to the face and dimpling of the skin where the shot goes in. If you have, please check your blood pressure and blood sugar frequently.    Foods high in purines (such as seafood, meats), alcohol, and certain diuretics can trigger flare ups of this condition.  Please see handout below for further information.      Disclaimer: This document was drafted with the use of a voice recognition device and is likely to have sound alike errors.    No

## 2025-07-03 NOTE — ED PROVIDER NOTE - NS ED MD DISPO DISCHARGE
[facility] Heart and Vascular Electrophysiology    Patient Name: Lorie Ding  Patient : 1940    Referred for  Syncope    History of Present Illness:  Lorie Ding is a 84 y.o. year old female patient with:    Syncope : Pt states she has had near syncopal and syncopal events during Yoga and other episodes prior to Yoga. Loop insertion 2023.  HTN managed with losartan.  CKD III  Pulmonary Embolism on Xarelto s/p IVC filter.  Vitamin D deficiency   Mild cognitive impairment.    Patient with episodes of syncope. In 2023, she had an episode during yoga lasting 2-3 seconds, no warning signs. She had another episode in May 2024 during yoga. She has had other episodes. Patient underwent loop recorder implant on 2023. She is currently on Xarelto (hx of PE).     Loop recorder has shown episodes of SVT with rates typically between 140s and 150s. Patient has been doing well recently, denies palpitations. ECG shows sinus rhythm with HR 79 bpm.    Past Medical History:  She has a past medical history of Allergic (2010), Chronic kidney disease (2018), Disease due to severe acute respiratory syndrome coronavirus 2 (SARS-CoV-2) (10/29/2024), Encounter for gynecological examination (general) (routine) without abnormal findings, Head injury, Memory loss, Other conditions influencing health status, Pneumonia (10/29/2024), Syncope, and Urinary tract bacterial infections (10/29/2024).    Past Surgical History:  She has a past surgical history that includes Other surgical history (2013); Other surgical history (2013); Tonsillectomy (2013); and Cardiac electrophysiology procedure (Left, 2023).      Social History:  She reports that she quit smoking about 63 years ago. Her smoking use included cigarettes. She started smoking about 64 years ago. She has a 0.6 pack-year smoking history. She has never used smokeless tobacco. She reports current alcohol use of about 7.0  standard drinks of alcohol per week. She reports that she does not use drugs.    Family History:  Family History[1]     Allergies:  Sulfa (sulfonamide antibiotics)    Outpatient Medications:  Current Outpatient Medications   Medication Instructions    Arexvy, PF, 120 mcg/0.5 mL suspension for reconstitution 0.5 mL, Once    cholecalciferol (VITAMIN D-3) 50 mcg, Daily    cyanocobalamin, vitamin B-12, (Vitamin B-12) 1,000 mcg tablet extended release 1 tablet, Daily    flecainide (TAMBOCOR) 50 mg, oral, 2 times daily    losartan (COZAAR) 25 mg, oral, Daily    rivaroxaban (XARELTO) 20 mg, oral, Daily with breakfast, Take with food.        ROS:  A 14 point review of systems was done and is negative other than as stated in HPI    Physical Exam  Cardiovascular:      Rate and Rhythm: Normal rate and regular rhythm.      Heart sounds: No murmur heard.     No friction rub. No gallop.   Pulmonary:      Effort: Pulmonary effort is normal.      Breath sounds: Normal breath sounds.   Abdominal:      Palpations: Abdomen is soft.   Musculoskeletal:      Cervical back: Neck supple.   Neurological:      Mental Status: She is alert.         Vitals:  There were no vitals taken for this visit.       Labs:   CBC  Lab Results   Component Value Date    WBC 7.0 05/27/2025    HGB 15.0 05/27/2025    HCT 45.8 (H) 05/27/2025    .7 (H) 05/27/2025     05/27/2025        Renal Function Panel  Lab Results   Component Value Date    GLUCOSE 111 (H) 05/27/2025     05/27/2025    K 4.6 05/27/2025     05/27/2025    CO2 26 05/27/2025    ANIONGAP 13 12/13/2024    BUN 22 05/27/2025    CREATININE 1.49 (H) 05/27/2025    GFRF 36 (A) 07/31/2023    CALCIUM 10.1 05/27/2025        CMP  Lab Results   Component Value Date    CALCIUM 10.1 05/27/2025    PHOS 3.0 05/27/2025    PROT 6.7 05/27/2025    ALBUMIN 4.1 05/27/2025    AST 16 05/27/2025    ALT 9 05/27/2025    ALKPHOS 67 05/27/2025    BILITOT 0.7 05/27/2025       TSH  Lab Results    Component Value Date    TSH 1.72 05/27/2025          Cardiac Testing:  ECG  07/07/2025  Sinus rhythm, HR 79 bpm.    Echocardiogram  06/10/2024  PHYSICIAN INTERPRETATION:  Left Ventricle: The left ventricular systolic function is normal. The calculated ejection fraction is normal at 63 % using the Melendez's Bi-plane MOD calculation. There are no regional wall motion abnormalities. The left ventricular cavity size is normal. Left ventricular diastolic filling was indeterminate.  Left Atrium: The left atrium is normal in size.  Right Ventricle: The right ventricle is mildly enlarged. There is normal right ventricular global systolic function.  Right Atrium: The right atrium is normal in size.  Aortic Valve: The aortic valve is probably trileaflet. There is mild aortic valve regurgitation. The peak instantaneous gradient of the aortic valve is 10.4 mmHg. The mean gradient of the aortic valve is 6.0 mmHg.  Mitral Valve: The mitral valve is mildly thickened. There is no evidence of mitral valve regurgitation.  Tricuspid Valve: The tricuspid valve is structurally normal. There is mild tricuspid regurgitation. The estimated pulmonary artery pressure is 50 mmHg.  Pulmonic Valve: The pulmonic valve is structurally normal. There is no indication of pulmonic valve regurgitation.  Pericardium: There is no pericardial effusion noted.  Aorta: The aortic root is normal.  In comparison to the previous echocardiogram(s): Compared with study from 8/4/2021, no significant change.        CONCLUSIONS:   1. Left ventricular systolic function is normal.   2. Mild aortic valve regurgitation.      Event monitor (11 days - 09/2023):  Patient had a min HR of 52 bpm, max HR of 182 bpm, and avg HR of 83 bpm. Predominant underlying rhythm was Sinus Rhythm. 115 Supraventricular Tachycardia runs occurred, the run with the fastest interval lasting 10 beats with a max rate of 182 bpm, the longest lasting 15 beats with an avg rate of 118 bpm. Some  episodes of Supraventricular Tachycardia may be possible Atrial Tachycardia with variable block. Atrial Flutter occurred (<1% burden), ranging from  bpm (avg of 133 bpm), the longest lasting 6 mins 57 secs with an avg rate of 143 bpm. Isolated SVEs were rare (<1.0%), SVE Couplets were rare (<1.0%), and SVE Triplets were rare (<1.0%). Isolated VEs were rare (<1.0%), VE Couplets were rare (<1.0%), and no VE Triplets were present. Ventricular Bigeminy was present.     CT cardiac scoring (10/25/2023):  IMPRESSION:  1. Coronary artery calcium score of 84.8*.      Assessment:     Patient with episodes of syncope. In July 2023, she had an episode during yoga lasting 2-3 seconds, no warning signs. She had another episode in May 2024 during yoga. She has had other episodes. Patient underwent loop recorder implant on 11/30/2023. She is currently on Xarelto (hx of PE).     Loop recorder has shown episodes of SVT with rates typically between 140s and 150s. Patient has been doing well recently, denies palpitations. ECG shows sinus rhythm with HR 79 bpm.    Plan:  I had a long discussion with the patient and her  about the findings.  I explained about SVT and treatment options.  The patient denies palpitations or other related symptoms.  I suggested increasing the dose of flecainide but the patient and her  declined.  Will continue to follow through the device clinic.       [1]   Family History  Problem Relation Name Age of Onset    Colon cancer Mother Ana Chester     Other (Old Age) Father        Home

## 2025-07-17 ENCOUNTER — EMERGENCY (EMERGENCY)
Age: 60
LOS: 1 days | End: 2025-07-17
Attending: EMERGENCY MEDICINE | Admitting: EMERGENCY MEDICINE
Payer: MEDICAID

## 2025-07-17 ENCOUNTER — INPATIENT (INPATIENT)
Facility: HOSPITAL | Age: 60
LOS: 14 days | Discharge: HOME | End: 2025-08-01
Attending: PSYCHIATRY & NEUROLOGY | Admitting: PSYCHIATRY & NEUROLOGY
Payer: MEDICAID

## 2025-07-17 VITALS
SYSTOLIC BLOOD PRESSURE: 119 MMHG | TEMPERATURE: 98 F | HEART RATE: 98 BPM | DIASTOLIC BLOOD PRESSURE: 79 MMHG | WEIGHT: 291.89 LBS | HEIGHT: 74 IN | RESPIRATION RATE: 16 BRPM | OXYGEN SATURATION: 100 %

## 2025-07-17 VITALS
RESPIRATION RATE: 18 BRPM | DIASTOLIC BLOOD PRESSURE: 78 MMHG | OXYGEN SATURATION: 96 % | HEART RATE: 113 BPM | TEMPERATURE: 98 F | SYSTOLIC BLOOD PRESSURE: 141 MMHG

## 2025-07-17 VITALS
DIASTOLIC BLOOD PRESSURE: 86 MMHG | OXYGEN SATURATION: 96 % | HEART RATE: 134 BPM | TEMPERATURE: 98 F | RESPIRATION RATE: 17 BRPM | SYSTOLIC BLOOD PRESSURE: 127 MMHG | HEIGHT: 74 IN

## 2025-07-17 DIAGNOSIS — R00.0 TACHYCARDIA, UNSPECIFIED: ICD-10-CM

## 2025-07-17 DIAGNOSIS — F33.3 MAJOR DEPRESSIVE DISORDER, RECURRENT, SEVERE WITH PSYCHOTIC SYMPTOMS: ICD-10-CM

## 2025-07-17 DIAGNOSIS — I50.9 HEART FAILURE, UNSPECIFIED: ICD-10-CM

## 2025-07-17 DIAGNOSIS — R07.89 OTHER CHEST PAIN: ICD-10-CM

## 2025-07-17 LAB
ALBUMIN SERPL ELPH-MCNC: 3.9 G/DL — SIGNIFICANT CHANGE UP (ref 3.4–5)
ALBUMIN SERPL ELPH-MCNC: 4.5 G/DL — SIGNIFICANT CHANGE UP (ref 3.3–5)
ALP SERPL-CCNC: 66 U/L — SIGNIFICANT CHANGE UP (ref 40–120)
ALP SERPL-CCNC: 71 U/L — SIGNIFICANT CHANGE UP (ref 40–120)
ALT FLD-CCNC: 55 U/L — HIGH (ref 4–41)
ALT FLD-CCNC: 68 U/L — HIGH (ref 12–42)
ANION GAP SERPL CALC-SCNC: 13 MMOL/L — SIGNIFICANT CHANGE UP (ref 9–16)
ANION GAP SERPL CALC-SCNC: 14 MMOL/L — SIGNIFICANT CHANGE UP (ref 7–14)
APAP SERPL-MCNC: <10 UG/ML — LOW (ref 15–25)
AST SERPL-CCNC: 81 U/L — HIGH (ref 15–37)
AST SERPL-CCNC: 82 U/L — HIGH (ref 4–40)
BASOPHILS # BLD AUTO: 0.04 K/UL — SIGNIFICANT CHANGE UP (ref 0–0.2)
BASOPHILS # BLD AUTO: 0.04 K/UL — SIGNIFICANT CHANGE UP (ref 0–0.2)
BASOPHILS NFR BLD AUTO: 0.5 % — SIGNIFICANT CHANGE UP (ref 0–2)
BASOPHILS NFR BLD AUTO: 0.6 % — SIGNIFICANT CHANGE UP (ref 0–2)
BILIRUB SERPL-MCNC: 0.9 MG/DL — SIGNIFICANT CHANGE UP (ref 0.2–1.2)
BILIRUB SERPL-MCNC: 0.9 MG/DL — SIGNIFICANT CHANGE UP (ref 0.2–1.2)
BUN SERPL-MCNC: 22 MG/DL — SIGNIFICANT CHANGE UP (ref 7–23)
BUN SERPL-MCNC: 28 MG/DL — HIGH (ref 7–23)
CALCIUM SERPL-MCNC: 9.3 MG/DL — SIGNIFICANT CHANGE UP (ref 8.4–10.5)
CALCIUM SERPL-MCNC: 9.3 MG/DL — SIGNIFICANT CHANGE UP (ref 8.5–10.5)
CHLORIDE SERPL-SCNC: 106 MMOL/L — SIGNIFICANT CHANGE UP (ref 96–108)
CHLORIDE SERPL-SCNC: 99 MMOL/L — SIGNIFICANT CHANGE UP (ref 98–107)
CO2 SERPL-SCNC: 21 MMOL/L — LOW (ref 22–31)
CO2 SERPL-SCNC: 23 MMOL/L — SIGNIFICANT CHANGE UP (ref 22–31)
CREAT SERPL-MCNC: 1.13 MG/DL — SIGNIFICANT CHANGE UP (ref 0.5–1.3)
CREAT SERPL-MCNC: 1.5 MG/DL — HIGH (ref 0.5–1.3)
EGFR: 53 ML/MIN/1.73M2 — LOW
EGFR: 53 ML/MIN/1.73M2 — LOW
EGFR: 75 ML/MIN/1.73M2 — SIGNIFICANT CHANGE UP
EGFR: 75 ML/MIN/1.73M2 — SIGNIFICANT CHANGE UP
EOSINOPHIL # BLD AUTO: 0.02 K/UL — SIGNIFICANT CHANGE UP (ref 0–0.5)
EOSINOPHIL # BLD AUTO: 0.08 K/UL — SIGNIFICANT CHANGE UP (ref 0–0.5)
EOSINOPHIL NFR BLD AUTO: 0.2 % — SIGNIFICANT CHANGE UP (ref 0–6)
EOSINOPHIL NFR BLD AUTO: 1.1 % — SIGNIFICANT CHANGE UP (ref 0–6)
ETHANOL SERPL-MCNC: <10 MG/DL — SIGNIFICANT CHANGE UP
GLUCOSE SERPL-MCNC: 103 MG/DL — HIGH (ref 70–99)
GLUCOSE SERPL-MCNC: 86 MG/DL — SIGNIFICANT CHANGE UP (ref 70–99)
HCT VFR BLD CALC: 35.6 % — LOW (ref 39–50)
HCT VFR BLD CALC: 37.9 % — LOW (ref 39–50)
HGB BLD-MCNC: 12.2 G/DL — LOW (ref 13–17)
HGB BLD-MCNC: 12.5 G/DL — LOW (ref 13–17)
IMM GRANULOCYTES # BLD AUTO: 0.01 K/UL — SIGNIFICANT CHANGE UP (ref 0–0.07)
IMM GRANULOCYTES # BLD AUTO: 0.03 K/UL — SIGNIFICANT CHANGE UP (ref 0–0.07)
IMM GRANULOCYTES NFR BLD AUTO: 0.1 % — SIGNIFICANT CHANGE UP (ref 0–0.9)
IMM GRANULOCYTES NFR BLD AUTO: 0.3 % — SIGNIFICANT CHANGE UP (ref 0–0.9)
LYMPHOCYTES # BLD AUTO: 1.71 K/UL — SIGNIFICANT CHANGE UP (ref 1–3.3)
LYMPHOCYTES # BLD AUTO: 2.53 K/UL — SIGNIFICANT CHANGE UP (ref 1–3.3)
LYMPHOCYTES NFR BLD AUTO: 24.4 % — SIGNIFICANT CHANGE UP (ref 13–44)
LYMPHOCYTES NFR BLD AUTO: 28.6 % — SIGNIFICANT CHANGE UP (ref 13–44)
MCHC RBC-ENTMCNC: 29.3 PG — SIGNIFICANT CHANGE UP (ref 27–34)
MCHC RBC-ENTMCNC: 29.8 PG — SIGNIFICANT CHANGE UP (ref 27–34)
MCHC RBC-ENTMCNC: 33 G/DL — SIGNIFICANT CHANGE UP (ref 32–36)
MCHC RBC-ENTMCNC: 34.3 G/DL — SIGNIFICANT CHANGE UP (ref 32–36)
MCV RBC AUTO: 86.8 FL — SIGNIFICANT CHANGE UP (ref 80–100)
MCV RBC AUTO: 88.8 FL — SIGNIFICANT CHANGE UP (ref 80–100)
MONOCYTES # BLD AUTO: 1.01 K/UL — HIGH (ref 0–0.9)
MONOCYTES # BLD AUTO: 1.1 K/UL — HIGH (ref 0–0.9)
MONOCYTES NFR BLD AUTO: 12.4 % — SIGNIFICANT CHANGE UP (ref 2–14)
MONOCYTES NFR BLD AUTO: 14.4 % — HIGH (ref 2–14)
NEUTROPHILS # BLD AUTO: 4.15 K/UL — SIGNIFICANT CHANGE UP (ref 1.8–7.4)
NEUTROPHILS # BLD AUTO: 5.12 K/UL — SIGNIFICANT CHANGE UP (ref 1.8–7.4)
NEUTROPHILS NFR BLD AUTO: 58 % — SIGNIFICANT CHANGE UP (ref 43–77)
NEUTROPHILS NFR BLD AUTO: 59.4 % — SIGNIFICANT CHANGE UP (ref 43–77)
NRBC # BLD AUTO: 0 K/UL — SIGNIFICANT CHANGE UP (ref 0–0)
NRBC # BLD AUTO: 0 K/UL — SIGNIFICANT CHANGE UP (ref 0–0)
NRBC # FLD: 0 K/UL — SIGNIFICANT CHANGE UP (ref 0–0)
NRBC # FLD: 0 K/UL — SIGNIFICANT CHANGE UP (ref 0–0)
NRBC BLD AUTO-RTO: 0 /100 WBCS — SIGNIFICANT CHANGE UP (ref 0–0)
NRBC BLD AUTO-RTO: 0 /100 WBCS — SIGNIFICANT CHANGE UP (ref 0–0)
PLATELET # BLD AUTO: 298 K/UL — SIGNIFICANT CHANGE UP (ref 150–400)
PLATELET # BLD AUTO: 324 K/UL — SIGNIFICANT CHANGE UP (ref 150–400)
PMV BLD: 9.7 FL — SIGNIFICANT CHANGE UP (ref 7–13)
PMV BLD: 9.9 FL — SIGNIFICANT CHANGE UP (ref 7–13)
POTASSIUM SERPL-MCNC: 4 MMOL/L — SIGNIFICANT CHANGE UP (ref 3.5–5.3)
POTASSIUM SERPL-MCNC: 4.2 MMOL/L — SIGNIFICANT CHANGE UP (ref 3.5–5.3)
POTASSIUM SERPL-SCNC: 4 MMOL/L — SIGNIFICANT CHANGE UP (ref 3.5–5.3)
POTASSIUM SERPL-SCNC: 4.2 MMOL/L — SIGNIFICANT CHANGE UP (ref 3.5–5.3)
PROT SERPL-MCNC: 8.1 G/DL — SIGNIFICANT CHANGE UP (ref 6–8.3)
PROT SERPL-MCNC: 8.2 G/DL — SIGNIFICANT CHANGE UP (ref 6.4–8.2)
RBC # BLD: 4.1 M/UL — LOW (ref 4.2–5.8)
RBC # BLD: 4.27 M/UL — SIGNIFICANT CHANGE UP (ref 4.2–5.8)
RBC # FLD: 14.8 % — HIGH (ref 10.3–14.5)
RBC # FLD: 14.8 % — HIGH (ref 10.3–14.5)
SALICYLATES SERPL-MCNC: <0.3 MG/DL — LOW (ref 15–30)
SODIUM SERPL-SCNC: 134 MMOL/L — LOW (ref 135–145)
SODIUM SERPL-SCNC: 142 MMOL/L — SIGNIFICANT CHANGE UP (ref 132–145)
TOXICOLOGY SCREEN, DRUGS OF ABUSE, SERUM RESULT: SIGNIFICANT CHANGE UP
TROPONIN I, HIGH SENSITIVITY RESULT: 18.9 NG/L — SIGNIFICANT CHANGE UP
TSH SERPL-MCNC: 1.81 UIU/ML — SIGNIFICANT CHANGE UP (ref 0.27–4.2)
WBC # BLD: 7 K/UL — SIGNIFICANT CHANGE UP (ref 3.8–10.5)
WBC # BLD: 8.84 K/UL — SIGNIFICANT CHANGE UP (ref 3.8–10.5)
WBC # FLD AUTO: 7 K/UL — SIGNIFICANT CHANGE UP (ref 3.8–10.5)
WBC # FLD AUTO: 8.84 K/UL — SIGNIFICANT CHANGE UP (ref 3.8–10.5)

## 2025-07-17 PROCEDURE — 99284 EMERGENCY DEPT VISIT MOD MDM: CPT

## 2025-07-17 PROCEDURE — 71045 X-RAY EXAM CHEST 1 VIEW: CPT | Mod: 26

## 2025-07-17 PROCEDURE — 99285 EMERGENCY DEPT VISIT HI MDM: CPT

## 2025-07-17 RX ORDER — DIAZEPAM 5 MG/1
5 TABLET ORAL ONCE
Refills: 0 | Status: DISCONTINUED | OUTPATIENT
Start: 2025-07-17 | End: 2025-07-17

## 2025-07-17 RX ORDER — DIPHENHYDRAMINE HCL 12.5MG/5ML
50 ELIXIR ORAL ONCE
Refills: 0 | Status: COMPLETED | OUTPATIENT
Start: 2025-07-17 | End: 2025-07-17

## 2025-07-17 RX ORDER — KETOROLAC TROMETHAMINE 30 MG/ML
30 INJECTION, SOLUTION INTRAMUSCULAR; INTRAVENOUS ONCE
Refills: 0 | Status: DISCONTINUED | OUTPATIENT
Start: 2025-07-17 | End: 2025-07-17

## 2025-07-17 RX ORDER — LORAZEPAM 4 MG/ML
2 VIAL (ML) INJECTION
Refills: 0 | Status: DISCONTINUED | OUTPATIENT
Start: 2025-07-17 | End: 2025-07-23

## 2025-07-17 RX ORDER — HYDROXYZINE HYDROCHLORIDE 25 MG/1
25 TABLET, FILM COATED ORAL EVERY 6 HOURS
Refills: 0 | Status: DISCONTINUED | OUTPATIENT
Start: 2025-07-17 | End: 2025-08-01

## 2025-07-17 RX ORDER — OLANZAPINE 10 MG/1
5 TABLET ORAL EVERY 6 HOURS
Refills: 0 | Status: DISCONTINUED | OUTPATIENT
Start: 2025-07-17 | End: 2025-08-01

## 2025-07-17 RX ORDER — OLANZAPINE 10 MG/1
5 TABLET ORAL ONCE
Refills: 0 | Status: DISCONTINUED | OUTPATIENT
Start: 2025-07-17 | End: 2025-08-01

## 2025-07-17 RX ORDER — NICOTINE POLACRILEX 4 MG/1
4 GUM, CHEWING ORAL
Refills: 0 | Status: DISCONTINUED | OUTPATIENT
Start: 2025-07-17 | End: 2025-08-01

## 2025-07-17 RX ORDER — ACETAMINOPHEN 500 MG/5ML
650 LIQUID (ML) ORAL EVERY 6 HOURS
Refills: 0 | Status: DISCONTINUED | OUTPATIENT
Start: 2025-07-17 | End: 2025-08-01

## 2025-07-17 RX ORDER — FOLIC ACID 1 MG/1
1 TABLET ORAL DAILY
Refills: 0 | Status: COMPLETED | OUTPATIENT
Start: 2025-07-17 | End: 2025-07-24

## 2025-07-17 RX ORDER — IBUPROFEN 200 MG
600 TABLET ORAL ONCE
Refills: 0 | Status: COMPLETED | OUTPATIENT
Start: 2025-07-17 | End: 2025-07-17

## 2025-07-17 RX ORDER — B1/B2/B3/B5/B6/B12/VIT C/FOLIC 500-0.5 MG
1 TABLET ORAL DAILY
Refills: 0 | Status: COMPLETED | OUTPATIENT
Start: 2025-07-17 | End: 2025-07-24

## 2025-07-17 RX ADMIN — Medication 600 MILLIGRAM(S): at 16:13

## 2025-07-17 RX ADMIN — Medication 50 MILLIGRAM(S): at 21:11

## 2025-07-17 RX ADMIN — Medication 650 MILLIGRAM(S): at 20:59

## 2025-07-17 RX ADMIN — Medication 650 MILLIGRAM(S): at 19:33

## 2025-07-17 RX ADMIN — Medication 600 MILLIGRAM(S): at 15:05

## 2025-07-18 DIAGNOSIS — F19.90 OTHER PSYCHOACTIVE SUBSTANCE USE, UNSPECIFIED, UNCOMPLICATED: ICD-10-CM

## 2025-07-18 PROCEDURE — 99223 1ST HOSP IP/OBS HIGH 75: CPT

## 2025-07-18 RX ORDER — CYCLOBENZAPRINE HYDROCHLORIDE 15 MG/1
5 CAPSULE, EXTENDED RELEASE ORAL ONCE
Refills: 0 | Status: COMPLETED | OUTPATIENT
Start: 2025-07-18 | End: 2025-07-18

## 2025-07-18 RX ORDER — LURASIDONE HYDROCHLORIDE 120 MG/1
20 TABLET, FILM COATED ORAL AT BEDTIME
Refills: 0 | Status: DISCONTINUED | OUTPATIENT
Start: 2025-07-18 | End: 2025-07-29

## 2025-07-18 RX ADMIN — Medication 650 MILLIGRAM(S): at 09:46

## 2025-07-18 RX ADMIN — FOLIC ACID 1 MILLIGRAM(S): 1 TABLET ORAL at 08:22

## 2025-07-18 RX ADMIN — Medication 1 TABLET(S): at 08:22

## 2025-07-18 RX ADMIN — CYCLOBENZAPRINE HYDROCHLORIDE 5 MILLIGRAM(S): 15 CAPSULE, EXTENDED RELEASE ORAL at 22:07

## 2025-07-18 RX ADMIN — Medication 650 MILLIGRAM(S): at 08:24

## 2025-07-18 RX ADMIN — Medication 650 MILLIGRAM(S): at 21:00

## 2025-07-18 RX ADMIN — Medication 100 MILLIGRAM(S): at 08:22

## 2025-07-18 RX ADMIN — Medication 650 MILLIGRAM(S): at 20:25

## 2025-07-19 LAB
ANION GAP SERPL CALC-SCNC: 10 MMOL/L — SIGNIFICANT CHANGE UP (ref 7–14)
BUN SERPL-MCNC: 15 MG/DL — SIGNIFICANT CHANGE UP (ref 7–23)
CALCIUM SERPL-MCNC: 9 MG/DL — SIGNIFICANT CHANGE UP (ref 8.4–10.5)
CHLORIDE SERPL-SCNC: 106 MMOL/L — SIGNIFICANT CHANGE UP (ref 98–107)
CO2 SERPL-SCNC: 25 MMOL/L — SIGNIFICANT CHANGE UP (ref 22–31)
CREAT SERPL-MCNC: 0.97 MG/DL — SIGNIFICANT CHANGE UP (ref 0.5–1.3)
EGFR: 90 ML/MIN/1.73M2 — SIGNIFICANT CHANGE UP
EGFR: 90 ML/MIN/1.73M2 — SIGNIFICANT CHANGE UP
GLUCOSE SERPL-MCNC: 72 MG/DL — SIGNIFICANT CHANGE UP (ref 70–99)
POTASSIUM SERPL-MCNC: 4.2 MMOL/L — SIGNIFICANT CHANGE UP (ref 3.5–5.3)
POTASSIUM SERPL-SCNC: 4.2 MMOL/L — SIGNIFICANT CHANGE UP (ref 3.5–5.3)
SODIUM SERPL-SCNC: 141 MMOL/L — SIGNIFICANT CHANGE UP (ref 135–145)

## 2025-07-19 PROCEDURE — 99232 SBSQ HOSP IP/OBS MODERATE 35: CPT

## 2025-07-19 RX ORDER — MELATONIN 5 MG
3 TABLET ORAL AT BEDTIME
Refills: 0 | Status: DISCONTINUED | OUTPATIENT
Start: 2025-07-19 | End: 2025-07-29

## 2025-07-19 RX ADMIN — FOLIC ACID 1 MILLIGRAM(S): 1 TABLET ORAL at 08:33

## 2025-07-19 RX ADMIN — Medication 1 TABLET(S): at 08:33

## 2025-07-19 RX ADMIN — LURASIDONE HYDROCHLORIDE 20 MILLIGRAM(S): 120 TABLET, FILM COATED ORAL at 20:06

## 2025-07-19 RX ADMIN — Medication 650 MILLIGRAM(S): at 09:38

## 2025-07-19 RX ADMIN — Medication 100 MILLIGRAM(S): at 08:33

## 2025-07-19 RX ADMIN — Medication 3 MILLIGRAM(S): at 20:06

## 2025-07-20 PROCEDURE — 99232 SBSQ HOSP IP/OBS MODERATE 35: CPT

## 2025-07-20 RX ORDER — IBUPROFEN 200 MG
400 TABLET ORAL ONCE
Refills: 0 | Status: COMPLETED | OUTPATIENT
Start: 2025-07-20 | End: 2025-07-20

## 2025-07-20 RX ORDER — IBUPROFEN 200 MG
600 TABLET ORAL ONCE
Refills: 0 | Status: COMPLETED | OUTPATIENT
Start: 2025-07-20 | End: 2025-07-21

## 2025-07-20 RX ADMIN — Medication 1 TABLET(S): at 08:43

## 2025-07-20 RX ADMIN — Medication 650 MILLIGRAM(S): at 06:17

## 2025-07-20 RX ADMIN — Medication 400 MILLIGRAM(S): at 21:32

## 2025-07-20 RX ADMIN — Medication 650 MILLIGRAM(S): at 06:53

## 2025-07-20 RX ADMIN — Medication 400 MILLIGRAM(S): at 20:30

## 2025-07-20 RX ADMIN — Medication 100 MILLIGRAM(S): at 08:43

## 2025-07-20 RX ADMIN — FOLIC ACID 1 MILLIGRAM(S): 1 TABLET ORAL at 08:43

## 2025-07-21 PROCEDURE — 99232 SBSQ HOSP IP/OBS MODERATE 35: CPT

## 2025-07-21 RX ORDER — IBUPROFEN 200 MG
400 TABLET ORAL ONCE
Refills: 0 | Status: COMPLETED | OUTPATIENT
Start: 2025-07-21 | End: 2025-07-21

## 2025-07-21 RX ADMIN — Medication 400 MILLIGRAM(S): at 21:03

## 2025-07-21 RX ADMIN — Medication 400 MILLIGRAM(S): at 22:04

## 2025-07-21 RX ADMIN — Medication 1 TABLET(S): at 08:53

## 2025-07-21 RX ADMIN — LURASIDONE HYDROCHLORIDE 20 MILLIGRAM(S): 120 TABLET, FILM COATED ORAL at 20:09

## 2025-07-21 RX ADMIN — FOLIC ACID 1 MILLIGRAM(S): 1 TABLET ORAL at 08:53

## 2025-07-21 RX ADMIN — Medication 600 MILLIGRAM(S): at 06:16

## 2025-07-22 LAB
FERRITIN SERPL-MCNC: 59 NG/ML — SIGNIFICANT CHANGE UP (ref 30–400)
IRON SATN MFR SERPL: 11 % — LOW (ref 14–50)
IRON SATN MFR SERPL: 44 UG/DL — LOW (ref 45–165)
TIBC SERPL-MCNC: 395 UG/DL — SIGNIFICANT CHANGE UP (ref 220–430)
UIBC SERPL-MCNC: 351 UG/DL — SIGNIFICANT CHANGE UP (ref 110–370)

## 2025-07-22 PROCEDURE — 99232 SBSQ HOSP IP/OBS MODERATE 35: CPT

## 2025-07-22 RX ADMIN — Medication 1 TABLET(S): at 08:48

## 2025-07-22 RX ADMIN — Medication 650 MILLIGRAM(S): at 21:36

## 2025-07-22 RX ADMIN — Medication 650 MILLIGRAM(S): at 20:36

## 2025-07-22 RX ADMIN — FOLIC ACID 1 MILLIGRAM(S): 1 TABLET ORAL at 08:48

## 2025-07-22 RX ADMIN — LURASIDONE HYDROCHLORIDE 20 MILLIGRAM(S): 120 TABLET, FILM COATED ORAL at 20:57

## 2025-07-23 PROCEDURE — 99232 SBSQ HOSP IP/OBS MODERATE 35: CPT

## 2025-07-23 RX ADMIN — LURASIDONE HYDROCHLORIDE 20 MILLIGRAM(S): 120 TABLET, FILM COATED ORAL at 22:00

## 2025-07-23 RX ADMIN — FOLIC ACID 1 MILLIGRAM(S): 1 TABLET ORAL at 08:47

## 2025-07-23 RX ADMIN — Medication 1 TABLET(S): at 08:47

## 2025-07-24 PROCEDURE — 99232 SBSQ HOSP IP/OBS MODERATE 35: CPT

## 2025-07-24 RX ADMIN — Medication 1 TABLET(S): at 08:20

## 2025-07-24 RX ADMIN — FOLIC ACID 1 MILLIGRAM(S): 1 TABLET ORAL at 08:20

## 2025-07-24 RX ADMIN — LURASIDONE HYDROCHLORIDE 20 MILLIGRAM(S): 120 TABLET, FILM COATED ORAL at 21:02

## 2025-07-24 RX ADMIN — Medication 650 MILLIGRAM(S): at 21:01

## 2025-07-25 PROCEDURE — 99232 SBSQ HOSP IP/OBS MODERATE 35: CPT

## 2025-07-25 RX ORDER — B1/B2/B3/B5/B6/B12/VIT C/FOLIC 500-0.5 MG
1 TABLET ORAL DAILY
Refills: 0 | Status: DISCONTINUED | OUTPATIENT
Start: 2025-07-25 | End: 2025-08-01

## 2025-07-25 RX ORDER — IBUPROFEN 200 MG
400 TABLET ORAL EVERY 8 HOURS
Refills: 0 | Status: DISCONTINUED | OUTPATIENT
Start: 2025-07-25 | End: 2025-08-01

## 2025-07-25 RX ADMIN — Medication 650 MILLIGRAM(S): at 11:15

## 2025-07-25 RX ADMIN — Medication 1 TABLET(S): at 11:15

## 2025-07-26 RX ADMIN — Medication 650 MILLIGRAM(S): at 13:05

## 2025-07-26 RX ADMIN — Medication 650 MILLIGRAM(S): at 21:15

## 2025-07-26 RX ADMIN — Medication 1 TABLET(S): at 08:58

## 2025-07-26 RX ADMIN — Medication 650 MILLIGRAM(S): at 21:52

## 2025-07-27 RX ADMIN — Medication 400 MILLIGRAM(S): at 20:29

## 2025-07-27 RX ADMIN — LURASIDONE HYDROCHLORIDE 20 MILLIGRAM(S): 120 TABLET, FILM COATED ORAL at 20:30

## 2025-07-28 PROCEDURE — 99232 SBSQ HOSP IP/OBS MODERATE 35: CPT

## 2025-07-28 RX ADMIN — Medication 650 MILLIGRAM(S): at 20:37

## 2025-07-28 RX ADMIN — Medication 1 TABLET(S): at 08:59

## 2025-07-28 RX ADMIN — Medication 650 MILLIGRAM(S): at 14:03

## 2025-07-29 PROCEDURE — 99232 SBSQ HOSP IP/OBS MODERATE 35: CPT

## 2025-07-29 RX ORDER — MELATONIN 5 MG
5 TABLET ORAL AT BEDTIME
Refills: 0 | Status: DISCONTINUED | OUTPATIENT
Start: 2025-07-29 | End: 2025-08-01

## 2025-07-29 RX ADMIN — Medication 1 TABLET(S): at 07:30

## 2025-07-29 RX ADMIN — Medication 400 MILLIGRAM(S): at 07:29

## 2025-07-30 VITALS — TEMPERATURE: 97 F

## 2025-07-30 PROCEDURE — 99232 SBSQ HOSP IP/OBS MODERATE 35: CPT

## 2025-07-30 RX ADMIN — Medication 400 MILLIGRAM(S): at 20:53

## 2025-07-30 RX ADMIN — Medication 400 MILLIGRAM(S): at 21:32

## 2025-07-30 RX ADMIN — Medication 1 TABLET(S): at 08:21

## 2025-07-31 PROCEDURE — 99232 SBSQ HOSP IP/OBS MODERATE 35: CPT

## 2025-07-31 RX ORDER — B1/B2/B3/B5/B6/B12/VIT C/FOLIC 500-0.5 MG
1 TABLET ORAL
Qty: 0 | Refills: 0 | DISCHARGE
Start: 2025-07-31

## 2025-08-01 RX ADMIN — Medication 1 TABLET(S): at 08:11

## 2025-08-04 NOTE — BH INPATIENT PSYCHIATRY DISCHARGE NOTE - NSTOBACCOUSAGEY/N_GEN_A_CS
Hansa calling for an sooner appt than the one she has which is 8/20/2025. Patient will be added to wait list in case someone cancels for a sooner appointment.    No

## 2025-09-02 ENCOUNTER — EMERGENCY (EMERGENCY)
Facility: HOSPITAL | Age: 60
LOS: 1 days | End: 2025-09-02
Attending: STUDENT IN AN ORGANIZED HEALTH CARE EDUCATION/TRAINING PROGRAM | Admitting: STUDENT IN AN ORGANIZED HEALTH CARE EDUCATION/TRAINING PROGRAM
Payer: MEDICAID

## 2025-09-02 VITALS
RESPIRATION RATE: 20 BRPM | OXYGEN SATURATION: 96 % | SYSTOLIC BLOOD PRESSURE: 133 MMHG | HEIGHT: 74 IN | TEMPERATURE: 98 F | HEART RATE: 111 BPM | DIASTOLIC BLOOD PRESSURE: 89 MMHG | WEIGHT: 298.06 LBS

## 2025-09-02 VITALS
DIASTOLIC BLOOD PRESSURE: 86 MMHG | RESPIRATION RATE: 18 BRPM | OXYGEN SATURATION: 97 % | TEMPERATURE: 98 F | HEART RATE: 79 BPM | SYSTOLIC BLOOD PRESSURE: 135 MMHG

## 2025-09-02 LAB
ALBUMIN SERPL ELPH-MCNC: 4 G/DL — SIGNIFICANT CHANGE UP (ref 3.3–5)
ALP SERPL-CCNC: 71 U/L — SIGNIFICANT CHANGE UP (ref 40–120)
ALT FLD-CCNC: 52 U/L — HIGH (ref 10–45)
ANION GAP SERPL CALC-SCNC: 15 MMOL/L — SIGNIFICANT CHANGE UP (ref 5–17)
APTT BLD: 27.7 SEC — SIGNIFICANT CHANGE UP (ref 26.1–36.8)
AST SERPL-CCNC: SIGNIFICANT CHANGE UP (ref 10–40)
BASOPHILS # BLD AUTO: 0.03 K/UL — SIGNIFICANT CHANGE UP (ref 0–0.2)
BASOPHILS NFR BLD AUTO: 0.5 % — SIGNIFICANT CHANGE UP (ref 0–2)
BILIRUB SERPL-MCNC: 0.9 MG/DL — SIGNIFICANT CHANGE UP (ref 0.2–1.2)
BUN SERPL-MCNC: 18 MG/DL — SIGNIFICANT CHANGE UP (ref 7–23)
CALCIUM SERPL-MCNC: 9.2 MG/DL — SIGNIFICANT CHANGE UP (ref 8.4–10.5)
CHLORIDE SERPL-SCNC: 105 MMOL/L — SIGNIFICANT CHANGE UP (ref 96–108)
CO2 SERPL-SCNC: 20 MMOL/L — LOW (ref 22–31)
CREAT SERPL-MCNC: 1.06 MG/DL — SIGNIFICANT CHANGE UP (ref 0.5–1.3)
D DIMER BLD IA.RAPID-MCNC: 436 NG/ML DDU — HIGH
EGFR: 81 ML/MIN/1.73M2 — SIGNIFICANT CHANGE UP
EGFR: 81 ML/MIN/1.73M2 — SIGNIFICANT CHANGE UP
EOSINOPHIL # BLD AUTO: 0.08 K/UL — SIGNIFICANT CHANGE UP (ref 0–0.5)
EOSINOPHIL NFR BLD AUTO: 1.3 % — SIGNIFICANT CHANGE UP (ref 0–6)
ETHANOL SERPL-MCNC: 78 MG/DL — HIGH (ref 0–10)
GLUCOSE SERPL-MCNC: 97 MG/DL — SIGNIFICANT CHANGE UP (ref 70–99)
HCT VFR BLD CALC: 34.6 % — LOW (ref 39–50)
HGB BLD-MCNC: 11.4 G/DL — LOW (ref 13–17)
IMM GRANULOCYTES # BLD AUTO: 0.01 K/UL — SIGNIFICANT CHANGE UP (ref 0–0.07)
IMM GRANULOCYTES NFR BLD AUTO: 0.2 % — SIGNIFICANT CHANGE UP (ref 0–0.9)
INR BLD: 1.17 — HIGH (ref 0.85–1.16)
LYMPHOCYTES # BLD AUTO: 2.1 K/UL — SIGNIFICANT CHANGE UP (ref 1–3.3)
LYMPHOCYTES NFR BLD AUTO: 34.8 % — SIGNIFICANT CHANGE UP (ref 13–44)
MCHC RBC-ENTMCNC: 29.8 PG — SIGNIFICANT CHANGE UP (ref 27–34)
MCHC RBC-ENTMCNC: 32.9 G/DL — SIGNIFICANT CHANGE UP (ref 32–36)
MCV RBC AUTO: 90.3 FL — SIGNIFICANT CHANGE UP (ref 80–100)
MONOCYTES # BLD AUTO: 0.73 K/UL — SIGNIFICANT CHANGE UP (ref 0–0.9)
MONOCYTES NFR BLD AUTO: 12.1 % — SIGNIFICANT CHANGE UP (ref 2–14)
NEUTROPHILS # BLD AUTO: 3.08 K/UL — SIGNIFICANT CHANGE UP (ref 1.8–7.4)
NEUTROPHILS NFR BLD AUTO: 51.1 % — SIGNIFICANT CHANGE UP (ref 43–77)
NRBC # BLD AUTO: 0 K/UL — SIGNIFICANT CHANGE UP (ref 0–0)
NRBC # FLD: 0 K/UL — SIGNIFICANT CHANGE UP (ref 0–0)
NRBC BLD AUTO-RTO: 0 /100 WBCS — SIGNIFICANT CHANGE UP (ref 0–0)
NT-PROBNP SERPL-SCNC: 55 PG/ML — SIGNIFICANT CHANGE UP (ref 0–300)
PLATELET # BLD AUTO: 272 K/UL — SIGNIFICANT CHANGE UP (ref 150–400)
PMV BLD: 9.6 FL — SIGNIFICANT CHANGE UP (ref 7–13)
POTASSIUM SERPL-MCNC: 4.1 MMOL/L — SIGNIFICANT CHANGE UP (ref 3.5–5.3)
POTASSIUM SERPL-SCNC: 4.1 MMOL/L — SIGNIFICANT CHANGE UP (ref 3.5–5.3)
PROT SERPL-MCNC: 7.4 G/DL — SIGNIFICANT CHANGE UP (ref 6–8.3)
PROTHROM AB SERPL-ACNC: 13.7 SEC — HIGH (ref 9.9–13.4)
RBC # BLD: 3.83 M/UL — LOW (ref 4.2–5.8)
RBC # FLD: 15.8 % — HIGH (ref 10.3–14.5)
SODIUM SERPL-SCNC: 140 MMOL/L — SIGNIFICANT CHANGE UP (ref 135–145)
TROPONIN I, HIGH SENSITIVITY RESULT: 8.6 NG/L — SIGNIFICANT CHANGE UP
WBC # BLD: 6.03 K/UL — SIGNIFICANT CHANGE UP (ref 3.8–10.5)
WBC # FLD AUTO: 6.03 K/UL — SIGNIFICANT CHANGE UP (ref 3.8–10.5)

## 2025-09-02 PROCEDURE — 70450 CT HEAD/BRAIN W/O DYE: CPT | Mod: 26

## 2025-09-02 PROCEDURE — 71046 X-RAY EXAM CHEST 2 VIEWS: CPT | Mod: 26

## 2025-09-02 PROCEDURE — 99285 EMERGENCY DEPT VISIT HI MDM: CPT

## 2025-09-02 PROCEDURE — 71275 CT ANGIOGRAPHY CHEST: CPT | Mod: 26

## 2025-09-02 RX ORDER — KETOROLAC TROMETHAMINE 30 MG/ML
15 INJECTION, SOLUTION INTRAMUSCULAR; INTRAVENOUS ONCE
Refills: 0 | Status: DISCONTINUED | OUTPATIENT
Start: 2025-09-02 | End: 2025-09-02

## 2025-09-02 RX ORDER — ASPIRIN 325 MG
162 TABLET ORAL ONCE
Refills: 0 | Status: COMPLETED | OUTPATIENT
Start: 2025-09-02 | End: 2025-09-02

## 2025-09-02 RX ADMIN — Medication 162 MILLIGRAM(S): at 17:15

## 2025-09-02 RX ADMIN — Medication 1000 MILLILITER(S): at 17:15

## 2025-09-02 RX ADMIN — KETOROLAC TROMETHAMINE 15 MILLIGRAM(S): 30 INJECTION, SOLUTION INTRAMUSCULAR; INTRAVENOUS at 21:58

## 2025-09-02 RX ADMIN — KETOROLAC TROMETHAMINE 15 MILLIGRAM(S): 30 INJECTION, SOLUTION INTRAMUSCULAR; INTRAVENOUS at 22:26

## 2025-09-04 DIAGNOSIS — R20.0 ANESTHESIA OF SKIN: ICD-10-CM

## 2025-09-04 DIAGNOSIS — R00.0 TACHYCARDIA, UNSPECIFIED: ICD-10-CM

## 2025-09-04 DIAGNOSIS — R07.9 CHEST PAIN, UNSPECIFIED: ICD-10-CM
